# Patient Record
Sex: FEMALE | Race: BLACK OR AFRICAN AMERICAN | NOT HISPANIC OR LATINO | Employment: OTHER | URBAN - METROPOLITAN AREA
[De-identification: names, ages, dates, MRNs, and addresses within clinical notes are randomized per-mention and may not be internally consistent; named-entity substitution may affect disease eponyms.]

---

## 2017-01-20 RX ORDER — DILTIAZEM HYDROCHLORIDE 180 MG/1
180 CAPSULE, COATED, EXTENDED RELEASE ORAL DAILY
Qty: 90 CAPSULE | Refills: 0 | Status: SHIPPED | OUTPATIENT
Start: 2017-01-20 | End: 2017-04-19 | Stop reason: SDUPTHER

## 2017-01-20 RX ORDER — DILTIAZEM HYDROCHLORIDE 120 MG/1
120 CAPSULE, COATED, EXTENDED RELEASE ORAL DAILY
Qty: 90 CAPSULE | Refills: 0 | Status: SHIPPED | OUTPATIENT
Start: 2017-01-20 | End: 2017-04-19 | Stop reason: SDUPTHER

## 2017-02-01 ENCOUNTER — OFFICE VISIT (OUTPATIENT)
Dept: OPHTHALMOLOGY | Facility: CLINIC | Age: 67
End: 2017-02-01
Payer: MEDICARE

## 2017-02-01 DIAGNOSIS — H40.63X0 STEROID-INDUCED GLAUCOMA OF BOTH EYES: Primary | ICD-10-CM

## 2017-02-01 DIAGNOSIS — T38.0X5A STEROID-INDUCED GLAUCOMA OF BOTH EYES: Primary | ICD-10-CM

## 2017-02-01 DIAGNOSIS — D89.89 AUTOIMMUNE DISORDER: ICD-10-CM

## 2017-02-01 DIAGNOSIS — H04.123 BILATERAL DRY EYES: ICD-10-CM

## 2017-02-01 DIAGNOSIS — H25.13 NUCLEAR SCLEROSIS, BILATERAL: ICD-10-CM

## 2017-02-01 PROCEDURE — 99999 PR PBB SHADOW E&M-EST. PATIENT-LVL II: CPT | Mod: PBBFAC,,, | Performed by: OPHTHALMOLOGY

## 2017-02-01 PROCEDURE — 92012 INTRM OPH EXAM EST PATIENT: CPT | Mod: S$GLB,,, | Performed by: OPHTHALMOLOGY

## 2017-02-01 RX ORDER — BRIMONIDINE TARTRATE AND TIMOLOL MALEATE 2; 5 MG/ML; MG/ML
1 SOLUTION OPHTHALMIC 2 TIMES DAILY
Qty: 15 ML | Refills: 11 | Status: SHIPPED | OUTPATIENT
Start: 2017-02-01 | End: 2018-03-23 | Stop reason: SDUPTHER

## 2017-02-01 NOTE — PROGRESS NOTES
HPI     Glaucoma    Additional comments: 2 mon iritis chk           Comments   Patient states that vision seems about the same as last visit in both   eyes.       Last edited by Juan Fraga on 2/1/2017 10:17 AM. (History)            Assessment /Plan     For exam results, see Encounter Report.    Steroid-induced glaucoma of both eyes  -     brimonidine-timolol (COMBIGAN) 0.2-0.5 % Drop; Place 1 drop into both eyes 2 (two) times daily.  Dispense: 15 mL; Refill: 11  -     Posterior Segment OCT Optic Nerve- Both eyes    Nuclear sclerosis, bilateral    Bilateral dry eyes - Both Eyes    Autoimmune disorder- recurrent iritis          Steroid glaucoma  Re-discussed steroid use and elevated IOP response    Patient with Hx scleritis  Long term PF 1% use without steroid response in past  Switched from PF 1% --> Durezol q day in April 2/2 cost and lack of coverage  Presents 10/5/2016 45 OU, clear corneas and quiet --> patient felt blur in OS x 3 weeks / no pain        CCT  489 // 495    Low teens      Both eyes  Combigan TWICE DAILY --> tolerating --> ran out and restart      ANJANA --> Recurrent x 2 inpast  Likely PARISH  No Trauma / Masses etc    HESHAM OS  Inf limbus  Patient notes increased darkening  Flat without vessels  More prominent      Recurrent Scleritis OU  + GEORGIANA Lupus  Azathioprine -->  increased dose per Dr Kan      Off Oral Motrin 400 mg TWICE DAILY -> proteinuria     Dry Eye Syndrome: discussed use of warm compresses, preserved & non-preserved artificial tears, gel and PM ointment options.  Also discussed options utilizing medications.      Plan  RTC 3 months IOP / inflammation check  RTC sooner prn with good understanding

## 2017-02-01 NOTE — MR AVS SNAPSHOT
[facility] - Ophthalmology  [address] [facility] [address]  Phone: [phone]  Fax: [phone]                  Idalmis Morejon   2017 10:00 AM   Office Visit    Description:  Female : 1950   Provider:  Alfredito Valentine MD   Department:  [facility] - Ophthalmology           Reason for Visit     Glaucoma           Diagnoses this Visit        Comments    Steroid-induced glaucoma of both eyes    -  Primary     Nuclear sclerosis, bilateral         Bilateral dry eyes         Autoimmune disorder                To Do List           Future Appointments        Provider Department Dept Phone    2017 10:45 AM LAB, SAME DAY [facility] [phone]    2017 11:30 AM Bryan Love MD [facility] - Gastroenterology [phone]    3/3/2017 8:00 AM HRA, NOM 3 [facility] - Internal Medicine [phone]    5/3/2017 10:00 AM Alfredito Valentine MD [facility] - Ophthalmology [phone]      Goals (5 Years of Data)     None      Follow-Up and Disposition     Return in about 3 months (around 2017), or if symptoms worsen or fail to improve, for Pressure check.       These Medications        Disp Refills Start End    brimonidine-timolol (COMBIGAN) 0.2-0.5 % Drop 15 mL 11 2017     Place 1 drop into both eyes 2 (two) times daily. - Both Eyes    Pharmacy: [facility]/pharmacy #[id number] - [place], [place] - [address] Ph #: [phone]         [facility]     [facility] Nurse Care [facility] -  Assistance  Registered nurses in the [facility] provide clinical advisement, health education, appointment booking, and other advisory services.  Call for this free service at [phone].             Medications           Message regarding Medications     Verify the changes and/or additions to your medication regime listed below are the same as discussed with your clinician today.  If any of these changes or additions are incorrect, please notify your  healthcare provider.        START taking these NEW medications        Refills    brimonidine-timolol (COMBIGAN) 0.2-0.5 % Drop 11    Sig: Place 1 drop into both eyes 2 (two) times daily.    Class: Normal    Route: Both Eyes      STOP taking these medications     prednisoLONE acetate (PRED FORTE) 1 % DrpS Place 1 drop into both eyes every 4 (four) hours.           Verify that the below list of medications is an accurate representation of the medications you are currently taking.  If none reported, the list may be blank. If incorrect, please contact your healthcare provider. Carry this list with you in case of emergency.           Current Medications     azathioprine 75 mg Tab Take 75 mg by mouth once daily.    diltiaZEM (CARDIZEM CD) 120 MG Cp24 Take 1 capsule (120 mg total) by mouth once daily.    diltiaZEM (CARDIZEM CD) 180 MG 24 hr capsule Take 1 capsule (180 mg total) by mouth once daily.    duloxetine (CYMBALTA) 60 MG capsule TAKE 2 CAPSULES (120 MG TOTAL) BY MOUTH ONCE DAILY.    ferrous sulfate 325 mg (65 mg iron) Tab tablet Take 1 tablet (325 mg total) by mouth every 12 (twelve) hours.    losartan (COZAAR) 100 MG tablet Take 1 tablet (100 mg total) by mouth once daily.    pantoprazole (PROTONIX) 40 MG tablet Take 1 tablet (40 mg total) by mouth before breakfast.    POLYETHYLENE GLYCOL 3350 (MIRALAX ORAL)     promethazine (PHENERGAN) 25 MG tablet TAKE 1 TABLET BY MOUTH .DO NOT TAKE MORE THAN 2-3 WEEKLY FOR NAUSEA AND HEADACHE    tolterodine (DETROL LA) 4 MG 24 hr capsule TAKE ONE CAPSULE BY MOUTH EVERY DAY    brimonidine-timolol (COMBIGAN) 0.2-0.5 % Drop Place 1 drop into both eyes 2 (two) times daily.           Clinical Reference Information           Allergies as of 2/1/2017     Alendronate    Kenalog [Triamcinolone Acetonide]      Immunizations Administered on Date of Encounter - 2/1/2017     None      Orders Placed During Today's Visit      Normal Orders This Visit    Posterior Segment OCT Optic Nerve- Both  eyes

## 2017-02-08 ENCOUNTER — TELEPHONE (OUTPATIENT)
Dept: GASTROENTEROLOGY | Facility: CLINIC | Age: 67
End: 2017-02-08

## 2017-02-09 ENCOUNTER — OFFICE VISIT (OUTPATIENT)
Dept: GASTROENTEROLOGY | Facility: CLINIC | Age: 67
End: 2017-02-09
Payer: MEDICARE

## 2017-02-09 ENCOUNTER — LAB VISIT (OUTPATIENT)
Dept: LAB | Facility: HOSPITAL | Age: 67
End: 2017-02-09
Attending: INTERNAL MEDICINE
Payer: MEDICARE

## 2017-02-09 VITALS
WEIGHT: 146.63 LBS | BODY MASS INDEX: 23.57 KG/M2 | HEIGHT: 66 IN | SYSTOLIC BLOOD PRESSURE: 143 MMHG | HEART RATE: 66 BPM | DIASTOLIC BLOOD PRESSURE: 84 MMHG

## 2017-02-09 DIAGNOSIS — Z79.899 ENCOUNTER FOR MONITORING LONG-TERM PROTON PUMP INHIBITOR THERAPY: ICD-10-CM

## 2017-02-09 DIAGNOSIS — K21.9 GASTROESOPHAGEAL REFLUX DISEASE, ESOPHAGITIS PRESENCE NOT SPECIFIED: Primary | ICD-10-CM

## 2017-02-09 DIAGNOSIS — M81.0 OSTEOPOROSIS: ICD-10-CM

## 2017-02-09 DIAGNOSIS — Z80.0 FAMILY HISTORY OF COLON CANCER: ICD-10-CM

## 2017-02-09 DIAGNOSIS — Z51.81 ENCOUNTER FOR MONITORING LONG-TERM PROTON PUMP INHIBITOR THERAPY: ICD-10-CM

## 2017-02-09 LAB
ALBUMIN SERPL BCP-MCNC: 3.6 G/DL
ALP SERPL-CCNC: 63 U/L
ALT SERPL W/O P-5'-P-CCNC: 10 U/L
ANION GAP SERPL CALC-SCNC: 8 MMOL/L
AST SERPL-CCNC: 23 U/L
BASOPHILS # BLD AUTO: 0.01 K/UL
BASOPHILS NFR BLD: 0.3 %
BILIRUB SERPL-MCNC: 0.7 MG/DL
BUN SERPL-MCNC: 13 MG/DL
CALCIUM SERPL-MCNC: 9.2 MG/DL
CHLORIDE SERPL-SCNC: 108 MMOL/L
CO2 SERPL-SCNC: 26 MMOL/L
CREAT SERPL-MCNC: 1 MG/DL
CRP SERPL-MCNC: 1.9 MG/L
DIFFERENTIAL METHOD: ABNORMAL
EOSINOPHIL # BLD AUTO: 0.2 K/UL
EOSINOPHIL NFR BLD: 4.1 %
ERYTHROCYTE [DISTWIDTH] IN BLOOD BY AUTOMATED COUNT: 15.2 %
ERYTHROCYTE [SEDIMENTATION RATE] IN BLOOD BY WESTERGREN METHOD: 20 MM/HR
EST. GFR  (AFRICAN AMERICAN): >60 ML/MIN/1.73 M^2
EST. GFR  (NON AFRICAN AMERICAN): 58 ML/MIN/1.73 M^2
GLUCOSE SERPL-MCNC: 87 MG/DL
HCT VFR BLD AUTO: 32.7 %
HGB BLD-MCNC: 10.9 G/DL
LYMPHOCYTES # BLD AUTO: 1.1 K/UL
LYMPHOCYTES NFR BLD: 29.3 %
MCH RBC QN AUTO: 29.5 PG
MCHC RBC AUTO-ENTMCNC: 33.3 %
MCV RBC AUTO: 89 FL
MONOCYTES # BLD AUTO: 0.3 K/UL
MONOCYTES NFR BLD: 6.5 %
NEUTROPHILS # BLD AUTO: 2.3 K/UL
NEUTROPHILS NFR BLD: 59.8 %
PLATELET # BLD AUTO: 254 K/UL
PMV BLD AUTO: 10.4 FL
POTASSIUM SERPL-SCNC: 4.1 MMOL/L
PROT SERPL-MCNC: 7.2 G/DL
RBC # BLD AUTO: 3.69 M/UL
SODIUM SERPL-SCNC: 142 MMOL/L
WBC # BLD AUTO: 3.86 K/UL

## 2017-02-09 PROCEDURE — 1157F ADVNC CARE PLAN IN RCRD: CPT | Mod: S$GLB,,, | Performed by: INTERNAL MEDICINE

## 2017-02-09 PROCEDURE — 1159F MED LIST DOCD IN RCRD: CPT | Mod: S$GLB,,, | Performed by: INTERNAL MEDICINE

## 2017-02-09 PROCEDURE — 1160F RVW MEDS BY RX/DR IN RCRD: CPT | Mod: S$GLB,,, | Performed by: INTERNAL MEDICINE

## 2017-02-09 PROCEDURE — 99999 PR PBB SHADOW E&M-EST. PATIENT-LVL III: CPT | Mod: PBBFAC,,, | Performed by: INTERNAL MEDICINE

## 2017-02-09 PROCEDURE — 99213 OFFICE O/P EST LOW 20 MIN: CPT | Mod: S$GLB,,, | Performed by: INTERNAL MEDICINE

## 2017-02-09 PROCEDURE — 3079F DIAST BP 80-89 MM HG: CPT | Mod: S$GLB,,, | Performed by: INTERNAL MEDICINE

## 2017-02-09 PROCEDURE — 3077F SYST BP >= 140 MM HG: CPT | Mod: S$GLB,,, | Performed by: INTERNAL MEDICINE

## 2017-02-09 PROCEDURE — 99499 UNLISTED E&M SERVICE: CPT | Mod: S$GLB,,, | Performed by: INTERNAL MEDICINE

## 2017-02-09 PROCEDURE — 1126F AMNT PAIN NOTED NONE PRSNT: CPT | Mod: S$GLB,,, | Performed by: INTERNAL MEDICINE

## 2017-02-09 NOTE — PROGRESS NOTES
CHIEF COMPLAINT: Follow up of Epigastric pain for the last eight months.      HISTORY OF PRESENT ILLNESS: This is a 66-year-old -American female who   has been worked up by Rheumatology for possible lupus, not definitive. She  Was seen in   GI clinic last on 8/16/2016  for 8 months of epigastric pain which would usually occurs right after eating,   lasted 5 to 15 minutes and then would go away. She denied any weight loss as a   result of this. She has had no trauma to her belly. She has never had this   pain before.  She had EGD and EUS for evaluation of these symptoms and it was normal and her  Symptoms have completely resolved on their own and now she feels great and no problems.      REVIEW OF SYSTEMS:  CONSTITUTIONAL: No fever or fatigue. No weight loss.  EYES: No visual disturbances.  ENT: No difficulty swallowing or sore throat. No odynophagia. No dysphagia.  CARDIOVASCULAR: No chest pain or palpitations.  RESPIRATORY: No shortness of breath.  MUSCULOSKELETAL: She does have some chronic arthritis.  SKIN: No itching or rash.  NEUROLOGIC: No headache, syncope or stroke.  PSYCHIATRIC: No uncontrolled depression or anxiety.  ENDOCRINE: No cold or heat intolerance.  LYMPHATICS: No lymphadenopathy.  GI: No nausea or vomiting. No heartburn. No epigastric pain. No early satiety.   No blood in her stool. Averaging one bowel   movement every other day. She does take MiraLax.      ALLERGIES: She is allergic to Kenalog and alendronate.      PAST MEDICAL HISTORY: Positive for hypertension, depression, chronic back pain,  anemia, sickle cell trait and possible lupus, but not definitive.      PAST SURGICAL HISTORY: Bilateral salpingo-oophorectomy, hysterectomy back in   1970, cholecystectomy in 1997, appendectomy, tonsillectomy, lumbar surgery with   fusion.      FAMILY HISTORY: Mom had two brothers with colorectal cancer, one sister with   colorectal cancer in her 70s. No first or other second-degree relatives with  "  colorectal cancer. Nobody with colon cancer under the age of 50. No Holly   syndrome. No FAP, no attenuated FAP, no MAP. No other GI malignancies. She   did have a brother who  of an MI in his 60, he was a diabetic, on dialysis.   Her mom lived past age 86. Her dad  in his 80s.      SOCIAL HISTORY: Nonsmoker, nondrinker. She has been disabled secondary to   depression since . She was  once,  in , has one daughter  who is about 48.5 years of age.      PHYSICAL EXAMINATION:  Visit Vitals    BP (!) 143/84    Pulse 66    Ht 5' 6" (1.676 m)    Wt 66.5 kg (146 lb 9.7 oz)    BMI 23.66 kg/m2     .GENERAL APPEARANCE: Well nourished, well developed, not in any acute distress.   Chaperone in the room, Alexa.  ABDOMEN: Soft, no guarding. Mild tenderness in the epigastric area. No Mallory  sign. No McBurney point tenderness. No CVA tenderness. Nondistended.   Normoactive bowel sounds. No appreciative ascites or hernias.  EYES: Conjunctivae are nonicteric.  CARDIOVASCULAR: S1 and S2 without murmurs.  RESPIRATORY: Clear to auscultation bilaterally without wheezes, rhonchi or   rales.  SKIN: No petechiae or rash on exposed skin areas.  NEUROLOGIC: Alert and oriented x4.  PSYCHIATRIC: Normal speech, mentation and affect.  LYMPHATICS: No cervical or supraclavicular lymphadenopathy.      MEDICAL DECISION MAKING: As above. Labs have been reviewed. The patient's   last colonoscopy on 10/02/2015 reviewed. The patient had 3 mm colon polyp.   Recommend a repeat colonoscopy in five years for surveillance. EGD on   2015, shows mild Schatzki ring.      Lab work shows a last hemoglobin was 12 but has sickle cell trait, it   has been stable. Comprehensive metabolic panel shows no elevated LFTs, lipase   is normal, ferritin is normal.    EGD and EUS   Impression:   - Normal esophagus.                        - Normal stomach.                        - Normal examined duodenum.                        - " There was no sign of significant pathology in the                         common bile duct.                        - There was no sign of significant pathology in the                         entire pancreas.                        - No specimens collected.  Recommendation:       - Discharge patient to home (ambulatory).                        - Resume previous diet; Discharge to home                         (ambulatory); Resume outpatient medications                        - Return to referring physician as previously                         scheduled.  Attending Participation:       I personally performed the entire procedure.  Arthur Partida MD  10/10/2016 11:50:11 AM      IMPRESSION AND PLAN:  1. Epigastric pain resolved on its own and patient feels great.  CT scan of the abdomen and pelvis didn't get good look at pancreas she is   So she had EGD and EUS for further evaluation and was reported as normal by Dr. Partida.   2. Long-term PPI use. She knows to get a vitamin B12, vitamin D and magnesium   once yearly and periodic bone densities. She was offered to drop down to 20mg once daily of   Her PPI but she doesn't think she can do that due to her symptoms.  3. Family history of colon cancer. Not meeting Holly screening criteria. The   patient with a history of adenomatous colon polyp. Recommend her next   colonoscopy in 5 years from her last, should be October 2020.   4. Recommend the patient return to GI Clinic in once yearly on long term PPI meds.

## 2017-03-02 ENCOUNTER — PATIENT MESSAGE (OUTPATIENT)
Dept: OPHTHALMOLOGY | Facility: CLINIC | Age: 67
End: 2017-03-02

## 2017-04-19 ENCOUNTER — TELEPHONE (OUTPATIENT)
Dept: INTERNAL MEDICINE | Facility: CLINIC | Age: 67
End: 2017-04-19

## 2017-04-19 ENCOUNTER — PATIENT MESSAGE (OUTPATIENT)
Dept: INTERNAL MEDICINE | Facility: CLINIC | Age: 67
End: 2017-04-19

## 2017-04-19 DIAGNOSIS — F32.9 MAJOR DEPRESSION, CHRONIC: ICD-10-CM

## 2017-04-19 DIAGNOSIS — Z79.899 ENCOUNTER FOR MONITORING PROTON PUMP INHIBITOR THERAPY: ICD-10-CM

## 2017-04-19 DIAGNOSIS — K21.9 GASTROESOPHAGEAL REFLUX DISEASE, ESOPHAGITIS PRESENCE NOT SPECIFIED: ICD-10-CM

## 2017-04-19 DIAGNOSIS — E78.2 MIXED HYPERLIPIDEMIA: ICD-10-CM

## 2017-04-19 DIAGNOSIS — I10 ESSENTIAL HYPERTENSION: Primary | ICD-10-CM

## 2017-04-19 DIAGNOSIS — Z51.81 ENCOUNTER FOR MONITORING PROTON PUMP INHIBITOR THERAPY: ICD-10-CM

## 2017-04-19 DIAGNOSIS — R10.13 EPIGASTRIC PAIN: ICD-10-CM

## 2017-04-19 DIAGNOSIS — E55.9 MILD VITAMIN D DEFICIENCY: ICD-10-CM

## 2017-04-19 DIAGNOSIS — D53.9 UNSPECIFIED DEFICIENCY ANEMIA: ICD-10-CM

## 2017-04-19 RX ORDER — DILTIAZEM HYDROCHLORIDE 180 MG/1
CAPSULE, COATED, EXTENDED RELEASE ORAL
Qty: 90 CAPSULE | Refills: 0 | Status: SHIPPED | OUTPATIENT
Start: 2017-04-19 | End: 2017-07-16 | Stop reason: SDUPTHER

## 2017-04-19 RX ORDER — DILTIAZEM HYDROCHLORIDE 120 MG/1
CAPSULE, COATED, EXTENDED RELEASE ORAL
Qty: 90 CAPSULE | Refills: 0 | Status: SHIPPED | OUTPATIENT
Start: 2017-04-19 | End: 2017-07-16 | Stop reason: SDUPTHER

## 2017-04-19 RX ORDER — PANTOPRAZOLE SODIUM 40 MG/1
40 TABLET, DELAYED RELEASE ORAL
Qty: 90 TABLET | Refills: 3 | Status: SHIPPED | OUTPATIENT
Start: 2017-04-19 | End: 2018-05-04 | Stop reason: SDUPTHER

## 2017-05-03 ENCOUNTER — OFFICE VISIT (OUTPATIENT)
Dept: OPHTHALMOLOGY | Facility: CLINIC | Age: 67
End: 2017-05-03
Payer: MEDICARE

## 2017-05-03 DIAGNOSIS — H25.13 NUCLEAR SCLEROSIS, BILATERAL: ICD-10-CM

## 2017-05-03 DIAGNOSIS — T38.0X5A STEROID-INDUCED GLAUCOMA OF BOTH EYES: ICD-10-CM

## 2017-05-03 DIAGNOSIS — H40.053 OCULAR HYPERTENSION, BILATERAL: Primary | ICD-10-CM

## 2017-05-03 DIAGNOSIS — H40.63X0 STEROID-INDUCED GLAUCOMA OF BOTH EYES: ICD-10-CM

## 2017-05-03 DIAGNOSIS — H04.123 BILATERAL DRY EYES: ICD-10-CM

## 2017-05-03 PROCEDURE — 99999 PR PBB SHADOW E&M-EST. PATIENT-LVL II: CPT | Mod: PBBFAC,,, | Performed by: OPHTHALMOLOGY

## 2017-05-03 PROCEDURE — 99499 UNLISTED E&M SERVICE: CPT | Mod: S$GLB,,, | Performed by: OPHTHALMOLOGY

## 2017-05-03 PROCEDURE — 92012 INTRM OPH EXAM EST PATIENT: CPT | Mod: S$GLB,,, | Performed by: OPHTHALMOLOGY

## 2017-05-03 NOTE — PROGRESS NOTES
HPI     DLS: 2/1/17    Pt here for 3 month IOP check. Pt without complaints today OU .  Pt denies   eye pain OU.     Combigan BID OU  Systane PRN OU        Last edited by Jessica Gould MA on 5/3/2017  9:39 AM.         Assessment /Plan     For exam results, see Encounter Report.    Ocular hypertension, bilateral  -     Rojas Visual Field - OU - Extended - Both Eyes; Future  -     Posterior Segment OCT Optic Nerve- Both eyes; Future    Steroid-induced glaucoma of both eyes    Bilateral dry eyes - Both Eyes    Nuclear sclerosis, bilateral            Steroid glaucoma  Re-discussed steroid use and elevated IOP response    Patient with Hx scleritis  Long term PF 1% use without steroid response in past  Switched from PF 1% --> Durezol q day in April 2/2 cost and lack of coverage  Presents 10/5/2016 43 OU, clear corneas and quiet --> patient felt blur in OS x 3 weeks / no pain        CCT  489 // 495    Low teens      Both eyes  Combigan TWICE DAILY      ANJANA --> Recurrent x 2 inpast  Likely PARISH  No Trauma / Masses etc    HESHAM OS  Inf limbus  Patient notes increased darkening  Flat without vessels        Recurrent Scleritis OU  + GEORGIANA Lupus  Azathioprine -->  increased dose per Dr Kan      Off Oral Motrin 400 mg TWICE DAILY -> proteinuria     Dry Eye Syndrome: discussed use of warm compresses, preserved & non-preserved artificial tears, gel and PM ointment options.  Also discussed options utilizing medications.      Plan  RTC 4 months IOP & DFE / HVF and OCT RNFL  RTC sooner prn with good understanding

## 2017-05-03 NOTE — MR AVS SNAPSHOT
[facility] - Ophthalmology  [address] [address]  Phone: [phone]  Fax: [phone]                  Idalmis Morejon   5/3/2017 10:00 AM   Office Visit    Description:  Female : 1950   Provider:  Alfredito Valentine MD   Department:  [facility] - Ophthalmology           Reason for Visit     Glaucoma           Diagnoses this Visit        Comments    Ocular hypertension, bilateral    -  Primary     Steroid-induced glaucoma of both eyes         Bilateral dry eyes         Nuclear sclerosis, bilateral                To Do List           Future Appointments        Provider Department Dept Phone    5/3/2017 10:00 AM Alfredito Valentine MD [facility] Ophthalmology [phone]    2017 8:30 AM LAB, APPOINTMENT [place] [facility] [phone]    2017 8:35 AM SPECIMEN, [facility] [phone]    2017 9:30 AM Gian Sena MD [facility] Nephrology [phone]    7/10/2017 7:30 AM LAB, APPOINTMENT [facility] [phone]      Goals (5 Years of Data)     None      Follow-Up and Disposition     Return in about 4 months (around 9/3/2017), or if symptoms worsen or fail to improve, for Pressure, Dilate, HVF & OCT RNFL.      [facility]     [facility] Care [facility] -  Assistance  Unless otherwise directed by your provider, please contact [facility], our nurse care line that is available for  assistance.     Registered nurses in the [facility] provide: appointment scheduling, clinical advisement, health education, and other advisory services.  Call: [phone] (toll free)               Medications           Message regarding Medications     Verify the changes and/or additions to your medication regime listed below are the same as discussed with your clinician today.  If any of these changes or additions are incorrect, please notify your healthcare  provider.             Verify that the below list of medications is an accurate representation of the medications you are currently taking.  If none reported, the list may be blank. If incorrect, please contact your healthcare provider. Carry this list with you in case of emergency.           Current Medications     azathioprine 75 mg Tab Take 75 mg by mouth once daily.    brimonidine-timolol (COMBIGAN) 0.2-0.5 % Drop Place 1 drop into both eyes 2 (two) times daily.    diltiaZEM (CARDIZEM CD) 120 MG Cp24 TAKE 1 CAPSULE (120 MG TOTAL) BY MOUTH ONCE DAILY.    diltiaZEM (CARDIZEM CD) 180 MG 24 hr capsule TAKE 1 CAPSULE (180 MG TOTAL) BY MOUTH ONCE DAILY.    duloxetine (CYMBALTA) 60 MG capsule TAKE 2 CAPSULES (120 MG TOTAL) BY MOUTH ONCE DAILY.    ferrous sulfate 325 mg (65 mg iron) Tab tablet Take 1 tablet (325 mg total) by mouth every 12 (twelve) hours.    losartan (COZAAR) 100 MG tablet Take 1 tablet (100 mg total) by mouth once daily.    pantoprazole (PROTONIX) 40 MG tablet Take 1 tablet (40 mg total) by mouth before breakfast.    POLYETHYLENE GLYCOL 3350 (MIRALAX ORAL)     promethazine (PHENERGAN) 25 MG tablet TAKE 1 TABLET BY MOUTH .DO NOT TAKE MORE THAN 2-3 WEEKLY FOR NAUSEA AND HEADACHE    tolterodine (DETROL LA) 4 MG 24 hr capsule TAKE ONE CAPSULE BY MOUTH EVERY DAY           Clinical Reference Information           Allergies as of 5/3/2017     Alendronate    Kenalog [Triamcinolone Acetonide]      Immunizations Administered on Date of Encounter - 5/3/2017     None      Orders Placed During Today's Visit     Future Labs/Procedures Expected by Expires    Rojas Visual Field - OU - Extended - Both Eyes  As directed 5/3/2018    Posterior Segment OCT Optic Nerve- Both eyes  As directed 5/3/2018      Language Assistance Services     ATTENTION: Language assistance services are available, free of charge. Please call [phone].      ATENCIÓN: Si theresa brar, tiene a garcia disposición servicios gratuitos de  asistencia lingüística. kandice al [phone].     NOHEMI Ý: N?u b?n nói Ti?ng Vi?t, có các d?ch v? h? tr? ngôn ng? mi?n phí dành cho b?n. G?i s? [phone].         [facility] complies with applicable Federal civil rights laws and does not discriminate on the basis of race, color, national origin, age, disability, or sex.

## 2017-05-10 ENCOUNTER — OFFICE VISIT (OUTPATIENT)
Dept: PSYCHIATRY | Facility: CLINIC | Age: 67
End: 2017-05-10
Payer: MEDICARE

## 2017-05-10 VITALS
SYSTOLIC BLOOD PRESSURE: 160 MMHG | WEIGHT: 147 LBS | BODY MASS INDEX: 23.63 KG/M2 | DIASTOLIC BLOOD PRESSURE: 78 MMHG | HEIGHT: 66 IN | HEART RATE: 75 BPM

## 2017-05-10 DIAGNOSIS — F33.41 MAJOR DEPRESSIVE DISORDER, RECURRENT, IN PARTIAL REMISSION: Primary | ICD-10-CM

## 2017-05-10 DIAGNOSIS — F40.298 SPECIFIC PHOBIA: ICD-10-CM

## 2017-05-10 PROCEDURE — 1160F RVW MEDS BY RX/DR IN RCRD: CPT | Mod: S$GLB,,, | Performed by: PSYCHIATRY & NEUROLOGY

## 2017-05-10 PROCEDURE — 3077F SYST BP >= 140 MM HG: CPT | Mod: S$GLB,,, | Performed by: PSYCHIATRY & NEUROLOGY

## 2017-05-10 PROCEDURE — 99499 UNLISTED E&M SERVICE: CPT | Mod: S$GLB,,, | Performed by: PSYCHIATRY & NEUROLOGY

## 2017-05-10 PROCEDURE — 1159F MED LIST DOCD IN RCRD: CPT | Mod: S$GLB,,, | Performed by: PSYCHIATRY & NEUROLOGY

## 2017-05-10 PROCEDURE — 99999 PR PBB SHADOW E&M-EST. PATIENT-LVL II: CPT | Mod: PBBFAC,,, | Performed by: PSYCHIATRY & NEUROLOGY

## 2017-05-10 PROCEDURE — 90833 PSYTX W PT W E/M 30 MIN: CPT | Mod: S$GLB,,, | Performed by: PSYCHIATRY & NEUROLOGY

## 2017-05-10 PROCEDURE — 3078F DIAST BP <80 MM HG: CPT | Mod: S$GLB,,, | Performed by: PSYCHIATRY & NEUROLOGY

## 2017-05-10 PROCEDURE — 99213 OFFICE O/P EST LOW 20 MIN: CPT | Mod: S$GLB,,, | Performed by: PSYCHIATRY & NEUROLOGY

## 2017-05-10 RX ORDER — DULOXETIN HYDROCHLORIDE 60 MG/1
CAPSULE, DELAYED RELEASE ORAL
Qty: 60 CAPSULE | Refills: 6 | Status: SHIPPED | OUTPATIENT
Start: 2017-05-10 | End: 2018-01-04 | Stop reason: SDUPTHER

## 2017-05-10 NOTE — PROGRESS NOTES
"Outpatient Psychiatry Follow-Up Visit (MD/NP)    5/10/2017    Clinical Status of Patient:  Outpatient (Ambulatory)    Chief Complaint:  Idalmis Morejon is a 66 y.o. female who presents today for follow-up of depression and anxiety.      Met with patient.      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  "Waiting for Mother's da to come and go."  She reports she was diagnosed with steroid induced glaucoma in her left eye.  It is improving.      Mood has been up and down starting a few weeks ago.  She will feel depressed for a day or so at times.  Wonders if it is thinkin too much about mothers day.  She still has not disposed of her mothers clothes or medications.      Eating well.  Awakens at 2 am and struggles to return to sleep.  She is trying not to take zzquil.  She takes it twice a week.  She had one crying spell when she states she was reliving her mothers death.      Psychotherapy:  · Target symptoms: depression, anxiety   · Why chosen therapy is appropriate versus another modality: relevant to diagnosis  · Outcome monitoring methods: self-report, observation  · Therapeutic intervention type: supportive psychotherapy  · Topics discussed/themes: nephew's living situation, illness/death of a loved one, stress related to medical comorbidities, symptom recognition  · The patient's response to the intervention is motivated. The patient's progress toward treatment goals is good.   · Duration of intervention: 16 mins    Review of Systems   · PSYCHIATRIC: Pertinant items are noted in the narrative.    Past Medical, Family and Social History: The patient's past medical, family and social history have been reviewed and updated as appropriate within the electronic medical record - see encounter notes.    Compliance: yes    Side effects: possible headache    Risk Parameters:  Patient reports no " "suicidal ideation  Patient reports no homicidal ideation  Patient reports no self-injurious behavior  Patient reports no violent behavior    Exam (detailed: at least 9 elements; comprehensive: all 15 elements)   Constitutional  Vitals:  Most recent vital signs, dated less than 90 days prior to this appointment, were reviewed.    Vitals:    05/10/17 0810   BP: (!) 160/78   Pulse: 75   Weight: 66.7 kg (147 lb)   Height: 5' 6" (1.676 m)        General:  unremarkable, age appropriate, neatly groomed, well nourished     Musculoskeletal  Muscle Strength/Tone:  no dyskinesia, no tremor   Gait & Station:  non-ataxic     Psychiatric  Speech:  normal tone, normal rate, normal pitch, normal volume, prosody intact   Mood:    Affect:  euthymic  appropriate, full   Thought Process:  normal and logical   Associations:  intact   Thought Content:  normal, no suicidality, no homicidality, delusions, or paranoia   Insight  Judgement:  good, patient has awareness of illness  Patient's behavior is adequate to circumstances   Orientation:  grossly intact   Memory: intact for content of interview   Language: grossly intact   Attention Span & Concentration:  able to focus   Fund of Knowledge:  intact and appropriate to age and level of education     Assessment and Diagnosis   Status/Progress: Based on the examination today, the patient's problem(s) is/are improved.  New problems have not been presented today.   Comorbidities are complicating management of the primary condition.  There are no active rule-out diagnoses for this patient at this time.    General Impression: Pt with depression and anxiety as well as multiple other medical comorbidities.  She is still affected by the death of her mother in March 2014.      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      Intervention/Counseling/Treatment Plan   · Continue cymbalta 120 mg  · Consider trial of trazodone instead of benadryl      Return to Clinic: 6 " months

## 2017-06-13 ENCOUNTER — LAB VISIT (OUTPATIENT)
Dept: LAB | Facility: HOSPITAL | Age: 67
End: 2017-06-13
Attending: INTERNAL MEDICINE
Payer: MEDICARE

## 2017-06-13 DIAGNOSIS — Z51.81 ENCOUNTER FOR MONITORING LONG-TERM PROTON PUMP INHIBITOR THERAPY: ICD-10-CM

## 2017-06-13 DIAGNOSIS — Z80.0 FAMILY HISTORY OF COLON CANCER: ICD-10-CM

## 2017-06-13 DIAGNOSIS — K21.9 GASTROESOPHAGEAL REFLUX DISEASE, ESOPHAGITIS PRESENCE NOT SPECIFIED: ICD-10-CM

## 2017-06-13 DIAGNOSIS — Z79.899 ENCOUNTER FOR MONITORING LONG-TERM PROTON PUMP INHIBITOR THERAPY: ICD-10-CM

## 2017-06-13 DIAGNOSIS — I10 ESSENTIAL HYPERTENSION: ICD-10-CM

## 2017-06-13 DIAGNOSIS — R80.9 PROTEINURIA: ICD-10-CM

## 2017-06-13 LAB
25(OH)D3+25(OH)D2 SERPL-MCNC: 37 NG/ML
ALBUMIN SERPL BCP-MCNC: 3.7 G/DL
ANION GAP SERPL CALC-SCNC: 10 MMOL/L
ANION GAP SERPL CALC-SCNC: 10 MMOL/L
BUN SERPL-MCNC: 13 MG/DL
BUN SERPL-MCNC: 13 MG/DL
CALCIUM SERPL-MCNC: 9.9 MG/DL
CALCIUM SERPL-MCNC: 9.9 MG/DL
CHLORIDE SERPL-SCNC: 109 MMOL/L
CHLORIDE SERPL-SCNC: 109 MMOL/L
CO2 SERPL-SCNC: 28 MMOL/L
CO2 SERPL-SCNC: 28 MMOL/L
CREAT SERPL-MCNC: 1 MG/DL
CREAT SERPL-MCNC: 1 MG/DL
EST. GFR  (AFRICAN AMERICAN): >60 ML/MIN/1.73 M^2
EST. GFR  (AFRICAN AMERICAN): >60 ML/MIN/1.73 M^2
EST. GFR  (NON AFRICAN AMERICAN): 58 ML/MIN/1.73 M^2
EST. GFR  (NON AFRICAN AMERICAN): 58 ML/MIN/1.73 M^2
GLUCOSE SERPL-MCNC: 92 MG/DL
GLUCOSE SERPL-MCNC: 92 MG/DL
MAGNESIUM SERPL-MCNC: 2.3 MG/DL
PHOSPHATE SERPL-MCNC: 3.2 MG/DL
POTASSIUM SERPL-SCNC: 4.4 MMOL/L
POTASSIUM SERPL-SCNC: 4.4 MMOL/L
SODIUM SERPL-SCNC: 147 MMOL/L
SODIUM SERPL-SCNC: 147 MMOL/L
VIT B12 SERPL-MCNC: 865 PG/ML

## 2017-06-13 PROCEDURE — 36415 COLL VENOUS BLD VENIPUNCTURE: CPT

## 2017-06-13 PROCEDURE — 82607 VITAMIN B-12: CPT

## 2017-06-13 PROCEDURE — 83735 ASSAY OF MAGNESIUM: CPT

## 2017-06-13 PROCEDURE — 80069 RENAL FUNCTION PANEL: CPT

## 2017-06-13 PROCEDURE — 82306 VITAMIN D 25 HYDROXY: CPT

## 2017-06-19 ENCOUNTER — TELEPHONE (OUTPATIENT)
Dept: GASTROENTEROLOGY | Facility: CLINIC | Age: 67
End: 2017-06-19

## 2017-06-19 NOTE — TELEPHONE ENCOUNTER
----- Message from Bryan Love MD sent at 6/18/2017  5:26 PM CDT -----  Alexa - please tell Idalmis that her serum sodium is just slightly elevated and periodically over the years it appears to be normal and slightly elevated and not sure of any clinic significance but recommend she follow up this with her primary care MD if not done so.

## 2017-06-28 ENCOUNTER — OFFICE VISIT (OUTPATIENT)
Dept: NEPHROLOGY | Facility: CLINIC | Age: 67
End: 2017-06-28
Payer: MEDICARE

## 2017-06-28 VITALS
SYSTOLIC BLOOD PRESSURE: 150 MMHG | HEART RATE: 78 BPM | WEIGHT: 147.69 LBS | BODY MASS INDEX: 23.74 KG/M2 | HEIGHT: 66 IN | OXYGEN SATURATION: 98 % | DIASTOLIC BLOOD PRESSURE: 80 MMHG

## 2017-06-28 DIAGNOSIS — I12.9 HYPERTENSIVE CKD (CHRONIC KIDNEY DISEASE): ICD-10-CM

## 2017-06-28 DIAGNOSIS — E87.0 HYPERNATREMIA: ICD-10-CM

## 2017-06-28 DIAGNOSIS — I10 ESSENTIAL HYPERTENSION: ICD-10-CM

## 2017-06-28 DIAGNOSIS — N18.2 CKD (CHRONIC KIDNEY DISEASE), STAGE II: Primary | ICD-10-CM

## 2017-06-28 PROCEDURE — 99999 PR PBB SHADOW E&M-EST. PATIENT-LVL III: CPT | Mod: PBBFAC,,, | Performed by: INTERNAL MEDICINE

## 2017-06-28 PROCEDURE — 99499 UNLISTED E&M SERVICE: CPT | Mod: S$GLB,,, | Performed by: INTERNAL MEDICINE

## 2017-06-28 PROCEDURE — 1159F MED LIST DOCD IN RCRD: CPT | Mod: S$GLB,,, | Performed by: INTERNAL MEDICINE

## 2017-06-28 PROCEDURE — 99214 OFFICE O/P EST MOD 30 MIN: CPT | Mod: S$GLB,,, | Performed by: INTERNAL MEDICINE

## 2017-06-28 PROCEDURE — 1126F AMNT PAIN NOTED NONE PRSNT: CPT | Mod: S$GLB,,, | Performed by: INTERNAL MEDICINE

## 2017-06-28 NOTE — PROGRESS NOTES
"Subjective:       Patient ID: Idalmis Morejon is a 66 y.o. Black or  female who presents for follow up of Chronic Kidney Disease    HPI     Ms. Morejon is seen for follow up on CKD. She established care with our clinic in 10/16, was seen in NP clinic in 11/16. Pt has longstanding hypertension, Schatzki's ring, DJD, sciatica, osteoporosis, hyperlipidemia, depression and multiple other medical problems. She reports having hypertension for "decades". She reports her mother and brother had longterm hypertension. They both had kidney disease and her brother had to be on dialysis. Pt admits to prior heavy use of various NSAID like Ibuprofen, goody powder and other compounds. She was still using NSAID until later part of 2016.    She denies any dysuria/ decreased urination/ leg swelling/ flank pain/ hematuria/ nausea/ vomiting/ diarrhea. She has been on losartan containing regimen. Her lab trend noted for baseline creatinine of 1 to 1.2. She has minimal amount of protein on prior urine studies, on prior quantification it was 0.1 to 0.2.     She denies any recent acute illness. She admits she does not drink enough water. She admits to drinking may be 3-4 cups of water daily. She denies any diarrhea. She denies any dizziness or postural symptoms. She did not bring her home BP readings.    Most recent renal labs from 6/13/17 noted for Na 147, K 4.4, bicarbonate 28, BUN 13, creatinine 1.0, eGFR > 60, Ca 9.9,prior . She has chronic anemia. Urinalysis shows hazy urine, urine PC ratio 0.11. Remote hep C screen was negative. Prior CT imaging showed hypodensity suggestive of possible cysts on kidneys.    Review of Systems   Constitutional: Negative for activity change, appetite change, chills, fatigue and fever.   HENT: Negative for ear discharge, ear pain, sneezing and sore throat.    Eyes: Negative for redness.   Respiratory: Negative for cough, shortness of breath and wheezing.    Cardiovascular: Negative for " chest pain and leg swelling.   Gastrointestinal: Negative for abdominal distention, abdominal pain, diarrhea, nausea and vomiting.   Endocrine: Negative for polydipsia and polyuria.   Genitourinary: Negative for decreased urine volume, difficulty urinating, dysuria, flank pain, frequency and hematuria.   Musculoskeletal: Positive for arthralgias and back pain.   Skin: Negative for pallor and rash.   Allergic/Immunologic: Negative for food allergies.   Neurological: Negative for dizziness, light-headedness and headaches.   Psychiatric/Behavioral: Negative for behavioral problems. The patient is not nervous/anxious.        Objective:      Physical Exam   Constitutional: She is oriented to person, place, and time. She appears well-developed and well-nourished. No distress.   HENT:   Head: Normocephalic and atraumatic.   Eyes: Right eye exhibits no discharge. Left eye exhibits no discharge.   Neck: Neck supple.   Cardiovascular: Normal rate, regular rhythm and normal heart sounds.    No murmur heard.  Pulmonary/Chest: Effort normal and breath sounds normal. No respiratory distress. She has no wheezes. She has no rales.   Abdominal: Soft. There is no tenderness.   Musculoskeletal: She exhibits no edema.   Neurological: She is alert and oriented to person, place, and time.   Skin: Skin is warm and dry.   Psychiatric: She has a normal mood and affect. Her behavior is normal. Thought content normal.   Nursing note and vitals reviewed.      Assessment:       1. CKD (chronic kidney disease), stage II    2. Essential hypertension    3. Hypernatremia    4. Hypertensive CKD (chronic kidney disease)      Plan:     Ms. Morejon has longstanding hypertension, prior heavy use of NSAID, hyperlipidemia, CTD and multiple other medical problems. Pt appears to have CKD II/III and minimal proteinuria per prior urine PC ratio. It could be related to longstanding hypertension and heavy use of NSAID. I discussed with her in detail about plenty  of oral fluids, low salt diet, monitoring BP at home, avoiding all types of NSAID, avoid high dose of vitamin C as it can promote GI irritation and kidney stone formation, periodically following on renal labs. Her family members likely had hypertensive glomerulosclerosis.     Encourage increased oral intake of water and follow up BMP in 3-4 weeks for electrolytes. She expressed understanding.     Plan  - periodically monitor renal panel for electrolytes, acid base status, eGFR  - CKD staging and potential risk of CKD progression d/w patient  - follow low salt diet  - follow PTH levels, vit D, elevated PTH seems out of proportion to her CKD stage, suspect primary hyperparathyroidism rather than secondary  - follow urine studies for proteinuria, quantify proteinuria by spot urine protein/ creatinine ratio  - obtain US kidney for kidney size, rule out mass/ cyst  - avoid NSAID/ bactrim/ IV contrast/ gadolinium/ aminoglycoside/ Fleet enema where possible  - continue ARBI containing regimen for RAAS blockade. She has been on losartan   - defer to her GI if she can come off protonix, given her complicated GI symptoms and problems not sure if it would be possible  - discussed with patient that Tramadol, Tylenol upto 2 grams per day in divided doses is still a safe option for her chronic pain    Plan, labs, recommendations were discussed with patient, her questions were answered to her satisfaction.   RTC 4 months

## 2017-06-29 ENCOUNTER — HOSPITAL ENCOUNTER (EMERGENCY)
Facility: HOSPITAL | Age: 67
Discharge: HOME | End: 2017-06-29
Attending: EMERGENCY MEDICINE | Admitting: EMERGENCY MEDICINE
Payer: MEDICARE

## 2017-06-29 VITALS
SYSTOLIC BLOOD PRESSURE: 174 MMHG | TEMPERATURE: 98 F | HEIGHT: 66 IN | DIASTOLIC BLOOD PRESSURE: 84 MMHG | OXYGEN SATURATION: 100 % | RESPIRATION RATE: 16 BRPM | HEART RATE: 71 BPM | WEIGHT: 140 LBS | BODY MASS INDEX: 22.5 KG/M2

## 2017-06-29 DIAGNOSIS — S90.31XA CONTUSION OF FOOT, RIGHT: Primary | ICD-10-CM

## 2017-06-29 DIAGNOSIS — M79.671 RIGHT FOOT PAIN: ICD-10-CM

## 2017-06-29 PROCEDURE — 99282 EMERGENCY DEPT VISIT SF MDM: CPT | Mod: ,,, | Performed by: EMERGENCY MEDICINE

## 2017-06-29 PROCEDURE — 99283 EMERGENCY DEPT VISIT LOW MDM: CPT

## 2017-06-29 NOTE — ED PROVIDER NOTES
Encounter Date: 6/29/2017    SCRIBE #1 NOTE: I, Kirk Tanner, am scribing for, and in the presence of,  Dr. Laird. I have scribed the entire note.       History     Chief Complaint   Patient presents with    Foot Injury     Patient dropped container of frozen food on right foot x3 days ago, swelling noted to the medial aspect of foot     Time patient was seen by the provider: 10:53 AM      The patient is a 66 y.o. female with hx of: CKD, HTN, sciatica that presents to the ED with a complaint of R foot pain x 3 days. Pt reports that a container fell on her R foot 3 days ago now endorses worsening swelling, tingling and pain with weight bearing. Pt reports no recent trauma to the foot. Pt has not taken any medications for the pain.           Review of patient's allergies indicates:   Allergen Reactions    Alendronate Nausea Only     And heartburn    Kenalog [triamcinolone acetonide] Hives     Past Medical History:   Diagnosis Date    Abnormal chest CT     Acid reflux     Allergy     Anemia     Anemia     Anxiety     Cataract     Chronic UTI     recently treated with antibiotics -x 3 wks. ago-    Colon adenoma 2015 due 2020 10/21/2015    Colon adenoma 2015 due 2020 10/21/2015    Depression     Disturbed sleep rhythm     DJD (degenerative joint disease)     Dry eyes     Dry mouth     Grief reaction 3/24/2014    Hx of migraine headaches     Hyperlipemia     Hypernatremia 8/8/2014    Hypertension     Iritis     Iritis     Mild vitamin D deficiency 9/9/2013    Multiple lung nodules on CT: [id number] no f/u needed 6/6/2016    Neurogenic bladder     Osteopenia     in hips    Osteoporosis     spine    Osteoporosis: 9/15 see rheumatology notes 6/6/2016    Positive GE[place]IANA (antinuclear antibody)     Schatzki's ring: 3/15 dilated 6/6/2016    Sciatica neuralgia 6/21/2013    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    Visual impairment      Past Surgical History:   Procedure Laterality Date  "   APPENDECTOMY      BACK SURGERY  2007    x4    CHOLECYSTECTOMY      lap orin    COLONOSCOPY N/A 10/2/2015    Procedure: COLONOSCOPY;  Surgeon: Bryan Love MD;  Location: [facility] ([address] [place] [address]);  Service: Endoscopy;  Laterality: N/A;    CYSTOSCOPY      with BOTOX INJECTION    HERNIA REPAIR      umbilical    HYSTERECTOMY      POSTERIOR FUSION LUMBAR SPINE W/ CORPECTOMY      TONSILLECTOMY, ADENOIDECTOMY       Family History   Problem Relation Age of Onset    Kidney disease Mother     Hypertension Mother     Diabetes Father     Diabetes Brother     Kidney disease Brother     Hyperlipidemia Sister     Hypertension Daughter     Colon cancer Maternal Aunt 70     stomach    Blindness Maternal Aunt     Cancer Maternal Aunt      stomach cancer    Cancer Maternal Uncle      colon    Colon cancer Maternal Uncle 70     two uncles    Glaucoma Neg Hx     Amblyopia Neg Hx     Macular degeneration Neg Hx     Retinal detachment Neg Hx     Anesthesia problems Neg Hx     Celiac disease Neg Hx     Cirrhosis Neg Hx     Crohn's disease Neg Hx     Esophageal cancer Neg Hx     Irritable bowel syndrome Neg Hx     Liver cancer Neg Hx     Liver disease Neg Hx     Rectal cancer Neg Hx     Stomach cancer Neg Hx      Social History   Substance Use Topics    Smoking status: Never Smoker    Smokeless tobacco: Never Used    Alcohol use No     Review of Systems   Constitutional: Negative for fever.   HENT: Negative for sore throat.    Eyes: Negative for visual disturbance.   Respiratory: Negative for shortness of breath.    Cardiovascular: Negative for chest pain.   Gastrointestinal: Negative for nausea and vomiting.   Genitourinary: Negative for dysuria.   Musculoskeletal:        R foot pain x 3 days.     Skin: Negative for rash.   Neurological: Positive for numbness ("tingling" of R foot).       Physical Exam     Initial Vitals [06/29/17 0958]   BP Pulse Resp Temp SpO2   (!) 174/84 71 16 98.2 °F (36.8 °C) " 100 %      MAP       114         Physical Exam    Nursing note and vitals reviewed.  Constitutional: She appears well-developed and well-nourished. No distress.   HENT:   Head: Normocephalic and atraumatic.   Mouth/Throat: No oropharyngeal exudate.   Eyes: EOM are normal.   Neck: Neck supple. No JVD present.   Cardiovascular:   Pulses:       Dorsalis pedis pulses are 2+ on the right side, and 2+ on the left side.   Musculoskeletal: Normal range of motion. She exhibits edema and tenderness (There is ttp at the first mtp but no decreased ROM).   Full ROM of toes and ankle on R side. Soft tissue swelling presenting at first mtp. No tenderness over remainder of foot.   Neurological: She is alert and oriented to person, place, and time. She has normal strength. No cranial nerve deficit or sensory deficit.   Sensation to light touch intact distally.   Skin: Skin is dry. No rash noted. No erythema. No pallor.   Psychiatric: She has a normal mood and affect.         ED Course   Procedures  Labs Reviewed - No data to display       X-Rays:   Independently Interpreted Readings:   Other Readings:  Xray R foot- soft tissue swelling at the first mtp but no fx or dislocation.     Medical Decision Making:   History:   Old Medical Records: I decided to obtain old medical records.  Old Records Summarized: records from clinic visits.       <> Summary of Records: Pt seen yesterday by her nephrologist. Hx of CKD, HTN, sciatica.   Initial Assessment:   65 yo female presents after dropping a heavy object on her R foot with continued swelling and pain with bearing weight.  Differential Diagnosis:   My initial differential diagnoses include but are not limited to: contusion vs fx. Will xray.   Independently Interpreted Test(s):   I have ordered and independently interpreted X-rays - see prior notes.  Clinical Tests:   Radiological Study: Ordered and Reviewed            Scribe Attestation:   Scribe #1: I performed the above scribed service  and the documentation accurately describes the services I performed. I attest to the accuracy of the note.    Attending Attestation:           Physician Attestation for Scribe:  Physician Attestation Statement for Scribe #1: I, Dr. Laird, reviewed documentation, as scribed by Kirk Tanner in my presence, and it is both accurate and complete.         Attending ED Notes:   11:37 AM    Will offer pt ace wrap for comfort, recommend elevation and to continue tylenol for pain.    11:43 AM  Pt opted for Ace wrap.          ED Course     Clinical Impression:   The primary encounter diagnosis was Contusion of foot, right. A diagnosis of Right foot pain was also pertinent to this visit.    Disposition:   Disposition: Discharged  Condition: Stable                        Neva Medeiros MD  06/29/17 9991

## 2017-06-29 NOTE — ED TRIAGE NOTES
Pt presents to the ED c/o right foot pain that occurred 2-3 days ago. Pt states she opened her freezer, and something fell onto her foot. Knot noted to right foot. Pt states she is unable to wear a closed toe shoe.

## 2017-07-10 ENCOUNTER — LAB VISIT (OUTPATIENT)
Dept: LAB | Facility: HOSPITAL | Age: 67
End: 2017-07-10
Attending: INTERNAL MEDICINE
Payer: MEDICARE

## 2017-07-10 ENCOUNTER — PATIENT MESSAGE (OUTPATIENT)
Dept: INTERNAL MEDICINE | Facility: CLINIC | Age: 67
End: 2017-07-10

## 2017-07-10 DIAGNOSIS — F32.9 MAJOR DEPRESSION, CHRONIC: ICD-10-CM

## 2017-07-10 DIAGNOSIS — E55.9 MILD VITAMIN D DEFICIENCY: ICD-10-CM

## 2017-07-10 DIAGNOSIS — E78.2 MIXED HYPERLIPIDEMIA: ICD-10-CM

## 2017-07-10 DIAGNOSIS — I10 ESSENTIAL HYPERTENSION: ICD-10-CM

## 2017-07-10 DIAGNOSIS — D53.9 UNSPECIFIED DEFICIENCY ANEMIA: ICD-10-CM

## 2017-07-10 LAB
25(OH)D3+25(OH)D2 SERPL-MCNC: 34 NG/ML
ALBUMIN SERPL BCP-MCNC: 3.9 G/DL
ALP SERPL-CCNC: 98 U/L
ALT SERPL W/O P-5'-P-CCNC: 18 U/L
ANION GAP SERPL CALC-SCNC: 10 MMOL/L
AST SERPL-CCNC: 26 U/L
BASOPHILS # BLD AUTO: 0.03 K/UL
BASOPHILS NFR BLD: 0.6 %
BILIRUB SERPL-MCNC: 0.5 MG/DL
BUN SERPL-MCNC: 16 MG/DL
CALCIUM SERPL-MCNC: 9.5 MG/DL
CHLORIDE SERPL-SCNC: 109 MMOL/L
CHOLEST/HDLC SERPL: 2.7 {RATIO}
CO2 SERPL-SCNC: 28 MMOL/L
CREAT SERPL-MCNC: 1.1 MG/DL
DIFFERENTIAL METHOD: ABNORMAL
EOSINOPHIL # BLD AUTO: 0.1 K/UL
EOSINOPHIL NFR BLD: 2.4 %
ERYTHROCYTE [DISTWIDTH] IN BLOOD BY AUTOMATED COUNT: 14 %
EST. GFR  (AFRICAN AMERICAN): 60 ML/MIN/1.73 M^2
EST. GFR  (NON AFRICAN AMERICAN): 52 ML/MIN/1.73 M^2
FERRITIN SERPL-MCNC: 43 NG/ML
GLUCOSE SERPL-MCNC: 89 MG/DL
HCT VFR BLD AUTO: 34.3 %
HDL/CHOLESTEROL RATIO: 36.7 %
HDLC SERPL-MCNC: 180 MG/DL
HDLC SERPL-MCNC: 66 MG/DL
HGB BLD-MCNC: 11.5 G/DL
IRON SERPL-MCNC: 64 UG/DL
LDLC SERPL CALC-MCNC: 102.8 MG/DL
LYMPHOCYTES # BLD AUTO: 1.7 K/UL
LYMPHOCYTES NFR BLD: 34 %
MCH RBC QN AUTO: 28.5 PG
MCHC RBC AUTO-ENTMCNC: 33.5 %
MCV RBC AUTO: 85 FL
MONOCYTES # BLD AUTO: 0.5 K/UL
MONOCYTES NFR BLD: 10.3 %
NEUTROPHILS # BLD AUTO: 2.7 K/UL
NEUTROPHILS NFR BLD: 52.7 %
NONHDLC SERPL-MCNC: 114 MG/DL
PLATELET # BLD AUTO: 272 K/UL
PMV BLD AUTO: 10.8 FL
POTASSIUM SERPL-SCNC: 4.1 MMOL/L
PROT SERPL-MCNC: 7.3 G/DL
RBC # BLD AUTO: 4.03 M/UL
SATURATED IRON: 20 %
SODIUM SERPL-SCNC: 147 MMOL/L
TOTAL IRON BINDING CAPACITY: 327 UG/DL
TRANSFERRIN SERPL-MCNC: 221 MG/DL
TRIGL SERPL-MCNC: 56 MG/DL
TSH SERPL DL<=0.005 MIU/L-ACNC: 1.54 UIU/ML
VIT B12 SERPL-MCNC: 767 PG/ML
WBC # BLD AUTO: 5.03 K/UL

## 2017-07-10 PROCEDURE — 80061 LIPID PANEL: CPT

## 2017-07-10 PROCEDURE — 36415 COLL VENOUS BLD VENIPUNCTURE: CPT

## 2017-07-10 PROCEDURE — 80053 COMPREHEN METABOLIC PANEL: CPT

## 2017-07-10 PROCEDURE — 84443 ASSAY THYROID STIM HORMONE: CPT

## 2017-07-10 PROCEDURE — 85025 COMPLETE CBC W/AUTO DIFF WBC: CPT

## 2017-07-10 PROCEDURE — 83540 ASSAY OF IRON: CPT

## 2017-07-10 PROCEDURE — 82607 VITAMIN B-12: CPT

## 2017-07-10 PROCEDURE — 82306 VITAMIN D 25 HYDROXY: CPT

## 2017-07-10 PROCEDURE — 82728 ASSAY OF FERRITIN: CPT

## 2017-07-14 ENCOUNTER — OFFICE VISIT (OUTPATIENT)
Dept: INTERNAL MEDICINE | Facility: CLINIC | Age: 67
End: 2017-07-14
Payer: MEDICARE

## 2017-07-14 VITALS
WEIGHT: 147.69 LBS | HEIGHT: 66 IN | SYSTOLIC BLOOD PRESSURE: 135 MMHG | DIASTOLIC BLOOD PRESSURE: 80 MMHG | BODY MASS INDEX: 23.74 KG/M2 | HEIGHT: 66 IN | WEIGHT: 147.69 LBS | SYSTOLIC BLOOD PRESSURE: 135 MMHG | BODY MASS INDEX: 23.74 KG/M2 | HEART RATE: 60 BPM | DIASTOLIC BLOOD PRESSURE: 80 MMHG

## 2017-07-14 DIAGNOSIS — F33.41 RECURRENT MAJOR DEPRESSIVE DISORDER, IN PARTIAL REMISSION: ICD-10-CM

## 2017-07-14 DIAGNOSIS — T38.0X5A STEROID-INDUCED GLAUCOMA OF BOTH EYES: ICD-10-CM

## 2017-07-14 DIAGNOSIS — Z79.899 HIGH RISK MEDICATION USE: ICD-10-CM

## 2017-07-14 DIAGNOSIS — Z79.899 ENCOUNTER FOR MONITORING LONG-TERM PROTON PUMP INHIBITOR THERAPY: ICD-10-CM

## 2017-07-14 DIAGNOSIS — I70.0 ATHEROSCLEROSIS OF ABDOMINAL AORTA: ICD-10-CM

## 2017-07-14 DIAGNOSIS — E55.9 MILD VITAMIN D DEFICIENCY: ICD-10-CM

## 2017-07-14 DIAGNOSIS — R91.8 MULTIPLE LUNG NODULES ON CT: ICD-10-CM

## 2017-07-14 DIAGNOSIS — I10 ESSENTIAL HYPERTENSION: ICD-10-CM

## 2017-07-14 DIAGNOSIS — D64.9 LOW HEMOGLOBIN AND LOW HEMATOCRIT: ICD-10-CM

## 2017-07-14 DIAGNOSIS — N18.2 CKD (CHRONIC KIDNEY DISEASE), STAGE II: ICD-10-CM

## 2017-07-14 DIAGNOSIS — M81.0 AGE-RELATED OSTEOPOROSIS WITHOUT CURRENT PATHOLOGICAL FRACTURE: ICD-10-CM

## 2017-07-14 DIAGNOSIS — Z51.81 ENCOUNTER FOR MONITORING LONG-TERM PROTON PUMP INHIBITOR THERAPY: ICD-10-CM

## 2017-07-14 DIAGNOSIS — K22.2 SCHATZKI'S RING: ICD-10-CM

## 2017-07-14 DIAGNOSIS — H40.63X0 STEROID-INDUCED GLAUCOMA OF BOTH EYES: ICD-10-CM

## 2017-07-14 DIAGNOSIS — D12.6 COLON ADENOMA: ICD-10-CM

## 2017-07-14 DIAGNOSIS — D89.89 AUTOIMMUNE DISORDER: ICD-10-CM

## 2017-07-14 DIAGNOSIS — I12.9 HYPERTENSIVE CKD (CHRONIC KIDNEY DISEASE): ICD-10-CM

## 2017-07-14 DIAGNOSIS — Z79.899 ENCOUNTER FOR MONITORING PROTON PUMP INHIBITOR THERAPY: ICD-10-CM

## 2017-07-14 DIAGNOSIS — D53.9 UNSPECIFIED DEFICIENCY ANEMIA: ICD-10-CM

## 2017-07-14 DIAGNOSIS — K21.00 GASTROESOPHAGEAL REFLUX DISEASE WITH ESOPHAGITIS: ICD-10-CM

## 2017-07-14 DIAGNOSIS — M47.817 SPONDYLOSIS OF LUMBOSACRAL REGION WITHOUT MYELOPATHY OR RADICULOPATHY: ICD-10-CM

## 2017-07-14 DIAGNOSIS — Z00.00 ENCOUNTER FOR PREVENTIVE HEALTH EXAMINATION: Primary | ICD-10-CM

## 2017-07-14 DIAGNOSIS — Z12.31 OTHER SCREENING MAMMOGRAM: ICD-10-CM

## 2017-07-14 DIAGNOSIS — E78.2 MIXED HYPERLIPIDEMIA: ICD-10-CM

## 2017-07-14 DIAGNOSIS — Z00.00 ANNUAL PHYSICAL EXAM: Primary | ICD-10-CM

## 2017-07-14 DIAGNOSIS — E87.0 HYPERNATREMIA: ICD-10-CM

## 2017-07-14 DIAGNOSIS — Z51.81 ENCOUNTER FOR MONITORING PROTON PUMP INHIBITOR THERAPY: ICD-10-CM

## 2017-07-14 DIAGNOSIS — D84.9 IMMUNOSUPPRESSION: ICD-10-CM

## 2017-07-14 DIAGNOSIS — K59.01 SLOW TRANSIT CONSTIPATION: ICD-10-CM

## 2017-07-14 PROCEDURE — 99499 UNLISTED E&M SERVICE: CPT | Mod: S$GLB,,, | Performed by: INTERNAL MEDICINE

## 2017-07-14 PROCEDURE — 99999 PR PBB SHADOW E&M-EST. PATIENT-LVL IV: CPT | Mod: PBBFAC,,, | Performed by: NURSE PRACTITIONER

## 2017-07-14 PROCEDURE — 99999 PR PBB SHADOW E&M-EST. PATIENT-LVL IV: CPT | Mod: PBBFAC,,, | Performed by: INTERNAL MEDICINE

## 2017-07-14 PROCEDURE — 99499 UNLISTED E&M SERVICE: CPT | Mod: S$GLB,,, | Performed by: NURSE PRACTITIONER

## 2017-07-14 PROCEDURE — 99397 PER PM REEVAL EST PAT 65+ YR: CPT | Mod: S$GLB,,, | Performed by: INTERNAL MEDICINE

## 2017-07-14 PROCEDURE — G0439 PPPS, SUBSEQ VISIT: HCPCS | Mod: S$GLB,,, | Performed by: NURSE PRACTITIONER

## 2017-07-14 NOTE — PROGRESS NOTES
"Idalmis Morejon presented for a  Medicare AWV and comprehensive Health Risk Assessment today. The following components were reviewed and updated:    · Medical history  · Family History  · Social history  · Allergies and Current Medications  · Health Risk Assessment  · Health Maintenance  · Care Team     ** See Completed Assessments for Annual Wellness Visit within the encounter summary.**       The following assessments were completed:  · Living Situation  · Depression Screening  · Timed Get Up and Go  · Whisper Test  · Cognitive Function Screening  · Nutrition Screening  · ADL Screening  · PAQ Screening            Vitals:    07/14/17 0857   BP: 135/80   Pulse: 60   Weight: 67 kg (147 lb 11.3 oz)   Height: 5' 6" (1.676 m)     Body mass index is 23.84 kg/m².     Physical Exam   Constitutional: She is oriented to person, place, and time. She appears well-developed and well-nourished. No distress.   HENT:   Head: Normocephalic and atraumatic.   Right Ear: No decreased hearing is noted.   Left Ear: No decreased hearing is noted.   Cardiovascular: Normal rate, regular rhythm and normal heart sounds.    No murmur heard.  Pulmonary/Chest: Effort normal and breath sounds normal. No respiratory distress. She has no wheezes. She has no rales.   Musculoskeletal: She exhibits no edema, tenderness or deformity.   Neurological: She is alert and oriented to person, place, and time.   Skin: Skin is warm and dry. She is not diaphoretic.   Psychiatric: She has a normal mood and affect. Her behavior is normal. She expresses no homicidal and no suicidal ideation. She expresses no suicidal plans and no homicidal plans.   Vitals reviewed.        Diagnoses and health risks identified today and associated recommendations/orders:    1. Encounter for preventive health examination  Mammogram ordered by PCP.  Dxa scan due 2018.  C-scope due 2020.    2. Immunosuppression  Stable.   On Azathioprine.   Followed by Rheumatology.     3. Recurrent " major depressive disorder, in partial remission  Stable.   Continue current medication.  Followed by Psychiatry.     4. Autoimmune disorder- recurrent iritis  Stable.   Continue current medication.  Followed by Rheumatology.     5. Atherosclerosis of abdominal aorta  Stable.   Seen on imaging from 7/18/2016.  Followed by PCP.     6. Essential hypertension  Stable.   Continue current medication.  Followed by PCP.     7. Mixed hyperlipidemia  Stable.   Followed by PCP.     8. Mild vitamin D deficiency  Stable.   Followed by PCP.     9. Hypernatremia  Stable.   Followed by Nephrology.     10. Age-related osteoporosis without current pathological fracture 2016  Stable.   Followed by Rheumatology.     11. Hypertensive CKD (chronic kidney disease)  Stable.   Followed by Nephrology.     12. CKD (chronic kidney disease), stage II  Stable.   Followed by Nephrology.     13. Steroid-induced glaucoma of both eyes  Stable.   Continue current medication.  Followed by Ophthalmology.       Provided Idalmis with a 5-10 year written screening schedule and personal prevention plan. Recommendations were developed using the USPSTF age appropriate recommendations. Education, counseling, and referrals were provided as needed. After Visit Summary printed and given to patient which includes a list of additional screenings\tests needed.    Return for follow up with PCP as advised. Return in 1 year for HRA..    Bisi Chanel NP

## 2017-07-14 NOTE — PATIENT INSTRUCTIONS
"  Exercises to Prevent Falls  Certain types of exercises may help make you less likely to fall. Try the ones below. Or do other exercises that your health care provider suggests. Depending on your health, you may need to start slowly. Don't let that stop you. Even small amounts of exercise can help you. Be sure to talk to your health care provider before starting any exercise program.    Improve balance  Many types of exercise can help improve balance. Jose chi and yoga are good examples. Here's another one to try. You can do it anytime and almost anywhere.  · Stand next to a counter or solid support.  · Push yourself up onto your tiptoes.  · Hold for 5 seconds. If you start to lose your balance, hold on to the counter.  · Rest and repeat 5 times. Work up to holding for 20 to 30 seconds, if you can.    Increase flexibility  Being more flexible makes it easier for you to move around safely. Try exercises like the seated hamstring stretch.  · Sit in a chair and put one foot on a stool.  · Straighten your leg and reach with both hands down either side of your leg. Reach as far down your leg as you can.  · Hold for about 20 seconds.  · Go back to the starting position. Then repeat 5 times. Switch legs.    Build strength  "Resistance" exercises help build strength. You can do them without equipment. Or you can use weights, elastic bands, or special machines. One such exercise is called the biceps curl. You can hold a 1-pound weight or even a can of soup. Do this exercise at least 3 times a week. Strive for every day.  · Sit up straight in a chair.  · Keep your elbow close to your body and your wrist straight.  · Bend your arm, moving your hand up to your shoulder. Then slowly lower your arm.  · Repeat 5 times. Switch to the other arm.    Build your staying power  Aerobic exercises make your heart and lungs stronger so you can keep moving longer. Walking and swimming are two of the best types of exercises you can do. Using " a stationary bike is great, too. Find an aerobic exercise that you enjoy. Start slowly and build up. Even 5 minutes is helpful. Aim for a goal of 30 minutes, at least 3 times a week. You don't have to do 30 minutes in 1 session. Break it up and walk a little throughout the day.     More helpful tips  · Start easy. Slowly work up to doing more.  · Talk with your health care provider about the best exercises for you.  · Call senior centers or health clubs about exercise programs.  · If needed, have a family member watch you walk every so often to check your stability.  · Exercise with a friend. Choose an activity you both enjoy.  · Consider vanessa chi or yoga to strengthen your balance.  · Try exercises that you can do anytime, anywhere. Here are 2 examples. Have someone with you when you first try these:  ¨ Practice walking by placing 1 foot right in front of the other.  ¨ Stand up and sit down 10 times. Repeat this throughout the day.   Date Last Reviewed: 6/13/2015  © 4869-4572 [employer]. [address] [place] [address]. All rights reserved. This information is not intended as a substitute for professional medical care. Always follow your healthcare professional's instructions.          Counseling and Referral of Other Preventative  (Italic type indicates deductible and co-insurance are waived)    Patient Name: Idalmis Morejon  Today's Date: 7/14/2017      SERVICE LIMITATIONS RECOMMENDATION    Vaccines    · Pneumococcal (once after 65)    · Influenza (annually)    · Hepatitis B (if medium/high risk)    · Prevnar 13      Hepatitis B medium/high risk factors:       - End-stage renal disease       - Hemophiliacs who received Factor VII or         IX concentrates       - Clients of institutions for the mentally             retarded       - Persons who live in the same house as          a HepB carrier       - Homosexual men       - Illicit injectable drug abusers     Pneumococcal: Done, no repeat  necessary     Influenza: Due fall 2017     Hepatitis B: N/A     Prevnar 13: Done, no repeat necessary    Mammogram (biennial age 50-74)  Annually (age 40 or over)  Recommended to patient, ordered by PCP.    Pap (up to age 70 and after 70 if unknown history or abnormal study last 10 years)    N/A     The USPSTF recommends against screening for cervical cancer in women who have had a hysterectomy with removal of the cervix and who do not have a history of a high-grade precancerous lesion (cervical intraepithelial neoplasia [MANUEL] grade 2 or 3) or cervical cancer.     Colorectal cancer screening (to age 75)    · Fecal occult blood test (annual)  · Flexible sigmoidoscopy (5y)  · Screening colonoscopy (10y)  · Barium enema   Last done 10/2015, recommend to repeat every 5  years    Diabetes self-management training (no USPSTF recommendations)  Requires referral by treating physician for patient with diabetes or renal disease. 10 hours of initial DSMT sessions of no less than 30 minutes each in a continuous 12-month period. 2 hours of follow-up DSMT in subsequent years.  N/A    Bone mass measurements (age 65 & older, biennial)  Requires diagnosis related to osteoporosis or estrogen deficiency. Biennial benefit unless patient has history of long-term glucocorticoid  Last done 3/2016, recommend to repeat every 2  years    Glaucoma screening (no USPSTF recommendation)  Diabetes mellitus, family history   , age 50 or over    American, age 65 or over  Done this year, repeat every year    Medical nutrition therapy for diabetes or renal disease (no recommended schedule)  Requires referral by treating physician for patient with diabetes or renal disease or kidney transplant within the past 3 years.  Can be provided in same year as diabetes self-management training (DSMT), and CMS recommends medical nutrition therapy take place after DSMT. Up to 3 hours for initial year and 2 hours in subsequent years.  N/A     Cardiovascular screening blood tests (every 5 years)  · Fasting lipid panel  Order as a panel if possible  Done this year, repeat every year    Diabetes screening tests (at least every 3 years, Medicare covers annually or at 6-month intervals for prediabetic patients)  · Fasting blood sugar (FBS) or glucose tolerance test (GTT)  Patient must be diagnosed with one of the following:       - Hypertension       - Dyslipidemia       - Obesity (BMI 30kg/m2)       - Previous elevated impaired FBS or GTT       ... or any two of the following:       - Overweight (BMI 25 but <30)       - Family history of diabetes       - Age 65 or older       - History of gestational diabetes or birth of baby weighing more than 9 pounds  Done this year, repeat every year    Abdominal aortic aneurysm screening (once)  · Sonogram   Limited to patients who meet one of the following criteria:       - Men who are 65-75 years old and have smoked more than 100 cigarette in their lifetime       - Anyone with a family history of abdominal aortic aneurysm       - Anyone recommended for screening by the USPSTF  N/A    HIV screening (annually for increased risk patients)  · HIV-1 and HIV-2 by EIA, or NUBIA, rapid antibody test or oral mucosa transudate  Patients must be at increased risk for HIV infection per USPSTF guidelines or pregnant. Tests covered annually for patient at increased risk or as requested by the patient. Pregnant patients may receive up to 3 tests during pregnancy.  Risks discussed, screening is not recommended    Smoking cessation counseling (up to 8 sessions per year)  Patients must be asymptomatic of tobacco-related conditions to receive as a preventative service.  Non-smoker    Subsequent annual wellness visit  At least 12 months since last AWV  Return in one year     The following information is provided to all patients.  This information is to help you find resources for any of the problems found today that may be affecting  your health:                Living healthy guide: www.[facility].[place].[facility]      Understanding Diabetes: www.diabetes.org      Eating healthy: www.cdc.gov/healthyweight      CDC home safety checklist: www.cdc.gov/steadi/patient.html      Agency on Aging: www.goea.[place].[facility]      Alcoholics anonymous (AA): www.aa.org      Physical Activity: www.dago.nih.gov/da4vzhg      Tobacco use: www.quitwithusla.org

## 2017-07-14 NOTE — PATIENT INSTRUCTIONS
Tips to Control Acid Reflux    To control acid reflux, youll need to make some basic diet and lifestyle changes. The simple steps outlined below may be all youll need to ease discomfort.  Watch what you eat  · Avoid fatty foods and spicy foods.  · Eat fewer acidic foods, such as citrus and tomato-based foods. These can increase symptoms.  · Limit drinking alcohol, caffeine, and fizzy beverages. All increase acid reflux.  · Try limiting chocolate, peppermint, and spearmint. These can worsen acid reflux in some people.  Watch when you eat  · Avoid lying down for 3 hours after eating.  · Do not snack before going to bed.  Raise your head  Raising your head and upper body by 4 to 6 inches helps limit reflux when youre lying down. Put blocks under the head of your bed frame to raise it.  Other changes  · Lose weight, if you need to  · Dont exercise near bedtime  · Avoid tight-fitting clothes  · Limit aspirin and ibuprofen  · Stop smoking   Date Last Reviewed: 7/1/2016 © 2000-2016 [employer]. [address] [place] [address]. All rights reserved. This information is not intended as a substitute for professional medical care. Always follow your healthcare professional's instructions.        Taking Your Blood Pressure  Blood pressure is the force of blood against the artery wall as it moves from the heart through the blood vessels. You can take your own blood pressure reading using a digital monitor. Take readings as often as your healthcare provider instructs. Take each reading at the same time of day.  Step 1. Relax    · Take your blood pressure at the same time every day, such as in the morning or evening, or at the time your healthcare provider recommends.  · Wait at least a half-hour after smoking, eating, drinking caffeinated beverages, or exercising.  · Sit comfortably at a table with both feet on the floor. Do not cross your legs or feet. Place the monitor near you.  · Rest for a few  minutes before you begin.  Step 2. Wrap the cuff    · Place your arm on the table, palm up. Your arm should be at the level of your heart. Wrap the cuff around your upper arm, just above your elbow. Its best done on bare skin, not over clothing. Most cuffs will indicate where the brachial artery (the blood vessel in the middle of the arm at the inner side of the elbow) should line up with the cuff. Look in your monitor's instruction booklet for an illustration. You can also bring your cuff to your healthcare provider and have them show you how to correctly place the cuff.  · Make sure your cuff fits. If it doesnt wrap around your upper arm, order a larger cuff.  Step 3. Inflate the cuff    · Pump the cuff until the scale reads 160. If you have a self-inflating cuff, push the button that starts the pump.  · The cuff will tighten, then loosen.  · The numbers will change. When they stop changing, your blood pressure reading will appear.  · Take 2 or 3 readings one minute apart.  Step 4. Write down the results of each reading    · Write down your blood pressure numbers for each reading. Note the date and time. Keep your results in one place, such as a notebook. Even if your monitor has a built-in memory, keep a hard copy of the readings.  · Remove the cuff from your arm. Turn off the machine.  · Share your blood pressure records with your healthcare providers at each visit.  Date Last Reviewed: 4/27/2016  © 1486-4399 [employer]. [address] [place] [address], [place] [address]. All rights reserved. This information is not intended as a substitute for professional medical care. Always follow your healthcare professional's instructions.

## 2017-07-14 NOTE — PROGRESS NOTES
Subjective:       Patient ID: Idalmis Morejon is a 66 y.o. female.    Chief Complaint:  Annual exam    Follow up multiple issues    Still missing mom    Due for Prolia?  DEXA 2016    Still sees Psych, mood ok, still not sleeping too well.    Adenoma 2015 due 2020.    CKD better.  Labs ok, needs to drink more water.    Patient Active Problem List   Diagnosis    Essential hypertension    Unspecified deficiency anemia    Gastroesophageal reflux disease with esophagitis: EGD 3/15    Spondylosis of lumbosacral region without myelopathy or radiculopathy    Mixed hyperlipidemia    Nuclear sclerosis - Both Eyes    Chronic pain syndrome    Sciatica neuralgia    High risk medication use-Azathioprine 100 mg daily    Ocular hypertension - Both Eyes    Bilateral dry eyes - Both Eyes    Autoimmune disorder- recurrent iritis    Mild vitamin D deficiency    Neurogenic bladder    Hypernatremia    Slow transit constipation    Scleritis    Urge incontinence    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    Colon adenoma: 10/15 repeat in 2020    Age-related osteoporosis without current pathological fracture 2016    Schatzki's ring: 3/15 dilated    Multiple lung nodules on CT: 2074-0002 no f/u needed    Steroid-induced glaucoma of both eyes    Proteinuria    Family history of colon cancer: maternal uncle and aunt    CKD (chronic kidney disease), stage II    Hypertensive CKD (chronic kidney disease)    Recurrent major depressive disorder, in partial remission    Immunosuppression    Atherosclerosis of abdominal aorta       HPI  Review of Systems   Constitutional: Negative for activity change and unexpected weight change.   HENT: Negative for hearing loss, rhinorrhea and trouble swallowing.    Eyes: Positive for visual disturbance. Negative for discharge.        Ongoing Ophtho follow up   Respiratory: Negative for chest tightness and wheezing.    Cardiovascular: Negative for chest pain and  palpitations.   Gastrointestinal: Positive for constipation. Negative for blood in stool, diarrhea and vomiting.        Chronic constipation  No blood   No pencil thin stools  Minimal GERD   Endocrine: Negative for polydipsia and polyuria.   Genitourinary: Negative for difficulty urinating, dysuria, hematuria and menstrual problem.        Some frequency uses Detrol   Musculoskeletal: Negative for arthralgias, joint swelling and neck pain.   Neurological: Negative for weakness and headaches.   Psychiatric/Behavioral: Negative for confusion and dysphoric mood.       Objective:      Physical Exam   Constitutional: She is oriented to person, place, and time. She appears well-developed and well-nourished.   HENT:   Head: Normocephalic and atraumatic.   Right Ear: External ear normal.   Left Ear: External ear normal.   Nose: Nose normal.   Mouth/Throat: Oropharynx is clear and moist. No oropharyngeal exudate.   Eyes: EOM are normal. No scleral icterus.   Neck: Normal range of motion. Neck supple. No JVD present. No thyromegaly present.   Cardiovascular: Normal rate, regular rhythm, normal heart sounds and intact distal pulses.  Exam reveals no gallop.    No murmur heard.  Pulmonary/Chest: Effort normal and breath sounds normal. No respiratory distress. She has no wheezes.   Abdominal: Soft. Bowel sounds are normal. She exhibits no distension and no mass. There is no tenderness. There is no rebound and no guarding.   Musculoskeletal: Normal range of motion. She exhibits no edema or tenderness.   Lymphadenopathy:     She has no cervical adenopathy.   Neurological: She is alert and oriented to person, place, and time. She displays normal reflexes. No cranial nerve deficit. Coordination normal.   Skin: Skin is warm. No rash noted. No erythema.   Psychiatric: She has a normal mood and affect. Her behavior is normal. Judgment and thought content normal.   Nursing note and vitals reviewed.      Assessment:       1. Annual  physical exam    2. Multiple lung nodules on CT: 4365-3216 no f/u needed    3. Essential hypertension    4. CKD (chronic kidney disease), stage II    5. Hypertensive CKD (chronic kidney disease)    6. Colon adenoma: 10/15 repeat in 2020    7. Gastroesophageal reflux disease with esophagitis: EGD 3/15    8. Schatzki's ring: 3/15 dilated    9. Mild vitamin D deficiency    10. Mixed hyperlipidemia    11. Age-related osteoporosis without current pathological fracture 2016    12. Encounter for monitoring proton pump inhibitor therapy    13. Low hemoglobin and low hematocrit    14. Encounter for monitoring long-term proton pump inhibitor therapy    15. High risk medication use-Azathioprine 100 mg daily    16. Recurrent major depressive disorder, in partial remission    17. Other screening mammogram    18. Slow transit constipation    19. Spondylosis of lumbosacral region without myelopathy or radiculopathy    20. Unspecified deficiency anemia        Plan:         Annual physical exam    Multiple lung nodules on CT: 8385-9910 no f/u needed    Essential hypertension: Low salt diet, exercise,; call if > 135/85 on a regular basis.    CKD (chronic kidney disease), stage II: hydration; avoid nephrotoxins    Hypertensive CKD (chronic kidney disease): stable    Colon adenoma: 10/15 repeat in 2020    Gastroesophageal reflux disease with esophagitis: EGD 3/15: no alarm sx currently; continue regimen    Schatzki's ring: 3/15 dilated    Mild vitamin D deficiency    Mixed hyperlipidemia: I have reviewed ACC/AHA guidelines with patient in terms of risk stratification and cholesterol guidelines and treatment; she is ambivalent about tx and declines currently    Age-related osteoporosis without current pathological fracture 2016: keep rheum follow up    Encounter for monitoring proton pump inhibitor therapy    Low hemoglobin and low hematocrit    Encounter for monitoring long-term proton pump inhibitor therapy: stable on regimen; keep GI  follow up    High risk medication use-Azathioprine 100 mg daily: keep rheum follow up    Recurrent major depressive disorder, in partial remission    Other screening mammogram  -     Mammo Digital Screening Bilat with CAD; Future; Expected date: 07/14/2017    Slow transit constipation: chronic; no alarm sx.  Diet and lifestyle reviewed    Spondylosis of lumbosacral region without myelopathy or radiculopathy: stable sx    Unspecified deficiency anemia: normal iron; likely secondary to meds and renal issues; will monitor    Shingles discussed- she declines (she is on immunosuppressive meds)    EP 6 months

## 2017-07-16 RX ORDER — DILTIAZEM HYDROCHLORIDE 120 MG/1
CAPSULE, COATED, EXTENDED RELEASE ORAL
Qty: 90 CAPSULE | Refills: 0 | Status: SHIPPED | OUTPATIENT
Start: 2017-07-16 | End: 2017-10-12 | Stop reason: SDUPTHER

## 2017-07-16 RX ORDER — DILTIAZEM HYDROCHLORIDE 180 MG/1
CAPSULE, COATED, EXTENDED RELEASE ORAL
Qty: 90 CAPSULE | Refills: 0 | Status: SHIPPED | OUTPATIENT
Start: 2017-07-16 | End: 2017-10-12 | Stop reason: SDUPTHER

## 2017-07-19 ENCOUNTER — TELEPHONE (OUTPATIENT)
Dept: NEPHROLOGY | Facility: CLINIC | Age: 67
End: 2017-07-19

## 2017-07-19 ENCOUNTER — LAB VISIT (OUTPATIENT)
Dept: LAB | Facility: HOSPITAL | Age: 67
End: 2017-07-19
Attending: INTERNAL MEDICINE
Payer: MEDICARE

## 2017-07-19 DIAGNOSIS — N18.2 CKD (CHRONIC KIDNEY DISEASE), STAGE II: ICD-10-CM

## 2017-07-19 LAB
ANION GAP SERPL CALC-SCNC: 7 MMOL/L
BUN SERPL-MCNC: 22 MG/DL
CALCIUM SERPL-MCNC: 9.6 MG/DL
CHLORIDE SERPL-SCNC: 110 MMOL/L
CO2 SERPL-SCNC: 27 MMOL/L
CREAT SERPL-MCNC: 1.3 MG/DL
EST. GFR  (AFRICAN AMERICAN): 48.7 ML/MIN/1.73 M^2
EST. GFR  (NON AFRICAN AMERICAN): 42.3 ML/MIN/1.73 M^2
GLUCOSE SERPL-MCNC: 84 MG/DL
POTASSIUM SERPL-SCNC: 4.4 MMOL/L
SODIUM SERPL-SCNC: 144 MMOL/L

## 2017-07-19 PROCEDURE — 80048 BASIC METABOLIC PNL TOTAL CA: CPT

## 2017-07-19 PROCEDURE — 36415 COLL VENOUS BLD VENIPUNCTURE: CPT

## 2017-07-19 NOTE — TELEPHONE ENCOUNTER
Repeat BMP reports reviewed, pt was called and informed, hypernatremia has resolved, electrolytes overall stable, only slight worsening of creatinine but she has had creatinine rise to 1.3 in the past also. She does not have any new symptoms. Will follow labs periodically to monitor her CKD. She expressed understanding.

## 2017-08-11 DIAGNOSIS — R35.1 NOCTURIA: ICD-10-CM

## 2017-08-11 RX ORDER — TOLTERODINE 4 MG/1
CAPSULE, EXTENDED RELEASE ORAL
Qty: 30 CAPSULE | Refills: 0 | Status: SHIPPED | OUTPATIENT
Start: 2017-08-11 | End: 2017-09-05 | Stop reason: SDUPTHER

## 2017-08-25 ENCOUNTER — HOSPITAL ENCOUNTER (OUTPATIENT)
Dept: RADIOLOGY | Facility: HOSPITAL | Age: 67
Discharge: HOME | End: 2017-08-25
Attending: INTERNAL MEDICINE
Payer: MEDICARE

## 2017-08-25 DIAGNOSIS — Z12.31 OTHER SCREENING MAMMOGRAM: ICD-10-CM

## 2017-08-25 PROCEDURE — 77063 BREAST TOMOSYNTHESIS BI: CPT | Mod: 26,,, | Performed by: INTERNAL MEDICINE

## 2017-08-25 PROCEDURE — 77067 SCR MAMMO BI INCL CAD: CPT | Mod: TC

## 2017-08-25 PROCEDURE — 77067 SCR MAMMO BI INCL CAD: CPT | Mod: 26,,, | Performed by: INTERNAL MEDICINE

## 2017-09-05 ENCOUNTER — OFFICE VISIT (OUTPATIENT)
Dept: OPHTHALMOLOGY | Facility: CLINIC | Age: 67
End: 2017-09-05
Payer: MEDICARE

## 2017-09-05 ENCOUNTER — CLINICAL SUPPORT (OUTPATIENT)
Dept: OPHTHALMOLOGY | Facility: CLINIC | Age: 67
End: 2017-09-05
Payer: MEDICARE

## 2017-09-05 DIAGNOSIS — H40.053 OCULAR HYPERTENSION, BILATERAL: ICD-10-CM

## 2017-09-05 DIAGNOSIS — H25.13 NUCLEAR SCLEROSIS, BILATERAL: ICD-10-CM

## 2017-09-05 DIAGNOSIS — T38.0X5A STEROID-INDUCED GLAUCOMA OF BOTH EYES: Primary | ICD-10-CM

## 2017-09-05 DIAGNOSIS — H04.123 BILATERAL DRY EYES: ICD-10-CM

## 2017-09-05 DIAGNOSIS — H15.003 SCLERITIS, BILATERAL: ICD-10-CM

## 2017-09-05 DIAGNOSIS — H11.132 PRIMARY ACQUIRED MELANOSIS OF CONJUNCTIVA OF LEFT EYE: ICD-10-CM

## 2017-09-05 DIAGNOSIS — R35.1 NOCTURIA: ICD-10-CM

## 2017-09-05 DIAGNOSIS — H40.63X0 STEROID-INDUCED GLAUCOMA OF BOTH EYES: Primary | ICD-10-CM

## 2017-09-05 PROCEDURE — 92133 CPTRZD OPH DX IMG PST SGM ON: CPT | Mod: S$GLB,,, | Performed by: OPHTHALMOLOGY

## 2017-09-05 PROCEDURE — 92083 EXTENDED VISUAL FIELD XM: CPT | Mod: S$GLB,,, | Performed by: OPHTHALMOLOGY

## 2017-09-05 PROCEDURE — 92014 COMPRE OPH EXAM EST PT 1/>: CPT | Mod: S$GLB,,, | Performed by: OPHTHALMOLOGY

## 2017-09-05 PROCEDURE — 99999 PR PBB SHADOW E&M-EST. PATIENT-LVL I: CPT | Mod: PBBFAC,,, | Performed by: OPHTHALMOLOGY

## 2017-09-05 RX ORDER — TOLTERODINE 4 MG/1
CAPSULE, EXTENDED RELEASE ORAL
Qty: 30 CAPSULE | Refills: 9 | Status: SHIPPED | OUTPATIENT
Start: 2017-09-05 | End: 2019-01-17 | Stop reason: SDUPTHER

## 2017-09-05 NOTE — PROGRESS NOTES
HPI     DLS: 05/03/2017  Steroid induced glaucoma   PARISH  NSC OU     Combigan BID OU   Systane BID OU       Last edited by Ricarda Martell on 9/5/2017  8:42 AM. (History)            Assessment /Plan     For exam results, see Encounter Report.    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          Steroid glaucoma  Re-discussed steroid use and elevated IOP response    Patient with Hx scleritis  Long term PF 1% use without steroid response in past  Switched from PF 1% --> Durezol q day in April 2/2 cost and lack of coverage  Presents 10/5/2016 43 OU, clear corneas and quiet --> patient felt blur in OS x 3 weeks / no pain        CCT  489 // 495    Low teens      Both eyes  Combigan TWICE DAILY      ANJANA --> Recurrent x 2 inpast  Likely PARISH  No Trauma / Masses etc    HESHAM OS  Inf limbus  Patient notes increased darkening  Flat without vessels        Recurrent Scleritis OU  + [place] Lupus  Azathioprine -->  increased dose per Dr Kan      Off Oral Motrin 400 mg TWICE DAILY -> proteinuria     Dry Eye Syndrome: discussed use of warm compresses, preserved & non-preserved artificial tears, gel and PM ointment options.  Also discussed options utilizing medications.      Plan  RTC 5 months IOP  RTC sooner prn with good understanding

## 2017-10-12 RX ORDER — DILTIAZEM HYDROCHLORIDE 120 MG/1
CAPSULE, COATED, EXTENDED RELEASE ORAL
Qty: 90 CAPSULE | Refills: 0 | Status: SHIPPED | OUTPATIENT
Start: 2017-10-12 | End: 2018-01-08 | Stop reason: SDUPTHER

## 2017-10-12 RX ORDER — DILTIAZEM HYDROCHLORIDE 180 MG/1
CAPSULE, COATED, EXTENDED RELEASE ORAL
Qty: 90 CAPSULE | Refills: 0 | Status: SHIPPED | OUTPATIENT
Start: 2017-10-12 | End: 2018-01-08 | Stop reason: SDUPTHER

## 2017-11-21 NOTE — PROGRESS NOTES
Subjective:       Patient ID: Idalmis Morejon is a 67 y.o. female with recurrent uveitis and +GEORGIANA on azathioprine & osteoporosis  Chief Complaint: No chief complaint on file.    This is a patient with multiple morbidities with recurrent scleritis and a +GEORGIANA. that had been followed by Dr. Gonzalo Vo in the past. She has not been dx with a CTD but does have esophageal dysmotility on fluoroscopy (2012).  She's had episodes of leukopenia since 2006. She has been followed by Dr. Valentine for uveitis, which appears to have improved in duration of episodes on azathioprine, but patient is still not able to come off prednisolone eye drops. Goal is to reduce and possibly come off steroid drops. Also gets bilateral scleritis and has nuclear sclerosis of both eyes and dry eye syndrome.    Returns for follow-up. Has not been seen since 11/14/16. Is on azathioprine 175 mg/d for her scleritis. Also has dry eye syndrome and glaucoma. She is doing very well with her eyes and joints.  She thinks azathioprine is a great drug as she has stopped steroid drops as well. Still has dry, red eyes and also has some dryness in her mouth, but it is not bothersome to her. Also has upper pharyngeal dysphagia (mild) but is scheduled for another dilatation soon. Still has some fatigue--mild. Denies joint pain, joint swelling, AM stiffness, Raynaud's, tight skin, alopecia, oral ulcers, pleurisy, pericarditis, photosensitivity, skin rashes, miscarriages (1 pregnancy FT), thromboses.     Had an initial rx w denosumab last July 2016 but none since then as she states she was not sent an appointment.  Also states she was not sent appts for labs.                She reports no joint swelling. Associated symptoms include fatigue and trouble swallowing. Pertinent negatives include no fever, myalgias or headaches.          Current Outpatient Prescriptions   Medication Sig Dispense Refill    azathioprine 75 mg Tab Take 75 mg by mouth once daily. 90 tablet  3    brimonidine-timolol (COMBIGAN) 0.2-0.5 % Drop Place 1 drop into both eyes 2 (two) times daily. 15 mL 11    diltiaZEM (CARDIZEM CD) 120 MG Cp24 TAKE ONE CAPSULE BY MOUTH EVERY DAY 90 capsule 0    diltiaZEM (CARDIZEM CD) 180 MG 24 hr capsule TAKE ONE CAPSULE BY MOUTH EVERY DAY 90 capsule 0    duloxetine (CYMBALTA) 60 MG capsule TAKE 2 CAPSULES (120 MG TOTAL) BY MOUTH ONCE DAILY. 60 capsule 6    losartan (COZAAR) 100 MG tablet Take 1 tablet (100 mg total) by mouth once daily. 90 tablet 3    pantoprazole (PROTONIX) 40 MG tablet Take 1 tablet (40 mg total) by mouth before breakfast. 90 tablet 3    POLYETHYLENE GLYCOL 3350 (MIRALAX ORAL)       promethazine (PHENERGAN) 25 MG tablet TAKE 1 TABLET BY MOUTH .DO NOT TAKE MORE THAN 2-3 WEEKLY FOR NAUSEA AND HEADACHE 30 tablet 3    tolterodine (DETROL LA) 4 MG 24 hr capsule TAKE ONE CAPSULE BY MOUTH EVERY DAY 30 capsule 9     No current facility-administered medications for this visit.          Allergies   Allergen Reactions    Alendronate Nausea Only     And heartburn    Kenalog [Triamcinolone Acetonide] Hives       Past Medical History:   Diagnosis Date    Abnormal chest CT     Acid reflux     Allergy     Anemia     Anemia     Anxiety     Cataract     Chronic UTI     recently treated with antibiotics -x 3 wks. ago-    Colon adenoma 2015 due 2020 10/21/2015    Colon adenoma 2015 due 2020 10/21/2015    Depression     Disturbed sleep rhythm     DJD (degenerative joint disease)     Dry eyes     Dry mouth     Grief reaction 3/24/2014    Hx of migraine headaches     Hyperlipemia     Hypernatremia 8/8/2014    Hypertension     Iritis     Iritis     Mild vitamin D deficiency 9/9/2013    Multiple lung nodules on CT: 2451-8290 no f/u needed 6/6/2016    Neurogenic bladder     Osteopenia     in hips    Osteoporosis     spine    Osteoporosis: 9/15 see rheumatology notes 6/6/2016    Positive GEORGIANA (antinuclear antibody)     Schatzki's ring: 3/15  dilated 6/6/2016    Sciatica neuralgia 6/21/2013    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    Visual impairment        Past Surgical History:   Procedure Laterality Date    APPENDECTOMY      BACK SURGERY  2007    x4    CHOLECYSTECTOMY      lap orin    COLONOSCOPY N/A 10/2/2015    Procedure: COLONOSCOPY;  Surgeon: Bryan Love MD;  Location: [facility] ([address] [place] [address]);  Service: Endoscopy;  Laterality: N/A;    CYSTOSCOPY      with BOTOX INJECTION    HERNIA REPAIR      umbilical    HYSTERECTOMY      COMPLETE    POSTERIOR FUSION LUMBAR SPINE W/ CORPECTOMY      TONSILLECTOMY, ADENOIDECTOMY           Review of Systems   Constitutional: Positive for fatigue. Negative for diaphoresis and fever.   HENT: Positive for trouble swallowing. Negative for mouth sores, sore throat and tinnitus.         Dry mouth   Eyes: Positive for redness. Negative for visual disturbance.        Dry eyes   Respiratory: Negative.  Negative for cough, choking, chest tightness and shortness of breath.    Cardiovascular: Negative.  Negative for chest pain, palpitations and leg swelling.   Gastrointestinal: Positive for constipation. Negative for abdominal distention, abdominal pain, blood in stool, diarrhea, nausea and vomiting.        Heartburn   Endocrine: Negative.    Genitourinary: Negative.  Negative for frequency, hematuria and menstrual problem.   Musculoskeletal: Negative.  Negative for back pain, joint swelling, myalgias, neck pain and neck stiffness.   Skin: Negative.  Negative for rash.   Allergic/Immunologic: Negative.    Neurological: Negative.  Negative for dizziness, syncope, weakness, light-headedness, numbness and headaches.   Hematological: Negative.  Negative for adenopathy. Does not bruise/bleed easily.   Psychiatric/Behavioral: Positive for dysphoric mood and sleep disturbance. The patient is nervous/anxious.        Family History   Problem Relation Age of Onset    Kidney disease Mother     Hypertension  "Mother     Diabetes Father     Diabetes Brother     Kidney disease Brother     Heart disease Brother     Hyperlipidemia Sister     Hypertension Daughter     Colon cancer Maternal Aunt 70     stomach    Blindness Maternal Aunt     Cancer Maternal Aunt      stomach cancer    Cancer Maternal Uncle      colon    Colon cancer Maternal Uncle 70     two uncles    Glaucoma Neg Hx     Amblyopia Neg Hx     Macular degeneration Neg Hx     Retinal detachment Neg Hx     Anesthesia problems Neg Hx     Celiac disease Neg Hx     Cirrhosis Neg Hx     Crohn's disease Neg Hx     Esophageal cancer Neg Hx     Irritable bowel syndrome Neg Hx     Liver cancer Neg Hx     Liver disease Neg Hx     Rectal cancer Neg Hx     Stomach cancer Neg Hx    Daughter taking BP meds.  Mom  age 89 with dementia/Alzheimers  Brother  late 50's diabetes & drugs.  Lots of colon cancer in the family.  No CTD in family.  Social History   Substance Use Topics    Smoking status: Never Smoker    Smokeless tobacco: Never Used    Alcohol use No           Objective:     BP (!) 174/85 (BP Location: Right arm, Patient Position: Sitting)   Pulse 71   Ht 5' 6" (1.676 m)   Wt 70.2 kg (154 lb 12.8 oz)   BMI 24.99 kg/m²   Did not take BP med this AM; took it after we took BP  Physical Exam   Vitals reviewed.  Constitutional: She is oriented to person, place, and time and well-developed, well-nourished, and in no distress. No distress.   HENT:   Head: Normocephalic and atraumatic.   Mouth/Throat: Oropharynx is clear and moist. No oropharyngeal exudate.   No facial rashes  Parotids not enlarged  No oral ulcers  Adequate pooling.  Teeth ok;    Eyes: EOM are normal. Pupils are equal, round, and reactive to light. Right eye exhibits no discharge. Left eye exhibits no discharge. No scleral icterus.   Both eyes with pigmentation and some erythema R>L   Neck: Neck supple. No JVD present. No tracheal deviation present. No thyromegaly present. "   Cardiovascular: Normal rate, regular rhythm, normal heart sounds and intact distal pulses.  Exam reveals no gallop and no friction rub.    No murmur heard.  Pulmonary/Chest: Effort normal and breath sounds normal. No respiratory distress. She has no wheezes. She has no rales. She exhibits no tenderness.   Abdominal: Soft. Bowel sounds are normal. She exhibits no distension and no mass. There is no splenomegaly or hepatomegaly. There is no tenderness. There is no rebound and no guarding.   Lymphadenopathy:     She has no cervical adenopathy.        Right: No inguinal adenopathy present.        Left: No inguinal adenopathy present.   Neurological: She is alert and oriented to person, place, and time. She has normal reflexes. No cranial nerve deficit. Gait normal.   Proximal and distal muscle strength 5/5.   Skin: Skin is warm and dry. No rash noted. She is not diaphoretic.     Psychiatric: Mood, memory, affect and judgment normal.   Musculoskeletal: Normal range of motion. She exhibits no edema or tenderness.   Cspine FROM no tenderness  Tspine FROM no tenderness  Lspine FROM no tenderness.  TMJ: unremarkable  Shoulders: FROM; no synovitis;  Elbows: FROM; no synovitis; no tophi or nodules  Wrists: FROM; no synovitis;    MCPs: FROM; no synovitis; no metacarpalgia;  ok;  PIPs:FROM; no synovitis; small Josie's  DIPs: FROM; no synovitis; small Heberden's  HIPS: FROM  Knees: Elvis PF crepitus; FROM; no synovitis; no instability;  Ankles: FROM: no synovitis   Toes: ok; no metatarsalgia               LABS:   11/22/17: RFTs ok;   7/10/17: Hg 11.5; Ht 34.3; CMP cnne 1.1;   11/14/16: CRP 3.3; Ht 36.2; CMP ok with cnne 1.0; ;   8/16/16: Hg 11.4; ferritin 35; CMP cnne 1.0  6/30/16: ESR 20; CRP 2.2; Hg 11.1; Ht 34.6;   6/6/16: Hg 11.3; Ht 34.9; cnne 1.3; TSH ok; Vit d 43  3/30/16: ESR 18; CRP 3.2; Hg 11.7; Ht 34.8; cnne 1.2  12/29/15: ESR 23; CRP 2.7; Hg 11.3; Ht 33.9; cnne 1.0  9/30/15: ESR 36; CRP 3.0; Hg 11.3; Ht  32.8; K+3.4; cnne 1.0; Vit D 40;   RF 42; CCP neg; GEORGIANA 1:320H; neg pro; LEXI ok; LAC neg; aCLA IgM 25.57 (12.49); beta 2 gly ok;   5/28/12: Hg 11.6; Ht 34.5; WC 4.26; Cnne 1.2; LFTs ok;  11/19/14: ESR 28;   6/20/12: GEORGIANA 1:320H; neg pro; neg c& p ANCA    3/29/16: DXA: personally reviewed: TLS -3.1; TFN -2.1; TTH -1.5;         Assessment:   UCTD   Recurrent uveitis & scleritis helped by azathioprine   Esophageal dysmotility (but upper dysphagia by hx)    Periodic dilatations   +GEORGIANA 1:320 H; neg profile;    +RF 42; (CCP neg)   + IgM aCLA 25.57 (12.49);    Mildly elevated ESRs in past   Mild anemia   Mild intermittent leukopenia (anna 2.9)   On azathioprine 150 mg/d.      Sicca sxs attributed in part to meds   Dry eye syndrome by Ophthalmology    Osteoporosis with vitamin D insufficiency in past   6/27/13: DXA TLS -2.9; TFN -2.0; TTH -1.6   3/29/16: DXA:TLS -3.1; TFN -2.1; TTH -1.5;    Vit D as low as 15--treated   S/P alendronate intolerance (nausea & heartburn)   Began denosumab 7/7/16;     Elevated BP   Did not take BP meds this AM    Mild renal insufficiency--intermittent--developed since 3/15   R/O PPI induced   Doubt CTD related.    GERD with Schatzki's Ring   On pantoprazole.    Multiple co-morbidities      Plan:   Continue azathioprine 175 mg/d.  Pre-auth put in again for denosumab.  Continue denosumab 60 mg q 6 m.  Labs today (w vitamin D) and in 3 & 6 months.  RTC 6 months with labs.       Cc: Alfredito Valentine MD

## 2017-11-22 ENCOUNTER — LAB VISIT (OUTPATIENT)
Dept: LAB | Facility: HOSPITAL | Age: 67
End: 2017-11-22
Attending: INTERNAL MEDICINE
Payer: MEDICARE

## 2017-11-22 ENCOUNTER — OFFICE VISIT (OUTPATIENT)
Dept: RHEUMATOLOGY | Facility: CLINIC | Age: 67
End: 2017-11-22
Payer: MEDICARE

## 2017-11-22 VITALS
BODY MASS INDEX: 24.88 KG/M2 | HEART RATE: 71 BPM | SYSTOLIC BLOOD PRESSURE: 174 MMHG | WEIGHT: 154.81 LBS | HEIGHT: 66 IN | DIASTOLIC BLOOD PRESSURE: 85 MMHG

## 2017-11-22 DIAGNOSIS — D89.89 AUTOIMMUNE DISORDER: ICD-10-CM

## 2017-11-22 DIAGNOSIS — N18.2 CKD (CHRONIC KIDNEY DISEASE), STAGE II: ICD-10-CM

## 2017-11-22 DIAGNOSIS — E55.9 VITAMIN D INSUFFICIENCY: ICD-10-CM

## 2017-11-22 DIAGNOSIS — Z79.60 LONG-TERM USE OF IMMUNOSUPPRESSANT MEDICATION: ICD-10-CM

## 2017-11-22 DIAGNOSIS — R76.8 POSITIVE ANA (ANTINUCLEAR ANTIBODY): ICD-10-CM

## 2017-11-22 DIAGNOSIS — H20.023 RECURRENT IRITIS, BILATERAL: Primary | ICD-10-CM

## 2017-11-22 DIAGNOSIS — M81.0 OSTEOPOROSIS WITHOUT CURRENT PATHOLOGICAL FRACTURE, UNSPECIFIED OSTEOPOROSIS TYPE: ICD-10-CM

## 2017-11-22 LAB
ALBUMIN SERPL BCP-MCNC: 3.8 G/DL
ANION GAP SERPL CALC-SCNC: 8 MMOL/L
BUN SERPL-MCNC: 13 MG/DL
CALCIUM SERPL-MCNC: 10.1 MG/DL
CHLORIDE SERPL-SCNC: 106 MMOL/L
CO2 SERPL-SCNC: 29 MMOL/L
CREAT SERPL-MCNC: 0.9 MG/DL
EST. GFR  (AFRICAN AMERICAN): >60 ML/MIN/1.73 M^2
EST. GFR  (NON AFRICAN AMERICAN): >60 ML/MIN/1.73 M^2
GLUCOSE SERPL-MCNC: 93 MG/DL
PHOSPHATE SERPL-MCNC: 3.4 MG/DL
POTASSIUM SERPL-SCNC: 4.2 MMOL/L
SODIUM SERPL-SCNC: 143 MMOL/L

## 2017-11-22 PROCEDURE — 36415 COLL VENOUS BLD VENIPUNCTURE: CPT

## 2017-11-22 PROCEDURE — 99999 PR PBB SHADOW E&M-EST. PATIENT-LVL III: CPT | Mod: PBBFAC,,, | Performed by: INTERNAL MEDICINE

## 2017-11-22 PROCEDURE — 99214 OFFICE O/P EST MOD 30 MIN: CPT | Mod: S$GLB,,, | Performed by: INTERNAL MEDICINE

## 2017-11-22 PROCEDURE — 99499 UNLISTED E&M SERVICE: CPT | Mod: S$GLB,,, | Performed by: INTERNAL MEDICINE

## 2017-11-22 PROCEDURE — 80069 RENAL FUNCTION PANEL: CPT

## 2017-11-22 NOTE — PATIENT INSTRUCTIONS
Call to get Prolia injection: [phone] 11/24/17.  Also remember to call in 6 months.    You need labs every 3 months for azathioprine.

## 2017-12-04 ENCOUNTER — TELEPHONE (OUTPATIENT)
Dept: INTERNAL MEDICINE | Facility: CLINIC | Age: 67
End: 2017-12-04

## 2017-12-04 NOTE — TELEPHONE ENCOUNTER
Spoke to pt and she stated that  Since Saturday she has been having  fever and Body  aches , headaches , congestion she has been taking otc medication with out much relive , her fever today is better is at 99.5  Pt wants something to be send to her pharmacy   Please advise

## 2017-12-04 NOTE — TELEPHONE ENCOUNTER
It sounds like this is a viral syndrome and I do not think that she needs any antibiotics at this point.  I would recommend rest, fluids, Tylenol and Mucinex    If she feels that fever is increasing or she is developing shortness of breath, chest pain or severe cough, I would recommend an office visit.  Thank you

## 2017-12-04 NOTE — TELEPHONE ENCOUNTER
----- Message from Madhavi Sky sent at 12/4/2017  9:54 AM CST -----  Contact: [phone]  Pt would like call back from nurse

## 2017-12-12 ENCOUNTER — NURSE TRIAGE (OUTPATIENT)
Dept: ADMINISTRATIVE | Facility: CLINIC | Age: 67
End: 2017-12-12

## 2017-12-12 ENCOUNTER — TELEPHONE (OUTPATIENT)
Dept: GASTROENTEROLOGY | Facility: CLINIC | Age: 67
End: 2017-12-12

## 2017-12-12 NOTE — TELEPHONE ENCOUNTER
----- Message from Daphne Tom sent at 12/12/2017 12:09 PM CST -----  Contact: Self- [phone]   Kate- pt is returning a missed call to Alexa- please call pt back at [phone]

## 2017-12-12 NOTE — TELEPHONE ENCOUNTER
Reason for Disposition   Normal formed BM with a few streaks or drops of blood on surface of BM    Protocols used: ST RECTAL BLEEDING-A-OH    Ms. Morejon states she has notice blood in stools for 2 days.

## 2017-12-13 NOTE — PROGRESS NOTES
CRS Office Visit History and Physical    Referring Md:   Aaareferral Self  No address on file    SUBJECTIVE:     Chief Complaint: rectal bleeding    History of Present Illness:  Patient is a 67 y.o. female presents with rectal bleeding. The patient is a new patient to this practice.   Course is as follows:  12/10/17: Normal formed BM with a few streaks or drops of blood on surface of BM    She comes in today stating that she has continued rectal bleeding.  It is bright red.  It is only with bowel movements.  No pain.  No itching.  No masses.  At baseline she is constipated.  She has 1 bowel movement every other day and spends approximately 10 minutes on the toilet.  She takes MiraLAX to have a bowel movement.    No family history of colorectal cancer.  No family history of inflammatory bowel disease.  She has never had rectal bleeding in the past    Last Colonoscopy: 10/2015  Impression:           - The examined portion of the ileum was normal.                        - Non-bleeding internal hemorrhoids.                        - Mild diverticulosis in the sigmoid colon. There                         was no evidence of diverticular bleeding.                        - One 3 mm polyp in the descending colon. Resected                         and retrieved.                        - The examination was otherwise normal.    Pathology was tubular adenoma    Review of patient's allergies indicates:   Allergen Reactions    Alendronate Nausea Only     And heartburn    Kenalog [triamcinolone acetonide] Hives       Past Medical History:   Diagnosis Date    Abnormal chest CT     Acid reflux     Allergy     Anemia     Anemia     Anxiety     Cataract     Chronic UTI     recently treated with antibiotics -x 3 wks. ago-    Colon adenoma 2015 due 2020 10/21/2015    Colon adenoma 2015 due 2020 10/21/2015    Depression     Disturbed sleep rhythm     DJD (degenerative joint disease)     Dry eyes     Dry mouth     Grief  reaction 3/24/2014    Hx of migraine headaches     Hyperlipemia     Hypernatremia 8/8/2014    Hypertension     Iritis     Iritis     Mild vitamin D deficiency 9/9/2013    Multiple lung nodules on CT: 0753-0343 no f/u needed 6/6/2016    Neurogenic bladder     Osteopenia     in hips    Osteoporosis     spine    Osteoporosis: 9/15 see rheumatology notes 6/6/2016    Positive GEORGIANA (antinuclear antibody)     Schatzki's ring: 3/15 dilated 6/6/2016    Sciatica neuralgia 6/21/2013    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    Visual impairment      Past Surgical History:   Procedure Laterality Date    APPENDECTOMY      BACK SURGERY  2007    x4    CHOLECYSTECTOMY      lap orin    COLONOSCOPY N/A 10/2/2015    Procedure: COLONOSCOPY;  Surgeon: Bryan Love MD;  Location: [facility] ([address] [place] [address]);  Service: Endoscopy;  Laterality: N/A;    CYSTOSCOPY      with BOTOX INJECTION    HERNIA REPAIR      umbilical    HYSTERECTOMY      COMPLETE    POSTERIOR FUSION LUMBAR SPINE W/ CORPECTOMY      TONSILLECTOMY, ADENOIDECTOMY       Family History   Problem Relation Age of Onset    Kidney disease Mother     Hypertension Mother     Diabetes Father     Diabetes Brother     Kidney disease Brother     Heart disease Brother     Hyperlipidemia Sister     Hypertension Daughter     Colon cancer Maternal Aunt 70     stomach    Blindness Maternal Aunt     Cancer Maternal Aunt      stomach cancer    Cancer Maternal Uncle      colon    Colon cancer Maternal Uncle 70     two uncles    Glaucoma Neg Hx     Amblyopia Neg Hx     Macular degeneration Neg Hx     Retinal detachment Neg Hx     Anesthesia problems Neg Hx     Celiac disease Neg Hx     Cirrhosis Neg Hx     Crohn's disease Neg Hx     Esophageal cancer Neg Hx     Irritable bowel syndrome Neg Hx     Liver cancer Neg Hx     Liver disease Neg Hx     Rectal cancer Neg Hx     Stomach cancer Neg Hx      Social History   Substance Use Topics     "Smoking status: Never Smoker    Smokeless tobacco: Never Used    Alcohol use No        Review of Systems:  Review of Systems   Constitutional: Negative for chills, diaphoresis, fever, malaise/fatigue and weight loss.   HENT: Negative for congestion.    Respiratory: Negative for shortness of breath.    Cardiovascular: Negative for chest pain and leg swelling.   Gastrointestinal: Positive for blood in stool and constipation. Negative for abdominal pain, nausea and vomiting.   Genitourinary: Negative for dysuria.   Musculoskeletal: Negative for back pain and myalgias.   Skin: Negative for rash.   Neurological: Negative for dizziness and weakness.   Endo/Heme/Allergies: Does not bruise/bleed easily.   Psychiatric/Behavioral: Negative for depression.       OBJECTIVE:     Vital Signs (Most Recent)  BP (!) 175/85   Pulse 72   Ht 5' 5.98" (1.676 m)   Wt 69.3 kg (152 lb 12.5 oz)   BMI 24.67 kg/m²     Physical Exam:  General: Black or  female in no distress   Neuro: alert and oriented x 4.  Moves all extremities.     HEENT: no icterus.  Trachea midline  Respiratory: respirations are even and unlabored  Cardiac: regular rate  Abdomen: Soft, no masses  Extremities: Warm dry and intact  Skin: no rashes  Anorectal: External exam reveals a few small external skin tags.  Digital exam was performed and was negative for any masses or gross blood  Anoscopy was then performed.  She has no hemorrhoidal disease seen in the right posterior.  Grade 1 hemorrhoids seen right anterior as well as left lateral    Labs: None    Imaging: None      ASSESSMENT/PLAN:     Diagnoses and all orders for this visit:    Grade I hemorrhoids        67-year-old lady with rectal bleeding that is likely secondary to internal hemorrhoids from constipation.  We discussed behavioral modification as well as dietary modification.  We will plan to increase fiber in her diet as well as provide supplemental fiber.  She should continue with her " MiraLAX.  She can follow up as needed    ERNST Rivera MD  Staff Surgeon  Colon & Rectal Surgery

## 2017-12-14 ENCOUNTER — OFFICE VISIT (OUTPATIENT)
Dept: SURGERY | Facility: CLINIC | Age: 67
End: 2017-12-14
Payer: MEDICARE

## 2017-12-14 VITALS
DIASTOLIC BLOOD PRESSURE: 85 MMHG | HEART RATE: 72 BPM | SYSTOLIC BLOOD PRESSURE: 175 MMHG | HEIGHT: 66 IN | WEIGHT: 152.75 LBS | BODY MASS INDEX: 24.55 KG/M2

## 2017-12-14 DIAGNOSIS — K64.0 GRADE I HEMORRHOIDS: Primary | ICD-10-CM

## 2017-12-14 PROCEDURE — 99203 OFFICE O/P NEW LOW 30 MIN: CPT | Mod: 25,S$GLB,, | Performed by: COLON & RECTAL SURGERY

## 2017-12-14 PROCEDURE — 99999 PR PBB SHADOW E&M-EST. PATIENT-LVL III: CPT | Mod: PBBFAC,,, | Performed by: COLON & RECTAL SURGERY

## 2017-12-14 PROCEDURE — 46600 DIAGNOSTIC ANOSCOPY SPX: CPT | Mod: S$GLB,,, | Performed by: COLON & RECTAL SURGERY

## 2018-01-04 ENCOUNTER — OFFICE VISIT (OUTPATIENT)
Dept: PSYCHIATRY | Facility: CLINIC | Age: 68
End: 2018-01-04
Payer: MEDICARE

## 2018-01-04 VITALS
HEIGHT: 65 IN | WEIGHT: 156 LBS | BODY MASS INDEX: 25.99 KG/M2 | HEART RATE: 72 BPM | DIASTOLIC BLOOD PRESSURE: 84 MMHG | SYSTOLIC BLOOD PRESSURE: 182 MMHG

## 2018-01-04 DIAGNOSIS — F40.298 SPECIFIC PHOBIA: ICD-10-CM

## 2018-01-04 DIAGNOSIS — F33.41 MAJOR DEPRESSIVE DISORDER, RECURRENT, IN PARTIAL REMISSION: Primary | ICD-10-CM

## 2018-01-04 PROCEDURE — 99999 PR PBB SHADOW E&M-EST. PATIENT-LVL II: CPT | Mod: PBBFAC,,, | Performed by: PSYCHIATRY & NEUROLOGY

## 2018-01-04 PROCEDURE — 99213 OFFICE O/P EST LOW 20 MIN: CPT | Mod: S$GLB,,, | Performed by: PSYCHIATRY & NEUROLOGY

## 2018-01-04 PROCEDURE — 90833 PSYTX W PT W E/M 30 MIN: CPT | Mod: S$GLB,,, | Performed by: PSYCHIATRY & NEUROLOGY

## 2018-01-04 PROCEDURE — 99499 UNLISTED E&M SERVICE: CPT | Mod: S$GLB,,, | Performed by: PSYCHIATRY & NEUROLOGY

## 2018-01-04 RX ORDER — DULOXETIN HYDROCHLORIDE 60 MG/1
CAPSULE, DELAYED RELEASE ORAL
Qty: 60 CAPSULE | Refills: 6 | Status: SHIPPED | OUTPATIENT
Start: 2018-01-04 | End: 2018-08-02 | Stop reason: SDUPTHER

## 2018-01-04 NOTE — PROGRESS NOTES
"Outpatient Psychiatry Follow-Up Visit (MD/NP)    1/4/2018    Clinical Status of Patient:  Outpatient (Ambulatory)    Chief Complaint:  Idalmis Morejon is a 67 y.o. female who presents today for follow-up of depression and anxiety.      Met with patient.      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  She is happy the holidays are over.  She has not enjoyed it since becoming depressed and worse since her mothere's death.  She has some down days but it lasts perhaps only a day.  She had more as the holidays approached, perhaps 2 a week.  She takes zzzquill still for sleep if she does tno go to volunteer the next day.  She still awakenes at 2 am most o fthe time if she does not take the medication.  Denies crying spells.  She reports eating is better.  She denies desire to die.      She still has no dealt with her mothers belongings.  It been 3 yrs.          Psychotherapy:  · Target symptoms: depression, anxiety   · Why chosen therapy is appropriate versus another modality: relevant to diagnosis  · Outcome monitoring methods: self-report, observation  · Therapeutic intervention type: supportive psychotherapy  · Topics discussed/themes: enduring the holidays, illness/death of a loved one, stress related to medical comorbidities, symptom recognition  · The patient's response to the intervention is motivated. The patient's progress toward treatment goals is good.   · Duration of intervention: 16 mins    Review of Systems   · PSYCHIATRIC: Pertinant items are noted in the narrative.  · CONSTITUTIONAL: No weight gain or loss.   · RESPIRATORY: No shortness of breath.  · CARDIOVASCULAR: No tachycardia or chest pain.    Past Medical, Family and Social History: The patient's past medical, family and social history have been reviewed and updated as appropriate within the electronic " "medical record - see encounter notes.    Compliance: yes    Side effects: possible headache    Risk Parameters:  Patient reports no suicidal ideation  Patient reports no homicidal ideation  Patient reports no self-injurious behavior  Patient reports no violent behavior    Exam (detailed: at least 9 elements; comprehensive: all 15 elements)   Constitutional  Vitals:  Most recent vital signs, dated less than 90 days prior to this appointment, were reviewed.    Vitals:    01/04/18 0805   BP: (!) 182/84   Pulse: 72   Weight: 70.8 kg (156 lb)   Height: 5' 5" (1.651 m)        General:  unremarkable, age appropriate, neatly groomed, well nourished     Musculoskeletal  Muscle Strength/Tone:  no dyskinesia, no tremor   Gait & Station:  non-ataxic     Psychiatric  Speech:  normal tone, normal rate, normal pitch, normal volume, prosody intact   Mood:    Affect:  euthymic  appropriate, full   Thought Process:  normal and logical   Associations:  intact   Thought Content:  normal, no suicidality, no homicidality, delusions, or paranoia   Insight  Judgement:  good, patient has awareness of illness  Patient's behavior is adequate to circumstances   Orientation:  grossly intact   Memory: intact for content of interview   Language: grossly intact   Attention Span & Concentration:  able to focus   Fund of Knowledge:  intact and appropriate to age and level of education     Assessment and Diagnosis   Status/Progress: Based on the examination today, the patient's problem(s) is/are improved.  New problems have not been presented today.   Comorbidities are complicating management of the primary condition.  There are no active rule-out diagnoses for this patient at this time.    General Impression: Pt with depression and anxiety as well as multiple other medical comorbidities.  She is still affected by the death of her mother in March 2014.      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      Intervention/Counseling/Treatment Plan   · Continue cymbalta 120 mg. Consider reducing dose.    · Consider trial of trazodone instead of benadryl      Return to Clinic: 6 months

## 2018-01-07 DIAGNOSIS — I10 HYPERTENSION: ICD-10-CM

## 2018-01-07 RX ORDER — LOSARTAN POTASSIUM 100 MG/1
100 TABLET ORAL DAILY
Qty: 90 TABLET | Refills: 2 | Status: SHIPPED | OUTPATIENT
Start: 2018-01-07 | End: 2018-03-09 | Stop reason: ALTCHOICE

## 2018-01-08 RX ORDER — DILTIAZEM HYDROCHLORIDE 120 MG/1
CAPSULE, COATED, EXTENDED RELEASE ORAL
Qty: 90 CAPSULE | Refills: 0 | Status: SHIPPED | OUTPATIENT
Start: 2018-01-08 | End: 2018-04-17 | Stop reason: SDUPTHER

## 2018-01-08 RX ORDER — DILTIAZEM HYDROCHLORIDE 180 MG/1
CAPSULE, COATED, EXTENDED RELEASE ORAL
Qty: 90 CAPSULE | Refills: 0 | Status: SHIPPED | OUTPATIENT
Start: 2018-01-08 | End: 2018-04-17 | Stop reason: SDUPTHER

## 2018-01-11 ENCOUNTER — LAB VISIT (OUTPATIENT)
Dept: LAB | Facility: HOSPITAL | Age: 68
End: 2018-01-11
Attending: INTERNAL MEDICINE
Payer: MEDICARE

## 2018-01-11 DIAGNOSIS — E55.9 VITAMIN D INSUFFICIENCY: ICD-10-CM

## 2018-01-11 DIAGNOSIS — Z79.60 LONG-TERM USE OF IMMUNOSUPPRESSANT MEDICATION: ICD-10-CM

## 2018-01-11 DIAGNOSIS — H20.023 RECURRENT IRITIS, BILATERAL: ICD-10-CM

## 2018-01-11 LAB
25(OH)D3+25(OH)D2 SERPL-MCNC: 38 NG/ML
ALBUMIN SERPL BCP-MCNC: 3.6 G/DL
ALP SERPL-CCNC: 117 U/L
ALT SERPL W/O P-5'-P-CCNC: 20 U/L
ANION GAP SERPL CALC-SCNC: 8 MMOL/L
AST SERPL-CCNC: 37 U/L
BASOPHILS # BLD AUTO: 0.02 K/UL
BASOPHILS NFR BLD: 0.4 %
BILIRUB SERPL-MCNC: 0.4 MG/DL
BUN SERPL-MCNC: 18 MG/DL
CALCIUM SERPL-MCNC: 9.6 MG/DL
CHLORIDE SERPL-SCNC: 106 MMOL/L
CO2 SERPL-SCNC: 28 MMOL/L
CREAT SERPL-MCNC: 1 MG/DL
CRP SERPL-MCNC: 3.9 MG/L
DIFFERENTIAL METHOD: ABNORMAL
EOSINOPHIL # BLD AUTO: 0.1 K/UL
EOSINOPHIL NFR BLD: 2.4 %
ERYTHROCYTE [DISTWIDTH] IN BLOOD BY AUTOMATED COUNT: 14.7 %
ERYTHROCYTE [SEDIMENTATION RATE] IN BLOOD BY WESTERGREN METHOD: 21 MM/HR
EST. GFR  (AFRICAN AMERICAN): >60 ML/MIN/1.73 M^2
EST. GFR  (NON AFRICAN AMERICAN): 57.6 ML/MIN/1.73 M^2
GLUCOSE SERPL-MCNC: 81 MG/DL
HCT VFR BLD AUTO: 33.5 %
HGB BLD-MCNC: 11 G/DL
IMM GRANULOCYTES # BLD AUTO: 0.02 K/UL
IMM GRANULOCYTES NFR BLD AUTO: 0.4 %
LYMPHOCYTES # BLD AUTO: 1.9 K/UL
LYMPHOCYTES NFR BLD: 36.1 %
MCH RBC QN AUTO: 27.9 PG
MCHC RBC AUTO-ENTMCNC: 32.8 G/DL
MCV RBC AUTO: 85 FL
MONOCYTES # BLD AUTO: 0.5 K/UL
MONOCYTES NFR BLD: 9.4 %
NEUTROPHILS # BLD AUTO: 2.7 K/UL
NEUTROPHILS NFR BLD: 51.3 %
NRBC BLD-RTO: 0 /100 WBC
PLATELET # BLD AUTO: 233 K/UL
PMV BLD AUTO: 11.9 FL
POTASSIUM SERPL-SCNC: 4.1 MMOL/L
PROT SERPL-MCNC: 7.5 G/DL
RBC # BLD AUTO: 3.94 M/UL
SODIUM SERPL-SCNC: 142 MMOL/L
WBC # BLD AUTO: 5.34 K/UL

## 2018-01-11 PROCEDURE — 82306 VITAMIN D 25 HYDROXY: CPT

## 2018-01-11 PROCEDURE — 85025 COMPLETE CBC W/AUTO DIFF WBC: CPT

## 2018-01-11 PROCEDURE — 86140 C-REACTIVE PROTEIN: CPT

## 2018-01-11 PROCEDURE — 36415 COLL VENOUS BLD VENIPUNCTURE: CPT

## 2018-01-11 PROCEDURE — 85651 RBC SED RATE NONAUTOMATED: CPT

## 2018-01-11 PROCEDURE — 80053 COMPREHEN METABOLIC PANEL: CPT

## 2018-01-25 ENCOUNTER — PATIENT MESSAGE (OUTPATIENT)
Dept: INTERNAL MEDICINE | Facility: CLINIC | Age: 68
End: 2018-01-25

## 2018-01-25 ENCOUNTER — OFFICE VISIT (OUTPATIENT)
Dept: NEPHROLOGY | Facility: CLINIC | Age: 68
End: 2018-01-25
Payer: MEDICARE

## 2018-01-25 VITALS
HEIGHT: 66 IN | DIASTOLIC BLOOD PRESSURE: 78 MMHG | OXYGEN SATURATION: 90 % | HEART RATE: 69 BPM | BODY MASS INDEX: 25.15 KG/M2 | WEIGHT: 156.5 LBS | SYSTOLIC BLOOD PRESSURE: 158 MMHG

## 2018-01-25 DIAGNOSIS — N18.2 CKD (CHRONIC KIDNEY DISEASE), STAGE II: Primary | ICD-10-CM

## 2018-01-25 DIAGNOSIS — I12.9 HYPERTENSIVE CKD (CHRONIC KIDNEY DISEASE): ICD-10-CM

## 2018-01-25 PROCEDURE — 99214 OFFICE O/P EST MOD 30 MIN: CPT | Mod: S$GLB,,, | Performed by: INTERNAL MEDICINE

## 2018-01-25 PROCEDURE — 99999 PR PBB SHADOW E&M-EST. PATIENT-LVL III: CPT | Mod: PBBFAC,,, | Performed by: INTERNAL MEDICINE

## 2018-01-25 PROCEDURE — 99499 UNLISTED E&M SERVICE: CPT | Mod: S$GLB,,, | Performed by: INTERNAL MEDICINE

## 2018-01-26 ENCOUNTER — TELEPHONE (OUTPATIENT)
Dept: INTERNAL MEDICINE | Facility: CLINIC | Age: 68
End: 2018-01-26

## 2018-01-26 NOTE — PROGRESS NOTES
"Subjective:       Patient ID: Idalmis Morejon is a 67 y.o. Black or  female who presents for follow up of Chronic Kidney Disease    HPI     Ms. Morejon is seen for follow up on CKD. She was last followed on 6/28/17. Pt has longstanding hypertension, Schatzki's ring, DJD, sciatica, osteoporosis, hyperlipidemia, depression and multiple other medical problems. She reports having hypertension for "decades". She reports her mother and brother had longterm hypertension. They both had kidney disease and her brother had to be on dialysis. Pt admits to prior heavy use of various NSAID like Ibuprofen, goody powder and other compounds. She was still using NSAID until later part of 2016.    She denies any dysuria/ decreased urination/ leg swelling/ flank pain/ hematuria/ nausea/ vomiting/ diarrhea. She has been on losartan containing regimen. Her lab trend noted for baseline creatinine of 1 to 1.2. She has minimal amount of protein on prior urine studies, on prior quantification it was 0.1 to 0.2.     She denies any recent acute illness. She denies any dizziness or postural symptoms. She did not bring her home BP readings as she admits she actually has not been checking her BP at home. She is trying to improve her fluid intake. But overall she has been doing fine.     Remote hep C screen was negative. Prior CT imaging showed hypodensity suggestive of possible cysts on kidneys.    Renal Function:  Lab Results   Component Value Date    GLU 81 01/11/2018    GLU 93 11/22/2017     01/11/2018     11/22/2017    K 4.1 01/11/2018    K 4.2 11/22/2017     01/11/2018     11/22/2017    CO2 28 01/11/2018    CO2 29 11/22/2017    BUN 18 01/11/2018    BUN 13 11/22/2017    CALCIUM 9.6 01/11/2018    CALCIUM 10.1 11/22/2017    CREATININE 1.0 01/11/2018    CREATININE 0.9 11/22/2017    ALBUMIN 3.6 01/11/2018    ALBUMIN 3.8 11/22/2017    PHOS 3.4 11/22/2017    PHOS 3.2 06/13/2017    ESTGFRAFRICA >60.0 01/11/2018 "    ESTGFRAFRICA >60.0 11/22/2017    EGFRNONAA 57.6 (A) 01/11/2018    EGFRNONAA >60.0 11/22/2017       Urinalysis:  Lab Results   Component Value Date    APPEARANCEUA Clear 11/22/2017    PHUR 7.0 11/22/2017    SPECGRAV 1.015 11/22/2017    PROTEINUA Negative 11/22/2017    GLUCUA Negative 11/22/2017    OCCULTUA Negative 11/22/2017    NITRITE Negative 11/22/2017    LEUKOCYTESUR Trace (A) 11/22/2017       Protein/Creatinine Ratio:  Lab Results   Component Value Date    PROTEINURINE 19 (H) 11/22/2017    CREATRANDUR 160.0 11/22/2017    UTPCR 0.12 11/22/2017       CBC:  Lab Results   Component Value Date    WBC 5.34 01/11/2018    HGB 11.0 (L) 01/11/2018    HCT 33.5 (L) 01/11/2018       Review of Systems   Constitutional: Negative for activity change, appetite change, chills, fatigue and fever.   HENT: Negative for ear discharge, ear pain, sneezing and sore throat.    Eyes: Negative for redness.   Respiratory: Negative for cough, shortness of breath and wheezing.    Cardiovascular: Negative for chest pain and leg swelling.   Gastrointestinal: Negative for abdominal distention, abdominal pain, diarrhea, nausea and vomiting.   Endocrine: Negative for polydipsia and polyuria.   Genitourinary: Negative for decreased urine volume, difficulty urinating, dysuria, flank pain, frequency and hematuria.   Musculoskeletal: Positive for arthralgias and back pain.   Skin: Negative for pallor and rash.   Allergic/Immunologic: Negative for food allergies.   Neurological: Negative for dizziness, light-headedness and headaches.   Psychiatric/Behavioral: Negative for behavioral problems. The patient is not nervous/anxious.        Objective:      Physical Exam   Constitutional: She is oriented to person, place, and time. She appears well-developed and well-nourished. No distress.   HENT:   Head: Normocephalic and atraumatic.   Eyes: Right eye exhibits no discharge. Left eye exhibits no discharge.   Neck: Neck supple.   Cardiovascular: Normal  rate, regular rhythm and normal heart sounds.    No murmur heard.  Pulmonary/Chest: Effort normal and breath sounds normal. No respiratory distress. She has no wheezes. She has no rales.   Abdominal: Soft. There is no tenderness.   Musculoskeletal: She exhibits no edema.   Neurological: She is alert and oriented to person, place, and time.   Skin: Skin is warm and dry.   Psychiatric: She has a normal mood and affect. Her behavior is normal. Thought content normal.   Nursing note and vitals reviewed.      Assessment:       1. CKD (chronic kidney disease), stage II    2. Hypertensive CKD (chronic kidney disease)      Plan:     Ms. Morejon has longstanding hypertension, prior heavy use of NSAID, hyperlipidemia, CTD and multiple other medical problems. Pt appears to have CKD II/III and minimal proteinuria per prior urine PC ratio. It could be related to longstanding hypertension and heavy use of NSAID. I discussed with her in detail about plenty of oral fluids, low salt diet, monitoring BP at home, avoiding all types of NSAID, avoid high dose of vitamin C as it can promote GI irritation and kidney stone formation, periodically following on renal labs. Her family members likely had hypertensive glomerulosclerosis.     Encourage increased oral intake of water. Stressed importance of following low salt diet, keeping goal BP less than 130/80 and following BP closely at home on a regular basis. She expressed understanding. Pt would benefit by digital BP monitoring. Defer further input on this to her PCP. I have been unable to refer her to Digital BP monitoring program in the epic.     Overall her CKD is stable appearing for now. She has stable PTH. Urine PC ratio is 0.12. Chronic anemia not related to her CKD.     Plan  - periodically monitor renal panel for electrolytes, acid base status, eGFR  - CKD staging and potential risk of CKD progression d/w patient  - follow low salt diet  - follow PTH levels, vit D  - follow urine  studies for proteinuria, quantify proteinuria by spot urine protein/ creatinine ratio  - obtain US kidney for kidney size, rule out mass/ cyst, ordered many months ago, not scheduled yet?  - avoid NSAID/ bactrim/ IV contrast/ gadolinium/ aminoglycoside/ Fleet enema where possible  - continue ARBI containing regimen for RAAS blockade. She has been on losartan   - defer to her GI if she can come off protonix, given her complicated GI symptoms and problems not sure if it would be possible  - discussed with patient that Tramadol, Tylenol upto 2 grams per day in divided doses is still a safe option for her chronic pain    Plan, labs, recommendations were discussed with patient, her questions were answered to her satisfaction.   RTC 5 to 6 months

## 2018-01-30 ENCOUNTER — TELEPHONE (OUTPATIENT)
Dept: GASTROENTEROLOGY | Facility: CLINIC | Age: 68
End: 2018-01-30

## 2018-01-30 NOTE — TELEPHONE ENCOUNTER
----- Message from Rylee Vo sent at 1/30/2018 12:54 PM CST -----  Contact: pt [phone]  Kate    Pt states she is having a lot of terrible heartburn and would like to speak with the nurse regarding scheduling a procedure. Pt states medication is not working and she is only able to eat soft foods and drink liquids. Please call pt

## 2018-02-01 ENCOUNTER — PATIENT MESSAGE (OUTPATIENT)
Dept: INTERNAL MEDICINE | Facility: CLINIC | Age: 68
End: 2018-02-01

## 2018-02-05 ENCOUNTER — PATIENT MESSAGE (OUTPATIENT)
Dept: ADMINISTRATIVE | Facility: OTHER | Age: 68
End: 2018-02-05

## 2018-02-16 ENCOUNTER — OFFICE VISIT (OUTPATIENT)
Dept: INTERNAL MEDICINE | Facility: CLINIC | Age: 68
End: 2018-02-16
Payer: MEDICARE

## 2018-02-16 VITALS
WEIGHT: 156.06 LBS | TEMPERATURE: 99 F | BODY MASS INDEX: 25.08 KG/M2 | HEART RATE: 96 BPM | OXYGEN SATURATION: 98 % | HEIGHT: 66 IN | DIASTOLIC BLOOD PRESSURE: 68 MMHG | SYSTOLIC BLOOD PRESSURE: 140 MMHG

## 2018-02-16 DIAGNOSIS — L50.9 URTICARIA: Primary | ICD-10-CM

## 2018-02-16 PROCEDURE — 3008F BODY MASS INDEX DOCD: CPT | Mod: S$GLB,,, | Performed by: INTERNAL MEDICINE

## 2018-02-16 PROCEDURE — 1126F AMNT PAIN NOTED NONE PRSNT: CPT | Mod: S$GLB,,, | Performed by: INTERNAL MEDICINE

## 2018-02-16 PROCEDURE — 99999 PR PBB SHADOW E&M-EST. PATIENT-LVL IV: CPT | Mod: PBBFAC,,, | Performed by: INTERNAL MEDICINE

## 2018-02-16 PROCEDURE — 1159F MED LIST DOCD IN RCRD: CPT | Mod: S$GLB,,, | Performed by: INTERNAL MEDICINE

## 2018-02-16 PROCEDURE — 99213 OFFICE O/P EST LOW 20 MIN: CPT | Mod: S$GLB,,, | Performed by: INTERNAL MEDICINE

## 2018-02-16 RX ORDER — METHYLPREDNISOLONE 4 MG/1
TABLET ORAL
Qty: 1 PACKAGE | Refills: 0 | Status: SHIPPED | OUTPATIENT
Start: 2018-02-16 | End: 2018-02-21

## 2018-02-16 NOTE — PROGRESS NOTES
Subjective:       Patient ID: Idalmis Morejon is a 67 y.o. female.    Chief Complaint: Rash    Rash   This is a new problem. The current episode started 1 to 4 weeks ago. The problem has been gradually worsening since onset. The rash is diffuse. The rash is characterized by redness and itchiness. She was exposed to nothing. Pertinent negatives include no congestion, cough, diarrhea, fatigue, fever, rhinorrhea, shortness of breath, sore throat or vomiting. (None) Past treatments include anti-itch cream, antihistamine, moisturizer, cold compress and topical steroids. The treatment provided no relief. There is no history of allergies, asthma, eczema or varicella.     Review of Systems   Constitutional: Negative for activity change, chills, fatigue, fever and unexpected weight change.   HENT: Negative for congestion, ear pain, hearing loss, nosebleeds, postnasal drip, rhinorrhea, sinus pressure, sore throat and trouble swallowing.    Eyes: Negative.  Negative for discharge and visual disturbance.   Respiratory: Negative for cough, chest tightness, shortness of breath and wheezing.    Cardiovascular: Negative for chest pain and palpitations.   Gastrointestinal: Positive for constipation. Negative for abdominal pain, blood in stool, diarrhea, nausea and vomiting.   Endocrine: Positive for polyuria. Negative for polydipsia.   Genitourinary: Negative for difficulty urinating, dysuria, frequency, hematuria, menstrual problem and urgency.   Musculoskeletal: Negative for arthralgias, joint swelling, neck pain and neck stiffness.   Skin: Positive for rash.   Neurological: Negative for dizziness, weakness and headaches.   Psychiatric/Behavioral: Negative for confusion, dysphoric mood and sleep disturbance. The patient is not nervous/anxious.        Objective:      Physical Exam   Constitutional: She is oriented to person, place, and time. She appears well-developed and well-nourished.  Non-toxic appearance. No distress.   HENT:    Head: Normocephalic and atraumatic.   Right Ear: Tympanic membrane, external ear and ear canal normal.   Left Ear: Tympanic membrane, external ear and ear canal normal.   Eyes: EOM are normal. Pupils are equal, round, and reactive to light. No scleral icterus.   Neck: Normal range of motion. Neck supple. No thyromegaly present.   Cardiovascular: Normal rate, regular rhythm and normal heart sounds.    Pulmonary/Chest: Effort normal and breath sounds normal.   Abdominal: Soft. Bowel sounds are normal. She exhibits no mass. There is no tenderness. There is no rebound.   Musculoskeletal: Normal range of motion.   Lymphadenopathy:     She has no cervical adenopathy.   Neurological: She is alert and oriented to person, place, and time. She has normal reflexes. She displays normal reflexes. No cranial nerve deficit. She exhibits normal muscle tone. Coordination normal.   Skin: Skin is warm and dry.        Psychiatric: She has a normal mood and affect. Her behavior is normal.       Assessment:       1. Urticaria        Plan:   Idalmis was seen today for rash.    Diagnoses and all orders for this visit:    Urticaria  -     Ambulatory consult to Allergy    Other orders  -     methylPREDNISolone (MEDROL DOSEPACK) 4 mg tablet; use as directed

## 2018-02-21 ENCOUNTER — HOSPITAL ENCOUNTER (EMERGENCY)
Facility: HOSPITAL | Age: 68
Discharge: HOME | End: 2018-02-21
Attending: EMERGENCY MEDICINE
Payer: MEDICARE

## 2018-02-21 ENCOUNTER — OFFICE VISIT (OUTPATIENT)
Dept: ALLERGY | Facility: CLINIC | Age: 68
End: 2018-02-21
Payer: MEDICARE

## 2018-02-21 ENCOUNTER — TELEPHONE (OUTPATIENT)
Dept: INTERNAL MEDICINE | Facility: CLINIC | Age: 68
End: 2018-02-21

## 2018-02-21 VITALS — WEIGHT: 154.56 LBS | HEIGHT: 66 IN | BODY MASS INDEX: 24.84 KG/M2

## 2018-02-21 VITALS
HEART RATE: 80 BPM | TEMPERATURE: 98 F | DIASTOLIC BLOOD PRESSURE: 80 MMHG | RESPIRATION RATE: 16 BRPM | OXYGEN SATURATION: 100 % | SYSTOLIC BLOOD PRESSURE: 147 MMHG

## 2018-02-21 DIAGNOSIS — R55 SYNCOPE: ICD-10-CM

## 2018-02-21 DIAGNOSIS — E86.0 DEHYDRATION: Primary | ICD-10-CM

## 2018-02-21 DIAGNOSIS — I10 ESSENTIAL HYPERTENSION: ICD-10-CM

## 2018-02-21 DIAGNOSIS — R21 RASH: ICD-10-CM

## 2018-02-21 DIAGNOSIS — R07.9 CHEST PAIN: ICD-10-CM

## 2018-02-21 DIAGNOSIS — L29.9 PRURITUS: ICD-10-CM

## 2018-02-21 DIAGNOSIS — R55 SYNCOPE AND COLLAPSE: Primary | ICD-10-CM

## 2018-02-21 LAB
ALBUMIN SERPL BCP-MCNC: 3.3 G/DL
ALP SERPL-CCNC: 118 U/L
ALT SERPL W/O P-5'-P-CCNC: 21 U/L
ANION GAP SERPL CALC-SCNC: 12 MMOL/L
AST SERPL-CCNC: 40 U/L
BASOPHILS # BLD AUTO: 0.01 K/UL
BASOPHILS NFR BLD: 0.1 %
BILIRUB SERPL-MCNC: 0.4 MG/DL
BNP SERPL-MCNC: 37 PG/ML
BUN SERPL-MCNC: 22 MG/DL
CALCIUM SERPL-MCNC: 9.6 MG/DL
CHLORIDE SERPL-SCNC: 104 MMOL/L
CO2 SERPL-SCNC: 22 MMOL/L
CREAT SERPL-MCNC: 1.4 MG/DL
DIFFERENTIAL METHOD: ABNORMAL
EOSINOPHIL # BLD AUTO: 0 K/UL
EOSINOPHIL NFR BLD: 0.1 %
ERYTHROCYTE [DISTWIDTH] IN BLOOD BY AUTOMATED COUNT: 14 %
EST. GFR  (AFRICAN AMERICAN): 44.2 ML/MIN/1.73 M^2
EST. GFR  (NON AFRICAN AMERICAN): 38.4 ML/MIN/1.73 M^2
GLUCOSE SERPL-MCNC: 151 MG/DL
HCT VFR BLD AUTO: 34.7 %
HGB BLD-MCNC: 11.9 G/DL
IMM GRANULOCYTES # BLD AUTO: 0.09 K/UL
IMM GRANULOCYTES NFR BLD AUTO: 0.7 %
LYMPHOCYTES # BLD AUTO: 1.5 K/UL
LYMPHOCYTES NFR BLD: 12.2 %
MCH RBC QN AUTO: 28.3 PG
MCHC RBC AUTO-ENTMCNC: 34.3 G/DL
MCV RBC AUTO: 82 FL
MONOCYTES # BLD AUTO: 0.3 K/UL
MONOCYTES NFR BLD: 2 %
NEUTROPHILS # BLD AUTO: 10.5 K/UL
NEUTROPHILS NFR BLD: 84.9 %
NRBC BLD-RTO: 0 /100 WBC
PLATELET # BLD AUTO: 371 K/UL
PMV BLD AUTO: 11 FL
POTASSIUM SERPL-SCNC: 4 MMOL/L
PROT SERPL-MCNC: 7.4 G/DL
RBC # BLD AUTO: 4.21 M/UL
SODIUM SERPL-SCNC: 138 MMOL/L
TROPONIN I SERPL DL<=0.01 NG/ML-MCNC: <0.006 NG/ML
WBC # BLD AUTO: 12.37 K/UL

## 2018-02-21 PROCEDURE — 96360 HYDRATION IV INFUSION INIT: CPT

## 2018-02-21 PROCEDURE — 99203 OFFICE O/P NEW LOW 30 MIN: CPT | Mod: GC,S$GLB,, | Performed by: ALLERGY & IMMUNOLOGY

## 2018-02-21 PROCEDURE — 3008F BODY MASS INDEX DOCD: CPT | Mod: GC,S$GLB,, | Performed by: ALLERGY & IMMUNOLOGY

## 2018-02-21 PROCEDURE — 84484 ASSAY OF TROPONIN QUANT: CPT

## 2018-02-21 PROCEDURE — 99999 PR PBB SHADOW E&M-EST. PATIENT-LVL III: CPT | Mod: PBBFAC,GC,, | Performed by: ALLERGY & IMMUNOLOGY

## 2018-02-21 PROCEDURE — 25000003 PHARM REV CODE 250: Performed by: NURSE PRACTITIONER

## 2018-02-21 PROCEDURE — 96361 HYDRATE IV INFUSION ADD-ON: CPT

## 2018-02-21 PROCEDURE — 99285 EMERGENCY DEPT VISIT HI MDM: CPT | Mod: ,,, | Performed by: EMERGENCY MEDICINE

## 2018-02-21 PROCEDURE — 99285 EMERGENCY DEPT VISIT HI MDM: CPT | Mod: 25

## 2018-02-21 PROCEDURE — 80053 COMPREHEN METABOLIC PANEL: CPT

## 2018-02-21 PROCEDURE — 85025 COMPLETE CBC W/AUTO DIFF WBC: CPT

## 2018-02-21 PROCEDURE — 93010 ELECTROCARDIOGRAM REPORT: CPT | Mod: ,,, | Performed by: INTERNAL MEDICINE

## 2018-02-21 PROCEDURE — 93005 ELECTROCARDIOGRAM TRACING: CPT | Mod: 59

## 2018-02-21 PROCEDURE — 1159F MED LIST DOCD IN RCRD: CPT | Mod: GC,S$GLB,, | Performed by: ALLERGY & IMMUNOLOGY

## 2018-02-21 PROCEDURE — 25000003 PHARM REV CODE 250: Performed by: EMERGENCY MEDICINE

## 2018-02-21 PROCEDURE — 83880 ASSAY OF NATRIURETIC PEPTIDE: CPT

## 2018-02-21 RX ORDER — ACETAMINOPHEN 500 MG
1000 TABLET ORAL
Status: COMPLETED | OUTPATIENT
Start: 2018-02-21 | End: 2018-02-21

## 2018-02-21 RX ADMIN — ACETAMINOPHEN 1000 MG: 500 TABLET ORAL at 11:02

## 2018-02-21 RX ADMIN — SODIUM CHLORIDE 1000 ML: 0.9 INJECTION, SOLUTION INTRAVENOUS at 11:02

## 2018-02-21 NOTE — PROGRESS NOTES
The patient presented for evaluation if itch/rash and other systemic signs that have been progressive. However during the check-in process, I was called to patient's room by Nurse Patricia, who was about to repeat the patient's BP (which was 100/60 on triage). I witnessed the patient have a syncopal episode while sitting in a chair in the exam room. She had a pulse during the syncopal episode, which lasted about 45 seconds. No seizure-like activity, no incontinence. When she came to, she was alert and oriented x 4, diaphoretic, nauseous, and complained of a headache and heartburn. Together, we laid her down on the exam table, her pulse was 80-90s, BP improved to 125/78 while supine.     She had taken two rolaids in the waiting room for her heartburn. She was coming to clinic to establish care for generalized pruritus. She recently completed a steroid pack, and after completion, symptoms got worse. She has some red patches on her skin. She denies new medication use, herbal medications, OTC medications other than benadryl and rolaids.     She had decreased sleep last night due to itching. She had diarrhea yesterday. She ate a smoothie this morning. She drove herself to clinic today. She has a history of HTN and took only one of her BP meds this am. She has a history of GERD and is on a PPI.    EMS was notified, and promptly transferred her to the ED. EKG done by EMS shows T-wave inversion in V5 and V5 and evidence of LVH, but rate and rhythm are normal.     I spoke with her daughter Thu, who will meet her in the ED.     The patient will need evaluation and treatment emergently, and she will need to RTC for additional evaluation at another time.     Cinthia Salinas MD  Allergy/Immunology Fellow

## 2018-02-21 NOTE — ED NOTES
C/o hives x 2 weeks, states she is finishing up a prescription of steroids today, states she returned to her primary physician today for worsening of symptoms and had a syncopal episode in the lobby, denies injury and fall, initial blood pressure on ems arrival was 100/50, currently complains of sore throat and nausea, denies dizziness and pain, denies shortness of breath

## 2018-02-21 NOTE — ED NOTES
Notified MD of patient' c/o headache pain, which she states has been intermittent since last night.

## 2018-02-21 NOTE — ED PROVIDER NOTES
"Encounter Date: 2/21/2018    SCRIBE #1 NOTE: I, Tsering Alan, am scribing for, and in the presence of,  Dr. Bates. I have scribed the following portions of the note - the APC attestation and the EKG reading.       History     Chief Complaint   Patient presents with    Loss of Consciousness     Pt is a 68 yo female with PMH of anemia, anxiety, depression, DJD, HTN, osteoporosis, and sciatica presents to the ED after a syncopal episode at the allergist office.  Pt states that she remembers going into the exam room, but does not remember anything after that.  Office staff states that pt sat in the exam chair and had an approximately 45 second syncopal episode.  Staff stat pt did not hit head. Office staff states that prior to exam, pt had taken 2 Rolaids due to heartburn in the waiting room.  Pt A&Ox4 on exam.  Only complaint is "heavy feeling" headache and bilateral arm itching.  Pt states she had been taking Prednisone x 1 week for the rash, but finished yesterday.  Pt denies any chest pain, abdominal pain, N/V/D.            Review of patient's allergies indicates:   Allergen Reactions    Alendronate Nausea Only     And heartburn    Kenalog [triamcinolone acetonide] Hives     Past Medical History:   Diagnosis Date    Abnormal chest CT     Acid reflux     Allergy     Anemia     Anemia     Anxiety     Cataract     Chronic UTI     recently treated with antibiotics -x 3 wks. ago-    Colon adenoma 2015 due 2020 10/21/2015    Colon adenoma 2015 due 2020 10/21/2015    Depression     Disturbed sleep rhythm     DJD (degenerative joint disease)     Dry eyes     Dry mouth     Grief reaction 3/24/2014    Hx of migraine headaches     Hyperlipemia     Hypernatremia 8/8/2014    Hypertension     Iritis     Iritis     Mild vitamin D deficiency 9/9/2013    Multiple lung nodules on CT: 6752-0234 no f/u needed 6/6/2016    Neurogenic bladder     Osteopenia     in hips    Osteoporosis     spine    " Osteoporosis: 9/15 see rheumatology notes 6/6/2016    Positive GE[place] (antinuclear antibody)     Schatzki's ring: 3/15 dilated 6/6/2016    Sciatica neuralgia 6/21/2013    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    Visual impairment      Past Surgical History:   Procedure Laterality Date    APPENDECTOMY      BACK SURGERY  2007    x4    CHOLECYSTECTOMY      lap orin    COLONOSCOPY N/A 10/2/2015    Procedure: COLONOSCOPY;  Surgeon: Bryan Love MD;  Location: [address]);  Service: Endoscopy;  Laterality: N/A;    CYSTOSCOPY      with BOTOX INJECTION    HERNIA REPAIR      umbilical    HYSTERECTOMY      COMPLETE    POSTERIOR FUSION LUMBAR SPINE W/ CORPECTOMY      TONSILLECTOMY, ADENOIDECTOMY       Family History   Problem Relation Age of Onset    Kidney disease Mother     Hypertension Mother     Diabetes Father     Diabetes Brother     Kidney disease Brother     Heart disease Brother     Hyperlipidemia Sister     Hypertension Daughter     Colon cancer Maternal Aunt 70     stomach    Blindness Maternal Aunt     Cancer Maternal Aunt      stomach cancer    Cancer Maternal Uncle      colon    Colon cancer Maternal Uncle 70     two uncles    Glaucoma Neg Hx     Amblyopia Neg Hx     Macular degeneration Neg Hx     Retinal detachment Neg Hx     Anesthesia problems Neg Hx     Celiac disease Neg Hx     Cirrhosis Neg Hx     Crohn's disease Neg Hx     Esophageal cancer Neg Hx     Irritable bowel syndrome Neg Hx     Liver cancer Neg Hx     Liver disease Neg Hx     Rectal cancer Neg Hx     Stomach cancer Neg Hx      Social History   Substance Use Topics    Smoking status: Never Smoker    Smokeless tobacco: Never Used    Alcohol use No     Review of Systems   Constitutional: Negative for activity change, appetite change, chills, fatigue and fever.   HENT: Negative for congestion, sinus pain, sinus pressure, sore throat and trouble swallowing.    Eyes: Negative for  photophobia, pain and discharge.   Respiratory: Negative for apnea, cough, choking, chest tightness, shortness of breath, wheezing and stridor.    Cardiovascular: Negative for chest pain, palpitations and leg swelling.   Gastrointestinal: Negative for abdominal distention, abdominal pain, constipation, diarrhea, nausea and vomiting.   Endocrine: Negative.    Genitourinary: Negative for difficulty urinating, dysuria, frequency and urgency.   Musculoskeletal: Negative for arthralgias, back pain, gait problem, joint swelling, myalgias, neck pain and neck stiffness.   Skin: Positive for rash. Negative for pallor and wound.   Allergic/Immunologic: Negative.    Neurological: Positive for headaches. Negative for dizziness, tremors, syncope, weakness and numbness.   Hematological: Negative for adenopathy.   Psychiatric/Behavioral: Negative.        Physical Exam     Initial Vitals [02/21/18 1037]   BP Pulse Resp Temp SpO2   (!) 149/76 86 16 98.2 °F (36.8 °C) 100 %      MAP       100.33         Physical Exam    Nursing note and vitals reviewed.  Constitutional: Vital signs are normal. She appears well-developed and well-nourished. She is cooperative. She does not have a sickly appearance. She does not appear ill. No distress.   HENT:   Head: Normocephalic and atraumatic.   Mouth/Throat: Uvula is midline, oropharynx is clear and moist and mucous membranes are normal.   Eyes: Conjunctivae, EOM and lids are normal. Pupils are equal, round, and reactive to light.   Neck: Trachea normal, normal range of motion, full passive range of motion without pain and phonation normal. Neck supple. No JVD present.   Cardiovascular: Normal rate, regular rhythm, normal heart sounds and intact distal pulses.   Pulses:       Radial pulses are 2+ on the right side, and 2+ on the left side.        Dorsalis pedis pulses are 2+ on the right side, and 2+ on the left side.   Pulmonary/Chest: Effort normal and breath sounds normal.   Abdominal: Soft.  Normal appearance and bowel sounds are normal. There is no tenderness. There is no rigidity, no rebound and no guarding.   Musculoskeletal: Normal range of motion.   Neurological: She is alert and oriented to person, place, and time. She has normal strength. GCS eye subscore is 4. GCS verbal subscore is 5. GCS motor subscore is 6.   Skin: Skin is warm, dry and intact. Capillary refill takes less than 2 seconds. Rash noted. Rash is urticarial. No cyanosis. Nails show no clubbing.   Uticarial rash noted to bilateral upper arms.  Pt states rash present x 2 weeks with no relief with Prednisone.    Psychiatric: She has a normal mood and affect. Her speech is normal and behavior is normal. Judgment and thought content normal. Cognition and memory are normal.         ED Course   Procedures  Labs Reviewed   CBC W/ AUTO DIFFERENTIAL - Abnormal; Notable for the following:        Result Value    Hemoglobin 11.9 (*)     Hematocrit 34.7 (*)     Platelets 371 (*)     Immature Granulocytes 0.7 (*)     Gran # (ANC) 10.5 (*)     Immature Grans (Abs) 0.09 (*)     Gran% 84.9 (*)     Lymph% 12.2 (*)     Mono% 2.0 (*)     All other components within normal limits   COMPREHENSIVE METABOLIC PANEL - Abnormal; Notable for the following:     CO2 22 (*)     Glucose 151 (*)     Albumin 3.3 (*)     eGFR if  44.2 (*)     eGFR if non  38.4 (*)     All other components within normal limits   TROPONIN I   B-TYPE NATRIURETIC PEPTIDE     EKG Readings: (Independently Interpreted)   Rhythm: Normal Sinus Rhythm. Heart Rate: 88.   Left atrial enlargement. LVH. ST flattening in inferior lateral leads. Unchanged when compared to March 21, 2015.        Imaging Results          CT Head Without Contrast (Final result)  Result time 02/21/18 12:22:28    Final result by Nathan Reynolds MD (02/21/18 12:22:28)                 Impression:        Sequela of chronic microvascular ischemic change, and senescent change, noting a few  more focal regions of low attenuation, new since the previous exam, within the bilateral corona radiata and right internal capsule, could reflect sequela of focal chronic microvascular ischemic change or age indeterminate infarct, correlation is recommended.            Electronically signed by: SHAILESH REYNOLDS MD  Date:     02/21/18  Time:    12:22              Narrative:    Comparison: 4/6/2015    Clinical history: Syncope    Technique:    Axial images of the brain were obtained at 5-mm intervals from the skull base to the vertex without the administration of contrast.    Findings:    There is generalized cerebral volume loss.  There is hypoattenuation in a periventricular fashion, likely sequela of chronic microvascular ischemic change.There are more focal regions of hypoattenuation, involving the anterior limb of the right internal capsule, and bilateral corona radiata, nonspecific.  There is no evidence of acute major vascular territory infarct, hemorrhage, or mass.  There is no hydrocephalus.  There are no abnormal extra-axial fluid collections.  The paranasal sinuses and mastoid air cells are clear, and there is no evidence of calvarial fracture.  The visualized soft tissues are unremarkable.                             X-Ray Chest 1 View (Final result)  Result time 02/21/18 12:23:36    Final result by Shailesh Reynolds MD (02/21/18 12:23:36)                 Impression:      No acute cardiopulmonary process.      Electronically signed by: SHAILESH REYNOLDS MD  Date:     02/21/18  Time:    12:23              Narrative:    Chest one view    Indication:Syncope    Comparison:3/21/2015    Findings:  The cardiomediastinal silhouette is not enlarged, stable as compared to the previous exam.  There is no pleural effusion.  The trachea is midline.  The lungs are symmetrically expanded bilaterally with minimally coarse interstitial attenuation. No large focal consolidation seen.  There is no pneumothorax.  The osseous  "structures are remarkable for degenerative changes.                                 Medical Decision Making:   History:   Old Medical Records: I decided to obtain old medical records.  Old Records Summarized: records from clinic visits.  Initial Assessment:   Emergent evaluation of 68 yo female presents to the ED after a syncopal episode at the allergists office. Urticarial rash noted on BUE.  Pt states head feels "heavy" from the headache.  No neurologic findings.    I will get labs, imaging, orthostatic blood pressures, medicate, hydrate and reassess.     Differential Diagnosis:   Dysrhythmia, Myocardial Infarction, Vasovagal, orthostatic hypotension  Independently Interpreted Test(s):   I have ordered and independently interpreted EKG Reading(s) - see prior notes  Clinical Tests:   Lab Tests: Ordered and Reviewed  The following lab test(s) were unremarkable: CBC, CMP, Troponin and BNP  Radiological Study: Ordered and Reviewed  Medical Tests: Ordered and Reviewed  ED Management:  Tylenol 1000 mg PO   NSS 1L bolus        APC / Resident Notes:   1310- Pt reassessed.  Pt states she is feeling much better.  Lab and imaging results discussed with patient and family.  Verbalized understanding.   Discharge instructions given. Return to ED precautions discussed. Follow up as directed.   Pt is stable for discharge.     I discussed the care of this patient with my supervising MD.         Scribe Attestation:   Scribe #1: I performed the above scribed service and the documentation accurately describes the services I performed. I attest to the accuracy of the note.    Attending Attestation:     Physician Attestation Statement for NP/PA:   I have conducted a face to face encounter with this patient in addition to the NP/PA, due to Medical Complexity    Other NP/PA Attestation Additions:    History of Present Illness: 67 y.o. sent from clinic for evaluation of syncopal episode. Patient was found to be hypotensive and diaphoretic " after event. Pressure improved with lying supine. She reports feeling having a burning sensation in the chest prior to the event. No bladder or bowel incontinence, no seizure-like activity. Patient returned to baseline mental status approximately 45 seconds after event. She now complains of a headache which is not typical for her. Cardiac workup and head CT ordered.     Head CT shows no evidence of ischemic changes or bleed. Cardiac workup is negative. Labs are significant for hypovolemia and mild BRANDT. Patient was treated with IV fluids. She reports significant improvement of symptoms. Cardiac monitor showed no evidence of arhythmia. Patient is stable for discharge.          Physician Attestation for Scribe:      Comments: I, Dr. Clarisse Bates, personally performed the services described in this documentation. All medical record entries made by the scribe were at my direction and in my presence.  I have reviewed the chart and agree that the record reflects my personal performance and is accurate and complete. Clarisse Bates MD.                 Clinical Impression:   The primary encounter diagnosis was Dehydration. Diagnoses of Chest pain and Syncope were also pertinent to this visit.    Disposition:   Disposition: Discharged  Condition: Stable                        Belen Saunders NP  02/21/18 8441       Clarisse Bates MD  02/21/18 7750

## 2018-02-21 NOTE — ED NOTES
"Hourly rounding complete.  Patient updated on plan of care and current status. Patient reporting headache after tylenol was given is "much better". Items within reach. Toileting offered.     "

## 2018-02-21 NOTE — TELEPHONE ENCOUNTER
Patient came in to be seen in  Allergy, roomed, BP was low she told me she said was about to faint, she became unconscious,  100/60, she is not diabetic, but was diaphoretic. 125/78 lying down, 88 hr, 98 pulse ox, c/o headache, has red discoloration to both upper arms and itching to trunk and under her feet, also headache and neck discomfort, Nunu arrived at 10:12, daughter Thu notified and is on the way to ed ,HL started to left hand after 2 attempts. EkG done.

## 2018-02-21 NOTE — ED NOTES
Patient identifiers verified and correct for Idalmis Morejon.    LOC: The patient is awake, alert and aware of environment with an appropriate affect, the patient is oriented x 3 and speaking appropriately.  APPEARANCE: Patient resting comfortably and in no acute distress, patient is clean and well groomed, patient's clothing is properly fastened.  SKIN: The skin is warm and dry, color consistent with ethnicity, patient has normal skin turgor and moist mucus membranes, diffuse generalized patchy rash noted to BLE- non raised, reddened, no drainage, +itching  MUSCULOSKELETAL: Patient moving all extremities spontaneously, no obvious swelling or deformities noted.  RESPIRATORY: Airway is open and patent, respirations are spontaneous, patient has a normal effort and rate, no accessory muscle use noted, bilateral breath sounds clear  CARDIAC: Patient has a normal rate and regular rhythm, no periphreal edema noted, capillary refill < 3 seconds.  ABDOMEN: Soft and non tender to palpation, no distention noted, normoactive bowel sounds present in all four quadrants.  NEUROLOGIC: PERRL, 3mm bilaterally, eyes open spontaneously, behavior appropriate to situation, follows commands, facial expression symmetrical, bilateral hand grasp equal and even, purposeful motor response noted, normal sensation in all extremities when touched with a finger.

## 2018-02-22 NOTE — TELEPHONE ENCOUNTER
Please call her    I reviewed ER report; it sounds like she was dehydrated    Please schedule her for an appt to see me in the next week thanks

## 2018-02-23 ENCOUNTER — OFFICE VISIT (OUTPATIENT)
Dept: ALLERGY | Facility: CLINIC | Age: 68
End: 2018-02-23
Payer: MEDICARE

## 2018-02-23 VITALS
DIASTOLIC BLOOD PRESSURE: 80 MMHG | WEIGHT: 156.06 LBS | SYSTOLIC BLOOD PRESSURE: 150 MMHG | HEIGHT: 66 IN | BODY MASS INDEX: 25.08 KG/M2

## 2018-02-23 DIAGNOSIS — L53.9 ERYTHRODERMA: ICD-10-CM

## 2018-02-23 DIAGNOSIS — L29.9 ITCHING: Primary | ICD-10-CM

## 2018-02-23 PROCEDURE — 1159F MED LIST DOCD IN RCRD: CPT | Mod: S$GLB,,, | Performed by: STUDENT IN AN ORGANIZED HEALTH CARE EDUCATION/TRAINING PROGRAM

## 2018-02-23 PROCEDURE — 99999 PR PBB SHADOW E&M-EST. PATIENT-LVL III: CPT | Mod: PBBFAC,,, | Performed by: STUDENT IN AN ORGANIZED HEALTH CARE EDUCATION/TRAINING PROGRAM

## 2018-02-23 PROCEDURE — 99203 OFFICE O/P NEW LOW 30 MIN: CPT | Mod: S$GLB,,, | Performed by: STUDENT IN AN ORGANIZED HEALTH CARE EDUCATION/TRAINING PROGRAM

## 2018-02-23 PROCEDURE — 1126F AMNT PAIN NOTED NONE PRSNT: CPT | Mod: S$GLB,,, | Performed by: STUDENT IN AN ORGANIZED HEALTH CARE EDUCATION/TRAINING PROGRAM

## 2018-02-23 PROCEDURE — 3008F BODY MASS INDEX DOCD: CPT | Mod: S$GLB,,, | Performed by: STUDENT IN AN ORGANIZED HEALTH CARE EDUCATION/TRAINING PROGRAM

## 2018-02-23 RX ORDER — CETIRIZINE HYDROCHLORIDE 10 MG/1
20 TABLET ORAL 2 TIMES DAILY
Qty: 60 TABLET | Refills: 3 | Status: SHIPPED | OUTPATIENT
Start: 2018-02-23 | End: 2018-02-28 | Stop reason: SDUPTHER

## 2018-02-23 NOTE — PATIENT INSTRUCTIONS
See dermatology to figure out what it is.      In the meantime, I will work to control itching    Plan A:  Cetirizine (10mg):  2 tablets every morning and every evening  Contact me Monday if itching is not controlled.

## 2018-02-23 NOTE — Clinical Note
Pt presented with erythroderma.  Am referring to dermatology for w/u and controlling itching with Zyrtec 2 BID.

## 2018-02-23 NOTE — PROGRESS NOTES
Allergy Clinic Note  [facility]    Subjective:      Patient ID: Idalmis Morejon is a 67 y.o. female.    Chief Complaint: Itching      Referring Provider: Self, Aaareferral    History of Present Illness: 67-year-old female with no prior history of cutaneous syndromes presents complaining of diffuse itching and redness for 3 weeks.    She says the itching started on both hands including between the fingers and spread up the arms.  It later occurred on the feet also in the web spaces and is now diffuse.  The itching and the redness appeared at the same time. There is been no other rash.  The itching is severe and uncontrolled, and keeps her awake at night.  She has been unhelped by Benadryl (by mouth and topical), oatmeal baths, Medrol Dosepak, cortisone cream, Gold Bond powder.  She denies recent use of NSAIDs or antibiotics.  Denies new medicines, OTC products or supplements.  No other clear precipitants.    No previous history of similar symptoms or other skin diseases. She also has no history of ALLERGIES.    Additional History:  Past medical history is significant for  multiple medical problems including chronic kidney disease and hypertension.  No Hx of ENT surgery.  Denies exposure to pets. She is single, disabled, and lives in [place].      Patient Active Problem List   Diagnosis    Essential hypertension    Unspecified deficiency anemia    Gastroesophageal reflux disease with esophagitis: EGD 3/15    Spondylosis of lumbosacral region without myelopathy or radiculopathy    Mixed hyperlipidemia    Nuclear sclerosis - Both Eyes    Chronic pain syndrome    Sciatica neuralgia    High risk medication use-Azathioprine 100 mg daily    Ocular hypertension - Both Eyes    Bilateral dry eyes - Both Eyes    Autoimmune disorder- recurrent iritis    Neurogenic bladder    Slow transit constipation    Scleritis    Urge incontinence    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     Colon adenoma: 10/15 repeat in 2020    Age-related osteoporosis without current pathological fracture 2016    Schatzki's ring: 3/15 dilated    Multiple lung nodules on CT: 8150-8591 no f/u needed    Steroid-induced glaucoma of both eyes    Proteinuria    Family history of colon cancer: maternal uncle and aunt    CKD (chronic kidney disease), stage II    Hypertensive CKD (chronic kidney disease)    Recurrent major depressive disorder, in partial remission    Immunosuppression    Atherosclerosis of abdominal aorta     Current Outpatient Prescriptions on File Prior to Visit   Medication Sig Dispense Refill    azathioprine 75 mg Tab Take 75 mg by mouth once daily. 90 tablet 3    brimonidine-timolol (COMBIGAN) 0.2-0.5 % Drop Place 1 drop into both eyes 2 (two) times daily. 15 mL 11    diltiaZEM (CARDIZEM CD) 120 MG Cp24 TAKE ONE CAPSULE BY MOUTH EVERY DAY 90 capsule 0    diltiaZEM (CARDIZEM CD) 180 MG 24 hr capsule TAKE ONE CAPSULE BY MOUTH EVERY DAY 90 capsule 0    DULoxetine (CYMBALTA) 60 MG capsule TAKE 2 CAPSULES (120 MG TOTAL) BY MOUTH ONCE DAILY. 60 capsule 6    losartan (COZAAR) 100 MG tablet TAKE 1 TABLET (100 MG TOTAL) BY MOUTH ONCE DAILY. 90 tablet 2    pantoprazole (PROTONIX) 40 MG tablet Take 1 tablet (40 mg total) by mouth before breakfast. 90 tablet 3    POLYETHYLENE GLYCOL 3350 (MIRALAX ORAL)       promethazine (PHENERGAN) 25 MG tablet TAKE 1 TABLET BY MOUTH .DO NOT TAKE MORE THAN 2-3 WEEKLY FOR NAUSEA AND HEADACHE 30 tablet 3    tolterodine (DETROL LA) 4 MG 24 hr capsule TAKE ONE CAPSULE BY MOUTH EVERY DAY 30 capsule 9     No current facility-administered medications on file prior to visit.          Review of Systems   Constitutional: Negative for chills, fever and malaise/fatigue.   HENT: Negative for ear discharge.    Eyes: Negative for pain and discharge.   Respiratory: Negative for hemoptysis, shortness of breath, wheezing and stridor.    Cardiovascular: Negative for chest pain  "and palpitations.   Gastrointestinal: Negative for abdominal pain, blood in stool, nausea and vomiting.   Genitourinary: Negative for dysuria, flank pain and hematuria.   Skin: Positive for itching and rash.   Neurological: Negative for seizures and loss of consciousness.       Objective:   BP (!) 150/80 (BP Location: Left arm, Patient Position: Sitting, BP Method: Medium (Manual))   Ht 5' 6" (1.676 m)   Wt 70.8 kg (156 lb 1.4 oz)   BMI 25.19 kg/m²     Physical Exam   Constitutional: She is oriented to person, place, and time and well-developed, well-nourished, and in no distress.   HENT:   Head: Normocephalic and atraumatic.   Eyes: Conjunctivae are normal. No scleral icterus.   Neck: Neck supple.   Cardiovascular: Intact distal pulses.    Pulmonary/Chest: Effort normal. No stridor. No respiratory distress.   Neurological: She is alert and oriented to person, place, and time.   Skin:   Palms red. Forearms.. No welts, papules or other raised lesions no areas of clearing.  Other areas not evaluated   Psychiatric: Affect and judgment normal.       Data: none      Assessment:     1. Itching    2. Erythroderma        Plan:     Idalmis was seen today for itching.    Diagnoses and all orders for this visit:    Itching - uncontrolled  -     cetirizine (ZYRTEC) 10 MG tablet; Take 2 tablets (20 mg total) by mouth 2 (two) times daily.  - Contact me Monday if itching remains uncontrolled.  Anticipate HS Atarax.    Erythroderma  To dermatology [place] [facility]    Patient Instructions   See dermatology to figure out what it is.      In the meantime, I will work to control itching    Plan A:  Cetirizine (10mg):  2 tablets every morning and every evening  Contact me Monday if itching is not controlled.      Follow-up in about 3 days (around 2/26/2018), or if symptoms worsen or fail to improve.    Selena Mclain MD    "

## 2018-02-27 ENCOUNTER — TELEPHONE (OUTPATIENT)
Dept: DERMATOLOGY | Facility: CLINIC | Age: 68
End: 2018-02-27

## 2018-02-27 ENCOUNTER — OFFICE VISIT (OUTPATIENT)
Dept: DERMATOLOGY | Facility: CLINIC | Age: 68
End: 2018-02-27
Payer: MEDICARE

## 2018-02-27 DIAGNOSIS — L50.9 URTICARIA OF UNKNOWN ORIGIN: Primary | ICD-10-CM

## 2018-02-27 PROCEDURE — 99999 PR PBB SHADOW E&M-EST. PATIENT-LVL II: CPT | Mod: PBBFAC,,, | Performed by: DERMATOLOGY

## 2018-02-27 PROCEDURE — 1126F AMNT PAIN NOTED NONE PRSNT: CPT | Mod: S$GLB,,, | Performed by: DERMATOLOGY

## 2018-02-27 PROCEDURE — 99202 OFFICE O/P NEW SF 15 MIN: CPT | Mod: S$GLB,,, | Performed by: DERMATOLOGY

## 2018-02-27 PROCEDURE — 3008F BODY MASS INDEX DOCD: CPT | Mod: S$GLB,,, | Performed by: DERMATOLOGY

## 2018-02-27 PROCEDURE — 1159F MED LIST DOCD IN RCRD: CPT | Mod: S$GLB,,, | Performed by: DERMATOLOGY

## 2018-02-27 NOTE — LETTER
February 27, 2018      Selena Mclain MD  [address]  [place] [address]           [facility]  [address]  [place] [address]  Phone: [phone]  Fax: [phone]          Patient: Idalmis Morejon   MR Number: [id number]   YOB: 1950   Date of Visit: 2/27/2018       Dear Dr. Selena Mclain:    Thank you for referring Idalmis Morejon to me for evaluation. Attached you will find relevant portions of my assessment and plan of care.    If you have questions, please do not hesitate to call me. I look forward to following Idalmis Morejon along with you.    Sincerely,    Paty Rosa MD    Enclosure  CC:  No Recipients    If you would like to receive this communication electronically, please contact [email or web address] or [phone] to request more information on [facility] [employer] access.    For providers and/or their staff who would like to refer a patient to [facility], please contact us through our one-stop-shop provider referral line, [facility], at [phone].    If you feel you have received this communication in error or would no longer like to receive these types of communications, please e-mail [email or web address]

## 2018-02-27 NOTE — TELEPHONE ENCOUNTER
I was able to speak to the patient and schedule her an appointment for today with Dr. Rosa. She was pleased with this and thanked me for the call.

## 2018-02-27 NOTE — PROGRESS NOTES
Subjective:       Patient ID:  Idalmis Morejon is a 67 y.o. female who presents for   Chief Complaint   Patient presents with    Rash     palms of hands/bottoms of feet/both arms, 3 weeks, OCT meds didnt help, RX predinsone did not help, RX cetirizine slightly helps     Rash  - Initial  Affected locations: left arm, right arm, left hand, right hand, left foot and right foot  Duration: 3 weeks  Signs / symptoms: itching  Severity: mild and mild to moderate  Timing: constant  Aggravated by: nothing  Relieving factors/Treatments tried: nothing  Improvement on treatment: no relief      Started on palms and soles; red and itchy - Medrol dose aretha ineffective; was seen by allergy and started on Zyrtec 20 mg po bid since last week for urticaria.  Rash at that time had spread up her arms, came and went.  She has had no further itching or rash since Sunday.  No recent viruses, travel history.  Of note patient had syncopal episode last week requiring ER - she is feeling much better and is seeing her PCP tomorrow for f/u.    Review of Systems   Constitutional: Negative for fatigue and malaise.   Eyes: Negative for itching, eye watering and eye irritation.   Musculoskeletal: Negative for myalgias and joint swelling.   Skin: Positive for itching and rash.        Objective:    Physical Exam   Constitutional: She appears well-developed and well-nourished. No distress.   Neurological: She is alert and oriented to person, place, and time. She is not disoriented.   Psychiatric: She has a normal mood and affect.   Skin:   Areas Examined (abnormalities noted in diagram):   Head / Face Inspection Performed  Neck Inspection Performed  RUE Inspected  LUE Inspection Performed  Nails and Digits Inspection Performed              Diagram Legend     Erythematous scaling macule/papule c/w actinic keratosis       Vascular papule c/w angioma      Pigmented verrucoid papule/plaque c/w seborrheic keratosis      Yellow umbilicated papule c/w  sebaceous hyperplasia      Irregularly shaped tan macule c/w lentigo     1-2 mm smooth white papules consistent with Milia      Movable subcutaneous cyst with punctum c/w epidermal inclusion cyst      Subcutaneous movable cyst c/w pilar cyst      Firm pink to brown papule c/w dermatofibroma      Pedunculated fleshy papule(s) c/w skin tag(s)      Evenly pigmented macule c/w junctional nevus     Mildly variegated pigmented, slightly irregular-bordered macule c/w mildly atypical nevus      Flesh colored to evenly pigmented papule c/w intradermal nevus       Pink pearly papule/plaque c/w basal cell carcinoma      Erythematous hyperkeratotic cursted plaque c/w SCC      Surgical scar with no sign of skin cancer recurrence      Open and closed comedones      Inflammatory papules and pustules      Verrucoid papule consistent consistent with wart     Erythematous eczematous patches and plaques     Dystrophic onycholytic nail with subungual debris c/w onychomycosis     Umbilicated papule    Erythematous-base heme-crusted tan verrucoid plaque consistent with inflamed seborrheic keratosis     Erythematous Silvery Scaling Plaque c/w Psoriasis     See annotation      Assessment / Plan:        Urticaria of unknown origin - no rash at present, but based upon history and description as well as response to cetirizine, favor acute urticaria. Given that timecourse is <3 months no further workup indicated at this point.  Of note patient is on Imuran for uveitis as managed by rheumatology.  -discontinue any fragranced products including bath and body works  -taper Zyrtec by one 10 mg tab q wk until off (take 3 tabs daily this week, 2 tabs daily next week, 1 tab daily week thereafter)  -pt to notify me if rash or itching recurs for further management  -zyrtec has completely resolved her symptoms  -no need for benadryl/atarax at this point  -reassurance         Follow-up if symptoms worsen or fail to improve.

## 2018-02-27 NOTE — TELEPHONE ENCOUNTER
----- Message from Vandana Boles NP sent at 2/26/2018  4:44 PM CST -----  Hey girl,  This patient absolutely needs to be rescheduled w/ a physician.  ThanksNatalie

## 2018-02-28 ENCOUNTER — PATIENT MESSAGE (OUTPATIENT)
Dept: ADMINISTRATIVE | Facility: OTHER | Age: 68
End: 2018-02-28

## 2018-02-28 ENCOUNTER — OFFICE VISIT (OUTPATIENT)
Dept: INTERNAL MEDICINE | Facility: CLINIC | Age: 68
End: 2018-02-28
Payer: MEDICARE

## 2018-02-28 VITALS
HEIGHT: 66 IN | DIASTOLIC BLOOD PRESSURE: 70 MMHG | WEIGHT: 152.13 LBS | BODY MASS INDEX: 24.45 KG/M2 | SYSTOLIC BLOOD PRESSURE: 150 MMHG

## 2018-02-28 DIAGNOSIS — E21.1 SECONDARY HYPERPARATHYROIDISM, NOT ELSEWHERE CLASSIFIED: ICD-10-CM

## 2018-02-28 DIAGNOSIS — R55 SYNCOPE, UNSPECIFIED SYNCOPE TYPE: ICD-10-CM

## 2018-02-28 DIAGNOSIS — D53.9 DEFICIENCY ANEMIA: ICD-10-CM

## 2018-02-28 DIAGNOSIS — D89.89 AUTOIMMUNE DISORDER: ICD-10-CM

## 2018-02-28 DIAGNOSIS — E86.0 DEHYDRATION: Primary | ICD-10-CM

## 2018-02-28 DIAGNOSIS — I70.0 ATHEROSCLEROSIS OF ABDOMINAL AORTA: ICD-10-CM

## 2018-02-28 DIAGNOSIS — N18.2 CKD (CHRONIC KIDNEY DISEASE), STAGE II: ICD-10-CM

## 2018-02-28 DIAGNOSIS — N17.9 ACUTE RENAL FAILURE, UNSPECIFIED ACUTE RENAL FAILURE TYPE: ICD-10-CM

## 2018-02-28 DIAGNOSIS — F33.41 RECURRENT MAJOR DEPRESSIVE DISORDER, IN PARTIAL REMISSION: ICD-10-CM

## 2018-02-28 DIAGNOSIS — E78.2 MIXED HYPERLIPIDEMIA: ICD-10-CM

## 2018-02-28 DIAGNOSIS — D84.9 IMMUNOSUPPRESSION: ICD-10-CM

## 2018-02-28 DIAGNOSIS — I10 ESSENTIAL HYPERTENSION: ICD-10-CM

## 2018-02-28 PROCEDURE — 1126F AMNT PAIN NOTED NONE PRSNT: CPT | Mod: S$GLB,,, | Performed by: INTERNAL MEDICINE

## 2018-02-28 PROCEDURE — 3008F BODY MASS INDEX DOCD: CPT | Mod: S$GLB,,, | Performed by: INTERNAL MEDICINE

## 2018-02-28 PROCEDURE — 99499 UNLISTED E&M SERVICE: CPT | Mod: S$GLB,,, | Performed by: INTERNAL MEDICINE

## 2018-02-28 PROCEDURE — 1159F MED LIST DOCD IN RCRD: CPT | Mod: S$GLB,,, | Performed by: INTERNAL MEDICINE

## 2018-02-28 PROCEDURE — 99999 PR PBB SHADOW E&M-EST. PATIENT-LVL III: CPT | Mod: PBBFAC,,, | Performed by: INTERNAL MEDICINE

## 2018-02-28 PROCEDURE — 99215 OFFICE O/P EST HI 40 MIN: CPT | Mod: S$GLB,,, | Performed by: INTERNAL MEDICINE

## 2018-02-28 RX ORDER — CETIRIZINE HYDROCHLORIDE 10 MG/1
10 TABLET ORAL 2 TIMES DAILY
Qty: 60 TABLET | Refills: 0
Start: 2018-02-28 | End: 2020-06-30

## 2018-02-28 NOTE — PATIENT INSTRUCTIONS
Anemia  Anemia is a condition that occurs when your body does not have enough healthy red blood cells (RBCs). RBCs are the parts of your blood that carry oxygen throughout your body. A protein called hemoglobin allows your RBCs to absorb and release oxygen. Without enough RBCs or hemoglobin, your body doesn't get enough oxygen. Symptoms of anemia may then occur.    What are the symptoms of anemia?  Some people with anemia have no symptoms. But most people have symptoms that range from mild to severe. These can include:  · Tiredness (fatigue)  · Weakness  · Pale skin  · Shortness of breath  · Dizziness or fainting  · Rapid heartbeat  · Trouble doing normal amounts of activity  · Jaundice (yellowing of your eyes, skin, or mouth; dark urine)  What causes anemia?  Anemia can occur when your body:  · Loses too much blood  · Does not make enough RBCs  · Destroys your RBCs at a faster rate than it can replace them  · Does not make a normal amount of hemoglobin in your RBCs  These problems can occur for many reasons, including:  · A condition that you are born with (congenital or inherited), such as sickle cell disease or thalassemia  · Heavy bleeding for any reason, including injury, surgery, childbirth, or even heavy menstrual periods  · Being low in certain nutrients, such as iron, folate, or vitamin B12, possibly from a poor diet or a condition like celiac disease or Crohn's disease  · Certain chronic conditions like diabetes, arthritis, or kidney disease  · Certain chronic infections like tuberculosis or HIV  · Exposure to certain medicines, such as those used for chemotherapy  There are different types of anemia. Your healthcare provider can tell you more about the type of anemia you have and what may have caused it.  How is anemia diagnosed?  To diagnose anemia, your healthcare provider orders blood tests. These can include:  · Complete blood cell count (CBC). This test measures the amounts of the different types  of blood cells.  · Blood smear. This test checks the size and shape of your blood cells. To do the test, a drop of your blood is viewed under a microscope. A stain is used to make the blood cells easier to see.  · Iron studies. These tests measure the amount of iron in your blood. Your body needs iron to make hemoglobin in your RBCs.  · Vitamin B12 and folate studies. These tests check for some of the components that help give RBCs a normal size and shape.  · Reticulocyte count. This test measures the amount of new RBCs that your bone marrow makes.  · Hemoglobin electrophoresis. This test checks for problems with your hemoglobin in RBCs.  How is anemia treated?  Treatment for anemia is based on the type of anemia, its cause, and the severity of your symptoms. Treatments may include:  · Diet changes. This involves increasing the amount of certain nutrients in your diet, such as iron, vitamin B12, or folate. Your healthcare provider may also prescribe nutrient supplements.  · Medicines. Certain medicines treat the cause of your anemia. Others help build new RBCs or relieve symptoms. If a medicine is the cause of your anemia, you may need to stop or change it.  · Blood transfusions. Replacing some of your blood can increase the number of healthy RBCs in your body.  · Surgery. In some cases, your doctor may do surgery to treat the underlying cause of anemia. If you need surgery, your healthcare provider will explain the procedure and outline the risks and benefits for you.  What are the long-term concerns?  If you have a certain type of anemia, you can expect a full recovery after treatment. If you have other types of anemia (especially a type you're born with), you will need to manage it for life. Your doctor can tell you more.  Date Last Reviewed: 12/1/2016  © 4395-4058 [employer]. [address] [place] [address], [place] [address]. All rights reserved. This information is not intended as a substitute for  professional medical care. Always follow your healthcare professional's instructions.        Taking Aspirin for Atherosclerosis       Aspirin is a medicine often prescribed to treat atherosclerosis. This condition affects your arteries. These are the blood vessels that carry blood away from your heart. Having atherosclerosis means youre at greater risk of a heart attack or stroke. Aspirin can help prevent these from happening.  How atherosclerosis affects your arteries   A fatty material (plaque) can build up in your arteries. This makes it harder for blood to flow through them. A blood clot can then form on the plaque. This may block the artery, cutting off blood flow. This can cause conditions such as coronary artery disease (CAD) and peripheral arterial disease (PAD):  · CAD occurs when plaque builds up in the coronary artery. This artery supplies the heart with oxygen-rich blood.  · PAD occurs when plaque forms in leg arteries.  The same things that cause CAD and PAD can also cause plaque to form in other arteries in your body, such as those in the brain. When plaque occurs in any of these arteries, it raises your risk of heart attack or stroke.  What aspirin does  Aspirin is a blood-thinner (antiplatelet medicine). It helps keep blood clots from forming. This reduces the risk of blockage. Aspirin can be taken daily if you are at high risk of or have already had a heart attack or stroke. It is also used after a procedure called a stent placement. This is when a tiny wire mesh tube, or stent, is placed in an artery to keep it open. Aspirin helps prevent blood clots from forming on the stent.  Taking aspirin safely  Tell your healthcare provider about any medicines you are taking. This includes:  · All prescription medicines  · Over-the-counter medicines  · Herbs, vitamins, and other supplements  Also mention if you have a history of ulcers or bleeding problems. Ask whether you will need to stop taking aspirin  before having surgery or dental work. Always take medicines as directed.  Tips for taking aspirin  · Have a routine. For example, take aspirin with the same meal each day.  · Dont take more than prescribed. A low dose gives the same benefit as a higher one, with a lower risk of side effects.  · Dont skip doses. Aspirin needs to be taken each day to work well.  · Keep track of what you take. A pillbox with days of the week can help, especially if you take several medicines. Or use a list or chart to keep track.  When to call your healthcare provider  Side effects of aspirin are not usually serious. If you do have problems, changing your dose may help. Call your healthcare provider if you have any of the following:  · Excessive bruising (some bruising is normal)  · Nosebleeds, bleeding gums, or other excessive bleeding  · An upset stomach or stomach pain   Date Last Reviewed: 6/1/2016  © 3293-1204 [employer]. [address] [place] [address]. All rights reserved. This information is not intended as a substitute for professional medical care. Always follow your healthcare professional's instructions.        Rosuvastatin Tablets  What is this medicine?  ROSUVASTATIN (mitchel TYLER va sta tin) is known as a HMG-CoA reductase inhibitor or 'statin'. It lowers cholesterol and triglycerides in the blood. This drug may also reduce the risk of heart attack, stroke, or other health problems in patients with risk factors for heart disease. Diet and lifestyle changes are often used with this drug.  How should I use this medicine?  Take this medicine by mouth with a glass of water. Follow the directions on the prescription label. Do not cut, crush or chew this medicine. You can take this medicine with or without food. Take your doses at regular intervals. Do not take your medicine more often than directed.  Talk to your pediatrician regarding the use of this medicine in children. While this drug may be  prescribed for children as young as 7 years old for selected conditions, precautions do apply.  What side effects may I notice from receiving this medicine?  Side effects that you should report to your doctor or health care professional as soon as possible:  · allergic reactions like skin rash, itching or hives, swelling of the face, lips, or tongue  · dark urine  · fever  · joint pain  · muscle cramps, pain  · redness, blistering, peeling or loosening of the skin, including inside the mouth  · trouble passing urine or change in the amount of urine  · unusually weak or tired  · yellowing of the eyes or skin  Side effects that usually do not require medical attention (report to your doctor or health care professional if they continue or are bothersome):  · constipation  · heartburn  · nausea  · stomach gas, pain, upset  What may interact with this medicine?  Do not take this medicine with any of the following medications:  · herbal medicines like red yeast rice  This medicine may also interact with the following medications:  · alcohol  · antacids containing aluminum hydroxide or magnesium hydroxide  · cyclosporine  · other medicines for high cholesterol  · some medicines for HIV infection  · warfarin  What if I miss a dose?  If you miss a dose, take it as soon as you can. Do not take 2 doses within 12 hours of each other. If there are less than 12 hours until your next dose, take only that dose. Do not take double or extra doses.  Where should I keep my medicine?  Keep out of the reach of children.  Store at room temperature between 20 and 25 degrees C (68 and 77 degrees F). Keep container tightly closed (protect from moisture). Throw away any unused medicine after the expiration date.  What should I tell my health care provider before I take this medicine?  They need to know if you have any of these conditions:  · frequently drink alcoholic beverages  · kidney disease  · liver disease  · muscle aches or  weakness  · other medical condition  · an unusual or allergic reaction to rosuvastatin, other medicines, foods, dyes, or preservatives  · pregnant or trying to get pregnant  · breast-feeding  What should I watch for while using this medicine?  Visit your doctor or health care professional for regular check-ups. You may need regular tests to make sure your liver is working properly.  Tell your doctor or health care professional right away if you get any unexplained muscle pain, tenderness, or weakness, especially if you also have a fever and tiredness. Your doctor or health care professional may tell you to stop taking this medicine if you develop muscle problems. If your muscle problems do not go away after stopping this medicine, contact your health care professional.  This medicine may affect blood sugar levels. If you have diabetes, check with your doctor or health care professional before you change your diet or the dose of your diabetic medicine.  Avoid taking antacids containing aluminum, calcium or magnesium within 2 hours of taking this medicine.  This drug is only part of a total heart-health program. Your doctor or a dietician can suggest a low-cholesterol and low-fat diet to help. Avoid alcohol and smoking, and keep a proper exercise schedule.  Do not use this drug if you are pregnant or breast-feeding. Serious side effects to an unborn child or to an infant are possible. Talk to your doctor or pharmacist for more information.  NOTE:This sheet is a summary. It may not cover all possible information. If you have questions about this medicine, talk to your doctor, pharmacist, or health care provider. Copyright© 2017 [employer]        Atorvastatin tablets  What is this medicine?  ATORVASTATIN (a TORE va sta tin) is known as a HMG-CoA reductase inhibitor or 'statin'. It lowers the level of cholesterol and triglycerides in the blood. This drug may also reduce the risk of heart attack, stroke, or other  health problems in patients with risk factors for heart disease. Diet and lifestyle changes are often used with this drug.  How should I use this medicine?  Take this medicine by mouth with a glass of water. Follow the directions on the prescription label. You can take this medicine with or without food. Take your doses at regular intervals. Do not take your medicine more often than directed.  Talk to your pediatrician regarding the use of this medicine in children. While this drug may be prescribed for children as young as 10 years old for selected conditions, precautions do apply.  What side effects may I notice from receiving this medicine?  Side effects that you should report to your doctor or health care professional as soon as possible:  · allergic reactions like skin rash, itching or hives, swelling of the face, lips, or tongue  · dark urine  · fever  · joint pain  · muscle cramps, pain  · redness, blistering, peeling or loosening of the skin, including inside the mouth  · trouble passing urine or change in the amount of urine  · unusually weak or tired  · yellowing of eyes or skin  Side effects that usually do not require medical attention (report to your doctor or health care professional if they continue or are bothersome):  · constipation  · heartburn  · stomach gas, pain, upset  What may interact with this medicine?  Do not take this medicine with any of the following medications:  · red yeast rice  · telaprevir  · telithromycin  · voriconazole  This medicine may also interact with the following medications:  · alcohol  · antiviral medicines for HIV or AIDS  · boceprevir  · certain antibiotics like clarithromycin, erythromycin, troleandomycin  · certain medicines for cholesterol like fenofibrate or gemfibrozil  · cimetidine  · clarithromycin  · colchicine  · cyclosporine  · digoxin  · female hormones, like estrogens or progestins and birth control pills  · grapefruit juice  · medicines for fungal  infections like fluconazole, itraconazole, ketoconazole  · niacin  · rifampin  · spironolactone  What if I miss a dose?  If you miss a dose, take it as soon as you can. If it is almost time for your next dose, take only that dose. Do not take double or extra doses.  Where should I keep my medicine?  Keep out of the reach of children.  Store at room temperature between 20 to 25 degrees C (68 to 77 degrees F). Throw away any unused medicine after the expiration date.  What should I tell my health care provider before I take this medicine?  They need to know if you have any of these conditions:  · frequently drink alcoholic beverages  · history of stroke, TIA  · kidney disease  · liver disease  · muscle aches or weakness  · other medical condition  · an unusual or allergic reaction to atorvastatin, other medicines, foods, dyes, or preservatives  · pregnant or trying to get pregnant  · breast-feeding  What should I watch for while using this medicine?  Visit your doctor or health care professional for regular check-ups. You may need regular tests to make sure your liver is working properly.  Tell your doctor or health care professional right away if you get any unexplained muscle pain, tenderness, or weakness, especially if you also have a fever and tiredness. Your doctor or health care professional may tell you to stop taking this medicine if you develop muscle problems. If your muscle problems do not go away after stopping this medicine, contact your health care professional.  This drug is only part of a total heart-health program. Your doctor or a dietician can suggest a low-cholesterol and low-fat diet to help. Avoid alcohol and smoking, and keep a proper exercise schedule.  Do not use this drug if you are pregnant or breast-feeding. Serious side effects to an unborn child or to an infant are possible. Talk to your doctor or pharmacist for more information.  This medicine may affect blood sugar levels. If you have  diabetes, check with your doctor or health care professional before you change your diet or the dose of your diabetic medicine.  If you are going to have surgery tell your health care professional that you are taking this drug.  NOTE:This sheet is a summary. It may not cover all possible information. If you have questions about this medicine, talk to your doctor, pharmacist, or health care provider. Copyright© 2017 [employer]

## 2018-02-28 NOTE — PROGRESS NOTES
"Transitional Care Note  Subjective:       Patient ID: Idalmis Morejon is a 67 y.o. female.  Chief Complaint: Hospital Follow Up    Family and/or Caretaker present at visit?  No.  Diagnostic tests reviewed/disposition: I have reviewed all completed as well as pending diagnostic tests at the time of discharge.  Disease/illness education: yes  Home health/community services discussion/referrals: Patient does not have home health established from hospital visit.  They do not need home health.  If needed, we will set up home health for the patient.   Establishment or re-establishment of referral orders for community resources: No other necessary community resources.   Discussion with other health care providers: No discussion with other health care providers necessary.     Passed out in the Allergy clinic and was taken to the ER.  Found to be dehydrated.  Unclear etiology- no vomiting or diarrhea or fever.  No URI sx.  She admits to drinking almost no liquids.    "Syncopal episode at the allergist office.  Pt states that she remembers going into the exam room, but does not remember anything after that.  Office staff states that pt sat in the exam chair and had an approximately 45 second syncopal episode.  Staff stat pt did not hit head. Office staff states that prior to exam, pt had taken 2 Rolaids due to heartburn in the waiting room.  Pt A&Ox4 on exam.  Only complaint is "heavy feeling" headache and bilateral arm itching.  Pt states she had been taking Prednisone x 1 week for the rash, but finished yesterday.  Pt denies any chest pain, abdominal pain, N/V/D."    Got EKG, CT, CXR and labs.    EKG showed some nonspecific changes.  Head CT showed some microvascular change consistent with microvascular ischemia.  Chest x-ray was acceptable.  Lab work showed worsening renal failure and slight anemia.    Since that time, she has felt significantly better.  She has been trying to hydrate.  She's had no further syncope.  Blood " pressure today is elevated but she says she did not take her medication today.  She doesn't really check her blood pressure at home but is plan to get involved in the digital hypertension program.    Aortic atherosclerosis was reviewed.  She's not a good candidate for aspirin therapy given her GI bleeding issues.  Statin therapy was discussed, she is probably a candidate for that but I would like to defer on new medication until she is more stable clinically.    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    Colon adenoma: 10/15 repeat in 2020    Age-related osteoporosis without current pathological fracture 2016    Schatzki's ring: 3/15 dilated    Multiple lung nodules on CT: 3696-8590 no f/u needed    Steroid-induced glaucoma of both eyes    Proteinuria    Family history of colon cancer: maternal uncle and aunt    CKD (chronic kidney disease), stage II    Hypertensive CKD (chronic kidney disease)    Recurrent major depressive disorder, in partial remission    Immunosuppression    Atherosclerosis of abdominal aorta: minimal see CT 7/16    Acute renal failure       HPI  Review of Systems   Constitutional: Positive for fatigue. Negative for activity change, appetite change, chills and fever.   HENT: Negative for congestion, hearing loss, sinus pressure and sore throat.    Eyes: Negative for visual disturbance.    Respiratory: Negative for cough, shortness of breath and wheezing.    Cardiovascular: Negative for chest pain, palpitations and leg swelling.   Gastrointestinal: Negative for abdominal distention, abdominal pain, constipation, diarrhea, nausea and vomiting.   Genitourinary: Negative for difficulty urinating, dysuria, frequency, hematuria and vaginal bleeding.   Musculoskeletal: Positive for back pain. Negative for gait problem, joint swelling and myalgias.   Skin: Negative for rash.   Neurological: Negative for dizziness, weakness, light-headedness, numbness and headaches.   Hematological: Negative for adenopathy. Does not bruise/bleed easily.   Psychiatric/Behavioral: Negative for confusion, hallucinations, sleep disturbance and suicidal ideas.        Stable mood       Objective:      Physical Exam   Constitutional: She is oriented to person, place, and time. She appears well-developed and well-nourished.   HENT:   Head: Normocephalic and atraumatic.   Right Ear: External ear normal.   Left Ear: External ear normal.   Nose: Nose normal.   Mouth/Throat: Oropharynx is clear and moist. No oropharyngeal exudate.   Eyes: Conjunctivae and EOM are normal. No scleral icterus.   Neck: Normal range of motion. Neck supple. No JVD present. No thyromegaly present.   Cardiovascular: Normal rate, regular rhythm, normal heart sounds and intact distal pulses.  Exam reveals no gallop.    No murmur heard.  Pulmonary/Chest: Effort normal and breath sounds normal. No respiratory distress. She has no wheezes.   Abdominal: Soft. Bowel sounds are normal. She exhibits no distension and no mass. There is no tenderness. There is no rebound and no guarding.   Musculoskeletal: Normal range of motion. She exhibits no edema or tenderness.   Lymphadenopathy:     She has no cervical adenopathy.   Neurological: She is alert and oriented to person, place, and time. She displays normal reflexes. No cranial nerve deficit. Coordination normal.    Skin: Skin is warm. No rash noted. No erythema.   Psychiatric: She has a normal mood and affect. Her behavior is normal. Judgment and thought content normal.   Nursing note and vitals reviewed.      Assessment:       1. Dehydration    2. Acute renal failure, unspecified acute renal failure type    3. Recurrent major depressive disorder, in partial remission    4. Atherosclerosis of abdominal aorta    5. Essential hypertension    6. Mixed hyperlipidemia    7. CKD (chronic kidney disease), stage II    8. Immunosuppression    9. Autoimmune disorder- recurrent iritis    10. Deficiency anemia    11. Syncope, unspecified syncope type    12. Secondary hyperparathyroidism, not elsewhere classified         Plan:       Dehydration: Hydration reviewed    Acute renal failure, unspecified acute renal failure type: Hydration reviewed, avoid nephrotoxins    Recurrent major depressive disorder, in partial remission: Stable on medication, keep psych follow up    Atherosclerosis of abdominal aorta  -     Lipid panel; Future; Expected date: 02/28/2018    Essential hypertension: She did not take her medication today.  Compliance with medication urged.  Low salt diet.  Agree with digital hypertension    Mixed hyperlipidemia: Should likely be on statin therapy, she is ambivalent but will consider.  Cautions and side effects reviewed, handouts given.  We'll discuss again at her upcoming follow-up appointment    CKD (chronic kidney disease), stage II: Stable, keep nephrology follow-up    Immunosuppression; stable on regimen, keep rheumatology follow-up    Autoimmune disorder- recurrent iritis  -     TSH; Future; Expected date: 02/28/2018    Deficiency anemia: Stable, no evidence of acute GI bleeding although she does have some hemorrhoidal bleeding from time to time  -     Vitamin B12; Future; Expected date: 02/28/2018  -     Iron and TIBC; Future; Expected date: 02/28/2018  -     Ferritin; Future; Expected date: 02/28/2018    Syncope,  unspecified syncope type: Unclear etiology.  Hydration, postural measures reviewed, keep scrupulous track of symptoms  -     CAR Ultrasound doppler carotid bliateral; Future  -     Exercise stress echo; Future    Secondary hyperparathyroidism, not elsewhere classified: No alarm symptoms.  Keep nephrology follow-up    Other orders  -     cetirizine (ZYRTEC) 10 MG tablet; Take 1 tablet (10 mg total) by mouth 2 (two) times daily.  Dispense: 60 tablet; Refill: 0    I will review all studies and determine further tx depending on findings    Patient evaluated for over 40 minutes with this appoinment, including diagnostic testing and treatment.  All questions answered,  chart reviewed and care coordinated.

## 2018-03-02 ENCOUNTER — PATIENT OUTREACH (OUTPATIENT)
Dept: OTHER | Facility: OTHER | Age: 68
End: 2018-03-02

## 2018-03-02 NOTE — LETTER
Kelsi Cordova, PharmD  [address] [place] [address]     Dear Idalmis Morejon,    Welcome to the [facility]!         My name is Kelsi Cordova PharmD and I am your dedicated Digital Medicine clinician.  As an expert in medication management, I will help ensure that the medications you are taking continue to provide you with the intended benefits.      I am Gibran Painting and I will be your health  for the duration of the program.  My  job is to help you identify lifestyle changes to improve your blood pressure control.  We will talk about nutrition, exercise, and other ways that you may be able to adjust your current habits to better your health. Together, we will work to improve your overall health and encourage you to meet your goals for a healthier lifestyle.    What we expect from YOU:    You will need to take blood pressure readings multiple times a week and no less than one reading per week.   It is important that you take your measurements at different times during the day, when possible.     What you should expect from your Digital Medicine Care Team:   We will provide you with education about high blood pressure, including lifestyle changes that could help you to control your blood pressure.   We will review your weekly readings and provide you with monthly blood pressure progress reports after you have been in the program for more than 30 days.   We will send monthly progress reports on your blood pressure control to your physician so they can follow along with your progress as well.    You will be able to reach me by phone at [phone] or through your [facility] account by clicking my name under Care Team on the right side of the home screen.    I look forward to working with you to achieve your blood pressure goals!    Sincerely,    Kelsi Cordova PharmD  Your personal clinician    Please visit  [email or web address] to learn more about high blood pressure and what you can do lower your blood pressure.                                                                                           Idalmis Morejon  [address] [facility] [place] [address]

## 2018-03-02 NOTE — PROGRESS NOTES
Ms. Idalmis Morejon is a 67 y.o. female who is newly enrolled in the [facility].     The following information was reviewed/updated:  Preferred pharmacy   [facility]/pharmacy #[id number] - [place] - [address]  [address]  [place] [address]  Phone: [phone] Fax: [phone]    Patient prefers a 90 days supply    Review of patient's allergies indicates:   Allergen Reactions    Alendronate Nausea Only     And heartburn    Kenalog [triamcinolone acetonide] Hives     Current Outpatient Prescriptions on File Prior to Visit   Medication Sig Dispense Refill    azathioprine 75 mg Tab Take 75 mg by mouth once daily. 90 tablet 3    brimonidine-timolol (COMBIGAN) 0.2-0.5 % Drop Place 1 drop into both eyes 2 (two) times daily. 15 mL 11    cetirizine (ZYRTEC) 10 MG tablet Take 1 tablet (10 mg total) by mouth 2 (two) times daily. 60 tablet 0    diltiaZEM (CARDIZEM CD) 120 MG Cp24 TAKE ONE CAPSULE BY MOUTH EVERY DAY 90 capsule 0    diltiaZEM (CARDIZEM CD) 180 MG 24 hr capsule TAKE ONE CAPSULE BY MOUTH EVERY DAY 90 capsule 0    DULoxetine (CYMBALTA) 60 MG capsule TAKE 2 CAPSULES (120 MG TOTAL) BY MOUTH ONCE DAILY. 60 capsule 6    losartan (COZAAR) 100 MG tablet TAKE 1 TABLET (100 MG TOTAL) BY MOUTH ONCE DAILY. 90 tablet 2    pantoprazole (PROTONIX) 40 MG tablet Take 1 tablet (40 mg total) by mouth before breakfast. 90 tablet 3    POLYETHYLENE GLYCOL 3350 (MIRALAX ORAL)       tolterodine (DETROL LA) 4 MG 24 hr capsule TAKE ONE CAPSULE BY MOUTH EVERY DAY 30 capsule 9     No current facility-administered medications on file prior to visit.        Reviewed non-pharmacologic therapies and impact on BP:    1. Low-sodium diet- 11 mmHg reduction 2-4 weeks. I have reviewed [facility].A[facility] diet sodium restrictions (<2000mg/day) Does not track salt intake currently. Prepares own food and eats out (eats out ~ 2-3 times/week). Does not add salt.  2. Exercise- 7 mmHg reduction 4-12 weeks.  I have recommended patient engage in exercise as tolerated at least 30 minutes 5x per week to improve cardiovascular health.  Walking at the mall on weekends. Back problems/pain and osteoporosis.   3. Alcohol intake- 3 mmHg reduction 4-12 weeks. I have discussed with patient the maximum recommended number of 1 drink(s) per day for women. Infrequent intake.    Explained that we expect patient to obtain several blood pressures per week at random times of day.   Our goal is to get  BP to consistently below 130/80mmHg and make the process convenient so patient can avoid extra trips to the office. Getting your blood pressure below 130/80mmHg (definition of control) will reduce your risk for heart attack, kidney failure, stroke and death (as well as kidney failure, eye disease, & dementia).     Patient is not meeting the goal already.   When asked what the patient thinks is causing BP to be elevated, she states: stress, age    Instructed patient not to allow anyone else to use phone and BP cuff.   I'm not available for emergencies. Patient will call [facility] on-call ([phone] or [phone]) or 911 if needed.     Discussed appropriate BP measuring technique:  Before taking your blood pressure  Find a quiet place. You will need to listen for your heartbeat.  Roll up the sleeve on your left arm or remove any tight-sleeved clothing, if needed. (It's best to take your blood pressure from your left arm if you are right-handed.You can use the other arm if you have been told by your health care provider to do so.)  Rest in a chair next to a table for 5 to 10 minutes. (Your left arm should rest comfortably at heart level.)  Sit up straight with your back against the chair, legs uncrossed and on the ground.  Rest your forearm on the table with the palm of your hand facing up.  You should not talk, read the newspaper, or watch television during this process.      Patient and I agreed that she will continue to monitor blood  pressure and sodium intake, and continue to remain adherent to medications.     I will plan to follow-up with the patient in 1-2 weeks.   Emailed patient link to [facility] webpage and my contact information in case she has any questions.       Last 5 Patient Entered Readings                                      Current 30 Day Average: 175/90     Recent Readings 3/1/2018 2/28/2018    SBP (mmHg) 165 184    DBP (mmHg) 81 99    Pulse 70 81

## 2018-03-08 ENCOUNTER — OFFICE VISIT (OUTPATIENT)
Dept: GASTROENTEROLOGY | Facility: CLINIC | Age: 68
End: 2018-03-08
Payer: MEDICARE

## 2018-03-08 ENCOUNTER — TELEPHONE (OUTPATIENT)
Dept: ENDOSCOPY | Facility: HOSPITAL | Age: 68
End: 2018-03-08

## 2018-03-08 ENCOUNTER — LAB VISIT (OUTPATIENT)
Dept: LAB | Facility: HOSPITAL | Age: 68
End: 2018-03-08
Attending: INTERNAL MEDICINE
Payer: MEDICARE

## 2018-03-08 VITALS
DIASTOLIC BLOOD PRESSURE: 88 MMHG | SYSTOLIC BLOOD PRESSURE: 164 MMHG | WEIGHT: 155 LBS | HEART RATE: 74 BPM | HEIGHT: 66 IN | BODY MASS INDEX: 24.91 KG/M2

## 2018-03-08 DIAGNOSIS — K21.9 GASTROESOPHAGEAL REFLUX DISEASE WITHOUT ESOPHAGITIS: Primary | ICD-10-CM

## 2018-03-08 DIAGNOSIS — Z79.60 LONG-TERM USE OF IMMUNOSUPPRESSANT MEDICATION: ICD-10-CM

## 2018-03-08 DIAGNOSIS — H20.023 RECURRENT IRITIS, BILATERAL: ICD-10-CM

## 2018-03-08 DIAGNOSIS — R10.816 EPIGASTRIC ABDOMINAL TENDERNESS WITHOUT REBOUND TENDERNESS: ICD-10-CM

## 2018-03-08 DIAGNOSIS — K59.09 OTHER CONSTIPATION: ICD-10-CM

## 2018-03-08 DIAGNOSIS — Z86.010 HISTORY OF COLON POLYPS: ICD-10-CM

## 2018-03-08 DIAGNOSIS — R13.19 ESOPHAGEAL DYSPHAGIA: ICD-10-CM

## 2018-03-08 LAB
ALBUMIN SERPL BCP-MCNC: 3.6 G/DL
ALP SERPL-CCNC: 113 U/L
ALT SERPL W/O P-5'-P-CCNC: 13 U/L
ANION GAP SERPL CALC-SCNC: 8 MMOL/L
AST SERPL-CCNC: 25 U/L
BASOPHILS # BLD AUTO: 0.01 K/UL
BASOPHILS NFR BLD: 0.2 %
BILIRUB SERPL-MCNC: 0.7 MG/DL
BUN SERPL-MCNC: 9 MG/DL
CALCIUM SERPL-MCNC: 9.9 MG/DL
CHLORIDE SERPL-SCNC: 109 MMOL/L
CO2 SERPL-SCNC: 28 MMOL/L
CREAT SERPL-MCNC: 1 MG/DL
CRP SERPL-MCNC: 6.6 MG/L
DIFFERENTIAL METHOD: ABNORMAL
EOSINOPHIL # BLD AUTO: 0.1 K/UL
EOSINOPHIL NFR BLD: 1.3 %
ERYTHROCYTE [DISTWIDTH] IN BLOOD BY AUTOMATED COUNT: 14.4 %
ERYTHROCYTE [SEDIMENTATION RATE] IN BLOOD BY WESTERGREN METHOD: 40 MM/HR
EST. GFR  (AFRICAN AMERICAN): >60 ML/MIN/1.73 M^2
EST. GFR  (NON AFRICAN AMERICAN): 57.6 ML/MIN/1.73 M^2
GLUCOSE SERPL-MCNC: 97 MG/DL
HCT VFR BLD AUTO: 34.7 %
HGB BLD-MCNC: 11.4 G/DL
IMM GRANULOCYTES # BLD AUTO: 0.02 K/UL
IMM GRANULOCYTES NFR BLD AUTO: 0.3 %
LYMPHOCYTES # BLD AUTO: 1.3 K/UL
LYMPHOCYTES NFR BLD: 20.8 %
MCH RBC QN AUTO: 27.8 PG
MCHC RBC AUTO-ENTMCNC: 32.9 G/DL
MCV RBC AUTO: 85 FL
MONOCYTES # BLD AUTO: 0.4 K/UL
MONOCYTES NFR BLD: 6.4 %
NEUTROPHILS # BLD AUTO: 4.4 K/UL
NEUTROPHILS NFR BLD: 71 %
NRBC BLD-RTO: 0 /100 WBC
PLATELET # BLD AUTO: 321 K/UL
PMV BLD AUTO: 9.8 FL
POTASSIUM SERPL-SCNC: 4.6 MMOL/L
PROT SERPL-MCNC: 7.3 G/DL
RBC # BLD AUTO: 4.1 M/UL
SODIUM SERPL-SCNC: 145 MMOL/L
WBC # BLD AUTO: 6.14 K/UL

## 2018-03-08 PROCEDURE — 36415 COLL VENOUS BLD VENIPUNCTURE: CPT

## 2018-03-08 PROCEDURE — 85025 COMPLETE CBC W/AUTO DIFF WBC: CPT

## 2018-03-08 PROCEDURE — 86140 C-REACTIVE PROTEIN: CPT

## 2018-03-08 PROCEDURE — 99214 OFFICE O/P EST MOD 30 MIN: CPT | Mod: S$GLB,,, | Performed by: NURSE PRACTITIONER

## 2018-03-08 PROCEDURE — 99999 PR PBB SHADOW E&M-EST. PATIENT-LVL III: CPT | Mod: PBBFAC,,, | Performed by: NURSE PRACTITIONER

## 2018-03-08 PROCEDURE — 3079F DIAST BP 80-89 MM HG: CPT | Mod: S$GLB,,, | Performed by: NURSE PRACTITIONER

## 2018-03-08 PROCEDURE — 80053 COMPREHEN METABOLIC PANEL: CPT

## 2018-03-08 PROCEDURE — 3077F SYST BP >= 140 MM HG: CPT | Mod: S$GLB,,, | Performed by: NURSE PRACTITIONER

## 2018-03-08 PROCEDURE — 99499 UNLISTED E&M SERVICE: CPT | Mod: S$GLB,,, | Performed by: NURSE PRACTITIONER

## 2018-03-08 PROCEDURE — 85651 RBC SED RATE NONAUTOMATED: CPT

## 2018-03-08 NOTE — PROGRESS NOTES
[facility] Note    Reason for Visit:  The primary encounter diagnosis was Gastroesophageal reflux disease without esophagitis. Diagnoses of Esophageal dysphagia, Epigastric abdominal tenderness without rebound tenderness, History of colon polyps, and Other constipation were also pertinent to this visit.    PCP:   Noemy Pugh       Referring MD:  No referring provider defined for this encounter.    HPI:  This is a 67 y.o. female here for evaluation of Gerd. Ms. Morejon was last seen in clinic 2/2017. She is new to me.     Hx of epigastric pain and dyspepsia. Last EGD 3/2015 was having dyspepsia and dysphagia- Erythematous mucosa in the gastric body, antrum and prepyloric region of the stomach bx negative for H. Pylori. Hiatal hernia 1 cm. Mild Schatzki ring. Dilated. Pt reports relief of dysphagia.     Currently, taking Protonix 40 mg daily in AM. The past 4 months reflux symptoms have gotten worse and occurring daily and has been taking Tums in addition to Protonix. Has been choking on solid and liquids. Feels get stuck in cervical esophagus and eventually subside. Occurring 1x/month. Drinking liquid with foods appears to prevent episodes. Recently, painful swallowing feels swallowing glass. Denies SOB at rest, CP, inducing vomiting. NSAID usage- none.     Hx of constipation. Normal formed stool daily. Taking Miralax every other day. 2 months ago saw CRS in for bright red blood for hemorrhoids. Recommended to continue Miralax and fiber follow up as needed. Bright red blood resolved.     ROS:  Constitutional: No fevers, no chills, No unintentional weight loss, no fatigue   ENT: No allergies  CV: No chest pain, no palpitations, no perif. edema  Pulm: No cough, No shortness of breath, no wheezes, no sputum  Ophtho: No vision changes  GI: see HPI  Derm: No rash  Heme: No lymphadenopathy, No bruising  MSK: No arthritis, no muscle pain, no muscle weakness  : No dysuria, No hematuria  Endo:  No hot or cold intolerance  Neuro: No syncope, No seizure     Medical History:  has a past medical history of Abnormal chest CT; Acid reflux; Allergy; Anemia; Anemia; Anxiety; Cataract; Chronic UTI; Colon adenoma 2015 due 2020 (10/21/2015); Colon adenoma 2015 due 2020 (10/21/2015); Depression; Disturbed sleep rhythm; DJD (degenerative joint disease); Dry eyes; Dry mouth; Grief reaction (3/24/2014); migraine headaches; Hyperlipemia; Hypernatremia (8/8/2014); Hypertension; Iritis; Iritis; Mild vitamin D deficiency (9/9/2013); Multiple lung nodules on CT: 7296-5856 no f/u needed (6/6/2016); Neurogenic bladder; Osteopenia; Osteoporosis; Osteoporosis: 9/15 see rheumatology notes (6/6/2016); Positive [place] (antinuclear antibody); Schatzki's ring: 3/15 dilated (6/6/2016); Sciatica neuralgia (6/21/2013); Steroid-induced glaucoma of both eyes (10/5/2016); and Visual impairment.    Surgical History:  has a past surgical history that includes Cholecystectomy; Appendectomy; Posterior fusion lumbar spine w/ corpectomy; Back surgery (2007); TONSILLECTOMY, ADENOIDECTOMY; Cystoscopy; Hernia repair; Colonoscopy (N/A, 10/2/2015); and Hysterectomy.    Family History: family history includes Blindness in her maternal aunt; Cancer in her maternal aunt and maternal uncle; Colon cancer (age of onset: 70) in her maternal aunt and maternal uncle; Diabetes in her brother and father; Heart disease in her brother; Hyperlipidemia in her sister; Hypertension in her daughter and mother; Kidney disease in her brother and mother..     Social History:  reports that she has never smoked. She has never used smokeless tobacco. She reports that she does not drink alcohol or use drugs.    Review of patient's allergies indicates:   Allergen Reactions    Alendronate Nausea Only     And heartburn    Kenalog [triamcinolone acetonide] Hives     Current Outpatient Prescriptions   Medication Sig    azathioprine 75 mg Tab Take 75 mg by mouth once daily.     "brimonidine-timolol (COMBIGAN) 0.2-0.5 % Drop Place 1 drop into both eyes 2 (two) times daily.    cetirizine (ZYRTEC) 10 MG tablet Take 1 tablet (10 mg total) by mouth 2 (two) times daily.    diltiaZEM (CARDIZEM CD) 120 MG Cp24 TAKE ONE CAPSULE BY MOUTH EVERY DAY    diltiaZEM (CARDIZEM CD) 180 MG 24 hr capsule TAKE ONE CAPSULE BY MOUTH EVERY DAY    DULoxetine (CYMBALTA) 60 MG capsule TAKE 2 CAPSULES (120 MG TOTAL) BY MOUTH ONCE DAILY.    losartan (COZAAR) 100 MG tablet TAKE 1 TABLET (100 MG TOTAL) BY MOUTH ONCE DAILY.    pantoprazole (PROTONIX) 40 MG tablet Take 1 tablet (40 mg total) by mouth before breakfast.    POLYETHYLENE GLYCOL 3350 (MIRALAX ORAL)     tolterodine (DETROL [place]) 4 MG 24 hr capsule TAKE ONE CAPSULE BY MOUTH EVERY DAY     No current facility-administered medications for this visit.      Objective Findings:    Vital Signs:  BP (!) 164/88   Pulse 74   Ht 5' 6" (1.676 m)   Wt 70.3 kg (154 lb 15.7 oz)   BMI 25.01 kg/m²   Body mass index is 25.01 kg/m².    Physical Exam:  General Appearance: Well appearing in no acute distress  Head: Normocephalic, without obvious abnormality  Eyes: No scleral icterus, EOMI  ENT: Neck supple, Lips, mucosa, and tongue normal; teeth and gums normal  Lungs: CTA bilaterally in anterior and posterior fields, no wheezes, no crackles.  Heart: Regular rate and rhythm, no murmurs heard  Abdomen: Soft, epigastric tenderness present upon palpation, non distended with positive bowel sounds in all four quadrants.  Extremities: No clubbing, cyanosis or edema  Skin: No rash to exposed areas  Neurologic: AAOx4    Labs:  Lab Results   Component Value Date    WBC 6.14 03/08/2018    HGB 11.4 (L) 03/08/2018    HCT 34.7 (L) 03/08/2018     03/08/2018    CHOL 180 07/10/2017    TRIG 56 07/10/2017    HDL 66 07/10/2017    ALT 13 03/08/2018    AST 25 03/08/2018     03/08/2018    K 4.6 03/08/2018     03/08/2018    CREATININE 1.0 03/08/2018    BUN 9 03/08/2018    CO2 " 28 03/08/2018    TSH 1.545 07/10/2017    INR 1.0 03/21/2015    HGBA1C 5.0 07/18/2006     Endoscopy:    EGD- 3/2015- Erythematous mucosa in the gastric body, antrum and prepyloric region of the stomach bx negative for H. Pylori. Hiatal hernia 1 cm. Mild Schatzki ring. Dilated.    EUS- 10/2016- Normal     Colonoscopy- 10/2015- Non-bleeding internal hemorrhoids. Mild diverticulosis in the sigmoid colon. There was no evidence of diverticular bleeding. Descending colon polyp removed. Repeat in 5 years.     Assessment:  1. Gastroesophageal reflux disease without esophagitis    2. Esophageal dysphagia    3. Epigastric abdominal tenderness without rebound tenderness    4. History of colon polyps    5. Other constipation      Recommendations:  1., 2., & 3. Schedule EGD to evaluate for dilation and esophagitis. Ordered H. Pylori antibody today. Continue taking Protonix 40 mg in AM.   4. Repeat colonoscopy 2020.   5. Continue taking Miralax to have soft formed BM 3 times per week.     Follow up after EGD with HARRIS Barnes.     Order summary:  Orders Placed This Encounter    H. PYLORI ANTIBODY, IGG    Case request GI: ESOPHAGOGASTRODUODENOSCOPY (EGD)     Thank you so much for allowing me to participate in the care of ROJELIO Bruno, LOGANP-C

## 2018-03-09 ENCOUNTER — PATIENT OUTREACH (OUTPATIENT)
Dept: OTHER | Facility: OTHER | Age: 68
End: 2018-03-09

## 2018-03-09 DIAGNOSIS — I10 ESSENTIAL HYPERTENSION: Primary | ICD-10-CM

## 2018-03-09 RX ORDER — VALSARTAN 160 MG/1
160 TABLET ORAL DAILY
Qty: 30 TABLET | Refills: 2 | Status: SHIPPED | OUTPATIENT
Start: 2018-03-09 | End: 2018-03-15 | Stop reason: SDUPTHER

## 2018-03-09 NOTE — PROGRESS NOTES
Last 5 Patient Entered Readings                                      Current 30 Day Average: 175/89     Recent Readings 3/8/2018 3/8/2018 3/4/2018 3/1/2018 2/28/2018    SBP (mmHg) 178 185 173 165 184    DBP (mmHg) 90 94 86 81 99    Pulse 77 78 73 70 81          Patient's BP average is above goal of <130/80.     Patient denies s/s of hypotension (lightheadedness, dizziness, nausea, fatigue) associated with low readings. Instructed patient to inform me if this occurs, patient confirms understanding.      Patient denies s/s of hypertension (SOB, CP, severe headaches, changes in vision) associated with high readings. Instructed patient to go to the ED if BP > 180/110 and accompanied by hypertensive s/s, patient confirms understanding.    Will continue to monitor regularly. Will follow up in 1-2 weeks, sooner if BP begins to trend upward or downward.    Patient has my contact information and knows to call with any concerns or clinical changes.     Current HTN regimen:  Hypertension Medications             diltiaZEM (CARDIZEM CD) 120 MG Cp24 TAKE ONE CAPSULE BY MOUTH EVERY DAY    diltiaZEM (CARDIZEM CD) 180 MG 24 hr capsule TAKE ONE CAPSULE BY MOUTH EVERY DAY    losartan (COZAAR) 100 MG tablet TAKE 1 TABLET (100 MG TOTAL) BY MOUTH ONCE DAILY.        Change losartan to valsartan 160 mg once daily. Follow up 1 week to assess if dose increase needed. Patient has been measuring BP soon before second dose of diltiazem, she will adjust timing to earlier in the day or at least 1 hour after diltiazem.

## 2018-03-09 NOTE — PROGRESS NOTES
Last 5 Patient Entered Readings                                      Current 30 Day Average: 175/89     Recent Readings 3/8/2018 3/8/2018 3/4/2018 3/1/2018 2/28/2018    SBP (mmHg) 178 185 173 165 184    DBP (mmHg) 90 94 86 81 99    Pulse 77 78 73 70 81        3/9-LVM.Intro attempt. Will call again on 3/13.    Hypertension Digital Medicine Program (HDMP): Health  Introduction    Introduced Mrs. Idalmis Morejon to HDMP. Discussed health  role and recommended lifestyle modifications.    Diet (i.e. Low sodium, food labels): Patient reports not being a breakfast person. She states she may consume a cold breakfast or blueberry muffin. She states maybe consuming oatmeal. She states consuming an apple or orange. Patient reports eating fig newtons or chex mix for snacks. Patient reports making red or butter beans. She states consuming fried or baked chicken with greens for dinner. She state seldom making mac N cheese or rice and gravy. She states eating seafood on Friday.   Exercise: Patient reports the only physical activity she gets is attending the mall on Saturday. She states she may enjoy walking for future increased activity. Patient reports she would like to walk 2x week for 1 mile per session.    Alcohol/Tobacco: She states being a non-smoker. Patient reports drinking 3-4 glasses of white wine per month.   Medication Adherence: has been compliant with the medicaiton regimen Patient reports no abnormal signs or symptoms with BP medication.   Other goals:    Reviewed AHA recommendations:  Limit sodium intake to <2000mg/day  Perform 150 minutes of physical activity per week    Patient is aware of the importance of taking daily BP readings at various times of the day  Patient aware that the health  can be used as a resource for lifestyle modifications to help reduce or maintain a healthy BP  Patient is aware of the importance of medication adherence.  Patient is aware that HDMP team is not available for  emergencies. Patient should call 911 or [facility] on [facility] if one arises.

## 2018-03-15 ENCOUNTER — PATIENT OUTREACH (OUTPATIENT)
Dept: OTHER | Facility: OTHER | Age: 68
End: 2018-03-15

## 2018-03-15 DIAGNOSIS — I10 ESSENTIAL HYPERTENSION: ICD-10-CM

## 2018-03-15 RX ORDER — VALSARTAN 160 MG/1
160 TABLET ORAL 2 TIMES DAILY
Start: 2018-03-15 | End: 2018-04-23 | Stop reason: SDUPTHER

## 2018-03-15 NOTE — PROGRESS NOTES
Last 5 Patient Entered Readings                                      Current 30 Day Average: 174/91     Recent Readings 3/14/2018 3/13/2018 3/13/2018 3/13/2018 3/12/2018    SBP (mmHg) 166 185 184 177 167    DBP (mmHg) 89 89 96 101 97    Pulse 78 69 69 70 77          Patient's BP average is above goal of <130/80.     Patient denies s/s of hypotension (lightheadedness, dizziness, nausea, fatigue) associated with low readings. Instructed patient to inform me if this occurs, patient confirms understanding.      Patient denies s/s of hypertension (SOB, CP, severe headaches, changes in vision) associated with high readings. Instructed patient to go to the ED if BP > 180/110 and accompanied by hypertensive s/s, patient confirms understanding.    Will continue to monitor regularly. Will follow up in 1-2 weeks, sooner if BP begins to trend upward or downward.    Patient has my contact information and knows to call with any concerns or clinical changes.     Current HTN regimen:  Hypertension Medications             diltiaZEM (CARDIZEM CD) 120 MG Cp24 TAKE ONE CAPSULE BY MOUTH EVERY DAY    diltiaZEM (CARDIZEM CD) 180 MG 24 hr capsule TAKE ONE CAPSULE BY MOUTH EVERY DAY    valsartan (DIOVAN) 160 MG tablet Take 1 tablet (160 mg total) by mouth once daily.        Patient reports she is closely monitoring diet. Increase valsartan to 160 mg BID.

## 2018-03-23 DIAGNOSIS — T38.0X5A STEROID-INDUCED GLAUCOMA OF BOTH EYES: ICD-10-CM

## 2018-03-23 DIAGNOSIS — H40.63X0 STEROID-INDUCED GLAUCOMA OF BOTH EYES: ICD-10-CM

## 2018-03-26 RX ORDER — BRIMONIDINE TARTRATE, TIMOLOL MALEATE 2; 5 MG/ML; MG/ML
1 SOLUTION/ DROPS OPHTHALMIC 2 TIMES DAILY
Qty: 15 ML | Refills: 3 | Status: SHIPPED | OUTPATIENT
Start: 2018-03-26 | End: 2019-04-11 | Stop reason: SDUPTHER

## 2018-03-29 ENCOUNTER — PATIENT OUTREACH (OUTPATIENT)
Dept: OTHER | Facility: OTHER | Age: 68
End: 2018-03-29

## 2018-03-29 DIAGNOSIS — I10 ESSENTIAL HYPERTENSION: Primary | ICD-10-CM

## 2018-03-29 RX ORDER — CHLORTHALIDONE 25 MG/1
TABLET ORAL
Qty: 15 TABLET | Refills: 2 | Status: SHIPPED | OUTPATIENT
Start: 2018-03-29 | End: 2018-05-08 | Stop reason: SDUPTHER

## 2018-03-29 NOTE — PROGRESS NOTES
Last 5 Patient Entered Readings                                      Current 30 Day Average: 170/90     Recent Readings 3/24/2018 3/20/2018 3/17/2018 3/14/2018 3/13/2018    SBP (mmHg) 155 152 161 166 185    DBP (mmHg) 81 90 89 89 89    Pulse 70 74 76 78 69          Patient's BP average is above goal of <130/80.     Patient denies s/s of hypotension (lightheadedness, dizziness, nausea, fatigue) associated with low readings. Instructed patient to inform me if this occurs, patient confirms understanding.      Patient denies s/s of hypertension (SOB, CP, severe headaches, changes in vision) associated with high readings. Instructed patient to go to the ED if BP > 180/110 and accompanied by hypertensive s/s, patient confirms understanding.    Will continue to monitor regularly. Will follow up in 2-3 weeks, sooner if BP begins to trend upward or downward.    Patient has my contact information and knows to call with any concerns or clinical changes.     Current HTN regimen:  Hypertension Medications             diltiaZEM (CARDIZEM CD) 120 MG Cp24 TAKE ONE CAPSULE BY MOUTH EVERY DAY    diltiaZEM (CARDIZEM CD) 180 MG 24 hr capsule TAKE ONE CAPSULE BY MOUTH EVERY DAY    valsartan (DIOVAN) 160 MG tablet Take 1 tablet (160 mg total) by mouth 2 (two) times daily.        BP improving but remains above goal. Start chlorthalidone 12.5 mg once daily in the morning. Patient does not recall ADRs with hctz in past. Move valsartan to evening. Repeat BMP 1-2 weeks; monitor renal function closely.

## 2018-04-02 ENCOUNTER — PATIENT OUTREACH (OUTPATIENT)
Dept: OTHER | Facility: OTHER | Age: 68
End: 2018-04-02

## 2018-04-02 NOTE — PROGRESS NOTES
Last 5 Patient Entered Readings                                      Current 30 Day Average: 167/90     Recent Readings 3/29/2018 3/24/2018 3/20/2018 3/17/2018 3/14/2018    SBP (mmHg) 148 155 152 161 166    DBP (mmHg) 87 81 90 89 89    Pulse 72 70 74 76 78        Assessed elevated readings starting on 3/13. She states feeling fine.      Patient expresses her gratitude for the [facility] care team checking in on her.    Hypertension Digital Medicine Program (HDMP): Health  Follow Up    Lifestyle Modifications:    1.Low sodium diet: yes Patient reports limiting her fried food to 1x week. She states eating baked chicken and mustard greens or broccoli. Patient reports staying away from crawfish and boiled crabs. Patient reports enjoying chanel toast. Patient reports consuming grapes, apples, and tangerines. Patient reports enjoying consuming pickled beets. Patient reports eating avocados. She states using Mrs. GRACE. Patient reports reading food labels. She states reading the labels daily.     2.Physical activity: no Patient reports walking 1x week for 2 miles. Encouraged patient to increase her walking regimen. She states she will try to increase it starting this week.       3.Hypotension/Hypertension symptoms: no Patient reports no abnormal signs or symptoms with BP medication.   Frequency/Alleviating factors/Precipitating factors, etc.     4.Patient has been compliant with the medication regimen.     Follow up with Mrs. Idalmis Morejon completed. No further questions or concerns. I will follow up in a few weeks to assess progress.

## 2018-04-04 ENCOUNTER — PES CALL (OUTPATIENT)
Dept: ADMINISTRATIVE | Facility: CLINIC | Age: 68
End: 2018-04-04

## 2018-04-10 ENCOUNTER — PATIENT MESSAGE (OUTPATIENT)
Dept: INTERNAL MEDICINE | Facility: CLINIC | Age: 68
End: 2018-04-10

## 2018-04-11 ENCOUNTER — LAB VISIT (OUTPATIENT)
Dept: LAB | Facility: HOSPITAL | Age: 68
End: 2018-04-11
Payer: MEDICARE

## 2018-04-11 DIAGNOSIS — I10 ESSENTIAL HYPERTENSION: ICD-10-CM

## 2018-04-11 LAB
ANION GAP SERPL CALC-SCNC: 8 MMOL/L
BUN SERPL-MCNC: 11 MG/DL
CALCIUM SERPL-MCNC: 9.6 MG/DL
CHLORIDE SERPL-SCNC: 107 MMOL/L
CO2 SERPL-SCNC: 27 MMOL/L
CREAT SERPL-MCNC: 1 MG/DL
EST. GFR  (AFRICAN AMERICAN): >60 ML/MIN/1.73 M^2
EST. GFR  (NON AFRICAN AMERICAN): 58 ML/MIN/1.73 M^2
GLUCOSE SERPL-MCNC: 92 MG/DL
POTASSIUM SERPL-SCNC: 4.1 MMOL/L
SODIUM SERPL-SCNC: 142 MMOL/L

## 2018-04-11 PROCEDURE — 80048 BASIC METABOLIC PNL TOTAL CA: CPT

## 2018-04-11 PROCEDURE — 36415 COLL VENOUS BLD VENIPUNCTURE: CPT

## 2018-04-12 ENCOUNTER — PATIENT OUTREACH (OUTPATIENT)
Dept: OTHER | Facility: OTHER | Age: 68
End: 2018-04-12

## 2018-04-12 NOTE — PROGRESS NOTES
Last 5 Patient Entered Readings                                      Current 30 Day Average: 165/88     Recent Readings 4/10/2018 4/8/2018 4/5/2018 3/29/2018 3/24/2018    SBP (mmHg) 183 164 168 148 155    DBP (mmHg) 92 85 89 87 81    Pulse 61 72 64 72 70          Patient's BP average is above goal of <130/80.     Patient denies s/s of hypotension (lightheadedness, dizziness, nausea, fatigue) associated with low readings. Instructed patient to inform me if this occurs, patient confirms understanding.      Patient denies s/s of hypertension (SOB, CP, severe headaches, changes in vision) associated with high readings. Instructed patient to go to the ED if BP > 180/110 and accompanied by hypertensive s/s, patient confirms understanding.    Will continue to monitor regularly. Will follow up in 2-3 weeks, sooner if BP begins to trend upward or downward.    Patient has my contact information and knows to call with any concerns or clinical changes.     Current HTN regimen:  Hypertension Medications             chlorthalidone (HYGROTEN) 25 MG Tab Take 1/2 tablet (12.5 mg) by mouth once daily in the morning    diltiaZEM (CARDIZEM CD) 120 MG Cp24 TAKE ONE CAPSULE BY MOUTH EVERY DAY    diltiaZEM (CARDIZEM CD) 180 MG 24 hr capsule TAKE ONE CAPSULE BY MOUTH EVERY DAY    valsartan (DIOVAN) 160 MG tablet Take 1 tablet (160 mg total) by mouth 2 (two) times daily.        Reports eating crawfish 4/7. Attributes higher BP 4/10 to increased salt intake. Reviewed BMP results. Discussed increasing chlorthalidone to 25 mg daily, however, she would like to wait until follow up. I requested at least 3 BP readings per week. Reviewed placement of BP cuff and encouraged charging cuff at least once per week. She is unwrapping cuff between uses.

## 2018-04-13 ENCOUNTER — PATIENT MESSAGE (OUTPATIENT)
Dept: INTERNAL MEDICINE | Facility: CLINIC | Age: 68
End: 2018-04-13

## 2018-04-17 RX ORDER — DILTIAZEM HYDROCHLORIDE 180 MG/1
CAPSULE, COATED, EXTENDED RELEASE ORAL
Qty: 90 CAPSULE | Refills: 0 | Status: SHIPPED | OUTPATIENT
Start: 2018-04-17 | End: 2018-04-27 | Stop reason: ALTCHOICE

## 2018-04-17 RX ORDER — DILTIAZEM HYDROCHLORIDE 120 MG/1
CAPSULE, COATED, EXTENDED RELEASE ORAL
Qty: 90 CAPSULE | Refills: 0 | Status: SHIPPED | OUTPATIENT
Start: 2018-04-17 | End: 2018-04-27 | Stop reason: ALTCHOICE

## 2018-04-21 RX ORDER — DILTIAZEM HYDROCHLORIDE 120 MG/1
CAPSULE, COATED, EXTENDED RELEASE ORAL
Qty: 90 CAPSULE | Refills: 0 | Status: SHIPPED | OUTPATIENT
Start: 2018-04-21 | End: 2018-04-27 | Stop reason: ALTCHOICE

## 2018-04-21 RX ORDER — DILTIAZEM HYDROCHLORIDE 180 MG/1
CAPSULE, COATED, EXTENDED RELEASE ORAL
Qty: 90 CAPSULE | Refills: 0 | Status: SHIPPED | OUTPATIENT
Start: 2018-04-21 | End: 2018-04-27 | Stop reason: ALTCHOICE

## 2018-04-23 ENCOUNTER — PATIENT OUTREACH (OUTPATIENT)
Dept: OTHER | Facility: OTHER | Age: 68
End: 2018-04-23

## 2018-04-23 DIAGNOSIS — I10 ESSENTIAL HYPERTENSION: ICD-10-CM

## 2018-04-23 RX ORDER — VALSARTAN 160 MG/1
160 TABLET ORAL 2 TIMES DAILY
Qty: 180 TABLET | Refills: 3 | Status: SHIPPED | OUTPATIENT
Start: 2018-04-23 | End: 2019-05-18 | Stop reason: SDUPTHER

## 2018-04-23 NOTE — PROGRESS NOTES
Last 5 Patient Entered Readings                                      Current 30 Day Average: 159/86     Recent Readings 4/21/2018 4/16/2018 4/15/2018 4/10/2018 4/8/2018    SBP (mmHg) 135 155 166 183 164    DBP (mmHg) 74 91 90 92 85    Pulse 76 74 69 61 72        Assessed previous elevated readings. She states she has been consuming crawfish.     Hypertension Digital Medicine Program (HDMP): Health  Follow Up    Lifestyle Modifications:    1.Low sodium diet: no Patient reports consuming fried shrimp, oysters, and crawfish. Patient reports eating fried food and eating out recently. Patient states she has been reading food labels but forgot what is considered low sodium (i.e. 140 mg or less ) and amount/day (i.e. <2kmg/day).     2.Physical activity: no Patient reports walking 2x/week 30 minutes in a local park. Asked about increasing to 3x/week but patient declined as she states having osteoporosis. Patient will continue walking 2x week and plans on increasing to 3x when she feels most comfortable. Encouraged patient to continue her walking regimen.      3.Hypotension/Hypertension symptoms: no Patient reports no abnormal signs or symptoms with BP medication.   Frequency/Alleviating factors/Precipitating factors, etc.     4.Patient has been compliant with the medication regimen.     Follow up with Mrs. Idalmis BAINS Jean-Pierre completed. No further questions or concerns. I will follow up in a few weeks to assess progress.

## 2018-04-26 ENCOUNTER — PATIENT OUTREACH (OUTPATIENT)
Dept: OTHER | Facility: OTHER | Age: 68
End: 2018-04-26

## 2018-04-26 DIAGNOSIS — I10 ESSENTIAL HYPERTENSION: Primary | ICD-10-CM

## 2018-04-26 NOTE — PROGRESS NOTES
Last 5 Patient Entered Readings                                      Current 30 Day Average: 159/87     Recent Readings 4/25/2018 4/21/2018 4/16/2018 4/15/2018 4/10/2018    SBP (mmHg) 154 135 155 166 183    DBP (mmHg) 84 74 91 90 92    Pulse 69 76 74 69 61          Patient's BP average is above goal of <130/80.     Patient denies s/s of hypotension (lightheadedness, dizziness, nausea, fatigue) associated with low readings. Instructed patient to inform me if this occurs, patient confirms understanding.      Patient denies s/s of hypertension (SOB, CP, severe headaches, changes in vision) associated with high readings. Instructed patient to go to the ED if BP > 180/110 and accompanied by hypertensive s/s, patient confirms understanding.    Will continue to monitor regularly. Will follow up in 2-3 weeks, sooner if BP begins to trend upward or downward.    Patient has my contact information and knows to call with any concerns or clinical changes.     Current HTN regimen:  Hypertension Medications             chlorthalidone (HYGROTEN) 25 MG Tab Take 1/2 tablet (12.5 mg) by mouth once daily in the morning    diltiaZEM (CARDIZEM CD) 120 MG Cp24 TAKE ONE CAPSULE BY MOUTH EVERY DAY    diltiaZEM (CARDIZEM CD) 120 MG Cp24 TAKE ONE CAPSULE BY MOUTH EVERY DAY    diltiaZEM (CARDIZEM CD) 180 MG 24 hr capsule TAKE ONE CAPSULE BY MOUTH EVERY DAY    diltiaZEM (CARDIZEM CD) 180 MG 24 hr capsule TAKE ONE CAPSULE BY MOUTH EVERY DAY    valsartan (DIOVAN) 160 MG tablet Take 1 tablet (160 mg total) by mouth 2 (two) times daily.          Stop diltiazem. Start amlodipine 10 mg once daily (per her preference, she will take dose in early afternoon).    5/10 - health  spoke with patient this week. May not yet be seeing full effect of amlodipine. Patient continues to work on lifestyle modifications.

## 2018-04-27 RX ORDER — AMLODIPINE BESYLATE 10 MG/1
10 TABLET ORAL DAILY
Qty: 30 TABLET | Refills: 2 | Status: SHIPPED | OUTPATIENT
Start: 2018-04-27 | End: 2018-07-23 | Stop reason: SDUPTHER

## 2018-05-04 DIAGNOSIS — R10.13 EPIGASTRIC PAIN: ICD-10-CM

## 2018-05-04 DIAGNOSIS — Z51.81 ENCOUNTER FOR MONITORING PROTON PUMP INHIBITOR THERAPY: ICD-10-CM

## 2018-05-04 DIAGNOSIS — K21.9 GASTROESOPHAGEAL REFLUX DISEASE, ESOPHAGITIS PRESENCE NOT SPECIFIED: ICD-10-CM

## 2018-05-04 DIAGNOSIS — Z79.899 ENCOUNTER FOR MONITORING PROTON PUMP INHIBITOR THERAPY: ICD-10-CM

## 2018-05-04 RX ORDER — PANTOPRAZOLE SODIUM 40 MG/1
TABLET, DELAYED RELEASE ORAL
Qty: 90 TABLET | Refills: 3 | Status: SHIPPED | OUTPATIENT
Start: 2018-05-04 | End: 2019-05-13 | Stop reason: SDUPTHER

## 2018-05-07 ENCOUNTER — PATIENT OUTREACH (OUTPATIENT)
Dept: OTHER | Facility: OTHER | Age: 68
End: 2018-05-07

## 2018-05-07 NOTE — PROGRESS NOTES
Last 5 Patient Entered Readings                                      Current 30 Day Average: 157/86     Recent Readings 5/7/2018 4/30/2018 4/25/2018 4/21/2018 4/16/2018    SBP (mmHg) 152 143 154 135 155    DBP (mmHg) 85 83 84 74 91    Pulse 70 74 69 76 74         Encounter also consisted of patient discussing her smoothie choices at [employer]. Went over nutrition facts , per [employer] website, with patient focusing on salt and sugar. Patient states she only consumes 1 smoothie every other week.     Digital Medicine: Health  Follow Up    Lifestyle Modifications:    1.Dietary Modifications (Sodium intake <2,000mg/day, food labels, dining out): Patient reports staying away from fried food by eating it on Fridays. She states consuming grilled or baked chicken or fish. She states making meals at home or from [employer] or [employer]. Patient reports sending food back when food is over seasoned or salted. Patient reports consuming 1 smoothie from [employer] 1x/2 weeks. Encouraged patient to ask  for cook to use no salt when seasoning foods. Encouraged patient to make more meals at home and decrease amount of food eating out.        2.Physical Activity: Patient reports walking a lot over the weekend. She states walking in the mall a couple of hours. Patient states she cannot put a lot of walking but she is trying to walk at [facility] 2x/week for 1 mile. She states she will this week.     3.Medication Therapy: Patient has been compliant with the medication regimen.    4.Patient has the following medication side effects/concerns:   (Frequency/Alleviating factors/Precipitating factors, etc.)     Follow up with Mrs. Idalmis BAINS Jean-Pierre completed. No further questions or concerns. Will continue follow up to achieve health goals.

## 2018-05-08 DIAGNOSIS — I10 ESSENTIAL HYPERTENSION: ICD-10-CM

## 2018-05-08 RX ORDER — CHLORTHALIDONE 25 MG/1
TABLET ORAL
Qty: 45 TABLET | Refills: 1 | Status: SHIPPED | OUTPATIENT
Start: 2018-05-08 | End: 2018-11-26 | Stop reason: SDUPTHER

## 2018-05-10 ENCOUNTER — ANESTHESIA EVENT (OUTPATIENT)
Dept: ENDOSCOPY | Facility: HOSPITAL | Age: 68
End: 2018-05-10
Payer: MEDICARE

## 2018-05-11 ENCOUNTER — ANESTHESIA (OUTPATIENT)
Dept: ENDOSCOPY | Facility: HOSPITAL | Age: 68
End: 2018-05-11
Payer: MEDICARE

## 2018-05-11 ENCOUNTER — SURGERY (OUTPATIENT)
Age: 68
End: 2018-05-11

## 2018-05-11 ENCOUNTER — HOSPITAL ENCOUNTER (OUTPATIENT)
Facility: HOSPITAL | Age: 68
Discharge: HOME | End: 2018-05-11
Attending: INTERNAL MEDICINE | Admitting: INTERNAL MEDICINE
Payer: MEDICARE

## 2018-05-11 VITALS
OXYGEN SATURATION: 100 % | BODY MASS INDEX: 22.5 KG/M2 | TEMPERATURE: 98 F | WEIGHT: 140 LBS | RESPIRATION RATE: 19 BRPM | HEART RATE: 66 BPM | HEIGHT: 66 IN | SYSTOLIC BLOOD PRESSURE: 173 MMHG | DIASTOLIC BLOOD PRESSURE: 97 MMHG

## 2018-05-11 DIAGNOSIS — K21.9 GASTROESOPHAGEAL REFLUX DISEASE WITHOUT ESOPHAGITIS: ICD-10-CM

## 2018-05-11 PROCEDURE — C1769 GUIDE WIRE: HCPCS | Performed by: INTERNAL MEDICINE

## 2018-05-11 PROCEDURE — 37000008 HC ANESTHESIA 1ST 15 MINUTES: Performed by: INTERNAL MEDICINE

## 2018-05-11 PROCEDURE — 43239 EGD BIOPSY SINGLE/MULTIPLE: CPT | Mod: 59,,, | Performed by: INTERNAL MEDICINE

## 2018-05-11 PROCEDURE — 43239 EGD BIOPSY SINGLE/MULTIPLE: CPT | Performed by: INTERNAL MEDICINE

## 2018-05-11 PROCEDURE — 88305 TISSUE EXAM BY PATHOLOGIST: CPT | Mod: 26,,, | Performed by: PATHOLOGY

## 2018-05-11 PROCEDURE — 27201012 HC FORCEPS, HOT/COLD, DISP: Performed by: INTERNAL MEDICINE

## 2018-05-11 PROCEDURE — 63600175 PHARM REV CODE 636 W HCPCS: Performed by: NURSE ANESTHETIST, CERTIFIED REGISTERED

## 2018-05-11 PROCEDURE — 88342 IMHCHEM/IMCYTCHM 1ST ANTB: CPT | Mod: 26,,, | Performed by: PATHOLOGY

## 2018-05-11 PROCEDURE — E9220 PRA ENDO ANESTHESIA: HCPCS | Mod: ,,, | Performed by: NURSE ANESTHETIST, CERTIFIED REGISTERED

## 2018-05-11 PROCEDURE — 37000009 HC ANESTHESIA EA ADD 15 MINS: Performed by: INTERNAL MEDICINE

## 2018-05-11 PROCEDURE — 25000003 PHARM REV CODE 250: Performed by: NURSE ANESTHETIST, CERTIFIED REGISTERED

## 2018-05-11 PROCEDURE — 43248 EGD GUIDE WIRE INSERTION: CPT | Performed by: INTERNAL MEDICINE

## 2018-05-11 PROCEDURE — 88305 TISSUE EXAM BY PATHOLOGIST: CPT | Performed by: PATHOLOGY

## 2018-05-11 PROCEDURE — 25000003 PHARM REV CODE 250: Performed by: INTERNAL MEDICINE

## 2018-05-11 PROCEDURE — 43248 EGD GUIDE WIRE INSERTION: CPT | Mod: ,,, | Performed by: INTERNAL MEDICINE

## 2018-05-11 PROCEDURE — 88341 IMHCHEM/IMCYTCHM EA ADD ANTB: CPT | Mod: 26,,, | Performed by: PATHOLOGY

## 2018-05-11 RX ORDER — SODIUM CHLORIDE 9 MG/ML
INJECTION, SOLUTION INTRAVENOUS CONTINUOUS
Status: DISCONTINUED | OUTPATIENT
Start: 2018-05-11 | End: 2018-05-11 | Stop reason: HOSPADM

## 2018-05-11 RX ORDER — PROPOFOL 10 MG/ML
VIAL (ML) INTRAVENOUS
Status: DISCONTINUED | OUTPATIENT
Start: 2018-05-11 | End: 2018-05-11

## 2018-05-11 RX ORDER — METOPROLOL TARTRATE 1 MG/ML
INJECTION, SOLUTION INTRAVENOUS
Status: DISCONTINUED | OUTPATIENT
Start: 2018-05-11 | End: 2018-05-11

## 2018-05-11 RX ORDER — HYDRALAZINE HYDROCHLORIDE 20 MG/ML
5 INJECTION INTRAMUSCULAR; INTRAVENOUS EVERY 10 MIN PRN
Status: CANCELLED | OUTPATIENT
Start: 2018-05-11

## 2018-05-11 RX ORDER — GLYCOPYRROLATE 0.2 MG/ML
INJECTION INTRAMUSCULAR; INTRAVENOUS
Status: DISCONTINUED | OUTPATIENT
Start: 2018-05-11 | End: 2018-05-11

## 2018-05-11 RX ORDER — LIDOCAINE HCL/PF 100 MG/5ML
SYRINGE (ML) INTRAVENOUS
Status: DISCONTINUED | OUTPATIENT
Start: 2018-05-11 | End: 2018-05-11

## 2018-05-11 RX ORDER — PROPOFOL 10 MG/ML
VIAL (ML) INTRAVENOUS CONTINUOUS PRN
Status: DISCONTINUED | OUTPATIENT
Start: 2018-05-11 | End: 2018-05-11

## 2018-05-11 RX ADMIN — GLYCOPYRROLATE 0.1 MG: 0.2 INJECTION, SOLUTION INTRAMUSCULAR; INTRAVENOUS at 11:05

## 2018-05-11 RX ADMIN — PROPOFOL 150 MCG/KG/MIN: 10 INJECTION, EMULSION INTRAVENOUS at 11:05

## 2018-05-11 RX ADMIN — PROPOFOL 20 MG: 10 INJECTION, EMULSION INTRAVENOUS at 11:05

## 2018-05-11 RX ADMIN — SODIUM CHLORIDE: 9 INJECTION, SOLUTION INTRAVENOUS at 10:05

## 2018-05-11 RX ADMIN — METOPROLOL TARTRATE 2.5 MG: 5 INJECTION, SOLUTION INTRAVENOUS at 11:05

## 2018-05-11 RX ADMIN — LIDOCAINE HYDROCHLORIDE 75 MG: 20 INJECTION, SOLUTION INTRAVENOUS at 11:05

## 2018-05-11 RX ADMIN — PROPOFOL 75 MG: 10 INJECTION, EMULSION INTRAVENOUS at 11:05

## 2018-05-11 NOTE — PROVATION PATIENT INSTRUCTIONS
Discharge Summary/Instructions after an Endoscopic Procedure  Patient Name: Idalmis Morejon  Patient MRN: [id number]  Patient YOB: 1950  Friday, May 11, 2018  Bryan Love MD  RESTRICTIONS:  During your procedure today, you received medications for sedation.  These   medications may affect your judgment, balance and coordination.  Therefore,   for 24 hours, you have the following restrictions:   - DO NOT drive a car, operate machinery, make legal/financial decisions,   sign important papers or drink alcohol.    ACTIVITY:  The following day: return to full activity including work, except no heavy   lifting, straining or running for 3 days if polyps were removed.  DIET:  Eat and drink normally unless instructed otherwise.     TREATMENT FOR COMMON SIDE EFFECTS:  - Mild abdominal pain, nausea, belching, bloating or excessive gas:  rest,   eat lightly and use a heating pad.  - Sore Throat: treat with throat lozenges and/or gargle with warm salt   water.  - Because air was used during the procedure, expelling large amounts of air   from your rectum or belching is normal.  - If a bowel prep was taken, you may not have a bowel movement for 1-3 days.    This is normal.  SYMPTOMS TO WATCH FOR AND REPORT TO YOUR PHYSICIAN:  1. Abdominal pain or bloating, other than gas cramps.  2. Chest pain.  3. Back pain.  4. Signs of infection such as: chills or fever occurring within 24 hours   after the procedure.  5. Rectal bleeding, which would show as bright red, maroon, or black stools.   (A tablespoon of blood from the rectum is not serious, especially if   hemorrhoids are present.)  6. Vomiting.  7. Weakness or dizziness.  GO DIRECTLY TO THE NEAREST EMERGENCY ROOM IF YOU HAVE ANY OF THE FOLLOWING:      Difficulty breathing              Chills and/or fever over 101 F   Persistent vomiting and/or vomiting blood   Severe abdominal pain   Severe chest pain   Black, tarry stools   Bleeding- more than one tablespoon   Any other  symptom or condition that you feel may need urgent attention  Your doctor recommends these additional instructions:  If any biopsies were taken, your doctors clinic will contact you in 1 to 2   weeks with any results.  - Discharge patient to home.   - Follow an antireflux regimen indefinitely.   - Use a proton pump inhibitor PO daily.   - Await pathology results.   - Telephone endoscopist for pathology results in 2 weeks.   - Return to GI clinic at the next available appointment.   - Repeat upper endoscopy PRN per protocol.   - The findings and recommendations were discussed with the patient.   - Return to nurse practitioner.  For questions, problems or results please call your physician - Bryan Love MD at Work:  [phone].  [facility], EMERGENCY ROOM PHONE NUMBER: [phone]  IF A COMPLICATION OR EMERGENCY SITUATION ARISES AND YOU ARE UNABLE TO REACH   YOUR PHYSICIAN - GO DIRECTLY TO THE EMERGENCY ROOM.  Bryan Love MD  5/11/2018 11:48:59 AM  This report has been verified and signed electronically.

## 2018-05-11 NOTE — ANESTHESIA POSTPROCEDURE EVALUATION
"Anesthesia Post Evaluation    Patient: Idalmis Morejon    Procedure(s) Performed: Procedure(s) (LRB):  ESOPHAGOGASTRODUODENOSCOPY (EGD) (N/A)    Final Anesthesia Type: general  Patient location during evaluation: GI PACU  Patient participation: Yes- Able to Participate  Level of consciousness: awake and alert  Post-procedure vital signs: reviewed and stable  Pain management: adequate  Airway patency: patent  PONV status at discharge: No PONV  Anesthetic complications: no      Cardiovascular status: blood pressure returned to baseline and hypertensive  Respiratory status: unassisted, spontaneous ventilation and room air  Hydration status: euvolemic          Visit Vitals  BP (!) 173/97   Pulse 66   Temp 36.6 °C (97.9 °F) (Temporal)   Resp 19   Ht 5' 6" (1.676 m)   Wt 63.5 kg (140 lb)   SpO2 100%   Breastfeeding? No   BMI 22.60 kg/m²       Pain/Brina Score: Pain Assessment Performed: Yes (5/11/2018 11:46 AM)  Presence of Pain: denies (5/11/2018 11:46 AM)  Brina Score: 10 (5/11/2018 12:23 PM)      "

## 2018-05-11 NOTE — H&P
[facility]  History & Physical    Subjective:      Chief Complaint/Reason for Admission:     EGD    Idalmis Morejon is a 67 y.o. female.    Past Medical History:   Diagnosis Date    Abnormal chest CT     Acid reflux     Allergy     Anemia     Anemia     Anxiety     Cataract     Chronic UTI     recently treated with antibiotics -x 3 wks. ago-    Colon adenoma 2015 due 2020 10/21/2015    Colon adenoma 2015 due 2020 10/21/2015    Depression     Disturbed sleep rhythm     DJD (degenerative joint disease)     Dry eyes     Dry mouth     Grief reaction 3/24/2014    Hx of migraine headaches     Hyperlipemia     Hypernatremia 8/8/2014    Hypertension     Iritis     Iritis     Mild vitamin D deficiency 9/9/2013    Multiple lung nodules on CT: 2022-1806 no f/u needed 6/6/2016    Neurogenic bladder     Osteopenia     in hips    Osteoporosis     spine    Osteoporosis: 9/15 see rheumatology notes 6/6/2016    Positive GE[place]IANA (antinuclear antibody)     Schatzki's ring: 3/15 dilated 6/6/2016    Sciatica neuralgia 6/21/2013    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    Visual impairment      Past Surgical History:   Procedure Laterality Date    APPENDECTOMY      BACK SURGERY  2007    x4    CHOLECYSTECTOMY      lap orin    COLONOSCOPY N/A 10/2/2015    Procedure: COLONOSCOPY;  Surgeon: Bryan Love MD;  Location: [facility] ([address] [place] [address]);  Service: Endoscopy;  Laterality: N/A;    CYSTOSCOPY      with BOTOX INJECTION    HERNIA REPAIR      umbilical    HYSTERECTOMY      COMPLETE    POSTERIOR FUSION LUMBAR SPINE W/ CORPECTOMY      TONSILLECTOMY, ADENOIDECTOMY       Family History   Problem Relation Age of Onset    Kidney disease Mother     Hypertension Mother     Diabetes Father     Diabetes Brother     Kidney disease Brother     Heart disease Brother     Hyperlipidemia Sister     Hypertension Daughter     Colon cancer Maternal Aunt 70        stomach    Blindness  Maternal Aunt     Cancer Maternal Aunt         stomach cancer    Cancer Maternal Uncle         colon    Colon cancer Maternal Uncle 70        two uncles    Glaucoma Neg Hx     Amblyopia Neg Hx     Macular degeneration Neg Hx     Retinal detachment Neg Hx     Anesthesia problems Neg Hx     Celiac disease Neg Hx     Cirrhosis Neg Hx     Crohn's disease Neg Hx     Esophageal cancer Neg Hx     Irritable bowel syndrome Neg Hx     Liver cancer Neg Hx     Liver disease Neg Hx     Rectal cancer Neg Hx     Stomach cancer Neg Hx     Melanoma Neg Hx      Social History   Substance Use Topics    Smoking status: Never Smoker    Smokeless tobacco: Never Used    Alcohol use No       PTA Medications   Medication Sig    amLODIPine (NORVASC) 10 MG tablet Take 1 tablet (10 mg total) by mouth once daily.    azathioprine 75 mg Tab Take 75 mg by mouth once daily.    cetirizine (ZYRTEC) 10 MG tablet Take 1 tablet (10 mg total) by mouth 2 (two) times daily.    chlorthalidone (HYGROTEN) 25 MG Tab Take 1/2 tablet (12.5 mg) by mouth once daily in the morning    DULoxetine (CYMBALTA) 60 MG capsule TAKE 2 CAPSULES (120 MG TOTAL) BY MOUTH ONCE DAILY.    pantoprazole (PROTONIX) 40 MG tablet TAKE 1 TABLET BY MOUTH BEFORE BREAKFAST.    tolterodine (DETROL LA) 4 MG 24 hr capsule TAKE ONE CAPSULE BY MOUTH EVERY DAY    valsartan (DIOVAN) 160 MG tablet Take 1 tablet (160 mg total) by mouth 2 (two) times daily.    COMBIGAN 0.2-0.5 % Drop PLACE 1 DROP INTO BOTH EYES 2 (TWO) TIMES DAILY.    POLYETHYLENE GLYCOL 3350 (MIRALAX ORAL)      Review of patient's allergies indicates:   Allergen Reactions    Alendronate Nausea Only     And heartburn    Kenalog [triamcinolone acetonide] Hives        Review of Systems   Constitutional: Negative for chills and fever.   Respiratory: Negative for shortness of breath and wheezing.    Cardiovascular: Negative for chest pain.   Gastrointestinal: Positive for heartburn.       Objective:       Vital Signs (Most Recent)  Temp: 98.2 °F (36.8 °C) (05/11/18 1057)  Pulse: 74 (05/11/18 1057)  Resp: 16 (05/11/18 1057)  BP: (!) 192/90 (05/11/18 1057)  SpO2: 100 % (05/11/18 1057)    Vital Signs Range (Last 24H):  Temp:  [98.2 °F (36.8 °C)]   Pulse:  [74]   Resp:  [16]   BP: (192)/(90)   SpO2:  [100 %]     Physical Exam   Constitutional: She appears well-developed and well-nourished.   Cardiovascular: Normal rate.    Pulmonary/Chest: Effort normal.   Abdominal: Soft.   Skin: Skin is warm and dry.   Psychiatric: She has a normal mood and affect. Her behavior is normal. Judgment and thought content normal.           Assessment:      Active Hospital Problems    Diagnosis  POA    Gastroesophageal reflux disease without esophagitis [K21.9]  Yes      Resolved Hospital Problems    Diagnosis Date Resolved POA   No resolved problems to display.       Plan:    EGD for dysphagia

## 2018-05-11 NOTE — ANESTHESIA PREPROCEDURE EVALUATION
05/11/2018  Idalmis Morejon is a 67 y.o., female.  Pre-operative evaluation for Procedure(s) (LRB):  ESOPHAGOGASTRODUODENOSCOPY (EGD) (N/A)    Idalmis Morejon is a 67 y.o. female     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    Colon adenoma: 10/15 repeat in 2020    Age-related osteoporosis without current pathological fracture 2016    Schatzki's ring: 3/15 dilated    Multiple lung nodules on CT: 5734-9164 no f/u needed    Steroid-induced glaucoma of both eyes    Proteinuria    Family history of colon cancer: maternal uncle and aunt    CKD (chronic kidney disease), stage II    Hypertensive CKD (chronic kidney disease)    Recurrent major depressive disorder, in partial remission    Immunosuppression    Atherosclerosis of abdominal aorta: minimal see CT 7/16    Acute renal failure       Review of patient's allergies indicates:   Allergen Reactions    Alendronate Nausea Only     And heartburn    Kenalog [triamcinolone acetonide] Hives       No current facility-administered medications on file prior to encounter.      Current Outpatient Prescriptions on File Prior to Encounter   Medication Sig Dispense Refill    azathioprine 75 mg Tab Take 75 mg by mouth once daily. 90 tablet 3    cetirizine (ZYRTEC) 10  MG tablet Take 1 tablet (10 mg total) by mouth 2 (two) times daily. 60 tablet 0    DULoxetine (CYMBALTA) 60 MG capsule TAKE 2 CAPSULES (120 MG TOTAL) BY MOUTH ONCE DAILY. 60 capsule 6    POLYETHYLENE GLYCOL 3350 (MIRALAX ORAL)       tolterodine (DETROL LA) 4 MG 24 hr capsule TAKE ONE CAPSULE BY MOUTH EVERY DAY 30 capsule 9       Past Surgical History:   Procedure Laterality Date    APPENDECTOMY      BACK SURGERY  2007    x4    CHOLECYSTECTOMY      lap orin    COLONOSCOPY N/A 10/2/2015    Procedure: COLONOSCOPY;  Surgeon: Bryan Love MD;  Location: [address];  Service: Endoscopy;  Laterality: N/A;    CYSTOSCOPY      with BOTOX INJECTION    HERNIA REPAIR      umbilical    HYSTERECTOMY      COMPLETE    POSTERIOR FUSION LUMBAR SPINE W/ CORPECTOMY      TONSILLECTOMY, ADENOIDECTOMY         Social History     Social History    Marital status: Single     Spouse name: N/A    Number of children: N/A    Years of education: N/A     Occupational History    Not on file.     Social History Main Topics    Smoking status: Never Smoker    Smokeless tobacco: Never Used    Alcohol use No    Drug use: No    Sexual activity: Not Currently     Other Topics Concern    Are You Pregnant Or Think You May Be? No    Breast-Feeding No     Social History Narrative    No narrative on file         CBC: No results for input(s): WBC, RBC, HGB, HCT, PLT, MCV, MCH, MCHC in the last 72 hours.    CMP: No results for input(s): NA, K, CL, CO2, BUN, CREATININE, GLU, MG, PHOS, CALCIUM, ALBUMIN, PROT, ALKPHOS, ALT, AST, BILITOT in the last 72 hours.    INR  No results for input(s): PT, INR, PROTIME, APTT in the last 72 hours.        Diagnostic Studies:      EKD Echo:  No results found for this or any previous visit.  Last 3 sets of Vitals    Vitals - 1 value per visit 2018 2018 3/8/2018   SYSTOLIC - 150 164   DIASTOLIC - 70 88   PULSE - - 74   TEMPERATURE - - -   RESPIRATIONS - - -   SPO2 - - -  "  Weight (lb) - 152.12 154.98   Weight (kg) - 69 70.3   HEIGHT - 5' 6" 5' 6"   BODY MASS INDEX - 24.55 25.01   VISIT REPORT - - -   Pain Score  0 0 0   Some encounter information is confidential and restricted. Go to Review Flowsheets activity to see all data.   Some recent data might be hidden         Anesthesia Evaluation    I have reviewed the Patient Summary Reports.    I have reviewed the Nursing Notes.   I have reviewed the Medications.     Review of Systems  Anesthesia Hx:  No problems with previous Anesthesia  History of prior surgery of interest to airway management or planning: Denies Family Hx of Anesthesia complications.   Denies Personal Hx of Anesthesia complications.   Cardiovascular:   Hypertension hyperlipidemia ECG has been reviewed.    Pulmonary:  Pulmonary Normal Lung nodules   Renal/:  Renal/ Normal     Hepatic/GI:   GERD    Musculoskeletal:  Spine Disorders: Degenerative disease    Neurological:   Chronic Pain Syndrome   Psych:   depression               Anesthesia Plan  Type of Anesthesia, risks & benefits discussed:  Anesthesia Type:  MAC, general  Patient's Preference:   Intra-op Monitoring Plan:   Intra-op Monitoring Plan Comments:   Post Op Pain Control Plan:   Post Op Pain Control Plan Comments:   Induction:   IV  Beta Blocker:  Patient is not currently on a Beta-Blocker (No further documentation required).       Informed Consent: Patient understands risks and agrees with Anesthesia plan.  Questions answered. Anesthesia consent signed with patient.  ASA Score: 2     Day of Surgery Review of History & Physical:    H&P update referred to the provider.         Ready For Surgery From Anesthesia Perspective.       "

## 2018-05-11 NOTE — DISCHARGE INSTRUCTIONS

## 2018-05-11 NOTE — TRANSFER OF CARE
"Anesthesia Transfer of Care Note    Patient: Idalmis Morejon    Procedure(s) Performed: Procedure(s) (LRB):  ESOPHAGOGASTRODUODENOSCOPY (EGD) (N/A)    Patient location: PACU    Anesthesia Type: general    Transport from OR: Transported from OR on room air with adequate spontaneous ventilation    Post pain: adequate analgesia    Post assessment: no apparent anesthetic complications and tolerated procedure well    Post vital signs: stable    Level of consciousness: awake, alert and oriented    Nausea/Vomiting: no nausea/vomiting    Complications: none    Transfer of care protocol was followed      Last vitals:   Visit Vitals  BP (!) 171/85 (BP Location: Left arm, Patient Position: Lying)   Pulse 75   Temp 36.6 °C (97.9 °F) (Temporal)   Resp 16   Ht 5' 6" (1.676 m)   Wt 63.5 kg (140 lb)   SpO2 100%   Breastfeeding? No   BMI 22.60 kg/m²     "

## 2018-05-15 ENCOUNTER — PATIENT OUTREACH (OUTPATIENT)
Dept: OTHER | Facility: OTHER | Age: 68
End: 2018-05-15

## 2018-05-15 NOTE — PROGRESS NOTES
Last 5 Patient Entered Readings                                      Current 30 Day Average: 149/84     Recent Readings 5/14/2018 5/7/2018 4/30/2018 4/25/2018 4/21/2018    SBP (mmHg) 141 152 143 154 135    DBP (mmHg) 79 85 83 84 74    Pulse 75 70 74 69 76          Patient's BP average is above goal of <130/80.     Patient denies s/s of hypotension (lightheadedness, dizziness, nausea, fatigue) associated with low readings. Instructed patient to inform me if this occurs, patient confirms understanding.      Patient denies s/s of hypertension (SOB, CP, severe headaches, changes in vision) associated with high readings. Instructed patient to go to the ED if BP > 180/110 and accompanied by hypertensive s/s, patient confirms understanding.    Will continue to monitor regularly. Will follow up in 2-3 weeks, sooner if BP begins to trend upward or downward.    Patient has my contact information and knows to call with any concerns or clinical changes.     Current HTN regimen:  Hypertension Medications             amLODIPine (NORVASC) 10 MG tablet Take 1 tablet (10 mg total) by mouth once daily.    chlorthalidone (HYGROTEN) 25 MG Tab Take 1/2 tablet (12.5 mg) by mouth once daily in the morning    valsartan (DIOVAN) 160 MG tablet Take 1 tablet (160 mg total) by mouth 2 (two) times daily.        Patient reports she is tolerating change from diltiazem to amlodipine. BP improving and may not yet be seeing full effect of amlodipine.

## 2018-05-18 ENCOUNTER — TELEPHONE (OUTPATIENT)
Dept: ENDOSCOPY | Facility: HOSPITAL | Age: 68
End: 2018-05-18

## 2018-05-29 ENCOUNTER — PATIENT OUTREACH (OUTPATIENT)
Dept: OTHER | Facility: OTHER | Age: 68
End: 2018-05-29

## 2018-05-29 NOTE — PROGRESS NOTES
Last 5 Patient Entered Readings                                      Current 30 Day Average: 146/82     Recent Readings 5/28/2018 5/21/2018 5/14/2018 5/7/2018 4/30/2018    SBP (mmHg) 141 152 141 152 143    DBP (mmHg) 81 82 79 85 83    Pulse 74 68 75 70 74          Patient's BP average is above goal of <130/80.     Patient denies s/s of hypotension (lightheadedness, dizziness, nausea, fatigue) associated with low readings. Instructed patient to inform me if this occurs, patient confirms understanding.      Patient denies s/s of hypertension (SOB, CP, severe headaches, changes in vision) associated with high readings. Instructed patient to go to the ED if BP > 180/110 and accompanied by hypertensive s/s, patient confirms understanding.    Will continue to monitor regularly. Will follow up in 2-3 weeks, sooner if BP begins to trend upward or downward.    Patient has my contact information and knows to call with any concerns or clinical changes.     Current HTN regimen:  Hypertension Medications             amLODIPine (NORVASC) 10 MG tablet Take 1 tablet (10 mg total) by mouth once daily.    chlorthalidone (HYGROTEN) 25 MG Tab Take 1/2 tablet (12.5 mg) by mouth once daily in the morning    valsartan (DIOVAN) 160 MG tablet Take 1 tablet (160 mg total) by mouth 2 (two) times daily.        Patient has been taking chlorthalidone at 9am, amlodipine at 5pm and valsartan 9am/2pm. She reports less fatigue with taking amlodipine later in day. She will try new schedule of chlorthalidone 9am, amlodipine 9pm, and valsartan 9am/9pm. She agreed to taking 2-3 BP readings per week and intends to take on MWF. She has labs 6/6 and PCP appointment 6/28. Consider increasing chlorthalidone based on lab results and if BP remains elevated on follow up.

## 2018-05-30 ENCOUNTER — PATIENT MESSAGE (OUTPATIENT)
Dept: GASTROENTEROLOGY | Facility: CLINIC | Age: 68
End: 2018-05-30

## 2018-05-31 ENCOUNTER — PATIENT OUTREACH (OUTPATIENT)
Dept: OTHER | Facility: OTHER | Age: 68
End: 2018-05-31

## 2018-05-31 NOTE — PROGRESS NOTES
Last 5 Patient Entered Readings                                      Current 30 Day Average: 146/82     Recent Readings 5/30/2018 5/30/2018 5/28/2018 5/21/2018 5/14/2018    SBP (mmHg) 146 156 141 152 141    DBP (mmHg) 82 80 81 82 79    Pulse 77 74 74 68 75        Digital Medicine: Health  Follow Up    Left voicemail to follow up with Erasmo Idalmis BAINS Jean-Pierre.  Current BP average 146/82 mmHg is not at goal, [130/80].

## 2018-06-06 ENCOUNTER — LAB VISIT (OUTPATIENT)
Dept: LAB | Facility: HOSPITAL | Age: 68
End: 2018-06-06
Attending: INTERNAL MEDICINE
Payer: MEDICARE

## 2018-06-06 DIAGNOSIS — I70.0 ATHEROSCLEROSIS OF ABDOMINAL AORTA: ICD-10-CM

## 2018-06-06 DIAGNOSIS — I12.9 HYPERTENSIVE CKD (CHRONIC KIDNEY DISEASE): ICD-10-CM

## 2018-06-06 DIAGNOSIS — Z79.60 LONG-TERM USE OF IMMUNOSUPPRESSANT MEDICATION: ICD-10-CM

## 2018-06-06 DIAGNOSIS — D89.89 AUTOIMMUNE DISORDER: ICD-10-CM

## 2018-06-06 DIAGNOSIS — D53.9 DEFICIENCY ANEMIA: ICD-10-CM

## 2018-06-06 DIAGNOSIS — H20.023 RECURRENT IRITIS, BILATERAL: ICD-10-CM

## 2018-06-06 LAB
ALBUMIN SERPL BCP-MCNC: 4 G/DL
ALBUMIN SERPL BCP-MCNC: 4 G/DL
ALP SERPL-CCNC: 106 U/L
ALT SERPL W/O P-5'-P-CCNC: 17 U/L
ANION GAP SERPL CALC-SCNC: 11 MMOL/L
ANION GAP SERPL CALC-SCNC: 11 MMOL/L
AST SERPL-CCNC: 27 U/L
BASOPHILS # BLD AUTO: 0.01 K/UL
BASOPHILS NFR BLD: 0.2 %
BILIRUB SERPL-MCNC: 0.8 MG/DL
BUN SERPL-MCNC: 13 MG/DL
BUN SERPL-MCNC: 13 MG/DL
CALCIUM SERPL-MCNC: 10.1 MG/DL
CALCIUM SERPL-MCNC: 10.1 MG/DL
CHLORIDE SERPL-SCNC: 106 MMOL/L
CHLORIDE SERPL-SCNC: 106 MMOL/L
CHOLEST SERPL-MCNC: 171 MG/DL
CHOLEST/HDLC SERPL: 2.8 {RATIO}
CO2 SERPL-SCNC: 27 MMOL/L
CO2 SERPL-SCNC: 27 MMOL/L
CREAT SERPL-MCNC: 1 MG/DL
CREAT SERPL-MCNC: 1 MG/DL
CRP SERPL-MCNC: 6.7 MG/L
DIFFERENTIAL METHOD: ABNORMAL
EOSINOPHIL # BLD AUTO: 0.2 K/UL
EOSINOPHIL NFR BLD: 4.4 %
ERYTHROCYTE [DISTWIDTH] IN BLOOD BY AUTOMATED COUNT: 14.9 %
ERYTHROCYTE [SEDIMENTATION RATE] IN BLOOD BY WESTERGREN METHOD: 38 MM/HR
EST. GFR  (AFRICAN AMERICAN): >60 ML/MIN/1.73 M^2
EST. GFR  (AFRICAN AMERICAN): >60 ML/MIN/1.73 M^2
EST. GFR  (NON AFRICAN AMERICAN): 58 ML/MIN/1.73 M^2
EST. GFR  (NON AFRICAN AMERICAN): 58 ML/MIN/1.73 M^2
FERRITIN SERPL-MCNC: 67 NG/ML
GLUCOSE SERPL-MCNC: 113 MG/DL
GLUCOSE SERPL-MCNC: 113 MG/DL
HCT VFR BLD AUTO: 34.8 %
HDLC SERPL-MCNC: 62 MG/DL
HDLC SERPL: 36.3 %
HGB BLD-MCNC: 11.8 G/DL
IRON SERPL-MCNC: 84 UG/DL
LDLC SERPL CALC-MCNC: 92.6 MG/DL
LYMPHOCYTES # BLD AUTO: 1.6 K/UL
LYMPHOCYTES NFR BLD: 34.2 %
MCH RBC QN AUTO: 27.8 PG
MCHC RBC AUTO-ENTMCNC: 33.9 G/DL
MCV RBC AUTO: 82 FL
MONOCYTES # BLD AUTO: 0.3 K/UL
MONOCYTES NFR BLD: 6.6 %
NEUTROPHILS # BLD AUTO: 2.5 K/UL
NEUTROPHILS NFR BLD: 54.6 %
NONHDLC SERPL-MCNC: 109 MG/DL
PHOSPHATE SERPL-MCNC: 3.1 MG/DL
PLATELET # BLD AUTO: 296 K/UL
PMV BLD AUTO: 11 FL
POTASSIUM SERPL-SCNC: 3.7 MMOL/L
POTASSIUM SERPL-SCNC: 3.7 MMOL/L
PROT SERPL-MCNC: 7.7 G/DL
PTH-INTACT SERPL-MCNC: 82 PG/ML
RBC # BLD AUTO: 4.24 M/UL
SATURATED IRON: 24 %
SODIUM SERPL-SCNC: 144 MMOL/L
SODIUM SERPL-SCNC: 144 MMOL/L
TOTAL IRON BINDING CAPACITY: 354 UG/DL
TRANSFERRIN SERPL-MCNC: 239 MG/DL
TRIGL SERPL-MCNC: 82 MG/DL
TSH SERPL DL<=0.005 MIU/L-ACNC: 2.4 UIU/ML
VIT B12 SERPL-MCNC: 869 PG/ML
WBC # BLD AUTO: 4.53 K/UL

## 2018-06-06 PROCEDURE — 82728 ASSAY OF FERRITIN: CPT

## 2018-06-06 PROCEDURE — 80069 RENAL FUNCTION PANEL: CPT

## 2018-06-06 PROCEDURE — 80053 COMPREHEN METABOLIC PANEL: CPT

## 2018-06-06 PROCEDURE — 85025 COMPLETE CBC W/AUTO DIFF WBC: CPT

## 2018-06-06 PROCEDURE — 80061 LIPID PANEL: CPT

## 2018-06-06 PROCEDURE — 84443 ASSAY THYROID STIM HORMONE: CPT

## 2018-06-06 PROCEDURE — 85651 RBC SED RATE NONAUTOMATED: CPT

## 2018-06-06 PROCEDURE — 83540 ASSAY OF IRON: CPT

## 2018-06-06 PROCEDURE — 83970 ASSAY OF PARATHORMONE: CPT

## 2018-06-06 PROCEDURE — 86140 C-REACTIVE PROTEIN: CPT

## 2018-06-06 PROCEDURE — 36415 COLL VENOUS BLD VENIPUNCTURE: CPT

## 2018-06-06 PROCEDURE — 82607 VITAMIN B-12: CPT

## 2018-06-08 NOTE — PROGRESS NOTES
"Last 5 Patient Entered Readings                                      Current 30 Day Average: 144/80     Recent Readings 6/6/2018 6/4/2018 5/30/2018 5/30/2018 5/28/2018    SBP (mmHg) 135 147 146 156 141    DBP (mmHg) 76 82 82 80 81    Pulse 72 70 77 74 74          Digital Medicine: Health  Follow Up    Lifestyle Modifications:    1.Dietary Modifications (Sodium intake <2,000mg/day, food labels, dining out): patient reports "cheating" 1x/week. She states consuming fried seafood for her cheat meal.. She states reading food labels often. Patient states eating grilled chicken salads with avocado. She states consuming smoothies. She states eating grilled fish in the evening. Patient  She states she is feeling better since pursuing a low-sodium diet. She states trying her best to stay below the 2k mg/sodium/day.  Encouraged patient to continue her current dietary regimen.     2.Physical Activity: Patient reports walking in the evening. She states walking in the mall and 1x in the local park. Encouraged patient to continue exercise regimen. Will further discuss exercise changes upon next encounter.     3.Medication Therapy: Patient has been compliant with the medication regimen.    4.Patient has the following medication side effects/concerns:   (Frequency/Alleviating factors/Precipitating factors, etc.)     Follow up with  Idalmis NARA Morejon completed. No further questions or concerns. Will continue follow up to achieve health goals.  "

## 2018-06-08 NOTE — PROGRESS NOTES
Last 5 Patient Entered Readings                                      Current 30 Day Average: 144/80     Recent Readings 6/6/2018 6/4/2018 5/30/2018 5/30/2018 5/28/2018    SBP (mmHg) 135 147 146 156 141    DBP (mmHg) 76 82 82 80 81    Pulse 72 70 77 74 74        6/8-Patient answered stating she will return call when available as she is at a [employer] party.

## 2018-06-12 ENCOUNTER — PATIENT OUTREACH (OUTPATIENT)
Dept: OTHER | Facility: OTHER | Age: 68
End: 2018-06-12

## 2018-06-12 NOTE — PROGRESS NOTES
Last 5 Patient Entered Readings                                      Current 30 Day Average: 145/79     Recent Readings 6/11/2018 6/8/2018 6/6/2018 6/4/2018 5/30/2018    SBP (mmHg) 150 148 135 147 146    DBP (mmHg) 77 74 76 82 82    Pulse 64 77 72 70 77          Patient's BP average is above goal of <130/80.     Patient denies s/s of hypotension (lightheadedness, dizziness, nausea, fatigue) associated with low readings. Instructed patient to inform me if this occurs, patient confirms understanding.      Patient denies s/s of hypertension (SOB, CP, severe headaches, changes in vision) associated with high readings. Instructed patient to go to the ED if BP > 180/110 and accompanied by hypertensive s/s, patient confirms understanding.    Will continue to monitor regularly. Will follow up in 1-2 weeks, sooner if BP begins to trend upward or downward.    Patient has my contact information and knows to call with any concerns or clinical changes.     Current HTN regimen:  Hypertension Medications             amLODIPine (NORVASC) 10 MG tablet Take 1 tablet (10 mg total) by mouth once daily.    chlorthalidone (HYGROTEN) 25 MG Tab Take 1/2 tablet (12.5 mg) by mouth once daily in the morning    valsartan (DIOVAN) 160 MG tablet Take 1 tablet (160 mg total) by mouth 2 (two) times daily.        DBP improving and at goal. Patient unable to explain lower BP reading 6/6. She did not adjust the timing of her medications. She will move amlodipine and evening valsartan doses to ~ 9 pm. Discussed taking BP measurement during the middle of the day. She agreed to follow up next week. BMP reviewed.

## 2018-06-19 ENCOUNTER — PATIENT OUTREACH (OUTPATIENT)
Dept: OTHER | Facility: OTHER | Age: 68
End: 2018-06-19

## 2018-06-19 NOTE — PROGRESS NOTES
Last 5 Patient Entered Readings                                      Current 30 Day Average: 144/80     Recent Readings 6/18/2018 6/13/2018 6/11/2018 6/8/2018 6/6/2018    SBP (mmHg) 143 137 150 148 135    DBP (mmHg) 83 83 77 74 76    Pulse 65 78 64 77 72        Patient's BP average is above goal of <130/80.     Patient denies s/s of hypotension (lightheadedness, dizziness, nausea, fatigue) associated with low readings. Instructed patient to inform me if this occurs, patient confirms understanding.      Patient denies s/s of hypertension (SOB, CP, severe headaches, changes in vision) associated with high readings. Instructed patient to go to the ED if BP > 180/110 and accompanied by hypertensive s/s, patient confirms understanding.    Will continue to monitor regularly. Will follow up in 2-3 weeks, sooner if BP begins to trend upward or downward.    Patient has my contact information and knows to call with any concerns or clinical changes.     Current HTN regimen:  Hypertension Medications             amLODIPine (NORVASC) 10 MG tablet Take 1 tablet (10 mg total) by mouth once daily.    chlorthalidone (HYGROTEN) 25 MG Tab Take 1/2 tablet (12.5 mg) by mouth once daily in the morning    valsartan (DIOVAN) 160 MG tablet Take 1 tablet (160 mg total) by mouth 2 (two) times daily.        Patient has changed timing of amlodipine and evening valsartan as discussed, now taking doses at 8-9 pm. Only 2 BP measurements since medication adjustment, but they have improved. Consider increasing chlorthalidone if BP not improved on follow up.

## 2018-06-28 ENCOUNTER — OFFICE VISIT (OUTPATIENT)
Dept: INTERNAL MEDICINE | Facility: CLINIC | Age: 68
End: 2018-06-28
Payer: MEDICARE

## 2018-06-28 VITALS
DIASTOLIC BLOOD PRESSURE: 70 MMHG | BODY MASS INDEX: 24.8 KG/M2 | HEIGHT: 66 IN | WEIGHT: 154.31 LBS | SYSTOLIC BLOOD PRESSURE: 125 MMHG

## 2018-06-28 DIAGNOSIS — I10 ESSENTIAL HYPERTENSION: ICD-10-CM

## 2018-06-28 DIAGNOSIS — F33.41 RECURRENT MAJOR DEPRESSIVE DISORDER, IN PARTIAL REMISSION: ICD-10-CM

## 2018-06-28 DIAGNOSIS — I12.9 HYPERTENSIVE KIDNEY DISEASE WITH STAGE 2 CHRONIC KIDNEY DISEASE: ICD-10-CM

## 2018-06-28 DIAGNOSIS — I70.0 ATHEROSCLEROSIS OF ABDOMINAL AORTA: ICD-10-CM

## 2018-06-28 DIAGNOSIS — E78.2 MIXED HYPERLIPIDEMIA: ICD-10-CM

## 2018-06-28 DIAGNOSIS — Z91.89 AT RISK FOR CORONARY ARTERY DISEASE: ICD-10-CM

## 2018-06-28 DIAGNOSIS — Z00.00 ANNUAL PHYSICAL EXAM: Primary | ICD-10-CM

## 2018-06-28 DIAGNOSIS — D84.9 IMMUNOSUPPRESSION: ICD-10-CM

## 2018-06-28 DIAGNOSIS — N18.2 HYPERTENSIVE KIDNEY DISEASE WITH STAGE 2 CHRONIC KIDNEY DISEASE: ICD-10-CM

## 2018-06-28 DIAGNOSIS — R91.8 MULTIPLE LUNG NODULES ON CT: ICD-10-CM

## 2018-06-28 DIAGNOSIS — D53.9 DEFICIENCY ANEMIA: ICD-10-CM

## 2018-06-28 DIAGNOSIS — Z12.31 SCREENING MAMMOGRAM, ENCOUNTER FOR: ICD-10-CM

## 2018-06-28 DIAGNOSIS — R11.0 NAUSEA: ICD-10-CM

## 2018-06-28 DIAGNOSIS — M81.0 AGE-RELATED OSTEOPOROSIS WITHOUT CURRENT PATHOLOGICAL FRACTURE: ICD-10-CM

## 2018-06-28 PROBLEM — N17.9 ACUTE RENAL FAILURE: Status: RESOLVED | Noted: 2018-02-28 | Resolved: 2018-06-28

## 2018-06-28 PROCEDURE — 99999 PR PBB SHADOW E&M-EST. PATIENT-LVL IV: CPT | Mod: PBBFAC,,, | Performed by: INTERNAL MEDICINE

## 2018-06-28 PROCEDURE — 3078F DIAST BP <80 MM HG: CPT | Mod: CPTII,S$GLB,, | Performed by: INTERNAL MEDICINE

## 2018-06-28 PROCEDURE — 3074F SYST BP LT 130 MM HG: CPT | Mod: CPTII,S$GLB,, | Performed by: INTERNAL MEDICINE

## 2018-06-28 PROCEDURE — 99397 PER PM REEVAL EST PAT 65+ YR: CPT | Mod: S$GLB,,, | Performed by: INTERNAL MEDICINE

## 2018-06-28 PROCEDURE — 99499 UNLISTED E&M SERVICE: CPT | Mod: S$GLB,,, | Performed by: INTERNAL MEDICINE

## 2018-06-28 RX ORDER — PROMETHAZINE HYDROCHLORIDE 12.5 MG/1
12.5 TABLET ORAL EVERY 6 HOURS PRN
Qty: 20 TABLET | Refills: 0 | Status: SHIPPED | OUTPATIENT
Start: 2018-06-28 | End: 2020-06-30

## 2018-06-28 NOTE — PROGRESS NOTES
Subjective:       Patient ID: Idalmis Morejon is a 67 y.o. female.    Chief Complaint: Annual Exam    Here for follow-up of multiple medical issues.    Blood pressure doing very well.  No further episodes of syncope.  She is on the digital hypertension program.    She recently saw GI and had an EGD, gastritis identify but no other findings.  She feels well.    Labs improved including anemia.    Lipids discussed.  Lipids are significantly better however given her connective tissue disease and hypertension, she is still a candidate for lipid lowering therapy.  She is still ambivalent about this.  ACC guidelines were reviewed.  I recommended a coronary CT scan for risk stratification.    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     Colon adenoma: 10/15 repeat in 2020     Age-related osteoporosis without current pathological fracture 2016     Schatzki's ring: 3/15 dilated     Multiple lung nodules on CT: 1490-2347 no f/u needed     Steroid-induced glaucoma of both eyes     Proteinuria     Family history of colon cancer: maternal uncle and aunt     CKD (chronic kidney disease), stage II     Hypertensive kidney disease with stage 2 chronic kidney disease     Recurrent major depressive disorder, in partial remission     Immunosuppression     Atherosclerosis of abdominal aorta: minimal see CT 7/16     Gastroesophageal reflux disease  without esophagitis        Review of Systems   Constitutional: Negative for activity change and unexpected weight change.   HENT: Negative for hearing loss, rhinorrhea and trouble swallowing.    Eyes: Negative for discharge and visual disturbance.   Respiratory: Negative for chest tightness and wheezing.    Cardiovascular: Negative for chest pain and palpitations.   Gastrointestinal: Positive for constipation. Negative for blood in stool, diarrhea and vomiting.   Endocrine: Negative for polydipsia and polyuria.   Genitourinary: Negative for difficulty urinating, dysuria, hematuria and menstrual problem.   Musculoskeletal: Positive for joint swelling. Negative for arthralgias and neck pain.   Neurological: Negative for weakness and headaches.   Psychiatric/Behavioral: Negative for confusion and dysphoric mood.       Objective:      Physical Exam   Constitutional: She is oriented to person, place, and time. She appears well-developed and well-nourished.   HENT:   Head: Normocephalic and atraumatic.   Right Ear: External ear normal.   Left Ear: External ear normal.   Nose: Nose normal.   Mouth/Throat: Oropharynx is clear and moist. No oropharyngeal exudate.   Eyes: Conjunctivae and EOM are normal. No scleral icterus.   Neck: Normal range of motion. Neck supple. No JVD present. No thyromegaly present.   Cardiovascular: Normal rate, regular rhythm, normal heart sounds and intact distal pulses.  Exam reveals no gallop.    No murmur heard.  Pulmonary/Chest: Effort normal and breath sounds normal. No respiratory distress. She has no wheezes.   Abdominal: Soft. Bowel sounds are normal. She exhibits no distension and no mass. There is no tenderness. There is no rebound and no guarding.   Musculoskeletal: Normal range of motion. She exhibits no edema or tenderness.   Lymphadenopathy:     She has no cervical adenopathy.   Neurological: She is alert and oriented to person, place, and time. She displays normal reflexes. No  cranial nerve deficit. Coordination normal.   Skin: Skin is warm. No rash noted. No erythema.   Psychiatric: She has a normal mood and affect. Her behavior is normal. Judgment and thought content normal.   Nursing note and vitals reviewed.      Assessment:       1. Annual physical exam    2. Recurrent major depressive disorder, in partial remission    3. Multiple lung nodules on CT: 6973-0687 no f/u needed    4. Atherosclerosis of abdominal aorta: minimal see CT 7/16    5. Essential hypertension    6. Mixed hyperlipidemia    7. Hypertensive kidney disease with stage 2 chronic kidney disease    8. Immunosuppression    9. Age-related osteoporosis without current pathological fracture 2016    10. Screening mammogram, encounter for    11. Deficiency anemia    12. At risk for coronary artery disease    13. Nausea        Plan:         Annual physical exam    Recurrent major depressive disorder, in partial remission:  Stable on regimen, keep psych follow-up    Multiple lung nodules on CT: 2979-7988 no f/u needed    Atherosclerosis of abdominal aorta: minimal see CT 7/16  -     CT Cardiac Scoring; Future; Expected date: 06/28/2018    Essential hypertension:  Continue regimen    Mixed hyperlipidemia  -     CT Cardiac Scoring; Future; Expected date: 06/28/2018    Hypertensive kidney disease with stage 2 chronic kidney disease:  Stable    Immunosuppression:  Stable, cautions reviewed.  New shingles vaccine when available recommended    Age-related osteoporosis without current pathological fracture 2016    Screening mammogram, encounter for  -     Mammo Digital Screening Bilat with Tomosynthesis CAD; Future; Expected date: 06/28/2018    Deficiency anemia:  Improved    At risk for coronary artery disease  -     CT Cardiac Scoring; Future; Expected date: 06/28/2018    Nausea:  She gets this very sparingly, perhaps once or twice a year and uses the medication very infrequently.  -     promethazine (PHENERGAN) 12.5 MG Tab; Take 1  tablet (12.5 mg total) by mouth every 6 (six) hours as needed (nausea).  Dispense: 20 tablet; Refill: 0    I will review all studies and determine further tx depending on findings

## 2018-06-28 NOTE — PATIENT INSTRUCTIONS
Prevention Guidelines, Women Ages 65 and Older  Screening tests and vaccines are an important part of managing your health. Health counseling is essential, too. Below are guidelines for these, for women ages 65 and older. Talk with your healthcare provider to make sure youre up to date on what you need.  Screening Who needs it How often   Type 2 diabetes or prediabetes All adults beginning at age 45 and adults without symptoms at any age who are overweight or obese and have 1 or more additional risk factors for diabetes At least every 3 years   Alcohol misuse All women in this age group At routine exams   Blood pressure All women in this age group Every 2 years if your blood pressure is less than 120/80 mm Hg; yearly if your systolic blood pressure is 120 to 139 mm Hg, or your diastolic blood pressure reading is 80 to 89 mm Hg   Breast cancer All women in this age group Yearly mammogram and clinical breast exam1   Cervical cancer Only women who had abnormal screening results before age 65 Talk with your healthcare provider   Chlamydia Women at increased risk for infection At routine exams   Colorectal cancer All women in this age group1 Flexible sigmoidoscopy every 5 years, or colonoscopy every 10 years, or double-contrast barium enema every 5 years; yearly fecal occult blood test or fecal immunochemical test; or a stool DNA test as often as your healthcare provider advises; talk with your healthcare provider about which tests are best for you   Depression All women in this age group At routine exams   Gonorrhea Sexually active women at increased risk for infection At routine exams   Hepatitis C Anyone at increased risk; 1 time for those born between 1945 and 1965 At routine exams   High cholesterol or triglycerides All women in this age group who are at risk for coronary artery disease At least every 5 years   HIV Women at increased risk for infection - talk with your healthcare provider At routine exams   Lung  cancer Adults age 55 to 80 who have smoked Yearly screening in smokers with 30 pack-year history of smoking or who quit within 15 years   Obesity All women in this age group At routine exams   Osteoporosis All women in this age group Bone density test at age 65, then follow-up as advised by your healthcare provider   Syphilis Women at increased risk for infection - talk with your healthcare provider At routine exams   Thyroid-Stimulating Hormone (TSH) All women in this age group Every 5 years   Tuberculosis Women at increased risk for infection - talk with your healthcare provider Ask your healthcare provider   Vision All women in this age group Every 1 to 2 years; if you have a chronic health condition, ask your healthcare provider if you need exams more often   Vaccine Who needs it How often   Chickenpox (varicella) All women in this age group who have no record of this infection or vaccine 2 doses; second dose should be given at least 4 weeks after the first dose   Hepatitis A Women at increased risk for infection - talk with your healthcare provider 2 doses given 6 months apart   Hepatitis B Women at increased risk for infection - talk with your healthcare provider 3 doses over 6 months; second dose should be given 1 month after the first dose; the third dose should be given at least 2 months after the second dose and at least 4 months after the first dose   Haemophilus influenza Type B (HIB) Women at increased risk for infection - talk with your healthcare provider 1 to 3 doses   Influenza (flu) All women in this age group Once a year   Pneumococcal conjugate vaccine (PCV13) and pneumococcal polysaccharide vaccine (PPSV23) All women in this age group 1 dose of each vaccine   Tetanus/diphtheria/pertussis (Td/Tdap) booster All women in this age group Td every 10 years, or a one-time dose of Tdap instead of a Td booster after age 18, then Td every 10 years   Zoster All women in this age group 1 dose   Counseling  Who needs it How often   Diet and exercise Women who are overweight or obese When diagnosed, and then at routine exams   Fall prevention (exercise and vitamin D supplements) All women in this age group At routine exams   Sexually transmitted infection prevention Women at increased risk for infection - talk with your healthcare provider At routine exams   Use of daily aspirin Women ages 55 and up in this age group who are at risk for cardiovascular health problems such as stroke When your risk is known   Use of tobacco and the health effects it can cause All women in this age group Every exam   1American [employer]  Date Last Reviewed: 8/9/2015  © 7374-0907 [employer]. [address] [place] [address], [place] [address]. All rights reserved. This information is not intended as a substitute for professional medical care. Always follow your healthcare professional's instructions.    [employer] when available

## 2018-06-29 ENCOUNTER — PATIENT OUTREACH (OUTPATIENT)
Dept: OTHER | Facility: OTHER | Age: 68
End: 2018-06-29

## 2018-06-29 NOTE — PROGRESS NOTES
Last 5 Patient Entered Readings                                      Current 30 Day Average: 144/79     Recent Readings 6/25/2018 6/22/2018 6/18/2018 6/13/2018 6/11/2018    SBP (mmHg) 144 146 143 137 150    DBP (mmHg) 82 73 83 83 77    Pulse 72 77 65 78 64          Digital Medicine: Health  Follow Up    Left voicemail to follow up with Erasmo Idalmis BAINS Jean-Pierre.  Current BP average 144/79 mmHg is not at goal, [130/80].

## 2018-07-03 ENCOUNTER — PATIENT OUTREACH (OUTPATIENT)
Dept: OTHER | Facility: OTHER | Age: 68
End: 2018-07-03

## 2018-07-03 NOTE — PROGRESS NOTES
Last 5 Patient Entered Readings                                      Current 30 Day Average: 140/78     Recent Readings 7/14/2018 7/9/2018 7/6/2018 7/2/2018 6/30/2018    SBP (mmHg) 140 133 146 131 140    DBP (mmHg) 83 67 83 74 82    Pulse 65 69 82 76 69        Patient's BP average is above goal of <130/80.     Patient denies s/s of hypotension (lightheadedness, dizziness, nausea, fatigue) associated with low readings. Instructed patient to inform me if this occurs, patient confirms understanding.      Patient denies s/s of hypertension (SOB, CP, severe headaches, changes in vision) associated with high readings. Instructed patient to go to the ED if BP > 180/110 and accompanied by hypertensive s/s, patient confirms understanding.    Will continue to monitor regularly. Will follow up in 2-3 weeks, sooner if BP begins to trend upward or downward.    Patient has my contact information and knows to call with any concerns or clinical changes.     Current HTN regimen:  Hypertension Medications             amLODIPine (NORVASC) 10 MG tablet Take 1 tablet (10 mg total) by mouth once daily.    chlorthalidone (HYGROTEN) 25 MG Tab Take 1/2 tablet (12.5 mg) by mouth once daily in the morning    valsartan (DIOVAN) 160 MG tablet Take 1 tablet (160 mg total) by mouth 2 (two) times daily.        7/3 - Chart reviewed. BP improving, including reading at PCP appointment last week. No medication recommendations at this time. Continue to monitor.  7/17 - Spoke with patient and advised of valsartan recall. She will check with her pharmacy. BP improving. Addendum: patient reports her valsartan is not affected by recall. Afternoon BP readings more elevated than other readings. Recommended patient to split amlodipine dose 5mg BID.

## 2018-07-10 NOTE — PROGRESS NOTES
"Last 5 Patient Entered Readings                                      Current 30 Day Average: 141/78     Recent Readings 7/9/2018 7/6/2018 7/2/2018 6/30/2018 6/25/2018    SBP (mmHg) 133 146 131 140 144    DBP (mmHg) 67 83 74 82 82    Pulse 69 82 76 69 72        Patient LVM on HC on lifestyle factors.     Patient states everything "all is well." Patient reports "pressure is coming down to where it needs to be."     Patient states she will be in an area where she cannot answer her phone. Requested HC not return call.     Digital Medicine: Health  Follow Up    Lifestyle Modifications:    1.Dietary Modifications (Sodium intake <2,000mg/day, food labels, dining out): Patient reports adjusting to the foods she needs to eat.     2.Physical Activity: Patient reports adjusting to times of day she needs to exercise.     3.Medication Therapy: Patient has been compliant with the medication regimen.    4.Patient has the following medication side effects/concerns:   (Frequency/Alleviating factors/Precipitating factors, etc.)     Follow up with Ms. Idalmis Morejon completed. No further questions or concerns. Will continue follow up to achieve health goals.     "

## 2018-07-10 NOTE — PROGRESS NOTES
Last 5 Patient Entered Readings                                      Current 30 Day Average: 141/78     Recent Readings 7/9/2018 7/6/2018 7/2/2018 6/30/2018 6/25/2018    SBP (mmHg) 133 146 131 140 144    DBP (mmHg) 67 83 74 82 82    Pulse 69 82 76 69 72        Digital Medicine: Health  Follow Up    Left voicemail to follow up with Ms. Idalmis Morejon.   Current BP average 141/78 mmHg is not at goal, [130/80].

## 2018-07-11 RX ORDER — DULOXETIN HYDROCHLORIDE 60 MG/1
CAPSULE, DELAYED RELEASE ORAL
Qty: 60 CAPSULE | Refills: 3 | Status: SHIPPED | OUTPATIENT
Start: 2018-07-11 | End: 2018-10-03

## 2018-07-19 RX ORDER — DILTIAZEM HYDROCHLORIDE 180 MG/1
CAPSULE, COATED, EXTENDED RELEASE ORAL
Qty: 90 CAPSULE | Refills: 0 | Status: SHIPPED | OUTPATIENT
Start: 2018-07-19 | End: 2018-08-02 | Stop reason: ALTCHOICE

## 2018-07-19 RX ORDER — DILTIAZEM HYDROCHLORIDE 120 MG/1
CAPSULE, COATED, EXTENDED RELEASE ORAL
Qty: 90 CAPSULE | Refills: 0 | Status: SHIPPED | OUTPATIENT
Start: 2018-07-19 | End: 2018-08-02 | Stop reason: ALTCHOICE

## 2018-07-23 DIAGNOSIS — I10 ESSENTIAL HYPERTENSION: ICD-10-CM

## 2018-07-23 RX ORDER — AMLODIPINE BESYLATE 10 MG/1
TABLET ORAL
Qty: 30 TABLET | Refills: 2 | Status: SHIPPED | OUTPATIENT
Start: 2018-07-23 | End: 2018-08-08

## 2018-07-27 ENCOUNTER — PATIENT OUTREACH (OUTPATIENT)
Dept: OTHER | Facility: OTHER | Age: 68
End: 2018-07-27

## 2018-07-27 DIAGNOSIS — I10 ESSENTIAL HYPERTENSION: ICD-10-CM

## 2018-07-27 NOTE — PROGRESS NOTES
Last 5 Patient Entered Readings                                      Current 30 Day Average: 136/78     Recent Readings 7/27/2018 7/22/2018 7/14/2018 7/9/2018 7/6/2018    SBP (mmHg) 120 126 140 133 146    DBP (mmHg) 79 77 83 67 83    Pulse 91 89 65 69 82          7/27 - Patient reports ankle swelling. States swelling is not painful but does make shoes tight. She reports edema resolves overnight. Patient understands CCB edema is not harmful and is willing to continue with amlodipine 5mg BID at this time due to improvement in BP. She agreed to elevated feet when able and to contact me sooner than scheduled outreach if edema intolerable.

## 2018-07-30 ENCOUNTER — PATIENT MESSAGE (OUTPATIENT)
Dept: ADMINISTRATIVE | Facility: OTHER | Age: 68
End: 2018-07-30

## 2018-07-31 ENCOUNTER — PATIENT OUTREACH (OUTPATIENT)
Dept: OTHER | Facility: OTHER | Age: 68
End: 2018-07-31

## 2018-07-31 NOTE — PROGRESS NOTES
Last 5 Patient Entered Readings                                      Current 30 Day Average: 134/78     Recent Readings 7/31/2018 7/27/2018 7/22/2018 7/14/2018 7/9/2018    SBP (mmHg) 139 120 126 140 133    DBP (mmHg) 83 79 77 83 67    Pulse 67 91 89 65 69        Digital Medicine: Health  Follow Up    Left voicemail to follow up with Ms. Idalmis Morejon.  Current BP average 134/78 mmHg is not at goal, [130/80].

## 2018-07-31 NOTE — PROGRESS NOTES
"Last 5 Patient Entered Readings                                      Current 30 Day Average: 134/78     Recent Readings 7/31/2018 7/27/2018 7/22/2018 7/14/2018 7/9/2018    SBP (mmHg) 139 120 126 140 133    DBP (mmHg) 83 79 77 83 67    Pulse 67 91 89 65 69        Patient attributes recent low readings to change in BP readings. When asked "what are you doing to keep BP readings low" she states "the BP medication is really helping" Patient states thinking the evening time, slightly elevated readings are due to not taking her 2nd BP meds prior to taking evening BP readings.     Digital Medicine: Health  Follow Up    Lifestyle Modifications:    1.Dietary Modifications (Sodium intake <2,000mg/day, food labels, dining out): Patient reports trying to limit fried foods. Patient reports trying to eat salads often. She states not having a problem with salt. Patient reports eating grilled chicken at [employer]. Patient reports eating prepared meals on Sundays. Encouraged patient to continue limiting processed foods and increased salads.        2.Physical Activity: Patient reports walking in mall 2x week for 45 minutes with daughter. Encouraged patient to continue increasing physical activity.     3.Medication Therapy: Patient has been compliant with the medication regimen.     4.Patient has the following medication side effects/concerns:   (Frequency/Alleviating factors/Precipitating factors, etc.)     Follow up with  Idalmis NARA Morejon completed. No further questions or concerns. Will continue follow up to achieve health goals.    "

## 2018-08-02 ENCOUNTER — OFFICE VISIT (OUTPATIENT)
Dept: RHEUMATOLOGY | Facility: CLINIC | Age: 68
End: 2018-08-02
Payer: MEDICARE

## 2018-08-02 VITALS
BODY MASS INDEX: 25.23 KG/M2 | HEART RATE: 76 BPM | SYSTOLIC BLOOD PRESSURE: 121 MMHG | WEIGHT: 157 LBS | HEIGHT: 66 IN | DIASTOLIC BLOOD PRESSURE: 73 MMHG

## 2018-08-02 DIAGNOSIS — D89.89 AUTOIMMUNE DISORDER: ICD-10-CM

## 2018-08-02 DIAGNOSIS — M81.0 OSTEOPOROSIS WITHOUT CURRENT PATHOLOGICAL FRACTURE, UNSPECIFIED OSTEOPOROSIS TYPE: ICD-10-CM

## 2018-08-02 DIAGNOSIS — Z79.60 LONG-TERM USE OF IMMUNOSUPPRESSANT MEDICATION: ICD-10-CM

## 2018-08-02 DIAGNOSIS — R76.8 POSITIVE ANA (ANTINUCLEAR ANTIBODY): ICD-10-CM

## 2018-08-02 DIAGNOSIS — M65.312 TRIGGER THUMB, LEFT THUMB: ICD-10-CM

## 2018-08-02 DIAGNOSIS — H20.023 RECURRENT IRITIS, BILATERAL: Primary | ICD-10-CM

## 2018-08-02 PROCEDURE — 99214 OFFICE O/P EST MOD 30 MIN: CPT | Mod: 25,S$GLB,, | Performed by: INTERNAL MEDICINE

## 2018-08-02 PROCEDURE — 3074F SYST BP LT 130 MM HG: CPT | Mod: CPTII,S$GLB,, | Performed by: INTERNAL MEDICINE

## 2018-08-02 PROCEDURE — 99499 UNLISTED E&M SERVICE: CPT | Mod: S$GLB,,, | Performed by: INTERNAL MEDICINE

## 2018-08-02 PROCEDURE — 20600 DRAIN/INJ JOINT/BURSA W/O US: CPT | Mod: LT,S$GLB,, | Performed by: INTERNAL MEDICINE

## 2018-08-02 PROCEDURE — 3078F DIAST BP <80 MM HG: CPT | Mod: CPTII,S$GLB,, | Performed by: INTERNAL MEDICINE

## 2018-08-02 PROCEDURE — 99999 PR PBB SHADOW E&M-EST. PATIENT-LVL III: CPT | Mod: PBBFAC,,, | Performed by: INTERNAL MEDICINE

## 2018-08-02 RX ORDER — TRIAMCINOLONE ACETONIDE 40 MG/ML
12 INJECTION, SUSPENSION INTRA-ARTICULAR; INTRAMUSCULAR
Status: COMPLETED | OUTPATIENT
Start: 2018-08-02 | End: 2018-08-02

## 2018-08-02 RX ADMIN — TRIAMCINOLONE ACETONIDE 12 MG: 40 INJECTION, SUSPENSION INTRA-ARTICULAR; INTRAMUSCULAR at 04:08

## 2018-08-02 NOTE — PROGRESS NOTES
Subjective:       Patient ID: Idalmis Morejon is a 67 y.o. female with recurrent uveitis and +GEORGIANA on azathioprine & osteoporosis  Chief Complaint: No chief complaint on file.    This is a patient with multiple morbidities with recurrent scleritis and a +GEORGIANA. that had been followed by Dr. Gonzalo Vo in the past. She has not been dx with a CTD but does have esophageal dysmotility on fluoroscopy (2012).  She's had episodes of leukopenia since 2006. She has been followed by Dr. Valentine for uveitis.  Azathioprine was begun in an effort to take her off prednisolone eye drops. 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    Returns for follow-up. Has not been seen since 11//22/17.  She is doing very well with her eyes and joints except for triggering of her L thumb which is worse in AM and interferes with her using her hands. She has difficulty dressing.    She has been off steroid eye drops since 3/26/18 and has been taking azathioprine 175 mg/d for her uveitis without adverse effects. She is pleased that she was able to come off prednisolone eye drops as they where causing other problems.  For her dry eye syndrome she uses systane. Her glaucoma is better. She thinks azathioprine is a great drug as she has stopped steroid drops as well. Still has dry, red eyes and also has some dryness in her mouth, but it is not bothersome to her. Also has upper pharyngeal dysphagia (mild). Denies AM stiffness, Raynaud's, tight skin, alopecia, oral ulcers, pleurisy, pericarditis, photosensitivity, skin rashes, miscarriages (1 pregnancy FT), thromboses.     Had an initial rx w denosumab last July 2016 which she states she never got and has not received it since although its been ordered.              She reports no joint swelling. Associated symptoms include fatigue and trouble swallowing. Pertinent negatives include no dysuria, fever, myalgias or headaches.           Current Outpatient Prescriptions   Medication Sig Dispense Refill    amLODIPine (NORVASC) 10 MG tablet TAKE 1 TABLET BY MOUTH EVERY DAY 30 tablet 2    azathioprine 75 mg Tab Take 75 mg by mouth once daily. 90 tablet 3    cetirizine (ZYRTEC) 10 MG tablet Take 1 tablet (10 mg total) by mouth 2 (two) times daily. 60 tablet 0    chlorthalidone (HYGROTEN) 25 MG Tab Take 1/2 tablet (12.5 mg) by mouth once daily in the morning 45 tablet 1    COMBIGAN 0.2-0.5 % Drop PLACE 1 DROP INTO BOTH EYES 2 (TWO) TIMES DAILY. 15 mL 3    diltiaZEM (CARDIZEM CD) 120 MG Cp24 TAKE ONE CAPSULE BY MOUTH EVERY DAY 90 capsule 0    diltiaZEM (CARDIZEM CD) 180 MG 24 hr capsule TAKE ONE CAPSULE BY MOUTH EVERY DAY 90 capsule 0    DULoxetine (CYMBALTA) 60 MG capsule TAKE 2 CAPSULES (120 MG TOTAL) BY MOUTH ONCE DAILY. 60 capsule 6    DULoxetine (CYMBALTA) 60 MG capsule TAKE 2 CAPSULES (120 MG TOTAL) BY MOUTH ONCE DAILY. 60 capsule 3    pantoprazole (PROTONIX) 40 MG tablet TAKE 1 TABLET BY MOUTH BEFORE BREAKFAST. 90 tablet 3    POLYETHYLENE GLYCOL 3350 (MIRALAX ORAL)       promethazine (PHENERGAN) 12.5 MG Tab Take 1 tablet (12.5 mg total) by mouth every 6 (six) hours as needed (nausea). 20 tablet 0    tolterodine (DETROL LA) 4 MG 24 hr capsule TAKE ONE CAPSULE BY MOUTH EVERY DAY 30 capsule 9    valsartan (DIOVAN) 160 MG tablet Take 1 tablet (160 mg total) by mouth 2 (two) times daily. 180 tablet 3     No current facility-administered medications for this visit.          Allergies   Allergen Reactions    Alendronate Nausea Only     And heartburn    Kenalog [Triamcinolone Acetonide] Hives       Past Medical History:   Diagnosis Date    Abnormal chest CT     Acid reflux     Allergy     Anemia     Anemia     Anxiety     Cataract     Chronic UTI     recently treated with antibiotics -x 3 wks. ago-    Colon adenoma 2015 due 2020 10/21/2015    Colon adenoma 2015 due 2020 10/21/2015    Depression     Disturbed sleep  rhythm     DJD (degenerative joint disease)     Dry eyes     Dry mouth     Grief reaction 3/24/2014    Hx of migraine headaches     Hyperlipemia     Hypernatremia 8/8/2014    Hypertension     Iritis     Iritis     Mild vitamin D deficiency 9/9/2013    Multiple lung nodules on CT: 6455-1604 no f/u needed 6/6/2016    Neurogenic bladder     Osteopenia     in hips    Osteoporosis     spine    Osteoporosis: 9/15 see rheumatology notes 6/6/2016    Positive GEORGIANA (antinuclear antibody)     Schatzki's ring: 3/15 dilated 6/6/2016    Sciatica neuralgia 6/21/2013    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    Visual impairment        Past Surgical History:   Procedure Laterality Date    APPENDECTOMY      BACK SURGERY  2007    x4    CHOLECYSTECTOMY      lap orin    COLONOSCOPY N/A 10/2/2015    Procedure: COLONOSCOPY;  Surgeon: Bryan Love MD;  Location: [facility] ([address] [place] [address]);  Service: Endoscopy;  Laterality: N/A;    CYSTOSCOPY      with BOTOX INJECTION    HERNIA REPAIR      umbilical    HYSTERECTOMY      COMPLETE    POSTERIOR FUSION LUMBAR SPINE W/ CORPECTOMY      TONSILLECTOMY, ADENOIDECTOMY           Review of Systems   Constitutional: Positive for fatigue. Negative for diaphoresis, fever and unexpected weight change.   HENT: Positive for trouble swallowing. Negative for mouth sores, sore throat and tinnitus.         Dry mouth   Eyes: Positive for redness. Negative for visual disturbance.        Dry eyes   Respiratory: Negative.  Negative for cough, choking, chest tightness and shortness of breath.    Cardiovascular: Negative.  Negative for chest pain, palpitations and leg swelling.   Gastrointestinal: Positive for constipation. Negative for abdominal distention, abdominal pain, blood in stool, diarrhea, nausea and vomiting.        Heartburn   Endocrine: Negative.    Genitourinary: Negative.  Negative for dysuria, frequency, genital sores, hematuria and menstrual problem.  "  Musculoskeletal: Negative.  Negative for back pain, joint swelling, myalgias, neck pain and neck stiffness.   Skin: Negative.  Negative for rash.   Allergic/Immunologic: Negative.    Neurological: Negative.  Negative for dizziness, syncope, weakness, light-headedness, numbness and headaches.   Hematological: Negative.  Negative for adenopathy. Does not bruise/bleed easily.   Psychiatric/Behavioral: Positive for dysphoric mood and sleep disturbance. The patient is nervous/anxious.        Family History   Problem Relation Age of Onset    Kidney disease Mother     Hypertension Mother     Diabetes Father     Diabetes Brother     Kidney disease Brother     Heart disease Brother     Hyperlipidemia Sister     Hypertension Sister     Hypertension Daughter     Colon cancer Maternal Aunt 70        stomach    Blindness Maternal Aunt     Cancer Maternal Aunt         stomach cancer    Cancer Maternal Uncle         colon    Colon cancer Maternal Uncle 70        two uncles    Glaucoma Neg Hx     Amblyopia Neg Hx     Macular degeneration Neg Hx     Retinal detachment Neg Hx     Anesthesia problems Neg Hx     Celiac disease Neg Hx     Cirrhosis Neg Hx     Crohn's disease Neg Hx     Esophageal cancer Neg Hx     Irritable bowel syndrome Neg Hx     Liver cancer Neg Hx     Liver disease Neg Hx     Rectal cancer Neg Hx     Stomach cancer Neg Hx     Melanoma Neg Hx    Daughter taking BP meds.  Mom  age 89 with dementia/Alzheimers  Brother  late 50's diabetes & drugs.  Lots of colon cancer in the family.  No CTD in family.  Social History   Substance Use Topics    Smoking status: Never Smoker    Smokeless tobacco: Never Used    Alcohol use No           Objective:   /73   Pulse 76   Ht 5' 6" (1.676 m)   Wt 71.2 kg (157 lb)   BMI 25.34 kg/m²       Physical Exam   Vitals reviewed.  Constitutional: She is oriented to person, place, and time and well-developed, well-nourished, and in no " distress. No distress.   HENT:   Head: Normocephalic and atraumatic.   Mouth/Throat: Oropharynx is clear and moist. No oropharyngeal exudate.   No facial rashes  Parotids not enlarged  No oral ulcers  Adequate pooling of saliva.  Teeth ok;    Eyes: EOM are normal. Pupils are equal, round, and reactive to light. Right eye exhibits no discharge. Left eye exhibits no discharge. No scleral icterus.   Both eyes with increase pigmentation and some erythema   Neck: Neck supple. No JVD present. No tracheal deviation present. No thyromegaly present.   Cardiovascular: Normal rate, regular rhythm, normal heart sounds and intact distal pulses.  Exam reveals no gallop and no friction rub.    No murmur heard.  Pulmonary/Chest: Effort normal and breath sounds normal. No respiratory distress. She has no wheezes. She has no rales. She exhibits no tenderness.   Abdominal: Soft. Bowel sounds are normal. She exhibits no distension and no mass. There is no splenomegaly or hepatomegaly. There is no tenderness. There is no rebound and no guarding.   Lymphadenopathy:     She has no cervical adenopathy.        Right: No inguinal adenopathy present.        Left: No inguinal adenopathy present.   Neurological: She is alert and oriented to person, place, and time. She has normal reflexes. No cranial nerve deficit. Gait normal.   Proximal and distal muscle strength 5/5.   Skin: Skin is warm and dry. No rash noted. She is not diaphoretic.     Psychiatric: Mood, memory, affect and judgment normal.   Musculoskeletal: Normal range of motion. She exhibits no edema or tenderness.   Cspine FROM no tenderness  Tspine FROM no tenderness  Lspine FROM no tenderness.  TMJ: unremarkable  Shoulders: FROM; no synovitis;  Elbows: FROM; no synovitis; no tophi or nodules  Wrists: FROM; no synovitis;    MCPs: FROM; no synovitis; no metacarpalgia;  ok;  R thumb w nodule and triggering; painful to  Palpation.  PIPs:FROM; no synovitis; small Josie's  DIPs:  FROM; no synovitis; small Heberden's  HIPS: FROM  Knees: Elvis PF crepitus; FROM; no synovitis; no instability;  Ankles: FROM: no synovitis   Toes: ok; no metatarsalgia               LABS:   6/6/18: ESR 38; CRP 6.7; CBC Hg 11.8; Ht 34.8; CMP cnne 1.0; PTH 82.0;   11/22/17: RFTs ok;   7/10/17: Hg 11.5; Ht 34.3; CMP cnne 1.1;   11/14/16: CRP 3.3; Ht 36.2; CMP ok with cnne 1.0; ;   8/16/16: Hg 11.4; ferritin 35; CMP cnne 1.0  6/30/16: ESR 20; CRP 2.2; Hg 11.1; Ht 34.6;   6/6/16: Hg 11.3; Ht 34.9; cnne 1.3; TSH ok; Vit d 43  3/30/16: ESR 18; CRP 3.2; Hg 11.7; Ht 34.8; cnne 1.2  12/29/15: ESR 23; CRP 2.7; Hg 11.3; Ht 33.9; cnne 1.0  9/30/15: ESR 36; CRP 3.0; Hg 11.3; Ht 32.8; K+3.4; cnne 1.0; Vit D 40;   RF 42; CCP neg; GEORGIANA 1:320H; neg pro; LEXI ok; LAC neg; aCLA IgM 25.57 (12.49); beta 2 gly ok;   5/28/12: Hg 11.6; Ht 34.5; WC 4.26; Cnne 1.2; LFTs ok;  11/19/14: ESR 28;   6/20/12: GEORGIANA 1:320H; neg pro; neg c& p ANCA    3/29/16: DXA: personally reviewed: TLS -3.1; TFN -2.1; TTH -1.5;         Assessment:   UCTD/Sjogren's (by SICCA criteria)   Recurrent uveitis & scleritis helped by azathioprine   Esophageal dysmotility (but upper dysphagia by hx)    Periodic dilatations   +GEORGIANA 1:320 H; neg profile;    +RF 42; (CCP neg)   + IgM aCLA 25.57 (12.49);    Mildly elevated ESRs in past   Mild anemia   Mild intermittent leukopenia (anna 2.9)   On azathioprine 150 mg/d; off prednisolone eye drops since 3/18.      Sicca sxs attributed in part to meds   Dry eye syndrome by Ophthalmology    Osteoporosis with vitamin D insufficiency in past   6/27/13: DXA TLS -2.9; TFN -2.0; TTH -1.6   3/29/16: DXA:TLS -3.1; TFN -2.1; TTH -1.5;    Vit D as low as 15--treated   S/P alendronate intolerance (nausea & heartburn)   Began denosumab 7/7/16?     L trigger thumb      Mild renal insufficiency--intermittent--developed since 3/15   R/O PPI induced   Doubt CTD related.    GERD with Schatzki's Ring & esophagitis and gastritis   On  pantoprazole.    Hypertension    Multiple co-morbidities   Recurrent Major depressive disorder; Dyslipidemia; atherosclerosis      Plan:   PROCEDURE NOTE:  Local and systemic side effects and toxicities of steroids were discussed prior to procedure.  With patient's request and permission the left thumb area was sterilely prepped. Topical anesthetic was sprayed and 12 mg kenalog was injected along the tendon sheath. Patient tolerated the procedure well. Icing and contacting us was recommended it if it begins to hurt.   Continue azathioprine 175 mg/d.  Pre-auth put in again for denosumab.  Continue denosumab 60 mg q 6 m.  Labs q 3 months.  She will ask Dr. Valentine if we should begin tapering azathioprine.  RTC 6 months with labs.       Cc: Alfredito Valentine MD

## 2018-08-08 RX ORDER — AMLODIPINE BESYLATE 10 MG/1
5 TABLET ORAL 2 TIMES DAILY
Qty: 30 TABLET | Refills: 2
Start: 2018-08-08 | End: 2018-11-19

## 2018-08-08 NOTE — PROGRESS NOTES
Last 5 Patient Entered Readings                                      Current 30 Day Average: 129/78     Recent Readings 8/6/2018 7/31/2018 7/27/2018 7/22/2018 7/14/2018    SBP (mmHg) 117 139 120 126 140    DBP (mmHg) 79 83 79 77 83    Pulse 85 67 91 89 65        Patient's BP average is controlled based on 2017 ACC/AHA HTN guidelines of goal BP <130/80.      Patient denies s/s of hypotension (lightheadedness, dizziness, nausea, fatigue) associated with low readings. Instructed patient to inform me if this occurs, patient confirms understanding.      Patient denies s/s of hypertension (SOB, CP, severe headaches, changes in vision) associated with high readings. Instructed patient to go to the ED if BP > 180/110 and accompanied by hypertensive s/s, patient confirms understanding.     Patient's health , Gibran Painting, will be following up every 3-4 weeks. I will continue to monitor regularly and will follow up in 2-3 months, sooner if BP begins to trend upward or downward.    Patient has my contact information and knows to call with any concerns or clinical changes.     Current HTN regimen:  Hypertension Medications             amLODIPine (NORVASC) 10 MG tablet TAKE 1/2 TABLET BY MOUTH twice daily    chlorthalidone (HYGROTEN) 25 MG Tab Take 1/2 tablet (12.5 mg) by mouth once daily in the morning    valsartan (DIOVAN) 160 MG tablet Take 1 tablet (160 mg total) by mouth 2 (two) times daily.        Patient reports IRIS improved and she is tolerating amlodipine.

## 2018-08-14 ENCOUNTER — PATIENT OUTREACH (OUTPATIENT)
Dept: OTHER | Facility: OTHER | Age: 68
End: 2018-08-14

## 2018-08-14 NOTE — PROGRESS NOTES
Last 5 Patient Entered Readings                                      Current 30 Day Average: 128/80     Recent Readings 8/14/2018 8/14/2018 8/6/2018 7/31/2018 7/27/2018    SBP (mmHg) 136 150 117 139 120    DBP (mmHg) 83 93 79 83 79    Pulse 73 79 85 67 91        Digital Medicine: Health  Follow Up    Left voicemail to follow up with Erasmo Idalmis BAINS Jean-Pierre.  Current BP average 128/80 mmHg is at goal, [130/80].

## 2018-08-21 NOTE — PROGRESS NOTES
Last 5 Patient Entered Readings                                      Current 30 Day Average: 130/81     Recent Readings 8/20/2018 8/14/2018 8/14/2018 8/6/2018 7/31/2018    SBP (mmHg) 142 136 150 117 139    DBP (mmHg) 84 83 93 79 83    Pulse 81 73 79 85 67            Digital Medicine: Health  Follow Up    Left voicemail to follow up with Erasmo Idalmis BAINS Jean-Pierre.  Current BP average 130/81 mmHg is not at goal, [130/80].

## 2018-08-28 ENCOUNTER — TELEPHONE (OUTPATIENT)
Dept: INFECTIOUS DISEASES | Facility: HOSPITAL | Age: 68
End: 2018-08-28

## 2018-08-28 NOTE — TELEPHONE ENCOUNTER
Patient notified regarding auth process and informed that I will be contacting her as soon as I obtain approval.  I also gave her my direct extension.

## 2018-08-31 ENCOUNTER — TELEPHONE (OUTPATIENT)
Dept: INFECTIOUS DISEASES | Facility: HOSPITAL | Age: 68
End: 2018-08-31

## 2018-09-04 NOTE — PROGRESS NOTES
Last 5 Patient Entered Readings                                      Current 30 Day Average: 139/81     Recent Readings 9/4/2018 8/27/2018 8/20/2018 8/14/2018 8/14/2018    SBP (mmHg) 146 154 142 136 150    DBP (mmHg) 80 81 84 83 93    Pulse 74 77 81 73 79          Digital Medicine: Health  Follow Up    Left voicemail to follow up with Ms. Idalmis Morejon. No Contact [employer] message sent.   Current BP average 139/81 mmHg is not at goal, [130/80].

## 2018-09-05 NOTE — PROGRESS NOTES
Last 5 Patient Entered Readings                                      Current 30 Day Average: 139/81     Recent Readings 9/4/2018 8/27/2018 8/20/2018 8/14/2018 8/14/2018    SBP (mmHg) 146 154 142 136 150    DBP (mmHg) 80 81 84 83 93    Pulse 74 77 81 73 79        Digital Medicine: Health  Follow Up    Lifestyle Modifications:    1.Dietary Modifications (Sodium intake <2,000mg/day, food labels, dining out): Patient reports cheating maybe 1 every 2 weeks. She states she may consume fried seafood for her cheat meal. She reports making most meals at home and is reading food labels for sodium. Patient states wanting to continue focusing on her current dietary approach. Encouraged patient to continue limiting prepared meals and reading labels.       2.Physical Activity: Patient states increasing her walking. She states increasing her walking and expresses an interest to increase walking to 1 mile. Will further assess exercise changes in 2 weeks.     3.Medication Therapy: Patient has been compliant with the medication regimen.    4.Patient has the following medication side effects/concerns:   (Frequency/Alleviating factors/Precipitating factors, etc.)     Follow up with Ms. Idalmis Morejon completed. No further questions or concerns. Will continue follow up to achieve health goals.

## 2018-09-06 ENCOUNTER — INFUSION (OUTPATIENT)
Dept: INFECTIOUS DISEASES | Facility: HOSPITAL | Age: 68
End: 2018-09-06
Attending: INTERNAL MEDICINE
Payer: MEDICARE

## 2018-09-06 VITALS — BODY MASS INDEX: 25.23 KG/M2 | HEIGHT: 66 IN | WEIGHT: 157 LBS

## 2018-09-06 DIAGNOSIS — M81.0 AGE-RELATED OSTEOPOROSIS WITHOUT CURRENT PATHOLOGICAL FRACTURE: Primary | ICD-10-CM

## 2018-09-06 PROCEDURE — 96372 THER/PROPH/DIAG INJ SC/IM: CPT

## 2018-09-06 PROCEDURE — 63600175 PHARM REV CODE 636 W HCPCS: Mod: JG | Performed by: INTERNAL MEDICINE

## 2018-09-06 RX ADMIN — DENOSUMAB 60 MG: 60 INJECTION SUBCUTANEOUS at 08:09

## 2018-09-19 ENCOUNTER — PATIENT OUTREACH (OUTPATIENT)
Dept: OTHER | Facility: OTHER | Age: 68
End: 2018-09-19

## 2018-09-19 NOTE — PROGRESS NOTES
Last 5 Patient Entered Readings                                      Current 30 Day Average: 140/80     Recent Readings 9/16/2018 9/5/2018 9/4/2018 8/27/2018 8/20/2018    SBP (mmHg) 130 127 146 154 142    DBP (mmHg) 80 75 80 81 84    Pulse 91 81 74 77 81          Digital Medicine: Health  Follow Up    Left voicemail to follow up with Ms. Idalmis Morejon.  Current BP average 140/80 mmHg is not at goal, [130/80].

## 2018-09-21 ENCOUNTER — PATIENT MESSAGE (OUTPATIENT)
Dept: OTHER | Facility: OTHER | Age: 68
End: 2018-09-21

## 2018-10-02 NOTE — PROGRESS NOTES
Last 5 Patient Entered Readings                                      Current 30 Day Average: 134/79     Recent Readings 9/24/2018 9/16/2018 9/5/2018 9/4/2018 8/27/2018    SBP (mmHg) 134 130 127 146 154    DBP (mmHg) 80 80 75 80 81    Pulse 81 91 81 74 77            Digital Medicine: Health  Follow Up    Left voicemail to follow up with Ms. Idalmis Morejon.  Current BP average 134/79 mmHg is not at goal, [130/80].

## 2018-10-03 ENCOUNTER — OFFICE VISIT (OUTPATIENT)
Dept: PSYCHIATRY | Facility: CLINIC | Age: 68
End: 2018-10-03
Payer: MEDICARE

## 2018-10-03 VITALS
HEIGHT: 66 IN | HEART RATE: 87 BPM | BODY MASS INDEX: 24.8 KG/M2 | WEIGHT: 154.31 LBS | SYSTOLIC BLOOD PRESSURE: 146 MMHG | DIASTOLIC BLOOD PRESSURE: 76 MMHG

## 2018-10-03 DIAGNOSIS — F33.41 MAJOR DEPRESSIVE DISORDER, RECURRENT, IN PARTIAL REMISSION: Primary | ICD-10-CM

## 2018-10-03 PROCEDURE — 99214 OFFICE O/P EST MOD 30 MIN: CPT | Mod: S$PBB,,, | Performed by: PSYCHIATRY & NEUROLOGY

## 2018-10-03 PROCEDURE — 99212 OFFICE O/P EST SF 10 MIN: CPT | Mod: PBBFAC | Performed by: PSYCHIATRY & NEUROLOGY

## 2018-10-03 PROCEDURE — 99999 PR PBB SHADOW E&M-EST. PATIENT-LVL II: CPT | Mod: PBBFAC,,, | Performed by: PSYCHIATRY & NEUROLOGY

## 2018-10-03 PROCEDURE — 3077F SYST BP >= 140 MM HG: CPT | Mod: CPTII,,, | Performed by: PSYCHIATRY & NEUROLOGY

## 2018-10-03 PROCEDURE — 1101F PT FALLS ASSESS-DOCD LE1/YR: CPT | Mod: CPTII,,, | Performed by: PSYCHIATRY & NEUROLOGY

## 2018-10-03 PROCEDURE — 3078F DIAST BP <80 MM HG: CPT | Mod: CPTII,,, | Performed by: PSYCHIATRY & NEUROLOGY

## 2018-10-03 RX ORDER — DULOXETIN HYDROCHLORIDE 30 MG/1
90 CAPSULE, DELAYED RELEASE ORAL DAILY
Qty: 90 CAPSULE | Refills: 3 | Status: SHIPPED | OUTPATIENT
Start: 2018-10-03 | End: 2019-08-21 | Stop reason: DRUGHIGH

## 2018-10-03 NOTE — PROGRESS NOTES
Outpatient Psychiatry Follow-Up Visit (MD/NP)    10/3/2018    Clinical Status of Patient:  Outpatient (Ambulatory)    Chief Complaint:  Idalmis Morejon is a 68 y.o. female who presents today for follow-up of depression and anxiety.      Met with patient.      Interval History and Content of Current Session:  Interim Events/Subjective Report/Content of Current Session:   Pt reports she is doing well.  Still works at the center a few days a week.  Daughter is working on her house.  She still takes zzquil most nights if she has trouble sleeping.  On days she de jesus snot take it she has make herself tired.  She is eating okay.  She is not skipping meals but sometimes has phases of wanting something cold, sometimes smoothies.  Still enjoys doing puzzles, and watching  shows and football, and going out to eat with her daughter.            Psychotherapy:  · Target symptoms: depression, anxiety   · Why chosen therapy is appropriate versus another modality: relevant to diagnosis  · Outcome monitoring methods: self-report, observation  · Therapeutic intervention type: supportive psychotherapy  · Topics discussed/themes: building skills sets for symptom management, symptom recognition  · The patient's response to the intervention is motivated. The patient's progress toward treatment goals is good.   · Duration of intervention: 6 mins    Review of Systems   · PSYCHIATRIC: Pertinant items are noted in the narrative.  · CONSTITUTIONAL: No weight gain or loss.   · RESPIRATORY: No shortness of breath.  · CARDIOVASCULAR: No tachycardia or chest pain.  · GASTROINTESTINAL: Positive for constipation.    Past Medical, Family and Social History: The patient's past medical, family and social history have been reviewed and updated as appropriate within the electronic medical record - see encounter notes.    Compliance: yes    Side effects: none    Risk Parameters:  Patient reports no suicidal ideation  Patient reports no homicidal  "ideation  Patient reports no self-injurious behavior  Patient reports no violent behavior    Exam (detailed: at least 9 elements; comprehensive: all 15 elements)   Constitutional  Vitals:  Most recent vital signs, dated less than 90 days prior to this appointment, were reviewed.    Vitals:    10/03/18 0758   BP: (!) 146/76   Pulse: 87   Weight: 70 kg (154 lb 5.2 oz)   Height: 5' 6" (1.676 m)        General:  unremarkable, age appropriate, neatly groomed, well nourished     Musculoskeletal  Muscle Strength/Tone:  no dyskinesia, no tremor   Gait & Station:  non-ataxic     Psychiatric  Speech:  normal tone, normal rate, normal pitch, normal volume, prosody intact   Mood:    Affect:  euthymic  appropriate, full   Thought Process:  normal and logical   Associations:  intact   Thought Content:  normal, no suicidality, no homicidality, delusions, or paranoia   Insight  Judgement:  good, patient has awareness of illness  Patient's behavior is adequate to circumstances   Orientation:  grossly intact   Memory: intact for content of interview   Language: grossly intact   Attention Span & Concentration:  able to focus   Fund of Knowledge:  intact and appropriate to age and level of education     Assessment and Diagnosis   Status/Progress: Based on the examination today, the patient's problem(s) is/are improved.  New problems have not been presented today.   Comorbidities are complicating management of the primary condition.  There are no active rule-out diagnoses for this patient at this time.    General Impression: Pt with depression and anxiety as well as multiple other medical comorbidities.  She is still affected by the death of her mother in March 2014.      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      Intervention/Counseling/Treatment Plan   · Reduce cymbalta to 90 mg daily.  Pt may further reduce to 60 mg daily after one month if she desires.     · Discussed a trial of melatonin.  "       Return to Clinic: 3 months

## 2018-10-06 ENCOUNTER — HOSPITAL ENCOUNTER (OUTPATIENT)
Dept: RADIOLOGY | Facility: HOSPITAL | Age: 68
Discharge: HOME | End: 2018-10-06
Attending: INTERNAL MEDICINE
Payer: MEDICARE

## 2018-10-06 DIAGNOSIS — Z12.31 SCREENING MAMMOGRAM, ENCOUNTER FOR: ICD-10-CM

## 2018-10-06 PROCEDURE — 77063 BREAST TOMOSYNTHESIS BI: CPT | Mod: 26,,, | Performed by: RADIOLOGY

## 2018-10-06 PROCEDURE — 77067 SCR MAMMO BI INCL CAD: CPT | Mod: 26,,, | Performed by: RADIOLOGY

## 2018-10-06 PROCEDURE — 77067 SCR MAMMO BI INCL CAD: CPT | Mod: TC

## 2018-10-06 PROCEDURE — 77063 BREAST TOMOSYNTHESIS BI: CPT | Mod: TC

## 2018-10-16 ENCOUNTER — PATIENT OUTREACH (OUTPATIENT)
Dept: OTHER | Facility: OTHER | Age: 68
End: 2018-10-16

## 2018-10-17 ENCOUNTER — PATIENT MESSAGE (OUTPATIENT)
Dept: INTERNAL MEDICINE | Facility: CLINIC | Age: 68
End: 2018-10-17

## 2018-10-17 DIAGNOSIS — M25.569 KNEE PAIN, UNSPECIFIED CHRONICITY, UNSPECIFIED LATERALITY: Primary | ICD-10-CM

## 2018-10-17 NOTE — TELEPHONE ENCOUNTER
Pt is requesting an open MRI for R knee pain, x1 month after staying in one position over 10 minutes. Will assist in scheduling if order is approved

## 2018-10-17 NOTE — TELEPHONE ENCOUNTER
I would recommend a plain x-ray and an orthopedic assessment as a 1st step.  I have ordered both.  Please assist with scheduling.  Thank you

## 2018-10-18 NOTE — PROGRESS NOTES
"Last 5 Patient Entered Readings                                      Current 30 Day Average: 141/82     Recent Readings 10/15/2018 10/15/2018 10/7/2018 10/7/2018 9/24/2018    SBP (mmHg) 143 149 145 158 134    DBP (mmHg) 82 88 83 88 80    Pulse 75 76 77 77 81        Patient also discussed hypertension being genetic.     Digital Medicine: Health  Follow Up    Lifestyle Modifications:    1.Dietary Modifications (Sodium intake <2,000mg/day, food labels, dining out): Patient reports making most meals at home. Patient reports buying "no sodium" groceries. Patient reports consuming baked or broiled chicken. She states not eating the same thing every day-it varies. She states making red beans at home. She states consuming spaghetti and meat sauce last week. Patient reports not knowing amount of sodium in her food per serving size. She reports increasing her smoothie consumption. Patient reports consuming She states asking for extra turbinado (sugar). Encouraged patient to keep sugar in moderation. Encouraged patient to continue purchasing no sodium options and making most meals at home.     2.Physical Activity: Patient reports pain in knee. She states having more pain than usual. She states walking but not as much as she would like. She expresses an interest to increase walking after tomorrow mornings appointment with MD. Will further assess patient's progress and ability to increase exercise upon next encounter.      3.Medication Therapy: Patient has been compliant with the medication regimen.    4.Patient has the following medication side effects/concerns:   (Frequency/Alleviating factors/Precipitating factors, etc.)     Follow up with Ms. Idalmis Morejon completed. No further questions or concerns. Will continue to follow up to achieve health goals.    "

## 2018-10-19 ENCOUNTER — HOSPITAL ENCOUNTER (OUTPATIENT)
Dept: RADIOLOGY | Facility: HOSPITAL | Age: 68
Discharge: HOME | End: 2018-10-19
Attending: INTERNAL MEDICINE
Payer: MEDICARE

## 2018-10-19 DIAGNOSIS — M25.569 KNEE PAIN, UNSPECIFIED CHRONICITY, UNSPECIFIED LATERALITY: ICD-10-CM

## 2018-10-19 PROCEDURE — 73562 X-RAY EXAM OF KNEE 3: CPT | Mod: TC,50

## 2018-10-19 PROCEDURE — 73562 X-RAY EXAM OF KNEE 3: CPT | Mod: 26,50,, | Performed by: RADIOLOGY

## 2018-10-22 ENCOUNTER — PATIENT MESSAGE (OUTPATIENT)
Dept: INTERNAL MEDICINE | Facility: CLINIC | Age: 68
End: 2018-10-22

## 2018-10-23 NOTE — PROGRESS NOTES
Last 5 Patient Entered Readings                                      Current 30 Day Average: 141/82     Recent Readings 10/15/2018 10/15/2018 10/7/2018 10/7/2018 9/24/2018    SBP (mmHg) 143 149 145 158 134    DBP (mmHg) 82 88 83 88 80    Pulse 75 76 77 77 81        Patient's BP average is above goal of <130/80.     Patient denies s/s of hypotension (lightheadedness, dizziness, nausea, fatigue) associated with low readings. Instructed patient to inform me if this occurs, patient confirms understanding.      Patient denies s/s of hypertension (SOB, CP, severe headaches, changes in vision) associated with high readings. Instructed patient to go to the ED if BP > 180/110 and accompanied by hypertensive s/s, patient confirms understanding.    Patient has xray for knee last week. She has an orthopedic appointment 10/26 to discuss treatment options. Patient hopes for improved pain and increased exercise with treatment.    Will continue to monitor regularly. Will follow up in 1-2 weeks, sooner if BP begins to trend upward or downward.    Patient has my contact information and knows to call with any concerns or clinical changes.     Current HTN regimen:  Hypertension Medications             amLODIPine (NORVASC) 10 MG tablet Take 0.5 tablets (5 mg total) by mouth 2 (two) times daily.    chlorthalidone (HYGROTEN) 25 MG Tab Take 1/2 tablet (12.5 mg) by mouth once daily in the morning    valsartan (DIOVAN) 160 MG tablet Take 1 tablet (160 mg total) by mouth 2 (two) times daily.

## 2018-10-23 NOTE — PROGRESS NOTES
Last 5 Patient Entered Readings                                      Current 30 Day Average: 141/82     Recent Readings 10/15/2018 10/15/2018 10/7/2018 10/7/2018 9/24/2018    SBP (mmHg) 143 149 145 158 134    DBP (mmHg) 82 88 83 88 80    Pulse 75 76 77 77 81          Left voicemail and sent [facility] message. Suspect knee pain and decreased walking are contributing to BP elevations.

## 2018-10-26 ENCOUNTER — OFFICE VISIT (OUTPATIENT)
Dept: ORTHOPEDICS | Facility: CLINIC | Age: 68
End: 2018-10-26
Payer: MEDICARE

## 2018-10-26 VITALS — DIASTOLIC BLOOD PRESSURE: 79 MMHG | HEART RATE: 69 BPM | SYSTOLIC BLOOD PRESSURE: 142 MMHG

## 2018-10-26 DIAGNOSIS — M25.561 RIGHT KNEE PAIN, UNSPECIFIED CHRONICITY: ICD-10-CM

## 2018-10-26 DIAGNOSIS — M22.41 CHONDROMALACIA OF PATELLOFEMORAL JOINT, RIGHT: ICD-10-CM

## 2018-10-26 DIAGNOSIS — M22.42 CHONDROMALACIA OF PATELLOFEMORAL JOINT, LEFT: ICD-10-CM

## 2018-10-26 DIAGNOSIS — M17.0 PRIMARY OSTEOARTHRITIS OF BOTH KNEES: Primary | ICD-10-CM

## 2018-10-26 PROCEDURE — 3077F SYST BP >= 140 MM HG: CPT | Mod: CPTII,,, | Performed by: PHYSICIAN ASSISTANT

## 2018-10-26 PROCEDURE — 99203 OFFICE O/P NEW LOW 30 MIN: CPT | Mod: S$PBB,,, | Performed by: PHYSICIAN ASSISTANT

## 2018-10-26 PROCEDURE — 3078F DIAST BP <80 MM HG: CPT | Mod: CPTII,,, | Performed by: PHYSICIAN ASSISTANT

## 2018-10-26 PROCEDURE — 99212 OFFICE O/P EST SF 10 MIN: CPT | Mod: PBBFAC | Performed by: PHYSICIAN ASSISTANT

## 2018-10-26 PROCEDURE — 99999 PR PBB SHADOW E&M-EST. PATIENT-LVL II: CPT | Mod: PBBFAC,,, | Performed by: PHYSICIAN ASSISTANT

## 2018-10-26 PROCEDURE — 1101F PT FALLS ASSESS-DOCD LE1/YR: CPT | Mod: CPTII,,, | Performed by: PHYSICIAN ASSISTANT

## 2018-10-26 NOTE — PROGRESS NOTES
SUBJECTIVE:     Chief Complaint & History of Present Illness:  Idalmis Morejon is a 68 y.o. year old female, new patient consulted to Orthopedics by Dr. Pugh, here with a history of intermittent right knee pain which started 2-3 months ago.  There is not a history of trauma.  The pain is located in the medial, anterior, patellar aspect of the knee.  The pain is described as achy, 6/10 on bad days, 0/10 on good days.  She currently does not have any pain in her knee in clinic.  There is not radiation.  There is not catching or locking.  Aggravating factors include going up and down stairs and rising after sitting.  Associated symptoms include crepitus sensation.  There is not numbness or tingling of the lower extremity.  There is not back pain. Previous treatments include acetaminophen which have provided minimal relief.  There is not a history of previous injury or surgery to the knee.  The patient does not use an assistive device.    Review of patient's allergies indicates:   Allergen Reactions    Alendronate Nausea Only     And heartburn    Kenalog [triamcinolone acetonide] Hives         Current Outpatient Medications   Medication Sig Dispense Refill    amLODIPine (NORVASC) 10 MG tablet Take 0.5 tablets (5 mg total) by mouth 2 (two) times daily. 30 tablet 2    cetirizine (ZYRTEC) 10 MG tablet Take 1 tablet (10 mg total) by mouth 2 (two) times daily. 60 tablet 0    chlorthalidone (HYGROTEN) 25 MG Tab Take 1/2 tablet (12.5 mg) by mouth once daily in the morning 45 tablet 1    COMBIGAN 0.2-0.5 % Drop PLACE 1 DROP INTO BOTH EYES 2 (TWO) TIMES DAILY. 15 mL 3    DULoxetine (CYMBALTA) 30 MG capsule Take 3 capsules (90 mg total) by mouth once daily. 90 capsule 3    pantoprazole (PROTONIX) 40 MG tablet TAKE 1 TABLET BY MOUTH BEFORE BREAKFAST. 90 tablet 3    POLYETHYLENE GLYCOL 3350 (MIRALAX ORAL)       promethazine (PHENERGAN) 12.5 MG Tab Take 1 tablet (12.5 mg total) by mouth every 6 (six) hours as needed  (nausea). 20 tablet 0    tolterodine (DETROL LA) 4 MG 24 hr capsule TAKE ONE CAPSULE BY MOUTH EVERY DAY 30 capsule 9    valsartan (DIOVAN) 160 MG tablet Take 1 tablet (160 mg total) by mouth 2 (two) times daily. 180 tablet 3     No current facility-administered medications for this visit.        Past Medical History:   Diagnosis Date    Abnormal chest CT     Acid reflux     Allergy     Anemia     Anemia     Anxiety     Cataract     Chronic UTI     recently treated with antibiotics -x 3 wks. ago-    Colon adenoma 2015 due 2020 10/21/2015    Colon adenoma 2015 due 2020 10/21/2015    Depression     Disturbed sleep rhythm     DJD (degenerative joint disease)     Dry eyes     Dry mouth     Grief reaction 3/24/2014    Hx of migraine headaches     Hyperlipemia     Hypernatremia 8/8/2014    Hypertension     Iritis     Iritis     Mild vitamin D deficiency 9/9/2013    Multiple lung nodules on CT: 7270-2177 no f/u needed 6/6/2016    Neurogenic bladder     Osteopenia     in hips    Osteoporosis     spine    Osteoporosis: 9/15 see rheumatology notes 6/6/2016    Positive GEORGIANA (antinuclear antibody)     Schatzki's ring: 3/15 dilated 6/6/2016    Sciatica neuralgia 6/21/2013    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    Visual impairment        Past Surgical History:   Procedure Laterality Date    APPENDECTOMY      BACK SURGERY  2007    x4    CHOLECYSTECTOMY      lap orin    COLONOSCOPY N/A 10/2/2015    Procedure: COLONOSCOPY;  Surgeon: Bryan Love MD;  Location: [facility] (4TH [facility]);  Service: Endoscopy;  Laterality: N/A;    COLONOSCOPY N/A 10/2/2015    Performed by Bryan Lvoe MD at [facility] (4TH [facility])    CYSTOSCOPY      with BOTOX INJECTION    CYSTOSCOPY N/A 3/5/2014    Performed by Cristine Church MD at [facility]    CYSTOSCOPY N/A 2/19/2013    Performed by Cristine Church MD at [facility]    ESOPHAGOGASTRODUODENOSCOPY (EGD) N/A 5/11/2018    Performed  by Bryan [facility] MD at [facility] (4TH FLR)    ESOPHAGOGASTRODUODENOSCOPY (EGD) N/A 10/10/2016    Performed by Arthur Partida MD at [facility] END[facility] (2ND FLR)    ESOPHAGOGASTRODUODENOSCOPY (EGD) N/A 9/2/2016    Performed by Charlene Villafana MD at [facility] (2ND FLR)    ESOPHAGOGASTRODUODENOSCOPY (EGD) N/A 3/4/2015    Performed by Bryan Love MD at [facility] (4TH FLR)    HERNIA REPAIR      umbilical    HYSTERECTOMY      COMPLETE    INJECTION, BOTULINUM TOXIN, TYPE A N/A 3/5/2014    Performed by Cristine Church MD at [facility] OR [facility]ST [facility]LR    INJECTION, BOTULINUM TOXIN, TYPE A N/A 2/19/2013    Performed by Cristine Church MD at [facility] OR 1ST [facility]LR    POSTERIOR FUSION LUMBAR SPINE W/ CORPECTOMY      REPAIR-HERNIA-INCISIONAL-INITIAL N/A 11/6/2012    Performed by Darryl Valles Jr., MD at [facility] OR 2ND FLR    TONSILLECTOMY, ADENOIDECTOMY      ULTRASOUND-ENDOSCOPIC-UPPER N/A 10/10/2016    Performed by Arthur Partida MD at [facility] (2ND FLR)    ULTRASOUND-ENDOSCOPIC-UPPER N/A 9/2/2016    Performed by Charlene Villafana MD at [facility] (2ND FLR)       Vital Signs (Most Recent)  Vitals:    10/26/18 0959   BP: (!) 142/79   Pulse: 69           Review of Systems:  ROS:  Constitutional: no fever or chills  Eyes: positive for steroid induced glaucoma  ENT: no nasal congestion or sore throat  Respiratory: positive for multiple lung nodules found on CT ([id number])  Cardiovascular: positive for atherosclerosis of abdominal aorta, essential hypertension  Gastrointestinal: positive for GERD with esophagitis  Genitourinary: no hematuria or dysuria  Integument/Breast: no rash or pruritis  Hematologic/Lymphatic: no easy bruising or lymphadenopathy  Musculoskeletal: positive for right knee pain  Neurological: no seizures or tremors  Behavioral/Psych: positive for depression  Endocrine: no heat or cold intolerance                OBJECTIVE:     PHYSICAL EXAM:     , General Appearance: Well nourished, well developed, in no  acute distress.  Neurological: Mood & affect are normal.  right  Knee Exam:  Knee Range of Motion:0-120 degrees flexion   Effusion:none  Condition of skin:intact  Location of tenderness:Medial joint line, Patella and Patellar tendon   Strength:5 of 5  Stability:  Lachman: stable, LCL: stable, MCL: stable, PCL: stable and posteromedial (dial): stable  Varus /Valgus stress:  normal  Maico:   negative/negative    left  Knee Exam:  Knee Range of Motion:0-120 degrees flexion   Effusion:none  Condition of skin:intact  Location of tenderness:None   Strength:5 of 5  Stability:  Lachman: stable, LCL: stable, MCL: stable, PCL: stable and posteromedial (dial): stable  Varus /Valgus stress:  normal  Maico:   negative/negative      Hip Examination:  normal    RADIOGRAPHS:  xrays of bilateral knees taken on 10/19/18 reviewed by me in clinic today, demonstrate mild to moderate degenerative changes of bilateral knee joints, also noted degenerative changes to patellofemoral joints bilaterally.  No evidence of fracture/dislocation.  ASSESSMENT/PLAN:     Plan: We discussed with the patient at length all the different treatment options available for the knee including anti-inflammatories, acetaminophen, rest, ice, knee strengthening exercise, occasional cortisone injections for temporary relief, Viscosupplimentation injections, arthroscopic debridement osteotomy, and finally knee arthroplasty.   I have advised against NSAID treatment due to gastric issues and decreased kidney function.  I have also advised against cortisone injections due to patient's history of steroid induced glaucoma.  I will prescribe compound cream for patient to use as needed for knee pain.  She is interested in Euflexxa injections, however would like to hold off at this time.  She was not interested in attending physical therapy sessions.  She is to follow up PRN.      I have discussed this patient her conditions and treatment plan with Sumit Shetty PA-C.  I  have reviewed notes and physical examination as well as the treatment plan and agree with all aspects of this plan.

## 2018-10-26 NOTE — LETTER
October 26, 2018      Noemy Pugh MD  [address]  [facility] [address]           [facility] - Orthopedics  [address], [address]  [facility] [address]  Phone: [phone]          Patient: Idalmis Morejon   MR Number: [id number]   YOB: 1950   Date of Visit: 10/26/2018       Dear Dr. Noemy Pugh:    Thank you for referring Idalmis Morejon to me for evaluation. Attached you will find relevant portions of my assessment and plan of care.    If you have questions, please do not hesitate to call me. I look forward to following Idalmis Morejon along with you.    Sincerely,    MARIA A Lewisosure  CC:  No Recipients    If you would like to receive this communication electronically, please contact [email or web address] or [phone] to request more information on [employer] access.    For providers and/or their staff who would like to refer a patient to [facility], please contact us through our one-stop-shop provider referral line, [facility], at [phone].    If you feel you have received this communication in error or would no longer like to receive these types of communications, please e-mail [email or web address]

## 2018-10-30 ENCOUNTER — PATIENT OUTREACH (OUTPATIENT)
Dept: OTHER | Facility: OTHER | Age: 68
End: 2018-10-30

## 2018-10-30 NOTE — PROGRESS NOTES
Last 5 Patient Entered Readings                                      Current 30 Day Average: 138/82     Recent Readings 10/30/2018 10/27/2018 10/15/2018 10/15/2018 10/7/2018    SBP (mmHg) 123 142 143 149 145    DBP (mmHg) 72 90 82 88 83    Pulse 71 86 75 76 77          Patient's BP average is above goal of <130/80.     Patient denies s/s of hypotension (lightheadedness, dizziness, nausea, fatigue) associated with low readings. Instructed patient to inform me if this occurs, patient confirms understanding.      Patient denies s/s of hypertension (SOB, CP, severe headaches, changes in vision) associated with high readings. Instructed patient to go to the ED if BP > 180/110 and accompanied by hypertensive s/s, patient confirms understanding.    Patient saw orthopedics last week and prescribed compound cream (she has not yet received). She is considering Euflexxa injections. States pain is not constant now, notices pain occurs when she has sat and then gets up. BP readings improving. Encouraged consistent proper measurement technique and charging cuff regularly.    Will continue to monitor regularly. Will follow up in 2-3 weeks, sooner if BP begins to trend upward or downward.    Patient has my contact information and knows to call with any concerns or clinical changes.     Current HTN regimen:  Hypertension Medications             amLODIPine (NORVASC) 10 MG tablet Take 0.5 tablets (5 mg total) by mouth 2 (two) times daily.    chlorthalidone (HYGROTEN) 25 MG Tab Take 1/2 tablet (12.5 mg) by mouth once daily in the morning    valsartan (DIOVAN) 160 MG tablet Take 1 tablet (160 mg total) by mouth 2 (two) times daily.

## 2018-11-01 ENCOUNTER — PATIENT OUTREACH (OUTPATIENT)
Dept: OTHER | Facility: OTHER | Age: 68
End: 2018-11-01

## 2018-11-01 NOTE — PROGRESS NOTES
Last 5 Patient Entered Readings                                      Current 30 Day Average: 138/84     Recent Readings 10/30/2018 10/27/2018 10/15/2018 10/15/2018 10/7/2018    SBP (mmHg) 123 142 143 149 145    DBP (mmHg) 72 90 82 88 83    Pulse 71 86 75 76 77            Digital Medicine: Health  Follow Up    Left voicemail to follow up with Ms. Idalmis Morejon.  Current BP average 138/84 mmHg is not at goal, [130/80].

## 2018-11-08 ENCOUNTER — PATIENT OUTREACH (OUTPATIENT)
Dept: OTHER | Facility: OTHER | Age: 68
End: 2018-11-08

## 2018-11-08 NOTE — PROGRESS NOTES
Last 5 Patient Entered Readings                                      Current 30 Day Average: 136/83     Recent Readings 10/30/2018 10/27/2018 10/15/2018 10/15/2018 10/7/2018    SBP (mmHg) 123 142 143 149 145    DBP (mmHg) 72 90 82 88 83    Pulse 71 86 75 76 77            Digital Medicine: Health  Follow Up    Left voicemail to follow up with Ms. Idalmis Morejon.  Current BP average 136/83 mmHg is not at goal, 130/80 mmHg. FU on pain and health and wellness goals.     11/8 Patient returned call. However there was a bad phone connection and the call was disconnected twice. I will send [facility] message.

## 2018-11-13 ENCOUNTER — PATIENT OUTREACH (OUTPATIENT)
Dept: OTHER | Facility: OTHER | Age: 68
End: 2018-11-13

## 2018-11-13 DIAGNOSIS — I10 ESSENTIAL HYPERTENSION: ICD-10-CM

## 2018-11-13 NOTE — PROGRESS NOTES
Last 5 Patient Entered Readings                                      Current 30 Day Average: 138/84     Recent Readings 11/10/2018 11/10/2018 11/10/2018 10/30/2018 10/27/2018    SBP (mmHg) 142 161 154 123 142    DBP (mmHg) 79 88 87 72 90    Pulse 71 72 75 71 86          Patient answered but unable to speak, agreed to call back when available. Discuss initiating carvedilol 6.25 mg BID.

## 2018-11-19 DIAGNOSIS — I10 ESSENTIAL HYPERTENSION: ICD-10-CM

## 2018-11-19 RX ORDER — AMLODIPINE BESYLATE 10 MG/1
TABLET ORAL
Qty: 30 TABLET | Refills: 2 | Status: SHIPPED | OUTPATIENT
Start: 2018-11-19 | End: 2018-12-18 | Stop reason: SDUPTHER

## 2018-11-26 RX ORDER — CHLORTHALIDONE 25 MG/1
TABLET ORAL
Qty: 45 TABLET | Refills: 1 | Status: SHIPPED | OUTPATIENT
Start: 2018-11-26 | End: 2020-03-12 | Stop reason: SDUPTHER

## 2018-11-26 NOTE — PROGRESS NOTES
Last 5 Patient Entered Readings                                      Current 30 Day Average: 143/85     Recent Readings 11/26/2018 11/23/2018 11/10/2018 11/10/2018 11/10/2018    SBP (mmHg) 143 163 142 161 154    DBP (mmHg) 82 94 79 88 87    Pulse 79 76 71 72 75        Patient's BP average is above goal of <130/80.     Patient denies s/s of hypotension (lightheadedness, dizziness, nausea, fatigue) associated with low readings. Instructed patient to inform me if this occurs, patient confirms understanding.      Patient denies s/s of hypertension (SOB, CP, severe headaches, changes in vision) associated with high readings. Instructed patient to go to the ED if BP > 180/110 and accompanied by hypertensive s/s, patient confirms understanding.    Patient admits to missing ~ 1 week's worth of chlorthalidone, due to being busy and not requesting refill. Sent prescription today to [facility] and she agreed to resume medication tomorrow. She also reports stress due to home renovations and the holidays being a difficult time (since mother passed). She is open to receiving stress-management resources from health . Discuss initiating carvedilol 6.25 mg BID if BP not improving with resumption of chlorthalidone; may defer change until after holidays depending on degree of BP elevation.    Will continue to monitor regularly. Will follow up in 2-3 weeks, sooner if BP begins to trend upward or downward.    Patient has my contact information and knows to call with any concerns or clinical changes.     Current HTN regimen:  Hypertension Medications             amLODIPine (NORVASC) 10 MG tablet TAKE 1 TABLET BY MOUTH EVERY DAY    chlorthalidone (HYGROTEN) 25 MG Tab Take 1/2 tablet (12.5 mg) by mouth once daily in the morning    valsartan (DIOVAN) 160 MG tablet Take 1 tablet (160 mg total) by mouth 2 (two) times daily.

## 2018-12-07 ENCOUNTER — LAB VISIT (OUTPATIENT)
Dept: LAB | Facility: HOSPITAL | Age: 68
End: 2018-12-07
Attending: INTERNAL MEDICINE
Payer: MEDICARE

## 2018-12-07 DIAGNOSIS — Z79.60 LONG-TERM USE OF IMMUNOSUPPRESSANT MEDICATION: ICD-10-CM

## 2018-12-07 DIAGNOSIS — D89.89 AUTOIMMUNE DISORDER: ICD-10-CM

## 2018-12-07 LAB
ALBUMIN SERPL BCP-MCNC: 3.9 G/DL
ALP SERPL-CCNC: 70 U/L
ALT SERPL W/O P-5'-P-CCNC: 14 U/L
ANION GAP SERPL CALC-SCNC: 11 MMOL/L
AST SERPL-CCNC: 24 U/L
BASOPHILS # BLD AUTO: 0.01 K/UL
BASOPHILS NFR BLD: 0.2 %
BILIRUB SERPL-MCNC: 0.6 MG/DL
BUN SERPL-MCNC: 16 MG/DL
CALCIUM SERPL-MCNC: 9.9 MG/DL
CHLORIDE SERPL-SCNC: 106 MMOL/L
CO2 SERPL-SCNC: 29 MMOL/L
CREAT SERPL-MCNC: 1.2 MG/DL
CRP SERPL-MCNC: 5.9 MG/L
DIFFERENTIAL METHOD: ABNORMAL
EOSINOPHIL # BLD AUTO: 0.1 K/UL
EOSINOPHIL NFR BLD: 2.9 %
ERYTHROCYTE [DISTWIDTH] IN BLOOD BY AUTOMATED COUNT: 14.3 %
ERYTHROCYTE [SEDIMENTATION RATE] IN BLOOD BY WESTERGREN METHOD: 38 MM/HR
EST. GFR  (AFRICAN AMERICAN): 53 ML/MIN/1.73 M^2
EST. GFR  (NON AFRICAN AMERICAN): 46 ML/MIN/1.73 M^2
GLUCOSE SERPL-MCNC: 91 MG/DL
HCT VFR BLD AUTO: 35.3 %
HGB BLD-MCNC: 11.4 G/DL
LYMPHOCYTES # BLD AUTO: 1.7 K/UL
LYMPHOCYTES NFR BLD: 35.4 %
MCH RBC QN AUTO: 26.7 PG
MCHC RBC AUTO-ENTMCNC: 32.3 G/DL
MCV RBC AUTO: 83 FL
MONOCYTES # BLD AUTO: 0.4 K/UL
MONOCYTES NFR BLD: 8.8 %
NEUTROPHILS # BLD AUTO: 2.5 K/UL
NEUTROPHILS NFR BLD: 52.7 %
PLATELET # BLD AUTO: 312 K/UL
PMV BLD AUTO: 10.8 FL
POTASSIUM SERPL-SCNC: 4.1 MMOL/L
PROT SERPL-MCNC: 7.4 G/DL
RBC # BLD AUTO: 4.27 M/UL
SODIUM SERPL-SCNC: 146 MMOL/L
WBC # BLD AUTO: 4.78 K/UL

## 2018-12-07 PROCEDURE — 80053 COMPREHEN METABOLIC PANEL: CPT

## 2018-12-07 PROCEDURE — 85652 RBC SED RATE AUTOMATED: CPT

## 2018-12-07 PROCEDURE — 85025 COMPLETE CBC W/AUTO DIFF WBC: CPT

## 2018-12-07 PROCEDURE — 36415 COLL VENOUS BLD VENIPUNCTURE: CPT

## 2018-12-07 PROCEDURE — 86140 C-REACTIVE PROTEIN: CPT

## 2018-12-11 ENCOUNTER — PATIENT OUTREACH (OUTPATIENT)
Dept: OTHER | Facility: OTHER | Age: 68
End: 2018-12-11

## 2018-12-11 NOTE — PROGRESS NOTES
Last 5 Patient Entered Readings                                      Current 30 Day Average: 143/83     Recent Readings 12/11/2018 12/3/2018 11/26/2018 11/23/2018 11/10/2018    SBP (mmHg) 116 150 143 163 142    DBP (mmHg) 72 83 82 94 79    Pulse 69 67 79 76 71          BP improving with resumption of chlorthalidone. Continue to monitor. Consider initiating carvedilol 6.25mg BID if BP not at goal on follow up.

## 2018-12-18 ENCOUNTER — TELEPHONE (OUTPATIENT)
Dept: INTERNAL MEDICINE | Facility: CLINIC | Age: 68
End: 2018-12-18

## 2018-12-18 DIAGNOSIS — I10 ESSENTIAL HYPERTENSION: ICD-10-CM

## 2018-12-19 RX ORDER — AMLODIPINE BESYLATE 10 MG/1
10 TABLET ORAL DAILY
Qty: 90 TABLET | Refills: 0 | Status: SHIPPED | OUTPATIENT
Start: 2018-12-19 | End: 2019-03-20 | Stop reason: SDUPTHER

## 2018-12-19 NOTE — TELEPHONE ENCOUNTER
Please schedule for appointment with one of the APPs for a blood pressure check sometime in the next 2 months

## 2019-01-08 NOTE — PROGRESS NOTES
Last 5 Patient Entered Readings                                      Current 30 Day Average: 131/85     Recent Readings 12/31/2018 12/24/2018 12/24/2018 12/11/2018 12/3/2018    SBP (mmHg) 136 141 145 116 150    DBP (mmHg) 85 91 90 72 83    Pulse 92 81 79 69 67        Left voicemail. BP continues to improve. Continue to consider initiating carvedilol 6.25mg BID.

## 2019-01-17 ENCOUNTER — PATIENT MESSAGE (OUTPATIENT)
Dept: UROLOGY | Facility: CLINIC | Age: 69
End: 2019-01-17

## 2019-01-17 DIAGNOSIS — R35.1 NOCTURIA: ICD-10-CM

## 2019-01-18 ENCOUNTER — PATIENT MESSAGE (OUTPATIENT)
Dept: UROLOGY | Facility: CLINIC | Age: 69
End: 2019-01-18

## 2019-01-18 RX ORDER — TOLTERODINE 4 MG/1
4 CAPSULE, EXTENDED RELEASE ORAL DAILY
Qty: 30 CAPSULE | Refills: 0 | OUTPATIENT
Start: 2019-01-18

## 2019-01-18 RX ORDER — TOLTERODINE 4 MG/1
CAPSULE, EXTENDED RELEASE ORAL
Qty: 30 CAPSULE | Refills: 0 | Status: SHIPPED | OUTPATIENT
Start: 2019-01-18 | End: 2019-03-20

## 2019-01-18 NOTE — TELEPHONE ENCOUNTER
----- Message from Cristela Mcclellan MA sent at 1/18/2019  3:12 PM CST -----  Contact: [phone]                                Prescription Refill Request  Name of medication and dose  [facility]     Pharm[facility]  [id number] [address] [phone] (Phone)  [phone] (Fax)        Are you completely out of your medication Yes    Additional Information: pt has an appt next week and wanted to know if you could give her one refill to last til she comes in

## 2019-01-22 NOTE — PROGRESS NOTES
Last 5 Patient Entered Readings                                      Current 30 Day Average: 139/89     Recent Readings 12/31/2018 12/24/2018 12/24/2018 12/11/2018 12/3/2018    SBP (mmHg) 136 141 145 116 150    DBP (mmHg) 85 91 90 72 83    Pulse 92 81 79 69 67        Left voicemail and sent [facility] message. Continue to consider initiating carvedilol 6.25mg BID. Last BP reading 12/31.

## 2019-01-22 NOTE — PROGRESS NOTES
Last 5 Patient Entered Readings                                      Current 30 Day Average: 139/89     Recent Readings 12/31/2018 12/24/2018 12/24/2018 12/11/2018 12/3/2018    SBP (mmHg) 136 141 145 116 150    DBP (mmHg) 85 91 90 72 83    Pulse 92 81 79 69 67        HPI:  Called patient to follow up. Patient endorses adherence to medication regimen. States her house was being renovated. She recently got her BP cuff out of storage and intends to take a reading today or tomorrow after it charges.    Patient denies hypotensive s/sx (lightheadedness, dizziness, nausea, fatigue); patient denies hypertensive s/sx (SOB, CP, severe headaches, changes in vision, dizziness, fatigue, confusion, anxiety, nosebleeds).     Assessment:  Reviewed recent readings. Per 2017 ACC/ AHA HTN guidelines (goal of BP < 130/80), current 30-day average needs to be addressed more thoroughly today.     Plan:  Continue current medication regimen. Requested at least 2 BP readings per week. I will continue to monitor regularly and will follow-up in 2 to 3 weeks, sooner if blood pressure begins to trend upward or downward.     Current medication regimen:  Hypertension Medications             amLODIPine (NORVASC) 10 MG tablet Take 1 tablet (10 mg total) by mouth once daily.    chlorthalidone (HYGROTEN) 25 MG Tab Take 1/2 tablet (12.5 mg) by mouth once daily in the morning (For blood pressure)    valsartan (DIOVAN) 160 MG tablet Take 1 tablet (160 mg total) by mouth 2 (two) times daily.          Patient denies having questions or concerns. Patient has my contact information and knows to call with any concerns or clinical changes.

## 2019-02-05 ENCOUNTER — PATIENT OUTREACH (OUTPATIENT)
Dept: OTHER | Facility: OTHER | Age: 69
End: 2019-02-05

## 2019-02-05 NOTE — PROGRESS NOTES
Last 5 Patient Entered Readings                                      Current 30 Day Average: 157/84     Recent Readings 2/2/2019 1/28/2019 12/31/2018 12/24/2018 12/24/2018    SBP (mmHg) 149 165 136 141 145    DBP (mmHg) 81 87 85 91 90    Pulse 76 75 92 81 79          Left voicemail.  Ensure patient charged BP cuff. Discuss lifestyle changes during house renovations.

## 2019-02-06 ENCOUNTER — PES CALL (OUTPATIENT)
Dept: ADMINISTRATIVE | Facility: CLINIC | Age: 69
End: 2019-02-06

## 2019-02-19 NOTE — PROGRESS NOTES
Last 5 Patient Entered Readings                                      Current 30 Day Average: 151/82     Recent Readings 2/18/2019 2/11/2019 2/11/2019 2/2/2019 1/28/2019    SBP (mmHg) 141 149 150 149 165    DBP (mmHg) 74 79 89 81 87    Pulse 70 68 78 76 75          Left voicemail and sent [facility] message.  Ensure patient charged BP cuff. Discuss lifestyle changes during house renovations. BP trending down.

## 2019-02-19 NOTE — PROGRESS NOTES
Last 5 Patient Entered Readings                                      Current 30 Day Average: 151/82     Recent Readings 2/18/2019 2/11/2019 2/11/2019 2/2/2019 1/28/2019    SBP (mmHg) 141 149 150 149 165    DBP (mmHg) 74 79 89 81 87    Pulse 70 68 78 76 75          HPI:  Called patient to follow up. Patient endorses adherence to medication regimen. States she charged her BP cuff once she found it. Is resuming healthy lifestyle routines. Patient denies hypotensive s/sx (lightheadedness, dizziness, nausea, fatigue); patient denies hypertensive s/sx (SOB, CP, severe headaches, changes in vision).     Assessment:  Reviewed recent readings. Per 2017 ACC/ AHA HTN guidelines (goal of BP < 130/80), current 30-day average needs to be addressed more thoroughly today. BP readings trending down.     Plan:  Continue current medication regimen. Consider increasing chlorthalidone if BP elevated on follow up. I will continue to monitor regularly and will follow-up in 4 to 6 weeks, sooner if blood pressure begins to trend upward or downward.     Current medication regimen:  Hypertension Medications             amLODIPine (NORVASC) 10 MG tablet Take 1 tablet (10 mg total) by mouth once daily.    chlorthalidone (HYGROTEN) 25 MG Tab Take 1/2 tablet (12.5 mg) by mouth once daily in the morning (For blood pressure)    valsartan (DIOVAN) 160 MG tablet Take 1 tablet (160 mg total) by mouth 2 (two) times daily.          Patient denies having questions or concerns. Patient has my contact information and knows to call with any concerns or clinical changes.

## 2019-02-21 DIAGNOSIS — R35.1 NOCTURIA: ICD-10-CM

## 2019-02-21 RX ORDER — TOLTERODINE 4 MG/1
CAPSULE, EXTENDED RELEASE ORAL
Qty: 30 CAPSULE | Refills: 0 | OUTPATIENT
Start: 2019-02-21

## 2019-03-11 ENCOUNTER — PATIENT OUTREACH (OUTPATIENT)
Dept: OTHER | Facility: OTHER | Age: 69
End: 2019-03-11

## 2019-03-11 NOTE — PROGRESS NOTES
Last 5 Patient Entered Readings                                      Current 30 Day Average: 148/81     Recent Readings 3/9/2019 3/6/2019 2/18/2019 2/11/2019 2/11/2019    SBP (mmHg) 151 151 141 149 150    DBP (mmHg) 83 78 74 79 89    Pulse 75 76 70 68 78          Patient reports this was not a good time to talk. Patient reports she would call me back. Gave patient my direct line.

## 2019-03-14 NOTE — PROGRESS NOTES
Last 5 Patient Entered Readings                                      Current 30 Day Average: 148/78     Recent Readings 3/9/2019 3/6/2019 2/18/2019 2/11/2019 2/11/2019    SBP (mmHg) 151 151 141 149 150    DBP (mmHg) 83 78 74 79 89    Pulse 75 76 70 68 78          Digital Medicine: Health  Follow Up    Patient reports she is using a compound cream her orthopedic prescribed to help with knee pain. Patient reports the cream has helped the pain a lot.      Lifestyle Modifications:    1.Dietary Modifications (Sodium intake <2,000mg/day, food labels, dining out): Patient reports during Mardi Gras time she may have cheated on her low sodium intake. Patient reports she is back on track.  Patient reports she is still doing grilled and baked protein and occasionally fried. Patient reports she will have 1 smoothie from [employer] once a week. Patient reports she is not adding turbinado (sugar). Patient reports she drinks a vanilla boost every morning.  Encouraged patient to keep up the great work!     2.Physical Activity: Patient reports she is walking in the mall 2x/wk for about 45 minutes. Patient reports this spring she would like to start sliver sneakers.     3.Medication Therapy: Patient has been compliant with the medication regimen. Patient reports she is about to call [facility] due to receiving letter for the recall on her Valsartan. She reports her numbers matched up with the medication that was recalled.     4.Patient has the following medication side effects/concerns: None.     Follow up with Ms. Idalmis NARA Morejon completed. No further questions or concerns. Will continue to follow up to achieve health goals.

## 2019-03-15 ENCOUNTER — INFUSION (OUTPATIENT)
Dept: INFECTIOUS DISEASES | Facility: HOSPITAL | Age: 69
End: 2019-03-15
Attending: INTERNAL MEDICINE
Payer: MEDICARE

## 2019-03-15 VITALS — HEIGHT: 66 IN | BODY MASS INDEX: 24.8 KG/M2 | WEIGHT: 154.31 LBS

## 2019-03-15 DIAGNOSIS — M81.0 AGE-RELATED OSTEOPOROSIS WITHOUT CURRENT PATHOLOGICAL FRACTURE: Primary | ICD-10-CM

## 2019-03-15 PROCEDURE — 63600175 PHARM REV CODE 636 W HCPCS: Mod: HCNC,JG | Performed by: INTERNAL MEDICINE

## 2019-03-15 PROCEDURE — 96372 THER/PROPH/DIAG INJ SC/IM: CPT | Mod: HCNC

## 2019-03-15 RX ADMIN — DENOSUMAB 60 MG: 60 INJECTION SUBCUTANEOUS at 08:03

## 2019-03-17 DIAGNOSIS — R35.1 NOCTURIA: ICD-10-CM

## 2019-03-18 RX ORDER — TOLTERODINE 4 MG/1
CAPSULE, EXTENDED RELEASE ORAL
Qty: 30 CAPSULE | Refills: 0 | OUTPATIENT
Start: 2019-03-18

## 2019-03-20 ENCOUNTER — OFFICE VISIT (OUTPATIENT)
Dept: UROLOGY | Facility: CLINIC | Age: 69
End: 2019-03-20
Payer: MEDICARE

## 2019-03-20 ENCOUNTER — TELEPHONE (OUTPATIENT)
Dept: INTERNAL MEDICINE | Facility: CLINIC | Age: 69
End: 2019-03-20

## 2019-03-20 VITALS
WEIGHT: 154.31 LBS | BODY MASS INDEX: 24.8 KG/M2 | HEIGHT: 66 IN | HEART RATE: 70 BPM | SYSTOLIC BLOOD PRESSURE: 154 MMHG | DIASTOLIC BLOOD PRESSURE: 80 MMHG

## 2019-03-20 DIAGNOSIS — N95.2 VAGINAL ATROPHY: ICD-10-CM

## 2019-03-20 DIAGNOSIS — N28.1 RENAL CYSTS, ACQUIRED, BILATERAL: ICD-10-CM

## 2019-03-20 DIAGNOSIS — R35.1 NOCTURIA: ICD-10-CM

## 2019-03-20 DIAGNOSIS — R39.15 URINARY URGENCY: Primary | ICD-10-CM

## 2019-03-20 DIAGNOSIS — N31.9 NEUROGENIC BLADDER: ICD-10-CM

## 2019-03-20 DIAGNOSIS — I10 ESSENTIAL HYPERTENSION: ICD-10-CM

## 2019-03-20 PROCEDURE — 3079F PR MOST RECENT DIASTOLIC BLOOD PRESSURE 80-89 MM HG: ICD-10-PCS | Mod: HCNC,CPTII,S$GLB, | Performed by: UROLOGY

## 2019-03-20 PROCEDURE — 99999 PR PBB SHADOW E&M-EST. PATIENT-LVL IV: ICD-10-PCS | Mod: PBBFAC,HCNC,, | Performed by: UROLOGY

## 2019-03-20 PROCEDURE — 99999 PR PBB SHADOW E&M-EST. PATIENT-LVL IV: CPT | Mod: PBBFAC,HCNC,, | Performed by: UROLOGY

## 2019-03-20 PROCEDURE — 99213 PR OFFICE/OUTPT VISIT, EST, LEVL III, 20-29 MIN: ICD-10-PCS | Mod: 25,HCNC,S$GLB, | Performed by: UROLOGY

## 2019-03-20 PROCEDURE — 81002 URINALYSIS NONAUTO W/O SCOPE: CPT | Mod: HCNC,S$GLB,, | Performed by: UROLOGY

## 2019-03-20 PROCEDURE — 1101F PT FALLS ASSESS-DOCD LE1/YR: CPT | Mod: HCNC,CPTII,S$GLB, | Performed by: UROLOGY

## 2019-03-20 PROCEDURE — 81002 PR URINALYSIS NONAUTO W/O SCOPE: ICD-10-PCS | Mod: HCNC,S$GLB,, | Performed by: UROLOGY

## 2019-03-20 PROCEDURE — 99213 OFFICE O/P EST LOW 20 MIN: CPT | Mod: 25,HCNC,S$GLB, | Performed by: UROLOGY

## 2019-03-20 PROCEDURE — 3079F DIAST BP 80-89 MM HG: CPT | Mod: HCNC,CPTII,S$GLB, | Performed by: UROLOGY

## 2019-03-20 PROCEDURE — 3077F PR MOST RECENT SYSTOLIC BLOOD PRESSURE >= 140 MM HG: ICD-10-PCS | Mod: HCNC,CPTII,S$GLB, | Performed by: UROLOGY

## 2019-03-20 PROCEDURE — 1101F PR PT FALLS ASSESS DOC 0-1 FALLS W/OUT INJ PAST YR: ICD-10-PCS | Mod: HCNC,CPTII,S$GLB, | Performed by: UROLOGY

## 2019-03-20 PROCEDURE — 3077F SYST BP >= 140 MM HG: CPT | Mod: HCNC,CPTII,S$GLB, | Performed by: UROLOGY

## 2019-03-20 RX ORDER — TOLTERODINE 4 MG/1
4 CAPSULE, EXTENDED RELEASE ORAL 2 TIMES DAILY PRN
Qty: 45 CAPSULE | Refills: 11 | Status: SHIPPED | OUTPATIENT
Start: 2019-03-20 | End: 2019-04-09

## 2019-03-20 RX ORDER — AMLODIPINE BESYLATE 10 MG/1
TABLET ORAL
Qty: 90 TABLET | Refills: 0 | Status: SHIPPED | OUTPATIENT
Start: 2019-03-20 | End: 2019-06-10 | Stop reason: SDUPTHER

## 2019-03-20 NOTE — PROGRESS NOTES
CHIEF COMPLAINT:    Mrs. Morejon is a 68 y.o. female presenting for a follow up on urgency, history of back surgery    PRESENTING ILLNESS:    Idalmis Morejon is a 68 y.o. female who has a history of a small bladder capacity, urinary urgency, nocturia.  She was injected with Botox in 2014 unfortunately, had an elevated PVR.  She performed intermittent catheterization for several months, then was able to stop.  At this point, she has urgency and nocturia.  She generally takes Detrol LA 4 mg in the morning but if she is going to be out she will take a second dose.  Normally she limits her fluids after 6 pm.  She has nocturia x 1-2.  No UTI's has been doing well.  The last time I saw her in the office was 2015.  (she came in 2016 but had to leave before being seen due to a family emergency.)    REVIEW OF SYSTEMS:    Review of Systems   Constitutional: Negative.    HENT: Negative.    Eyes: Negative.    Respiratory: Negative.    Cardiovascular: Negative.    Gastrointestinal: Positive for heartburn.   Genitourinary: Positive for urgency.        Nocturia   Musculoskeletal: Negative.    Skin: Negative.    Neurological: Negative.    Endo/Heme/Allergies: Negative.    Psychiatric/Behavioral: Negative.        PATIENT HISTORY:    Past Medical History:   Diagnosis Date    Abnormal chest CT     Acid reflux     Allergy     Anemia     Anemia     Anxiety     Cataract     Chronic UTI     recently treated with antibiotics -x 3 wks. ago-    Colon adenoma 2015 due 2020 10/21/2015    Colon adenoma 2015 due 2020 10/21/2015    Depression     Disturbed sleep rhythm     DJD (degenerative joint disease)     Dry eyes     Dry mouth     Grief reaction 3/24/2014    Hx of migraine headaches     Hyperlipemia     Hypernatremia 8/8/2014    Hypertension     Iritis     Iritis     Mild vitamin D deficiency 9/9/2013    Multiple lung nodules on CT: 5194-2307 no f/u needed 6/6/2016    Neurogenic bladder     Osteopenia     in hips     Osteoporosis     spine    Osteoporosis: 9/15 see rheumatology notes 6/6/2016    Positive [place] (antinuclear antibody)     Schatzki's ring: 3/15 dilated 6/6/2016    Sciatica neuralgia 6/21/2013    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    Visual impairment        Past Surgical History:   Procedure Laterality Date    APPENDECTOMY      BACK SURGERY  2007    x4    CHOLECYSTECTOMY      lap orin    COLONOSCOPY N/A 10/2/2015    Performed by [facility] at [facility] (4[facility] FLR)    CYSTOSCOPY      with BOTOX INJECTION    CYSTOSCOPY N/A 3/5/2014    Performed by [facility]ine [facility] at [facility] OR 1ST FLR    CYSTOSCOPY N/A 2/19/2013    Performed by [facility] at [facility] OR 1ST FLR    ESOPHAGOGASTRODUODENOSCOPY (EGD) N/A 5/11/2018    Performed by Bryan Love [facility] at [facility] (4TH FLR)    ESOPHAGOGASTRODUODENOSCOPY (EGD) N/A 10/10/2016    Performed by Arthur Partida MD at [facility] ENDO (2ND FLR)    ESOPHAGOGASTRODUODENOSCOPY (EGD) N/A 9/2/2016    Performed by Charlene Villafana MD at [facility] (2ND FLR)    ESOPHAGOGASTRODUODENOSCOPY (EGD) N/A 3/4/2015    Performed by Bryan Love MD at [facility] (4TH FLR)    HERNIA REPAIR      umbilical    HYSTERECTOMY      COMPLETE    INJECTION, BOTULINUM TOXIN, TYPE A N/A 3/5/2014    Performed by [facility] at [facility] OR 1ST FLR    INJECTION, BOTULINUM TOXIN, TYPE A N/A 2/19/2013    Performed by [facility]ine Church [facility] at [facility] OR 1ST FLR    POSTERIOR FUSION LUMBAR SPINE W/ CORPECTOMY      REPAIR-HERNIA-INCISIONAL-INITIAL N/A 11/6/2012    Performed by Darryl Valles Jr., MD at [facility] OR 2ND FLR    TONSILLECTOMY, ADENOIDECTOMY      ULTRASOUND-ENDOSCOPIC-UPPER N/A 10/10/2016    Performed by Arthur Partida MD at [facility] (2ND FLR)    ULTRASOUND-ENDOSCOPIC-UPPER N/A 9/2/2016    Performed by Charlene Villafana MD at [facility] (2ND FLR)       Family History   Problem Relation Age of Onset    Kidney disease Mother     Hypertension Mother      Diabetes Father     Diabetes Brother     Kidney disease Brother     Heart disease Brother     Hyperlipidemia Sister     Hypertension Sister     Hypertension Daughter     Colon cancer Maternal Aunt 70        stomach    Blindness Maternal Aunt     Cancer Maternal Aunt         stomach cancer    Cancer Maternal Uncle         colon    Colon cancer Maternal Uncle 70        two uncles     Socioeconomic History    Marital status: Single   Tobacco Use    Smoking status: Never Smoker    Smokeless tobacco: Never Used   Substance and Sexual Activity    Alcohol use: No    Drug use: No    Sexual activity: Not Currently       Allergies:  Alendronate and Kenalog [triamcinolone acetonide]    Medications:  Outpatient Encounter Medications as of 3/20/2019   Medication Sig Dispense Refill    amLODIPine (NORVASC) 10 MG tablet Take 1 tablet (10 mg total) by mouth once daily. 90 tablet 0    cetirizine (ZYRTEC) 10 MG tablet Take 1 tablet (10 mg total) by mouth 2 (two) times daily. 60 tablet 0    chlorthalidone (HYGROTEN) 25 MG Tab Take 1/2 tablet (12.5 mg) by mouth once daily in the morning (For blood pressure) 45 tablet 1    COMBIGAN 0.2-0.5 % Drop PLACE 1 DROP INTO BOTH EYES 2 (TWO) TIMES DAILY. 15 mL 3    DULoxetine (CYMBALTA) 30 MG capsule Take 3 capsules (90 mg total) by mouth once daily. 90 capsule 3    pantoprazole (PROTONIX) 40 MG tablet TAKE 1 TABLET BY MOUTH BEFORE BREAKFAST. 90 tablet 3    POLYETHYLENE GLYCOL 3350 (MIRALAX ORAL)       promethazine (PHENERGAN) 12.5 MG Tab Take 1 tablet (12.5 mg total) by mouth every 6 (six) hours as needed (nausea). 20 tablet 0    tolterodine (DETROL LA) 4 MG 24 hr capsule Take 1 capsule (4 mg total) by mouth 2 (two) times daily as needed (neurogenic bladder). 45 capsule 11    valsartan (DIOVAN) 160 MG tablet Take 1 tablet (160 mg total) by mouth 2 (two) times daily. 180 tablet 3    [DISCONTINUED] tolterodine (DETROL LA) 4 MG 24 hr capsule TAKE ONE CAPSULE BY MOUTH  EVERY DAY 30 capsule 0    conjugated estrogens (PREMARIN) vaginal cream Place 0.5 g vaginally 3 (three) times a week. 30 g 3     No facility-administered encounter medications on file as of 3/20/2019.          PHYSICAL EXAMINATION:    The patient generally appears in good health, is appropriately interactive, and is in no apparent distress.    Skin: No lesions.    Mental: Cooperative with normal affect.    Neuro: Grossly intact.    HEENT: Normal. No evidence of lymphadenopathy.    Chest:  normal inspiratory effort.    Abdomen:  Soft, non-tender. No masses or organomegaly. Bladder is not palpable. No evidence of flank discomfort. No evidence of inguinal hernia.    Extremities: No clubbing, cyanosis, or edema    Normal external female genitalia  Grade II Vaginal atrophy   Urethral meatus is normal, small urethral caruncle  Urethra and bladder are nontender to bimanual exam  Well supported anteriorly and posteriorly   Uterus and cervix are surgically absent  No adnexal masses      LABS:    Lab Results   Component Value Date    BUN 16 12/07/2018    CREATININE 1.2 12/07/2018     UA 1.010, PH 7, tr leuk, tr protein, otherwise, negative    CT scan abd/pelvis from 7/2016 showed bilateral hypodensities, likely cysts    IMPRESSION:    Encounter Diagnoses   Name Primary?    Urinary urgency Yes    Renal cysts, acquired, bilateral     Nocturia     Vaginal atrophy     Neurogenic bladder        PLAN:    1.  Detrol LA 4 mg #45 because she sometimes takes it twice a day  2.  Premarin cream for vaginal atrophy  3.  Follow up in 1 year,  OK to see midlevel.   4.  Renal ultrasound ordered to follow up on cysts.

## 2019-03-20 NOTE — TELEPHONE ENCOUNTER
Please let her know that I did refill her medication.  However, her blood pressure is running high.  She needs to come in for an office visit, please schedule her with Edie for a blood pressure check sometime in the next 1-2 weeks.  Please let me know.  Thank you

## 2019-03-26 NOTE — PROGRESS NOTES
"Chief complaint:   Chief Complaint   Patient presents with   â¢ Implantable Defibrillator     Medtronic, single chamber   Cardiomyopathy  Atrial fibrillation  Ventricular tachycardia  High risk medication management  Anticoagulation    Vitals:  Visit Vitals  /82   Pulse 68   Ht 5' 10"" (1.778 m)   Wt 93 kg   BMI 29.41 kg/mÂ²       HISTORY OF PRESENT ILLNESS     HPI     Patient had ECHO 11/26/2018. Defibrillator: Denies pocket discomfort. Cardiomyopathy, non-ischemic: No shortness of breath, lightheadedness or fatigue reported. Overall feels well. Atrial fibrillation/ ventricular tachycardia: Denies tachy-palpitations. Tolerating sotalol. Warfarin anticoagulation: Denies bleeding issues. History of AF, CVA,  mechanical AVR. Other significant problems:  Patient Active Problem List    Diagnosis Date Noted   â¢ Impaired mobility 03/26/2019     Priority: Medium     Ambulates with cane. â¢ Sustained ventricular tachycardia 08/30/2012     Priority: Medium     On sotalol. S/p ICD. Myocardial perfusion scan: 11/3/2016 - LVEF 48%, WNL. Myocardial perf. scan: 8/15/2012 - LVEF 36%, WNL. â¢ Sotalol therapy  08/20/2012     Priority: Medium     Started 8/16/2012. For VT suppression. On 80 mg BID.     â¢ ICD, Medtronic, single chamber 08/20/2012     Priority: Medium     HAS REMOTE FOLLOW UP  Implanted 3/28/2005, generator change 8/30/2013. Received appropriate shock for VF (5/21/2017). Secondary prevention. â¢ Cardiomyopathy, non-ischemic 08/20/2012     Priority: Medium     s/p ICD. ECHO: 11/26/2018 - LVEF 35%. ECHO: 8/1/2018 - LVEF 26%, MERON 52.9 ml, moderate MR. Cardiac cath: 8/3/2018 - normal coronaries. ECHO: 6/16/2017 - LVEF 59%, MERON 43.7 ml. ECHO: 1/3/2015 - LVEF 57%, LA volume 40.8ml, mild MR. ECHO: 8/14/2012 - LVEF 56%. â¢ Warfarin anticoagulation 01/22/2012     Priority: Medium     Followed by [facility] (2nd floor). History of mechanical AVR, AF, CVA.       â¢ Atrial " Last 5 Patient Entered Readings                                      Current 30 Day Average: 136/83     Recent Readings 11/10/2018 11/10/2018 11/10/2018 10/30/2018 10/27/2018    SBP (mmHg) 142 161 154 123 142    DBP (mmHg) 79 88 87 72 90    Pulse 71 72 75 71 86          Left voicemail. Discuss initiating carvedilol 6.25 mg BID. Health  also attempting contact, will request more frequent BP readings (ie. At least 3 per week)   fibrillation (CMS/[facility]) 01/22/2012     Priority: Medium     On coreg, warfarin. Carotid doppler: 10/20/2014 - WNL. CHADS VASc Score    Congestive Heart Failure: 1 (YES: 1, NO: 0)  Hypertension: 1 (YES: 1, NO: 0)  Age: 0 (<65: 0, 65-74: 1, >75: 2)  Diabetes Mellitus: 1 (YES: 1, NO: 0)  Stroke/TIA/Thromboembolism: 2 (YES: 2, NO: 0)  Vascular Disease: 1 (YES: 1, NO: 0)  Sex: 0 (Male: 0, Female: 1)    Total Score: 6    CHADSVASC clinical risk estimation. Adapted from Angela et al.   [id number]   SCORE ADJUSTED STROKE RATE (% year)   0 0%   1 1,3%   2 2,2%   3 3,2%   4 4,0%   5 6,7%   6 9,8%   7 9,6%   8 6,7%   9 15,2%          â¢ Hypertension 12/28/2011     Priority: Medium     On Norvasc, lisinopril. â¢ Congestive heart failure 12/28/2011     Priority: Medium   â¢ Aortic valve replacement 11/08/2011     Priority: Medium     1/11/2011- mechanical.  On warfarin.       â¢ PAD (peripheral artery disease) (CMS/[facility]) 08/27/2018     Priority: Low   â¢ CAP (community acquired pneumonia) 08/01/2018     Priority: Low   â¢ Atrial fibrillation, unspecified type (CMS/[facility]) 06/08/2017     Priority: Low   â¢ Diabetic polyneuropathy associated with type 2 diabetes mellitus (CMS/[facility]) 04/24/2017     Priority: Low   â¢ Current use of long term anticoagulation 08/11/2016     Priority: Low   â¢ Vitamin D deficiency 03/14/2016     Priority: Low   â¢ Atrial fibrillation, unspecified 02/02/2016     Priority: Low   â¢ Bilateral foot pain 12/29/2015     Priority: Low   â¢ OM (onychomycosis) 12/29/2015     Priority: Low   â¢ Memory changes 01/08/2015     Priority: Low   â¢ Goiter, unspecified 10/22/2012     Priority: Low   â¢ Left heart failure with left ejection fraction greater than or equal to 50 percent (CMS/[facility]) 01/18/2012     Priority: Low   â¢ Migraine 12/28/2011     Priority: Low   â¢ Hyperlipidemia 12/28/2011     Priority: Low   â¢ Diabetes mellitus (CMS/[facility]) 12/28/2011     Priority: Low   â¢ Degeneration of intervertebral disc 12/28/2011 "Priority: Low   â¢ CVA (cerebrovascular accident) (CMS/[facility]) 12/28/2011     Priority: Low   â¢ Chronic renal failure 12/28/2011     Priority: Low       PAST MEDICAL, FAMILY AND SOCIAL HISTORY     Medications:  Current Outpatient Medications   Medication   â¢ nortriptyline (PAMELOR) 25 MG capsule   â¢ omeprazole (PRILOSEC) 40 MG capsule   â¢ furosemide (LASIX) 40 MG tablet   â¢ ferrous sulfate 325 (65 FE) MG tablet   â¢ atorvastatin (LIPITOR) 40 MG tablet   â¢ sotalol (BETAPACE) 80 MG tablet   â¢ lisinopril (PRINIVIL,ZESTRIL) 40 MG tablet   â¢ potassium chloride (KLOR-CON M) 20 MEQ jung ER tablet   â¢ carvedilol (COREG) 12.5 MG tablet   â¢ gabapentin (NEURONTIN) 100 MG capsule   â¢ amLODIPine (NORVASC) 5 MG tablet   â¢ warfarin (COUMADIN) 5 MG tablet   â¢ insulin NPH (HUMULIN N, NOVOLIN N) 100 UNIT/ML injectable suspension   â¢ insulin regular, human, (HUMULIN R, NOVOLIN R) 100 UNIT/ML injectable solution   â¢ warfarin (COUMADIN) 5 MG tablet   â¢ Cholecalciferol (VITAMIN D3) 2000 units Tab   â¢ aspirin 81 MG chewable tablet   â¢ ammonium lactate (LAC-HYDRIN) 12 % lotion   â¢ fluticasone (VERAMYST) 27.5 MCG/SPRAY nasal spray   â¢ Insulin Syringe 30G X 5/16"" 0.3 ML Misc   â¢ blood glucose test strip   â¢ ONETOUCH DELICA LANCETS 34Y [facility] [place]     No current facility-administered medications for this visit.         Allergies:  ALLERGIES:   Allergen Reactions   â¢ Seasonal Other (See Comments)     Stuffy nose       Past Medical  History/Surgeries:  Past Medical History:   Diagnosis Date   â¢ Aortic regurgitation     Moderate to severe    â¢ Arthritis    â¢ Atrial fibrillation (CMS/[facility])    â¢ Cardiac failure congestive    â¢ Coronary artery disease    â¢ CVA (cerebrovascular accident) (CMS/[facility])    â¢ Esophageal reflux    â¢ Fracture    â¢ HTN (hypertension)    â¢ Murmur, heart    â¢ Myocardial infarction (CMS/[facility])    â¢ Nonischemic cardiomyopathy (CMS/[facility])    â¢ NORBERT (obstructive sleep apnea)     c-pap   â¢ Other and unspecified hyperlipidemia    â¢ Renal insufficiency  " â¢ Type 2 diabetes mellitus (CMS/HCC)     FBS checks three times daily   â¢ Use of cane as ambulatory aid    â¢ VT (ventricular tachycardia) (CMS/HCC)    â¢ Wears glasses        Past Surgical History:   Procedure Laterality Date   â¢ Cardiac surgery  2011    aortic valve replaced   â¢ Colonoscopy diagnostic  2016    tubular polyp 5yr recall    â¢ Combined rt and lt heart cath w ventriculography w md sup & interp  2010    severe LV dysfunction, mod MR, mod Aortic regurg   â¢ Echo lacy  2011    (Dobutamine) LVEF 30-40%   â¢ Ep icd gen change  2013    Medtronic/single-chamber   â¢ Ep icd implant  2005    Medtronic-single chamber   â¢ [facility] exam B[facility] 2017    [facility]   â¢ Knee surgery      Right knee cartilage reconstruction       Family History:  Family History   Problem Relation Age of Onset   â¢ Stroke Mother    â¢ Diabetes Mother    â¢ Diabetes Father    â¢ Heart disease Father    â¢ Hypertension Father    â¢ High cholesterol Father    â¢ Cancer Father    â¢ Stroke Sister    â¢ Hypertension Sister    â¢ Cancer Brother         throat   â¢ Heart disease Sister    â¢ Heart disease Brother        Social History:  Social History     Tobacco Use   â¢ Smoking status: Current Some Day Smoker     Packs/day: 0.00     Years: 40.00     Pack years: 0.00     Types: Cigarettes     Last attempt to quit: 2016     Years since quittin.7   â¢ Smokeless tobacco: Never Used   â¢ Tobacco comment: quit information previously given   Substance Use Topics   â¢ Alcohol use: No     Alcohol/week: 0.0 oz       REVIEW OF SYSTEMS     Review of Systems    A problem-pertinent review of systems was performed and aside from what is mentioned in the HPI, all other review of systems are within normal limits. Patient denies LE swelling. PHYSICAL EXAM     Physical Exam   Constitutional: He is oriented to person, place, and time. He appears well-developed and well-nourished. No distress.    HENT:   Head: Normocephalic and atraumatic. Eyes: Conjunctivae are normal. Right eye exhibits no discharge. Left eye exhibits no discharge. No scleral icterus. Neck: Normal range of motion. Neck supple. Cardiovascular: Normal rate and normal heart sounds. Device incision well healed   Pulmonary/Chest: Effort normal and breath sounds normal. No stridor. No respiratory distress. Abdominal: He exhibits no distension. Musculoskeletal:   Ambulates with cane. Neurological: He is alert and oriented to person, place, and time. Coordination normal.   Skin: Skin is warm and dry. No rash noted. He is not diaphoretic. No erythema. No pallor. Psychiatric: He has a normal mood and affect. His behavior is normal. Judgment and thought content normal.     Telemetry: (Reviewed personally by Dr. Tommy Ahuja): Normal sinus rhythm occasional PVC's, QTc normal.     TSH (mcUnits/mL)   Date Value   11/08/2018 1.563       WBC (K/mcL)   Date Value   11/08/2018 9.8     RBC (mil/mcL)   Date Value   11/08/2018 4.45 (L)     HCT (%)   Date Value   11/08/2018 40.3     HGB (g/dL)   Date Value   11/08/2018 13.2     PLT (K/mcL)   Date Value   11/08/2018 222   08/30/2013 215       Sodium (mmol/L)   Date Value   11/08/2018 141     Potassium (mmol/L)   Date Value   11/08/2018 3.9     Chloride (mmol/L)   Date Value   11/08/2018 105     Glucose (mg/dL)   Date Value   11/08/2018 81     CALCIUM (mg/dL)   Date Value   11/08/2018 9.2     Carbon Dioxide (mmol/L)   Date Value   11/08/2018 28     BUN (mg/dL)   Date Value   11/08/2018 18     Creatinine (mg/dL)   Date Value   11/08/2018 1.06       INR (no units)   Date Value   11/08/2018 2.4     INR-POC (no units)   Date Value   03/21/2019 2.3     ECHO: 11/26/2018 -       ASSESSMENT/PLAN     ICD, Medtronic, single chamber  Secondary prevention. I personally reviewed the device interrogation/programming as recorded with this encounter and agree with the documentation. Normal device programming.     4 monitored VT episodes noted on today's device interrogation - longest lasted 1 second. Asymptomatic. Uses remote monitoring, will send transmission in 3 months with next office visit in 6 months. Congestive heart failure  Clinically, heart failure is well compensated without evidence of significant volume overload. On device interrogation, optivol is subthreshold, reflective of optimal fluid status. Continue present management. Cardiomyopathy, non-ischemic  ECHO:11/26/2018 - LVEF 35%. Sustained ventricular tachycardia  Under excellent suppression on sotalol. Continue current regimen. Sotalol therapy   QTc normal on today's ECG. Continue current therapy. Atrial fibrillation (CMS/HCC)  Continue coreg, warfarin. Warfarin anticoagulation  No bleeding reported. History of mechanical AVR, AF, CVA. Aortic valve replacement  1/11/2011- mechanical.     CVA (cerebrovascular accident)       Return in about 6 months (around 9/26/2019), or if symptoms worsen or fail to improve, for Remote check 3 months. On 3/26/2019, Kareem IQBAL RN scribed the services personally performed by Dr Regine Wallace MD.     The documentation recorded by the scribe accurately and completely reflects the service(s) I personally performed and the decisions made by me.

## 2019-04-09 ENCOUNTER — TELEPHONE (OUTPATIENT)
Dept: INTERNAL MEDICINE | Facility: CLINIC | Age: 69
End: 2019-04-09

## 2019-04-09 ENCOUNTER — TELEPHONE (OUTPATIENT)
Dept: UROLOGY | Facility: CLINIC | Age: 69
End: 2019-04-09

## 2019-04-09 ENCOUNTER — HOSPITAL ENCOUNTER (OUTPATIENT)
Dept: RADIOLOGY | Facility: HOSPITAL | Age: 69
Discharge: HOME | End: 2019-04-09
Attending: UROLOGY
Payer: MEDICARE

## 2019-04-09 DIAGNOSIS — N39.41 URGE INCONTINENCE: Primary | ICD-10-CM

## 2019-04-09 DIAGNOSIS — M81.0 AGE-RELATED OSTEOPOROSIS WITHOUT CURRENT PATHOLOGICAL FRACTURE: ICD-10-CM

## 2019-04-09 DIAGNOSIS — N28.1 RENAL CYSTS, ACQUIRED, BILATERAL: ICD-10-CM

## 2019-04-09 DIAGNOSIS — E78.2 MIXED HYPERLIPIDEMIA: ICD-10-CM

## 2019-04-09 DIAGNOSIS — R53.83 FATIGUE, UNSPECIFIED TYPE: ICD-10-CM

## 2019-04-09 DIAGNOSIS — D53.9 DEFICIENCY ANEMIA: Primary | ICD-10-CM

## 2019-04-09 PROCEDURE — 76770 US EXAM ABDO BACK WALL COMP: CPT | Mod: TC,HCNC

## 2019-04-09 PROCEDURE — 76770 US KIDNEY: ICD-10-PCS | Mod: 26,HCNC,, | Performed by: RADIOLOGY

## 2019-04-09 PROCEDURE — 76770 US EXAM ABDO BACK WALL COMP: CPT | Mod: 26,HCNC,, | Performed by: RADIOLOGY

## 2019-04-09 RX ORDER — OXYBUTYNIN CHLORIDE 10 MG/1
10 TABLET, EXTENDED RELEASE ORAL 2 TIMES DAILY PRN
Qty: 60 TABLET | Refills: 11 | Status: SHIPPED | OUTPATIENT
Start: 2019-04-09 | End: 2020-05-13

## 2019-04-09 NOTE — TELEPHONE ENCOUNTER
----- Message from Dee Dee Ulloa sent at 4/9/2019  7:47 AM CDT -----  Contact: self/[phone]  Patient called in regards needing to talk with Dr Pugh medical assistant about patient is feeling tired, and she believe that she need some blood work. Please call and advise. Thank you.

## 2019-04-09 NOTE — TELEPHONE ENCOUNTER
Oxybutynin 10 mg XL was sent to the patient.  She is to be informed of the bid prn dosing schedulellll

## 2019-04-09 NOTE — TELEPHONE ENCOUNTER
----- Message from Kurtis Brizuela sent at 4/9/2019 10:41 AM CDT -----  Contact: Doris ([facility]): [phone]  Needs Advice    Reason for call: pt stated the  Rx for tolterodine (DETROL LA) 4 MG was to expensive and would like to switch to oxybutin         Communication Preference: Doris ([facility]): [phone]

## 2019-04-09 NOTE — TELEPHONE ENCOUNTER
Pt stated that she has been feeling tired and weak , low energy , pt stated that she have had a couple of Bowel Movements she have notice a lot of Blood Loss in the last 2 weeks on and off,  this has happened in the past too , pt is requesting  Lab work to be done today   Please advise

## 2019-04-10 ENCOUNTER — PATIENT MESSAGE (OUTPATIENT)
Dept: INTERNAL MEDICINE | Facility: CLINIC | Age: 69
End: 2019-04-10

## 2019-04-10 RX ORDER — FERROUS SULFATE 324(65)MG
325 TABLET, DELAYED RELEASE (ENTERIC COATED) ORAL DAILY
Qty: 30 TABLET | Refills: 6 | Status: SHIPPED | OUTPATIENT
Start: 2019-04-10 | End: 2020-06-30

## 2019-04-10 NOTE — TELEPHONE ENCOUNTER
Labs show worsening of her kidney function, she needs to be sure that she is drinking at least 8 glasses of water a day.    Anemia is stable but her iron is slightly low.  I know she struggles with constipation but I would recommend iron at least once daily, ferrous sulfate, and to work exercise and consider evaluation for her hemorrhoids in Colorectal surgery.    I do want to see her for an appointment to review in person

## 2019-04-11 DIAGNOSIS — H40.63X0 STEROID-INDUCED GLAUCOMA OF BOTH EYES: ICD-10-CM

## 2019-04-11 DIAGNOSIS — T38.0X5A STEROID-INDUCED GLAUCOMA OF BOTH EYES: ICD-10-CM

## 2019-04-13 RX ORDER — BRIMONIDINE TARTRATE, TIMOLOL MALEATE 2; 5 MG/ML; MG/ML
1 SOLUTION/ DROPS OPHTHALMIC 2 TIMES DAILY
Qty: 15 ML | Refills: 0 | Status: SHIPPED | OUTPATIENT
Start: 2019-04-13 | End: 2019-06-24

## 2019-04-22 ENCOUNTER — PATIENT MESSAGE (OUTPATIENT)
Dept: GASTROENTEROLOGY | Facility: CLINIC | Age: 69
End: 2019-04-22

## 2019-04-22 DIAGNOSIS — R13.19 ESOPHAGEAL DYSPHAGIA: Primary | ICD-10-CM

## 2019-04-24 ENCOUNTER — PATIENT MESSAGE (OUTPATIENT)
Dept: GASTROENTEROLOGY | Facility: CLINIC | Age: 69
End: 2019-04-24

## 2019-04-25 ENCOUNTER — HOSPITAL ENCOUNTER (OUTPATIENT)
Dept: RADIOLOGY | Facility: CLINIC | Age: 69
Discharge: HOME | End: 2019-04-25
Attending: INTERNAL MEDICINE
Payer: MEDICARE

## 2019-04-25 DIAGNOSIS — M81.0 AGE-RELATED OSTEOPOROSIS WITHOUT CURRENT PATHOLOGICAL FRACTURE: ICD-10-CM

## 2019-04-25 PROCEDURE — 77080 DEXA BONE DENSITY SPINE HIP: ICD-10-PCS | Mod: 26,HCNC,, | Performed by: INTERNAL MEDICINE

## 2019-04-25 PROCEDURE — 77080 DXA BONE DENSITY AXIAL: CPT | Mod: 26,HCNC,, | Performed by: INTERNAL MEDICINE

## 2019-04-25 PROCEDURE — 77080 DXA BONE DENSITY AXIAL: CPT | Mod: TC,HCNC

## 2019-05-01 ENCOUNTER — OFFICE VISIT (OUTPATIENT)
Dept: INTERNAL MEDICINE | Facility: CLINIC | Age: 69
End: 2019-05-01
Payer: MEDICARE

## 2019-05-01 VITALS
WEIGHT: 154.31 LBS | BODY MASS INDEX: 25.71 KG/M2 | DIASTOLIC BLOOD PRESSURE: 70 MMHG | SYSTOLIC BLOOD PRESSURE: 135 MMHG | HEIGHT: 65 IN

## 2019-05-01 DIAGNOSIS — I70.0 ATHEROSCLEROSIS OF ABDOMINAL AORTA: ICD-10-CM

## 2019-05-01 DIAGNOSIS — N25.81 SECONDARY HYPERPARATHYROIDISM: ICD-10-CM

## 2019-05-01 DIAGNOSIS — N18.30 CKD (CHRONIC KIDNEY DISEASE) STAGE 3, GFR 30-59 ML/MIN: ICD-10-CM

## 2019-05-01 DIAGNOSIS — Z79.899 HIGH RISK MEDICATION USE: ICD-10-CM

## 2019-05-01 DIAGNOSIS — D53.9 DEFICIENCY ANEMIA: ICD-10-CM

## 2019-05-01 DIAGNOSIS — D84.9 IMMUNOSUPPRESSION: ICD-10-CM

## 2019-05-01 DIAGNOSIS — F33.41 RECURRENT MAJOR DEPRESSIVE DISORDER, IN PARTIAL REMISSION: ICD-10-CM

## 2019-05-01 DIAGNOSIS — I10 ESSENTIAL HYPERTENSION: ICD-10-CM

## 2019-05-01 DIAGNOSIS — E78.2 MIXED HYPERLIPIDEMIA: ICD-10-CM

## 2019-05-01 DIAGNOSIS — R91.8 MULTIPLE LUNG NODULES ON CT: ICD-10-CM

## 2019-05-01 DIAGNOSIS — K64.9 HEMORRHOIDS, UNSPECIFIED HEMORRHOID TYPE: Primary | ICD-10-CM

## 2019-05-01 DIAGNOSIS — D89.89 AUTOIMMUNE DISORDER: ICD-10-CM

## 2019-05-01 PROBLEM — N18.2 CKD (CHRONIC KIDNEY DISEASE), STAGE II: Status: RESOLVED | Noted: 2017-06-28 | Resolved: 2019-05-01

## 2019-05-01 PROCEDURE — 1101F PT FALLS ASSESS-DOCD LE1/YR: CPT | Mod: HCNC,CPTII,S$GLB, | Performed by: INTERNAL MEDICINE

## 2019-05-01 PROCEDURE — 99499 UNLISTED E&M SERVICE: CPT | Mod: HCNC,S$GLB,, | Performed by: INTERNAL MEDICINE

## 2019-05-01 PROCEDURE — 3075F PR MOST RECENT SYSTOLIC BLOOD PRESS GE 130-139MM HG: ICD-10-PCS | Mod: HCNC,CPTII,S$GLB, | Performed by: INTERNAL MEDICINE

## 2019-05-01 PROCEDURE — 3075F SYST BP GE 130 - 139MM HG: CPT | Mod: HCNC,CPTII,S$GLB, | Performed by: INTERNAL MEDICINE

## 2019-05-01 PROCEDURE — 99499 RISK ADDL DX/OHS AUDIT: ICD-10-PCS | Mod: HCNC,S$GLB,, | Performed by: INTERNAL MEDICINE

## 2019-05-01 PROCEDURE — 99999 PR PBB SHADOW E&M-EST. PATIENT-LVL V: CPT | Mod: PBBFAC,HCNC,, | Performed by: INTERNAL MEDICINE

## 2019-05-01 PROCEDURE — 99999 PR PBB SHADOW E&M-EST. PATIENT-LVL V: ICD-10-PCS | Mod: PBBFAC,HCNC,, | Performed by: INTERNAL MEDICINE

## 2019-05-01 PROCEDURE — 99214 PR OFFICE/OUTPT VISIT, EST, LEVL IV, 30-39 MIN: ICD-10-PCS | Mod: HCNC,S$GLB,, | Performed by: INTERNAL MEDICINE

## 2019-05-01 PROCEDURE — 1101F PR PT FALLS ASSESS DOC 0-1 FALLS W/OUT INJ PAST YR: ICD-10-PCS | Mod: HCNC,CPTII,S$GLB, | Performed by: INTERNAL MEDICINE

## 2019-05-01 PROCEDURE — 3078F DIAST BP <80 MM HG: CPT | Mod: HCNC,CPTII,S$GLB, | Performed by: INTERNAL MEDICINE

## 2019-05-01 PROCEDURE — 3078F PR MOST RECENT DIASTOLIC BLOOD PRESSURE < 80 MM HG: ICD-10-PCS | Mod: HCNC,CPTII,S$GLB, | Performed by: INTERNAL MEDICINE

## 2019-05-01 PROCEDURE — 99214 OFFICE O/P EST MOD 30 MIN: CPT | Mod: HCNC,S$GLB,, | Performed by: INTERNAL MEDICINE

## 2019-05-01 NOTE — PATIENT INSTRUCTIONS
Hemorrhoids    Hemorrhoids are swollen and inflamed veins inside the rectum and near the anus. The rectum is the last several inches of the colon. The anus is the passage between the rectum and the outside of the body.  Causes  The veins can become swollen due to increased pressure in them. This is most often caused by:  · Chronic constipation or diarrhea  · Straining when having a bowel movement  · Sitting too long on the toilet  · A low-fiber diet  · Pregnancy  Symptoms  · Bleeding from the rectum (this may be noticeable after bowel movements)  · Lump near the anus  · Itching around the anus  · Pain around the anus  There are different types of hemorrhoids. Depending on the type you have and the severity, you may be able to treat yourself at home. In some cases, a procedure may be the best treatment option. Your healthcare provider can tell you more about this, if needed.  Home care  General care  · To get relief from pain or itching, try:  ¨ Topical products. Your healthcare provider may prescribe or recommend creams, ointments, or pads that can be applied to the hemorrhoid. Use these exactly as directed.  ¨ Medicines. Your healthcare provider may recommend stool softeners, suppositories, or laxatives to help manage constipation. Use these exactly as directed.  ¨ Sitz baths. A sitz bath involves sitting in a few inches of warm bath water. Be careful not to make the water so hot that you burn yourself--test it before sitting in it. Soak for about 10 to 15 minutes a few times a day. This may help relieve pain.  Tips to help prevent hemorrhoids  · Eat more fiber. Fiber adds bulk to stool and absorbs water as it moves through your colon. This makes stool softer and easier to pass.  ¨ Increase the fiber in your diet with more fiber-rich foods. These include fresh fruit, vegetables, and whole grains.  ¨ Take a fiber supplement or bulking agent, if advised to by your provider. These include products such as psyllium  or methylcellulose.  · Drink plenty of water, if directed to by your provider. This can help keep stool soft.  · Be more active. Frequent exercise aids digestion and helps prevent constipation. It may also help make bowel movements more regular.  · Dont strain during bowel movements. This can make hemorrhoids more likely. Also, dont sit on the toilet for long periods of time.  Follow-up care  Follow up with your healthcare provider, or as advised. If a culture or imaging tests were done, you will be notified of the results when they are ready. This may take a few days or longer.  When to seek medical advice  Call your healthcare provider right away if any of these occur:  · Increased bleeding from the rectum  · Increased pain around the rectum or anus  · Weakness or dizziness  Call 911  Call 911 or return to the emergency department right away if any of these occur:  · Trouble breathing or swallowing  · Fainting or loss of consciousness  · Unusually fast heart rate  · Vomiting blood  · Large amounts of blood in stool  Date Last Reviewed: 6/22/2015 © 2000-2017 [employer]. [address] [place] [address]. All rights reserved. This information is not intended as a substitute for professional medical care. Always follow your healthcare professional's instructions.        Treating Hemorrhoids: Self-Care    Follow your healthcare providers advice about caring for your hemorrhoids at home. Some treatments help relieve symptoms right away. Others involve making changes in your diet and exercise habits. These can help ease constipation and prevent hemorrhoid symptoms from coming back.  Relieving symptoms  Your healthcare provider may prescribe anti-inflammatory medicine to help ease your symptoms. The following tips will also help relieve pain and swelling.  · Take sitz baths. Taking a sitz bath means sitting in a few inches of warm bath water. Soaking for 10 minutes twice a day can provide  welcome relief from painful hemorrhoids. It can also help the area stay clean.  · Develop good bowel habits. Use the bathroom when you need to. Dont ignore the urge to move your bowels. This can lead to constipation, hard stools, and straining. Also, dont read while on the toilet. Sit only as long as needed. Wipe gently with soft, unscented toilet tissue or baby wipes.  · Use ice packs. Placing an ice pack on a thrombosed external hemorrhoid can help relieve pain right away. It will also help reduce the blood clot. Use the ice for 15 to 20 minutes at a time. Keep a cloth between the ice and your skin to prevent skin damage.  · Use other measures. Laxatives and enemas can help ease constipation. But use them only on your healthcare providers advice. For symptom relief, try using cotton pads soaked in witch hazel. These are available at most drugstores. Over-the-counter hemorrhoid ointments and petroleum jelly can also provide relief.  Add fiber to your diet  Adding fiber to your diet can help relieve constipation by making stools softer and easier to pass. To increase your fiber intake, your healthcare provider may recommend a bulking agent, such as psyllium. This is a high-fiber supplement available at most grocery stores and drugstores. Eating more fiber-rich foods will also help. There are two types of fiber:  · Insoluble fiber is the main ingredient in bulking agents. Its also found in foods such as wheat bran, whole-grain breads, fresh fruits, and vegetables.  · Soluble fiber is found in foods such as oat bran. Although soluble fiber is good for you, it may not ease constipation as much as foods high in insoluble fiber.  Drink more water  Along with a high-fiber diet, drinking more water can help ease constipation. This is because insoluble fiber absorbs water, making stools soft and bulky. Be sure to drink plenty of water throughout the day. Drinking fruit juices, such as prune juice or apple juice, can  also help prevent constipation.  Get more exercise  Regular exercise aids digestion and helps prevent constipation. Its also great for your health. So talk with your healthcare provider about starting an exercise program. Low-impact activities, such as swimming or walking, are good places to start. Take it easy at first. And remember to drink plenty of water when you exercise.  High-fiber foods  High-fiber foods offer many benefits. By making your stools softer, they help heal and prevent swollen hemorrhoids. They may also help reduce the risk of colon and rectal cancer. Best of all, theyre usually low in calories and taste great. Here are some examples of fiber-rich foods.  · Whole grains, such as wheat bran, corn bran, and brown rice.  · Vegetables, especially carrots, broccoli, cabbage, and peas.  · Fruits, such as apples, bananas, raisins, peaches, and pears.  · Nuts and legumes, especially peanuts, lentils, and kidney beans.  Easy ways to add fiber  The tips below offer some simple ways to add more high-fiber foods to your meals.  · Start your day with a high-fiber breakfast. Eat a wheat bran cereal along with a sliced banana. Or, try peanut butter on whole-wheat toast.  · Eat carrot sticks for snacks. Theyre easy to prepare, taste great, and are low in calories.  · Use whole-grain breads instead of white bread for sandwiches.  · Eat fruits for treats. Try an apple and some raisins instead of a candy bar.   Date Last Reviewed: 7/1/2016  © 3656-1199 [employer]. [address] [place] [address], [place] [address]. All rights reserved. This information is not intended as a substitute for professional medical care. Always follow your healthcare professional's instructions.        Understanding Hemorrhoids    Hemorrhoid tissues are cushions of blood vessels that swell slightly during bowel movements. Too much pressure on the anal canal can make these tissues remain enlarged, become inflamed, and cause  symptoms. This can happen both inside and outside the anal canal.  Parts of the anal canal  The parts of the anal canal are:  · Internal hemorrhoid tissue is in the upper area of the anal canal.  · The rectum is the last several inches of the colon. This is where stool is stored prior to bowel movements.  · Anal sphincters are ring-shaped muscles that expand and contract to control the anal opening.  · External hemorrhoid tissue lies under the anal skin.  · The anus is the passage between the rectum and the outside of the body.  Normal hemorrhoid tissue  Hemorrhoid tissues play an important role in helping your body eliminate waste. Food passes from the stomach through the intestines. The waste (stool) then travels through the colon to the rectum. It is stored in the rectum until its ready to be passed from the anus. During bowel movements, hemorrhoids swell with blood and become slightly larger. This swelling helps protect and cushion the anal canal as stool passes from the body. Once the stool has passed, the tissues stop swelling and return to normal.  Problem hemorrhoids  Pressure due to straining or other factors can cause hemorrhoid tissues to remain swollen. When this happens to the hemorrhoid tissues in the anal canal theyre called internal hemorrhoids. Swollen tissues around the anal opening are called external hemorrhoids. Depending on the location, your symptoms can differ.  · Internal hemorrhoids often happen in clusters around the wall of the anal canal. They are usually painless. But they may prolapse (protrude out of the anus) due to straining or pressure from hard stool. After the bowel movement is over, they may then reduce (return inside the body). Internal hemorrhoids often bleed. They can also discharge mucus.  ·   External hemorrhoids are located at the anal opening, just beneath the skin. These tissues rarely cause problems unless they thrombose (form a blood clot). When this happens, a hard,  bluish lump may appear. A thrombosed hemorrhoid also causes sudden, severe pain. In time, the clot may go away on its own. This sometimes leaves a skin tag of tissue stretched by the clot.  Hemorrhoid symptoms  Hemorrhoid symptoms may include:  · Pain or a burning sensation  · Bleeding during bowel movements  · Protrusion of tissue from the anus  · Itching around the anus  Causes of hemorrhoids  Theres no single cause of hemorrhoids. Most often, though, they are caused by too much pressure on the anal canal. This can be due to:  · Chronic (ongoing) constipation  · Straining during bowel movements  · Sitting too long on the toilet  · Strenuous exercise or heavy lifting  · Pregnancy and childbirth  · Aging  · Diarrhea  Date Last Reviewed: 7/1/2016  © 2644-7509 [employer]. [address] [place] [address], [place] [address]. All rights reserved. This information is not intended as a substitute for professional medical care. Always follow your healthcare professional's instructions.        Evaluating and Treating Rectal Bleeding     As part of your evaluation, a flexible sigmoidoscopy or colonoscopy may be done. You may also have an upper endoscopy if your stools are darker.     To find the site and cause of your bleeding, you will have a physical exam. You will be asked about your health history. Tests may be done to help confirm the diagnosis and plan your treatment.  Tests you may have  Any of these procedures may be done:  · Stool sample. A small amount of your stool will be checked for blood.  · Anoscopy. This test uses a small tube (anoscope) to examine the anus. It checks for problems such as hemorrhoids.  · Sigmoidoscopy. This test uses a lighted tube to check your rectum and the part of the large intestine that is closest to the rectum (the sigmoid colon).  · Colonoscopy. This test looks at your rectum and entire colon. You may be given medicine through an IV to help you relax.  · Lower GI  (gastrointestinal) series, or barium enema. This is an X-ray test to view your colon. A milky liquid containing barium is passed through your rectum and into the colon. This liquid makes it easy to see your colon on the X-ray.  · Upper endoscopy. This test checks your esophagus, stomach, and upper small intestine. It is done in cases of rectal bleeding along with other symptoms like low blood pressure and rapid heartbeat. This test may also be done if your stools are dark black and tarry.  · Capsule endoscopy. For this test, you swallow a pill that has a tiny camera inside. The camera takes pictures of your small intestine. It can get to areas that are hard to reach with colonoscopy and upper endoscopy.  · Balloon enteroscopy. This test uses a special tube (scope) to get deep into the small intestine.  · Tagged red blood cell scan. This test marks (tags) red blood cells with very small amounts of radioactive material. The cells can then be seen and tracked on a scan.  · Angiography. This test threads a tube (catheter) through a vein, often in the leg. The tube injects dye into your blood vessels to see where the bleeding is taking place.  Your treatment plan  Your treatment will depend on the cause of your rectal bleeding. Your healthcare provider will create a treatment plan thats right for you. Sometimes rectal bleeding stops on its own. If it does, be sure to see your provider to check that the problem wasnt serious.  What you can do  Follow all your doctors instructions. Keep working with your doctor after your treatment. Make and keep your follow-up visits. If you have more rectal bleeding, call your doctor. It may be a sign of the same or another health problem.   Date Last Reviewed: 7/1/2016  © 2994-4389 [employer]. [address] [place] [address], [place] [address]. All rights reserved. This information is not intended as a substitute for professional medical care. Always follow your healthcare  professional's instructions.

## 2019-05-01 NOTE — PROGRESS NOTES
Subjective:       Patient ID: Idalmis Morejon is a 68 y.o. female.    Chief Complaint: Constipation and Rectal Problems    Follow-up visit    Constipation, some rectal bleeding.  Hemorrhoids in past 2 months.      Has had colon polyps in the past, last colonoscopy was in 2015 and revealed a colon adenoma.  Colonoscopy is planned.  She does have hemorrhoids.  An appointment in [facility] was recommended for the hemorrhoids but she did not want to schedule this.  (She had been seen in December of 2017 for this in [facility]).      She is planning to have an EGD for esophageal stricture.    Last labs showed some slightly low iron, anemia and worsening of CKD.    Lipids again reviewed.  Statin tx again recommended.  She declines.  She also declines coronary CT.    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     Colon adenoma: 10/15 repeat in 2020     Age-related osteoporosis without current pathological fracture 2016     Schatzki's ring: 3/15 dilated     Multiple lung nodules on CT: 5023-1756 no f/u needed     Steroid-induced glaucoma of both eyes     Proteinuria     Family history of colon cancer: maternal uncle and aunt     Hypertensive kidney disease with stage 2 chronic kidney disease     Recurrent major depressive disorder, in partial remission     Immunosuppression     Atherosclerosis of abdominal aorta: minimal see CT 7/16     Gastroesophageal reflux disease without  esophagitis     CKD (chronic kidney disease) stage 3, GFR 30-59 ml/min     Secondary hyperparathyroidism      Review of Systems   Constitutional: Negative for activity change and unexpected weight change.   HENT: Negative for hearing loss, rhinorrhea and trouble swallowing.    Eyes: Negative for discharge and visual disturbance.   Respiratory: Negative for chest tightness and wheezing.    Cardiovascular: Negative for chest pain and palpitations.   Gastrointestinal: Positive for blood in stool and constipation. Negative for diarrhea and vomiting.   Endocrine: Positive for polyuria. Negative for polydipsia.   Genitourinary: Negative for difficulty urinating, dysuria, hematuria and menstrual problem.   Musculoskeletal: Negative for arthralgias, joint swelling and neck pain.   Neurological: Negative for weakness and headaches.   Psychiatric/Behavioral: Negative for confusion and dysphoric mood.       Objective:      Physical Exam   Constitutional: She is oriented to person, place, and time. She appears well-developed and well-nourished.   HENT:   Head: Normocephalic and atraumatic.   Right Ear: External ear normal.   Left Ear: External ear normal.   Mouth/Throat: Oropharynx is clear and moist.   Eyes: Conjunctivae are normal.   Neck: Normal range of motion. Neck supple. No thyromegaly present.   Cardiovascular: Normal rate, regular rhythm and normal heart sounds.   Pulmonary/Chest: No respiratory distress. She has no wheezes. She has no rales.   Abdominal: Soft. Bowel sounds are normal.   Genitourinary:   Genitourinary Comments: Non thrombosed external hemorrhoid  Guaiac-negative   Musculoskeletal: She exhibits no edema.   Lymphadenopathy:     She has no cervical adenopathy.   Neurological: She is alert and oriented to person, place, and time.   Skin: Skin is warm and dry. No rash noted. No erythema.       Assessment:       1. Hemorrhoids, unspecified hemorrhoid type    2. Recurrent major depressive disorder, in partial  remission    3. Multiple lung nodules on CT: 4882-8031 no f/u needed    4. Essential hypertension    5. Mixed hyperlipidemia    6. CKD (chronic kidney disease) stage 3, GFR 30-59 ml/min    7. High risk medication use-Azathioprine 100 mg daily    8. Atherosclerosis of abdominal aorta: minimal see CT 7/16    9. Autoimmune disorder- recurrent iritis    10. Immunosuppression    11. Deficiency anemia    12. Secondary hyperparathyroidism        Plan:         Idalmis was seen today for constipation and rectal problems.    Diagnoses and all orders for this visit:    Hemorrhoids, unspecified hemorrhoid type:  Hydration, avoid constipation.  Hygienic measures reviewed.  She cannot tolerate Anusol  -     Ambulatory referral to Colorectal Surgery    Recurrent major depressive disorder, in partial remission; stable, continue current regimen.  Keep psych follow-up    Multiple lung nodules on CT: 4951-2201 no f/u needed    Essential hypertension:  Continue regimen    Mixed hyperlipidemia:  Levels actually significantly improved with LDL around 70, she declines statin therapy citing concerned about side effects and multiple medications that she currently takes    CKD (chronic kidney disease) stage 3, GFR 30-59 ml/min:  Hydration, avoid nephrotoxins.  She admits that she drinks very little water, she will work on this.  Will follow closely  -     Ambulatory consult to Nephrology  -     Renal function panel; Future    High risk medication use-Azathioprine 100 mg daily    Atherosclerosis of abdominal aorta: minimal see CT 7/16:  Continue regimen    Autoimmune disorder- recurrent iritis; keep follow-up    Immunosuppression:  Keep follow-up    Deficiency anemia:  Continue with low-dose iron; cautions and side effects reviewed.  She will be having an EGD soon, due for colonoscopy in the next year, she wants to defer on this  -     Iron and TIBC; Future  -     CBC auto differential; Future  -     Ferritin; Future    Secondary  hyperparathyroidism  -     Ambulatory consult to Nephrology    Shingles vaccine recommended, she declines    I will review all studies and determine further tx depending on findings

## 2019-05-02 ENCOUNTER — PATIENT OUTREACH (OUTPATIENT)
Dept: OTHER | Facility: OTHER | Age: 69
End: 2019-05-02

## 2019-05-02 NOTE — PROGRESS NOTES
Last 5 Patient Entered Readings                                      Current 30 Day Average: 137/77     Recent Readings 4/30/2019 4/29/2019 4/15/2019 4/8/2019 3/25/2019    SBP (mmHg) 136 141 135 135 145    DBP (mmHg) 74 83 76 74 79    Pulse 68 77 74 76 74          Digital Medicine: Health  Follow Up    Patient is not experiencing any stress or pain.     Lifestyle Modifications:    1.Dietary Modifications (Sodium intake <2,000mg/day, food labels, dining out): Patient reports she is monitoring sodium intake. Patient had no questions or concerns about dietary habits. Patient declined resources for improving dietary habits.     2.Physical Activity: Patient reports no change in exercise. Patient is continuing to walk in the mall. Patient looked into [employer] and will be starting next month. Will f/u with patient next outreach on classes.      3.Medication Therapy: Patient has been compliant with the medication regimen. Patient is taking half pill in the morning and half at night (Amlodipine). Patient reports amlodipine makes her ankles swell. Patient reports the change has helped but does still experiences swelling when she stands for a long time.     4.Patient has the following medication side effects/concerns: None.     Follow up with Ms. Idalmis Morejon completed. No further questions or concerns. Will continue to follow up to achieve health goals.

## 2019-05-08 ENCOUNTER — PATIENT MESSAGE (OUTPATIENT)
Dept: INTERNAL MEDICINE | Facility: CLINIC | Age: 69
End: 2019-05-08

## 2019-05-13 ENCOUNTER — ANESTHESIA (OUTPATIENT)
Dept: ENDOSCOPY | Facility: HOSPITAL | Age: 69
End: 2019-05-13
Payer: MEDICARE

## 2019-05-13 ENCOUNTER — ANESTHESIA EVENT (OUTPATIENT)
Dept: ENDOSCOPY | Facility: HOSPITAL | Age: 69
End: 2019-05-13
Payer: MEDICARE

## 2019-05-13 ENCOUNTER — TELEPHONE (OUTPATIENT)
Dept: ENDOSCOPY | Facility: HOSPITAL | Age: 69
End: 2019-05-13

## 2019-05-13 ENCOUNTER — HOSPITAL ENCOUNTER (OUTPATIENT)
Facility: HOSPITAL | Age: 69
Discharge: HOME | End: 2019-05-13
Attending: INTERNAL MEDICINE | Admitting: INTERNAL MEDICINE
Payer: MEDICARE

## 2019-05-13 VITALS
WEIGHT: 149 LBS | OXYGEN SATURATION: 100 % | HEART RATE: 78 BPM | TEMPERATURE: 98 F | SYSTOLIC BLOOD PRESSURE: 158 MMHG | DIASTOLIC BLOOD PRESSURE: 76 MMHG | RESPIRATION RATE: 18 BRPM | BODY MASS INDEX: 23.95 KG/M2 | HEIGHT: 66 IN

## 2019-05-13 DIAGNOSIS — R10.13 EPIGASTRIC PAIN: ICD-10-CM

## 2019-05-13 DIAGNOSIS — R13.19 ESOPHAGEAL DYSPHAGIA: ICD-10-CM

## 2019-05-13 DIAGNOSIS — R13.19 ESOPHAGEAL DYSPHAGIA: Primary | ICD-10-CM

## 2019-05-13 DIAGNOSIS — Z79.899 ENCOUNTER FOR MONITORING PROTON PUMP INHIBITOR THERAPY: ICD-10-CM

## 2019-05-13 DIAGNOSIS — Z51.81 ENCOUNTER FOR MONITORING PROTON PUMP INHIBITOR THERAPY: ICD-10-CM

## 2019-05-13 DIAGNOSIS — K21.9 GASTROESOPHAGEAL REFLUX DISEASE, ESOPHAGITIS PRESENCE NOT SPECIFIED: ICD-10-CM

## 2019-05-13 PROCEDURE — 37000009 HC ANESTHESIA EA ADD 15 MINS: Mod: HCNC | Performed by: INTERNAL MEDICINE

## 2019-05-13 PROCEDURE — 37000008 HC ANESTHESIA 1ST 15 MINUTES: Mod: HCNC | Performed by: INTERNAL MEDICINE

## 2019-05-13 PROCEDURE — 63600175 PHARM REV CODE 636 W HCPCS: Mod: HCNC | Performed by: NURSE ANESTHETIST, CERTIFIED REGISTERED

## 2019-05-13 PROCEDURE — 43248 EGD GUIDE WIRE INSERTION: CPT | Mod: HCNC | Performed by: INTERNAL MEDICINE

## 2019-05-13 PROCEDURE — 25000003 PHARM REV CODE 250: Mod: HCNC | Performed by: INTERNAL MEDICINE

## 2019-05-13 PROCEDURE — 43248 PR EGD, FLEX, W/DILATION OVER GUIDEWIRE: ICD-10-PCS | Mod: HCNC,,, | Performed by: INTERNAL MEDICINE

## 2019-05-13 PROCEDURE — 43248 EGD GUIDE WIRE INSERTION: CPT | Mod: HCNC,,, | Performed by: INTERNAL MEDICINE

## 2019-05-13 PROCEDURE — 25000003 PHARM REV CODE 250: Mod: HCNC | Performed by: NURSE ANESTHETIST, CERTIFIED REGISTERED

## 2019-05-13 PROCEDURE — E9220 PRA ENDO ANESTHESIA: ICD-10-PCS | Mod: HCNC,,, | Performed by: NURSE ANESTHETIST, CERTIFIED REGISTERED

## 2019-05-13 PROCEDURE — E9220 PRA ENDO ANESTHESIA: HCPCS | Mod: HCNC,,, | Performed by: NURSE ANESTHETIST, CERTIFIED REGISTERED

## 2019-05-13 RX ORDER — PROPOFOL 10 MG/ML
VIAL (ML) INTRAVENOUS
Status: DISCONTINUED | OUTPATIENT
Start: 2019-05-13 | End: 2019-05-13

## 2019-05-13 RX ORDER — GLYCOPYRROLATE 0.2 MG/ML
INJECTION INTRAMUSCULAR; INTRAVENOUS
Status: DISCONTINUED | OUTPATIENT
Start: 2019-05-13 | End: 2019-05-13

## 2019-05-13 RX ORDER — SODIUM CHLORIDE 9 MG/ML
INJECTION, SOLUTION INTRAVENOUS CONTINUOUS
Status: DISCONTINUED | OUTPATIENT
Start: 2019-05-13 | End: 2019-05-13 | Stop reason: HOSPADM

## 2019-05-13 RX ORDER — PANTOPRAZOLE SODIUM 40 MG/1
TABLET, DELAYED RELEASE ORAL
Qty: 90 TABLET | Refills: 3 | Status: SHIPPED | OUTPATIENT
Start: 2019-05-13 | End: 2020-05-26

## 2019-05-13 RX ORDER — LIDOCAINE HCL/PF 100 MG/5ML
SYRINGE (ML) INTRAVENOUS
Status: DISCONTINUED | OUTPATIENT
Start: 2019-05-13 | End: 2019-05-13

## 2019-05-13 RX ORDER — SODIUM CHLORIDE 0.9 % (FLUSH) 0.9 %
10 SYRINGE (ML) INJECTION
Status: DISCONTINUED | OUTPATIENT
Start: 2019-05-13 | End: 2019-05-13 | Stop reason: HOSPADM

## 2019-05-13 RX ORDER — PROPOFOL 10 MG/ML
VIAL (ML) INTRAVENOUS CONTINUOUS PRN
Status: DISCONTINUED | OUTPATIENT
Start: 2019-05-13 | End: 2019-05-13

## 2019-05-13 RX ADMIN — LIDOCAINE HYDROCHLORIDE 80 MG: 20 INJECTION, SOLUTION INTRAVENOUS at 11:05

## 2019-05-13 RX ADMIN — SODIUM CHLORIDE: 0.9 INJECTION, SOLUTION INTRAVENOUS at 10:05

## 2019-05-13 RX ADMIN — PROPOFOL 50 MG: 10 INJECTION, EMULSION INTRAVENOUS at 11:05

## 2019-05-13 RX ADMIN — GLYCOPYRROLATE 0.2 MG: 0.2 INJECTION, SOLUTION INTRAMUSCULAR; INTRAVENOUS at 11:05

## 2019-05-13 RX ADMIN — PROPOFOL 150 MCG/KG/MIN: 10 INJECTION, EMULSION INTRAVENOUS at 11:05

## 2019-05-13 NOTE — ANESTHESIA POSTPROCEDURE EVALUATION
Anesthesia Post Evaluation    Patient: Idalmis Morejon    Procedure(s) Performed: Procedure(s) (LRB):  EGD (ESOPHAGOGASTRODUODENOSCOPY) (N/A)    Final Anesthesia Type: general  Patient location during evaluation: PACU  Patient participation: Yes- Able to Participate  Level of consciousness: awake and alert and oriented  Post-procedure vital signs: reviewed and stable  Pain management: adequate  Airway patency: patent  PONV status at discharge: No PONV  Anesthetic complications: no      Cardiovascular status: blood pressure returned to baseline  Respiratory status: unassisted  Hydration status: euvolemic  Follow-up not needed.          Vitals Value Taken Time   /76 5/13/2019 12:19 PM   Temp 36.6 °C (97.9 °F) 5/13/2019 11:54 AM   Pulse 78 5/13/2019 12:19 PM   Resp 18 5/13/2019 12:19 PM   SpO2 100 % 5/13/2019 12:19 PM         Event Time     Out of Recovery 12:19:57          Pain/Brina Score: Brina Score: 10 (5/13/2019 12:09 PM)

## 2019-05-13 NOTE — DISCHARGE INSTRUCTIONS

## 2019-05-13 NOTE — PROVATION PATIENT INSTRUCTIONS
Discharge Summary/Instructions after an Endoscopic Procedure  Patient Name: Idalmis Morejon  Patient MRN: [id number]  Patient YOB: 1950  Monday, May 13, 2019  Bryan Love MD  RESTRICTIONS:  During your procedure today, you received medications for sedation.  These   medications may affect your judgment, balance and coordination.  Therefore,   for 24 hours, you have the following restrictions:   - DO NOT drive a car, operate machinery, make legal/financial decisions,   sign important papers or drink alcohol.    ACTIVITY:  Today: no heavy lifting, straining or running due to procedural   sedation/anesthesia.  The following day: return to full activity including work.  DIET:  Eat and drink normally unless instructed otherwise.     TREATMENT FOR COMMON SIDE EFFECTS:  - Mild abdominal pain, nausea, belching, bloating or excessive gas:  rest,   eat lightly and use a heating pad.  - Sore Throat: treat with throat lozenges and/or gargle with warm salt   water.  - Because air was used during the procedure, expelling large amounts of air   from your rectum or belching is normal.  - If a bowel prep was taken, you may not have a bowel movement for 1-3 days.    This is normal.  SYMPTOMS TO WATCH FOR AND REPORT TO YOUR PHYSICIAN:  1. Abdominal pain or bloating, other than gas cramps.  2. Chest pain.  3. Back pain.  4. Signs of infection such as: chills or fever occurring within 24 hours   after the procedure.  5. Rectal bleeding, which would show as bright red, maroon, or black stools.   (A tablespoon of blood from the rectum is not serious, especially if   hemorrhoids are present.)  6. Vomiting.  7. Weakness or dizziness.  GO DIRECTLY TO THE NEAREST EMERGENCY ROOM IF YOU HAVE ANY OF THE FOLLOWING:      Difficulty breathing              Chills and/or fever over 101 F   Persistent vomiting and/or vomiting blood   Severe abdominal pain   Severe chest pain   Black, tarry stools   Bleeding- more than one tablespoon   Any  other symptom or condition that you feel may need urgent attention  Your doctor recommends these additional instructions:  If any biopsies were taken, your doctors clinic will contact you in 1 to 2   weeks with any results.  - Discharge patient to home.   - Follow an antireflux regimen.   - Return to GI clinic PRN of still with dysphagia.   - The findings and recommendations were discussed with the patient.   - Perform a barium swallow using barium in liquid and tablet form at the   next available appointment.  For questions, problems or results please call your physician - Bryan Love MD at Work:  [phone].  [facility], EMERGENCY ROOM PHONE NUMBER: [phone]  IF A COMPLICATION OR EMERGENCY SITUATION ARISES AND YOU ARE UNABLE TO REACH   YOUR PHYSICIAN - GO DIRECTLY TO THE EMERGENCY ROOM.  Bryan Love MD  5/13/2019 11:54:24 AM  This report has been verified and signed electronically.  [employer]

## 2019-05-13 NOTE — H&P
Short Stay Endoscopy History and Physical    PCP - Noemy Pugh MD     Procedure - EGD  ASA - per anesthesia  Mallampati - per anesthesia  History of Anesthesia problems - no  Family history Anesthesia problems -  no   Plan of anesthesia - General    HPI:  68 year old female with a history of HTN, HLD, GERD who presents for EGD due to ongoing dysphagia.    Patient with a long standing issue of dysphagia and prior EGD with dilations.    ROS:  Constitutional: No fevers, chills, No weight loss  CV: No chest pain  Pulm: No cough, No shortness of breath  Ophtho: No vision changes  GI: see HPI  Derm: No rash    Medical History:  has a past medical history of Abnormal chest CT, Acid reflux, Allergy, Anemia, Anemia, Anxiety, Cataract, Chronic UTI, Colon adenoma 2015 due 2020 (10/21/2015), Colon adenoma 2015 due 2020 (10/21/2015), Depression, Disturbed sleep rhythm, DJD (degenerative joint disease), Dry eyes, Dry mouth, Grief reaction (3/24/2014), migraine headaches, Hyperlipemia, Hypernatremia (8/8/2014), Hypertension, Iritis, Iritis, Mild vitamin D deficiency (9/9/2013), Multiple lung nodules on CT: 1638-9656 no f/u needed (6/6/2016), Neurogenic bladder, Osteopenia, Osteoporosis, Osteoporosis: 9/15 see rheumatology notes (6/6/2016), Positive GEORGIANA (antinuclear antibody), Schatzki's ring: 3/15 dilated (6/6/2016), Sciatica neuralgia (6/21/2013), 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    Surgical History:  has a past surgical history that includes Cholecystectomy; Appendectomy; Posterior fusion lumbar spine w/ corpectomy; Back surgery (2007); TONSILLECTOMY, ADENOIDECTOMY; Cystoscopy; Hernia repair; Colonoscopy (N/A, 10/2/2015); and Hysterectomy.    Family History: family history includes Blindness in her maternal aunt; Cancer in her maternal aunt and maternal uncle; Colon cancer (age of onset: 70) in her maternal aunt and maternal uncle; Diabetes in her brother and father; Heart disease in  her brother; Hyperlipidemia in her sister; Hypertension in her daughter, mother, and sister; Kidney disease in her brother and mother.. Otherwise no colon cancer, inflammatory bowel disease, or GI malignancies.    Social History:  reports that she has never smoked. She has never used smokeless tobacco. She reports that she does not drink alcohol or use drugs.    Review of patient's allergies indicates:   Allergen Reactions    Alendronate Nausea Only     And heartburn    Kenalog [triamcinolone acetonide] Hives       Medications:   Medications Prior to Admission   Medication Sig Dispense Refill Last Dose    amLODIPine (NORVASC) 10 MG tablet TAKE 1 TABLET BY MOUTH EVERY DAY 90 tablet 0 5/12/2019 at Unknown time    cetirizine (ZYRTEC) 10 MG tablet Take 1 tablet (10 mg total) by mouth 2 (two) times daily. 60 tablet 0 Past Week at Unknown time    chlorthalidone (HYGROTEN) 25 MG Tab Take 1/2 tablet (12.5 mg) by mouth once daily in the morning (For blood pressure) 45 tablet 1 Past Week at Unknown time    COMBIGAN 0.2-0.5 % Drop PLACE 1 DROP INTO BOTH EYES 2 (TWO) TIMES DAILY. 15 mL 0 Past Week at Unknown time    DULoxetine (CYMBALTA) 30 MG capsule Take 3 capsules (90 mg total) by mouth once daily. 90 capsule 3 5/12/2019 at Unknown time    ferrous sulfate 324 mg (65 mg iron) TbEC Take 1 tablet (324 mg total) by mouth once daily. 30 tablet 6 Past Week at Unknown time    oxybutynin (DITROPAN-XL) 10 MG 24 hr tablet Take 1 tablet (10 mg total) by mouth 2 (two) times daily as needed. 60 tablet 11 5/12/2019 at Unknown time    pantoprazole (PROTONIX) 40 MG tablet TAKE 1 TABLET BY MOUTH BEFORE BREAKFAST. 90 tablet 3 5/12/2019 at Unknown time    POLYETHYLENE GLYCOL 3350 (MIRALAX ORAL)    Past Week at Unknown time    valsartan (DIOVAN) 160 MG tablet Take 1 tablet (160 mg total) by mouth 2 (two) times daily. 180 tablet 3 5/13/2019 at Unknown time    conjugated estrogens (PREMARIN) vaginal cream Place 0.5 g vaginally 3  (three) times a week. 30 g 3     promethazine (PHENERGAN) 12.5 MG Tab Take 1 tablet (12.5 mg total) by mouth every 6 (six) hours as needed (nausea). 20 tablet 0 More than a month at Unknown time       Physical Exam:    Vital Signs:   Vitals:    05/13/19 1058   BP: (!) 154/73   Pulse: 77   Resp: 15   Temp: 98.6 °F (37 °C)       General Appearance: Well appearing in no acute distress  Eyes:    No scleral icterus  ENT: Neck supple, Lips, mucosa, and tongue normal; teeth and gums normal  Lungs: CTA anteriorly  Heart:  Regular rate, S1, S2 normal, no murmurs heard.  Abdomen: Soft, non tender, non distended with normal bowel sounds. No hepatosplenomegaly, ascites, or mass.  Extremities: No edema  Skin: No rash    Labs:  Lab Results   Component Value Date    WBC 6.63 04/09/2019    HGB 11.0 (L) 04/09/2019    HCT 33.9 (L) 04/09/2019     04/09/2019    CHOL 159 04/09/2019    TRIG 105 04/09/2019    HDL 60 04/09/2019    ALT 10 04/09/2019    AST 24 04/09/2019     04/09/2019    K 4.0 04/09/2019     04/09/2019    CREATININE 1.2 04/09/2019    BUN 14 04/09/2019    CO2 27 04/09/2019    TSH 1.355 04/09/2019    INR 1.0 03/21/2015    HGBA1C 5.0 07/18/2006       I have explained the risks and benefits of endoscopy procedures to the patient including but not limited to bleeding, perforation, infection, and death.      Antoinette Wheeler MD

## 2019-05-13 NOTE — H&P
[facility]  History & Physical    Subjective:      Chief Complaint/Reason for Admission:     EGD for dysphagia    Idalmis Morejon is a 68 y.o. female.    Past Medical History:   Diagnosis Date    Abnormal chest CT     Acid reflux     Allergy     Anemia     Anemia     Anxiety     Cataract     Chronic UTI     recently treated with antibiotics -x 3 wks. ago-    Colon adenoma 2015 due 2020 10/21/2015    Colon adenoma 2015 due 2020 10/21/2015    Depression     Disturbed sleep rhythm     DJD (degenerative joint disease)     Dry eyes     Dry mouth     Grief reaction 3/24/2014    Hx of migraine headaches     Hyperlipemia     Hypernatremia 8/8/2014    Hypertension     Iritis     Iritis     Mild vitamin D deficiency 9/9/2013    Multiple lung nodules on CT: 9904-0814 no f/u needed 6/6/2016    Neurogenic bladder     Osteopenia     in hips    Osteoporosis     spine    Osteoporosis: 9/15 see rheumatology notes 6/6/2016    Positive GEORGIANA (antinuclear antibody)     Schatzki's ring: 3/15 dilated 6/6/2016    Sciatica neuralgia 6/21/2013    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    Visual impairment      Past Surgical History:   Procedure Laterality Date    APPENDECTOMY      BACK SURGERY  2007    x4    CHOLECYSTECTOMY      lap orin    COLONOSCOPY N/A 10/2/2015    Performed by Bryan Love [facility] at [facility] (4TH FLR)    CYSTOSCOPY      with BOTOX INJECTION    CYSTOSCOPY N/A 3/5/2014    Performed by Cristine Church MD at [facility] OR 1ST FLR    CYSTOSCOPY N/A 2/19/2013    Performed by [facility]ine Church MD at [facility] OR 1ST FLR    ESOPHAGOGASTRODUODENOSCOPY (EGD) N/A 5/11/2018    Performed by Bryan Love MD at [facility] ENDO (4TH FLR)    ESOPHAGOGASTRODUODENOSCOPY (EGD) N/A 10/10/2016    Performed by Arthur Partida MD at [facility] ENDO (2ND FLR)    ESOPHAGOGASTRODUODENOSCOPY (EGD) N/A 9/2/2016    Performed by Charlene Villafana MD at [facility] ENDO (2ND FLR)     ESOPHAGOGASTRODUODENOSCOPY (EGD) N/A 3/4/2015    Performed [facility] at [facility] (4TH FLR)    HERNIA REPAIR      umbilical    HYSTERECTOMY      COMPLETE    INJECTION, BOTULINUM TOXIN, TYPE A N/A 3/5/2014    [facility]formed [facility] OR 1ST FLR    INJECTION, BOTULINUM TOXIN, TYPE A N/A 2/19/2013    Performed by Cristine Church MD at [facility] OR 1ST FLR    POSTERIOR FUSION LUMBAR SPINE W/ CORPECTOMY      REPAIR-HERNIA-INCISIONAL-INITIAL N/A 11/6/2012    Performed by Darryl Valles Jr., MD at [facility] OR 2ND FLR    TONSILLECTOMY, ADENOIDECTOMY      ULTRASOUND-ENDOSCOPIC-UPPER N/A 10/10/2016    Performed by Arthur Partida MD at [facility] ENDO (2ND FLR)    ULTRASOUND-ENDOSCOPIC-UPPER N/A 9/2/2016    Performed by Charlene Villafana MD at [facility] ENDO (2ND FLR)     Family History   Problem Relation Age of Onset    Kidney disease Mother     Hypertension Mother     Diabetes Father     Diabetes Brother     Kidney disease Brother     Heart disease Brother     Hyperlipidemia Sister     Hypertension Sister     Hypertension Daughter     Colon cancer Maternal Aunt 70        stomach    Blindness Maternal Aunt     Cancer Maternal Aunt         stomach cancer    Cancer Maternal Uncle         colon    Colon cancer Maternal Uncle 70        two uncles    Glaucoma Neg Hx     Amblyopia Neg Hx     Macular degeneration Neg Hx     Retinal detachment Neg Hx     Anesthesia problems Neg Hx     Celiac disease Neg Hx     Cirrhosis Neg Hx     Crohn's disease Neg Hx     Esophageal cancer Neg Hx     Irritable bowel syndrome Neg Hx     Liver cancer Neg Hx     Liver disease Neg Hx     Rectal cancer Neg Hx     Stomach cancer Neg Hx     Melanoma Neg Hx      Social History     Tobacco Use    Smoking status: Never Smoker    Smokeless tobacco: Never Used   Substance Use Topics    Alcohol use: No     Frequency: Never    Drug use: No       PTA Medications   Medication Sig    amLODIPine  (NORVASC) 10 MG tablet TAKE 1 TABLET BY MOUTH EVERY DAY    cetirizine (ZYRTEC) 10 MG tablet Take 1 tablet (10 mg total) by mouth 2 (two) times daily.    chlorthalidone (HYGROTEN) 25 MG Tab Take 1/2 tablet (12.5 mg) by mouth once daily in the morning (For blood pressure)    COMBIGAN 0.2-0.5 % Drop PLACE 1 DROP INTO BOTH EYES 2 (TWO) TIMES DAILY.    DULoxetine (CYMBALTA) 30 MG capsule Take 3 capsules (90 mg total) by mouth once daily.    ferrous sulfate 324 mg (65 mg iron) TbEC Take 1 tablet (324 mg total) by mouth once daily.    oxybutynin (DITROPAN-XL) 10 MG 24 hr tablet Take 1 tablet (10 mg total) by mouth 2 (two) times daily as needed.    pantoprazole (PROTONIX) 40 MG tablet TAKE 1 TABLET BY MOUTH BEFORE BREAKFAST.    POLYETHYLENE GLYCOL 3350 (MIRALAX ORAL)     valsartan (DIOVAN) 160 MG tablet Take 1 tablet (160 mg total) by mouth 2 (two) times daily.    conjugated estrogens (PREMARIN) vaginal cream Place 0.5 g vaginally 3 (three) times a week.    promethazine (PHENERGAN) 12.5 MG Tab Take 1 tablet (12.5 mg total) by mouth every 6 (six) hours as needed (nausea).     Review of patient's allergies indicates:   Allergen Reactions    Alendronate Nausea Only     And heartburn    Kenalog [triamcinolone acetonide] Hives        Review of Systems   Constitutional: Negative for chills, fever and weight loss.   Respiratory: Negative for shortness of breath and wheezing.    Cardiovascular: Negative for chest pain.   Gastrointestinal: Positive for heartburn.       Objective:      Vital Signs (Most Recent)  Temp: 98.6 °F (37 °C) (05/13/19 1058)  Pulse: 77 (05/13/19 1058)  Resp: 15 (05/13/19 1058)  BP: (!) 154/73 (05/13/19 1058)  SpO2: 100 % (05/13/19 1058)    Vital Signs Range (Last 24H):  Temp:  [98.6 °F (37 °C)]   Pulse:  [77]   Resp:  [15]   BP: (154)/(73)   SpO2:  [100 %]     Physical Exam   Constitutional: She appears well-developed and well-nourished.   Cardiovascular: Normal rate.   Pulmonary/Chest: Effort  normal.   Abdominal: Soft.   Skin: Skin is warm and dry.   Psychiatric: She has a normal mood and affect. Her behavior is normal. Judgment and thought content normal.            Assessment:      Active Hospital Problems    Diagnosis  POA    Esophageal dysphagia [R13.10]  Yes      Resolved Hospital Problems   No resolved problems to display.       Plan:    EGd for dysphagia has had good success with esophageal dilation last time.

## 2019-05-13 NOTE — ANESTHESIA PREPROCEDURE EVALUATION
05/13/2019  Idalmis Morejon is a 68 y.o., female.    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    Colon adenoma: 10/15 repeat in 2020    Age-related osteoporosis without current pathological fracture 2016    Schatzki's ring: 3/15 dilated    Multiple lung nodules on CT: 7907-1472 no f/u needed    Steroid-induced glaucoma of both eyes    Proteinuria    Family history of colon cancer: maternal uncle and aunt    Hypertensive kidney disease with stage 2 chronic kidney disease    Recurrent major depressive disorder, in partial remission    Immunosuppression    Atherosclerosis of abdominal aorta: minimal see CT 7/16    Gastroesophageal reflux disease without esophagitis    CKD (chronic kidney disease) stage 3, GFR 30-59 ml/min    Secondary hyperparathyroidism         Anesthesia Evaluation    I have reviewed the Patient Summary Reports.     I have reviewed the Medications.     Review of Systems  Anesthesia Hx:   Denies Personal Hx of Anesthesia complications.   Hematology/Oncology:  Hematology Normal   Oncology Normal     EENT/Dental:EENT/Dental Normal   Cardiovascular:   Hypertension    Pulmonary:  Pulmonary Normal    Renal/:   Chronic Renal Disease    Hepatic/GI:   GERD    Musculoskeletal:   Arthritis     Neurological:    Neuromuscular Disease,    Endocrine:  Endocrine Normal    Dermatological:  Skin Normal    Psych:  Psychiatric Normal           Physical Exam  General:  Well nourished    Airway/Jaw/Neck:  AIRWAY FINDINGS: Normal      Eyes/Ears/Nose:  EYES/EARS/NOSE FINDINGS: Normal   Dental:  DENTAL FINDINGS: Normal   Chest/Lungs:  Chest/Lungs Clear    Heart/Vascular:  Heart Findings: Normal Heart murmur: negative Vascular Findings: Normal    Abdomen:  Abdomen Findings: Normal    Musculoskeletal:  Musculoskeletal Findings: Normal   Skin:  Skin Findings: Normal    Mental Status:  Mental Status Findings: Normal        Anesthesia Plan  Type of Anesthesia, risks & benefits discussed:  Anesthesia Type:  general  Patient's Preference: General  Intra-op Monitoring Plan: standard ASA monitors  Intra-op Monitoring Plan Comments:   Post Op Pain Control Plan:   Post Op Pain Control Plan Comments:   Induction:   IV  Beta Blocker:  Patient is not currently on a Beta-Blocker (No further documentation required).       Informed Consent: Patient understands risks and agrees with Anesthesia plan.  Questions answered. Anesthesia consent signed with patient.  ASA Score: 2     Day of Surgery Review of History & Physical: I have interviewed and examined the patient. I have reviewed the patient's H&P dated:    H&P update referred to the surgeon.         Ready For Surgery From Anesthesia Perspective.

## 2019-05-13 NOTE — TRANSFER OF CARE
"Anesthesia Transfer of Care Note    Patient: Idalmis Morejon    Procedure(s) Performed: Procedure(s) (LRB):  EGD (ESOPHAGOGASTRODUODENOSCOPY) (N/A)    Patient location: PACU    Anesthesia Type: general    Post pain: adequate analgesia    Post assessment: tolerated procedure well and no apparent anesthetic complications    Post vital signs: stable    Level of consciousness: awake, alert and oriented    Nausea/Vomiting: no nausea/vomiting    Complications: none    Transfer of care protocol was followed      Last vitals:   Visit Vitals  BP (!) 154/73   Pulse 77   Temp 37 °C (98.6 °F)   Resp 15   Ht 5' 6" (1.676 m)   Wt 67.6 kg (149 lb)   SpO2 100%   BMI 24.05 kg/m²     "

## 2019-05-15 ENCOUNTER — PATIENT OUTREACH (OUTPATIENT)
Dept: OTHER | Facility: OTHER | Age: 69
End: 2019-05-15

## 2019-05-15 ENCOUNTER — PATIENT MESSAGE (OUTPATIENT)
Dept: OPHTHALMOLOGY | Facility: CLINIC | Age: 69
End: 2019-05-15

## 2019-05-15 RX ORDER — DULOXETIN HYDROCHLORIDE 60 MG/1
CAPSULE, DELAYED RELEASE ORAL
Qty: 60 CAPSULE | Refills: 3 | OUTPATIENT
Start: 2019-05-15

## 2019-05-15 NOTE — PROGRESS NOTES
Left voicemail for patient to call back as follow up to her hypertension.     Assessment:  Reviewed recent readings. Per 2017 ACC/ AHA HTN guidelines (goal of BP < 130/80), current 30-day average needs to be addressed more thoroughly today.     Plan:  Consider increasing chlorthalidone to 25mg    I will continue to monitor regularly and will follow-up in 2 to 3 weeks, sooner if blood pressure begins to trend upward or downward.     Current medication regimen:  Hypertension Medications             amLODIPine (NORVASC) 10 MG tablet TAKE 1 TABLET BY MOUTH EVERY DAY    chlorthalidone (HYGROTEN) 25 MG Tab Take 1/2 tablet (12.5 mg) by mouth once daily in the morning (For blood pressure)    valsartan (DIOVAN) 160 MG tablet Take 1 tablet (160 mg total) by mouth 2 (two) times daily.        Patient has my contact information and knows to call with any concerns or clinical changes.

## 2019-05-18 DIAGNOSIS — I10 ESSENTIAL HYPERTENSION: ICD-10-CM

## 2019-05-18 RX ORDER — VALSARTAN 160 MG/1
160 TABLET ORAL 2 TIMES DAILY
Qty: 180 TABLET | Refills: 3 | Status: SHIPPED | OUTPATIENT
Start: 2019-05-18 | End: 2020-03-12

## 2019-05-20 ENCOUNTER — TELEPHONE (OUTPATIENT)
Dept: ENDOSCOPY | Facility: HOSPITAL | Age: 69
End: 2019-05-20

## 2019-05-23 ENCOUNTER — TELEPHONE (OUTPATIENT)
Dept: GASTROENTEROLOGY | Facility: CLINIC | Age: 69
End: 2019-05-23

## 2019-05-23 NOTE — TELEPHONE ENCOUNTER
Ma spoke to pt scheduled esophagram for dysphagia ordered by Dr. Love     Pt verbalize understanding, confirmed appt on 6/7 at 3 pm and thank Ma

## 2019-06-04 ENCOUNTER — LAB VISIT (OUTPATIENT)
Dept: LAB | Facility: HOSPITAL | Age: 69
End: 2019-06-04
Attending: INTERNAL MEDICINE
Payer: MEDICARE

## 2019-06-04 DIAGNOSIS — D53.9 DEFICIENCY ANEMIA: ICD-10-CM

## 2019-06-04 DIAGNOSIS — N18.30 CKD (CHRONIC KIDNEY DISEASE) STAGE 3, GFR 30-59 ML/MIN: ICD-10-CM

## 2019-06-04 LAB
ALBUMIN SERPL BCP-MCNC: 3.5 G/DL (ref 3.5–5.2)
ANION GAP SERPL CALC-SCNC: 8 MMOL/L (ref 8–16)
BASOPHILS # BLD AUTO: 0.02 K/UL (ref 0–0.2)
BASOPHILS NFR BLD: 0.3 % (ref 0–1.9)
BUN SERPL-MCNC: 14 MG/DL (ref 8–23)
CALCIUM SERPL-MCNC: 9.7 MG/DL (ref 8.7–10.5)
CHLORIDE SERPL-SCNC: 106 MMOL/L (ref 95–110)
CO2 SERPL-SCNC: 26 MMOL/L (ref 23–29)
CREAT SERPL-MCNC: 1.2 MG/DL (ref 0.5–1.4)
DIFFERENTIAL METHOD: ABNORMAL
EOSINOPHIL # BLD AUTO: 0.2 K/UL (ref 0–0.5)
EOSINOPHIL NFR BLD: 2.9 % (ref 0–8)
ERYTHROCYTE [DISTWIDTH] IN BLOOD BY AUTOMATED COUNT: 14.8 % (ref 11.5–14.5)
EST. GFR  (AFRICAN AMERICAN): 52.9 ML/MIN/1.73 M^2
EST. GFR  (NON AFRICAN AMERICAN): 45.9 ML/MIN/1.73 M^2
FERRITIN SERPL-MCNC: 31 NG/ML (ref 20–300)
GLUCOSE SERPL-MCNC: 83 MG/DL (ref 70–110)
HCT VFR BLD AUTO: 33.6 % (ref 37–48.5)
HGB BLD-MCNC: 10.9 G/DL (ref 12–16)
IRON SERPL-MCNC: 66 UG/DL (ref 30–160)
LYMPHOCYTES # BLD AUTO: 2.6 K/UL (ref 1–4.8)
LYMPHOCYTES NFR BLD: 38.8 % (ref 18–48)
MCH RBC QN AUTO: 27 PG (ref 27–31)
MCHC RBC AUTO-ENTMCNC: 32.4 G/DL (ref 32–36)
MCV RBC AUTO: 83 FL (ref 82–98)
MONOCYTES # BLD AUTO: 0.6 K/UL (ref 0.3–1)
MONOCYTES NFR BLD: 9.7 % (ref 4–15)
NEUTROPHILS # BLD AUTO: 3.2 K/UL (ref 1.8–7.7)
NEUTROPHILS NFR BLD: 48 % (ref 38–73)
PHOSPHATE SERPL-MCNC: 3.2 MG/DL (ref 2.7–4.5)
PLATELET # BLD AUTO: 294 K/UL (ref 150–350)
PMV BLD AUTO: 10.8 FL (ref 9.2–12.9)
POTASSIUM SERPL-SCNC: 4.6 MMOL/L (ref 3.5–5.1)
RBC # BLD AUTO: 4.03 M/UL (ref 4–5.4)
SATURATED IRON: 19 % (ref 20–50)
SODIUM SERPL-SCNC: 140 MMOL/L (ref 136–145)
TOTAL IRON BINDING CAPACITY: 346 UG/DL (ref 250–450)
TRANSFERRIN SERPL-MCNC: 234 MG/DL (ref 200–375)
WBC # BLD AUTO: 6.59 K/UL (ref 3.9–12.7)

## 2019-06-04 PROCEDURE — 85025 COMPLETE CBC W/AUTO DIFF WBC: CPT | Mod: HCNC

## 2019-06-04 PROCEDURE — 83540 ASSAY OF IRON: CPT | Mod: HCNC

## 2019-06-04 PROCEDURE — 82728 ASSAY OF FERRITIN: CPT | Mod: HCNC

## 2019-06-04 PROCEDURE — 80069 RENAL FUNCTION PANEL: CPT | Mod: HCNC

## 2019-06-04 PROCEDURE — 36415 COLL VENOUS BLD VENIPUNCTURE: CPT | Mod: HCNC

## 2019-06-05 ENCOUNTER — PATIENT MESSAGE (OUTPATIENT)
Dept: INTERNAL MEDICINE | Facility: CLINIC | Age: 69
End: 2019-06-05

## 2019-06-05 ENCOUNTER — PES CALL (OUTPATIENT)
Dept: ADMINISTRATIVE | Facility: CLINIC | Age: 69
End: 2019-06-05

## 2019-06-06 NOTE — TELEPHONE ENCOUNTER
Please contact her to assist her with an appointment both in Colorectal surgery and in Nephrology.  Orders are in for both.  Thank you    Please let me know when scheduled.

## 2019-06-06 NOTE — TELEPHONE ENCOUNTER
Spoke to pt advised pt on appt with colon & rectal pt verbalized understanding , Nephrology is completely booked we tried and patient tired getting something please advise on what can be done pt patient thank you

## 2019-06-10 DIAGNOSIS — I10 ESSENTIAL HYPERTENSION: ICD-10-CM

## 2019-06-10 RX ORDER — AMLODIPINE BESYLATE 10 MG/1
TABLET ORAL
Qty: 90 TABLET | Refills: 0 | Status: SHIPPED | OUTPATIENT
Start: 2019-06-10 | End: 2019-08-29

## 2019-06-10 NOTE — TELEPHONE ENCOUNTER
This is another patient who needs a Nephrology appointment for worsening renal function.  Thank you for your prompt attention.

## 2019-06-11 NOTE — TELEPHONE ENCOUNTER
Zeferino Grove MD  You; Alexa Mensah LPN 3 minutes ago (3:16 PM)       Please, place this patient with our [facility] for July or August.    Routing comment

## 2019-06-17 ENCOUNTER — HOSPITAL ENCOUNTER (OUTPATIENT)
Dept: RADIOLOGY | Facility: HOSPITAL | Age: 69
Discharge: HOME | End: 2019-06-17
Attending: INTERNAL MEDICINE
Payer: MEDICARE

## 2019-06-17 ENCOUNTER — PATIENT MESSAGE (OUTPATIENT)
Dept: GASTROENTEROLOGY | Facility: CLINIC | Age: 69
End: 2019-06-17

## 2019-06-17 DIAGNOSIS — R13.19 ESOPHAGEAL DYSPHAGIA: ICD-10-CM

## 2019-06-17 PROCEDURE — 74220 X-RAY XM ESOPHAGUS 1CNTRST: CPT | Mod: 26,HCNC,, | Performed by: RADIOLOGY

## 2019-06-17 PROCEDURE — 74220 X-RAY XM ESOPHAGUS 1CNTRST: CPT | Mod: TC,HCNC

## 2019-06-17 PROCEDURE — 74220 FL ESOPHAGRAM WITH BARIUM TABLET: ICD-10-PCS | Mod: 26,HCNC,, | Performed by: RADIOLOGY

## 2019-06-19 ENCOUNTER — PATIENT MESSAGE (OUTPATIENT)
Dept: GASTROENTEROLOGY | Facility: CLINIC | Age: 69
End: 2019-06-19

## 2019-06-19 DIAGNOSIS — R13.19 ESOPHAGEAL DYSPHAGIA: Primary | ICD-10-CM

## 2019-06-20 ENCOUNTER — OFFICE VISIT (OUTPATIENT)
Dept: NEPHROLOGY | Facility: CLINIC | Age: 69
End: 2019-06-20
Payer: MEDICARE

## 2019-06-20 VITALS
OXYGEN SATURATION: 99 % | HEIGHT: 66 IN | SYSTOLIC BLOOD PRESSURE: 140 MMHG | HEART RATE: 69 BPM | BODY MASS INDEX: 25.15 KG/M2 | DIASTOLIC BLOOD PRESSURE: 70 MMHG | WEIGHT: 156.5 LBS

## 2019-06-20 DIAGNOSIS — R80.9 PROTEINURIA, UNSPECIFIED TYPE: ICD-10-CM

## 2019-06-20 DIAGNOSIS — N18.30 CKD (CHRONIC KIDNEY DISEASE) STAGE 3, GFR 30-59 ML/MIN: Primary | ICD-10-CM

## 2019-06-20 PROBLEM — I12.9 HYPERTENSIVE KIDNEY DISEASE WITH STAGE 2 CHRONIC KIDNEY DISEASE: Status: RESOLVED | Noted: 2017-06-28 | Resolved: 2019-06-20

## 2019-06-20 PROBLEM — N18.2 HYPERTENSIVE KIDNEY DISEASE WITH STAGE 2 CHRONIC KIDNEY DISEASE: Status: RESOLVED | Noted: 2017-06-28 | Resolved: 2019-06-20

## 2019-06-20 PROCEDURE — 3078F PR MOST RECENT DIASTOLIC BLOOD PRESSURE < 80 MM HG: ICD-10-PCS | Mod: HCNC,CPTII,S$GLB, | Performed by: INTERNAL MEDICINE

## 2019-06-20 PROCEDURE — 99214 OFFICE O/P EST MOD 30 MIN: CPT | Mod: HCNC,S$GLB,, | Performed by: INTERNAL MEDICINE

## 2019-06-20 PROCEDURE — 99999 PR PBB SHADOW E&M-EST. PATIENT-LVL III: CPT | Mod: PBBFAC,HCNC,, | Performed by: INTERNAL MEDICINE

## 2019-06-20 PROCEDURE — 99499 RISK ADDL DX/OHS AUDIT: ICD-10-PCS | Mod: HCNC,S$GLB,, | Performed by: INTERNAL MEDICINE

## 2019-06-20 PROCEDURE — 1101F PR PT FALLS ASSESS DOC 0-1 FALLS W/OUT INJ PAST YR: ICD-10-PCS | Mod: HCNC,CPTII,S$GLB, | Performed by: INTERNAL MEDICINE

## 2019-06-20 PROCEDURE — 99999 PR PBB SHADOW E&M-EST. PATIENT-LVL III: ICD-10-PCS | Mod: PBBFAC,HCNC,, | Performed by: INTERNAL MEDICINE

## 2019-06-20 PROCEDURE — 1101F PT FALLS ASSESS-DOCD LE1/YR: CPT | Mod: HCNC,CPTII,S$GLB, | Performed by: INTERNAL MEDICINE

## 2019-06-20 PROCEDURE — 3078F DIAST BP <80 MM HG: CPT | Mod: HCNC,CPTII,S$GLB, | Performed by: INTERNAL MEDICINE

## 2019-06-20 PROCEDURE — 99499 UNLISTED E&M SERVICE: CPT | Mod: HCNC,S$GLB,, | Performed by: INTERNAL MEDICINE

## 2019-06-20 PROCEDURE — 3077F PR MOST RECENT SYSTOLIC BLOOD PRESSURE >= 140 MM HG: ICD-10-PCS | Mod: HCNC,CPTII,S$GLB, | Performed by: INTERNAL MEDICINE

## 2019-06-20 PROCEDURE — 3077F SYST BP >= 140 MM HG: CPT | Mod: HCNC,CPTII,S$GLB, | Performed by: INTERNAL MEDICINE

## 2019-06-20 PROCEDURE — 99214 PR OFFICE/OUTPT VISIT, EST, LEVL IV, 30-39 MIN: ICD-10-PCS | Mod: HCNC,S$GLB,, | Performed by: INTERNAL MEDICINE

## 2019-06-20 NOTE — PROGRESS NOTES
"Subjective:       Patient ID: Idalmis Morejon is a 68 y.o. Black or  female who presents for follow up of Chronic Kidney Disease    HPI     Ms. Morejon is seen for follow up on CKD. She was last followed on 1/25/18. She returns to re-establish follow up on CKD.    She reports she has been doing ok. She denies any recent problems with BP/ dizziness/ nausea/ vomiting/ decreased urine/ dysuria. She has occasional ankle swelling, she states she has been told it is related to amlodipine use. She does not have any flank pain/ SOB.     Pt has longstanding hypertension, Schatzki's ring, DJD, sciatica, osteoporosis, hyperlipidemia, depression and multiple other medical problems. She reports having hypertension for "decades". She reports her mother and brother had longterm hypertension. They both had kidney disease and her brother had to be on dialysis. Pt admits to prior heavy use of various NSAID like Ibuprofen, goody powder and other compounds. She was still using NSAID until later part of 2016.    She has been on ARB containing regimen. Her lab trend noted for baseline creatinine of 1 to 1.2. She has minimal amount of protein on prior urine studies, on prior quantification it was 0.1 to 0.2.      Remote hep C screen was negative. Prior CT imaging showed hypodensity suggestive of possible cysts on kidneys.    Renal Function:  Lab Results   Component Value Date    GLU 83 06/04/2019     04/09/2019     06/04/2019     04/09/2019    K 4.6 06/04/2019    K 4.0 04/09/2019     06/04/2019     04/09/2019    CO2 26 06/04/2019    CO2 27 04/09/2019    BUN 14 06/04/2019    BUN 14 04/09/2019    CALCIUM 9.7 06/04/2019    CALCIUM 10.3 04/09/2019    CREATININE 1.2 06/04/2019    CREATININE 1.2 04/09/2019    ALBUMIN 3.5 06/04/2019    ALBUMIN 3.9 04/09/2019    PHOS 3.2 06/04/2019    PHOS 3.1 06/06/2018    ESTGFRAFRICA 52.9 (A) 06/04/2019    ESTGFRAFRICA 53 (A) 04/09/2019    EGFRNONAA 45.9 (A) " 06/04/2019    EGFRNONAA 46 (A) 04/09/2019       Urinalysis:  Lab Results   Component Value Date    APPEARANCEUA Clear 11/22/2017    PHUR 7.0 11/22/2017    SPECGRAV 1.015 11/22/2017    PROTEINUA Negative 11/22/2017    GLUCUA Negative 11/22/2017    OCCULTUA Negative 11/22/2017    NITRITE Negative 11/22/2017    LEUKOCYTESUR Trace (A) 11/22/2017       Protein/Creatinine Ratio:  Lab Results   Component Value Date    PROTEINURINE 19 (H) 11/22/2017    CREATRANDUR 160.0 11/22/2017    UTPCR 0.12 11/22/2017       CBC:  Lab Results   Component Value Date    WBC 6.59 06/04/2019    HGB 10.9 (L) 06/04/2019    HCT 33.6 (L) 06/04/2019     US kidneys  4/2019  8.2 and 8.3 cm kidney size  No hydronephrosis  No cysts noted    Review of Systems   Constitutional: Negative for activity change, appetite change, chills, fatigue and fever.   HENT: Negative for facial swelling and sore throat.    Respiratory: Negative for cough, shortness of breath and wheezing.    Cardiovascular: Negative for chest pain and leg swelling.   Gastrointestinal: Negative for abdominal pain, diarrhea, nausea and vomiting.   Endocrine: Negative for polyuria.   Genitourinary: Negative for decreased urine volume, difficulty urinating, dysuria, flank pain, frequency and hematuria.   Musculoskeletal: Negative for gait problem.   Skin: Negative for pallor and rash.   Neurological: Negative for dizziness, light-headedness and headaches.   Psychiatric/Behavioral: Negative for behavioral problems. The patient is not nervous/anxious.        Objective:      Physical Exam   Constitutional: She is oriented to person, place, and time. She appears well-developed and well-nourished. No distress.   Eyes: Right eye exhibits no discharge. Left eye exhibits no discharge.   Neck: Neck supple.   Cardiovascular: Normal rate, regular rhythm and normal heart sounds.   Pulmonary/Chest: Effort normal and breath sounds normal. No respiratory distress. She has no wheezes. She has no rales.    Abdominal: Soft. There is no tenderness.   Musculoskeletal: She exhibits no edema.   Neurological: She is alert and oriented to person, place, and time.   Skin: Skin is warm and dry.   Psychiatric: She has a normal mood and affect. Her behavior is normal. Thought content normal.   Nursing note and vitals reviewed.      Assessment:       1. CKD (chronic kidney disease) stage 3, GFR 30-59 ml/min    2. Proteinuria, unspecified type      Plan:     Ms. Morejon has longstanding hypertension, prior heavy use of NSAID, hyperlipidemia, CTD and multiple other medical problems. Pt appears to have CKD III and minimal proteinuria per prior urine PC ratio. It could be related to longstanding hypertension and heavy use of NSAID. I discussed with her in detail about plenty of oral fluids, low salt diet, monitoring BP at home, avoiding all types of NSAID, avoid high dose of vitamin C as it can promote GI irritation and kidney stone formation, periodically following on renal labs. Her family members likely had hypertensive glomerulosclerosis.     Encourage increased oral intake of water. Stressed importance of following low salt diet, keeping goal BP less than 130/80 and following BP closely at home on a regular basis. She expressed understanding. Her BP is being monitored through the Digital BP monitoring program.    Overall her CKD is stable appearing for now. Chronic anemia not related to her CKD. No pre renal component seen on the most recent labs.     Plan  - periodically monitor renal panel for electrolytes, acid base status, eGFR  - CKD staging and potential risk of CKD progression d/w patient  - follow low salt diet  - follow PTH levels, vit D  - follow urine studies for proteinuria, quantify proteinuria by spot urine protein/ creatinine ratio  - continue to follow with urology for urinary incontinence problem  - avoid NSAID/ bactrim/ IV contrast/ gadolinium/ aminoglycoside/ Fleet enema where possible  - continue ARB  containing regimen for RAAS blockade. She has been on losartan   - defer to her GI if she can come off protonix, given her complicated GI symptoms and problems not sure if it would be possible  - discussed with patient that Tylenol upto 2 grams per day in divided doses is still a safe option for her chronic pain    Plan, labs, recommendations were discussed with patient, her questions were answered to her satisfaction.   RTC  6 months

## 2019-06-20 NOTE — LETTER
June 20, 2019      Noemy Pugh MD  [address]  [place] [address]           [facility] - Nephrology  [address]  [place] [address]  Phone: [phone]  Fax: [phone]          Patient: Idalmis Morejon   MR Number: [id number]   YOB: 1950   Date of Visit: 6/20/2019       Dear Dr. Noemy Pugh:    Thank you for referring Idalmis Morejon to me for evaluation. Attached you will find relevant portions of my assessment and plan of care.    If you have questions, please do not hesitate to call me. I look forward to following Idalmis Morejon along with you.    Sincerely,    Gian Sena MD    Enclosure  CC:  No Recipients    If you would like to receive this communication electronically, please contact [email or web address] or [phone] to request more information on [employer] Link access.    For providers and/or their staff who would like to refer a patient to [facility], please contact us through our one-stop-shop provider referral line, [facility], at [phone].    If you feel you have received this communication in error or would no longer like to receive these types of communications, please e-mail [email or web address]

## 2019-06-23 NOTE — PROGRESS NOTES
Assessment /Plan     For exam results, see Encounter Report.    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    Bilateral ocular hypertension          Steroid glaucoma  Re-discussed steroid use and elevated IOP response    Patient with Hx scleritis  Long term PF 1% use without steroid response in past  Switched from PF 1% --> Durezol q day in April 2/2 cost and lack of coverage  Presents 10/5/2016 43 OU, clear corneas and quiet --> patient felt blur in OS x 3 weeks / no pain        CCT  489 // 495    Low teens      Both eyes  Combigan TWICE DAILY --> Alphagan Allergy --> Hold --> start Timolol BID     No Trauma / Masses etc    HESHAM OS  Inf limbus  Patient notes increased darkening  Flat without vessels        Recurrent Scleritis OU  + GEORGIANA Lupus  Azathioprine -->  Not using    Off Oral Motrin 400 mg TWICE DAILY -> proteinuria     Dry Eye Syndrome: discussed use of warm compresses, preserved & non-preserved artificial tears, gel and PM ointment options.  Also discussed options utilizing medications.      Plan  RTC 4-6 weeks IOP, HVF and OCT RNFL --> off Alphagan  RTC sooner prn with good understanding

## 2019-06-24 ENCOUNTER — OFFICE VISIT (OUTPATIENT)
Dept: OPHTHALMOLOGY | Facility: CLINIC | Age: 69
End: 2019-06-24
Payer: MEDICARE

## 2019-06-24 DIAGNOSIS — H11.132 PRIMARY ACQUIRED MELANOSIS OF CONJUNCTIVA OF LEFT EYE: ICD-10-CM

## 2019-06-24 DIAGNOSIS — T38.0X5A STEROID-INDUCED GLAUCOMA OF BOTH EYES: ICD-10-CM

## 2019-06-24 DIAGNOSIS — H40.1194 PRIMARY OPEN-ANGLE GLAUCOMA, INDETERMINATE STAGE, UNSPECIFIED LATERALITY: Primary | ICD-10-CM

## 2019-06-24 DIAGNOSIS — D89.89 AUTOIMMUNE DISORDER: ICD-10-CM

## 2019-06-24 DIAGNOSIS — H10.433 CHRONIC FOLLICULAR CONJUNCTIVITIS OF BOTH EYES: Primary | ICD-10-CM

## 2019-06-24 DIAGNOSIS — H40.053 BILATERAL OCULAR HYPERTENSION: ICD-10-CM

## 2019-06-24 DIAGNOSIS — H40.63X0 STEROID-INDUCED GLAUCOMA OF BOTH EYES: ICD-10-CM

## 2019-06-24 PROCEDURE — 92014 PR EYE EXAM, EST PATIENT,COMPREHESV: ICD-10-PCS | Mod: HCNC,S$GLB,, | Performed by: OPHTHALMOLOGY

## 2019-06-24 PROCEDURE — 99499 RISK ADDL DX/OHS AUDIT: ICD-10-PCS | Mod: HCNC,S$GLB,, | Performed by: OPHTHALMOLOGY

## 2019-06-24 PROCEDURE — 99999 PR PBB SHADOW E&M-EST. PATIENT-LVL II: CPT | Mod: PBBFAC,HCNC,, | Performed by: OPHTHALMOLOGY

## 2019-06-24 PROCEDURE — 99999 PR PBB SHADOW E&M-EST. PATIENT-LVL II: ICD-10-PCS | Mod: PBBFAC,HCNC,, | Performed by: OPHTHALMOLOGY

## 2019-06-24 PROCEDURE — 92014 COMPRE OPH EXAM EST PT 1/>: CPT | Mod: HCNC,S$GLB,, | Performed by: OPHTHALMOLOGY

## 2019-06-24 PROCEDURE — 99499 UNLISTED E&M SERVICE: CPT | Mod: HCNC,S$GLB,, | Performed by: OPHTHALMOLOGY

## 2019-06-24 RX ORDER — TIMOLOL MALEATE 5 MG/ML
1 SOLUTION/ DROPS OPHTHALMIC 2 TIMES DAILY
Qty: 10 ML | Refills: 6 | Status: SHIPPED | OUTPATIENT
Start: 2019-06-24 | End: 2020-11-22 | Stop reason: SDUPTHER

## 2019-06-24 RX ORDER — TIMOLOL MALEATE 5 MG/ML
1 SOLUTION/ DROPS OPHTHALMIC 2 TIMES DAILY
COMMUNITY
End: 2019-06-24 | Stop reason: SDUPTHER

## 2019-06-28 ENCOUNTER — TELEPHONE (OUTPATIENT)
Dept: ENDOSCOPY | Facility: HOSPITAL | Age: 69
End: 2019-06-28

## 2019-06-28 ENCOUNTER — OFFICE VISIT (OUTPATIENT)
Dept: GASTROENTEROLOGY | Facility: CLINIC | Age: 69
End: 2019-06-28
Payer: MEDICARE

## 2019-06-28 ENCOUNTER — PATIENT OUTREACH (OUTPATIENT)
Dept: OTHER | Facility: OTHER | Age: 69
End: 2019-06-28

## 2019-06-28 VITALS
WEIGHT: 154.56 LBS | BODY MASS INDEX: 26.39 KG/M2 | DIASTOLIC BLOOD PRESSURE: 90 MMHG | SYSTOLIC BLOOD PRESSURE: 142 MMHG | HEIGHT: 64 IN | HEART RATE: 74 BPM

## 2019-06-28 DIAGNOSIS — D50.9 IRON DEFICIENCY ANEMIA, UNSPECIFIED IRON DEFICIENCY ANEMIA TYPE: ICD-10-CM

## 2019-06-28 DIAGNOSIS — R13.19 ESOPHAGEAL DYSPHAGIA: Primary | ICD-10-CM

## 2019-06-28 PROCEDURE — 99999 PR PBB SHADOW E&M-EST. PATIENT-LVL IV: CPT | Mod: PBBFAC,HCNC,, | Performed by: INTERNAL MEDICINE

## 2019-06-28 PROCEDURE — 1101F PT FALLS ASSESS-DOCD LE1/YR: CPT | Mod: HCNC,CPTII,S$GLB, | Performed by: INTERNAL MEDICINE

## 2019-06-28 PROCEDURE — 3080F DIAST BP >= 90 MM HG: CPT | Mod: HCNC,CPTII,S$GLB, | Performed by: INTERNAL MEDICINE

## 2019-06-28 PROCEDURE — 99999 PR PBB SHADOW E&M-EST. PATIENT-LVL IV: ICD-10-PCS | Mod: PBBFAC,HCNC,, | Performed by: INTERNAL MEDICINE

## 2019-06-28 PROCEDURE — 3080F PR MOST RECENT DIASTOLIC BLOOD PRESSURE >= 90 MM HG: ICD-10-PCS | Mod: HCNC,CPTII,S$GLB, | Performed by: INTERNAL MEDICINE

## 2019-06-28 PROCEDURE — 99214 OFFICE O/P EST MOD 30 MIN: CPT | Mod: HCNC,S$GLB,, | Performed by: INTERNAL MEDICINE

## 2019-06-28 PROCEDURE — 1101F PR PT FALLS ASSESS DOC 0-1 FALLS W/OUT INJ PAST YR: ICD-10-PCS | Mod: HCNC,CPTII,S$GLB, | Performed by: INTERNAL MEDICINE

## 2019-06-28 PROCEDURE — 3077F PR MOST RECENT SYSTOLIC BLOOD PRESSURE >= 140 MM HG: ICD-10-PCS | Mod: HCNC,CPTII,S$GLB, | Performed by: INTERNAL MEDICINE

## 2019-06-28 PROCEDURE — 3077F SYST BP >= 140 MM HG: CPT | Mod: HCNC,CPTII,S$GLB, | Performed by: INTERNAL MEDICINE

## 2019-06-28 PROCEDURE — 99214 PR OFFICE/OUTPT VISIT, EST, LEVL IV, 30-39 MIN: ICD-10-PCS | Mod: HCNC,S$GLB,, | Performed by: INTERNAL MEDICINE

## 2019-06-28 NOTE — PROGRESS NOTES
Last 5 Patient Entered Readings                                      Current 30 Day Average: 133/79     Recent Readings 6/25/2019 6/18/2019 6/11/2019 6/11/2019 6/3/2019    SBP (mmHg) 140 128 125 142 140    DBP (mmHg) 80 76 77 88 73    Pulse 77 69 75 73 67            Digital [facility]: Health  Follow Up    Left voicemail to follow up with Ms. Idalmis Morejon.  Current BP average 133/79 mmHg is not at goal, [130/80 mmHg].

## 2019-06-28 NOTE — LETTER
June 28, 2019      Noemy Pugh MD  [address]  [place] [address]           [facility] - Gastroenterology  [address]  [place] [address]  Phone: [phone]  Fax: [phone]          Patient: Idalmis Morejon   MR Number: [id number]   YOB: 1950   Date of Visit: 6/28/2019       Dear Dr. Noemy Pugh:    Thank you for referring Idalmis Morejon to me for evaluation. Attached you will find relevant portions of my assessment and plan of care.    If you have questions, please do not hesitate to call me. I look forward to following Idalmis Morejon along with you.    Sincerely,    Bryan Love MD    Enclosure  CC:  No Recipients    If you would like to receive this communication electronically, please contact [email or web address] or [phone] to request more information on [employer] Link access.    For providers and/or their staff who would like to refer a patient to [facility], please contact us through our one-stop-shop provider referral line, [facility], at [phone].    If you feel you have received this communication in error or would no longer like to receive these types of communications, please e-mail [email or web address]

## 2019-06-28 NOTE — TELEPHONE ENCOUNTER
Patient in office to scheduled EGD and colonoscopy.  She wants to schedule in September. Informed her that we are not scheduling for that time yet.  Main line phone number provided to call in 2 weeks to attempt scheduling.  Stated understanding.

## 2019-06-28 NOTE — PROGRESS NOTES
[facility] Consultation Note    Reason for Consult:  The primary encounter diagnosis was Esophageal dysphagia. A diagnosis of Iron deficiency anemia, unspecified iron deficiency anemia type was also pertinent to this visit.    PCP:   Noemy Pugh   [address] / [place] [address]    Referring MD:  Noemy Pugh Md  [address] [place] [place] [address]    Initial History of Present Illness (HPI):  This is a 68 y.o. female here for evaluation of intermittent solid food dysphagia and iron deficiency anemia.  Patient denies taking any NSAIDs.  No history of food impactions.  She did have esophageal dilation had some improvement with feels she needs to be further dilated.  She has not seen any blood in her stool.  No change in bowel habits.  No fever chills or weight loss.  No chest pain or shortness of breath no abdominal pain.    Abdominal pain - no  Reflux - no  Dysphagia - yes  Bowel habits - normal  GI bleeding - none  NSAID usage - none    Interval HPI 06/28/2019:  The patient's last visit with me was on 2/9/2017.      ROS:  Constitutional: No fevers, chills, No weight loss  ENT:  No heartburn no dysphagia no odynophagia no hoarseness  CV: No chest pain, no palpitation  Pulm: No cough, No shortness of breath, no wheezing  Ophtho: No vision changes  GI: see HPI  Derm: No rash, no itching  Heme: No lymphadenopathy, No easy bruising  MSK: No significant arthritis  : No dysuria, No hematuria  Endo: No hot or cold intolerance  Neuro: No syncope, No seizure, no strokes  Psych: No uncontrolled anxiety, No uncontrolled depression    Medical History:  has a past medical history of Abnormal chest CT, Acid reflux, Allergy, Anemia, Anemia, Anxiety, Cataract, Chronic UTI, Colon adenoma 2015 due 2020 (10/21/2015), Colon adenoma 2015 due 2020 (10/21/2015), Depression, Disturbed sleep rhythm, DJD (degenerative joint disease), Dry eyes, Dry mouth, Grief reaction (3/24/2014), migraine  headaches, Hyperlipemia, Hypernatremia (8/8/2014), Hypertension, Iritis, Iritis, Mild vitamin D deficiency (9/9/2013), Multiple lung nodules on CT: 3293-8232 no f/u needed (6/6/2016), Neurogenic bladder, Osteopenia, Osteoporosis, Osteoporosis: 9/15 see rheumatology notes (6/6/2016), Positive GEORGIANA (antinuclear antibody), Schatzki's ring: 3/15 dilated (6/6/2016), Sciatica neuralgia (6/21/2013), 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    Surgical History:  has a past surgical history that includes Cholecystectomy; Appendectomy; Posterior fusion lumbar spine w/ corpectomy; Back surgery (2007); TONSILLECTOMY, ADENOIDECTOMY; Cystoscopy; Hernia repair; Colonoscopy (N/A, 10/2/2015); Hysterectomy; and Esophagogastroduodenoscopy (N/A, 5/13/2019).    Family History: family history includes Blindness in her maternal aunt; Cancer in her maternal aunt and maternal uncle; Colon cancer (age of onset: 70) in her maternal aunt and maternal uncle; Diabetes in her brother and father; Heart disease in her brother; Hyperlipidemia in her sister; Hypertension in her daughter, mother, and sister; Kidney disease in her brother and mother..     Social History:  reports that she has never smoked. She has never used smokeless tobacco. She reports that she does not drink alcohol or use drugs.     PAST MEDICAL HISTORY: Positive for hypertension, depression, chronic back pain,  anemia, sickle cell trait and possible lupus, but not definitive.      PAST SURGICAL HISTORY: Bilateral salpingo-oophorectomy, hysterectomy back in   1970, cholecystectomy in 1997, appendectomy, tonsillectomy, lumbar surgery with   fusion.      FAMILY HISTORY: Mom had two brothers with colorectal cancer, one sister with   colorectal cancer in her 70s. No first or other second-degree relatives with   colorectal cancer. Nobody with colon cancer under the age of 50. No Holly   syndrome. No FAP, no attenuated FAP, no MAP. No other GI  "malignancies. She   did have a brother who  of an MI in his 60, he was a diabetic, on dialysis.   Her mom lived past age 86. Her dad  in his 80s.    Review of patient's allergies indicates:   Allergen Reactions    Alendronate Nausea Only     And heartburn    Kenalog [triamcinolone acetonide] Hives       Medication List with Changes/Refills   Current Medications    AMLODIPINE (NORVASC) 10 MG TABLET    TAKE 1 TABLET BY MOUTH EVERY DAY    CETIRIZINE (ZYRTEC) 10 MG TABLET    Take 1 tablet (10 mg total) by mouth 2 (two) times daily.    CHLORTHALIDONE (HYGROTEN) 25 MG TAB    Take 1/2 tablet (12.5 mg) by mouth once daily in the morning (For blood pressure)    DULOXETINE (CYMBALTA) 30 MG CAPSULE    Take 3 capsules (90 mg total) by mouth once daily.    FERROUS SULFATE 324 MG (65 MG IRON) TBEC    Take 1 tablet (324 mg total) by mouth once daily.    OXYBUTYNIN (DITROPAN-XL) 10 MG 24 HR TABLET    Take 1 tablet (10 mg total) by mouth 2 (two) times daily as needed.    PANTOPRAZOLE (PROTONIX) 40 MG TABLET    TAKE 1 TABLET BY MOUTH BEFORE BREAKFAST.    POLYETHYLENE GLYCOL 3350 (MIRALAX ORAL)        PROMETHAZINE (PHENERGAN) 12.5 MG TAB    Take 1 tablet (12.5 mg total) by mouth every 6 (six) hours as needed (nausea).    TIMOLOL MALEATE 0.5% (TIMOPTIC) 0.5 % DROP    Place 1 drop into both eyes 2 (two) times daily.    VALSARTAN (DIOVAN) 160 MG TABLET    TAKE 1 TABLET (160 MG TOTAL) BY MOUTH 2 (TWO) TIMES DAILY.         Objective Findings:    Vital Signs:  BP (!) 142/90   Pulse 74   Ht 5' 4" (1.626 m)   Wt 70.1 kg (154 lb 8.7 oz)   BMI 26.53 kg/m²   Body mass index is 26.53 kg/m².    Physical Exam:  General Appearance: Well appearing in no acute distress  Eyes:    No scleral icterus  ENT:  No lesions or masses   Lungs: CTA bilaterally, no wheezes, no rhonchi, no rales  Heart:  S1, S2 normal, no murmurs heard  Abdomen:  Non distended, soft, no guarding, no rebound, no tenderness, no appreciated ascites, no bruits, no " hepatosplenomegaly,  No CVA tenderness, no appreciated hernias  Musculoskeletal:  No major joint deformities  Skin: No petechiae or rash on exposed skin areas  Neurologic:  Alert and oriented x4  Psychiatric:  Normal speech mentation and affect    Labs:  Lab Results   Component Value Date    WBC 6.59 06/04/2019    HGB 10.9 (L) 06/04/2019    HCT 33.6 (L) 06/04/2019     06/04/2019    CHOL 159 04/09/2019    TRIG 105 04/09/2019    HDL 60 04/09/2019    ALT 10 04/09/2019    AST 24 04/09/2019     06/04/2019    K 4.6 06/04/2019     06/04/2019    CREATININE 1.2 06/04/2019    BUN 14 06/04/2019    CO2 26 06/04/2019    TSH 1.355 04/09/2019    INR 1.0 03/21/2015    HGBA1C 5.0 07/18/2006             Medical Decision Making:    Prior EGD and colonoscopy images and path reviewed by myself.  EGD talk given colonoscopy talk given.  Lab work talk given.  Split bowel prep talk given.    Assessment:  1. Esophageal dysphagia    2. Iron deficiency anemia, unspecified iron deficiency anemia type         Recommendations:   1.  Iron deficiency anemia Family history of colon cancer and prior colon polyps recommend colonoscopy.  2.  History of dysphagia repeat EGD with esophageal dilation and also evaluate for iron deficiency anemia.  3.  Return GI clinic as needed.  No follow-ups on file.      Order summary:  Orders Placed This Encounter    Case request GI: EGD (ESOPHAGOGASTRODUODENOSCOPY), COLONOSCOPY         Thank you so much for allowing me to participate in the care of Idalmis Love MD

## 2019-07-09 DIAGNOSIS — D50.9 IRON DEFICIENCY ANEMIA, UNSPECIFIED IRON DEFICIENCY ANEMIA TYPE: Primary | ICD-10-CM

## 2019-07-09 RX ORDER — POLYETHYLENE GLYCOL 3350, SODIUM SULFATE ANHYDROUS, SODIUM BICARBONATE, SODIUM CHLORIDE, POTASSIUM CHLORIDE 236; 22.74; 6.74; 5.86; 2.97 G/4L; G/4L; G/4L; G/4L; G/4L
4 POWDER, FOR SOLUTION ORAL ONCE
Qty: 4000 ML | Refills: 0 | Status: SHIPPED | OUTPATIENT
Start: 2019-07-09 | End: 2019-07-09

## 2019-07-10 ENCOUNTER — TELEPHONE (OUTPATIENT)
Dept: INTERNAL MEDICINE | Facility: CLINIC | Age: 69
End: 2019-07-10

## 2019-07-11 RX ORDER — CETIRIZINE HYDROCHLORIDE 10 MG/1
10 TABLET ORAL 2 TIMES DAILY
Qty: 60 TABLET | Refills: 0 | OUTPATIENT
Start: 2019-07-11

## 2019-07-11 NOTE — TELEPHONE ENCOUNTER
----- Message from Vicky Garner sent at 7/11/2019 11:46 AM CDT -----  Contact: pt at [phone]  Rx Refill/Request     Is this a Refill or New Rx:  refill  Rx Name and Strength:  Cetirrizine 10mg qty60  Preferred Pharmacy with phone number: [facility] at [phone]  Communication Preference: call asap.  Pt has been waiting since Monday.  Call pt with update please  Additional Information:
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
## 2019-07-11 NOTE — TELEPHONE ENCOUNTER
LVM stating medication refill was denied due to not being seen.  Left message that this medication was OTC.

## 2019-07-22 RX ORDER — DULOXETIN HYDROCHLORIDE 60 MG/1
CAPSULE, DELAYED RELEASE ORAL
Qty: 180 CAPSULE | Refills: 0 | Status: SHIPPED | OUTPATIENT
Start: 2019-07-22 | End: 2019-09-12 | Stop reason: DRUGHIGH

## 2019-07-24 ENCOUNTER — PATIENT MESSAGE (OUTPATIENT)
Dept: INTERNAL MEDICINE | Facility: CLINIC | Age: 69
End: 2019-07-24

## 2019-08-01 NOTE — PROGRESS NOTES
Last 5 Patient Entered Readings                                      Current 30 Day Average: 129/79     Recent Readings 7/22/2019 7/15/2019 7/11/2019 7/8/2019 7/4/2019    SBP (mmHg) 131 129 128 128 128    DBP (mmHg) 77 77 78 82 80    Pulse 82 69 72 77 69            Digital Medicine: Health  Follow Up    Left voicemail to follow up with Ms. Idalmis Morejon.  Current BP average 129/79 mmHg is at goal, [130/80 mmHg].  Sending [employer] message next outreach if patient does not return call.

## 2019-08-06 NOTE — PROGRESS NOTES
Assessment /Plan     For exam results, see Encounter Report.    Bilateral ocular hypertension    Chronic follicular conjunctivitis of both eyes    Bilateral scleritis    Steroid-induced glaucoma of both eyes    Bilateral dry eyes - Both Eyes    Nuclear sclerosis of both eyes          Steroid glaucoma  Re-discussed steroid use and elevated IOP response    Patient with Hx scleritis  Long term PF 1% use without steroid response in past  Switched from PF 1% --> Durezol q day in April 2/2 cost and lack of coverage  Presents 10/5/2016 43 OU, clear corneas and quiet --> patient felt blur in OS x 3 weeks / no pain        CCT  489 // 495    Low teens      Both eyes  Timolol BID  Combigan BID --> Alphagan Allergy --> resolved // Holding    No Trauma / Masses etc    HESHAM OS  Inf limbus  Patient notes increased darkening  Flat without vessels        Recurrent Scleritis OU  + GEORGIANA Lupus  Azathioprine -->  Not using    Off Oral Motrin 400 mg TWICE DAILY -> proteinuria     Dry Eye Syndrome: discussed use of warm compresses, preserved & non-preserved artificial tears, gel and PM ointment options.  Also discussed options utilizing medications.      Plan  RTC 6 weeks IOP, DFE and OCT RNFL  RTC sooner prn with good understanding

## 2019-08-14 ENCOUNTER — CLINICAL SUPPORT (OUTPATIENT)
Dept: OPHTHALMOLOGY | Facility: CLINIC | Age: 69
End: 2019-08-14
Payer: MEDICARE

## 2019-08-14 ENCOUNTER — OFFICE VISIT (OUTPATIENT)
Dept: OPHTHALMOLOGY | Facility: CLINIC | Age: 69
End: 2019-08-14
Payer: MEDICARE

## 2019-08-14 DIAGNOSIS — H40.053 BILATERAL OCULAR HYPERTENSION: ICD-10-CM

## 2019-08-14 DIAGNOSIS — H15.003 BILATERAL SCLERITIS: ICD-10-CM

## 2019-08-14 DIAGNOSIS — H40.63X0 STEROID-INDUCED GLAUCOMA OF BOTH EYES: ICD-10-CM

## 2019-08-14 DIAGNOSIS — H10.433 CHRONIC FOLLICULAR CONJUNCTIVITIS OF BOTH EYES: ICD-10-CM

## 2019-08-14 DIAGNOSIS — T38.0X5A STEROID-INDUCED GLAUCOMA OF BOTH EYES: ICD-10-CM

## 2019-08-14 DIAGNOSIS — H25.13 NUCLEAR SCLEROSIS OF BOTH EYES: ICD-10-CM

## 2019-08-14 DIAGNOSIS — H04.123 BILATERAL DRY EYES: ICD-10-CM

## 2019-08-14 DIAGNOSIS — H40.053 BILATERAL OCULAR HYPERTENSION: Primary | ICD-10-CM

## 2019-08-14 PROCEDURE — 92083 HUMPHREY VISUAL FIELD - OU - BOTH EYES: ICD-10-PCS | Mod: HCNC,S$GLB,, | Performed by: OPHTHALMOLOGY

## 2019-08-14 PROCEDURE — 99999 PR PBB SHADOW E&M-EST. PATIENT-LVL III: ICD-10-PCS | Mod: PBBFAC,HCNC,, | Performed by: OPHTHALMOLOGY

## 2019-08-14 PROCEDURE — 99999 PR PBB SHADOW E&M-EST. PATIENT-LVL I: ICD-10-PCS | Mod: PBBFAC,HCNC,,

## 2019-08-14 PROCEDURE — 99999 PR PBB SHADOW E&M-EST. PATIENT-LVL III: CPT | Mod: PBBFAC,HCNC,, | Performed by: OPHTHALMOLOGY

## 2019-08-14 PROCEDURE — 99999 PR PBB SHADOW E&M-EST. PATIENT-LVL I: CPT | Mod: PBBFAC,HCNC,,

## 2019-08-14 PROCEDURE — 92012 PR EYE EXAM, EST PATIENT,INTERMED: ICD-10-PCS | Mod: HCNC,S$GLB,, | Performed by: OPHTHALMOLOGY

## 2019-08-14 PROCEDURE — 92012 INTRM OPH EXAM EST PATIENT: CPT | Mod: HCNC,S$GLB,, | Performed by: OPHTHALMOLOGY

## 2019-08-14 PROCEDURE — 92083 EXTENDED VISUAL FIELD XM: CPT | Mod: HCNC,S$GLB,, | Performed by: OPHTHALMOLOGY

## 2019-08-21 ENCOUNTER — OFFICE VISIT (OUTPATIENT)
Dept: INTERNAL MEDICINE | Facility: CLINIC | Age: 69
End: 2019-08-21
Payer: MEDICARE

## 2019-08-21 VITALS
HEIGHT: 66 IN | DIASTOLIC BLOOD PRESSURE: 80 MMHG | OXYGEN SATURATION: 99 % | BODY MASS INDEX: 24.7 KG/M2 | HEART RATE: 68 BPM | SYSTOLIC BLOOD PRESSURE: 120 MMHG | WEIGHT: 153.69 LBS

## 2019-08-21 DIAGNOSIS — E78.2 MIXED HYPERLIPIDEMIA: ICD-10-CM

## 2019-08-21 DIAGNOSIS — H40.63X0 STEROID-INDUCED GLAUCOMA OF BOTH EYES: ICD-10-CM

## 2019-08-21 DIAGNOSIS — N31.9 NEUROGENIC BLADDER: ICD-10-CM

## 2019-08-21 DIAGNOSIS — I10 ESSENTIAL HYPERTENSION: ICD-10-CM

## 2019-08-21 DIAGNOSIS — I70.0 ATHEROSCLEROSIS OF ABDOMINAL AORTA: ICD-10-CM

## 2019-08-21 DIAGNOSIS — N18.30 CKD (CHRONIC KIDNEY DISEASE) STAGE 3, GFR 30-59 ML/MIN: ICD-10-CM

## 2019-08-21 DIAGNOSIS — M81.0 AGE-RELATED OSTEOPOROSIS WITHOUT CURRENT PATHOLOGICAL FRACTURE: ICD-10-CM

## 2019-08-21 DIAGNOSIS — D89.89 AUTOIMMUNE DISORDER: ICD-10-CM

## 2019-08-21 DIAGNOSIS — Z00.00 ENCOUNTER FOR PREVENTIVE HEALTH EXAMINATION: Primary | ICD-10-CM

## 2019-08-21 DIAGNOSIS — R91.8 MULTIPLE LUNG NODULES ON CT: ICD-10-CM

## 2019-08-21 DIAGNOSIS — Z12.39 BREAST CANCER SCREENING: ICD-10-CM

## 2019-08-21 DIAGNOSIS — F33.41 RECURRENT MAJOR DEPRESSIVE DISORDER, IN PARTIAL REMISSION: ICD-10-CM

## 2019-08-21 DIAGNOSIS — N25.81 SECONDARY HYPERPARATHYROIDISM: ICD-10-CM

## 2019-08-21 DIAGNOSIS — T38.0X5A STEROID-INDUCED GLAUCOMA OF BOTH EYES: ICD-10-CM

## 2019-08-21 DIAGNOSIS — K21.00 GASTROESOPHAGEAL REFLUX DISEASE WITH ESOPHAGITIS: ICD-10-CM

## 2019-08-21 DIAGNOSIS — R09.89 PROMINENT AORTA: ICD-10-CM

## 2019-08-21 DIAGNOSIS — D84.9 IMMUNOSUPPRESSION: ICD-10-CM

## 2019-08-21 PROCEDURE — G0439 PPPS, SUBSEQ VISIT: HCPCS | Mod: HCNC,S$GLB,, | Performed by: NURSE PRACTITIONER

## 2019-08-21 PROCEDURE — 99499 RISK ADDL DX/OHS AUDIT: ICD-10-PCS | Mod: HCNC,S$GLB,, | Performed by: NURSE PRACTITIONER

## 2019-08-21 PROCEDURE — G0439 PR MEDICARE ANNUAL WELLNESS SUBSEQUENT VISIT: ICD-10-PCS | Mod: HCNC,S$GLB,, | Performed by: NURSE PRACTITIONER

## 2019-08-21 PROCEDURE — 99499 UNLISTED E&M SERVICE: CPT | Mod: HCNC,S$GLB,, | Performed by: NURSE PRACTITIONER

## 2019-08-21 PROCEDURE — 99999 PR PBB SHADOW E&M-EST. PATIENT-LVL V: CPT | Mod: PBBFAC,HCNC,, | Performed by: NURSE PRACTITIONER

## 2019-08-21 PROCEDURE — 3079F PR MOST RECENT DIASTOLIC BLOOD PRESSURE 80-89 MM HG: ICD-10-PCS | Mod: HCNC,CPTII,S$GLB, | Performed by: NURSE PRACTITIONER

## 2019-08-21 PROCEDURE — 3079F DIAST BP 80-89 MM HG: CPT | Mod: HCNC,CPTII,S$GLB, | Performed by: NURSE PRACTITIONER

## 2019-08-21 PROCEDURE — 3074F PR MOST RECENT SYSTOLIC BLOOD PRESSURE < 130 MM HG: ICD-10-PCS | Mod: HCNC,CPTII,S$GLB, | Performed by: NURSE PRACTITIONER

## 2019-08-21 PROCEDURE — 99999 PR PBB SHADOW E&M-EST. PATIENT-LVL V: ICD-10-PCS | Mod: PBBFAC,HCNC,, | Performed by: NURSE PRACTITIONER

## 2019-08-21 PROCEDURE — 3074F SYST BP LT 130 MM HG: CPT | Mod: HCNC,CPTII,S$GLB, | Performed by: NURSE PRACTITIONER

## 2019-08-21 RX ORDER — CHOLECALCIFEROL (VITAMIN D3) 25 MCG
1000 TABLET ORAL DAILY
COMMUNITY

## 2019-08-21 RX ORDER — MELATONIN 3 MG
LOZENGE ORAL NIGHTLY
COMMUNITY
End: 2020-06-30

## 2019-08-21 NOTE — PROGRESS NOTES
"Idalmis Morejon presented for a  Medicare AWV and comprehensive Health Risk Assessment today. The following components were reviewed and updated:    · Medical history  · Family History  · Social history  · Allergies and Current Medications  · Health Risk Assessment  · Health Maintenance  · Care Team     ** See Completed Assessments for Annual Wellness Visit within the encounter summary.**       The following assessments were completed:  · Living Situation  · CAGE  · Depression Screening  · Timed Get Up and Go  · Whisper Test  · Cognitive Function Screening  ·   ·   · Nutrition Screening  · ADL Screening  · PAQ Screening    Vitals:    08/21/19 0813   BP: 120/80   Pulse: 68   SpO2: 99%   Weight: 69.7 kg (153 lb 10.6 oz)   Height: 5' 6" (1.676 m)     Body mass index is 24.8 kg/m².  Physical Exam   Constitutional: She is oriented to person, place, and time. She appears well-developed and well-nourished.   HENT:   Head: Normocephalic and atraumatic.   Nose: Nose normal.   Eyes: Conjunctivae and EOM are normal.   Cardiovascular: Normal rate, regular rhythm, normal heart sounds and intact distal pulses.   No murmur heard.  Pulmonary/Chest: Effort normal and breath sounds normal.   Musculoskeletal: Normal range of motion.   Neurological: She is alert and oriented to person, place, and time.   Skin: Skin is warm and dry.   Psychiatric: She has a normal mood and affect. Her behavior is normal. Judgment and thought content normal.   Nursing note and vitals reviewed.        Diagnoses and health risks identified today and associated recommendations/orders:    1. Encounter for preventive health examination  Assessment performed. Health maintenance updated. Chart review completed.    2. Recurrent major depressive disorder, in partial remission  Chronic. Stable with current regimen. PHQ2 negative. Followed by Psychiatry.    3. Prominent aorta-Noted on imaging 12/6/2010  Noted on imaging. Chronic. Stable with blood pressure control. " Followed by PCP.    4. Atherosclerosis of abdominal aorta: minimal see CT 7/16  Noted on imaging. Chronic. Stable with blood pressure control. Followed by PCP.    5. CKD (chronic kidney disease) stage 3, GFR 30-59 ml/min  Chronic Continue as directed. Followed by Nephrology.    6. Immunosuppression  Chronic. Continue current regimen as instructed. Followed by Rheumatology.    7. Autoimmune disorder- recurrent iritis  Chronic. Continue current regimen as instructed. Followed by Rheumatology.    8. Secondary hyperparathyroidism  Chronic. Continue as directed. Followed by PCP.  Component      Latest Ref Rng & Units 6/6/2018   PTH      9.0 - 77.0 pg/mL 82.0 (H)     9. Gastroesophageal reflux disease with esophagitis: EGD 3/15  Chronic. Continue regimen as instructed. Followed by Gastroenterology.  EGD scheduled.    10. Age-related osteoporosis without current pathological fracture 2016  Chronic. Stable. Receives Prolia with ID department    11. Essential hypertension  Chronic. Continue as instructed. Followed by PCP.    12. Mixed hyperlipidemia  Chronic. Continue as instructed. Followed by PCP.    13. Multiple lung nodules on CT: 4817-0893 no f/u needed  Chronic. Stable. Followed by PCP.    14. Steroid-induced glaucoma of both eyes  Chronic. Continue with current regimen. Followed by Ophthalmology.    15. Neurogenic bladder  Chronic. Stable. Followed by PCP.    16. Breast cancer screening  - Mammo Digital Screening Bilat w/ [facility]; Future      Provided Idalmis with a 5-10 year written screening schedule and personal prevention plan. Recommendations were developed using the USPSTF age appropriate recommendations. Education, counseling, and referrals were provided as needed. After Visit Summary printed and given to patient which includes a list of additional screenings\tests needed.    Follow up for Annual Wellness Visit in 1 year, follow up with PCP as instructed, ;sooner if problems.    DANO Patel         I  offered to discuss end of life issues, including information on how to make advance directives that the patient could use to name someone who would make medical decisions on their behalf if they became too ill to make themselves.    _X_Patient declined  ___Patient is interested, I provided paper work and offered to discuss.

## 2019-08-21 NOTE — PATIENT INSTRUCTIONS
Counseling and Referral of Other Preventative  (Italic type indicates deductible and co-insurance are waived)    Patient Name: Idalmis Morejon  Today's Date: 8/21/2019    Health Maintenance       Date Due Completion Date    High Dose Statin 08/13/1969 ---    Mammogram 10/06/2019 10/6/2018    Override on 3/26/2012: Done    Shingles Vaccine (1 of 2) 08/21/2020 (Originally 8/13/1998) ---    Influenza Vaccine (1) 09/01/2019 ---    Colonoscopy 10/02/2020 10/2/2015    DEXA SCAN 04/25/2022 4/25/2019    Lipid Panel 04/09/2024 4/9/2019    TETANUS VACCINE 02/03/2026 2/3/2016        No orders of the defined types were placed in this encounter.    The following information is provided to all patients.  This information is to help you find resources for any of the problems found today that may be affecting your health:                Living healthy guide: www.[facility].[place].gov      Understanding Diabetes: www.diabetes.org      Eating healthy: www.cdc.gov/healthyweight      CDC home safety checklist: www.cdc.gov/steadi/patient.html      Agency on Aging: www.goea.[place].[facility]      Alcoholics anonymous (AA): www.aa.org      Physical Activity: www.dago.nih.gov/hd9asyi      Tobacco use: www.quitwithusla.org

## 2019-08-29 DIAGNOSIS — I10 ESSENTIAL HYPERTENSION: ICD-10-CM

## 2019-08-29 RX ORDER — AMLODIPINE BESYLATE 10 MG/1
5 TABLET ORAL 2 TIMES DAILY
Qty: 90 TABLET | Refills: 0
Start: 2019-08-29 | End: 2019-10-18 | Stop reason: SDUPTHER

## 2019-08-29 NOTE — PROGRESS NOTES
Last 5 Patient Entered Readings                                      Current 30 Day Average: 130/79     Recent Readings 8/26/2019 8/26/2019 8/24/2019 8/20/2019 8/12/2019    SBP (mmHg) 133 139 138 128 130    DBP (mmHg) 84 86 76 78 79    Pulse 74 74 70 76 78          PharmD spoke with patient today. I will follow-up on lifestyle next week.

## 2019-08-29 NOTE — PROGRESS NOTES
Last 5 Patient Entered Readings                                      Current 30 Day Average: 130/79     Recent Readings 8/26/2019 8/26/2019 8/24/2019 8/20/2019 8/12/2019    SBP (mmHg) 133 139 138 128 130    DBP (mmHg) 84 86 76 78 79    Pulse 74 74 70 76 78          HPI:  Called patient to follow up. Patient endorses adherence to medication regimen. Patient denies hypotensive s/sx (lightheadedness, dizziness, nausea, fatigue); patient denies hypertensive s/sx (SOB, CP, severe headaches, changes in vision).    Patient relates upward trend of BP to temporary increase in stress.    She reports IRIS is minimal and not restricting ADLs.     She is following with nephrology every 6 months.    Assessment:  Reviewed recent readings. Per 2017 ACC/ AHA HTN guidelines (goal of BP < 130/80), current 30-day average is well controlled.     Plan:  Continue current medication regimen. I will continue to monitor regularly and will follow-up in 2 to 3 weeks, sooner if blood pressure begins to trend upward or downward.     Current medication regimen:  Hypertension Medications             amLODIPine (NORVASC) 10 MG tablet TAKE 1/2 TABLET BY MOUTH twice a day    chlorthalidone (HYGROTEN) 25 MG Tab Take 1/2 tablet (12.5 mg) by mouth once daily in the morning (For blood pressure)    valsartan (DIOVAN) 160 MG tablet TAKE 1 TABLET (160 MG TOTAL) BY MOUTH 2 (TWO) TIMES DAILY.          Patient denies having questions or concerns. Patient has my contact information and knows to call with any concerns or clinical changes.

## 2019-08-29 NOTE — PROGRESS NOTES
Last 5 Patient Entered Readings                                      Current 30 Day Average: 130/79     Recent Readings 8/26/2019 8/26/2019 8/24/2019 8/20/2019 8/12/2019    SBP (mmHg) 133 139 138 128 130    DBP (mmHg) 84 86 76 78 79    Pulse 74 74 70 76 78          Patient answered and states she will call me back  Assess slight upward trend of BP and prior reported IRIS with amlodipine

## 2019-09-05 NOTE — PROGRESS NOTES
Last 5 Patient Entered Readings                                      Current 30 Day Average: 130/79     Recent Readings 9/2/2019 8/26/2019 8/26/2019 8/24/2019 8/20/2019    SBP (mmHg) 122 133 139 138 128    DBP (mmHg) 71 84 86 76 78    Pulse 65 74 74 70 76        Digital Medicine: Health  Follow Up    Left voicemail to follow up with Ms. Idalmis Morejon.  Current BP average 130/79 mmHg is at goal, [130/80 mmHg].

## 2019-09-09 ENCOUNTER — ANESTHESIA EVENT (OUTPATIENT)
Dept: ENDOSCOPY | Facility: HOSPITAL | Age: 69
End: 2019-09-09
Payer: MEDICARE

## 2019-09-09 ENCOUNTER — HOSPITAL ENCOUNTER (OUTPATIENT)
Facility: HOSPITAL | Age: 69
Discharge: HOME | End: 2019-09-09
Attending: INTERNAL MEDICINE | Admitting: INTERNAL MEDICINE
Payer: MEDICARE

## 2019-09-09 ENCOUNTER — TELEPHONE (OUTPATIENT)
Dept: ENDOSCOPY | Facility: HOSPITAL | Age: 69
End: 2019-09-09

## 2019-09-09 ENCOUNTER — ANESTHESIA (OUTPATIENT)
Dept: ENDOSCOPY | Facility: HOSPITAL | Age: 69
End: 2019-09-09
Payer: MEDICARE

## 2019-09-09 VITALS
OXYGEN SATURATION: 100 % | BODY MASS INDEX: 23.95 KG/M2 | HEIGHT: 66 IN | RESPIRATION RATE: 14 BRPM | TEMPERATURE: 97 F | HEART RATE: 69 BPM | DIASTOLIC BLOOD PRESSURE: 86 MMHG | SYSTOLIC BLOOD PRESSURE: 156 MMHG | WEIGHT: 149 LBS

## 2019-09-09 DIAGNOSIS — K92.2 GASTROINTESTINAL HEMORRHAGE, UNSPECIFIED GASTROINTESTINAL HEMORRHAGE TYPE: Primary | ICD-10-CM

## 2019-09-09 DIAGNOSIS — Z86.010 HISTORY OF ADENOMATOUS POLYP OF COLON: ICD-10-CM

## 2019-09-09 PROBLEM — Z86.0101 HISTORY OF ADENOMATOUS POLYP OF COLON: Status: ACTIVE | Noted: 2019-09-09

## 2019-09-09 PROCEDURE — G0105 COLORECTAL SCRN; HI RISK IND: ICD-10-PCS | Mod: HCNC,,, | Performed by: INTERNAL MEDICINE

## 2019-09-09 PROCEDURE — 25000003 PHARM REV CODE 250: Mod: HCNC | Performed by: NURSE ANESTHETIST, CERTIFIED REGISTERED

## 2019-09-09 PROCEDURE — E9220 PRA ENDO ANESTHESIA: ICD-10-PCS | Mod: HCNC,,, | Performed by: NURSE ANESTHETIST, CERTIFIED REGISTERED

## 2019-09-09 PROCEDURE — 88305 TISSUE SPECIMEN TO PATHOLOGY - SURGERY: ICD-10-PCS | Mod: 26,HCNC,, | Performed by: PATHOLOGY

## 2019-09-09 PROCEDURE — 88305 TISSUE EXAM BY PATHOLOGIST: CPT | Mod: HCNC | Performed by: PATHOLOGY

## 2019-09-09 PROCEDURE — C1769 GUIDE WIRE: HCPCS | Mod: HCNC | Performed by: INTERNAL MEDICINE

## 2019-09-09 PROCEDURE — 63600175 PHARM REV CODE 636 W HCPCS: Mod: HCNC | Performed by: INTERNAL MEDICINE

## 2019-09-09 PROCEDURE — 43249 ESOPH EGD DILATION <30 MM: CPT | Mod: HCNC,,, | Performed by: INTERNAL MEDICINE

## 2019-09-09 PROCEDURE — 37000008 HC ANESTHESIA 1ST 15 MINUTES: Mod: HCNC | Performed by: INTERNAL MEDICINE

## 2019-09-09 PROCEDURE — 43255 PR EGD, FLEX, W/CTRL BLEED, ANY METHOD: ICD-10-PCS | Mod: 59,HCNC,, | Performed by: INTERNAL MEDICINE

## 2019-09-09 PROCEDURE — 37000009 HC ANESTHESIA EA ADD 15 MINS: Mod: HCNC | Performed by: INTERNAL MEDICINE

## 2019-09-09 PROCEDURE — 43239 EGD BIOPSY SINGLE/MULTIPLE: CPT | Mod: HCNC

## 2019-09-09 PROCEDURE — 43248 PR EGD, FLEX, W/DILATION OVER GUIDEWIRE: ICD-10-PCS | Mod: HCNC,,, | Performed by: INTERNAL MEDICINE

## 2019-09-09 PROCEDURE — 63600175 PHARM REV CODE 636 W HCPCS: Mod: HCNC | Performed by: NURSE ANESTHETIST, CERTIFIED REGISTERED

## 2019-09-09 PROCEDURE — 43255 EGD CONTROL BLEEDING ANY: CPT | Mod: 59,HCNC,, | Performed by: INTERNAL MEDICINE

## 2019-09-09 PROCEDURE — 43255 EGD CONTROL BLEEDING ANY: CPT | Mod: HCNC | Performed by: INTERNAL MEDICINE

## 2019-09-09 PROCEDURE — 88305 TISSUE EXAM BY PATHOLOGIST: CPT | Mod: 26,HCNC,, | Performed by: PATHOLOGY

## 2019-09-09 PROCEDURE — 43239 PR EGD, FLEX, W/BIOPSY, SGL/MULTI: ICD-10-PCS | Mod: 59,HCNC,, | Performed by: INTERNAL MEDICINE

## 2019-09-09 PROCEDURE — 43249 PR EGD, FLEX, W/BALL DILATION, < 30MM: ICD-10-PCS | Mod: HCNC,,, | Performed by: INTERNAL MEDICINE

## 2019-09-09 PROCEDURE — G0105 COLORECTAL SCRN; HI RISK IND: HCPCS | Mod: HCNC,,, | Performed by: INTERNAL MEDICINE

## 2019-09-09 PROCEDURE — 43248 EGD GUIDE WIRE INSERTION: CPT | Mod: HCNC,,, | Performed by: INTERNAL MEDICINE

## 2019-09-09 PROCEDURE — 43255 EGD CONTROL BLEEDING ANY: CPT | Mod: HCNC,59

## 2019-09-09 PROCEDURE — 43239 EGD BIOPSY SINGLE/MULTIPLE: CPT | Mod: HCNC | Performed by: INTERNAL MEDICINE

## 2019-09-09 PROCEDURE — G0105 COLORECTAL SCRN; HI RISK IND: HCPCS | Mod: HCNC | Performed by: INTERNAL MEDICINE

## 2019-09-09 PROCEDURE — E9220 PRA ENDO ANESTHESIA: HCPCS | Mod: HCNC,,, | Performed by: NURSE ANESTHETIST, CERTIFIED REGISTERED

## 2019-09-09 PROCEDURE — 43239 EGD BIOPSY SINGLE/MULTIPLE: CPT | Mod: 59,HCNC,, | Performed by: INTERNAL MEDICINE

## 2019-09-09 PROCEDURE — 43249 ESOPH EGD DILATION <30 MM: CPT | Mod: HCNC | Performed by: INTERNAL MEDICINE

## 2019-09-09 RX ORDER — SODIUM CHLORIDE 9 MG/ML
INJECTION, SOLUTION INTRAVENOUS CONTINUOUS
Status: DISCONTINUED | OUTPATIENT
Start: 2019-09-09 | End: 2019-09-09 | Stop reason: HOSPADM

## 2019-09-09 RX ORDER — SODIUM CHLORIDE 0.9 % (FLUSH) 0.9 %
10 SYRINGE (ML) INJECTION
Status: DISCONTINUED | OUTPATIENT
Start: 2019-09-09 | End: 2019-09-09 | Stop reason: HOSPADM

## 2019-09-09 RX ORDER — PROPOFOL 10 MG/ML
VIAL (ML) INTRAVENOUS
Status: DISCONTINUED | OUTPATIENT
Start: 2019-09-09 | End: 2019-09-09

## 2019-09-09 RX ORDER — LIDOCAINE HCL/PF 100 MG/5ML
SYRINGE (ML) INTRAVENOUS
Status: DISCONTINUED | OUTPATIENT
Start: 2019-09-09 | End: 2019-09-09

## 2019-09-09 RX ORDER — GLYCOPYRROLATE 0.2 MG/ML
INJECTION INTRAMUSCULAR; INTRAVENOUS
Status: DISCONTINUED | OUTPATIENT
Start: 2019-09-09 | End: 2019-09-09

## 2019-09-09 RX ORDER — PROPOFOL 10 MG/ML
VIAL (ML) INTRAVENOUS CONTINUOUS PRN
Status: DISCONTINUED | OUTPATIENT
Start: 2019-09-09 | End: 2019-09-09

## 2019-09-09 RX ADMIN — SODIUM CHLORIDE: 0.9 INJECTION, SOLUTION INTRAVENOUS at 09:09

## 2019-09-09 RX ADMIN — PROPOFOL 140 MG: 10 INJECTION, EMULSION INTRAVENOUS at 08:09

## 2019-09-09 RX ADMIN — SODIUM CHLORIDE: 0.9 INJECTION, SOLUTION INTRAVENOUS at 08:09

## 2019-09-09 RX ADMIN — LIDOCAINE HYDROCHLORIDE 50 MG: 20 INJECTION, SOLUTION INTRAVENOUS at 09:09

## 2019-09-09 RX ADMIN — LIDOCAINE HYDROCHLORIDE 70 MG: 20 INJECTION, SOLUTION INTRAVENOUS at 08:09

## 2019-09-09 RX ADMIN — PROPOFOL 40 MG: 10 INJECTION, EMULSION INTRAVENOUS at 08:09

## 2019-09-09 RX ADMIN — GLYCOPYRROLATE 0.2 MG: 0.2 INJECTION, SOLUTION INTRAMUSCULAR; INTRAVENOUS at 08:09

## 2019-09-09 RX ADMIN — PROPOFOL 200 MCG/KG/MIN: 10 INJECTION, EMULSION INTRAVENOUS at 08:09

## 2019-09-09 NOTE — TRANSFER OF CARE
"Anesthesia Transfer of Care Note    Patient: Idalmis Morejon    Procedure(s) Performed: Procedure(s) (LRB):  EGD (ESOPHAGOGASTRODUODENOSCOPY) (N/A)  COLONOSCOPY (N/A)    Patient location: PACU    Anesthesia Type: general    Transport from OR: Transported from OR on 2-3 L/min O2 by NC with adequate spontaneous ventilation    Post pain: adequate analgesia    Post assessment: no apparent anesthetic complications and tolerated procedure well    Post vital signs: stable    Level of consciousness: sedated    Nausea/Vomiting: no nausea/vomiting    Complications: none    Transfer of care protocol was followed      Last vitals:   Visit Vitals  BP (!) 167/80 (BP Location: Left arm, Patient Position: Lying)   Pulse 70   Temp 36.6 °C (97.9 °F) (Tympanic)   Resp 16   Ht 5' 6" (1.676 m)   Wt 67.6 kg (149 lb)   SpO2 100%   Breastfeeding? No   BMI 24.05 kg/m²     "

## 2019-09-09 NOTE — PLAN OF CARE
Discharge instructions reviewed with pt and she verbalized understanding. Instructed pt to go to 2nd floor for lab work ordered per dr mahmood. Pt verbalized understanding

## 2019-09-09 NOTE — ANESTHESIA POSTPROCEDURE EVALUATION
Anesthesia Post Evaluation    Patient: Idalmis Morejon    Procedure(s) Performed: Procedure(s) (LRB):  EGD (ESOPHAGOGASTRODUODENOSCOPY) (N/A)  COLONOSCOPY (N/A)    Final Anesthesia Type: general  Patient location during evaluation: PACU  Patient participation: Yes- Able to Participate  Level of consciousness: awake and alert  Post-procedure vital signs: reviewed and stable  Pain management: adequate  Airway patency: patent  PONV status at discharge: No PONV  Anesthetic complications: no      Cardiovascular status: hemodynamically stable  Respiratory status: room air, spontaneous ventilation and unassisted  Hydration status: euvolemic  Follow-up not needed.          Vitals Value Taken Time   /86 9/9/2019 10:20 AM   Temp 36.3 °C (97.3 °F) 9/9/2019  9:50 AM   Pulse 69 9/9/2019 10:20 AM   Resp 14 9/9/2019 10:20 AM   SpO2 100 % 9/9/2019 10:20 AM         Event Time     Out of Recovery 10:29:34          Pain/Brina Score: Brina Score: 10 (9/9/2019 10:21 AM)

## 2019-09-09 NOTE — ANESTHESIA PREPROCEDURE EVALUATION
09/09/2019  Idalmis Morejon is a 69 y.o., female.    Anesthesia Evaluation    I have reviewed the Patient Summary Reports.     I have reviewed the Medications.     Review of Systems  Anesthesia Hx:  No problems with previous Anesthesia  Denies Family Hx of Anesthesia complications.   Denies Personal Hx of Anesthesia complications.   Hematology/Oncology:  Hematology Normal   Oncology Normal     EENT/Dental:EENT/Dental Normal   Cardiovascular:  Cardiovascular Normal     Pulmonary:  Pulmonary Normal    Renal/:  Renal/ Normal     Hepatic/GI:  Hepatic/GI Normal    Musculoskeletal:  Musculoskeletal Normal    Neurological:  Neurology Normal    Endocrine:  Endocrine Normal    Dermatological:  Skin Normal    Psych:  Psychiatric Normal           Physical Exam  General:  Well nourished    Airway/Jaw/Neck:  Airway Findings: Mouth Opening: Normal Tongue: Normal  General Airway Assessment: Adult  Mallampati: II  TM Distance: Normal, at least 6 cm      Dental:  Dental Findings:   Chest/Lungs:  Chest/Lungs Findings: Clear to auscultation     Heart/Vascular:  Heart Findings: Rate: Normal  Rhythm: Regular Rhythm  Sounds: Normal     Abdomen:  Abdomen Findings:  Normal     Musculoskeletal:  Musculoskeletal Findings:    Skin:  Skin Findings:     Mental Status:  Mental Status Findings:  Cooperative, Alert and Oriented         Anesthesia Plan  Type of Anesthesia, risks & benefits discussed:  Anesthesia Type:  general  Patient's Preference: General  Intra-op Monitoring Plan: standard ASA monitors  Intra-op Monitoring Plan Comments:   Post Op Pain Control Plan:   Post Op Pain Control Plan Comments:   Induction:    Beta Blocker:  Patient is not currently on a Beta-Blocker (No further documentation required).       Informed Consent: Patient understands risks and agrees with Anesthesia plan.  Questions answered. Anesthesia consent  signed with patient.  ASA Score: 2     Day of Surgery Review of History & Physical: I have interviewed and examined the patient. I have reviewed the patient's H&P dated:  There are no significant changes.      Anesthesia Plan Notes: B/P under control,  No C/O CP/SOB with physical activity.  NPO status verified.  No HX anesthetic complications.        Ready For Surgery From Anesthesia Perspective.     Past Medical History:   Diagnosis Date    Abnormal chest CT     Acid reflux     Allergy     Anemia     Anemia     Anxiety     Cataract     Chronic UTI     recently treated with antibiotics -x 3 wks. ago-    Colon adenoma 2015 due 2020 10/21/2015    Colon adenoma 2015 due 2020 10/21/2015    Depression     Disturbed sleep rhythm     DJD (degenerative joint disease)     Dry eyes     Dry mouth     Grief reaction 3/24/2014    Hx of migraine headaches     Hyperlipemia     Hypernatremia 8/8/2014    Hypertension     Iritis     Iritis     Mild vitamin D deficiency 9/9/2013    Multiple lung nodules on CT: 9427-6083 no f/u needed 6/6/2016    Neurogenic bladder     Osteopenia     in hips    Osteoporosis     spine    Osteoporosis: 9/15 see rheumatology notes 6/6/2016    Positive [place] (antinuclear antibody)     Schatzki's ring: 3/15 dilated 6/6/2016    Sciatica neuralgia 6/21/2013    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    Visual impairment      Past Surgical History:   Procedure Laterality Date    APPENDECTOMY      BACK SURGERY  2007    x4    CHOLECYSTECTOMY      lap orin    COLONOSCOPY N/A 10/2/2015    [facility] ([facility])    CYSTOSCOPY      with BOTOX INJECTION    CYSTOSCOPY N/A 3/5/2014    [facility] OR 1ST FLR    CYSTOSCOPY N/A 2/19/2013    Per[facility] by [facility] at [facility] OR 1ST FLR    EGD (ESOPHAGOGASTRODUODENOSCOPY) N/A 5/13/2019    Performed by [facility] at [facility] ENDO (4TH FLR)     ESOPHAGOGASTRODUODENOSCOPY (EGD) N/A 5/11/2018    Per[facility] (4TH FLR)    ESOPHAGOGASTRODUODENOSCOPY (EGD) N/A 10/10/2016    Performed by Arthur Partida [facility] at [facility] (2ND FLR)    ESOPHAGOGASTRODUODENOSCOPY (EGD) N/A 9/2/2016    Performed by Charlene Villafana [facility] at [facility] (2ND FLR)    ESOPHAGOGASTRODUODENOSCOPY (EGD) N/A 3/4/2015    Performed by [facility] at [facility] (4TH FLR)    HERNIA REPAIR      umbilical    HYSTERECTOMY      COMPLETE    INJECTION, BOTULINUM TOXIN, TYPE A N/A 3/5/2014    [facility] 1ST FLR    INJECTION, BOTULINUM TOXIN, TYPE A N/A 2/19/2013    [facility] at [facility] 1ST FLR    POSTERIOR FUSION LUMBAR SPINE W/ CORPECTOMY      REPAIR-HERNIA-INCISIONAL-INITIAL N/A 11/6/2012    Performed by Darryl Valles Jr., [facility] at [facility] OR 2ND FLR    TONSILLECTOMY, ADENOIDECTOMY      ULTRASOUND-ENDOSCOPIC-UPPER N/A 10/10/2016    Performed by [facility] at [facility] (2ND FLR)    ULTRASOUND-ENDOSCOPIC-UPPER N/A 9/2/2016    Performed by Charlene [facility]afana [facility] at [facility] (2ND FLR)     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    Colon adenoma: 10/15 repeat in 2020    Age-related osteoporosis without current pathological fracture 2016    Schatzki's ring: 3/15 dilated    Multiple lung nodules on CT: 5707-0403  no f/u needed    Steroid-induced glaucoma of both eyes    Proteinuria    Family history of colon cancer: maternal uncle and aunt    Recurrent major depressive disorder, in partial remission    Immunosuppression    Atherosclerosis of abdominal aorta: minimal see CT 7/16    Gastroesophageal reflux disease without esophagitis    CKD (chronic kidney disease) stage 3, GFR 30-59 ml/min    Secondary hyperparathyroidism    Esophageal dysphagia    Chronic follicular conjunctivitis of both eyes    Prominent aorta-Noted on imaging 12/6/2010    History of adenomatous polyp of colon     There is no height or weight on file to calculate BMI.  2D Echo:  No results found for this or any previous visit.    Please See ROS/PMH and Active Problem List above

## 2019-09-09 NOTE — DISCHARGE INSTRUCTIONS

## 2019-09-09 NOTE — H&P
[facility]  History & Physical    Subjective:      Chief Complaint/Reason for Admission:     EGD and colonoscopy    Idalmis Morejon is a 69 y.o. female.    Past Medical History:   Diagnosis Date    Abnormal chest CT     Acid reflux     Allergy     Anemia     Anemia     Anxiety     Cataract     Chronic UTI     recently treated with antibiotics -x 3 wks. ago-    Colon adenoma 2015 due 2020 10/21/2015    Colon adenoma 2015 due 2020 10/21/2015    Depression     Disturbed sleep rhythm     DJD (degenerative joint disease)     Dry eyes     Dry mouth     Grief reaction 3/24/2014    Hx of migraine headaches     Hyperlipemia     Hypernatremia 8/8/2014    Hypertension     Iritis     Iritis     Mild vitamin D deficiency 9/9/2013    Multiple lung nodules on CT: 2357-2209 no f/u needed 6/6/2016    Neurogenic bladder     Osteopenia     in hips    Osteoporosis     spine    Osteoporosis: 9/15 see rheumatology notes 6/6/2016    Positive [place] (antinuclear antibody)     Schatzki's ring: 3/15 dilated 6/6/2016    Sciatica neuralgia 6/21/2013    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    Visual impairment      Past Surgical History:   Procedure Laterality Date    APPENDECTOMY      BACK SURGERY  2007    x4    CHOLECYSTECTOMY      lap orin    COLONOSCOPY N/A 10/2/2015    Performed by Bryan Love MD at [facility] (4TH FLR)    CYSTOSCOPY      with BOTOX INJECTION    CYSTOSCOPY N/A 3/5/2014    Performed by Cristine Church MD at [facility] OR 1ST FLR    CYSTOSCOPY N/A 2/19/2013    Performed by Cristine Church MD at [facility] OR 1ST FLR    EGD (ESOPHAGOGASTRODUODENOSCOPY) N/A 5/13/2019    Performed by Bryan Love MD at [facility] ENDO (4TH FLR)    ESOPHAGOGASTRODUODENOSCOPY (EGD) N/A 5/11/2018    Performed by Bryan Love MD at [facility] ENDO (4TH FLR)    ESOPHAGOGASTRODUODENOSCOPY (EGD) N/A 10/10/2016    Performed by Arthur Partida MD at [facility] (2ND FLR)     ESOPHAGOGASTRODUODENOSCOPY (EGD) N/A 9/2/2016    Performed by Charlene Villafana MD at [facility] ENDO (2ND FLR)    ESOPHAGOGASTRODUODENOSCOPY (EGD) N/A 3/4/2015    Performed by [facility] at [facility] (4TH FLR)    HERNIA REPAIR      umbilical    HYSTERECTOMY      COMPLETE    INJECTION, BOTULINUM TOXIN, TYPE A N/A 3/5/2014    Performed by Cristine Church MD at [facility] OR 1ST FLR    INJECTION, BOTULINUM TOXIN, TYPE A N/A 2/19/2013    Performed by Cristine Church MD at [facility] OR 1ST FLR    POSTERIOR FUSION LUMBAR SPINE W/ CORPECTOMY      REPAIR-HERNIA-INCISIONAL-INITIAL N/A 11/6/2012    Performed by Darryl Valles Jr., MD at [facility] OR 2ND FLR    TONSILLECTOMY, ADENOIDECTOMY      ULTRASOUND-ENDOSCOPIC-UPPER N/A 10/10/2016    Performed by Arthur Partida MD at [facility] ENDO (2ND FLR)    ULTRASOUND-ENDOSCOPIC-UPPER N/A 9/2/2016    Performed by Charlene Villafana MD at [facility] (2ND FLR)     Family History   Problem Relation Age of Onset    Kidney disease Mother     Hypertension Mother     Diabetes Father     Diabetes Brother     Kidney disease Brother     Heart disease Brother     Hyperlipidemia Sister     Hypertension Sister     Hypertension Daughter     Colon cancer Maternal Aunt 70        stomach    Blindness Maternal Aunt     Cancer Maternal Aunt         stomach cancer    Cancer Maternal Uncle         colon    Colon cancer Maternal Uncle 70        two uncles    Glaucoma Neg Hx     Amblyopia Neg Hx     Macular degeneration Neg Hx     Retinal detachment Neg Hx     Anesthesia problems Neg Hx     Celiac disease Neg Hx     Cirrhosis Neg Hx     Crohn's disease Neg Hx     Esophageal cancer Neg Hx     Irritable bowel syndrome Neg Hx     Liver cancer Neg Hx     Liver disease Neg Hx     Rectal cancer Neg Hx     Stomach cancer Neg Hx     Melanoma Neg Hx      Social History     Tobacco Use    Smoking status: Never Smoker    Smokeless tobacco: Never Used   Substance Use Topics     Alcohol use: No     Frequency: Never    Drug use: No       PTA Medications   Medication Sig    amLODIPine (NORVASC) 10 MG tablet Take 0.5 tablets (5 mg total) by mouth 2 (two) times daily.    cetirizine (ZYRTEC) 10 MG tablet Take 1 tablet (10 mg total) by mouth 2 (two) times daily.    chlorthalidone (HYGROTEN) 25 MG Tab Take 1/2 tablet (12.5 mg) by mouth once daily in the morning (For blood pressure)    DULoxetine (CYMBALTA) 60 MG capsule TAKE 2 CAPSULES (120 MG TOTAL) BY MOUTH ONCE DAILY.    ferrous sulfate 324 mg (65 mg iron) TbEC Take 1 tablet (324 mg total) by mouth once daily.    melatonin 1 mg/4 mL Drop Take by mouth every evening.    oxybutynin (DITROPAN-XL) 10 MG 24 hr tablet Take 1 tablet (10 mg total) by mouth 2 (two) times daily as needed.    pantoprazole (PROTONIX) 40 MG tablet TAKE 1 TABLET BY MOUTH BEFORE BREAKFAST.    POLYETHYLENE GLYCOL 3350 (MIRALAX ORAL) daily as needed.     promethazine (PHENERGAN) 12.5 MG Tab Take 1 tablet (12.5 mg total) by mouth every 6 (six) hours as needed (nausea).    timolol maleate 0.5% (TIMOPTIC) 0.5 % Drop Place 1 drop into both eyes 2 (two) times daily.    valsartan (DIOVAN) 160 MG tablet TAKE 1 TABLET (160 MG TOTAL) BY MOUTH 2 (TWO) TIMES DAILY.    vitamin D (VITAMIN D3) 1000 units Tab Take 1,000 Units by mouth once daily.     Review of patient's allergies indicates:   Allergen Reactions    Alendronate Nausea Only     And heartburn    Brimonidine Dermatitis     Follicular Conjunctivitis    Kenalog [triamcinolone acetonide] Hives        Review of Systems   Constitutional: Negative for chills, fever and weight loss.   Respiratory: Negative for shortness of breath and wheezing.    Cardiovascular: Negative for chest pain.   Gastrointestinal: Negative for abdominal pain.       Objective:      Vital Signs (Most Recent)  Temp: 97.9 °F (36.6 °C) (09/09/19 0817)  Pulse: 70 (09/09/19 0817)  Resp: 16 (09/09/19 0817)  BP: (!) 167/80 (09/09/19 0817)  SpO2: 100 %  (09/09/19 0817)    Vital Signs Range (Last 24H):  Temp:  [97.9 °F (36.6 °C)]   Pulse:  [70]   Resp:  [16]   BP: (167)/(80)   SpO2:  [100 %]     Physical Exam   Constitutional: She appears well-developed and well-nourished.   Cardiovascular: Normal rate.   Pulmonary/Chest: Effort normal.   Abdominal: Soft.   Skin: Skin is warm and dry.   Psychiatric: She has a normal mood and affect. Her behavior is normal. Judgment and thought content normal.            Assessment:      Active Hospital Problems    Diagnosis  POA    History of adenomatous polyp of colon [Z86.010]  Not Applicable      Resolved Hospital Problems   No resolved problems to display.       Plan:    EGD for dysphagia and GERD and colonoscopy for history of colon polyps

## 2019-09-09 NOTE — [employer] PATIENT INSTRUCTIONS
Discharge Summary/Instructions after an Endoscopic Procedure  Patient Name: Idalmis Morejon  Patient MRN: [id number]  Patient YOB: 1950 Monday, September 09, 2019  Bryan Love MD  RESTRICTIONS:  During your procedure today, you received medications for sedation.  These   medications may affect your judgment, balance and coordination.  Therefore,   for 24 hours, you have the following restrictions:   - DO NOT drive a car, operate machinery, make legal/financial decisions,   sign important papers or drink alcohol.    ACTIVITY:  Today: no heavy lifting, straining or running due to procedural   sedation/anesthesia.  The following day: return to full activity including work.  DIET:  Eat and drink normally unless instructed otherwise.     TREATMENT FOR COMMON SIDE EFFECTS:  - Mild abdominal pain, nausea, belching, bloating or excessive gas:  rest,   eat lightly and use a heating pad.  - Sore Throat: treat with throat lozenges and/or gargle with warm salt   water.  - Because air was used during the procedure, expelling large amounts of air   from your rectum or belching is normal.  - If a bowel prep was taken, you may not have a bowel movement for 1-3 days.    This is normal.  SYMPTOMS TO WATCH FOR AND REPORT TO YOUR PHYSICIAN:  1. Abdominal pain or bloating, other than gas cramps.  2. Chest pain.  3. Back pain.  4. Signs of infection such as: chills or fever occurring within 24 hours   after the procedure.  5. Rectal bleeding, which would show as bright red, maroon, or black stools.   (A tablespoon of blood from the rectum is not serious, especially if   hemorrhoids are present.)  6. Vomiting.  7. Weakness or dizziness.  GO DIRECTLY TO THE NEAREST EMERGENCY ROOM IF YOU HAVE ANY OF THE FOLLOWING:      Difficulty breathing              Chills and/or fever over 101 F   Persistent vomiting and/or vomiting blood   Severe abdominal pain   Severe chest pain   Black, tarry stools   Bleeding- more than one  tablespoon   Any other symptom or condition that you feel may need urgent attention  Your doctor recommends these additional instructions:  If any biopsies were taken, your doctors clinic will contact you in 1 to 2   weeks with any results.  - Discharge patient to home.   - Repeat colonoscopy in 5 years for surveillance.   - The findings and recommendations were discussed with the patient.   - Return to primary care physician.  For questions, problems or results please call your physician - Bryan Love MD at Work:  [phone].  [facility], EMERGENCY ROOM PHONE NUMBER: [phone]  IF A COMPLICATION OR EMERGENCY SITUATION ARISES AND YOU ARE UNABLE TO REACH   YOUR PHYSICIAN - GO DIRECTLY TO THE EMERGENCY ROOM.  Bryan Love MD  9/9/2019 9:53:26 AM  This report has been verified and signed electronically.  [employer]

## 2019-09-09 NOTE — PROVATION PATIENT INSTRUCTIONS
Discharge Summary/Instructions after an Endoscopic Procedure  Patient Name: Idalmis Morejon  Patient MRN: [id number]  Patient YOB: 1950 Monday, September 09, 2019  Bryan oLve MD  RESTRICTIONS:  During your procedure today, you received medications for sedation.  These   medications may affect your judgment, balance and coordination.  Therefore,   for 24 hours, you have the following restrictions:   - DO NOT drive a car, operate machinery, make legal/financial decisions,   sign important papers or drink alcohol.    ACTIVITY:  Today: no heavy lifting, straining or running due to procedural   sedation/anesthesia.  The following day: return to full activity including work.  DIET:  Eat and drink normally unless instructed otherwise.     TREATMENT FOR COMMON SIDE EFFECTS:  - Mild abdominal pain, nausea, belching, bloating or excessive gas:  rest,   eat lightly and use a heating pad.  - Sore Throat: treat with throat lozenges and/or gargle with warm salt   water.  - Because air was used during the procedure, expelling large amounts of air   from your rectum or belching is normal.  - If a bowel prep was taken, you may not have a bowel movement for 1-3 days.    This is normal.  SYMPTOMS TO WATCH FOR AND REPORT TO YOUR PHYSICIAN:  1. Abdominal pain or bloating, other than gas cramps.  2. Chest pain.  3. Back pain.  4. Signs of infection such as: chills or fever occurring within 24 hours   after the procedure.  5. Rectal bleeding, which would show as bright red, maroon, or black stools.   (A tablespoon of blood from the rectum is not serious, especially if   hemorrhoids are present.)  6. Vomiting.  7. Weakness or dizziness.  GO DIRECTLY TO THE NEAREST EMERGENCY ROOM IF YOU HAVE ANY OF THE FOLLOWING:      Difficulty breathing              Chills and/or fever over 101 F   Persistent vomiting and/or vomiting blood   Severe abdominal pain   Severe chest pain   Black, tarry stools   Bleeding- more than one  tablespoon   Any other symptom or condition that you feel may need urgent attention  Your doctor recommends these additional instructions:  If any biopsies were taken, your doctors clinic will contact you in 1 to 2   weeks with any results.  - Discharge patient to home.   - Await pathology results. Please get labs today before your go home to   check for anemia - orders placed.  - Telephone endoscopist for pathology results in 2 weeks.   - Consider avoiding all non-steroidal anti-inflammatory drugs (aspirin,   ibuprofen, naproxen, etc.), unless needed for cardiovascular protection.    Recommend you discuss with your prescribing doctor (of your aspirin) to see   if cardiovascular benefits of your aspirin outweigh the risks of GI   bleeding.  - Use a proton pump inhibitor PO daily.   - The findings and recommendations were discussed with the patient.   - Return to GI clinic at the next available appointment.   - Return to primary care physician.  For questions, problems or results please call your physician - Bryan Love MD at Work:  [phone].  [facility], EMERGENCY ROOM PHONE NUMBER: [phone]  IF A COMPLICATION OR EMERGENCY SITUATION ARISES AND YOU ARE UNABLE TO REACH   YOUR PHYSICIAN - GO DIRECTLY TO THE EMERGENCY ROOM.  Bryan Love MD  9/9/2019 9:29:44 AM  This report has been verified and signed electronically.  [employer]

## 2019-09-12 ENCOUNTER — INITIAL CONSULT (OUTPATIENT)
Dept: PSYCHIATRY | Facility: CLINIC | Age: 69
End: 2019-09-12
Payer: MEDICARE

## 2019-09-12 VITALS
HEART RATE: 70 BPM | BODY MASS INDEX: 24.98 KG/M2 | SYSTOLIC BLOOD PRESSURE: 144 MMHG | WEIGHT: 155.44 LBS | DIASTOLIC BLOOD PRESSURE: 71 MMHG | HEIGHT: 66 IN

## 2019-09-12 DIAGNOSIS — F33.41 RECURRENT MAJOR DEPRESSIVE DISORDER, IN PARTIAL REMISSION: Primary | ICD-10-CM

## 2019-09-12 PROCEDURE — 99999 PR PBB SHADOW E&M-EST. PATIENT-LVL II: CPT | Mod: PBBFAC,HCNC,GC, | Performed by: STUDENT IN AN ORGANIZED HEALTH CARE EDUCATION/TRAINING PROGRAM

## 2019-09-12 PROCEDURE — 3078F DIAST BP <80 MM HG: CPT | Mod: HCNC,CPTII,GC,S$GLB | Performed by: STUDENT IN AN ORGANIZED HEALTH CARE EDUCATION/TRAINING PROGRAM

## 2019-09-12 PROCEDURE — 99213 OFFICE O/P EST LOW 20 MIN: CPT | Mod: HCNC,GC,S$GLB, | Performed by: STUDENT IN AN ORGANIZED HEALTH CARE EDUCATION/TRAINING PROGRAM

## 2019-09-12 PROCEDURE — 99999 PR PBB SHADOW E&M-EST. PATIENT-LVL II: ICD-10-PCS | Mod: PBBFAC,HCNC,GC, | Performed by: STUDENT IN AN ORGANIZED HEALTH CARE EDUCATION/TRAINING PROGRAM

## 2019-09-12 PROCEDURE — 1101F PR PT FALLS ASSESS DOC 0-1 FALLS W/OUT INJ PAST YR: ICD-10-PCS | Mod: HCNC,CPTII,GC,S$GLB | Performed by: STUDENT IN AN ORGANIZED HEALTH CARE EDUCATION/TRAINING PROGRAM

## 2019-09-12 PROCEDURE — 3077F SYST BP >= 140 MM HG: CPT | Mod: HCNC,CPTII,GC,S$GLB | Performed by: STUDENT IN AN ORGANIZED HEALTH CARE EDUCATION/TRAINING PROGRAM

## 2019-09-12 PROCEDURE — 3078F PR MOST RECENT DIASTOLIC BLOOD PRESSURE < 80 MM HG: ICD-10-PCS | Mod: HCNC,CPTII,GC,S$GLB | Performed by: STUDENT IN AN ORGANIZED HEALTH CARE EDUCATION/TRAINING PROGRAM

## 2019-09-12 PROCEDURE — 99213 PR OFFICE/OUTPT VISIT, EST, LEVL III, 20-29 MIN: ICD-10-PCS | Mod: HCNC,GC,S$GLB, | Performed by: STUDENT IN AN ORGANIZED HEALTH CARE EDUCATION/TRAINING PROGRAM

## 2019-09-12 PROCEDURE — 3077F PR MOST RECENT SYSTOLIC BLOOD PRESSURE >= 140 MM HG: ICD-10-PCS | Mod: HCNC,CPTII,GC,S$GLB | Performed by: STUDENT IN AN ORGANIZED HEALTH CARE EDUCATION/TRAINING PROGRAM

## 2019-09-12 PROCEDURE — 1101F PT FALLS ASSESS-DOCD LE1/YR: CPT | Mod: HCNC,CPTII,GC,S$GLB | Performed by: STUDENT IN AN ORGANIZED HEALTH CARE EDUCATION/TRAINING PROGRAM

## 2019-09-12 RX ORDER — DULOXETIN HYDROCHLORIDE 30 MG/1
30 CAPSULE, DELAYED RELEASE ORAL DAILY
Qty: 30 CAPSULE | Refills: 2 | Status: SHIPPED | OUTPATIENT
Start: 2019-09-12 | End: 2019-12-11

## 2019-09-12 NOTE — PROGRESS NOTES
Outpatient Psychiatry Follow-Up Visit (MD/NP)    9/12/2019    Clinical Status of Patient:  Outpatient (Ambulatory)    Chief Complaint:  Idalmis Morejon is a 69 y.o. female who presents today for follow-up of depression and anxiety.        Interval History and Content of Current Session:  Interim Events/Subjective Report/Content of Current Session:         Chart reviewed patient previously seen by Dr. Campbell reports that her mood is stable and she would like to decrease her medication to the lowest possible effective dose. Patient does not want to discontinue her medication as she has had multiple episodes of depression since her first episode in 2002. Patient reports that during her first episode she also had SI and AH.     Today she reports that her appetite and sleep remain unimproved.   Reports poor appetite that has not improved since the first time being diagnosed with clinical depression   Reports continued poor sleep overnight with early morning awakenings where she is unable to return to sleep     Continues to spend time with her daughter whom lives with her.     Previous hx:  Pt reports she is doing well.  Still works at the center a few days a week.  Daughter is working on her house.  She still takes zzquil most nights if she has trouble sleeping.  On days she de jesus snot take it she has make herself tired.  She is eating okay.  She is not skipping meals but sometimes has phases of wanting something cold, sometimes smoothies.  Still enjoys doing puzzles, and watching  shows and football, and going out to eat with her daughter.      Psychotherapy:  · Target symptoms: depression, anxiety   · Why chosen therapy is appropriate versus another modality: relevant to diagnosis  · Outcome monitoring methods: self-report, observation  · Therapeutic intervention type: supportive psychotherapy  · Topics discussed/themes: building skills sets for symptom management, symptom recognition  · The patient's response to  "the intervention is motivated. The patient's progress toward treatment goals is good.   · Duration of intervention: 6 mins    Review of Systems   · PSYCHIATRIC: Pertinant items are noted in the narrative.  · CONSTITUTIONAL: No weight gain or loss.   · RESPIRATORY: No shortness of breath.  · CARDIOVASCULAR: No tachycardia or chest pain.  · GASTROINTESTINAL: Positive for constipation.    Past Medical, Family and Social History: The patient's past medical, family and social history have been reviewed and updated as appropriate within the electronic medical record - see encounter notes.    Compliance: yes    Side effects: none    Risk Parameters:  Patient reports no suicidal ideation  Patient reports no homicidal ideation  Patient reports no self-injurious behavior  Patient reports no violent behavior    Exam (detailed: at least 9 elements; comprehensive: all 15 elements)   Constitutional  Vitals:  Most recent vital signs, dated less than 90 days prior to this appointment, were reviewed.    Vitals:    09/12/19 0755   BP: (!) 144/71   Pulse: 70   Weight: 70.5 kg (155 lb 6.8 oz)   Height: 5' 6" (1.676 m)        General:  unremarkable, age appropriate, neatly groomed, well nourished     Musculoskeletal  Muscle Strength/Tone:  no dyskinesia, no tremor   Gait & Station:  non-ataxic     Psychiatric  Speech:  normal tone, normal rate, normal pitch, normal volume, prosody intact   Mood:    Affect:  euthymic  appropriate, full   Thought Process:  normal and logical   Associations:  intact   Thought Content:  normal, no suicidality, no homicidality, delusions, or paranoia   Insight  Judgement:  good, patient has awareness of illness  Patient's behavior is adequate to circumstances   Orientation:  grossly intact   Memory: intact for content of interview   Language: grossly intact   Attention Span & Concentration:  able to focus   Fund of Knowledge:  intact and appropriate to age and level of education     Assessment and Diagnosis "   Status/Progress: Based on the examination today, the patient's problem(s) is/are well controlled.  New problems have not been presented today.   Comorbidities are complicating management of the primary condition.  There are no active rule-out diagnoses for this patient at this time.    General Impression: Pt with depression and anxiety as well as multiple other medical comorbidities.  She is still affected by the death of her mother in March 2014.      Diagnosis::   MDD, recurrent in remission   specific phobia  R/O social phobia.      Intervention/Counseling/Treatment Plan   · Reduced Cymbalta to 30mg qD discussed with patient that she may self titrate back to 60mg qD if she find that her mood is negatively impacted by the change   · Trialed melatonin without significant effect  · Discussed Doxepin with patient and she would like to revisit the conversation in 8 weeks when she returns to clinic  · RTC in 8 weeks       Return to Clinic: 3 months

## 2019-09-16 ENCOUNTER — TELEPHONE (OUTPATIENT)
Dept: ENDOSCOPY | Facility: HOSPITAL | Age: 69
End: 2019-09-16

## 2019-09-20 NOTE — PROGRESS NOTES
I discussed this patient's diagnosis and treatment with Dr Pino, and agree with the diagnosis and planned treatment at this time.

## 2019-09-20 NOTE — PROGRESS NOTES
Last 5 Patient Entered Readings                                      Current 30 Day Average: 138/81     Recent Readings 10/15/2018 10/15/2018 10/7/2018 10/7/2018 9/24/2018    SBP (mmHg) 143 149 145 158 134    DBP (mmHg) 82 88 83 88 80    Pulse 75 76 77 77 81          Left voicemail. Address upward trend of BP and increasing monitoring frequency.   08-Sep-2019

## 2019-10-03 ENCOUNTER — PATIENT OUTREACH (OUTPATIENT)
Dept: ADMINISTRATIVE | Facility: OTHER | Age: 69
End: 2019-10-03

## 2019-10-04 ENCOUNTER — OFFICE VISIT (OUTPATIENT)
Dept: ORTHOPEDICS | Facility: CLINIC | Age: 69
End: 2019-10-04
Payer: MEDICARE

## 2019-10-04 ENCOUNTER — HOSPITAL ENCOUNTER (OUTPATIENT)
Dept: RADIOLOGY | Facility: HOSPITAL | Age: 69
Discharge: HOME | End: 2019-10-04
Attending: PHYSICIAN ASSISTANT
Payer: MEDICARE

## 2019-10-04 VITALS
HEART RATE: 73 BPM | BODY MASS INDEX: 25.01 KG/M2 | HEIGHT: 66 IN | DIASTOLIC BLOOD PRESSURE: 75 MMHG | SYSTOLIC BLOOD PRESSURE: 131 MMHG | WEIGHT: 155.63 LBS

## 2019-10-04 DIAGNOSIS — M79.672 LEFT FOOT PAIN: Primary | ICD-10-CM

## 2019-10-04 DIAGNOSIS — S90.122A CONTUSION OF LESSER TOE OF LEFT FOOT WITHOUT DAMAGE TO NAIL, INITIAL ENCOUNTER: ICD-10-CM

## 2019-10-04 DIAGNOSIS — M79.672 LEFT FOOT PAIN: ICD-10-CM

## 2019-10-04 PROCEDURE — 99214 PR OFFICE/OUTPT VISIT, EST, LEVL IV, 30-39 MIN: ICD-10-PCS | Mod: HCNC,S$GLB,, | Performed by: PHYSICIAN ASSISTANT

## 2019-10-04 PROCEDURE — 3078F DIAST BP <80 MM HG: CPT | Mod: HCNC,CPTII,S$GLB, | Performed by: PHYSICIAN ASSISTANT

## 2019-10-04 PROCEDURE — 73630 X-RAY EXAM OF FOOT: CPT | Mod: TC,HCNC,LT

## 2019-10-04 PROCEDURE — 3078F PR MOST RECENT DIASTOLIC BLOOD PRESSURE < 80 MM HG: ICD-10-PCS | Mod: HCNC,CPTII,S$GLB, | Performed by: PHYSICIAN ASSISTANT

## 2019-10-04 PROCEDURE — 99999 PR PBB SHADOW E&M-EST. PATIENT-LVL IV: ICD-10-PCS | Mod: PBBFAC,HCNC,, | Performed by: PHYSICIAN ASSISTANT

## 2019-10-04 PROCEDURE — 3075F PR MOST RECENT SYSTOLIC BLOOD PRESS GE 130-139MM HG: ICD-10-PCS | Mod: HCNC,CPTII,S$GLB, | Performed by: PHYSICIAN ASSISTANT

## 2019-10-04 PROCEDURE — 99214 OFFICE O/P EST MOD 30 MIN: CPT | Mod: HCNC,S$GLB,, | Performed by: PHYSICIAN ASSISTANT

## 2019-10-04 PROCEDURE — 1101F PT FALLS ASSESS-DOCD LE1/YR: CPT | Mod: HCNC,CPTII,S$GLB, | Performed by: PHYSICIAN ASSISTANT

## 2019-10-04 PROCEDURE — 99999 PR PBB SHADOW E&M-EST. PATIENT-LVL IV: CPT | Mod: PBBFAC,HCNC,, | Performed by: PHYSICIAN ASSISTANT

## 2019-10-04 PROCEDURE — 1101F PR PT FALLS ASSESS DOC 0-1 FALLS W/OUT INJ PAST YR: ICD-10-PCS | Mod: HCNC,CPTII,S$GLB, | Performed by: PHYSICIAN ASSISTANT

## 2019-10-04 PROCEDURE — 73630 XR FOOT COMPLETE 3 VIEW LEFT: ICD-10-PCS | Mod: 26,HCNC,LT, | Performed by: RADIOLOGY

## 2019-10-04 PROCEDURE — 73630 X-RAY EXAM OF FOOT: CPT | Mod: 26,HCNC,LT, | Performed by: RADIOLOGY

## 2019-10-04 PROCEDURE — 3075F SYST BP GE 130 - 139MM HG: CPT | Mod: HCNC,CPTII,S$GLB, | Performed by: PHYSICIAN ASSISTANT

## 2019-10-04 NOTE — PROGRESS NOTES
SUBJECTIVE:     Chief Complaint & History of Present Illness:  Idalmis Morejon is a  New  patient 69 y.o. female who is seen here today with a complaint of  pain in the 5th digit of the left foot .  Patient is here today for evaluation treatment of sudden onset pain of the 5th digit of her left foot following dropping a cell phone on her toe.  She has had soreness and tenderness to palpation and  inability to wear closed shoes for the past 4 days following the injury.  He has had no formal treatment for the foot but can stand and ambulate without difficulty.  She comes to the clinic today and sandals as having little to no problems there is minor swelling in about the and ecchymosis  On a scale of 1-10, with 10 being worst pain imaginable, he rates this pain as 1 on good days and 4 on bad days.  she describes the pain as sore.    Review of patient's allergies indicates:   Allergen Reactions    Alendronate Nausea Only     And heartburn    Brimonidine Dermatitis     Follicular Conjunctivitis    Kenalog [triamcinolone acetonide] Hives         Current Outpatient Medications   Medication Sig Dispense Refill    amLODIPine (NORVASC) 10 MG tablet Take 0.5 tablets (5 mg total) by mouth 2 (two) times daily. 90 tablet 0    cetirizine (ZYRTEC) 10 MG tablet Take 1 tablet (10 mg total) by mouth 2 (two) times daily. 60 tablet 0    chlorthalidone (HYGROTEN) 25 MG Tab Take 1/2 tablet (12.5 mg) by mouth once daily in the morning (For blood pressure) 45 tablet 1    DULoxetine (CYMBALTA) 30 MG capsule Take 1 capsule (30 mg total) by mouth once daily. 30 capsule 2    ferrous sulfate 324 mg (65 mg iron) TbEC Take 1 tablet (324 mg total) by mouth once daily. 30 tablet 6    melatonin 1 mg/4 mL Drop Take by mouth every evening.      oxybutynin (DITROPAN-XL) 10 MG 24 hr tablet Take 1 tablet (10 mg total) by mouth 2 (two) times daily as needed. 60 tablet 11    pantoprazole (PROTONIX) 40 MG tablet TAKE 1 TABLET BY MOUTH BEFORE  BREAKFAST. 90 tablet 3    POLYETHYLENE GLYCOL 3350 (MIRALAX ORAL) daily as needed.       promethazine (PHENERGAN) 12.5 MG Tab Take 1 tablet (12.5 mg total) by mouth every 6 (six) hours as needed (nausea). 20 tablet 0    timolol maleate 0.5% (TIMOPTIC) 0.5 % Drop Place 1 drop into both eyes 2 (two) times daily. 10 mL 6    valsartan (DIOVAN) 160 MG tablet TAKE 1 TABLET (160 MG TOTAL) BY MOUTH 2 (TWO) TIMES DAILY. 180 tablet 3    vitamin D (VITAMIN D3) 1000 units Tab Take 1,000 Units by mouth once daily.       No current facility-administered medications for this visit.        Past Medical History:   Diagnosis Date    Abnormal chest CT     Acid reflux     Allergy     Anemia     Anemia     Anxiety     Cataract     Chronic UTI     recently treated with antibiotics -x 3 wks. ago-    Colon adenoma 2015 due 2020 10/21/2015    Colon adenoma 2015 due 2020 10/21/2015    Depression     Disturbed sleep rhythm     DJD (degenerative joint disease)     Dry eyes     Dry mouth     Grief reaction 3/24/2014    Hx of migraine headaches     Hyperlipemia     Hypernatremia 8/8/2014    Hypertension     Iritis     Iritis     Mild vitamin D deficiency 9/9/2013    Multiple lung nodules on CT: 3715-1027 no f/u needed 6/6/2016    Neurogenic bladder     Osteopenia     in hips    Osteoporosis     spine    Osteoporosis: 9/15 see rheumatology notes 6/6/2016    Positive GEORGIANA (antinuclear antibody)     Schatzki's ring: 3/15 dilated 6/6/2016    Sciatica neuralgia 6/21/2013    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    Visual impairment        Past Surgical History:   Procedure Laterality Date    APPENDECTOMY      BACK SURGERY  2007    x4    CHOLECYSTECTOMY      lap orin    COLONOSCOPY N/A 10/2/2015    Procedure: COLONOSCOPY;  Surgeon: Bryan Love MD;  [facility] ([address] [place] [address]);  Service: Endoscopy;  Laterality: N/A;    COLONOSCOPY N/A 9/9/2019    Procedure: COLONOSCOPY;  Surgeon: Bryan ARGUELLO  "MD Kate;  [facility]  Service: Endoscopy;  Laterality: N/A;    CYSTOSCOPY      with BOTOX INJECTION    ESOPHAGOGASTRODUODENOSCOPY N/A 5/13/2019    Procedure: EGD (ESOPHAGOGASTRODUODENOSCOPY);  Surgeon: Bryan Love MD;  [facility]  Service: Endoscopy;  Laterality: N/A;    ESOPHAGOGASTRODUODENOSCOPY N/A 9/9/2019    Procedure: EGD (ESOPHAGOGASTRODUODENOSCOPY);  Surgeon: Bryan Love MD;  [facility]  Service: Endoscopy;  Laterality: N/A;    HERNIA REPAIR      umbilical    HYSTERECTOMY      COMPLETE    POSTERIOR FUSION LUMBAR SPINE W/ CORPECTOMY      TONSILLECTOMY, ADENOIDECTOMY         Vital Signs (Most Recent)  Vitals:    10/04/19 0903   BP: 131/75   Pulse: 73       Review of Systems:  ROS:  Constitutional: no fever or chills  Eyes: no visual changes, Steroid induced glaucoma, history of iritis  ENT: no nasal congestion or sore throat  Respiratory: no cough or shortness of breath  Cardiovascular: no chest pain or palpitations, Positive aortic atherosclerosis  Gastrointestinal: Positive GERD, shot skis ring, dysphagia, colon adenoma  Genitourinary: no hematuria or dysuria, Positive neurogenic bladder, CKD stage 3,  Integument/Breast: no rash or pruritis  Hematologic/Lymphatic: no easy bruising or lymphadenopathy, Positive GEORGIANA, immunosuppression, history of anemia  Musculoskeletal: no arthralgias or myalgias, Positive sciatic neuralgia, osteoporosis,  Neurological: no seizures or tremors, Positive history of migraines, chronic pain syndrome, spondylosis lumbar spine without radiculopathy  Behavioral/Psych: no auditory or visual hallucinations, Recurrent major depressive disorder, depression, anxiety  Endocrine: no heat or cold intolerance, Secondary hyperparathyroidism, mild vitamin-D deficiency      OBJECTIVE:     PHYSICAL EXAM:  Height: 5' 6" (167.6 cm) Weight: 70.6 kg (155 lb 10.3 oz), General Appearance: Well nourished, well developed, " in no acute distress.  Neurological: Mood & affect are normal.  left  Foot/Ankle    Skin: bruising  Swelling: mild  Warmth: no warmth  Tenderness: mild and moderate  ROM: 90 degrees dorsiflexion, 180 degrees plantarflexion, 40 degrees inversion and 45 degrees eversion  Strength: normal, 5 on 5 tibialis anterior strength, 5 of 5 EHL strength, 5 of 5 EDL strength, 5 of 5 FHL and 5 of 5 FDL  Gait: normal  Stability: anterior drawer: negative, exterior rotation test: negative, Lachman: negative and Cotton test: negative    ecchymoses and swelling of the 5th toe and normal microcirculation and capillary reflow          RADIOGRAPHS:  X-rays taken today films reviewed by me demonstrate no evidence of fracture dislocation or bony abnormality in or about the foot    ASSESSMENT/PLAN:       ICD-10-CM ICD-9-CM   1. Left foot pain M79.672 729.5   2. Contusion of lesser toe of left foot without damage to nail, initial encounter S90.122A 924.3       Plan:  Contusion of the foot  Salty tape  Wear lace-up shoes with minimal pressure on the toe  Ice elevation  Follow-up p.r.n.

## 2019-10-10 ENCOUNTER — PATIENT OUTREACH (OUTPATIENT)
Dept: OTHER | Facility: OTHER | Age: 69
End: 2019-10-10

## 2019-10-14 ENCOUNTER — OFFICE VISIT (OUTPATIENT)
Dept: ORTHOPEDICS | Facility: CLINIC | Age: 69
End: 2019-10-14
Payer: MEDICARE

## 2019-10-14 ENCOUNTER — TELEPHONE (OUTPATIENT)
Dept: ORTHOPEDICS | Facility: CLINIC | Age: 69
End: 2019-10-14

## 2019-10-14 ENCOUNTER — HOSPITAL ENCOUNTER (OUTPATIENT)
Dept: RADIOLOGY | Facility: HOSPITAL | Age: 69
Discharge: HOME | End: 2019-10-14
Attending: PHYSICIAN ASSISTANT
Payer: MEDICARE

## 2019-10-14 ENCOUNTER — PATIENT OUTREACH (OUTPATIENT)
Dept: ADMINISTRATIVE | Facility: OTHER | Age: 69
End: 2019-10-14

## 2019-10-14 VITALS
SYSTOLIC BLOOD PRESSURE: 130 MMHG | HEART RATE: 67 BPM | WEIGHT: 157.06 LBS | DIASTOLIC BLOOD PRESSURE: 71 MMHG | BODY MASS INDEX: 25.24 KG/M2 | HEIGHT: 66 IN

## 2019-10-14 DIAGNOSIS — M79.672 LEFT FOOT PAIN: ICD-10-CM

## 2019-10-14 DIAGNOSIS — S90.122A CONTUSION OF LESSER TOE OF LEFT FOOT WITHOUT DAMAGE TO NAIL, INITIAL ENCOUNTER: ICD-10-CM

## 2019-10-14 DIAGNOSIS — M79.672 LEFT FOOT PAIN: Primary | ICD-10-CM

## 2019-10-14 PROCEDURE — 99999 PR PBB SHADOW E&M-EST. PATIENT-LVL IV: ICD-10-PCS | Mod: PBBFAC,HCNC,, | Performed by: PHYSICIAN ASSISTANT

## 2019-10-14 PROCEDURE — 99999 PR PBB SHADOW E&M-EST. PATIENT-LVL IV: CPT | Mod: PBBFAC,HCNC,, | Performed by: PHYSICIAN ASSISTANT

## 2019-10-14 PROCEDURE — 3075F SYST BP GE 130 - 139MM HG: CPT | Mod: HCNC,CPTII,S$GLB, | Performed by: PHYSICIAN ASSISTANT

## 2019-10-14 PROCEDURE — 73630 X-RAY EXAM OF FOOT: CPT | Mod: 26,HCNC,LT, | Performed by: RADIOLOGY

## 2019-10-14 PROCEDURE — 99213 PR OFFICE/OUTPT VISIT, EST, LEVL III, 20-29 MIN: ICD-10-PCS | Mod: HCNC,S$GLB,, | Performed by: PHYSICIAN ASSISTANT

## 2019-10-14 PROCEDURE — 99213 OFFICE O/P EST LOW 20 MIN: CPT | Mod: HCNC,S$GLB,, | Performed by: PHYSICIAN ASSISTANT

## 2019-10-14 PROCEDURE — 3075F PR MOST RECENT SYSTOLIC BLOOD PRESS GE 130-139MM HG: ICD-10-PCS | Mod: HCNC,CPTII,S$GLB, | Performed by: PHYSICIAN ASSISTANT

## 2019-10-14 PROCEDURE — 73630 X-RAY EXAM OF FOOT: CPT | Mod: TC,HCNC,LT

## 2019-10-14 PROCEDURE — 3078F PR MOST RECENT DIASTOLIC BLOOD PRESSURE < 80 MM HG: ICD-10-PCS | Mod: HCNC,CPTII,S$GLB, | Performed by: PHYSICIAN ASSISTANT

## 2019-10-14 PROCEDURE — 3078F DIAST BP <80 MM HG: CPT | Mod: HCNC,CPTII,S$GLB, | Performed by: PHYSICIAN ASSISTANT

## 2019-10-14 PROCEDURE — 1101F PT FALLS ASSESS-DOCD LE1/YR: CPT | Mod: HCNC,CPTII,S$GLB, | Performed by: PHYSICIAN ASSISTANT

## 2019-10-14 PROCEDURE — 1101F PR PT FALLS ASSESS DOC 0-1 FALLS W/OUT INJ PAST YR: ICD-10-PCS | Mod: HCNC,CPTII,S$GLB, | Performed by: PHYSICIAN ASSISTANT

## 2019-10-14 PROCEDURE — 73630 XR FOOT COMPLETE 3 VIEW LEFT: ICD-10-PCS | Mod: 26,HCNC,LT, | Performed by: RADIOLOGY

## 2019-10-14 NOTE — PROGRESS NOTES
SUBJECTIVE:     Chief Complaint & History of Present Illness:  Idalmis Morejon is a  Established  patient 69 y.o. female who is seen here today with a complaint of  left foot pain .  He has patient well-known to me was last seen treated the clinic for this condition 10/04/2019 for contusion of the 5th digit of her left foot.  Returns today stating she is still having soreness and tenderness in the area difficulty with tolerating footwear.  She has had some improvement but is concerned that she is having delayed healing  On a scale of 1-10, with 10 being worst pain imaginable, he rates this pain as 3 on good days and 7 on bad days.  she describes the pain as tender.    Review of patient's allergies indicates:   Allergen Reactions    Alendronate Nausea Only     And heartburn    Brimonidine Dermatitis     Follicular Conjunctivitis    Kenalog [triamcinolone acetonide] Hives         Current Outpatient Medications   Medication Sig Dispense Refill    amLODIPine (NORVASC) 10 MG tablet Take 0.5 tablets (5 mg total) by mouth 2 (two) times daily. 90 tablet 0    cetirizine (ZYRTEC) 10 MG tablet Take 1 tablet (10 mg total) by mouth 2 (two) times daily. 60 tablet 0    chlorthalidone (HYGROTEN) 25 MG Tab Take 1/2 tablet (12.5 mg) by mouth once daily in the morning (For blood pressure) 45 tablet 1    DULoxetine (CYMBALTA) 30 MG capsule Take 1 capsule (30 mg total) by mouth once daily. 30 capsule 2    ferrous sulfate 324 mg (65 mg iron) TbEC Take 1 tablet (324 mg total) by mouth once daily. 30 tablet 6    melatonin 1 mg/4 mL Drop Take by mouth every evening.      oxybutynin (DITROPAN-XL) 10 MG 24 hr tablet Take 1 tablet (10 mg total) by mouth 2 (two) times daily as needed. 60 tablet 11    pantoprazole (PROTONIX) 40 MG tablet TAKE 1 TABLET BY MOUTH BEFORE BREAKFAST. 90 tablet 3    POLYETHYLENE GLYCOL 3350 (MIRALAX ORAL) daily as needed.       promethazine (PHENERGAN) 12.5 MG Tab Take 1 tablet (12.5 mg total) by mouth  every 6 (six) hours as needed (nausea). 20 tablet 0    timolol maleate 0.5% (TIMOPTIC) 0.5 % Drop Place 1 drop into both eyes 2 (two) times daily. 10 mL 6    valsartan (DIOVAN) 160 MG tablet TAKE 1 TABLET (160 MG TOTAL) BY MOUTH 2 (TWO) TIMES DAILY. 180 tablet 3    vitamin D (VITAMIN D3) 1000 units Tab Take 1,000 Units by mouth once daily.       No current facility-administered medications for this visit.        Past Medical History:   Diagnosis Date    Abnormal chest CT     Acid reflux     Allergy     Anemia     Anemia     Anxiety     Cataract     Chronic UTI     recently treated with antibiotics -x 3 wks. ago-    Colon adenoma 2015 due 2020 10/21/2015    Colon adenoma 2015 due 2020 10/21/2015    Depression     Disturbed sleep rhythm     DJD (degenerative joint disease)     Dry eyes     Dry mouth     Grief reaction 3/24/2014    Hx of migraine headaches     Hyperlipemia     Hypernatremia 8/8/2014    Hypertension     Iritis     Iritis     Mild vitamin D deficiency 9/9/2013    Multiple lung nodules on CT: 7143-9684 no f/u needed 6/6/2016    Neurogenic bladder     Osteopenia     in hips    Osteoporosis     spine    Osteoporosis: 9/15 see rheumatology notes 6/6/2016    Positive GEORGIANA (antinuclear antibody)     Schatzki's ring: 3/15 dilated 6/6/2016    Sciatica neuralgia 6/21/2013    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    Visual impairment        Past Surgical History:   Procedure Laterality Date    APPENDECTOMY      BACK SURGERY  2007    x4    CHOLECYSTECTOMY      lap orin    COLONOSCOPY N/A 10/2/2015    Procedure: COLONOSCOPY;  Surgeon: Bryan Love MD;  Location: [address]);  Service: Endoscopy;  Laterality: N/A;    COLONOSCOPY N/A 9/9/2019    Procedure: COLONOSCOPY;  Surgeon: Bryan Love MD;  Location: [facility] ([address] [place] [address]);  Service: Endoscopy;  Laterality: N/A;    CYSTOSCOPY      with BOTOX INJECTION    ESOPHAGOGASTRODUODENOSCOPY N/A 5/13/2019  "   Procedure: EGD (ESOPHAGOGASTRODUODENOSCOPY);  Surgeon: Bryan Love MD;  [facility]  Service: Endoscopy;  Laterality: N/A;    ESOPHAGOGASTRODUODENOSCOPY N/A 9/9/2019    Procedure: EGD (ESOPHAGOGASTRODUODENOSCOPY);  Surgeon: Bryan Love MD;  [facility]  Service: Endoscopy;  Laterality: N/A;    HERNIA REPAIR      umbilical    HYSTERECTOMY      COMPLETE    POSTERIOR FUSION LUMBAR SPINE W/ CORPECTOMY      TONSILLECTOMY, ADENOIDECTOMY         Vital Signs (Most Recent)  Vitals:    10/14/19 1550   BP: 130/71   Pulse: 67       Review of Systems:  ROS:  Constitutional: no fever or chills  Eyes: no visual changes, Steroid induced glaucoma, history of iritis  ENT: no nasal congestion or sore throat  Respiratory: no cough or shortness of breath  Cardiovascular: no chest pain or palpitations, Positive aortic atherosclerosis  Gastrointestinal: Positive GERD, shot skis ring, dysphagia, colon adenoma  Genitourinary: no hematuria or dysuria, Positive neurogenic bladder, CKD stage 3,  Integument/Breast: no rash or pruritis  Hematologic/Lymphatic: no easy bruising or lymphadenopathy, Positive GEORGIANA, immunosuppression, history of anemia  Musculoskeletal: no arthralgias or myalgias, Positive sciatic neuralgia, osteoporosis,  Neurological: no seizures or tremors, Positive history of migraines, chronic pain syndrome, spondylosis lumbar spine without radiculopathy  Behavioral/Psych: no auditory or visual hallucinations, Recurrent major depressive disorder, depression, anxiety  Endocrine: no heat or cold intolerance, Secondary hyperparathyroidism, mild vitamin-D deficiency        OBJECTIVE:     PHYSICAL EXAM:  Height: 5' 6" (167.6 cm) Weight: 71.2 kg (157 lb 1.2 oz), General Appearance: Well nourished, well developed, in no acute distress.  Neurological: Mood & affect are normal.  left  Foot/Ankle     Skin: bruising  Swelling: mild  Warmth: no warmth  Tenderness: mildROM: 90 degrees " dorsiflexion, 180 degrees plantarflexion, 40 degrees inversion and 45 degrees eversion  Strength: normal, 5 on 5 tibialis anterior strength, 5 of 5 EHL strength, 5 of 5 EDL strength, 5 of 5 FHL and 5 of 5 FDL  Gait: normal  Stability: anterior drawer: negative, exterior rotation test: negative, Lachman: negative and Cotton test: negative     mild swelling of the 5th toe and normal microcirculation and capillary reflow              RADIOGRAPHS:  Repeat x-rays taken today films reviewed by me demonstrate no evidence of fracture dislocation or about the toe soft tissue swelling can be noted on the x-ray    ASSESSMENT/PLAN:       ICD-10-CM ICD-9-CM   1. Left foot pain M79.672 729.5       Plan:  Continue with conservative care going to add compound pain cream to affected area up to q.i.d. p.r.n. follow-up in 2-3 weeks if symptoms not resolve

## 2019-10-14 NOTE — TELEPHONE ENCOUNTER
----- Message from Martin Mccann PA-C sent at 10/14/2019  8:36 AM CDT -----  Contact: patient  If she is still having problems have her come in.    ----- Message -----  From: Ju Alford MA  Sent: 10/11/2019   1:56 PM CDT  To: Martin Mccann PA-C    Patient states that she came in for her big toe two weeks ago. Patient states that her big toe still hurts. She wanted to know what was going on. Can you give her a call back please??  ----- Message -----  From: Farzaneh Fonseca  Sent: 10/11/2019  11:50 AM CDT  To: Siobhan Flores Staff    Please call above patient at [phone] need to speak with the nurse,waiting on a call back thanks.

## 2019-10-18 DIAGNOSIS — I10 ESSENTIAL HYPERTENSION: ICD-10-CM

## 2019-10-18 RX ORDER — DULOXETIN HYDROCHLORIDE 60 MG/1
CAPSULE, DELAYED RELEASE ORAL
Qty: 180 CAPSULE | Refills: 0 | Status: SHIPPED | OUTPATIENT
Start: 2019-10-18 | End: 2020-04-03

## 2019-10-18 RX ORDER — AMLODIPINE BESYLATE 10 MG/1
TABLET ORAL
Qty: 90 TABLET | Refills: 0 | Status: SHIPPED | OUTPATIENT
Start: 2019-10-18 | End: 2020-01-22

## 2019-10-28 ENCOUNTER — TELEPHONE (OUTPATIENT)
Dept: ORTHOPEDICS | Facility: CLINIC | Age: 69
End: 2019-10-28

## 2019-10-28 ENCOUNTER — PATIENT OUTREACH (OUTPATIENT)
Dept: ADMINISTRATIVE | Facility: OTHER | Age: 69
End: 2019-10-28

## 2019-10-28 NOTE — TELEPHONE ENCOUNTER
----- Message from Martin Mccann PA-C sent at 10/28/2019 10:41 AM CDT -----  Contact: self/[phone]  Set her up with podiatry please     ----- Message -----  From: Heather Alvarenga MA  Sent: 10/28/2019  10:30 AM CDT  To: Martin Mccann PA-C        ----- Message -----  From: Keyanna Weldon MA  Sent: 10/28/2019  10:07 AM CDT  To: Siobhan Flores Staff    Pt states she is still having problems with her Little L toe. Pt states she cant even put on her shoes on .She would like to know what can she do

## 2019-10-31 ENCOUNTER — OFFICE VISIT (OUTPATIENT)
Dept: PODIATRY | Facility: CLINIC | Age: 69
End: 2019-10-31
Payer: MEDICARE

## 2019-10-31 VITALS
HEIGHT: 66 IN | BODY MASS INDEX: 25.23 KG/M2 | WEIGHT: 157 LBS | HEART RATE: 62 BPM | SYSTOLIC BLOOD PRESSURE: 150 MMHG | DIASTOLIC BLOOD PRESSURE: 71 MMHG

## 2019-10-31 DIAGNOSIS — S90.122A: Primary | ICD-10-CM

## 2019-10-31 DIAGNOSIS — M79.675 TOE PAIN, LEFT: ICD-10-CM

## 2019-10-31 PROCEDURE — 99203 OFFICE O/P NEW LOW 30 MIN: CPT | Mod: HCNC,S$GLB,, | Performed by: PODIATRIST

## 2019-10-31 PROCEDURE — 3077F SYST BP >= 140 MM HG: CPT | Mod: HCNC,CPTII,S$GLB, | Performed by: PODIATRIST

## 2019-10-31 PROCEDURE — 3077F PR MOST RECENT SYSTOLIC BLOOD PRESSURE >= 140 MM HG: ICD-10-PCS | Mod: HCNC,CPTII,S$GLB, | Performed by: PODIATRIST

## 2019-10-31 PROCEDURE — 99999 PR PBB SHADOW E&M-EST. PATIENT-LVL III: CPT | Mod: PBBFAC,HCNC,, | Performed by: PODIATRIST

## 2019-10-31 PROCEDURE — 99999 PR PBB SHADOW E&M-EST. PATIENT-LVL III: ICD-10-PCS | Mod: PBBFAC,HCNC,, | Performed by: PODIATRIST

## 2019-10-31 PROCEDURE — 1101F PR PT FALLS ASSESS DOC 0-1 FALLS W/OUT INJ PAST YR: ICD-10-PCS | Mod: HCNC,CPTII,S$GLB, | Performed by: PODIATRIST

## 2019-10-31 PROCEDURE — 1101F PT FALLS ASSESS-DOCD LE1/YR: CPT | Mod: HCNC,CPTII,S$GLB, | Performed by: PODIATRIST

## 2019-10-31 PROCEDURE — 3078F DIAST BP <80 MM HG: CPT | Mod: HCNC,CPTII,S$GLB, | Performed by: PODIATRIST

## 2019-10-31 PROCEDURE — 3078F PR MOST RECENT DIASTOLIC BLOOD PRESSURE < 80 MM HG: ICD-10-PCS | Mod: HCNC,CPTII,S$GLB, | Performed by: PODIATRIST

## 2019-10-31 PROCEDURE — 99203 PR OFFICE/OUTPT VISIT, NEW, LEVL III, 30-44 MIN: ICD-10-PCS | Mod: HCNC,S$GLB,, | Performed by: PODIATRIST

## 2019-10-31 RX ORDER — LIDOCAINE HYDROCHLORIDE 20 MG/ML
JELLY TOPICAL
Qty: 30 ML | Refills: 2 | Status: SHIPPED | OUTPATIENT
Start: 2019-10-31 | End: 2020-06-30

## 2019-10-31 NOTE — PROGRESS NOTES
Subjective:      Patient ID: Idalmis Morejon is a 69 y.o. female.    Chief Complaint: New patient (left foot ( little toe) // dropped cell phone on little toe and been swollen// bruised tissue )    Sharp throbbing pain left 5th toe.  Rapid onset dropping phone 1 month ago.  Minimal improvement since.  Gradual onset, worsening over past several weeks, aggravated by increased weight bearing, shoe gear, pressure.  No previous medical treatment.  OTC pain med not helping. Denies repeat trauma, signs infection, surgery.  xrays x 2 negative osseous injury toe bones/joints.    Review of Systems   Constitution: Negative for chills, diaphoresis, fever, malaise/fatigue and night sweats.   Cardiovascular: Negative for claudication, cyanosis, leg swelling and syncope.   Skin: Negative for color change, dry skin, nail changes, rash, suspicious lesions and unusual hair distribution.   Musculoskeletal: Negative for falls, joint pain, joint swelling, muscle cramps, muscle weakness and stiffness.   Gastrointestinal: Negative for constipation, diarrhea, nausea and vomiting.   Neurological: Negative for brief paralysis, disturbances in coordination, focal weakness, numbness, paresthesias, sensory change and tremors.           Objective:      Physical Exam   Constitutional: She is oriented to person, place, and time. She appears well-developed and well-nourished. She is cooperative. No distress.   Cardiovascular:   Pulses:       Popliteal pulses are 2+ on the right side, and 2+ on the left side.        Dorsalis pedis pulses are 2+ on the right side, and 2+ on the left side.        Posterior tibial pulses are 2+ on the right side, and 2+ on the left side.   Capillary refill 3 seconds all toes/distal feet, all toes/both feet warm to touch.      Negative lymphadenopathy bilateral popliteal fossa and tarsal tunnel.      Negavie lower extremity edema bilateral.     Musculoskeletal:        Right ankle: She exhibits normal range of motion, no  swelling, no ecchymosis, no deformity, no laceration and normal pulse. Achilles tendon normal. Achilles tendon exhibits no pain, no defect and normal Lincoln's test results.   Sharp deep pain to palpation dorsal lateral and tip of 5th toe left foot without deformity, signs of trauma, or loss of function.    Otherwise, Normal angle, base, station of gait. All ten toes without clubbing, cyanosis, or signs of ischemia.  No pain to palpation bilateral lower extremities.  Range of motion, stability, muscle strength, and muscle tone normal bilateral feet and legs.    Lymphadenopathy: No inguinal adenopathy noted on the right or left side.   Negative lymphadenopathy bilateral popliteal fossa and tarsal tunnel.    Negative lymphangitic streaking bilateral feet/ankles/legs.   Neurological: She is alert and oriented to person, place, and time. She has normal strength. She displays no atrophy and no tremor. No sensory deficit. She exhibits normal muscle tone. Gait normal.   Reflex Scores:       Patellar reflexes are 2+ on the right side and 2+ on the left side.       Achilles reflexes are 2+ on the right side and 2+ on the left side.  Negative tinel sign to percussion sural, superficial peroneal, deep peroneal, saphenous, and posterior tibial nerves right and left ankles and feet.    Negative allodynia and negative hyperesthesia both feet and all toes.   Skin: Skin is warm, dry and intact. Capillary refill takes 2 to 3 seconds. No abrasion, no bruising, no burn, no ecchymosis, no laceration, no lesion and no rash noted. She is not diaphoretic. No cyanosis or erythema. No pallor. Nails show no clubbing.     Skin is normal age and health appropriate color, turgor, texture, and temperature bilateral lower extremities without ulceration, hyperpigmentation, discoloration, masses nodules or cords palpated.  No ecchymosis, erythema, edema, or cardinal signs of infection bilateral lower extremities.     Psychiatric: She has a normal  mood and affect.             Assessment:       Encounter Diagnoses   Name Primary?    Contusion of small toe of left foot, initial encounter Yes    Toe pain, left          Plan:       Idalmis was seen today for new patient.    Diagnoses and all orders for this visit:    Contusion of small toe of left foot, initial encounter    Toe pain, left    Other orders  -     lidocaine HCL 2% (XYLOCAINE) 2 % jelly; Apply topically as needed.      I counseled the patient on her conditions, their implications and medical management.        Recommend toe sleeves to tolerance.    Rx lidocaine gel.    Discussed conservative treatment with shoes of adequate dimensions, material, and style to alleviate symptoms and delay or prevent surgical intervention.    Declines scheduled follow up pending improvement.          Follow up if symptoms worsen or fail to improve.

## 2019-11-08 NOTE — PROGRESS NOTES
Health  following non-compliance protocol  BP avg near goal at 131/78 mmHg  Renal function panel 6/2019 WNL except eGFR ~ 52    Hypertension Medications             amLODIPine (NORVASC) 10 MG tablet TAKE 1 TABLET BY MOUTH EVERY DAY    chlorthalidone (HYGROTEN) 25 MG Tab Take 1/2 tablet (12.5 mg) by mouth once daily in the morning (For blood pressure)    valsartan (DIOVAN) 160 MG tablet TAKE 1 TABLET (160 MG TOTAL) BY MOUTH 2 (TWO) TIMES DAILY.

## 2019-11-14 NOTE — PROGRESS NOTES
"Patient states, 'I think I have everything under control. I no longer want to be in the program".     Patient reports she is going to call Kelsi Cordova PharmD to speak with her this afternoon. Notified PharmD.      Signing encounter due to expected removal of program.   "

## 2019-11-30 ENCOUNTER — HOSPITAL ENCOUNTER (OUTPATIENT)
Dept: RADIOLOGY | Facility: HOSPITAL | Age: 69
Discharge: HOME | End: 2019-11-30
Attending: NURSE PRACTITIONER
Payer: MEDICARE

## 2019-11-30 DIAGNOSIS — Z12.39 BREAST CANCER SCREENING: ICD-10-CM

## 2019-11-30 PROCEDURE — 77063 MAMMO DIGITAL SCREENING BILAT WITH TOMOSYNTHESIS_CAD: ICD-10-PCS | Mod: 26,HCNC,, | Performed by: RADIOLOGY

## 2019-11-30 PROCEDURE — 77067 MAMMO DIGITAL SCREENING BILAT WITH TOMOSYNTHESIS_CAD: ICD-10-PCS | Mod: 26,HCNC,, | Performed by: RADIOLOGY

## 2019-11-30 PROCEDURE — 77063 BREAST TOMOSYNTHESIS BI: CPT | Mod: TC,HCNC

## 2019-11-30 PROCEDURE — 77067 SCR MAMMO BI INCL CAD: CPT | Mod: 26,HCNC,, | Performed by: RADIOLOGY

## 2019-11-30 PROCEDURE — 77063 BREAST TOMOSYNTHESIS BI: CPT | Mod: 26,HCNC,, | Performed by: RADIOLOGY

## 2019-12-02 NOTE — PROGRESS NOTES
Patient never contacted PharmDKelsi. Calling patient to confirm if she would like be to removed from the program.

## 2019-12-05 ENCOUNTER — EPISODE CHANGES (OUTPATIENT)
Dept: OTHER | Facility: OTHER | Age: 69
End: 2019-12-05

## 2019-12-05 ENCOUNTER — TELEPHONE (OUTPATIENT)
Dept: NEPHROLOGY | Facility: CLINIC | Age: 69
End: 2019-12-05

## 2019-12-05 NOTE — PROGRESS NOTES
"Patient requested to be removed.     Patient states, "I want to be removed because I am stable and I keep forgetting to take readings". Patient reports she only wants to check readings every couple of weeks. Patient reports she is going to continue to check in with doctors. Patient states, "if it gets out of hand she will call us and rejoin the program". Patient states, "thank you for all you do".     Removing patient from digital medicine program. Notified PharmD and enrolling physician.     "

## 2019-12-16 ENCOUNTER — TELEPHONE (OUTPATIENT)
Dept: NEPHROLOGY | Facility: CLINIC | Age: 69
End: 2019-12-16

## 2020-01-22 DIAGNOSIS — I10 ESSENTIAL HYPERTENSION: ICD-10-CM

## 2020-01-22 RX ORDER — AMLODIPINE BESYLATE 10 MG/1
TABLET ORAL
Qty: 90 TABLET | Refills: 0 | Status: SHIPPED | OUTPATIENT
Start: 2020-01-22 | End: 2020-03-12

## 2020-02-26 ENCOUNTER — TELEPHONE (OUTPATIENT)
Dept: GASTROENTEROLOGY | Facility: CLINIC | Age: 70
End: 2020-02-26

## 2020-02-26 NOTE — TELEPHONE ENCOUNTER
----- Message from Divina Brown sent at 2/26/2020 10:16 AM CST -----  Contact: Pt  Type: Needs Medical Advice  Who Called: Pt  Symptoms (please be specific): Not able to digest anything   How long has patient had these symptoms: ongoing for the past couple of weeks  Pharmacy name and phone #: [facility]   Would the patient rather a call back or a response via [facility]? Callback  Best Call Back Number: [phone]  Additional Information: Requesting a callback since she is having a hard time digesting food and water

## 2020-02-26 NOTE — TELEPHONE ENCOUNTER
Dr. Love,  Patient is requesting orders to get an EGD   Patient states she's having a hard time digesting food and water along with a sour taste  Pt states the pantoprazole is not working and wants to know if she should double the meds  Please advise

## 2020-03-02 ENCOUNTER — PES CALL (OUTPATIENT)
Dept: ADMINISTRATIVE | Facility: CLINIC | Age: 70
End: 2020-03-02

## 2020-03-03 ENCOUNTER — LAB VISIT (OUTPATIENT)
Dept: LAB | Facility: HOSPITAL | Age: 70
End: 2020-03-03
Attending: INTERNAL MEDICINE
Payer: MEDICARE

## 2020-03-03 DIAGNOSIS — N18.30 CKD (CHRONIC KIDNEY DISEASE) STAGE 3, GFR 30-59 ML/MIN: ICD-10-CM

## 2020-03-03 DIAGNOSIS — R80.9 PROTEINURIA, UNSPECIFIED TYPE: ICD-10-CM

## 2020-03-03 PROCEDURE — 80069 RENAL FUNCTION PANEL: CPT | Mod: HCNC

## 2020-03-03 PROCEDURE — 83970 ASSAY OF PARATHORMONE: CPT | Mod: HCNC

## 2020-03-03 PROCEDURE — 82306 VITAMIN D 25 HYDROXY: CPT | Mod: HCNC

## 2020-03-03 PROCEDURE — 36415 COLL VENOUS BLD VENIPUNCTURE: CPT | Mod: HCNC

## 2020-03-04 LAB
25(OH)D3+25(OH)D2 SERPL-MCNC: 46 NG/ML (ref 30–96)
ALBUMIN SERPL BCP-MCNC: 3.7 G/DL (ref 3.5–5.2)
ANION GAP SERPL CALC-SCNC: 12 MMOL/L (ref 8–16)
BUN SERPL-MCNC: 13 MG/DL (ref 8–23)
CALCIUM SERPL-MCNC: 10.2 MG/DL (ref 8.7–10.5)
CHLORIDE SERPL-SCNC: 106 MMOL/L (ref 95–110)
CO2 SERPL-SCNC: 26 MMOL/L (ref 23–29)
CREAT SERPL-MCNC: 1.1 MG/DL (ref 0.5–1.4)
EST. GFR  (AFRICAN AMERICAN): 58.4 ML/MIN/1.73 M^2
EST. GFR  (NON AFRICAN AMERICAN): 50.6 ML/MIN/1.73 M^2
GLUCOSE SERPL-MCNC: 90 MG/DL (ref 70–110)
PHOSPHATE SERPL-MCNC: 3.9 MG/DL (ref 2.7–4.5)
POTASSIUM SERPL-SCNC: 4.4 MMOL/L (ref 3.5–5.1)
PTH-INTACT SERPL-MCNC: 65 PG/ML (ref 9–77)
SODIUM SERPL-SCNC: 144 MMOL/L (ref 136–145)

## 2020-03-06 ENCOUNTER — PATIENT MESSAGE (OUTPATIENT)
Dept: GASTROENTEROLOGY | Facility: CLINIC | Age: 70
End: 2020-03-06

## 2020-03-06 ENCOUNTER — TELEPHONE (OUTPATIENT)
Dept: NEPHROLOGY | Facility: CLINIC | Age: 70
End: 2020-03-06

## 2020-03-06 DIAGNOSIS — R13.19 ESOPHAGEAL DYSPHAGIA: Primary | ICD-10-CM

## 2020-03-06 NOTE — PROGRESS NOTES
"Subjective:       Patient ID: Idalmis Morejon is a 69 y.o. AA female who presents for follow-up evaluation of CKD   No chief complaint on file.    HPI   Patient is new to me. Last seen by Dr. Sena in June 2019.  Prior pertinent chart reviewed since this is patient's first appointment with me.    Patient presents for f/u of CKD.  Baseline creatinine of 1.0-1.2 mg/dL. Pt admits to prior heavy use of various NSAID like Ibuprofen, goody powder and other compounds. She was still using NSAID until later part of 2016.    Home BPs: 140s/80s (when not taking amlodipine); 120s/70s (when taking amlodipine).     Significant hx includes longstanding hypertension "for decades", Schatzki's ring, DJD, sciatica, osteoporosis, hyperlipidemia.      The patient denies taking NSAIDs, herbal supplements, or new antibiotics, recreational drugs, recent episode of dehydration, diarrhea, nausea or vomiting, acute illness, hospitalization or exposure to IV radiocontrast.     Significant family hx includes: HTN; brother, father, nephew c ESRD on dialysis    Last renal US: April 2019, reviewed.    Review of Systems   Constitutional: Negative for appetite change and fatigue.   Respiratory: Negative for cough, shortness of breath, wheezing and stridor.    Cardiovascular: Positive for leg swelling (to ankles only). Negative for chest pain and palpitations.   Gastrointestinal: Positive for constipation. Negative for diarrhea, nausea and vomiting.   Genitourinary: Negative for difficulty urinating, dysuria, flank pain, frequency, hematuria and urgency.   Neurological: Negative for dizziness, weakness and light-headedness.       Objective:       Blood pressure (!) 140/80, pulse 64, height 5' 6" (1.676 m), weight 72 kg (158 lb 11.7 oz), SpO2 98 %.  Physical Exam   Constitutional: She is oriented to person, place, and time. She appears well-developed and well-nourished. No distress.   Cardiovascular: Normal rate, regular rhythm and normal heart " sounds. Exam reveals no gallop and no friction rub.   No murmur heard.  Pulmonary/Chest: Effort normal and breath sounds normal. No stridor. No respiratory distress. She has no wheezes.   Abdominal: Soft. Bowel sounds are normal. She exhibits no distension. There is no tenderness. There is no CVA tenderness.   Musculoskeletal: She exhibits no edema.   Neurological: She is alert and oriented to person, place, and time.   Skin: Skin is warm and dry. She is not diaphoretic. No pallor.   Psychiatric: She has a normal mood and affect.         Crt: 1.1 mg/dL  Hgb: 11.3 g/dL  CO2: 26 mmol/L  UPCR: 0.05 g/g    Assessment:       1. CKD (chronic kidney disease) stage 3, GFR 30-59 ml/min    2. Anemia, unspecified type    3. Essential hypertension    4. Hypertension, unspecified type        Plan:   CKD stage 3A c eGFR 53-58 mL/min - likely r/t longstanding HTN and heavy NSAID use, possible APOL-1 component. Stable. Non-proteinuric. Educated patient to control BP, remain well-hydrated, and avoid NSAIDs to prevent progression of CKD.    UPCR Non-proteinuric. On valsartan 160 mg BID.   Acid-base WNL.   Renal osteodystrophy Ca, phos okay. PTH improved.   Anemia Previously at goal for CKD; need repeat.   DM Non-diabetic.   Lipid Management Defer to PCP.   ESRD planning Anticipatory guidance provided about timing of dialysis. Start discussions and planning when eGFR is about 20 mL/min; most patients start dialysis between 5-10 mL/min.     HTN - BPs high at home on days when not taking chlorthalidone. On amlodipine 5 mg, chlorthalidone 25 mg qod (supposed to be chlorthalidone 12.5 daily), valsartan 160 mg BID.  Will simplify patient's dosing schedule: amlodipine 5 mg daily, chlorthalidone 12.5 mg daily, valsartan 320 mg nightly. Instructed pt to continue to take home BPs and call for SBP <110 or DBP < 60 consistently. Also advised her to report symptoms of hypotension, including dizziness. She verbalized understanding and agreed to  plan. Will message pt in 2 weeks to check on BPs on new regimen.     All questions patient had were answered.  Asked if further questions. None. F/u in clinic 12 mos  with labs and urine prior to next visit or sooner if needed.  ER for emergency concerns.    Summary of Plan:  1. Simplify patient's BP dosing schedule: amlodipine 5 mg daily, chlorthalidone 12.5 mg daily, valsartan 320 mg nightly.  2. CBC and BMP next week   3. Avoid NSAIDs, IV contrast, aminoglycosides, Bactrim  4. RTC in 12 mos

## 2020-03-09 NOTE — TELEPHONE ENCOUNTER
Called and spoke to pt re recommendations.  Pt says she had esophagram few months ago (06/17/2019) and does not want to have another.  Pt transferred to endo schedulers to schedule EGD and motility study & pt provided endo schedulers number.  Pt appreciated the call.

## 2020-03-11 ENCOUNTER — PATIENT OUTREACH (OUTPATIENT)
Dept: ADMINISTRATIVE | Facility: OTHER | Age: 70
End: 2020-03-11

## 2020-03-12 ENCOUNTER — OFFICE VISIT (OUTPATIENT)
Dept: NEPHROLOGY | Facility: CLINIC | Age: 70
End: 2020-03-12
Payer: MEDICARE

## 2020-03-12 VITALS
WEIGHT: 158.75 LBS | OXYGEN SATURATION: 98 % | DIASTOLIC BLOOD PRESSURE: 80 MMHG | HEART RATE: 64 BPM | HEIGHT: 66 IN | BODY MASS INDEX: 25.51 KG/M2 | SYSTOLIC BLOOD PRESSURE: 140 MMHG

## 2020-03-12 DIAGNOSIS — N18.30 CKD (CHRONIC KIDNEY DISEASE) STAGE 3, GFR 30-59 ML/MIN: Primary | ICD-10-CM

## 2020-03-12 DIAGNOSIS — I10 HYPERTENSION, UNSPECIFIED TYPE: ICD-10-CM

## 2020-03-12 DIAGNOSIS — D64.9 ANEMIA, UNSPECIFIED TYPE: ICD-10-CM

## 2020-03-12 DIAGNOSIS — I10 ESSENTIAL HYPERTENSION: ICD-10-CM

## 2020-03-12 PROCEDURE — 3077F SYST BP >= 140 MM HG: CPT | Mod: HCNC,CPTII,S$GLB, | Performed by: NURSE PRACTITIONER

## 2020-03-12 PROCEDURE — 1101F PT FALLS ASSESS-DOCD LE1/YR: CPT | Mod: HCNC,CPTII,S$GLB, | Performed by: NURSE PRACTITIONER

## 2020-03-12 PROCEDURE — 1159F PR MEDICATION LIST DOCUMENTED IN MEDICAL RECORD: ICD-10-PCS | Mod: HCNC,S$GLB,, | Performed by: NURSE PRACTITIONER

## 2020-03-12 PROCEDURE — 99499 UNLISTED E&M SERVICE: CPT | Mod: HCNC,S$GLB,, | Performed by: NURSE PRACTITIONER

## 2020-03-12 PROCEDURE — 1126F AMNT PAIN NOTED NONE PRSNT: CPT | Mod: HCNC,S$GLB,, | Performed by: NURSE PRACTITIONER

## 2020-03-12 PROCEDURE — 3079F DIAST BP 80-89 MM HG: CPT | Mod: HCNC,CPTII,S$GLB, | Performed by: NURSE PRACTITIONER

## 2020-03-12 PROCEDURE — 99999 PR PBB SHADOW E&M-EST. PATIENT-LVL III: ICD-10-PCS | Mod: PBBFAC,HCNC,, | Performed by: NURSE PRACTITIONER

## 2020-03-12 PROCEDURE — 3077F PR MOST RECENT SYSTOLIC BLOOD PRESSURE >= 140 MM HG: ICD-10-PCS | Mod: HCNC,CPTII,S$GLB, | Performed by: NURSE PRACTITIONER

## 2020-03-12 PROCEDURE — 99214 PR OFFICE/OUTPT VISIT, EST, LEVL IV, 30-39 MIN: ICD-10-PCS | Mod: HCNC,S$GLB,, | Performed by: NURSE PRACTITIONER

## 2020-03-12 PROCEDURE — 1101F PR PT FALLS ASSESS DOC 0-1 FALLS W/OUT INJ PAST YR: ICD-10-PCS | Mod: HCNC,CPTII,S$GLB, | Performed by: NURSE PRACTITIONER

## 2020-03-12 PROCEDURE — 99499 RISK ADDL DX/OHS AUDIT: ICD-10-PCS | Mod: HCNC,S$GLB,, | Performed by: NURSE PRACTITIONER

## 2020-03-12 PROCEDURE — 99999 PR PBB SHADOW E&M-EST. PATIENT-LVL III: CPT | Mod: PBBFAC,HCNC,, | Performed by: NURSE PRACTITIONER

## 2020-03-12 PROCEDURE — 99214 OFFICE O/P EST MOD 30 MIN: CPT | Mod: HCNC,S$GLB,, | Performed by: NURSE PRACTITIONER

## 2020-03-12 PROCEDURE — 1126F PR PAIN SEVERITY QUANTIFIED, NO PAIN PRESENT: ICD-10-PCS | Mod: HCNC,S$GLB,, | Performed by: NURSE PRACTITIONER

## 2020-03-12 PROCEDURE — 3079F PR MOST RECENT DIASTOLIC BLOOD PRESSURE 80-89 MM HG: ICD-10-PCS | Mod: HCNC,CPTII,S$GLB, | Performed by: NURSE PRACTITIONER

## 2020-03-12 PROCEDURE — 1159F MED LIST DOCD IN RCRD: CPT | Mod: HCNC,S$GLB,, | Performed by: NURSE PRACTITIONER

## 2020-03-12 RX ORDER — B-COMPLEX WITH VITAMIN C
1 TABLET ORAL DAILY
COMMUNITY

## 2020-03-12 RX ORDER — CHLORTHALIDONE 25 MG/1
TABLET ORAL
Qty: 45 TABLET | Refills: 1 | Status: SHIPPED | OUTPATIENT
Start: 2020-03-12 | End: 2020-04-06

## 2020-03-12 RX ORDER — VALSARTAN 160 MG/1
320 TABLET ORAL DAILY
Qty: 180 TABLET | Refills: 3 | Status: SHIPPED | OUTPATIENT
Start: 2020-03-12 | End: 2021-05-12

## 2020-03-12 RX ORDER — AMLODIPINE BESYLATE 5 MG/1
5 TABLET ORAL DAILY
Qty: 90 TABLET | Refills: 3 | Status: SHIPPED | OUTPATIENT
Start: 2020-03-12 | End: 2020-06-30

## 2020-03-12 NOTE — PATIENT INSTRUCTIONS
Blood pressure regimen:   - amlodipine 5  Mg in morning  - chlorthalidone 12.5 mg in morning (daily)   - valsartan 320 mg at night.  Notify us if top number is consistently less than 110 or bottom number is consistently less than 60.    Continue low sodium diet.  Avoid NSAID (ibuprofen, advil, motrin, aleve, naprosyn, naproxen, [place], Goody's Powder). Tylenol is okay.  Avoid bactrim/ IV contrast/ gadolinium/ aminoglycoside where possible.  Avoid herbal supplements.  Stay hydrated.  Return to clinic in 12 months with labs (bloodwork and urine) or sooner if needed.  Go to the ER with any emergency concerns.

## 2020-03-17 ENCOUNTER — PES CALL (OUTPATIENT)
Dept: ADMINISTRATIVE | Facility: CLINIC | Age: 70
End: 2020-03-17

## 2020-03-20 ENCOUNTER — TELEPHONE (OUTPATIENT)
Dept: INTERNAL MEDICINE | Facility: CLINIC | Age: 70
End: 2020-03-20

## 2020-03-20 ENCOUNTER — LAB VISIT (OUTPATIENT)
Dept: LAB | Facility: HOSPITAL | Age: 70
End: 2020-03-20
Attending: INTERNAL MEDICINE
Payer: MEDICARE

## 2020-03-20 DIAGNOSIS — N18.30 CKD (CHRONIC KIDNEY DISEASE) STAGE 3, GFR 30-59 ML/MIN: ICD-10-CM

## 2020-03-20 DIAGNOSIS — I10 ESSENTIAL HYPERTENSION: ICD-10-CM

## 2020-03-20 DIAGNOSIS — I10 HYPERTENSION, UNSPECIFIED TYPE: ICD-10-CM

## 2020-03-20 LAB
ANION GAP SERPL CALC-SCNC: 8 MMOL/L (ref 8–16)
BASOPHILS # BLD AUTO: 0.03 K/UL (ref 0–0.2)
BASOPHILS NFR BLD: 0.4 % (ref 0–1.9)
BUN SERPL-MCNC: 21 MG/DL (ref 8–23)
CALCIUM SERPL-MCNC: 9.7 MG/DL (ref 8.7–10.5)
CHLORIDE SERPL-SCNC: 104 MMOL/L (ref 95–110)
CO2 SERPL-SCNC: 30 MMOL/L (ref 23–29)
CREAT SERPL-MCNC: 1 MG/DL (ref 0.5–1.4)
DIFFERENTIAL METHOD: ABNORMAL
EOSINOPHIL # BLD AUTO: 0.1 K/UL (ref 0–0.5)
EOSINOPHIL NFR BLD: 1.5 % (ref 0–8)
ERYTHROCYTE [DISTWIDTH] IN BLOOD BY AUTOMATED COUNT: 14.8 % (ref 11.5–14.5)
EST. GFR  (AFRICAN AMERICAN): >60 ML/MIN/1.73 M^2
EST. GFR  (NON AFRICAN AMERICAN): 56.8 ML/MIN/1.73 M^2
GLUCOSE SERPL-MCNC: 93 MG/DL (ref 70–110)
HCT VFR BLD AUTO: 35.8 % (ref 37–48.5)
HGB BLD-MCNC: 11 G/DL (ref 12–16)
IMM GRANULOCYTES # BLD AUTO: 0.01 K/UL (ref 0–0.04)
IMM GRANULOCYTES NFR BLD AUTO: 0.1 % (ref 0–0.5)
LYMPHOCYTES # BLD AUTO: 2.8 K/UL (ref 1–4.8)
LYMPHOCYTES NFR BLD: 37.1 % (ref 18–48)
MCH RBC QN AUTO: 26.6 PG (ref 27–31)
MCHC RBC AUTO-ENTMCNC: 30.7 G/DL (ref 32–36)
MCV RBC AUTO: 87 FL (ref 82–98)
MONOCYTES # BLD AUTO: 0.6 K/UL (ref 0.3–1)
MONOCYTES NFR BLD: 8 % (ref 4–15)
NEUTROPHILS # BLD AUTO: 4 K/UL (ref 1.8–7.7)
NEUTROPHILS NFR BLD: 52.9 % (ref 38–73)
NRBC BLD-RTO: 0 /100 WBC
PLATELET # BLD AUTO: 248 K/UL (ref 150–350)
PMV BLD AUTO: 12.5 FL (ref 9.2–12.9)
POTASSIUM SERPL-SCNC: 3.9 MMOL/L (ref 3.5–5.1)
RBC # BLD AUTO: 4.13 M/UL (ref 4–5.4)
SODIUM SERPL-SCNC: 142 MMOL/L (ref 136–145)
WBC # BLD AUTO: 7.53 K/UL (ref 3.9–12.7)

## 2020-03-20 PROCEDURE — 85025 COMPLETE CBC W/AUTO DIFF WBC: CPT | Mod: HCNC

## 2020-03-20 PROCEDURE — 36415 COLL VENOUS BLD VENIPUNCTURE: CPT | Mod: HCNC

## 2020-03-20 PROCEDURE — 80048 BASIC METABOLIC PNL TOTAL CA: CPT | Mod: HCNC

## 2020-03-20 NOTE — TELEPHONE ENCOUNTER
Please call to check on her.  Unless she is having issues of concern I would NOT recommend she come in for an appointment today.  I can call her if she likes or we can make this a video visit thanks

## 2020-03-31 ENCOUNTER — TELEPHONE (OUTPATIENT)
Dept: NEPHROLOGY | Facility: CLINIC | Age: 70
End: 2020-03-31

## 2020-03-31 NOTE — TELEPHONE ENCOUNTER
----- Message from Shadia Cm LPN sent at 3/31/2020  4:09 PM CDT -----      ----- Message -----  From: Mercy Maldonado NP  Sent: 3/26/2020   4:29 PM CDT  To: Shadia Cm LPN    Pls call for home BPs

## 2020-04-01 ENCOUNTER — TELEPHONE (OUTPATIENT)
Dept: NEPHROLOGY | Facility: CLINIC | Age: 70
End: 2020-04-01

## 2020-04-03 ENCOUNTER — PATIENT MESSAGE (OUTPATIENT)
Dept: PSYCHIATRY | Facility: CLINIC | Age: 70
End: 2020-04-03

## 2020-04-03 ENCOUNTER — OFFICE VISIT (OUTPATIENT)
Dept: PSYCHIATRY | Facility: CLINIC | Age: 70
End: 2020-04-03
Payer: MEDICARE

## 2020-04-03 DIAGNOSIS — F33.41 RECURRENT MAJOR DEPRESSIVE DISORDER, IN PARTIAL REMISSION: Primary | ICD-10-CM

## 2020-04-03 PROCEDURE — 1159F MED LIST DOCD IN RCRD: CPT | Mod: HCNC,95,, | Performed by: PSYCHIATRY & NEUROLOGY

## 2020-04-03 PROCEDURE — 1101F PT FALLS ASSESS-DOCD LE1/YR: CPT | Mod: HCNC,CPTII,95, | Performed by: PSYCHIATRY & NEUROLOGY

## 2020-04-03 PROCEDURE — 99214 OFFICE O/P EST MOD 30 MIN: CPT | Mod: HCNC,95,, | Performed by: PSYCHIATRY & NEUROLOGY

## 2020-04-03 PROCEDURE — 99499 RISK ADDL DX/OHS AUDIT: ICD-10-PCS | Mod: HCNC,95,, | Performed by: PSYCHIATRY & NEUROLOGY

## 2020-04-03 PROCEDURE — 99499 UNLISTED E&M SERVICE: CPT | Mod: HCNC,95,, | Performed by: PSYCHIATRY & NEUROLOGY

## 2020-04-03 PROCEDURE — 99214 PR OFFICE/OUTPT VISIT, EST, LEVL IV, 30-39 MIN: ICD-10-PCS | Mod: HCNC,95,, | Performed by: PSYCHIATRY & NEUROLOGY

## 2020-04-03 PROCEDURE — 1101F PR PT FALLS ASSESS DOC 0-1 FALLS W/OUT INJ PAST YR: ICD-10-PCS | Mod: HCNC,CPTII,95, | Performed by: PSYCHIATRY & NEUROLOGY

## 2020-04-03 PROCEDURE — 1159F PR MEDICATION LIST DOCUMENTED IN MEDICAL RECORD: ICD-10-PCS | Mod: HCNC,95,, | Performed by: PSYCHIATRY & NEUROLOGY

## 2020-04-03 RX ORDER — DULOXETIN HYDROCHLORIDE 60 MG/1
60 CAPSULE, DELAYED RELEASE ORAL DAILY
Qty: 90 CAPSULE | Refills: 0 | Status: SHIPPED | OUTPATIENT
Start: 2020-04-03 | End: 2020-07-02

## 2020-04-03 NOTE — PROGRESS NOTES
The patient location is: her home  The chief complaint leading to consultation is: depression and anxiety  Visit type: Virtual visit with synchronous audio and video  Total time spent with patient: 30 mins  Each patient to whom he or she provides medical services by telemedicine is:  (1) informed of the relationship between the physician and patient and the respective role of any other health care provider with respect to management of the patient; and (2) notified that he or she may decline to receive medical services by telemedicine and may withdraw from such care at any time.    Notes:     Outpatient Psychiatry Follow-Up Visit (MD/NP)    4/3/2020    Clinical Status of Patient:  Outpatient (Ambulatory)    Chief Complaint:  Idalmis Morejon is a 69 y.o. female who presents today for follow-up of depression and anxiety.      Met with patient.      Interval History and Content of Current Session:  Interim Events/Subjective Report/Content of Current Session:   Overwhelmed with corona.  People she knows have .  Prior to Coronavirus she was doing well.  Was working with [employer] working 3 days a week.  Unfortunately she has been laid off recently.  She is worried about being home so much.  She is having trouble sleeping.  Melatonin has stopped working for her.  Watches TV in bed before turning it off about 10:30.  Sleeps 2 hrs and tosses and turns the rest of the night.  Started before coronavirus.  She is worried about kwame the virus.  She admits to minimal crying.  She was walking with her daughter at [facility] but there wee too many people there.  She is eating one meal a day and her weight is stable.  No other worries.  She still enjoys doing puzzles but unable to go to [employer] or the mall.  Denies wanting to die.      At her last appt in Sept with Dr. Pino it was documented that the dose of cymbalta was decreased to 30 mg daily.  She reports she is on 60 mg daily and never attempted to reduce to 30 mg  daily. She verified by checking her medicine bottle.        Psychotherapy:  · Target symptoms: depression, anxiety   · Why chosen therapy is appropriate versus another modality: relevant to diagnosis  · Outcome monitoring methods: self-report, observation  · Therapeutic intervention type: supportive psychotherapy  · Topics discussed/themes: adjusting to coronavirus effects on daily living, building skills sets for symptom management, symptom recognition  · The patient's response to the intervention is motivated. The patient's progress toward treatment goals is good.   · Duration of intervention: 10 mins    Review of Systems   Constitutional: Negative for chills, fever and weight loss.   Respiratory: Negative for cough, shortness of breath and wheezing.    Cardiovascular: Negative for chest pain and palpitations.   Gastrointestinal: Negative for abdominal pain, diarrhea and vomiting.   Genitourinary: Negative for dysuria.   Musculoskeletal: Negative for falls.   Neurological: Negative for dizziness and tremors.         Past Medical, Family and Social History: The patient's past medical, family and social history have been reviewed and updated as appropriate within the electronic medical record - see encounter notes.    Compliance: yes    Side effects: none    Risk Parameters:  Patient reports no suicidal ideation  Patient reports no homicidal ideation  Patient reports no self-injurious behavior  Patient reports no violent behavior    Exam (detailed: at least 9 elements; comprehensive: all 15 elements)   Constitutional  Vitals:  Most recent vital signs, dated less than 90 days prior to this appointment, were reviewed.    There were no vitals filed for this visit.     General:  unremarkable, age appropriate, neatly groomed, well nourished     Musculoskeletal  Muscle Strength/Tone:  no dyskinesia, no tremor   Gait & Station:  non-ataxic noted for walking in her home     Psychiatric  Speech:  normal tone, normal rate,  normal pitch, normal volume, prosody intact   Mood:    Affect:  euthymic  appropriate, full   Thought Process:  normal and logical   Associations:  intact   Thought Content:  normal, no suicidality, no homicidality, delusions, or paranoia   Insight  Judgement:  good, patient has awareness of illness  Patient's behavior is adequate to circumstances   Orientation:  grossly intact   Memory: intact for content of interview   Language: grossly intact   Attention Span & Concentration:  able to focus   Fund of Knowledge:  intact and appropriate to age and level of education     Assessment and Diagnosis   Status/Progress: Based on the examination today, the patient's problem(s) is/are improved.  New problems have not been presented today.   Comorbidities are complicating management of the primary condition.  There are no active rule-out diagnoses for this patient at this time.    General Impression: Pt with depression and anxiety as well as multiple other medical comorbidities.  She is still affected by the death of her mother in March 2014.      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      Intervention/Counseling/Treatment Plan   · Continue cymbalta 60 mg daily  · Discussed sleep hygiene and risks of sleep meds      Return to Clinic: 3 months

## 2020-04-05 DIAGNOSIS — I10 ESSENTIAL HYPERTENSION: ICD-10-CM

## 2020-04-06 RX ORDER — CHLORTHALIDONE 25 MG/1
TABLET ORAL
Qty: 45 TABLET | Refills: 1 | Status: SHIPPED | OUTPATIENT
Start: 2020-04-06 | End: 2020-05-04

## 2020-04-19 DIAGNOSIS — I10 ESSENTIAL HYPERTENSION: ICD-10-CM

## 2020-04-19 RX ORDER — AMLODIPINE BESYLATE 10 MG/1
TABLET ORAL
Qty: 90 TABLET | Refills: 0 | Status: SHIPPED | OUTPATIENT
Start: 2020-04-19 | End: 2020-06-30

## 2020-04-28 RX ORDER — DULOXETIN HYDROCHLORIDE 60 MG/1
CAPSULE, DELAYED RELEASE ORAL
Qty: 180 CAPSULE | Refills: 0 | OUTPATIENT
Start: 2020-04-28

## 2020-05-02 DIAGNOSIS — I10 ESSENTIAL HYPERTENSION: ICD-10-CM

## 2020-05-04 RX ORDER — CHLORTHALIDONE 25 MG/1
TABLET ORAL
Qty: 45 TABLET | Refills: 1 | Status: SHIPPED | OUTPATIENT
Start: 2020-05-04 | End: 2021-01-26

## 2020-05-05 ENCOUNTER — PATIENT MESSAGE (OUTPATIENT)
Dept: NEPHROLOGY | Facility: CLINIC | Age: 70
End: 2020-05-05

## 2020-05-08 ENCOUNTER — PATIENT MESSAGE (OUTPATIENT)
Dept: NEPHROLOGY | Facility: CLINIC | Age: 70
End: 2020-05-08

## 2020-05-13 ENCOUNTER — TELEPHONE (OUTPATIENT)
Dept: UROLOGY | Facility: CLINIC | Age: 70
End: 2020-05-13

## 2020-05-13 DIAGNOSIS — N39.41 URGE INCONTINENCE: ICD-10-CM

## 2020-05-13 RX ORDER — OXYBUTYNIN CHLORIDE 10 MG/1
10 TABLET, EXTENDED RELEASE ORAL 2 TIMES DAILY PRN
Qty: 180 TABLET | Refills: 0 | Status: SHIPPED | OUTPATIENT
Start: 2020-05-13 | End: 2020-05-13

## 2020-05-13 RX ORDER — OXYBUTYNIN CHLORIDE 10 MG/1
10 TABLET, EXTENDED RELEASE ORAL 2 TIMES DAILY PRN
Qty: 180 TABLET | Refills: 0 | Status: SHIPPED | OUTPATIENT
Start: 2020-05-13 | End: 2020-08-10

## 2020-05-17 ENCOUNTER — PATIENT MESSAGE (OUTPATIENT)
Dept: NEPHROLOGY | Facility: CLINIC | Age: 70
End: 2020-05-17

## 2020-05-26 DIAGNOSIS — Z79.899 ENCOUNTER FOR MONITORING PROTON PUMP INHIBITOR THERAPY: ICD-10-CM

## 2020-05-26 DIAGNOSIS — Z51.81 ENCOUNTER FOR MONITORING PROTON PUMP INHIBITOR THERAPY: ICD-10-CM

## 2020-05-26 DIAGNOSIS — K21.9 GASTROESOPHAGEAL REFLUX DISEASE, ESOPHAGITIS PRESENCE NOT SPECIFIED: ICD-10-CM

## 2020-05-26 DIAGNOSIS — R10.13 EPIGASTRIC PAIN: ICD-10-CM

## 2020-05-26 RX ORDER — PANTOPRAZOLE SODIUM 40 MG/1
TABLET, DELAYED RELEASE ORAL
Qty: 90 TABLET | Refills: 3 | Status: SHIPPED | OUTPATIENT
Start: 2020-05-26 | End: 2021-05-19

## 2020-05-26 RX ORDER — PANTOPRAZOLE SODIUM 40 MG/1
40 TABLET, DELAYED RELEASE ORAL
Qty: 90 TABLET | Refills: 3 | Status: CANCELLED | OUTPATIENT
Start: 2020-05-26

## 2020-06-15 DIAGNOSIS — R13.10 DYSPHAGIA, UNSPECIFIED TYPE: Primary | ICD-10-CM

## 2020-06-16 ENCOUNTER — PATIENT OUTREACH (OUTPATIENT)
Dept: ADMINISTRATIVE | Facility: HOSPITAL | Age: 70
End: 2020-06-16

## 2020-06-16 NOTE — PROGRESS NOTES
Health Maintenance Due   Topic Date Due    High Dose Statin  08/13/1971     Chart review completed.

## 2020-06-30 ENCOUNTER — TELEPHONE (OUTPATIENT)
Dept: INTERNAL MEDICINE | Facility: CLINIC | Age: 70
End: 2020-06-30

## 2020-06-30 ENCOUNTER — OFFICE VISIT (OUTPATIENT)
Dept: INTERNAL MEDICINE | Facility: CLINIC | Age: 70
End: 2020-06-30
Payer: MEDICARE

## 2020-06-30 DIAGNOSIS — I10 ESSENTIAL HYPERTENSION: Primary | ICD-10-CM

## 2020-06-30 DIAGNOSIS — R91.8 MULTIPLE LUNG NODULES ON CT: ICD-10-CM

## 2020-06-30 DIAGNOSIS — H15.003 BILATERAL SCLERITIS: ICD-10-CM

## 2020-06-30 DIAGNOSIS — F33.41 RECURRENT MAJOR DEPRESSIVE DISORDER, IN PARTIAL REMISSION: ICD-10-CM

## 2020-06-30 DIAGNOSIS — D12.6 COLON ADENOMA: ICD-10-CM

## 2020-06-30 DIAGNOSIS — M35.9 UNDIFFERENTIATED CONNECTIVE TISSUE DISEASE: ICD-10-CM

## 2020-06-30 DIAGNOSIS — N18.30 CKD (CHRONIC KIDNEY DISEASE) STAGE 3, GFR 30-59 ML/MIN: ICD-10-CM

## 2020-06-30 DIAGNOSIS — R06.00 DYSPNEA, UNSPECIFIED TYPE: ICD-10-CM

## 2020-06-30 DIAGNOSIS — Z79.899 HIGH RISK MEDICATION USE: ICD-10-CM

## 2020-06-30 DIAGNOSIS — R06.00 DYSPNEA, UNSPECIFIED TYPE: Primary | ICD-10-CM

## 2020-06-30 DIAGNOSIS — N25.81 SECONDARY HYPERPARATHYROIDISM: ICD-10-CM

## 2020-06-30 DIAGNOSIS — D89.89 AUTOIMMUNE DISORDER: ICD-10-CM

## 2020-06-30 PROCEDURE — 99499 RISK ADDL DX/OHS AUDIT: ICD-10-PCS | Mod: HCNC,95,, | Performed by: INTERNAL MEDICINE

## 2020-06-30 PROCEDURE — 1101F PR PT FALLS ASSESS DOC 0-1 FALLS W/OUT INJ PAST YR: ICD-10-PCS | Mod: HCNC,CPTII,95, | Performed by: INTERNAL MEDICINE

## 2020-06-30 PROCEDURE — 99214 OFFICE O/P EST MOD 30 MIN: CPT | Mod: HCNC,95,, | Performed by: INTERNAL MEDICINE

## 2020-06-30 PROCEDURE — 99214 PR OFFICE/OUTPT VISIT, EST, LEVL IV, 30-39 MIN: ICD-10-PCS | Mod: HCNC,95,, | Performed by: INTERNAL MEDICINE

## 2020-06-30 PROCEDURE — 1159F MED LIST DOCD IN RCRD: CPT | Mod: HCNC,95,, | Performed by: INTERNAL MEDICINE

## 2020-06-30 PROCEDURE — 1159F PR MEDICATION LIST DOCUMENTED IN MEDICAL RECORD: ICD-10-PCS | Mod: HCNC,95,, | Performed by: INTERNAL MEDICINE

## 2020-06-30 PROCEDURE — 1101F PT FALLS ASSESS-DOCD LE1/YR: CPT | Mod: HCNC,CPTII,95, | Performed by: INTERNAL MEDICINE

## 2020-06-30 PROCEDURE — 99499 UNLISTED E&M SERVICE: CPT | Mod: HCNC,95,, | Performed by: INTERNAL MEDICINE

## 2020-06-30 RX ORDER — AMLODIPINE BESYLATE 10 MG/1
5 TABLET ORAL 2 TIMES DAILY
Qty: 90 TABLET | Refills: 3 | Status: SHIPPED | OUTPATIENT
Start: 2020-06-30 | End: 2021-08-13

## 2020-06-30 NOTE — PROGRESS NOTES
Telemedicine Video Visit    The patient location is:  Patient Home   The chief complaint leading to consultation is: home  Visit type: Virtual visit with synchronous audio and video  Total time spent with patient: 25 minutes  Each patient to whom he or she provides medical services by telemedicine is:  (1) informed of the relationship between the physician and patient and the respective role of any other health care provider with respect to management of the patient; and (2) notified that he or she may decline to receive medical services by telemedicine and may withdraw from such care at any time.     BP doing well; takes amlodipine 5 mg 2 x daily.    Renal function stable; seen in Nephrology and was told to come back in 1 year.  In fact, last creatinine was normal at 1.0!  Drinking more water.    Slight anemia, stable to improved.      Insomnia- some trouble sleeping, trouble falling asleep and staying asleep.  Zoey worked but concerned about dementia.  Melatonin may have helped, she will try 10 mg.    Has been doing some exercise.  Walks at the park 3 miles in an hour.  Sometimes SOB with walking but feels good no chest pain or tightness.  If she sits sx resolve.  No PND or orthopnea.    Due for colonoscopy later this year.    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    Colon adenoma: 10/15 repeat in 2020    Age-related osteoporosis  without current pathological fracture 2016    Schatzki's ring: 3/15 dilated    Multiple lung nodules on CT: 2230-0066 no f/u needed    Steroid-induced glaucoma of both eyes    Proteinuria    Family history of colon cancer: maternal uncle and aunt    Recurrent major depressive disorder, in partial remission    Immunosuppression    Atherosclerosis of abdominal aorta: minimal see CT 7/16    Gastroesophageal reflux disease without esophagitis    CKD (chronic kidney disease) stage 3, GFR 30-59 ml/min    Secondary hyperparathyroidism    Esophageal dysphagia    Chronic follicular conjunctivitis of both eyes    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    Respiratory: Negative for cough.         See above   Cardiovascular: Negative for leg swelling and PND.   Gastrointestinal: Negative for abdominal pain.   Genitourinary: Negative for dysuria and frequency.   Musculoskeletal:        Arthralgias, mild stable   Neurological: Negative for headaches.   Psychiatric/Behavioral:        Stable mood               Answers for HPI/ROS submitted by the patient on 6/28/2020   Shortness of breath  Chronicity: new  Onset: more than 1 month ago  Frequency: daily  Progression since onset: unchanged  Episode duration: 15 seconds  abdominal pain: No  coryza: No  headaches: Yes  leg swelling: No  PND: No  sore throat: No  syncope: No  Aggravating factors: any activity  Risk factors for DVT/PE: no known risk factors  Treatments tried: nothing  asthma: No  allergies: No  COPD: No  pneumonia: No  aspirin allergies: No  CAD: No  DVT: No  heart failure: No  PE: No  recent surgery: No  bronchiolitis: No  chronic lung disease: No    Physical Exam  Constitutional:       Appearance: She is well-developed.   HENT:      Head: Normocephalic and atraumatic.   Eyes:      Extraocular Movements:  Extraocular movements intact.      Conjunctiva/sclera: Conjunctivae normal.   Neck:      Musculoskeletal: Normal range of motion and neck supple.      Thyroid: No thyromegaly.   Cardiovascular:      Rate and Rhythm: Normal rate and regular rhythm.   Pulmonary:      Effort: Pulmonary effort is normal. No respiratory distress.   Lymphadenopathy:      Cervical: No cervical adenopathy.   Skin:     Findings: No erythema or rash.   Neurological:      General: No focal deficit present.      Mental Status: She is alert and oriented to person, place, and time. Mental status is at baseline.   Psychiatric:         Mood and Affect: Mood normal.         Behavior: Behavior normal.         Thought Content: Thought content normal.         Judgment: Judgment normal.     Idalmis was seen today for follow-up.    Diagnoses and all orders for this visit:    Essential hypertension  -     amLODIPine (NORVASC) 10 MG tablet; Take 0.5 tablets (5 mg total) by mouth 2 (two) times daily.    CKD (chronic kidney disease) stage 3, GFR 30-59 ml/min: improved; continue hydration    Secondary hyperparathyroidism: improved    Colon adenoma: 10/15 repeated in 2019    High risk medication use-Azathioprine 100 mg daily    Dyspnea, unspecified type: no alarm sx; but on high risk meds.  Will start with CXR and echo; may need to consider PFTs  -     Echo Color Flow Doppler? Yes  -     X-Ray Chest PA And Lateral; Future  -     PFTs    UCTD: schedule Rheum follow up    Depression: stable on regimen, keep Psych follow up    Shingrix reviewed    I will review all studies and determine further tx depending on findings

## 2020-07-01 ENCOUNTER — PATIENT MESSAGE (OUTPATIENT)
Dept: INTERNAL MEDICINE | Facility: CLINIC | Age: 70
End: 2020-07-01

## 2020-07-01 NOTE — TELEPHONE ENCOUNTER
Please call to let her know she is due to see Rheumatology as well, it has been almost 2 years    She needs a chest xray, echo and PFTs all ordered

## 2020-07-02 ENCOUNTER — TELEPHONE (OUTPATIENT)
Dept: INTERNAL MEDICINE | Facility: CLINIC | Age: 70
End: 2020-07-02

## 2020-07-02 NOTE — TELEPHONE ENCOUNTER
----- Message from Edel Cristobal sent at 7/2/2020 10:46 AM CDT -----  Contact: self [phone]  Pt states she is calling back to schedule her EKG. Pt states she would like to schedule on the same day as the xray(7/6). Please call and advise.

## 2020-07-22 ENCOUNTER — OFFICE VISIT (OUTPATIENT)
Dept: PSYCHIATRY | Facility: CLINIC | Age: 70
End: 2020-07-22
Payer: MEDICARE

## 2020-07-22 DIAGNOSIS — F33.41 RECURRENT MAJOR DEPRESSIVE DISORDER, IN PARTIAL REMISSION: Primary | ICD-10-CM

## 2020-07-22 PROCEDURE — 90833 PR PSYCHOTHERAPY W/PATIENT W/E&M, 30 MIN (ADD ON): ICD-10-PCS | Mod: HCNC,95,, | Performed by: PSYCHIATRY & NEUROLOGY

## 2020-07-22 PROCEDURE — 1159F MED LIST DOCD IN RCRD: CPT | Mod: HCNC,95,, | Performed by: PSYCHIATRY & NEUROLOGY

## 2020-07-22 PROCEDURE — 90833 PSYTX W PT W E/M 30 MIN: CPT | Mod: HCNC,95,, | Performed by: PSYCHIATRY & NEUROLOGY

## 2020-07-22 PROCEDURE — 1101F PT FALLS ASSESS-DOCD LE1/YR: CPT | Mod: HCNC,CPTII,95, | Performed by: PSYCHIATRY & NEUROLOGY

## 2020-07-22 PROCEDURE — 1159F PR MEDICATION LIST DOCUMENTED IN MEDICAL RECORD: ICD-10-PCS | Mod: HCNC,95,, | Performed by: PSYCHIATRY & NEUROLOGY

## 2020-07-22 PROCEDURE — 1101F PR PT FALLS ASSESS DOC 0-1 FALLS W/OUT INJ PAST YR: ICD-10-PCS | Mod: HCNC,CPTII,95, | Performed by: PSYCHIATRY & NEUROLOGY

## 2020-07-22 PROCEDURE — 99213 PR OFFICE/OUTPT VISIT, EST, LEVL III, 20-29 MIN: ICD-10-PCS | Mod: HCNC,95,, | Performed by: PSYCHIATRY & NEUROLOGY

## 2020-07-22 PROCEDURE — 99213 OFFICE O/P EST LOW 20 MIN: CPT | Mod: HCNC,95,, | Performed by: PSYCHIATRY & NEUROLOGY

## 2020-07-22 RX ORDER — DULOXETIN HYDROCHLORIDE 60 MG/1
60 CAPSULE, DELAYED RELEASE ORAL DAILY
Qty: 90 CAPSULE | Refills: 0 | Status: SHIPPED | OUTPATIENT
Start: 2020-07-22 | End: 2020-10-26

## 2020-07-22 NOTE — PROGRESS NOTES
The patient location is: her home  The chief complaint leading to consultation is: depression and anxiety  Visit type: Virtual visit with synchronous audio and video  Total time spent with patient: 30 mins  Each patient to whom he or she provides medical services by telemedicine is:  (1) informed of the relationship between the physician and patient and the respective role of any other health care provider with respect to management of the patient; and (2) notified that he or she may decline to receive medical services by telemedicine and may withdraw from such care at any time.    Notes:     Outpatient Psychiatry Follow-Up Visit (MD/NP)    7/22/2020    Clinical Status of Patient:  Outpatient (Ambulatory)    Chief Complaint:  Idalmis Morejon is a 69 y.o. female who presents today for follow-up of depression and anxiety.      Met with patient.      Interval History and Content of Current Session:  Interim Events/Subjective Report/Content of Current Session:   Pt reports a bad couple of weeks.  Her mother's last sibling passed away.   Relived her mothers death.  She was unable to vist with her aunt prior to her death.  She is doing better now.  She is less preoccupied with it now.  Prior to her death she was doing very well.  She had been exercising until it got too hot to walk at [employer].  Now going to the mall. Still does not sleep very well.  No problem falling asleep but is unable to return to sleep after going to the bathroom 2 hrs later.   Mind is just all over at that point.  Not necessarily worry or negative focus.  Gets another 4 hrs in the morning.  Eating okay.  Was supposed to get an endoscopy done but did not because of COViD.   Maintaining weight.     Asks about benefits of OTC med for memory.  Feels she is forgetting things she should not and is worried about dementia.        Psychotherapy:  · Target symptoms: depression, anxiety   · Why chosen therapy is appropriate versus another modality: relevant to  diagnosis  · Outcome monitoring methods: self-report, observation  · Therapeutic intervention type: supportive psychotherapy  · Topics discussed/themes: adjusting to coronavirus effects on daily living, illness/death of a loved one, building skills sets for symptom management, symptom recognition  · The patient's response to the intervention is motivated. The patient's progress toward treatment goals is good.   · Duration of intervention: 16 mins    Review of Systems   Constitutional: Negative for fever and weight loss.   Respiratory: Positive for shortness of breath. Negative for cough and wheezing.    Cardiovascular: Negative for chest pain and palpitations.   Gastrointestinal: Negative for abdominal pain, diarrhea and vomiting.   Musculoskeletal: Negative for falls.         Past Medical, Family and Social History: The patient's past medical, family and social history have been reviewed and updated as appropriate within the electronic medical record - see encounter notes.    Compliance: yes    Side effects: none    Risk Parameters:  Patient reports no suicidal ideation  Patient reports no homicidal ideation  Patient reports no self-injurious behavior  Patient reports no violent behavior    Exam (detailed: at least 9 elements; comprehensive: all 15 elements)   Constitutional  Vitals:  Most recent vital signs, dated less than 90 days prior to this appointment, were reviewed.    There were no vitals filed for this visit.     General:  unremarkable, age appropriate, neatly groomed, well nourished     Musculoskeletal  Muscle Strength/Tone:  no dyskinesia, no tremor   Gait & Station:  non-ataxic      Psychiatric  Speech:  normal tone, normal rate, normal pitch, normal volume, prosody intact   Mood:    Affect:  as above  appropriate, full   Thought Process:  goal-directed, logical   Associations:  intact   Thought Content:  normal, no suicidality, no homicidality, delusions, or paranoia   Insight  Judgement:  good,  patient has awareness of illness  Patient's behavior is adequate to circumstances   Orientation:  grossly intact   Memory: intact for content of interview   Language: grossly intact   Attention Span & Concentration:  able to focus   Fund of Knowledge:  intact and appropriate to age and level of education     Assessment and Diagnosis   Status/Progress: Based on the examination today, the patient's problem(s) is/are improved.  New problems have not been presented today.   Comorbidities are complicating management of the primary condition.  There are no active rule-out diagnoses for this patient at this time.    General Impression: Pt with depression and anxiety as well as multiple other medical comorbidities.  She is still affected by the death of her mother in March 2014.      Diagnosis::   MDD single in  rem  specific phobia  R/O social phobia.      Intervention/Counseling/Treatment Plan   · Continue cymbalta 60 mg daily  · Recommended continuing/restarting melatonin.  She may temporarily restart zzzquil if not successful  · Possible referral to CBT-i      Return to Clinic: 4 months

## 2020-07-27 ENCOUNTER — PES CALL (OUTPATIENT)
Dept: ADMINISTRATIVE | Facility: CLINIC | Age: 70
End: 2020-07-27

## 2020-07-29 ENCOUNTER — HOSPITAL ENCOUNTER (OUTPATIENT)
Dept: RADIOLOGY | Facility: HOSPITAL | Age: 70
Discharge: HOME | End: 2020-07-29
Attending: INTERNAL MEDICINE
Payer: MEDICARE

## 2020-07-29 DIAGNOSIS — R06.00 DYSPNEA, UNSPECIFIED TYPE: ICD-10-CM

## 2020-07-29 PROCEDURE — 71046 X-RAY EXAM CHEST 2 VIEWS: CPT | Mod: TC,HCNC

## 2020-07-29 PROCEDURE — 71046 XR CHEST PA AND LATERAL: ICD-10-PCS | Mod: 26,HCNC,, | Performed by: RADIOLOGY

## 2020-07-29 PROCEDURE — 71046 X-RAY EXAM CHEST 2 VIEWS: CPT | Mod: 26,HCNC,, | Performed by: RADIOLOGY

## 2020-08-07 ENCOUNTER — TELEPHONE (OUTPATIENT)
Dept: INTERNAL MEDICINE | Facility: CLINIC | Age: 70
End: 2020-08-07

## 2020-08-07 NOTE — TELEPHONE ENCOUNTER
----- Message from Marie Faith sent at 8/7/2020  3:55 PM CDT -----  Contact: Pt [phone]  Patient is still waiting on a call concerning her [facility] message.    Please call and advise.    Thank You

## 2020-08-10 ENCOUNTER — TELEPHONE (OUTPATIENT)
Dept: UROLOGY | Facility: CLINIC | Age: 70
End: 2020-08-10

## 2020-08-11 ENCOUNTER — PATIENT MESSAGE (OUTPATIENT)
Dept: GASTROENTEROLOGY | Facility: CLINIC | Age: 70
End: 2020-08-11

## 2020-08-12 ENCOUNTER — PATIENT MESSAGE (OUTPATIENT)
Dept: GASTROENTEROLOGY | Facility: CLINIC | Age: 70
End: 2020-08-12

## 2020-08-13 ENCOUNTER — OFFICE VISIT (OUTPATIENT)
Dept: GASTROENTEROLOGY | Facility: CLINIC | Age: 70
End: 2020-08-13
Payer: MEDICARE

## 2020-08-13 VITALS — WEIGHT: 147 LBS | HEIGHT: 66 IN | BODY MASS INDEX: 23.63 KG/M2

## 2020-08-13 DIAGNOSIS — Z79.899 ENCOUNTER FOR MONITORING LONG-TERM PROTON PUMP INHIBITOR THERAPY: ICD-10-CM

## 2020-08-13 DIAGNOSIS — E61.1 IRON DEFICIENCY: ICD-10-CM

## 2020-08-13 DIAGNOSIS — M54.50 LUMBAR BACK PAIN: ICD-10-CM

## 2020-08-13 DIAGNOSIS — R13.19 ESOPHAGEAL DYSPHAGIA: ICD-10-CM

## 2020-08-13 DIAGNOSIS — K59.04 CHRONIC IDIOPATHIC CONSTIPATION: Primary | ICD-10-CM

## 2020-08-13 DIAGNOSIS — Z51.81 ENCOUNTER FOR MONITORING LONG-TERM PROTON PUMP INHIBITOR THERAPY: ICD-10-CM

## 2020-08-13 PROCEDURE — 3008F PR BODY MASS INDEX (BMI) DOCUMENTED: ICD-10-PCS | Mod: HCNC,CPTII,95, | Performed by: INTERNAL MEDICINE

## 2020-08-13 PROCEDURE — 1159F MED LIST DOCD IN RCRD: CPT | Mod: HCNC,95,, | Performed by: INTERNAL MEDICINE

## 2020-08-13 PROCEDURE — 1159F PR MEDICATION LIST DOCUMENTED IN MEDICAL RECORD: ICD-10-PCS | Mod: HCNC,95,, | Performed by: INTERNAL MEDICINE

## 2020-08-13 PROCEDURE — 3008F BODY MASS INDEX DOCD: CPT | Mod: HCNC,CPTII,95, | Performed by: INTERNAL MEDICINE

## 2020-08-13 PROCEDURE — 99214 PR OFFICE/OUTPT VISIT, EST, LEVL IV, 30-39 MIN: ICD-10-PCS | Mod: HCNC,95,, | Performed by: INTERNAL MEDICINE

## 2020-08-13 PROCEDURE — 99214 OFFICE O/P EST MOD 30 MIN: CPT | Mod: HCNC,95,, | Performed by: INTERNAL MEDICINE

## 2020-08-13 PROCEDURE — 1101F PT FALLS ASSESS-DOCD LE1/YR: CPT | Mod: HCNC,CPTII,95, | Performed by: INTERNAL MEDICINE

## 2020-08-13 PROCEDURE — 1101F PR PT FALLS ASSESS DOC 0-1 FALLS W/OUT INJ PAST YR: ICD-10-PCS | Mod: HCNC,CPTII,95, | Performed by: INTERNAL MEDICINE

## 2020-08-13 NOTE — Clinical Note
VERY IMPORTANT:    Staci please schedule patient for fasting blood work across the street [facility] Monday morning August 17, 2020 at 9:00 a.m. patient should have a 12 hr fast nothing to eat after 9:00 p.m. the night before.    Please refer patient 2 spine clinic at [facility] for lumbar back pain with right posterior leg pain.  Orders placed    Please schedule patient for telemedicine follow-up video visit with me in 3 months.

## 2020-08-13 NOTE — PROGRESS NOTES
The patient location is:  at home  The chief complaint leading to consultation is:  Intermittent solid food dysphagia, constipation    Visit type:  Telemedicine video visit    Face to Face time with patient: 20 minutes of total time spent on the encounter, which includes face to face time and non-face to face time preparing to see the patient (eg, review of tests), Obtaining and/or reviewing separately obtained history, Documenting clinical information in the electronic or other health record, Independently interpreting results (not separately reported) and communicating results to the patient/family/caregiver, or Care coordination (not separately reported).         Each patient to whom he or she provides medical services by telemedicine is:  (1) informed of the relationship between the physician and patient and the respective role of any other health care provider with respect to management of the patient; and (2) notified that he or she may decline to receive medical services by telemedicine and may withdraw from such care at any time.    Notes:         [facility] Consultation Note    Reason for Consult:  The primary encounter diagnosis was Chronic idiopathic constipation. Diagnoses of Esophageal dysphagia, Iron deficiency, Encounter for monitoring long-term proton pump inhibitor therapy, and Lumbar back pain were also pertinent to this visit.    PCP:   Noemy Pugh       Referring MD:  No referring provider defined for this encounter.    Initial History of Present Illness (HPI):  This is a 70 y.o. female here for evaluation of chronic constipation.  MiraLax is bloating her too much she would like to try Linzess at the lowest dose and stop her MiraLax.  Colonoscopy is up-to-date.  She is also having intermittent solid food dysphagia she guard great benefit from her esophageal dilation last year EGD is been ordered she is going to call the schedulers to schedule it.  Currently no blood in  the stool no blood in her urine no nausea vomiting no nose bleeds.  Not taking a lot a NSAIDs she does have a slight anemia.  No overt GI bleeding no chest pain no shortness of breath no nausea or vomiting.  Patient with chronic back pain similar to when she had her surgery it is lumbar radiating down her right leg no fecal or urinary incontinence.  She is going to follow-up with the spine clinic at [facility].  She is able walk without problems no fever chills no cough    Abdominal pain - no  Reflux -well controlled on her PPI  Dysphagia - intermittent solid food dysphagia  Bowel habits - constipated responding well to MiraLax but causing a lot a gas and bloating  GI bleeding - none  NSAID usage - none    Interval HPI 08/13/2020:  The patient's last visit with me was on 6/28/2019.      ROS:  Constitutional: No fevers, chills, No weight loss she says her weight is stable she looks like she has 9 lb down since last documentation  We got her weight today from patient's report she says she is not eating quite as much since COVID  ENT:  Well control heartburn intermittent solid food dysphagia no odynophagia no hoarseness  CV: No chest pain, no palpitation  Pulm: No cough, No shortness of breath, no wheezing  Ophtho: No vision changes  GI: see HPI  Derm: No rash, no itching  Heme: No lymphadenopathy, No easy bruising  MSK:  Positive for chronic back pain  : No dysuria, No hematuria  Endo: No hot or cold intolerance  Neuro: No syncope, No seizure, no strokes  Psych: No uncontrolled anxiety, No uncontrolled depression    Medical History:  has a past medical history of Abnormal chest CT, Acid reflux, Allergy, Anemia, Anemia, Anxiety, Cataract, Chronic UTI, Colon adenoma 2015 due 2020 (10/21/2015), Colon adenoma 2015 due 2020 (10/21/2015), Depression, Disturbed sleep rhythm, DJD (degenerative joint disease), Dry eyes, Dry mouth, Grief reaction (3/24/2014), migraine headaches, Hyperlipemia, Hypernatremia (8/8/2014),  Hypertension, Iritis, Iritis, Mild vitamin D deficiency (9/9/2013), Multiple lung nodules on CT: [id number] no f/u needed (6/6/2016), Neurogenic bladder, Osteopenia, Osteoporosis, Osteoporosis: 9/15 see rheumatology notes (6/6/2016), Positive GEORGIANA (antinuclear antibody), Schatzki's ring: 3/15 dilated (6/6/2016), Sciatica neuralgia (6/21/2013), 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    Surgical History:  has a past surgical history that includes Cholecystectomy; Appendectomy; Posterior fusion lumbar spine w/ corpectomy; Back surgery (2007); TONSILLECTOMY, ADENOIDECTOMY; Cystoscopy; Hernia repair; Colonoscopy (N/A, 10/2/2015); Hysterectomy; Esophagogastroduodenoscopy (N/A, 5/13/2019); Esophagogastroduodenoscopy (N/A, 9/9/2019); and Colonoscopy (N/A, 9/9/2019).    Family History: family history includes Blindness in her maternal aunt; Cancer in her maternal aunt and maternal uncle; Colon cancer (age of onset: 70) in her maternal aunt and maternal uncle; Diabetes in her brother and father; Heart disease in her brother; Hyperlipidemia in her sister; Hypertension in her daughter, mother, and sister; Kidney disease in her brother and mother..     Social History:  reports that she has never smoked. She has never used smokeless tobacco. She reports that she does not drink alcohol or use drugs.    Review of patient's allergies indicates:   Allergen Reactions    Alendronate Nausea Only     And heartburn    Brimonidine Dermatitis     Follicular Conjunctivitis    Kenalog [triamcinolone acetonide] Hives       Medication List with Changes/Refills   New Medications    LINACLOTIDE (LINZESS) 72 MCG CAP CAPSULE    Take 1 capsule (72 mcg total) by mouth before breakfast.   Current Medications    AMLODIPINE (NORVASC) 10 MG TABLET    Take 0.5 tablets (5 mg total) by mouth 2 (two) times daily.    ASCORBIC ACID (VITAMIN C ORAL)    Take by mouth.     "B-COMPLEX WITH VITAMIN C (Z-BEC OR EQUIV) TABLET    Take 1 tablet by mouth once daily.    CHLORTHALIDONE (HYGROTEN) 25 MG TAB    TAKE 1/2 TABLET (12.5 MG) BY MOUTH ONCE DAILY IN THE MORNING (FOR BLOOD PRESSURE)    DULOXETINE (CYMBALTA) 60 MG CAPSULE    Take 1 capsule (60 mg total) by mouth once daily.    OXYBUTYNIN (DITROPAN-XL) 10 MG 24 HR TABLET    TAKE 1 TABLET (10 MG TOTAL) BY MOUTH 2 (TWO) TIMES DAILY AS NEEDED.    PANTOPRAZOLE (PROTONIX) 40 MG TABLET    TAKE 1 TABLET BY MOUTH BEFORE BREAKFAST.    PROMETHAZINE (PHENERGAN) 25 MG TABLET    Take 1 tablet (25 mg total) by mouth every 6 (six) hours as needed for Nausea.    TIMOLOL MALEATE 0.5% (TIMOPTIC) 0.5 % DROP    Place 1 drop into both eyes 2 (two) times daily.    VALSARTAN (DIOVAN) 160 MG TABLET    Take 2 tablets (320 mg total) by mouth once daily.    VITAMIN D (VITAMIN D3) 1000 UNITS TAB    Take 1,000 Units by mouth once daily.   Discontinued Medications    POLYETHYLENE GLYCOL 3350 (MIRALAX ORAL)    daily as needed.          Objective Findings:    Vital Signs:  Ht 5' 6" (1.676 m)   Wt 66.7 kg (147 lb)   BMI 23.73 kg/m²   Body mass index is 23.73 kg/m².    Physical Exam:  General Appearance: Well appearing in no acute distress  Neurologic:  Alert and oriented x4  Psychiatric:  Normal speech mentation and affect    Labs:  Lab Results   Component Value Date    WBC 7.53 03/20/2020    HGB 11.0 (L) 03/20/2020    HCT 35.8 (L) 03/20/2020     03/20/2020    CHOL 159 04/09/2019    TRIG 105 04/09/2019    HDL 60 04/09/2019    ALT 10 04/09/2019    AST 24 04/09/2019     03/20/2020    K 3.9 03/20/2020     03/20/2020    CREATININE 1.0 03/20/2020    BUN 21 03/20/2020    CO2 30 (H) 03/20/2020    TSH 1.355 04/09/2019    INR 1.0 03/21/2015    HGBA1C 5.0 07/18/2006               Medical Decision Making:  Lab work reviewed anemic  Intermittent solid food dysphagia EGD talk give  Patient request referral to spine clinic for her back pain and radicular pain down " her right leg  Patient would like to try Linzess at the lowest dose in stop her MiraLax for chronic constipation  She knows not to be on both MiraLax and Linzess she has to do 1 or the other.    Assessment:  1. Chronic idiopathic constipation    2. Esophageal dysphagia    3. Iron deficiency    4. Encounter for monitoring long-term proton pump inhibitor therapy    5. Lumbar back pain         Recommendations:   1.  Chronic constipation with a lot of bloating from her MiraLax she would like to stop her MiraLax and start Linzess at the lowest dose prescription given she will message us in a week after starting Linzess and stopping her MiraLax to let us know if she feels orders and improvement.  She knows not to taken both.  2.  Anemia will set up patient for lab work for further evaluation.  She will do this Monday August 17, 2020 fasting at 9:00 a.m. across [facility].  3.  Lumbar back pain with radicular pain down the right leg with no urinary a or fecal incontinence refer patient to the spine clinic.  4.  Intermittent solid food dysphagia with improvement esophageal dilation last year EGD orders have been placed patient will call to schedule.    Follow up in about 3 months (around 11/13/2020).      Order summary:  Orders Placed This Encounter    CBC auto differential    Ferritin    Iron and TIBC    TISSUE TRANSGLUTAMINASE (TTG), IGA    IgA    Lipase    TSH    Comprehensive metabolic panel    Vitamin B12    Vitamin D    Magnesium    Ambulatory referral/consult to [facility] Sp[facility] Clinic    linaCLOtide (LINZESS) 72 mcg Cap capsule         Thank you so much for allowing me to participate in the care of Idalmis Love MD

## 2020-08-24 ENCOUNTER — LAB VISIT (OUTPATIENT)
Dept: LAB | Facility: HOSPITAL | Age: 70
End: 2020-08-24
Attending: INTERNAL MEDICINE
Payer: MEDICARE

## 2020-08-24 DIAGNOSIS — Z79.899 ENCOUNTER FOR MONITORING LONG-TERM PROTON PUMP INHIBITOR THERAPY: ICD-10-CM

## 2020-08-24 DIAGNOSIS — R13.19 ESOPHAGEAL DYSPHAGIA: ICD-10-CM

## 2020-08-24 DIAGNOSIS — E61.1 IRON DEFICIENCY: ICD-10-CM

## 2020-08-24 DIAGNOSIS — Z51.81 ENCOUNTER FOR MONITORING LONG-TERM PROTON PUMP INHIBITOR THERAPY: ICD-10-CM

## 2020-08-24 DIAGNOSIS — K59.04 CHRONIC IDIOPATHIC CONSTIPATION: ICD-10-CM

## 2020-08-24 LAB
25(OH)D3+25(OH)D2 SERPL-MCNC: 47 NG/ML (ref 30–96)
ALBUMIN SERPL BCP-MCNC: 3.7 G/DL (ref 3.5–5.2)
ALP SERPL-CCNC: 90 U/L (ref 55–135)
ALT SERPL W/O P-5'-P-CCNC: 14 U/L (ref 10–44)
ANION GAP SERPL CALC-SCNC: 9 MMOL/L (ref 8–16)
AST SERPL-CCNC: 23 U/L (ref 10–40)
BASOPHILS # BLD AUTO: 0.02 K/UL (ref 0–0.2)
BASOPHILS NFR BLD: 0.4 % (ref 0–1.9)
BILIRUB SERPL-MCNC: 0.5 MG/DL (ref 0.1–1)
BUN SERPL-MCNC: 20 MG/DL (ref 8–23)
CALCIUM SERPL-MCNC: 9.6 MG/DL (ref 8.7–10.5)
CHLORIDE SERPL-SCNC: 107 MMOL/L (ref 95–110)
CO2 SERPL-SCNC: 27 MMOL/L (ref 23–29)
CREAT SERPL-MCNC: 1.1 MG/DL (ref 0.5–1.4)
DIFFERENTIAL METHOD: ABNORMAL
EOSINOPHIL # BLD AUTO: 0.1 K/UL (ref 0–0.5)
EOSINOPHIL NFR BLD: 1.8 % (ref 0–8)
ERYTHROCYTE [DISTWIDTH] IN BLOOD BY AUTOMATED COUNT: 14.6 % (ref 11.5–14.5)
EST. GFR  (AFRICAN AMERICAN): 58 ML/MIN/1.73 M^2
EST. GFR  (NON AFRICAN AMERICAN): 50.3 ML/MIN/1.73 M^2
FERRITIN SERPL-MCNC: 56 NG/ML (ref 20–300)
GLUCOSE SERPL-MCNC: 94 MG/DL (ref 70–110)
HCT VFR BLD AUTO: 36 % (ref 37–48.5)
HGB BLD-MCNC: 11.1 G/DL (ref 12–16)
IGA SERPL-MCNC: 163 MG/DL (ref 40–350)
IMM GRANULOCYTES # BLD AUTO: 0.02 K/UL (ref 0–0.04)
IMM GRANULOCYTES NFR BLD AUTO: 0.4 % (ref 0–0.5)
IRON SERPL-MCNC: 74 UG/DL (ref 30–160)
LIPASE SERPL-CCNC: 17 U/L (ref 4–60)
LYMPHOCYTES # BLD AUTO: 1.9 K/UL (ref 1–4.8)
LYMPHOCYTES NFR BLD: 35.2 % (ref 18–48)
MAGNESIUM SERPL-MCNC: 2.2 MG/DL (ref 1.6–2.6)
MCH RBC QN AUTO: 26.5 PG (ref 27–31)
MCHC RBC AUTO-ENTMCNC: 30.8 G/DL (ref 32–36)
MCV RBC AUTO: 86 FL (ref 82–98)
MONOCYTES # BLD AUTO: 0.5 K/UL (ref 0.3–1)
MONOCYTES NFR BLD: 8.3 % (ref 4–15)
NEUTROPHILS # BLD AUTO: 2.9 K/UL (ref 1.8–7.7)
NEUTROPHILS NFR BLD: 53.9 % (ref 38–73)
NRBC BLD-RTO: 0 /100 WBC
PLATELET # BLD AUTO: 321 K/UL (ref 150–350)
PMV BLD AUTO: 11.8 FL (ref 9.2–12.9)
POTASSIUM SERPL-SCNC: 4.2 MMOL/L (ref 3.5–5.1)
PROT SERPL-MCNC: 7.3 G/DL (ref 6–8.4)
RBC # BLD AUTO: 4.19 M/UL (ref 4–5.4)
SATURATED IRON: 22 % (ref 20–50)
SODIUM SERPL-SCNC: 143 MMOL/L (ref 136–145)
TOTAL IRON BINDING CAPACITY: 329 UG/DL (ref 250–450)
TRANSFERRIN SERPL-MCNC: 222 MG/DL (ref 200–375)
TSH SERPL DL<=0.005 MIU/L-ACNC: 1.38 UIU/ML (ref 0.4–4)
VIT B12 SERPL-MCNC: 1074 PG/ML (ref 210–950)
WBC # BLD AUTO: 5.45 K/UL (ref 3.9–12.7)

## 2020-08-24 PROCEDURE — 83690 ASSAY OF LIPASE: CPT | Mod: HCNC

## 2020-08-24 PROCEDURE — 83516 IMMUNOASSAY NONANTIBODY: CPT | Mod: HCNC

## 2020-08-24 PROCEDURE — 82784 ASSAY IGA/IGD/IGG/IGM EACH: CPT | Mod: HCNC

## 2020-08-24 PROCEDURE — 83735 ASSAY OF MAGNESIUM: CPT | Mod: HCNC

## 2020-08-24 PROCEDURE — 84443 ASSAY THYROID STIM HORMONE: CPT | Mod: HCNC

## 2020-08-24 PROCEDURE — 80053 COMPREHEN METABOLIC PANEL: CPT | Mod: HCNC

## 2020-08-24 PROCEDURE — 83540 ASSAY OF IRON: CPT | Mod: HCNC

## 2020-08-24 PROCEDURE — 82306 VITAMIN D 25 HYDROXY: CPT | Mod: HCNC

## 2020-08-24 PROCEDURE — 85025 COMPLETE CBC W/AUTO DIFF WBC: CPT | Mod: HCNC

## 2020-08-24 PROCEDURE — 82728 ASSAY OF FERRITIN: CPT | Mod: HCNC

## 2020-08-24 PROCEDURE — 82607 VITAMIN B-12: CPT | Mod: HCNC

## 2020-08-25 ENCOUNTER — TELEPHONE (OUTPATIENT)
Dept: UROLOGY | Facility: CLINIC | Age: 70
End: 2020-08-25

## 2020-08-27 LAB — TTG IGA SER-ACNC: 5 UNITS

## 2020-09-03 ENCOUNTER — PATIENT OUTREACH (OUTPATIENT)
Dept: ADMINISTRATIVE | Facility: OTHER | Age: 70
End: 2020-09-03

## 2020-09-04 ENCOUNTER — DOCUMENTATION ONLY (OUTPATIENT)
Dept: ENDOSCOPY | Facility: HOSPITAL | Age: 70
End: 2020-09-04

## 2020-09-04 NOTE — PROGRESS NOTES
"Great to hear that  Thank you  ===View-only below this line===      ----- Message -----       From:Idalmis Morejon       Sent:9/4/2020 12:51 PM CDT         To:Bryan Love MD    Subject:Prescription    Hi Dr. Love,  you prescribed Linzess for me a few weeks ago and I just want to say how wonderful it's working for me. Since I've been off the Miralax and on the Linzess the bloating and gas pains are so much better. You are "God Sent" and I'm so grateful that you are my doctor. Thanks a bunch and stay safe. Idalmis Morejon    "

## 2020-09-04 NOTE — PROGRESS NOTES
Subjective:       Patient ID: Idalmis Morejon is a 70 y.o. female with recurrent uveitis and +GEORGIANA  (on azathioprine in the past) & with osteoporosis      The patient location is: home  The chief complaint leading to consultation is: UCTD + hx of azathioprine use & OP  Visit type: synchronous video & audio    Face to Face time with patient: 15 mins  35 minutes of total time spent on the encounter, which includes face to face time and non-face to face time preparing to see the patient (eg, review of tests), Obtaining and/or reviewing separately obtained history, Documenting clinical information in the electronic or other health record, Independently interpreting results (not separately reported) and communicating results to the patient/family/caregiver, or Care coordination (not separately reported).     Each patient to whom he or she provides medical services by telemedicine is:  (1) informed of the relationship between the physician and patient and the respective role of any other health care provider with respect to management of the patient; and (2) notified that he or she may decline to receive medical services by telemedicine and may withdraw from such care at any time.        Chief Complaint: No chief complaint on file.    This is a patient with multiple morbidities with recurrent scleritis and a +GEORGIANA. that had been followed by Dr. Gonzalo Vo in the past. She has not been dx with a CTD but does have esophageal dysmotility on fluoroscopy (2012).  She's had episodes of leukopenia since 2006. She has been followed by Dr. Valentine for uveitis (HLA B27 neg). Azathioprine was begun in an effort to take her off prednisolone eye drops. 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    Returns for follow-up. She was last seen over 2 years ago on 8/2/18. She returns for f/u and has no complaints.  She is no longer taking azathioprine for  scleritis/uveitis and is only on timolol eye drops. She denies sicca sxs as she is using OTC eye drops. The L thumb triggering has resolved with the injection given 2 yrs ago.  Denies other joint pain or swelling; denies dysphagia. Denies AM stiffness, Raynaud's, tight skin, alopecia, oral ulcers, pleurisy, pericarditis, photosensitivity, skin rashes, miscarriages (1 pregnancy FT), thromboses.     Was on denosumab for OP but it she only received a few injections and she never returned for f/u. Last injection 3/15/19.              She reports no joint swelling. Associated symptoms include fatigue. Pertinent negatives include no dysuria, fever, trouble swallowing, myalgias or headaches.          Current Outpatient Medications   Medication Sig Dispense Refill    amLODIPine (NORVASC) 10 MG tablet Take 0.5 tablets (5 mg total) by mouth 2 (two) times daily. 90 tablet 3    ascorbic acid (VITAMIN C ORAL) Take by mouth.      B-complex with vitamin C (Z-BEC OR EQUIV) tablet Take 1 tablet by mouth once daily.      chlorthalidone (HYGROTEN) 25 MG Tab TAKE 1/2 TABLET (12.5 MG) BY MOUTH ONCE DAILY IN THE MORNING (FOR BLOOD PRESSURE) 45 tablet 1    DULoxetine (CYMBALTA) 60 MG capsule Take 1 capsule (60 mg total) by mouth once daily. 90 capsule 0    linaCLOtide (LINZESS) 72 mcg Cap capsule Take 1 capsule (72 mcg total) by mouth before breakfast. 30 capsule 2    oxybutynin (DITROPAN-XL) 10 MG 24 hr tablet TAKE 1 TABLET (10 MG TOTAL) BY MOUTH 2 (TWO) TIMES DAILY AS NEEDED. 180 tablet 0    pantoprazole (PROTONIX) 40 MG tablet TAKE 1 TABLET BY MOUTH BEFORE BREAKFAST. 90 tablet 3    promethazine (PHENERGAN) 25 MG tablet Take 1 tablet (25 mg total) by mouth every 6 (six) hours as needed for Nausea. 10 tablet 0    timolol maleate 0.5% (TIMOPTIC) 0.5 % Drop Place 1 drop into both eyes 2 (two) times daily. 10 mL 6    valsartan (DIOVAN) 160 MG tablet Take 2 tablets (320 mg total) by mouth once daily. 180 tablet 3    vitamin D  (VITAMIN D3) 1000 units Tab Take 1,000 Units by mouth once daily.       No current facility-administered medications for this visit.          Allergies   Allergen Reactions    Alendronate Nausea Only     And heartburn    Kenalog [Triamcinolone Acetonide] Hives       Past Medical History:   Diagnosis Date    Abnormal chest CT     Acid reflux     Allergy     Anemia     Anemia     Anxiety     Cataract     Chronic UTI     recently treated with antibiotics -x 3 wks. ago-    Colon adenoma 2015 due 2020 10/21/2015    Colon adenoma 2015 due 2020 10/21/2015    Depression     Disturbed sleep rhythm     DJD (degenerative joint disease)     Dry eyes     Dry mouth     Grief reaction 3/24/2014    Hx of migraine headaches     Hyperlipemia     Hypernatremia 8/8/2014    Hypertension     Iritis     Iritis     Mild vitamin D deficiency 9/9/2013    Multiple lung nodules on CT: 2769-7019 no f/u needed 6/6/2016    Neurogenic bladder     Osteopenia     in hips    Osteoporosis     spine    Osteoporosis: 9/15 see rheumatology notes 6/6/2016    Positive GEORGIANA (antinuclear antibody)     Schatzki's ring: 3/15 dilated 6/6/2016    Sciatica neuralgia 6/21/2013    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    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    Visual impairment        Past Surgical History:   Procedure Laterality Date    APPENDECTOMY      BACK SURGERY  2007    x4    CHOLECYSTECTOMY      lap orin    COLONOSCOPY N/A 10/2/2015    Procedure: COLONOSCOPY;  Surgeon: Bryan Love MD;  Location: [address]);  Service: Endoscopy;  Laterality: N/A;    COLONOSCOPY N/A 9/9/2019    Procedure: COLONOSCOPY;  Surgeon: Byran Love MD;  Location: [address]);  Service: Endoscopy;  Laterality: N/A;    CYSTOSCOPY      with BOTOX INJECTION    ESOPHAGOGASTRODUODENOSCOPY N/A 5/13/2019    Procedure: EGD (ESOPHAGOGASTRODUODENOSCOPY);  Surgeon: Bryan Love MD;  [facility]  Service: Endoscopy;  Laterality: N/A;    ESOPHAGOGASTRODUODENOSCOPY N/A 9/9/2019    Procedure: EGD (ESOPHAGOGASTRODUODENOSCOPY);  Surgeon: Bryan Love MD;  [facility]  Service: Endoscopy;  Laterality: N/A;    HERNIA REPAIR      umbilical    HYSTERECTOMY      COMPLETE    POSTERIOR FUSION LUMBAR SPINE W/ CORPECTOMY      TONSILLECTOMY, ADENOIDECTOMY           Review of Systems   Constitutional: Positive for fatigue. Negative for diaphoresis, fever and unexpected weight change.   HENT: Negative.  Negative for mouth sores, sore throat, tinnitus and trouble swallowing.         Dry mouth   Eyes: Negative for redness and visual disturbance.        Dry eyes   Respiratory: Negative.  Negative for cough, choking, chest tightness and shortness of breath.    Cardiovascular: Negative.  Negative for chest pain, palpitations and leg swelling.   Gastrointestinal: Positive for constipation (helped by Linzess) and nausea (occasional nausea w GERD). Negative for abdominal distention, abdominal pain, blood in stool, diarrhea and vomiting.        Heartburn  Constipation helped by Linzess   Endocrine: Negative.    Genitourinary: Negative.  Negative for dysuria, frequency, genital sores, hematuria and menstrual problem.   Musculoskeletal: Positive for back pain (chronic; about the same). Negative for joint swelling, myalgias, neck pain and neck stiffness.   Skin: Negative.  Negative for rash.   Allergic/Immunologic: Negative.    Neurological: Negative.  Negative for dizziness, syncope, weakness, light-headedness, numbness and headaches.   Hematological: Negative.  Negative for adenopathy. Does not bruise/bleed easily.   Psychiatric/Behavioral: Positive for dysphoric mood and sleep disturbance. The patient is nervous/anxious.        Family History   Problem Relation Age of Onset    Kidney disease Mother     Hypertension Mother     Diabetes Father     Diabetes Brother     Kidney  disease Brother     Heart disease Brother     Hyperlipidemia Sister     Hypertension Sister     Hypertension Daughter     Colon cancer Maternal Aunt 70        stomach    Blindness Maternal Aunt     Cancer Maternal Aunt         stomach cancer    Cancer Maternal Uncle         colon    Colon cancer Maternal Uncle 70        two uncles    Glaucoma Neg Hx     Amblyopia Neg Hx     Macular degeneration Neg Hx     Retinal detachment Neg Hx     Anesthesia problems Neg Hx     Celiac disease Neg Hx     Cirrhosis Neg Hx     Crohn's disease Neg Hx     Esophageal cancer Neg Hx     Irritable bowel syndrome Neg Hx     Liver cancer Neg Hx     Liver disease Neg Hx     Rectal cancer Neg Hx     Stomach cancer Neg Hx     Melanoma Neg Hx    Daughter taking BP meds.  Mom  age 89 with dementia/Alzheimers  Maternal aunt recently .   Brother  late 50's diabetes & drugs.  Lots of colon cancer in the family.  No CTD in family.  Social History     Tobacco Use    Smoking status: Never Smoker    Smokeless tobacco: Never Used   Substance Use Topics    Alcohol use: No     Frequency: Never    Drug use: No           Objective:   Oriented x 3  No ocular erythema  FROM all upper joints.   Exam below is from 18    Physical Exam   Vitals reviewed.  Constitutional: She is oriented to person, place, and time and well-developed, well-nourished, and in no distress. No distress.   HENT:   Head: Normocephalic and atraumatic.   Mouth/Throat: Oropharynx is clear and moist. No oropharyngeal exudate.   No facial rashes  Parotids not enlarged  No oral ulcers  Adequate pooling of saliva.  Teeth ok;    Eyes: EOM are normal. Pupils are equal, round, and reactive to light. Right eye exhibits no discharge. Left eye exhibits no discharge. No scleral icterus.   Both eyes with increase pigmentation and some erythema   Neck: Neck supple. No JVD present. No tracheal deviation present. No thyromegaly present.   Cardiovascular: Normal  rate, regular rhythm, normal heart sounds and intact distal pulses.  Exam reveals no gallop and no friction rub.    No murmur heard.  Pulmonary/Chest: Effort normal and breath sounds normal. No respiratory distress. She has no wheezes. She has no rales. She exhibits no tenderness.   Abdominal: Soft. Bowel sounds are normal. She exhibits no distension and no mass. There is no splenomegaly or hepatomegaly. There is no abdominal tenderness. There is no rebound and no guarding.   Lymphadenopathy:     She has no cervical adenopathy.   Neurological: She is alert and oriented to person, place, and time. She has normal reflexes. No cranial nerve deficit. Gait normal.   Proximal and distal muscle strength 5/5.   Skin: Skin is warm and dry. No rash noted. She is not diaphoretic.     Psychiatric: Mood, memory, affect and judgment normal.   Musculoskeletal: Normal range of motion. No tenderness or edema.      Comments: Cspine FROM no tenderness  Tspine FROM no tenderness  Lspine FROM no tenderness.  TMJ: unremarkable  Shoulders: FROM; no synovitis;  Elbows: FROM; no synovitis; no tophi or nodules  Wrists: FROM; no synovitis;    MCPs: FROM; no synovitis; no metacarpalgia;  ok;  R thumb w nodule and triggering; painful to  Palpation.  PIPs:FROM; no synovitis; small Josie's  DIPs: FROM; no synovitis; small Heberden's  HIPS: FROM  Knees: Elvis PF crepitus; FROM; no synovitis; no instability;  Ankles: FROM: no synovitis   Toes: ok; no metatarsalgia               LABS:   8/24/20: CBC Hg 11.1; Ht 36; CMP cnne 1.1; GFR 58; Vit D 47; TSH ok;   6/6/18: ESR 38; CRP 6.7; CBC Hg 11.8; Ht 34.8; CMP cnne 1.0; PTH 82.0;   11/22/17: RFTs ok;   7/10/17: Hg 11.5; Ht 34.3; CMP cnne 1.1;   11/14/16: CRP 3.3; Ht 36.2; CMP ok with cnne 1.0; ;   8/16/16: Hg 11.4; ferritin 35; CMP cnne 1.0  6/30/16: ESR 20; CRP 2.2; Hg 11.1; Ht 34.6;   6/6/16: Hg 11.3; Ht 34.9; cnne 1.3; TSH ok; Vit d 43  3/30/16: ESR 18; CRP 3.2; Hg 11.7; Ht 34.8; cnne  1.2  12/29/15: ESR 23; CRP 2.7; Hg 11.3; Ht 33.9; cnne 1.0  9/30/15: ESR 36; CRP 3.0; Hg 11.3; Ht 32.8; K+3.4; cnne 1.0; Vit D 40;   RF 42; CCP neg; GEORGIANA 1:320H; neg pro; LEXI ok; LAC neg; aCLA IgM 25.57 (12.49); beta 2 gly ok;   5/28/12: Hg 11.6; Ht 34.5; WC 4.26; Cnne 1.2; LFTs ok;  11/19/14: ESR 28;   6/20/12: GEORGIANA 1:320H; neg pro; neg c& p ANCA    4/25/19: DXA: TLS -1.7; TFN -1.5; TTH -1.4; Frax 6.4%/1%  3/29/16: DXA: personally reviewed: TLS -3.1; TFN -2.1; TTH -1.5;         Assessment:   UCTD/Sjogren's (by SICCA criteria)   Recurrent uveitis & scleritis helped by azathioprine    Off for a good while   Esophageal dysmotility (but upper dysphagia by hx)    Periodic dilatations (has Schatzki's ring)   +GEORGIANA 1:320 H; neg profile;    +RF 42; (CCP neg)   + IgM aCLA 25.57 (12.49);    HLA B-27 neg   Mildly elevated ESRs in past   Mild anemia   Mild intermittent leukopenia (anna 2.9)   Was on azathioprine 150 mg/d for uveitis--off 1-2 years; off prednisolone eye drops since   3/18; only on  timolol drops currently   Off azathioprine      Sicca sxs attributed in part to meds--doing well on OTC eye drops   Dry eye syndrome by Ophthalmology    Osteoporosis with vitamin D insufficiency in past   3/29/16: DXA:TLS -3.1; TFN -2.1; TTH -1.5   6/27/13: DXA TLS -2.9; TFN -2.0; TTH -1.6   Vit D as low as 15--treated; last 47   S/P alendronate intolerance (nausea & heartburn)   Began denosumab but did not take as prescribed    Received 7/7/16; 9/6/18 & 3/15/19 but off it since 3/15/19     L trigger thumb--resolved    Mild renal insufficiency--intermittent--developed since 3/15   R/O PPI induced   Doubt CTD related.    GERD with Schatzki's Ring & esophagitis and gastritis   On pantoprazole.   S/P esophageal dilatation    Hypertension    Multiple co-morbidities   Recurrent Major depressive disorder; Dyslipidemia; atherosclerosis; LBP      Plan:   Discussed stability of her UCTD.  Discussed risk of OP (ping since she has not had  denosumab)  We will order DXA.  RTC prn--if she needs rx for OP will call back for VV)

## 2020-09-08 ENCOUNTER — OFFICE VISIT (OUTPATIENT)
Dept: RHEUMATOLOGY | Facility: CLINIC | Age: 70
End: 2020-09-08
Payer: MEDICARE

## 2020-09-08 DIAGNOSIS — Z92.25 HISTORY OF IMMUNOSUPPRESSION: ICD-10-CM

## 2020-09-08 DIAGNOSIS — M81.0 AGE-RELATED OSTEOPOROSIS WITHOUT CURRENT PATHOLOGICAL FRACTURE: ICD-10-CM

## 2020-09-08 DIAGNOSIS — M35.9 UNDIFFERENTIATED CONNECTIVE TISSUE DISEASE: Primary | ICD-10-CM

## 2020-09-08 PROCEDURE — 1159F PR MEDICATION LIST DOCUMENTED IN MEDICAL RECORD: ICD-10-PCS | Mod: HCNC,95,, | Performed by: INTERNAL MEDICINE

## 2020-09-08 PROCEDURE — 99214 PR OFFICE/OUTPT VISIT, EST, LEVL IV, 30-39 MIN: ICD-10-PCS | Mod: HCNC,95,, | Performed by: INTERNAL MEDICINE

## 2020-09-08 PROCEDURE — 1159F MED LIST DOCD IN RCRD: CPT | Mod: HCNC,95,, | Performed by: INTERNAL MEDICINE

## 2020-09-08 PROCEDURE — 1101F PT FALLS ASSESS-DOCD LE1/YR: CPT | Mod: HCNC,CPTII,95, | Performed by: INTERNAL MEDICINE

## 2020-09-08 PROCEDURE — 99214 OFFICE O/P EST MOD 30 MIN: CPT | Mod: HCNC,95,, | Performed by: INTERNAL MEDICINE

## 2020-09-08 PROCEDURE — 1101F PR PT FALLS ASSESS DOC 0-1 FALLS W/OUT INJ PAST YR: ICD-10-PCS | Mod: HCNC,CPTII,95, | Performed by: INTERNAL MEDICINE

## 2020-09-08 NOTE — PROGRESS NOTES
Rapid3 Question Responses and Scores 9/4/2020   MDHAQ Score 0.3   Psychologic Score 3.3   Pain Score 7   When you awakened in the morning OVER THE LAST WEEK, did you feel stiff? No   Fatigue Score 5   Global Health Score 0.5   RAPID3 Score 2.83       Answers for HPI/ROS submitted by the patient on 9/4/2020   fever: No  eye redness: No  mouth sores: No  headaches: No  shortness of breath: No  chest pain: No  trouble swallowing: No  diarrhea: No  constipation: No  unexpected weight change: No  genital sore: No  dysuria: No  During the last 3 days, have you had a skin rash?: No  Bruises or bleeds easily: No  cough: No

## 2020-09-08 NOTE — LETTER
September 8, 2020      Noemy Pugh MD  [address]  [facility] [address]           [id number]  [address]  [facility] [address]  Phone: [phone]  Fax: [phone]          Patient: Idalmis Morejon   MR Number: [id number]   YOB: 1950   Date of Visit: 9/8/2020       Dear Dr. Noemy Pugh:    Thank you for referring Idalmis Morejon to me for evaluation. Attached you will find relevant portions of my assessment and plan of care.    If you have questions, please do not hesitate to call me. I look forward to following Idalmis Morejon along with you.    Sincerely,    Bonita Kan MD    Enclosure  CC:  No Recipients    If you would like to receive this communication electronically, please contact [email or web address] or [phone] to request more information on [employer] Link access.    For providers and/or their staff who would like to refer a patient to [facility], please contact us through our one-stop-shop provider referral line, [facility], at [phone].    If you feel you have received this communication in error or would no longer like to receive these types of communications, please e-mail [email or web address]

## 2020-09-14 ENCOUNTER — TELEPHONE (OUTPATIENT)
Dept: SPINE | Facility: CLINIC | Age: 70
End: 2020-09-14

## 2020-09-14 ENCOUNTER — PES CALL (OUTPATIENT)
Dept: ADMINISTRATIVE | Facility: CLINIC | Age: 70
End: 2020-09-14

## 2020-09-14 NOTE — TELEPHONE ENCOUNTER
No answer, LVM informing patient that at this time we have no sooner appts but will add her name to the Wait List. No further questions at this time.

## 2020-09-14 NOTE — TELEPHONE ENCOUNTER
----- Message from Mary Wise, Patient Care Assistant sent at 9/14/2020  9:53 AM CDT -----  Name of Who is Calling: REA ARNOLD [[id number]]    What is the request in detail: Requesting a sooner appointment than 11/3. Please contact to further discuss and advise      Can the clinic reply by [facility]: No    [facility]   [phone]

## 2020-09-14 NOTE — TELEPHONE ENCOUNTER
----- Message from Erin Doyle sent at 9/14/2020  3:26 PM CDT -----      Name of Who is Calling: REA ARNOLD [[id number]]      What is the request in detail: pt called for a pain she's having in her back and would like to speakw ith someone.Please contact to further discuss and advise.          Can the clinic reply by [facility]: N      What Number to Call Back if not in [facility]: [phone]

## 2020-09-16 ENCOUNTER — PATIENT MESSAGE (OUTPATIENT)
Dept: SPINE | Facility: CLINIC | Age: 70
End: 2020-09-16

## 2020-09-16 ENCOUNTER — PATIENT MESSAGE (OUTPATIENT)
Dept: INTERNAL MEDICINE | Facility: CLINIC | Age: 70
End: 2020-09-16

## 2020-09-16 ENCOUNTER — PATIENT MESSAGE (OUTPATIENT)
Dept: RHEUMATOLOGY | Facility: CLINIC | Age: 70
End: 2020-09-16

## 2020-09-16 NOTE — TELEPHONE ENCOUNTER
I spoke to the patient and informed her that myself, and my staff have left several messages for her regarding her request for a sooner appt. At this time I still do not have a sooner appt. Patient is requesting same day due to pain. Patient was advised to go to the nearest UC or contact her PCP if her pain is that unbearable, as Dr. Winkler can not diagnose or prescribe anything without 1st evaluating her. Patient verbalized understanding of all of the above. Pt added to the waitlist for sooner appt No further questions or concerns at this time.

## 2020-09-16 NOTE — TELEPHONE ENCOUNTER
"Patient contacted and informed of the details of the referral which is for NEUROSURGERY. Patient was very understanding. Patient states that NEUROSURGERY would make more sense because that is who did her last surgery which helped a lot. Pt states she is have some of the same symptoms that she had prior to her surgery in 2007 and would like to see if something has gone wrong with the "hardware".     Message sent to referring provider Dr. Love for review of referral.    "

## 2020-09-16 NOTE — TELEPHONE ENCOUNTER
It does not appear that she has recent imaging.  So neurosurgery might not see her.  We just need to clarify referral and see if they want neurosurgery

## 2020-09-17 ENCOUNTER — TELEPHONE (OUTPATIENT)
Dept: RHEUMATOLOGY | Facility: CLINIC | Age: 70
End: 2020-09-17

## 2020-09-17 DIAGNOSIS — M54.50 LOW BACK PAIN, UNSPECIFIED BACK PAIN LATERALITY, UNSPECIFIED CHRONICITY, UNSPECIFIED WHETHER SCIATICA PRESENT: Primary | ICD-10-CM

## 2020-09-17 NOTE — TELEPHONE ENCOUNTER
Please let her know that I would recommend that she be seen in the [facility] as soon as possible.  I do not know who her neurosurgeon was and if they would be willing to see her but they usually ask that they get an MRI first.    We can see her for urgent care here in [facility], either me or any provider, to get things started.

## 2020-09-17 NOTE — TELEPHONE ENCOUNTER
Spoke to pt-----she has been in contact with Dr. Winkler in [facility] and have been advised to see Neurosurgery. Pt would like to know if MRI can be ordered. She would prefer to see only Dr. Pugh if appt is needed. Please advise.

## 2020-09-21 ENCOUNTER — PATIENT MESSAGE (OUTPATIENT)
Dept: INTERNAL MEDICINE | Facility: CLINIC | Age: 70
End: 2020-09-21

## 2020-09-21 ENCOUNTER — OFFICE VISIT (OUTPATIENT)
Dept: INTERNAL MEDICINE | Facility: CLINIC | Age: 70
End: 2020-09-21
Payer: MEDICARE

## 2020-09-21 DIAGNOSIS — R80.9 PROTEINURIA, UNSPECIFIED TYPE: ICD-10-CM

## 2020-09-21 DIAGNOSIS — M54.31 SCIATICA OF RIGHT SIDE: ICD-10-CM

## 2020-09-21 DIAGNOSIS — Z86.010 HISTORY OF ADENOMATOUS POLYP OF COLON: ICD-10-CM

## 2020-09-21 DIAGNOSIS — Z79.899 HIGH RISK MEDICATION USE: ICD-10-CM

## 2020-09-21 DIAGNOSIS — T38.0X5A STEROID-INDUCED GLAUCOMA OF BOTH EYES: ICD-10-CM

## 2020-09-21 DIAGNOSIS — I77.1 TORTUOUS AORTA: ICD-10-CM

## 2020-09-21 DIAGNOSIS — K21.9 GASTROESOPHAGEAL REFLUX DISEASE WITHOUT ESOPHAGITIS: ICD-10-CM

## 2020-09-21 DIAGNOSIS — K59.01 SLOW TRANSIT CONSTIPATION: ICD-10-CM

## 2020-09-21 DIAGNOSIS — Z00.00 ENCOUNTER FOR PREVENTIVE HEALTH EXAMINATION: Primary | ICD-10-CM

## 2020-09-21 DIAGNOSIS — R13.19 ESOPHAGEAL DYSPHAGIA: ICD-10-CM

## 2020-09-21 DIAGNOSIS — D89.89 AUTOIMMUNE DISORDER: ICD-10-CM

## 2020-09-21 DIAGNOSIS — H04.123 BILATERAL DRY EYES: ICD-10-CM

## 2020-09-21 DIAGNOSIS — M81.0 AGE-RELATED OSTEOPOROSIS WITHOUT CURRENT PATHOLOGICAL FRACTURE: ICD-10-CM

## 2020-09-21 DIAGNOSIS — I70.0 ATHEROSCLEROSIS OF ABDOMINAL AORTA: ICD-10-CM

## 2020-09-21 DIAGNOSIS — H10.433 CHRONIC FOLLICULAR CONJUNCTIVITIS OF BOTH EYES: ICD-10-CM

## 2020-09-21 DIAGNOSIS — H40.63X0 STEROID-INDUCED GLAUCOMA OF BOTH EYES: ICD-10-CM

## 2020-09-21 DIAGNOSIS — F33.41 RECURRENT MAJOR DEPRESSIVE DISORDER, IN PARTIAL REMISSION: ICD-10-CM

## 2020-09-21 DIAGNOSIS — K22.2 SCHATZKI'S RING: ICD-10-CM

## 2020-09-21 DIAGNOSIS — N31.9 NEUROGENIC BLADDER: ICD-10-CM

## 2020-09-21 DIAGNOSIS — D53.9 DEFICIENCY ANEMIA: ICD-10-CM

## 2020-09-21 DIAGNOSIS — M47.817 SPONDYLOSIS OF LUMBOSACRAL REGION WITHOUT MYELOPATHY OR RADICULOPATHY: ICD-10-CM

## 2020-09-21 DIAGNOSIS — H25.13 NUCLEAR SCLEROSIS OF BOTH EYES: ICD-10-CM

## 2020-09-21 DIAGNOSIS — R09.89 PROMINENT AORTA: ICD-10-CM

## 2020-09-21 DIAGNOSIS — G89.4 CHRONIC PAIN SYNDROME: ICD-10-CM

## 2020-09-21 DIAGNOSIS — D84.9 IMMUNOSUPPRESSION: ICD-10-CM

## 2020-09-21 DIAGNOSIS — N18.30 CKD (CHRONIC KIDNEY DISEASE) STAGE 3, GFR 30-59 ML/MIN: ICD-10-CM

## 2020-09-21 DIAGNOSIS — H40.053 BILATERAL OCULAR HYPERTENSION: ICD-10-CM

## 2020-09-21 DIAGNOSIS — H11.132 PRIMARY ACQUIRED MELANOSIS OF CONJUNCTIVA OF LEFT EYE: ICD-10-CM

## 2020-09-21 DIAGNOSIS — H15.003 BILATERAL SCLERITIS: ICD-10-CM

## 2020-09-21 DIAGNOSIS — K21.00 GASTROESOPHAGEAL REFLUX DISEASE WITH ESOPHAGITIS: ICD-10-CM

## 2020-09-21 DIAGNOSIS — R91.8 MULTIPLE LUNG NODULES ON CT: ICD-10-CM

## 2020-09-21 DIAGNOSIS — M35.9 UNDIFFERENTIATED CONNECTIVE TISSUE DISEASE: ICD-10-CM

## 2020-09-21 DIAGNOSIS — E78.2 MIXED HYPERLIPIDEMIA: ICD-10-CM

## 2020-09-21 DIAGNOSIS — D12.6 COLON ADENOMA: ICD-10-CM

## 2020-09-21 DIAGNOSIS — I10 ESSENTIAL HYPERTENSION: ICD-10-CM

## 2020-09-21 DIAGNOSIS — Z12.31 SCREENING MAMMOGRAM, ENCOUNTER FOR: ICD-10-CM

## 2020-09-21 DIAGNOSIS — Z80.0 FAMILY HISTORY OF COLON CANCER: ICD-10-CM

## 2020-09-21 DIAGNOSIS — N25.81 SECONDARY HYPERPARATHYROIDISM: ICD-10-CM

## 2020-09-21 PROCEDURE — 99999 PR PBB SHADOW E&M-EST. PATIENT-LVL II: ICD-10-PCS | Mod: PBBFAC,HCNC,, | Performed by: NURSE PRACTITIONER

## 2020-09-21 PROCEDURE — 99499 UNLISTED E&M SERVICE: CPT | Mod: HCNC,S$GLB,, | Performed by: NURSE PRACTITIONER

## 2020-09-21 PROCEDURE — 99999 PR PBB SHADOW E&M-EST. PATIENT-LVL II: CPT | Mod: PBBFAC,HCNC,, | Performed by: NURSE PRACTITIONER

## 2020-09-21 PROCEDURE — 99499 RISK ADDL DX/OHS AUDIT: ICD-10-PCS | Mod: HCNC,S$GLB,, | Performed by: NURSE PRACTITIONER

## 2020-09-21 PROCEDURE — G0439 PR MEDICARE ANNUAL WELLNESS SUBSEQUENT VISIT: ICD-10-PCS | Mod: 95,HCNC,, | Performed by: NURSE PRACTITIONER

## 2020-09-21 PROCEDURE — G0439 PPPS, SUBSEQ VISIT: HCPCS | Mod: 95,HCNC,, | Performed by: NURSE PRACTITIONER

## 2020-09-21 NOTE — PROGRESS NOTES
The patient location is: Home  The chief complaint leading to consultation is: AWV    Visit type: audiovisual    Face to Face time with patient: Yes  40 minutes of total time spent on the encounter, which includes face to face time and non-face to face time preparing to see the patient (eg, review of tests), Obtaining and/or reviewing separately obtained history, Documenting clinical information in the electronic or other health record, Independently interpreting results (not separately reported) and communicating results to the patient/family/caregiver, or Care coordination (not separately reported).       Each patient to whom he or she provides medical services by telemedicine is:  (1) informed of the relationship between the physician and patient and the respective role of any other health care provider with respect to management of the patient; and (2) notified that he or she may decline to receive medical services by telemedicine and may withdraw from such care at any time.    Notes: At home MJ=[id number].    Idalmis Morejon presented for a  Medicare AWV and comprehensive Health Risk Assessment today. The following components were reviewed and updated:    · Medical history  · Family History  · Social history  · Allergies and Current Medications  · Health Risk Assessment  · Health Maintenance  · Care Team     ** See Completed Assessments for Annual Wellness Visit within the encounter summary.**         The following assessments were completed:  · Living Situation  · CAGE  · Depression Screening  · Fall Risk Assessment (MACH 10)  · Hearing Assessment(HHI)  · Cognitive Function Screening  · Nutrition Screening  · ADL Screening  · PAQ Screening      Physical Exam  Constitutional: She is oriented to person, place, and time and well-developed, well-nourished, and in no distress. No distress.   HENT:   Head: Normocephalic and atraumatic.   Eyes: No scleral icterus.   Pulmonary/Chest: Effort normal. No respiratory distress.    Neurological: She is alert and oriented to person, place, and time.   Skin: She is not diaphoretic.   Psychiatric: Mood and affect normal.           Diagnoses and health risks identified today and associated recommendations/orders:    1. Encounter for preventive health examination  Annual Health Risk Assessment (HRA) visit today.  Counseling and referral of health maintenance and preventative health measures performed.  Patient given annual wellness paperwork to take home.  Encouraged to return in 1 year for subsequent HRA visit.     2. Recurrent major depressive disorder, in partial remission  Chronic. Stable. She denies suicidal or homicidal ideation. Continue current treatment plan as previously prescribed by PCP.    3. Tortuous aorta  Chronic. Stable. Noted on CXR from 7/29/20. Continue current treatment plan as previously prescribed by PCP.    4. Atherosclerosis of abdominal aorta: minimal see CT 7/16  Chronic. Stable. Continue current treatment plan as previously prescribed by PCP.    5. CKD (chronic kidney disease) stage 3, GFR 30-59 ml/min  Chronic. Stable. Continue current treatment plan as previously prescribed by PCP.    6. Spondylosis of lumbosacral region without myelopathy or radiculopathy  Chronic. Stable. Continue current treatment plan as previously prescribed by PCP.    7. Bilateral dry eyes - Both Eyes  Chronic. Stable. Continue current treatment plan as previously prescribed by PCP.    8. Chronic follicular conjunctivitis of both eyes  Chronic. Stable. Continue current treatment plan as previously prescribed by PCP.    9. Nuclear sclerosis of both eyes  Chronic. Stable. Continue current treatment plan as previously prescribed by PCP.    10. Bilateral ocular hypertension  Chronic. Stable. Continue current treatment plan as previously prescribed by PCP.    11. 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 Stable. Continue current treatment plan as previously prescribed by  PCP.    12. Bilateral scleritis  Chronic. Stable. Continue current treatment plan as previously prescribed by PCP.    13. Steroid-induced glaucoma of both eyes  Chronic. Stable. Continue current treatment plan as previously prescribed by PCP.    14. Chronic pain syndrome  Chronic. Stable. Continue current treatment plan as previously prescribed by PCP.    15. Multiple lung nodules on CT: 9070-0722 no f/u needed  Chronic. Stable. Continue current treatment plan as previously prescribed by PCP.    16. Prominent aorta-Noted on imaging 12/6/2010  Chronic. Stable. Continue current treatment plan as previously prescribed by PCP.    17. Mixed hyperlipidemia  Chronic. Stable. Continue current treatment plan as previously prescribed by PCP.    18. Essential hypertension  Chronic. Stable. Controlled. Encouraged to increase exercise as tolerated (moderate-intensity aerobic activity and muscle-strengthening activities) improve diet to heart healthy, low sodium diet. Continue current treatment plan as previously prescribed by PCP.    19. Neurogenic bladder  Chronic. Stable. Continue current treatment plan as previously prescribed by PCP.    20. Proteinuria, unspecified type  Chronic. Stable. Continue current treatment plan as previously prescribed by PCP.    21. Autoimmune disorder- recurrent iritis  Chronic. Stable. Continue current treatment plan as previously prescribed by PCP.    22. Immunosuppression  Chronic. Stable. Continue current treatment plan as previously prescribed by PCP.    23. Undifferentiated connective tissue disease  Chronic. Stable. Continue current treatment plan as previously prescribed by PCP.    24. Deficiency anemia  Chronic. Stable. Continue current treatment plan as previously prescribed by PCP.    25. Secondary hyperparathyroidism  Chronic. Stable. Continue current treatment plan as previously prescribed by PCP.    26. Colon adenoma: 10/15 repeat in 2020  Chronic. Stable. Continue current treatment  plan as previously prescribed by PCP.    27. Esophageal dysphagia  Chronic. Stable. Continue current treatment plan as previously prescribed by PCP.    28. Gastroesophageal reflux disease with esophagitis: EGD 3/15  Chronic. Stable. Continue current treatment plan as previously prescribed by PCP.    29. Gastroesophageal reflux disease without esophagitis  Chronic. Stable. Continue current treatment plan as previously prescribed by PCP.    30. History of adenomatous polyp of colon  Chronic. Stable. Continue current treatment plan as previously prescribed by PCP.    31. Schatzki's ring: 3/15 dilated  Chronic. Stable. Continue current treatment plan as previously prescribed by PCP.    32. Slow transit constipation  Chronic. Stable. Continue current treatment plan as previously prescribed by PCP.    33. Age-related osteoporosis without current pathological fracture 2016  Chronic. Stable. Continue current treatment plan as previously prescribed by PCP.    34. Sciatica of right side  Chronic. Stable. Continue current treatment plan as previously prescribed by PCP.    35. Family history of colon cancer: maternal uncle and aunt  Chronic. Stable. Continue current treatment plan as previously prescribed by PCP.    36. High risk medication use-Azathioprine 100 mg daily  Chronic. Stable. Continue current treatment plan as previously prescribed by PCP.      Provided Idalmis with a 5-10 year written screening schedule and personal prevention plan. Recommendations were developed using the USPSTF age appropriate recommendations. Education, counseling, and referrals were provided as needed. After Visit Summary printed and given to patient which includes a list of additional screenings\tests needed.    Follow up in about 1 year (around 9/21/2021).    Stephan Fonseca NP  I offered to discuss end of life issues, including information on how to make advance directives that the patient could use to name someone who would make medical  decisions on their behalf if they became too ill to make themselves.    ___Patient declined  _X_Patient is interested, I provided paper work and offered to discuss.

## 2020-09-21 NOTE — PATIENT INSTRUCTIONS
Counseling and Referral of Other Preventative  (Italic type indicates deductible and co-insurance are waived)    Patient Name: Idalmis Morejon  Today's Date: 9/21/2020    Health Maintenance       Date Due Completion Date    High Dose Statin 08/13/1969 ---    Shingles Vaccine (1 of 2) 08/13/1998 ---    Influenza Vaccine (1) 08/01/2020 3/12/2020 (Declined)    Override on 3/12/2020: Declined    Mammogram 11/30/2020 11/30/2019    Override on 3/26/2012: Done    DEXA SCAN 04/25/2022 4/25/2019    Lipid Panel 04/09/2024 4/9/2019    Colorectal Cancer Screening 09/09/2024 9/9/2019    TETANUS VACCINE 02/03/2026 2/3/2016        No orders of the defined types were placed in this encounter.    The following information is provided to all patients.  This information is to help you find resources for any of the problems found today that may be affecting your health:                Living healthy guide: www.[facility].[place].[facility]      Understanding Diabetes: www.diabetes.org      Eating healthy: www.cdc.gov/healthyweight      [facility] home safety checklist: www.cdc.gov/steadi/patient.html      Agency on Aging: www.[employer].[facility]      Alcoholics anonymous (AA): www.aa.org      Physical Activity: www.dago.nih.gov/yv1ajgb      Tobacco use: www.[employer].org     The following information is provided to all patients.  This information is to help you find resources for any of the problems found today that may be affecting your health:                Living healthy guide: www.[facility].[place].gov      Understanding Diabetes: www.diabetes.org      Eating healthy: www.cdc.gov/healthyweight      CDC home safety checklist: www.[facility].gov/steadi/patient.html      Agency on Aging: www.[employer].[place].[facility]      Alcoholics anonymous (AA): www.aa.org      Physical Activity: www.dago.nih.gov/qp5uuyq      Tobacco use: www.quitDriftToItla.org

## 2020-09-22 ENCOUNTER — DOCUMENTATION ONLY (OUTPATIENT)
Dept: ENDOSCOPY | Facility: HOSPITAL | Age: 70
End: 2020-09-22

## 2020-09-22 ENCOUNTER — PATIENT MESSAGE (OUTPATIENT)
Dept: GASTROENTEROLOGY | Facility: CLINIC | Age: 70
End: 2020-09-22

## 2020-09-22 DIAGNOSIS — K59.04 CHRONIC IDIOPATHIC CONSTIPATION: Primary | ICD-10-CM

## 2020-09-23 ENCOUNTER — PATIENT MESSAGE (OUTPATIENT)
Dept: GASTROENTEROLOGY | Facility: CLINIC | Age: 70
End: 2020-09-23

## 2020-09-29 ENCOUNTER — PATIENT MESSAGE (OUTPATIENT)
Dept: OTHER | Facility: OTHER | Age: 70
End: 2020-09-29

## 2020-10-13 ENCOUNTER — PATIENT OUTREACH (OUTPATIENT)
Dept: ADMINISTRATIVE | Facility: OTHER | Age: 70
End: 2020-10-13

## 2020-10-13 NOTE — PROGRESS NOTES
Requested updates within Care Everywhere.  Patient's chart was reviewed for overdue SEBLE topics.  Immunizations reconciled.    Orders placed:n/a  Tasked appts:n/a  Labs Linked:n/a

## 2020-10-14 ENCOUNTER — HOSPITAL ENCOUNTER (OUTPATIENT)
Dept: RADIOLOGY | Facility: HOSPITAL | Age: 70
Discharge: HOME | End: 2020-10-14
Attending: ORTHOPAEDIC SURGERY
Payer: MEDICARE

## 2020-10-14 ENCOUNTER — OFFICE VISIT (OUTPATIENT)
Dept: ORTHOPEDICS | Facility: CLINIC | Age: 70
End: 2020-10-14
Payer: MEDICARE

## 2020-10-14 VITALS
WEIGHT: 158.06 LBS | HEIGHT: 66 IN | DIASTOLIC BLOOD PRESSURE: 79 MMHG | HEART RATE: 62 BPM | BODY MASS INDEX: 25.4 KG/M2 | SYSTOLIC BLOOD PRESSURE: 140 MMHG

## 2020-10-14 DIAGNOSIS — M51.36 DDD (DEGENERATIVE DISC DISEASE), LUMBAR: ICD-10-CM

## 2020-10-14 DIAGNOSIS — M43.27 FUSION OF SPINE, LUMBOSACRAL REGION: ICD-10-CM

## 2020-10-14 DIAGNOSIS — M54.50 LOW BACK PAIN, UNSPECIFIED BACK PAIN LATERALITY, UNSPECIFIED CHRONICITY, UNSPECIFIED WHETHER SCIATICA PRESENT: ICD-10-CM

## 2020-10-14 PROCEDURE — 1101F PR PT FALLS ASSESS DOC 0-1 FALLS W/OUT INJ PAST YR: ICD-10-PCS | Mod: HCNC,CPTII,S$GLB, | Performed by: ORTHOPAEDIC SURGERY

## 2020-10-14 PROCEDURE — 3008F PR BODY MASS INDEX (BMI) DOCUMENTED: ICD-10-PCS | Mod: HCNC,CPTII,S$GLB, | Performed by: ORTHOPAEDIC SURGERY

## 2020-10-14 PROCEDURE — 99214 OFFICE O/P EST MOD 30 MIN: CPT | Mod: HCNC,S$GLB,, | Performed by: ORTHOPAEDIC SURGERY

## 2020-10-14 PROCEDURE — 99999 PR PBB SHADOW E&M-EST. PATIENT-LVL IV: CPT | Mod: PBBFAC,HCNC,, | Performed by: ORTHOPAEDIC SURGERY

## 2020-10-14 PROCEDURE — 99999 PR PBB SHADOW E&M-EST. PATIENT-LVL IV: ICD-10-PCS | Mod: PBBFAC,HCNC,, | Performed by: ORTHOPAEDIC SURGERY

## 2020-10-14 PROCEDURE — 72120 XR LUMBAR SPINE AP AND LAT WITH FLEX/EXT: ICD-10-PCS | Mod: 26,HCNC,, | Performed by: RADIOLOGY

## 2020-10-14 PROCEDURE — 72100 X-RAY EXAM L-S SPINE 2/3 VWS: CPT | Mod: TC,HCNC

## 2020-10-14 PROCEDURE — 72100 X-RAY EXAM L-S SPINE 2/3 VWS: CPT | Mod: 26,HCNC,, | Performed by: RADIOLOGY

## 2020-10-14 PROCEDURE — 3077F SYST BP >= 140 MM HG: CPT | Mod: HCNC,CPTII,S$GLB, | Performed by: ORTHOPAEDIC SURGERY

## 2020-10-14 PROCEDURE — 99214 PR OFFICE/OUTPT VISIT, EST, LEVL IV, 30-39 MIN: ICD-10-PCS | Mod: HCNC,S$GLB,, | Performed by: ORTHOPAEDIC SURGERY

## 2020-10-14 PROCEDURE — 3077F PR MOST RECENT SYSTOLIC BLOOD PRESSURE >= 140 MM HG: ICD-10-PCS | Mod: HCNC,CPTII,S$GLB, | Performed by: ORTHOPAEDIC SURGERY

## 2020-10-14 PROCEDURE — 1125F PR PAIN SEVERITY QUANTIFIED, PAIN PRESENT: ICD-10-PCS | Mod: HCNC,S$GLB,, | Performed by: ORTHOPAEDIC SURGERY

## 2020-10-14 PROCEDURE — 1125F AMNT PAIN NOTED PAIN PRSNT: CPT | Mod: HCNC,S$GLB,, | Performed by: ORTHOPAEDIC SURGERY

## 2020-10-14 PROCEDURE — 3008F BODY MASS INDEX DOCD: CPT | Mod: HCNC,CPTII,S$GLB, | Performed by: ORTHOPAEDIC SURGERY

## 2020-10-14 PROCEDURE — 1159F PR MEDICATION LIST DOCUMENTED IN MEDICAL RECORD: ICD-10-PCS | Mod: HCNC,S$GLB,, | Performed by: ORTHOPAEDIC SURGERY

## 2020-10-14 PROCEDURE — 1159F MED LIST DOCD IN RCRD: CPT | Mod: HCNC,S$GLB,, | Performed by: ORTHOPAEDIC SURGERY

## 2020-10-14 PROCEDURE — 72100 XR LUMBAR SPINE AP AND LAT WITH FLEX/EXT: ICD-10-PCS | Mod: 26,HCNC,, | Performed by: RADIOLOGY

## 2020-10-14 PROCEDURE — 3078F PR MOST RECENT DIASTOLIC BLOOD PRESSURE < 80 MM HG: ICD-10-PCS | Mod: HCNC,CPTII,S$GLB, | Performed by: ORTHOPAEDIC SURGERY

## 2020-10-14 PROCEDURE — 1101F PT FALLS ASSESS-DOCD LE1/YR: CPT | Mod: HCNC,CPTII,S$GLB, | Performed by: ORTHOPAEDIC SURGERY

## 2020-10-14 PROCEDURE — 3078F DIAST BP <80 MM HG: CPT | Mod: HCNC,CPTII,S$GLB, | Performed by: ORTHOPAEDIC SURGERY

## 2020-10-14 PROCEDURE — 72120 X-RAY BEND ONLY L-S SPINE: CPT | Mod: 26,HCNC,, | Performed by: RADIOLOGY

## 2020-10-14 NOTE — LETTER
October 18, 2020      Bonita Kan MD  [address]  [facility] [address]           [id number]  [address]  [facility] [address]  Phone: [phone]          Patient: Idalmis Morejon   MR Number: [id number]   YOB: 1950   Date of Visit: 10/14/2020       Dear Dr. Bonita Kan:    Thank you for referring Idalmis Morejon to me for evaluation. Attached you will find relevant portions of my assessment and plan of care.    If you have questions, please do not hesitate to call me. I look forward to following Idalmis Morejon along with you.    Sincerely,    Syed Pineda MD    Enclosure  CC:  No Recipients    If you would like to receive this communication electronically, please contact [email or web address] or [phone] to request more information on [employer] Link access.    For providers and/or their staff who would like to refer a patient to [facility], please contact us through our one-stop-shop provider referral line, [facility], at [phone].    If you feel you have received this communication in error or would no longer like to receive these types of communications, please e-mail [email or web address]

## 2020-10-14 NOTE — PROGRESS NOTES
DATE: 10/14/2020  PATIENT: Idalmis Morejon    Attending Physician: Syed Pineda M.D.    CHIEF COMPLAINT: LBP    HISTORY:  Idalmis Morejon is a 70 y.o. female with history of 4 lumbar surgeries from [id number] here for initial evaluation of low back and R leg pain (Back - 9, Leg - 9). The pain has been present for 3 months. She has previously had no pain since her last surgery in 2007. The patient describes the pain as sharp.  The pain is worse with activity and improved by tylenol. There is no associated numbness and tingling. There is no subjective weakness. Prior treatments have included tylenol, rest, but no PT, KANDY, surgery.    The Patient denies myelopathic symptoms such as handwriting changes or difficulty with buttons/coins/keys. Denies perineal paresthesias, bowel/bladder dysfunction.    PAST MEDICAL/SURGICAL HISTORY:  Past Medical History:   Diagnosis Date    Abnormal chest CT     Acid reflux     Allergy     Anemia     Anemia     Anxiety     Cataract     Chronic UTI     recently treated with antibiotics -x 3 wks. ago-    Colon adenoma 2015 due 2020 10/21/2015    Colon adenoma 2015 due 2020 10/21/2015    Depression     Disturbed sleep rhythm     DJD (degenerative joint disease)     Dry eyes     Dry mouth     Grief reaction 3/24/2014    Hx of migraine headaches     Hyperlipemia     Hypernatremia 8/8/2014    Hypertension     Iritis     Iritis     Mild vitamin D deficiency 9/9/2013    Multiple lung nodules on CT: 7808-7050 no f/u needed 6/6/2016    Neurogenic bladder     Osteopenia     in hips    Osteoporosis     spine    Osteoporosis: 9/15 see rheumatology notes 6/6/2016    Positive GEORGIANA (antinuclear antibody)     Schatzki's ring: 3/15 dilated 6/6/2016    Sciatica neuralgia 6/21/2013    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 ENDO (4TH FLR);  Service: Endoscopy;  Laterality: N/A;    COLONOSCOPY N/A 9/9/2019    Procedure: COLONOSCOPY;  Surgeon: Bryan Love MD;  Location: [facility] ENDO (4TH FLR);  Service: Endoscopy;  Laterality: N/A;    CYSTOSCOPY      with BOTOX INJECTION    ESOPHAGOGASTRODUODENOSCOPY N/A 5/13/2019    Procedure: EGD (ESOPHAGOGASTRODUODENOSCOPY);  Surgeon: Bryan Love MD;  Location: [facility] ENDO (4TH FLR);  Service: Endoscopy;  Laterality: N/A;    ESOPHAGOGASTRODUODENOSCOPY N/A 9/9/2019    Procedure: EGD (ESOPHAGOGASTRODUODENOSCOPY);  Surgeon: Bryan Love MD;  Location: [facility] ENDO (4TH FLR);  Service: Endoscopy;  Laterality: N/A;    HERNIA REPAIR      umbilical    HYSTERECTOMY      COMPLETE    POSTERIOR FUSION LUMBAR SPINE W/ CORPECTOMY      SPINE SURGERY      TONSILLECTOMY, ADENOIDECTOMY         Current Medications:   Current Outpatient Medications:     amLODIPine (NORVASC) 10 MG tablet, Take 0.5 tablets (5 mg total) by mouth 2 (two) times daily., Disp: 90 tablet, Rfl: 3    ascorbic acid (VITAMIN C ORAL), Take by mouth., Disp: , Rfl:     B-complex with vitamin C (Z-BEC OR EQUIV) tablet, Take 1 tablet by mouth once daily., Disp: , Rfl:     chlorthalidone (HYGROTEN) 25 MG Tab, TAKE 1/2 TABLET (12.5 MG) BY MOUTH ONCE DAILY IN THE MORNING (FOR BLOOD PRESSURE), Disp: 45 tablet, Rfl: 1    DULoxetine (CYMBALTA) 60 MG capsule, Take 1 capsule (60 mg total) by mouth once daily., Disp: 90 capsule, Rfl: 0    linaCLOtide (LINZESS) 145 mcg Cap capsule, Take 1 capsule (145 mcg total) by mouth before breakfast., Disp: 30 capsule, Rfl: 5    oxybutynin (DITROPAN-XL) 10 MG 24 hr tablet, TAKE 1 TABLET (10 MG TOTAL) BY MOUTH 2 (TWO) TIMES DAILY AS NEEDED., Disp: 180 tablet, Rfl: 0    pantoprazole (PROTONIX) 40 MG tablet, TAKE 1 TABLET  BY MOUTH BEFORE BREAKFAST., Disp: 90 tablet, Rfl: 3    promethazine (PHENERGAN) 25 MG tablet, Take 1 tablet (25 mg total) by mouth every 6 (six) hours as needed for Nausea., Disp: 10 tablet, Rfl: 0    timolol maleate 0.5% (TIMOPTIC) 0.5 % Drop, Place 1 drop into both eyes 2 (two) times daily., Disp: 10 mL, Rfl: 6    valsartan (DIOVAN) 160 MG tablet, Take 2 tablets (320 mg total) by mouth once daily., Disp: 180 tablet, Rfl: 3    vitamin D (VITAMIN D3) 1000 units Tab, Take 1,000 Units by mouth once daily., Disp: , Rfl:     Social History:   Social History     Socioeconomic History    Marital status: Single     Spouse name: Not on file    Number of children: Not on file    Years of education: Not on file    Highest education level: Not on file   Occupational History    Not on file   Social Needs    Financial resource strain: Not very hard    Food insecurity     Worry: Never true     Inability: Never true    Transportation needs     Medical: No     Non-medical: No   Tobacco Use    Smoking status: Never Smoker    Smokeless tobacco: Never Used   Substance and Sexual Activity    Alcohol use: No     Frequency: Never    Drug use: No    Sexual activity: Not Currently     Partners: Male   Lifestyle    Physical activity     Days per week: 2 days     Minutes per session: 20 min    Stress: To some extent   Relationships    Social connections     Talks on phone: Twice a week     Gets together: Once a week     Attends Judaism service: Not on file     Active member of club or organization: Yes     Attends meetings of clubs or organizations: More than 4 times per year     Relationship status:    Other Topics Concern    Are you pregnant or think you may be? No    Breast-feeding No   Social History Narrative    Not on file       REVIEW OF SYSTEMS:  Constitution: Negative. Negative for chills, fever and night sweats.   Cardiovascular: Negative for chest pain and syncope.   Respiratory: Negative for cough  "and shortness of breath.   Gastrointestinal: See HPI. Negative for nausea/vomiting. Negative for abdominal pain.  Genitourinary: See HPI. Negative for discoloration or dysuria.  Hematologic/Lymphatic: neg for bleeding/clotting disorders.   Musculoskeletal: Negative for falls and muscle weakness.   Neurological: See HPI. no history of seizures. no history of cranial surgery or shunts.  Neurological: See HPI. No seizures.   Endocrine: Negative for polydipsia, polyphagia and polyuria.   Allergic/Immunologic: Negative for hives and persistent infections.     EXAM:  BP (!) 140/79 (BP Location: Left arm, Patient Position: Sitting)   Pulse 62   Ht 5' 6" (1.676 m)   Wt 71.7 kg (158 lb 1.1 oz)   BMI 25.51 kg/m²     PHYSICAL EXAMINATION:    General: The patient is a 70 y.o. female in no apparent distress, the patient is orientatied to person, place and time.  Psych: Normal mood and affect  HEENT: Vision grossly intact, hearing intact to the spoken word.  Lungs: Respirations unlabored.  Gait: Normal station and gait, no difficulty with toe or heel walk.   Skin: Dorsal lumbar skin negative for rashes, lesions, hairy patches and surgical scars. There is mild lumbar tenderness to palpation.  Range of motion: Lumbar range of motion is acceptable.  Spinal Balance: Global saggital and coronal spinal balance acceptable, no significant for scoliosis and kyphosis.  Musculoskeletal: No pain with the range of motion of the bilateral hips. No trochanteric tenderness to palpation.  Vascular: Bilateral lower extremities warm and well perfused, Dorsalis pedis pulses 2+ bilaterally.  Neurological: Normal strength and tone in all major motor groups in the bilateral lower extremities. Normal sensation to light touch in the L2-S1 dermatomes bilaterally.  Deep tendon reflexes symmetric in the bilateral lower extremities.  Negative Babinski bilaterally. Straight leg raise negative bilaterally.    IMAGING:      Today I personally reviewed AP, " Lat and Flex/Ex  upright L-spine that demonstrate evidence of L5/S1 interbody and fusion      Body mass index is 25.51 kg/m².  Hemoglobin A1C   Date Value Ref Range Status   07/18/2006 5.0 4.5 - 6.2 % Final   01/26/2006 5.2 4.5 - 6.2 % Final       ASSESSMENT/PLAN:    Idalmis was seen today for pain.    Diagnoses and all orders for this visit:    Low back pain, unspecified back pain laterality, unspecified chronicity, unspecified whether sciatica present  -     Ambulatory referral/consult to Orthopedics    Fusion of spine, lumbosacral region  -     MRI Lumbar Spine W WO Contrast; Future  -     Creatinine, serum; Future      No follow-ups on file.    Idalmis Morejon is a 70 y.o. female with previous L5/S1 interbody fusion. Will obtain L spine MRI and have patient follow up to review results.

## 2020-10-22 ENCOUNTER — PATIENT MESSAGE (OUTPATIENT)
Dept: INTERNAL MEDICINE | Facility: CLINIC | Age: 70
End: 2020-10-22

## 2020-10-22 ENCOUNTER — PATIENT MESSAGE (OUTPATIENT)
Dept: NEPHROLOGY | Facility: CLINIC | Age: 70
End: 2020-10-22

## 2020-10-29 ENCOUNTER — PATIENT MESSAGE (OUTPATIENT)
Dept: ORTHOPEDICS | Facility: CLINIC | Age: 70
End: 2020-10-29

## 2020-11-19 ENCOUNTER — PATIENT MESSAGE (OUTPATIENT)
Dept: PSYCHIATRY | Facility: CLINIC | Age: 70
End: 2020-11-19

## 2020-11-19 ENCOUNTER — OFFICE VISIT (OUTPATIENT)
Dept: PSYCHIATRY | Facility: CLINIC | Age: 70
End: 2020-11-19
Payer: MEDICARE

## 2020-11-19 DIAGNOSIS — F33.0 MDD (MAJOR DEPRESSIVE DISORDER), RECURRENT EPISODE, MILD: Primary | ICD-10-CM

## 2020-11-19 PROCEDURE — 99214 OFFICE O/P EST MOD 30 MIN: CPT | Mod: HCNC,95,, | Performed by: PSYCHIATRY & NEUROLOGY

## 2020-11-19 PROCEDURE — 1159F PR MEDICATION LIST DOCUMENTED IN MEDICAL RECORD: ICD-10-PCS | Mod: HCNC,95,, | Performed by: PSYCHIATRY & NEUROLOGY

## 2020-11-19 PROCEDURE — 1159F MED LIST DOCD IN RCRD: CPT | Mod: HCNC,95,, | Performed by: PSYCHIATRY & NEUROLOGY

## 2020-11-19 PROCEDURE — 99214 PR OFFICE/OUTPT VISIT, EST, LEVL IV, 30-39 MIN: ICD-10-PCS | Mod: HCNC,95,, | Performed by: PSYCHIATRY & NEUROLOGY

## 2020-11-19 RX ORDER — QUETIAPINE FUMARATE 50 MG/1
TABLET, FILM COATED ORAL
Qty: 60 TABLET | Refills: 1 | Status: SHIPPED | OUTPATIENT
Start: 2020-11-19 | End: 2021-01-20

## 2020-11-19 NOTE — PROGRESS NOTES
The patient location is: her home  The chief complaint leading to consultation is: depression and anxiety  Visit type: Virtual visit with synchronous audio and video  Total time spent with patient: 26 mins  Each patient to whom he or she provides medical services by telemedicine is:  (1) informed of the relationship between the physician and patient and the respective role of any other health care provider with respect to management of the patient; and (2) notified that he or she may decline to receive medical services by telemedicine and may withdraw from such care at any time.    Notes:     Outpatient Psychiatry Follow-Up Visit (MD/NP)    11/19/2020    Clinical Status of Patient:  Outpatient (Ambulatory)    Chief Complaint:  Idalmis Morejon is a 70 y.o. female who presents today for follow-up of depression and anxiety.      Met with patient.      Interval History and Content of Current Session:  Interim Events/Subjective Report/Content of Current Session:   Pt reports she doing fairly well.  But also reports some up and down with her mood.  She believes it is because it is this time of year.  She does not like the holidays.  Also her mother's birthday was recent..  Much going on with several deaths in the family    60% of the time she is down since around her moms birthday in Late October.  Also COVID worries.  Back problems returned after none since 2007.  Worried about kidneys with an MRI that was ordered.  Denies wanting to die.  Crying a little.   Sleep is not very good and laying in bed is when she may have some teary moments.   Appetite my have decreased particularly in the morning.  Not losing weight.  Enjoying puzzles.   Back no longer allowing her to walk at [employer].  Enjoys court TV programs.  Finds her mind drifting to her health conditions, ping her kidneys. Energy is okay.  Memory is not as good, names of people she should know.      Denies feeling anxious or afraid.  Denies panic attacks.      Prior  trials:  remeron - HA        Psychotherapy:  · Target symptoms: depression, anxiety   · Why chosen therapy is appropriate versus another modality: relevant to diagnosis  · Outcome monitoring methods: self-report, observation  · Therapeutic intervention type: supportive psychotherapy  · Topics discussed/themes: adjusting to coronavirus effects on daily living, illness/death of a loved one, building skills sets for symptom management, symptom recognition  · The patient's response to the intervention is motivated. The patient's progress toward treatment goals is good.   · Duration of intervention: 10 mins    Review of Systems   Constitutional: Negative for fever and weight loss.   Respiratory: Negative for cough, shortness of breath and wheezing.    Cardiovascular: Negative for chest pain and palpitations.   Gastrointestinal: Negative for abdominal pain, diarrhea and vomiting.   Musculoskeletal: Negative for falls.         Past Medical, Family and Social History: The patient's past medical, family and social history have been reviewed and updated as appropriate within the electronic medical record - see encounter notes.    Compliance: yes    Side effects: none    Risk Parameters:  Patient reports no suicidal ideation  Patient reports no homicidal ideation  Patient reports no self-injurious behavior  Patient reports no violent behavior    Exam (detailed: at least 9 elements; comprehensive: all 15 elements)   Constitutional  Vitals:  Most recent vital signs, dated less than 90 days prior to this appointment, were reviewed.    There were no vitals filed for this visit.     General:  age appropriate, casually dressed, neatly groomed     Musculoskeletal  Muscle Strength/Tone:  no dyskinesia, no tremor   Gait & Station:  Not observed      Psychiatric  Speech:  normal tone, normal rate, normal pitch, normal volume, prosody intact   Mood:    Affect:  Mildly depressed  appropriate, full   Thought Process:  goal-directed,  logical   Associations:  intact   Thought Content:  normal, no suicidality, no homicidality, delusions, or paranoia   Insight  Judgement:  good, patient has awareness of illness  Patient's behavior is adequate to circumstances   Orientation:  grossly intact   Memory: intact for content of interview   Language: grossly intact   Attention Span & Concentration:  able to focus   Fund of Knowledge:  intact and appropriate to age and level of education     Assessment and Diagnosis   Status/Progress: Based on the examination today, the patient's problem(s) is/are improved.  New problems have not been presented today.   Comorbidities are complicating management of the primary condition.  There are no active rule-out diagnoses for this patient at this time.    General Impression: Pt with depression and anxiety as well as multiple other medical comorbidities.  She is still affected by the death of her mother in March 2014.      Diagnosis::   MDD single in  rem  specific phobia  R/O social phobia.      Intervention/Counseling/Treatment Plan   · Restart Seroquel 50 mg to 100 mg nightly for augmentation.  We discussed potential side effects and the fact that this would likely be a brief trial  · Continue cymbalta 60 mg daily  · Possible referral to CBT-i      Return to Clinic:  1 month

## 2020-11-22 DIAGNOSIS — H40.1194 PRIMARY OPEN-ANGLE GLAUCOMA, INDETERMINATE STAGE, UNSPECIFIED LATERALITY: ICD-10-CM

## 2020-11-23 RX ORDER — TIMOLOL MALEATE 5 MG/ML
1 SOLUTION/ DROPS OPHTHALMIC 2 TIMES DAILY
Qty: 10 ML | Refills: 6 | Status: SHIPPED | OUTPATIENT
Start: 2020-11-23 | End: 2021-11-01

## 2020-12-01 ENCOUNTER — PATIENT OUTREACH (OUTPATIENT)
Dept: ADMINISTRATIVE | Facility: OTHER | Age: 70
End: 2020-12-01

## 2020-12-01 NOTE — PROGRESS NOTES
LINKS immunization registry not responding  Care Everywhere updated  Health Maintenance updated  Chart reviewed for overdue Proactive [facility] Encounters ([facility]) health maintenance testing (CRS, Breast Ca, Diabetic Eye Exam)   Orders entered:N/A

## 2020-12-02 ENCOUNTER — HOSPITAL ENCOUNTER (OUTPATIENT)
Dept: RADIOLOGY | Facility: HOSPITAL | Age: 70
Discharge: HOME | End: 2020-12-02
Attending: NURSE PRACTITIONER
Payer: MEDICARE

## 2020-12-02 DIAGNOSIS — Z12.31 SCREENING MAMMOGRAM, ENCOUNTER FOR: ICD-10-CM

## 2020-12-02 PROCEDURE — 77067 SCR MAMMO BI INCL CAD: CPT | Mod: TC,HCNC

## 2020-12-02 PROCEDURE — 77063 BREAST TOMOSYNTHESIS BI: CPT | Mod: 26,HCNC,, | Performed by: RADIOLOGY

## 2020-12-02 PROCEDURE — 77067 MAMMO DIGITAL SCREENING BILAT WITH TOMO: ICD-10-PCS | Mod: 26,HCNC,, | Performed by: RADIOLOGY

## 2020-12-02 PROCEDURE — 77067 SCR MAMMO BI INCL CAD: CPT | Mod: 26,HCNC,, | Performed by: RADIOLOGY

## 2020-12-02 PROCEDURE — 77063 MAMMO DIGITAL SCREENING BILAT WITH TOMO: ICD-10-PCS | Mod: 26,HCNC,, | Performed by: RADIOLOGY

## 2020-12-02 NOTE — PROGRESS NOTES
Assessment /Plan     For exam results, see Encounter Report.    Bilateral ocular hypertension  -     Posterior Segment OCT Optic Nerve- Both eyes  -     Posterior Segment OCT Optic Nerve- Both eyes; Future  -     Rojas Visual Field - OU - Extended - Both Eyes; Future    Bilateral scleritis    Autoimmune disorder- recurrent iritis    Bilateral dry eyes - Both Eyes    Nuclear sclerosis of both eyes    Open angle with borderline findings and low glaucoma risk in both eyes  -     Posterior Segment OCT Optic Nerve- Both eyes  -     Posterior Segment OCT Optic Nerve- Both eyes; Future  -     Rojas Visual Field - OU - Extended - Both Eyes; Future        Lost to fu 08/14/2019 --> 12/07/2020    Steroid glaucoma  Steroid use and elevated IOP response    Patient with Hx scleritis  Long term PF 1% use without steroid response in past  Switched from PF 1% --> Durezol q day in April 2/2 cost and lack of coverage  Presents 10/5/2016 45 OU, clear corneas and quiet --> patient felt blur in OS x 3 weeks / no pain    Possible OCT progression OD 2017 --> 2020 as discussed      CCT  489 // 495    Low teens      Both eyes  Timolol BID --> consider Cosopt BID  Combigan BID --> Alphagan Allergy --> resolved // Holding    HESHAM OS  Inf limbus  Flat without vessels        Recurrent Scleritis OU  + GEORGIANA Lupus  Azathioprine -->  Not using    Off Oral Motrin 400 mg TWICE DAILY -> proteinuria     Dry Eye Syndrome: discussed use of warm compresses, preserved & non-preserved artificial tears, gel and PM ointment options.  Also discussed options utilizing medications.      Plan  RTC 6 months IOP and OCT RNFL & HVF  RTC sooner prn with good understanding

## 2020-12-07 ENCOUNTER — OFFICE VISIT (OUTPATIENT)
Dept: OPHTHALMOLOGY | Facility: CLINIC | Age: 70
End: 2020-12-07
Payer: MEDICARE

## 2020-12-07 DIAGNOSIS — D89.89 AUTOIMMUNE DISORDER: ICD-10-CM

## 2020-12-07 DIAGNOSIS — H15.003 BILATERAL SCLERITIS: ICD-10-CM

## 2020-12-07 DIAGNOSIS — H04.123 BILATERAL DRY EYES: ICD-10-CM

## 2020-12-07 DIAGNOSIS — H40.013 OPEN ANGLE WITH BORDERLINE FINDINGS AND LOW GLAUCOMA RISK IN BOTH EYES: ICD-10-CM

## 2020-12-07 DIAGNOSIS — H40.053 BILATERAL OCULAR HYPERTENSION: Primary | ICD-10-CM

## 2020-12-07 DIAGNOSIS — H25.13 NUCLEAR SCLEROSIS OF BOTH EYES: ICD-10-CM

## 2020-12-07 PROCEDURE — 92133 POSTERIOR SEGMENT OCT OPTIC NERVE(OCULAR COHERENCE TOMOGRAPHY) - OU - BOTH EYES: ICD-10-PCS | Mod: HCNC,S$GLB,, | Performed by: OPHTHALMOLOGY

## 2020-12-07 PROCEDURE — 92014 PR EYE EXAM, EST PATIENT,COMPREHESV: ICD-10-PCS | Mod: HCNC,S$GLB,, | Performed by: OPHTHALMOLOGY

## 2020-12-07 PROCEDURE — 1126F AMNT PAIN NOTED NONE PRSNT: CPT | Mod: HCNC,S$GLB,, | Performed by: OPHTHALMOLOGY

## 2020-12-07 PROCEDURE — 99999 PR PBB SHADOW E&M-EST. PATIENT-LVL III: CPT | Mod: PBBFAC,HCNC,, | Performed by: OPHTHALMOLOGY

## 2020-12-07 PROCEDURE — 1126F PR PAIN SEVERITY QUANTIFIED, NO PAIN PRESENT: ICD-10-PCS | Mod: HCNC,S$GLB,, | Performed by: OPHTHALMOLOGY

## 2020-12-07 PROCEDURE — 92014 COMPRE OPH EXAM EST PT 1/>: CPT | Mod: HCNC,S$GLB,, | Performed by: OPHTHALMOLOGY

## 2020-12-07 PROCEDURE — 99999 PR PBB SHADOW E&M-EST. PATIENT-LVL III: ICD-10-PCS | Mod: PBBFAC,HCNC,, | Performed by: OPHTHALMOLOGY

## 2020-12-07 PROCEDURE — 92133 CPTRZD OPH DX IMG PST SGM ON: CPT | Mod: HCNC,S$GLB,, | Performed by: OPHTHALMOLOGY

## 2020-12-11 ENCOUNTER — PATIENT MESSAGE (OUTPATIENT)
Dept: OTHER | Facility: OTHER | Age: 70
End: 2020-12-11

## 2021-01-20 ENCOUNTER — OFFICE VISIT (OUTPATIENT)
Dept: PSYCHIATRY | Facility: CLINIC | Age: 71
End: 2021-01-20
Payer: MEDICARE

## 2021-01-20 DIAGNOSIS — F33.41 RECURRENT MAJOR DEPRESSIVE DISORDER, IN PARTIAL REMISSION: Primary | ICD-10-CM

## 2021-01-20 PROCEDURE — 1159F MED LIST DOCD IN RCRD: CPT | Mod: HCNC,95,, | Performed by: PSYCHIATRY & NEUROLOGY

## 2021-01-20 PROCEDURE — 99213 OFFICE O/P EST LOW 20 MIN: CPT | Mod: HCNC,95,, | Performed by: PSYCHIATRY & NEUROLOGY

## 2021-01-20 PROCEDURE — 1159F PR MEDICATION LIST DOCUMENTED IN MEDICAL RECORD: ICD-10-PCS | Mod: HCNC,95,, | Performed by: PSYCHIATRY & NEUROLOGY

## 2021-01-20 PROCEDURE — 99213 PR OFFICE/OUTPT VISIT, EST, LEVL III, 20-29 MIN: ICD-10-PCS | Mod: HCNC,95,, | Performed by: PSYCHIATRY & NEUROLOGY

## 2021-01-20 RX ORDER — DULOXETIN HYDROCHLORIDE 60 MG/1
60 CAPSULE, DELAYED RELEASE ORAL DAILY
Qty: 90 CAPSULE | Refills: 0 | Status: SHIPPED | OUTPATIENT
Start: 2021-01-20 | End: 2021-04-07 | Stop reason: SDUPTHER

## 2021-01-24 ENCOUNTER — PATIENT MESSAGE (OUTPATIENT)
Dept: INTERNAL MEDICINE | Facility: CLINIC | Age: 71
End: 2021-01-24

## 2021-01-24 DIAGNOSIS — K62.5 RECTAL BLEED: Primary | ICD-10-CM

## 2021-01-25 ENCOUNTER — LAB VISIT (OUTPATIENT)
Dept: LAB | Facility: HOSPITAL | Age: 71
End: 2021-01-25
Attending: INTERNAL MEDICINE
Payer: MEDICARE

## 2021-01-25 ENCOUNTER — PATIENT MESSAGE (OUTPATIENT)
Dept: INTERNAL MEDICINE | Facility: CLINIC | Age: 71
End: 2021-01-25

## 2021-01-25 DIAGNOSIS — K62.5 RECTAL BLEED: ICD-10-CM

## 2021-01-25 LAB
ALBUMIN SERPL BCP-MCNC: 3.7 G/DL (ref 3.5–5.2)
ALP SERPL-CCNC: 100 U/L (ref 55–135)
ALT SERPL W/O P-5'-P-CCNC: 12 U/L (ref 10–44)
ANION GAP SERPL CALC-SCNC: 7 MMOL/L (ref 8–16)
AST SERPL-CCNC: 23 U/L (ref 10–40)
BASOPHILS # BLD AUTO: 0.03 K/UL (ref 0–0.2)
BASOPHILS NFR BLD: 0.4 % (ref 0–1.9)
BILIRUB SERPL-MCNC: 0.4 MG/DL (ref 0.1–1)
BUN SERPL-MCNC: 19 MG/DL (ref 8–23)
CALCIUM SERPL-MCNC: 9.5 MG/DL (ref 8.7–10.5)
CHLORIDE SERPL-SCNC: 105 MMOL/L (ref 95–110)
CO2 SERPL-SCNC: 29 MMOL/L (ref 23–29)
CREAT SERPL-MCNC: 1 MG/DL (ref 0.5–1.4)
DIFFERENTIAL METHOD: ABNORMAL
EOSINOPHIL # BLD AUTO: 0.1 K/UL (ref 0–0.5)
EOSINOPHIL NFR BLD: 1.7 % (ref 0–8)
ERYTHROCYTE [DISTWIDTH] IN BLOOD BY AUTOMATED COUNT: 15.1 % (ref 11.5–14.5)
EST. GFR  (AFRICAN AMERICAN): >60 ML/MIN/1.73 M^2
EST. GFR  (NON AFRICAN AMERICAN): 56.4 ML/MIN/1.73 M^2
GLUCOSE SERPL-MCNC: 76 MG/DL (ref 70–110)
HCT VFR BLD AUTO: 33.8 % (ref 37–48.5)
HGB BLD-MCNC: 10.6 G/DL (ref 12–16)
IMM GRANULOCYTES # BLD AUTO: 0.03 K/UL (ref 0–0.04)
IMM GRANULOCYTES NFR BLD AUTO: 0.4 % (ref 0–0.5)
LYMPHOCYTES # BLD AUTO: 2.3 K/UL (ref 1–4.8)
LYMPHOCYTES NFR BLD: 32.9 % (ref 18–48)
MCH RBC QN AUTO: 26.6 PG (ref 27–31)
MCHC RBC AUTO-ENTMCNC: 31.4 G/DL (ref 32–36)
MCV RBC AUTO: 85 FL (ref 82–98)
MONOCYTES # BLD AUTO: 0.7 K/UL (ref 0.3–1)
MONOCYTES NFR BLD: 9.9 % (ref 4–15)
NEUTROPHILS # BLD AUTO: 3.9 K/UL (ref 1.8–7.7)
NEUTROPHILS NFR BLD: 54.7 % (ref 38–73)
NRBC BLD-RTO: 0 /100 WBC
PLATELET # BLD AUTO: 276 K/UL (ref 150–350)
PMV BLD AUTO: 12.3 FL (ref 9.2–12.9)
POTASSIUM SERPL-SCNC: 3.8 MMOL/L (ref 3.5–5.1)
PROT SERPL-MCNC: 7.3 G/DL (ref 6–8.4)
RBC # BLD AUTO: 3.99 M/UL (ref 4–5.4)
SODIUM SERPL-SCNC: 141 MMOL/L (ref 136–145)
WBC # BLD AUTO: 7.06 K/UL (ref 3.9–12.7)

## 2021-01-25 PROCEDURE — 85025 COMPLETE CBC W/AUTO DIFF WBC: CPT | Mod: HCNC

## 2021-01-25 PROCEDURE — 80053 COMPREHEN METABOLIC PANEL: CPT | Mod: HCNC

## 2021-01-25 PROCEDURE — 36415 COLL VENOUS BLD VENIPUNCTURE: CPT | Mod: HCNC

## 2021-01-26 ENCOUNTER — TELEPHONE (OUTPATIENT)
Dept: INTERNAL MEDICINE | Facility: CLINIC | Age: 71
End: 2021-01-26

## 2021-01-26 ENCOUNTER — PATIENT MESSAGE (OUTPATIENT)
Dept: INTERNAL MEDICINE | Facility: CLINIC | Age: 71
End: 2021-01-26

## 2021-01-26 DIAGNOSIS — D64.9 ANEMIA, UNSPECIFIED TYPE: Primary | ICD-10-CM

## 2021-02-07 ENCOUNTER — PATIENT MESSAGE (OUTPATIENT)
Dept: ORTHOPEDICS | Facility: CLINIC | Age: 71
End: 2021-02-07

## 2021-02-07 DIAGNOSIS — M43.27 FUSION OF SPINE, LUMBOSACRAL REGION: ICD-10-CM

## 2021-02-07 DIAGNOSIS — M54.50 LOW BACK PAIN, UNSPECIFIED BACK PAIN LATERALITY, UNSPECIFIED CHRONICITY, UNSPECIFIED WHETHER SCIATICA PRESENT: Primary | ICD-10-CM

## 2021-02-08 ENCOUNTER — PATIENT MESSAGE (OUTPATIENT)
Dept: ORTHOPEDICS | Facility: CLINIC | Age: 71
End: 2021-02-08

## 2021-02-09 ENCOUNTER — HOSPITAL ENCOUNTER (OUTPATIENT)
Dept: RADIOLOGY | Facility: HOSPITAL | Age: 71
Discharge: HOME | End: 2021-02-09
Attending: PHYSICIAN ASSISTANT
Payer: MEDICARE

## 2021-02-09 DIAGNOSIS — M43.27 FUSION OF SPINE, LUMBOSACRAL REGION: ICD-10-CM

## 2021-02-09 DIAGNOSIS — M54.50 LOW BACK PAIN, UNSPECIFIED BACK PAIN LATERALITY, UNSPECIFIED CHRONICITY, UNSPECIFIED WHETHER SCIATICA PRESENT: ICD-10-CM

## 2021-02-09 PROCEDURE — 25500020 PHARM REV CODE 255: Mod: HCNC | Performed by: PHYSICIAN ASSISTANT

## 2021-02-09 PROCEDURE — 72158 MRI LUMBAR SPINE W WO CONTRAST: ICD-10-PCS | Mod: 26,HCNC,, | Performed by: RADIOLOGY

## 2021-02-09 PROCEDURE — 72158 MRI LUMBAR SPINE W/O & W/DYE: CPT | Mod: TC,HCNC

## 2021-02-09 PROCEDURE — A9585 GADOBUTROL INJECTION: HCPCS | Mod: HCNC | Performed by: PHYSICIAN ASSISTANT

## 2021-02-09 PROCEDURE — 72158 MRI LUMBAR SPINE W/O & W/DYE: CPT | Mod: 26,HCNC,, | Performed by: RADIOLOGY

## 2021-02-09 RX ORDER — GADOBUTROL 604.72 MG/ML
8 INJECTION INTRAVENOUS
Status: COMPLETED | OUTPATIENT
Start: 2021-02-09 | End: 2021-02-09

## 2021-02-09 RX ADMIN — GADOBUTROL 8 ML: 604.72 INJECTION INTRAVENOUS at 07:02

## 2021-02-10 ENCOUNTER — OFFICE VISIT (OUTPATIENT)
Dept: ORTHOPEDICS | Facility: CLINIC | Age: 71
End: 2021-02-10
Payer: MEDICARE

## 2021-02-10 ENCOUNTER — PATIENT MESSAGE (OUTPATIENT)
Dept: ORTHOPEDICS | Facility: CLINIC | Age: 71
End: 2021-02-10

## 2021-02-10 DIAGNOSIS — S32.010A COMPRESSION FRACTURE OF L1 VERTEBRA, INITIAL ENCOUNTER: Primary | ICD-10-CM

## 2021-02-10 PROCEDURE — 1159F PR MEDICATION LIST DOCUMENTED IN MEDICAL RECORD: ICD-10-PCS | Mod: S$GLB,,, | Performed by: ORTHOPAEDIC SURGERY

## 2021-02-10 PROCEDURE — 3288F PR FALLS RISK ASSESSMENT DOCUMENTED: ICD-10-PCS | Mod: CPTII,S$GLB,, | Performed by: ORTHOPAEDIC SURGERY

## 2021-02-10 PROCEDURE — 99999 PR PBB SHADOW E&M-EST. PATIENT-LVL II: ICD-10-PCS | Mod: PBBFAC,,, | Performed by: ORTHOPAEDIC SURGERY

## 2021-02-10 PROCEDURE — 99214 PR OFFICE/OUTPT VISIT, EST, LEVL IV, 30-39 MIN: ICD-10-PCS | Mod: S$GLB,,, | Performed by: ORTHOPAEDIC SURGERY

## 2021-02-10 PROCEDURE — 99214 OFFICE O/P EST MOD 30 MIN: CPT | Mod: S$GLB,,, | Performed by: ORTHOPAEDIC SURGERY

## 2021-02-10 PROCEDURE — 1125F AMNT PAIN NOTED PAIN PRSNT: CPT | Mod: S$GLB,,, | Performed by: ORTHOPAEDIC SURGERY

## 2021-02-10 PROCEDURE — 1159F MED LIST DOCD IN RCRD: CPT | Mod: S$GLB,,, | Performed by: ORTHOPAEDIC SURGERY

## 2021-02-10 PROCEDURE — 1101F PT FALLS ASSESS-DOCD LE1/YR: CPT | Mod: CPTII,S$GLB,, | Performed by: ORTHOPAEDIC SURGERY

## 2021-02-10 PROCEDURE — 1101F PR PT FALLS ASSESS DOC 0-1 FALLS W/OUT INJ PAST YR: ICD-10-PCS | Mod: CPTII,S$GLB,, | Performed by: ORTHOPAEDIC SURGERY

## 2021-02-10 PROCEDURE — 3288F FALL RISK ASSESSMENT DOCD: CPT | Mod: CPTII,S$GLB,, | Performed by: ORTHOPAEDIC SURGERY

## 2021-02-10 PROCEDURE — 99999 PR PBB SHADOW E&M-EST. PATIENT-LVL II: CPT | Mod: PBBFAC,,, | Performed by: ORTHOPAEDIC SURGERY

## 2021-02-10 PROCEDURE — 1125F PR PAIN SEVERITY QUANTIFIED, PAIN PRESENT: ICD-10-PCS | Mod: S$GLB,,, | Performed by: ORTHOPAEDIC SURGERY

## 2021-02-10 RX ORDER — CALCITONIN SALMON 200 [IU]/.09ML
1 SPRAY, METERED NASAL DAILY
Qty: 1 BOTTLE | Refills: 0 | Status: SHIPPED | OUTPATIENT
Start: 2021-02-10 | End: 2021-05-26 | Stop reason: ALTCHOICE

## 2021-02-11 ENCOUNTER — PATIENT MESSAGE (OUTPATIENT)
Dept: ORTHOPEDICS | Facility: CLINIC | Age: 71
End: 2021-02-11

## 2021-02-13 ENCOUNTER — PATIENT MESSAGE (OUTPATIENT)
Dept: ORTHOPEDICS | Facility: CLINIC | Age: 71
End: 2021-02-13

## 2021-02-15 RX ORDER — TRAMADOL HYDROCHLORIDE 50 MG/1
50 TABLET ORAL EVERY 6 HOURS PRN
Qty: 28 TABLET | Refills: 0 | Status: SHIPPED | OUTPATIENT
Start: 2021-02-15 | End: 2021-05-26

## 2021-02-23 ENCOUNTER — PATIENT MESSAGE (OUTPATIENT)
Dept: RHEUMATOLOGY | Facility: CLINIC | Age: 71
End: 2021-02-23

## 2021-02-25 ENCOUNTER — PATIENT OUTREACH (OUTPATIENT)
Dept: ADMINISTRATIVE | Facility: OTHER | Age: 71
End: 2021-02-25

## 2021-02-26 ENCOUNTER — LAB VISIT (OUTPATIENT)
Dept: LAB | Facility: HOSPITAL | Age: 71
End: 2021-02-26
Payer: MEDICARE

## 2021-02-26 ENCOUNTER — PATIENT MESSAGE (OUTPATIENT)
Dept: INTERNAL MEDICINE | Facility: CLINIC | Age: 71
End: 2021-02-26

## 2021-02-26 ENCOUNTER — OFFICE VISIT (OUTPATIENT)
Dept: ORTHOPEDICS | Facility: CLINIC | Age: 71
End: 2021-02-26
Payer: MEDICARE

## 2021-02-26 VITALS
SYSTOLIC BLOOD PRESSURE: 162 MMHG | DIASTOLIC BLOOD PRESSURE: 84 MMHG | HEART RATE: 80 BPM | BODY MASS INDEX: 26.33 KG/M2 | WEIGHT: 163.13 LBS

## 2021-02-26 DIAGNOSIS — M80.08XS PATHOLOGICAL FRACTURE OF VERTEBRA DUE TO AGE-RELATED OSTEOPOROSIS, SEQUELA: Primary | ICD-10-CM

## 2021-02-26 DIAGNOSIS — M81.6 LOCALIZED OSTEOPOROSIS OF LEQUESNE: ICD-10-CM

## 2021-02-26 DIAGNOSIS — M81.0 OSTEOPOROSIS, UNSPECIFIED OSTEOPOROSIS TYPE, UNSPECIFIED PATHOLOGICAL FRACTURE PRESENCE: ICD-10-CM

## 2021-02-26 DIAGNOSIS — M80.80XA PATHOLOGICAL FRACTURE DUE TO OSTEOPOROSIS, UNSPECIFIED FRACTURE SITE, UNSPECIFIED OSTEOPOROSIS TYPE, INITIAL ENCOUNTER: ICD-10-CM

## 2021-02-26 DIAGNOSIS — M80.08XS PATHOLOGICAL FRACTURE OF VERTEBRA DUE TO AGE-RELATED OSTEOPOROSIS, SEQUELA: ICD-10-CM

## 2021-02-26 LAB
25(OH)D3+25(OH)D2 SERPL-MCNC: 42 NG/ML (ref 30–96)
PTH-INTACT SERPL-MCNC: 61 PG/ML (ref 9–77)

## 2021-02-26 PROCEDURE — 99999 PR PBB SHADOW E&M-EST. PATIENT-LVL III: ICD-10-PCS | Mod: PBBFAC,,, | Performed by: PHYSICIAN ASSISTANT

## 2021-02-26 PROCEDURE — 80053 COMPREHEN METABOLIC PANEL: CPT

## 2021-02-26 PROCEDURE — 1159F PR MEDICATION LIST DOCUMENTED IN MEDICAL RECORD: ICD-10-PCS | Mod: S$GLB,,, | Performed by: PHYSICIAN ASSISTANT

## 2021-02-26 PROCEDURE — 99213 OFFICE O/P EST LOW 20 MIN: CPT | Mod: S$GLB,,, | Performed by: PHYSICIAN ASSISTANT

## 2021-02-26 PROCEDURE — 99499 RISK ADDL DX/OHS AUDIT: ICD-10-PCS | Mod: S$GLB,,, | Performed by: PHYSICIAN ASSISTANT

## 2021-02-26 PROCEDURE — 82306 VITAMIN D 25 HYDROXY: CPT

## 2021-02-26 PROCEDURE — 3079F DIAST BP 80-89 MM HG: CPT | Mod: CPTII,S$GLB,, | Performed by: PHYSICIAN ASSISTANT

## 2021-02-26 PROCEDURE — 3008F BODY MASS INDEX DOCD: CPT | Mod: CPTII,S$GLB,, | Performed by: PHYSICIAN ASSISTANT

## 2021-02-26 PROCEDURE — 99999 PR PBB SHADOW E&M-EST. PATIENT-LVL III: CPT | Mod: PBBFAC,,, | Performed by: PHYSICIAN ASSISTANT

## 2021-02-26 PROCEDURE — 3077F PR MOST RECENT SYSTOLIC BLOOD PRESSURE >= 140 MM HG: ICD-10-PCS | Mod: CPTII,S$GLB,, | Performed by: PHYSICIAN ASSISTANT

## 2021-02-26 PROCEDURE — 99213 PR OFFICE/OUTPT VISIT, EST, LEVL III, 20-29 MIN: ICD-10-PCS | Mod: S$GLB,,, | Performed by: PHYSICIAN ASSISTANT

## 2021-02-26 PROCEDURE — 36415 COLL VENOUS BLD VENIPUNCTURE: CPT

## 2021-02-26 PROCEDURE — 99499 UNLISTED E&M SERVICE: CPT | Mod: S$GLB,,, | Performed by: PHYSICIAN ASSISTANT

## 2021-02-26 PROCEDURE — 83970 ASSAY OF PARATHORMONE: CPT

## 2021-02-26 PROCEDURE — 84439 ASSAY OF FREE THYROXINE: CPT

## 2021-02-26 PROCEDURE — 3077F SYST BP >= 140 MM HG: CPT | Mod: CPTII,S$GLB,, | Performed by: PHYSICIAN ASSISTANT

## 2021-02-26 PROCEDURE — 84443 ASSAY THYROID STIM HORMONE: CPT

## 2021-02-26 PROCEDURE — 3079F PR MOST RECENT DIASTOLIC BLOOD PRESSURE 80-89 MM HG: ICD-10-PCS | Mod: CPTII,S$GLB,, | Performed by: PHYSICIAN ASSISTANT

## 2021-02-26 PROCEDURE — 3008F PR BODY MASS INDEX (BMI) DOCUMENTED: ICD-10-PCS | Mod: CPTII,S$GLB,, | Performed by: PHYSICIAN ASSISTANT

## 2021-02-26 PROCEDURE — 1159F MED LIST DOCD IN RCRD: CPT | Mod: S$GLB,,, | Performed by: PHYSICIAN ASSISTANT

## 2021-02-27 LAB
ALBUMIN SERPL BCP-MCNC: 3.8 G/DL (ref 3.5–5.2)
ALP SERPL-CCNC: 130 U/L (ref 55–135)
ALT SERPL W/O P-5'-P-CCNC: 13 U/L (ref 10–44)
ANION GAP SERPL CALC-SCNC: 9 MMOL/L (ref 8–16)
AST SERPL-CCNC: 23 U/L (ref 10–40)
BILIRUB SERPL-MCNC: 0.6 MG/DL (ref 0.1–1)
BUN SERPL-MCNC: 22 MG/DL (ref 8–23)
CALCIUM SERPL-MCNC: 9.8 MG/DL (ref 8.7–10.5)
CHLORIDE SERPL-SCNC: 108 MMOL/L (ref 95–110)
CO2 SERPL-SCNC: 25 MMOL/L (ref 23–29)
CREAT SERPL-MCNC: 1.2 MG/DL (ref 0.5–1.4)
EST. GFR  (AFRICAN AMERICAN): 52.2 ML/MIN/1.73 M^2
EST. GFR  (NON AFRICAN AMERICAN): 45.3 ML/MIN/1.73 M^2
GLUCOSE SERPL-MCNC: 60 MG/DL (ref 70–110)
POTASSIUM SERPL-SCNC: 4.6 MMOL/L (ref 3.5–5.1)
PROT SERPL-MCNC: 7.6 G/DL (ref 6–8.4)
SODIUM SERPL-SCNC: 142 MMOL/L (ref 136–145)
T4 FREE SERPL-MCNC: 0.86 NG/DL (ref 0.71–1.51)
TSH SERPL DL<=0.005 MIU/L-ACNC: 1.21 UIU/ML (ref 0.4–4)

## 2021-03-09 ENCOUNTER — OFFICE VISIT (OUTPATIENT)
Dept: ORTHOPEDICS | Facility: CLINIC | Age: 71
End: 2021-03-09
Payer: MEDICARE

## 2021-03-09 VITALS — WEIGHT: 160.19 LBS | BODY MASS INDEX: 25.74 KG/M2 | HEIGHT: 66 IN

## 2021-03-09 DIAGNOSIS — M54.50 CHRONIC BILATERAL LOW BACK PAIN WITHOUT SCIATICA: ICD-10-CM

## 2021-03-09 DIAGNOSIS — M80.08XS PATHOLOGICAL FRACTURE OF VERTEBRA DUE TO AGE-RELATED OSTEOPOROSIS, SEQUELA: Primary | ICD-10-CM

## 2021-03-09 DIAGNOSIS — G89.29 CHRONIC BILATERAL LOW BACK PAIN WITHOUT SCIATICA: ICD-10-CM

## 2021-03-09 PROCEDURE — 1159F PR MEDICATION LIST DOCUMENTED IN MEDICAL RECORD: ICD-10-PCS | Mod: S$GLB,,, | Performed by: ORTHOPAEDIC SURGERY

## 2021-03-09 PROCEDURE — 3288F PR FALLS RISK ASSESSMENT DOCUMENTED: ICD-10-PCS | Mod: CPTII,S$GLB,, | Performed by: ORTHOPAEDIC SURGERY

## 2021-03-09 PROCEDURE — 3008F PR BODY MASS INDEX (BMI) DOCUMENTED: ICD-10-PCS | Mod: CPTII,S$GLB,, | Performed by: ORTHOPAEDIC SURGERY

## 2021-03-09 PROCEDURE — 1125F AMNT PAIN NOTED PAIN PRSNT: CPT | Mod: S$GLB,,, | Performed by: ORTHOPAEDIC SURGERY

## 2021-03-09 PROCEDURE — 1159F MED LIST DOCD IN RCRD: CPT | Mod: S$GLB,,, | Performed by: ORTHOPAEDIC SURGERY

## 2021-03-09 PROCEDURE — 1101F PR PT FALLS ASSESS DOC 0-1 FALLS W/OUT INJ PAST YR: ICD-10-PCS | Mod: CPTII,S$GLB,, | Performed by: ORTHOPAEDIC SURGERY

## 2021-03-09 PROCEDURE — 1125F PR PAIN SEVERITY QUANTIFIED, PAIN PRESENT: ICD-10-PCS | Mod: S$GLB,,, | Performed by: ORTHOPAEDIC SURGERY

## 2021-03-09 PROCEDURE — 99999 PR PBB SHADOW E&M-EST. PATIENT-LVL III: CPT | Mod: PBBFAC,,, | Performed by: ORTHOPAEDIC SURGERY

## 2021-03-09 PROCEDURE — 99214 OFFICE O/P EST MOD 30 MIN: CPT | Mod: S$GLB,,, | Performed by: ORTHOPAEDIC SURGERY

## 2021-03-09 PROCEDURE — 3288F FALL RISK ASSESSMENT DOCD: CPT | Mod: CPTII,S$GLB,, | Performed by: ORTHOPAEDIC SURGERY

## 2021-03-09 PROCEDURE — 3008F BODY MASS INDEX DOCD: CPT | Mod: CPTII,S$GLB,, | Performed by: ORTHOPAEDIC SURGERY

## 2021-03-09 PROCEDURE — 99214 PR OFFICE/OUTPT VISIT, EST, LEVL IV, 30-39 MIN: ICD-10-PCS | Mod: S$GLB,,, | Performed by: ORTHOPAEDIC SURGERY

## 2021-03-09 PROCEDURE — 99999 PR PBB SHADOW E&M-EST. PATIENT-LVL III: ICD-10-PCS | Mod: PBBFAC,,, | Performed by: ORTHOPAEDIC SURGERY

## 2021-03-09 PROCEDURE — 1101F PT FALLS ASSESS-DOCD LE1/YR: CPT | Mod: CPTII,S$GLB,, | Performed by: ORTHOPAEDIC SURGERY

## 2021-03-22 ENCOUNTER — PATIENT MESSAGE (OUTPATIENT)
Dept: RHEUMATOLOGY | Facility: CLINIC | Age: 71
End: 2021-03-22

## 2021-03-30 ENCOUNTER — OFFICE VISIT (OUTPATIENT)
Dept: RHEUMATOLOGY | Facility: CLINIC | Age: 71
End: 2021-03-30
Payer: MEDICARE

## 2021-03-30 DIAGNOSIS — M80.00XA AGE-RELATED OSTEOPOROSIS WITH CURRENT PATHOLOGICAL FRACTURE, INITIAL ENCOUNTER: Primary | ICD-10-CM

## 2021-03-30 DIAGNOSIS — M35.9 UNDIFFERENTIATED CONNECTIVE TISSUE DISEASE: ICD-10-CM

## 2021-03-30 DIAGNOSIS — Z55.9 EDUCATIONAL CIRCUMSTANCE: ICD-10-CM

## 2021-03-30 DIAGNOSIS — Z92.25 HISTORY OF IMMUNOSUPPRESSION: ICD-10-CM

## 2021-03-30 DIAGNOSIS — M54.50 LOW BACK PAIN, UNSPECIFIED BACK PAIN LATERALITY, UNSPECIFIED CHRONICITY, UNSPECIFIED WHETHER SCIATICA PRESENT: ICD-10-CM

## 2021-03-30 PROCEDURE — 99499 UNLISTED E&M SERVICE: CPT | Mod: 95,,, | Performed by: INTERNAL MEDICINE

## 2021-03-30 PROCEDURE — 99215 PR OFFICE/OUTPT VISIT, EST, LEVL V, 40-54 MIN: ICD-10-PCS | Mod: 95,,, | Performed by: INTERNAL MEDICINE

## 2021-03-30 PROCEDURE — 1159F PR MEDICATION LIST DOCUMENTED IN MEDICAL RECORD: ICD-10-PCS | Mod: 95,,, | Performed by: INTERNAL MEDICINE

## 2021-03-30 PROCEDURE — 99215 OFFICE O/P EST HI 40 MIN: CPT | Mod: 95,,, | Performed by: INTERNAL MEDICINE

## 2021-03-30 PROCEDURE — 1159F MED LIST DOCD IN RCRD: CPT | Mod: 95,,, | Performed by: INTERNAL MEDICINE

## 2021-03-30 PROCEDURE — 99499 RISK ADDL DX/OHS AUDIT: ICD-10-PCS | Mod: 95,,, | Performed by: INTERNAL MEDICINE

## 2021-03-30 RX ORDER — ALENDRONATE SODIUM 70 MG/1
70 TABLET ORAL
Qty: 4 TABLET | Refills: 11 | Status: SHIPPED | OUTPATIENT
Start: 2021-03-30 | End: 2021-05-26

## 2021-03-30 SDOH — SOCIAL DETERMINANTS OF HEALTH (SDOH): PROBLEMS RELATED TO EDUCATION AND LITERACY, UNSPECIFIED: Z55.9

## 2021-03-31 ENCOUNTER — LAB VISIT (OUTPATIENT)
Dept: LAB | Facility: HOSPITAL | Age: 71
End: 2021-03-31
Attending: INTERNAL MEDICINE
Payer: MEDICARE

## 2021-03-31 DIAGNOSIS — M35.9 UNDIFFERENTIATED CONNECTIVE TISSUE DISEASE: ICD-10-CM

## 2021-03-31 DIAGNOSIS — Z92.25 HISTORY OF IMMUNOSUPPRESSION: ICD-10-CM

## 2021-03-31 LAB — ERYTHROCYTE [SEDIMENTATION RATE] IN BLOOD BY WESTERGREN METHOD: 33 MM/HR (ref 0–36)

## 2021-03-31 PROCEDURE — 80053 COMPREHEN METABOLIC PANEL: CPT | Performed by: INTERNAL MEDICINE

## 2021-03-31 PROCEDURE — 36415 COLL VENOUS BLD VENIPUNCTURE: CPT | Performed by: INTERNAL MEDICINE

## 2021-03-31 PROCEDURE — 85025 COMPLETE CBC W/AUTO DIFF WBC: CPT | Performed by: INTERNAL MEDICINE

## 2021-03-31 PROCEDURE — 86140 C-REACTIVE PROTEIN: CPT | Performed by: INTERNAL MEDICINE

## 2021-03-31 PROCEDURE — 85652 RBC SED RATE AUTOMATED: CPT | Performed by: INTERNAL MEDICINE

## 2021-04-01 ENCOUNTER — PES CALL (OUTPATIENT)
Dept: ADMINISTRATIVE | Facility: CLINIC | Age: 71
End: 2021-04-01

## 2021-04-01 LAB
ALBUMIN SERPL BCP-MCNC: 3.9 G/DL (ref 3.5–5.2)
ALP SERPL-CCNC: 108 U/L (ref 55–135)
ALT SERPL W/O P-5'-P-CCNC: 7 U/L (ref 10–44)
ANION GAP SERPL CALC-SCNC: 8 MMOL/L (ref 8–16)
AST SERPL-CCNC: 22 U/L (ref 10–40)
BASOPHILS # BLD AUTO: 0.01 K/UL (ref 0–0.2)
BASOPHILS NFR BLD: 0.2 % (ref 0–1.9)
BILIRUB SERPL-MCNC: 0.7 MG/DL (ref 0.1–1)
BUN SERPL-MCNC: 16 MG/DL (ref 8–23)
CALCIUM SERPL-MCNC: 9.5 MG/DL (ref 8.7–10.5)
CHLORIDE SERPL-SCNC: 103 MMOL/L (ref 95–110)
CO2 SERPL-SCNC: 28 MMOL/L (ref 23–29)
CREAT SERPL-MCNC: 1.2 MG/DL (ref 0.5–1.4)
CRP SERPL-MCNC: 5.7 MG/L (ref 0–8.2)
DIFFERENTIAL METHOD: ABNORMAL
EOSINOPHIL # BLD AUTO: 0.1 K/UL (ref 0–0.5)
EOSINOPHIL NFR BLD: 1.6 % (ref 0–8)
ERYTHROCYTE [DISTWIDTH] IN BLOOD BY AUTOMATED COUNT: 15.3 % (ref 11.5–14.5)
EST. GFR  (AFRICAN AMERICAN): 52.2 ML/MIN/1.73 M^2
EST. GFR  (NON AFRICAN AMERICAN): 45.3 ML/MIN/1.73 M^2
GLUCOSE SERPL-MCNC: 94 MG/DL (ref 70–110)
HCT VFR BLD AUTO: 33.9 % (ref 37–48.5)
HGB BLD-MCNC: 10.7 G/DL (ref 12–16)
IMM GRANULOCYTES # BLD AUTO: 0 K/UL (ref 0–0.04)
IMM GRANULOCYTES NFR BLD AUTO: 0 % (ref 0–0.5)
LYMPHOCYTES # BLD AUTO: 2.8 K/UL (ref 1–4.8)
LYMPHOCYTES NFR BLD: 43.5 % (ref 18–48)
MCH RBC QN AUTO: 26.4 PG (ref 27–31)
MCHC RBC AUTO-ENTMCNC: 31.6 G/DL (ref 32–36)
MCV RBC AUTO: 84 FL (ref 82–98)
MONOCYTES # BLD AUTO: 0.6 K/UL (ref 0.3–1)
MONOCYTES NFR BLD: 9.2 % (ref 4–15)
NEUTROPHILS # BLD AUTO: 2.9 K/UL (ref 1.8–7.7)
NEUTROPHILS NFR BLD: 45.5 % (ref 38–73)
NRBC BLD-RTO: 0 /100 WBC
PLATELET # BLD AUTO: 241 K/UL (ref 150–450)
PMV BLD AUTO: 13 FL (ref 9.2–12.9)
POTASSIUM SERPL-SCNC: 3.6 MMOL/L (ref 3.5–5.1)
PROT SERPL-MCNC: 7.7 G/DL (ref 6–8.4)
RBC # BLD AUTO: 4.06 M/UL (ref 4–5.4)
SODIUM SERPL-SCNC: 139 MMOL/L (ref 136–145)
WBC # BLD AUTO: 6.32 K/UL (ref 3.9–12.7)

## 2021-04-07 ENCOUNTER — OFFICE VISIT (OUTPATIENT)
Dept: PSYCHIATRY | Facility: CLINIC | Age: 71
End: 2021-04-07
Payer: MEDICARE

## 2021-04-07 DIAGNOSIS — F33.41 RECURRENT MAJOR DEPRESSIVE DISORDER, IN PARTIAL REMISSION: Primary | ICD-10-CM

## 2021-04-07 PROCEDURE — 99499 RISK ADDL DX/OHS AUDIT: ICD-10-PCS | Mod: 95,,, | Performed by: PSYCHIATRY & NEUROLOGY

## 2021-04-07 PROCEDURE — 99214 OFFICE O/P EST MOD 30 MIN: CPT | Mod: 95,,, | Performed by: PSYCHIATRY & NEUROLOGY

## 2021-04-07 PROCEDURE — 1159F PR MEDICATION LIST DOCUMENTED IN MEDICAL RECORD: ICD-10-PCS | Mod: 95,,, | Performed by: PSYCHIATRY & NEUROLOGY

## 2021-04-07 PROCEDURE — 1159F MED LIST DOCD IN RCRD: CPT | Mod: 95,,, | Performed by: PSYCHIATRY & NEUROLOGY

## 2021-04-07 PROCEDURE — 99499 UNLISTED E&M SERVICE: CPT | Mod: 95,,, | Performed by: PSYCHIATRY & NEUROLOGY

## 2021-04-07 PROCEDURE — 99214 PR OFFICE/OUTPT VISIT, EST, LEVL IV, 30-39 MIN: ICD-10-PCS | Mod: 95,,, | Performed by: PSYCHIATRY & NEUROLOGY

## 2021-04-07 RX ORDER — DULOXETIN HYDROCHLORIDE 60 MG/1
60 CAPSULE, DELAYED RELEASE ORAL DAILY
Qty: 90 CAPSULE | Refills: 0 | Status: SHIPPED | OUTPATIENT
Start: 2021-04-07 | End: 2021-08-03 | Stop reason: SDUPTHER

## 2021-04-26 ENCOUNTER — OFFICE VISIT (OUTPATIENT)
Dept: ORTHOPEDICS | Facility: CLINIC | Age: 71
End: 2021-04-26
Payer: MEDICARE

## 2021-04-26 ENCOUNTER — HOSPITAL ENCOUNTER (OUTPATIENT)
Dept: RADIOLOGY | Facility: CLINIC | Age: 71
Discharge: HOME | End: 2021-04-26
Attending: PHYSICIAN ASSISTANT
Payer: MEDICARE

## 2021-04-26 DIAGNOSIS — M81.0 AGE-RELATED OSTEOPOROSIS WITHOUT CURRENT PATHOLOGICAL FRACTURE: Primary | ICD-10-CM

## 2021-04-26 DIAGNOSIS — M81.6 LOCALIZED OSTEOPOROSIS OF LEQUESNE: ICD-10-CM

## 2021-04-26 DIAGNOSIS — M81.0 OSTEOPOROSIS, UNSPECIFIED OSTEOPOROSIS TYPE, UNSPECIFIED PATHOLOGICAL FRACTURE PRESENCE: ICD-10-CM

## 2021-04-26 DIAGNOSIS — M80.08XS PATHOLOGICAL FRACTURE OF VERTEBRA DUE TO AGE-RELATED OSTEOPOROSIS, SEQUELA: ICD-10-CM

## 2021-04-26 PROCEDURE — 99214 OFFICE O/P EST MOD 30 MIN: CPT | Mod: S$GLB,,, | Performed by: ORTHOPAEDIC SURGERY

## 2021-04-26 PROCEDURE — 3288F FALL RISK ASSESSMENT DOCD: CPT | Mod: CPTII,S$GLB,, | Performed by: ORTHOPAEDIC SURGERY

## 2021-04-26 PROCEDURE — 99999 PR PBB SHADOW E&M-EST. PATIENT-LVL III: ICD-10-PCS | Mod: PBBFAC,,, | Performed by: ORTHOPAEDIC SURGERY

## 2021-04-26 PROCEDURE — 1125F AMNT PAIN NOTED PAIN PRSNT: CPT | Mod: S$GLB,,, | Performed by: ORTHOPAEDIC SURGERY

## 2021-04-26 PROCEDURE — 3288F PR FALLS RISK ASSESSMENT DOCUMENTED: ICD-10-PCS | Mod: CPTII,S$GLB,, | Performed by: ORTHOPAEDIC SURGERY

## 2021-04-26 PROCEDURE — 1159F PR MEDICATION LIST DOCUMENTED IN MEDICAL RECORD: ICD-10-PCS | Mod: S$GLB,,, | Performed by: ORTHOPAEDIC SURGERY

## 2021-04-26 PROCEDURE — 77080 DXA BONE DENSITY AXIAL: CPT | Mod: 26,,, | Performed by: INTERNAL MEDICINE

## 2021-04-26 PROCEDURE — 1101F PR PT FALLS ASSESS DOC 0-1 FALLS W/OUT INJ PAST YR: ICD-10-PCS | Mod: CPTII,S$GLB,, | Performed by: ORTHOPAEDIC SURGERY

## 2021-04-26 PROCEDURE — 77080 DXA BONE DENSITY AXIAL: CPT | Mod: TC

## 2021-04-26 PROCEDURE — 99999 PR PBB SHADOW E&M-EST. PATIENT-LVL III: CPT | Mod: PBBFAC,,, | Performed by: ORTHOPAEDIC SURGERY

## 2021-04-26 PROCEDURE — 99214 PR OFFICE/OUTPT VISIT, EST, LEVL IV, 30-39 MIN: ICD-10-PCS | Mod: S$GLB,,, | Performed by: ORTHOPAEDIC SURGERY

## 2021-04-26 PROCEDURE — 1101F PT FALLS ASSESS-DOCD LE1/YR: CPT | Mod: CPTII,S$GLB,, | Performed by: ORTHOPAEDIC SURGERY

## 2021-04-26 PROCEDURE — 1159F MED LIST DOCD IN RCRD: CPT | Mod: S$GLB,,, | Performed by: ORTHOPAEDIC SURGERY

## 2021-04-26 PROCEDURE — 77080 DEXA BONE DENSITY SPINE HIP: ICD-10-PCS | Mod: 26,,, | Performed by: INTERNAL MEDICINE

## 2021-04-26 PROCEDURE — 1125F PR PAIN SEVERITY QUANTIFIED, PAIN PRESENT: ICD-10-PCS | Mod: S$GLB,,, | Performed by: ORTHOPAEDIC SURGERY

## 2021-04-28 ENCOUNTER — PATIENT MESSAGE (OUTPATIENT)
Dept: INTERNAL MEDICINE | Facility: CLINIC | Age: 71
End: 2021-04-28

## 2021-05-11 ENCOUNTER — PATIENT MESSAGE (OUTPATIENT)
Dept: ORTHOPEDICS | Facility: CLINIC | Age: 71
End: 2021-05-11

## 2021-05-19 ENCOUNTER — PES CALL (OUTPATIENT)
Dept: ADMINISTRATIVE | Facility: CLINIC | Age: 71
End: 2021-05-19

## 2021-05-26 ENCOUNTER — OFFICE VISIT (OUTPATIENT)
Dept: UROLOGY | Facility: CLINIC | Age: 71
End: 2021-05-26
Payer: MEDICARE

## 2021-05-26 DIAGNOSIS — R35.1 NOCTURIA: ICD-10-CM

## 2021-05-26 DIAGNOSIS — N39.41 URGE INCONTINENCE: Primary | ICD-10-CM

## 2021-05-26 PROCEDURE — 99214 PR OFFICE/OUTPT VISIT, EST, LEVL IV, 30-39 MIN: ICD-10-PCS | Mod: 95,,, | Performed by: UROLOGY

## 2021-05-26 PROCEDURE — 99214 OFFICE O/P EST MOD 30 MIN: CPT | Mod: 95,,, | Performed by: UROLOGY

## 2021-05-26 PROCEDURE — 1159F PR MEDICATION LIST DOCUMENTED IN MEDICAL RECORD: ICD-10-PCS | Mod: 95,,, | Performed by: UROLOGY

## 2021-05-26 PROCEDURE — 1159F MED LIST DOCD IN RCRD: CPT | Mod: 95,,, | Performed by: UROLOGY

## 2021-05-26 RX ORDER — MIRABEGRON 50 MG/1
1 TABLET, FILM COATED, EXTENDED RELEASE ORAL DAILY
Qty: 30 TABLET | Refills: 11 | Status: SHIPPED | OUTPATIENT
Start: 2021-05-26 | End: 2021-12-01 | Stop reason: SINTOL

## 2021-06-01 ENCOUNTER — TELEPHONE (OUTPATIENT)
Dept: NEPHROLOGY | Facility: CLINIC | Age: 71
End: 2021-06-01

## 2021-06-01 DIAGNOSIS — N18.31 STAGE 3A CHRONIC KIDNEY DISEASE: Primary | ICD-10-CM

## 2021-06-09 ENCOUNTER — LAB VISIT (OUTPATIENT)
Dept: LAB | Facility: HOSPITAL | Age: 71
End: 2021-06-09
Payer: MEDICARE

## 2021-06-09 DIAGNOSIS — N18.31 STAGE 3A CHRONIC KIDNEY DISEASE: ICD-10-CM

## 2021-06-09 LAB
BILIRUB UR QL STRIP: NEGATIVE
CLARITY UR REFRACT.AUTO: ABNORMAL
COLOR UR AUTO: YELLOW
CREAT UR-MCNC: 61 MG/DL (ref 15–325)
GLUCOSE UR QL STRIP: NEGATIVE
HGB UR QL STRIP: NEGATIVE
KETONES UR QL STRIP: NEGATIVE
LEUKOCYTE ESTERASE UR QL STRIP: NEGATIVE
NITRITE UR QL STRIP: NEGATIVE
PH UR STRIP: 6 [PH] (ref 5–8)
PROT UR QL STRIP: NEGATIVE
PROT UR-MCNC: 8 MG/DL (ref 0–15)
PROT/CREAT UR: 0.13 MG/G{CREAT} (ref 0–0.2)
SP GR UR STRIP: 1 (ref 1–1.03)
URN SPEC COLLECT METH UR: ABNORMAL

## 2021-06-09 PROCEDURE — 81003 URINALYSIS AUTO W/O SCOPE: CPT | Performed by: NURSE PRACTITIONER

## 2021-06-09 PROCEDURE — 82570 ASSAY OF URINE CREATININE: CPT | Performed by: NURSE PRACTITIONER

## 2021-06-25 ENCOUNTER — PATIENT MESSAGE (OUTPATIENT)
Dept: PHARMACY | Facility: CLINIC | Age: 71
End: 2021-06-25

## 2021-06-30 ENCOUNTER — TELEPHONE (OUTPATIENT)
Dept: INTERNAL MEDICINE | Facility: CLINIC | Age: 71
End: 2021-06-30

## 2021-07-15 NOTE — TELEPHONE ENCOUNTER
[facility]  Progress Note     Damion Stevenson  : 1947    Status: Inpatient  Day #: 2    Attending: Kevon Yang MD  PCP: Daly Santamaria MD      Assessment and Plan       Painful orthopaedic hardware ([address]  HAD LUMBAR SPINE L1 METS Pls call pt to ask her home BPs. (Last 24 hours) at 7/15/2021 1347  Last data filed at 7/15/2021 1311  Gross per 24 hour   Intake 595 ml   Output 830 ml   Net -235 ml         Recent Labs   Lab 07/08/21  1620 07/08/21  1620 07/13/21  1456 07/14/21  0349 07/15/21  0534   WBC 4.0  --   --

## 2021-07-20 ENCOUNTER — PES CALL (OUTPATIENT)
Dept: ADMINISTRATIVE | Facility: CLINIC | Age: 71
End: 2021-07-20

## 2021-08-03 ENCOUNTER — OFFICE VISIT (OUTPATIENT)
Dept: PSYCHIATRY | Facility: CLINIC | Age: 71
End: 2021-08-03
Payer: MEDICARE

## 2021-08-03 DIAGNOSIS — F33.41 RECURRENT MAJOR DEPRESSIVE DISORDER, IN PARTIAL REMISSION: Primary | ICD-10-CM

## 2021-08-03 PROCEDURE — 1159F PR MEDICATION LIST DOCUMENTED IN MEDICAL RECORD: ICD-10-PCS | Mod: CPTII,95,, | Performed by: PSYCHIATRY & NEUROLOGY

## 2021-08-03 PROCEDURE — 99499 UNLISTED E&M SERVICE: CPT | Mod: HCNC,95,, | Performed by: PSYCHIATRY & NEUROLOGY

## 2021-08-03 PROCEDURE — 90833 PSYTX W PT W E/M 30 MIN: CPT | Mod: 95,,, | Performed by: PSYCHIATRY & NEUROLOGY

## 2021-08-03 PROCEDURE — 99499 RISK ADDL DX/OHS AUDIT: ICD-10-PCS | Mod: HCNC,95,, | Performed by: PSYCHIATRY & NEUROLOGY

## 2021-08-03 PROCEDURE — 90833 PR PSYCHOTHERAPY W/PATIENT W/E&M, 30 MIN (ADD ON): ICD-10-PCS | Mod: 95,,, | Performed by: PSYCHIATRY & NEUROLOGY

## 2021-08-03 PROCEDURE — 1160F RVW MEDS BY RX/DR IN RCRD: CPT | Mod: CPTII,95,, | Performed by: PSYCHIATRY & NEUROLOGY

## 2021-08-03 PROCEDURE — 99214 PR OFFICE/OUTPT VISIT, EST, LEVL IV, 30-39 MIN: ICD-10-PCS | Mod: 95,,, | Performed by: PSYCHIATRY & NEUROLOGY

## 2021-08-03 PROCEDURE — 1160F PR REVIEW ALL MEDS BY PRESCRIBER/CLIN PHARMACIST DOCUMENTED: ICD-10-PCS | Mod: CPTII,95,, | Performed by: PSYCHIATRY & NEUROLOGY

## 2021-08-03 PROCEDURE — 99214 OFFICE O/P EST MOD 30 MIN: CPT | Mod: 95,,, | Performed by: PSYCHIATRY & NEUROLOGY

## 2021-08-03 PROCEDURE — 1159F MED LIST DOCD IN RCRD: CPT | Mod: CPTII,95,, | Performed by: PSYCHIATRY & NEUROLOGY

## 2021-08-03 RX ORDER — DULOXETIN HYDROCHLORIDE 60 MG/1
60 CAPSULE, DELAYED RELEASE ORAL DAILY
Qty: 90 CAPSULE | Refills: 0 | Status: SHIPPED | OUTPATIENT
Start: 2021-08-03 | End: 2022-02-03 | Stop reason: SDUPTHER

## 2021-09-09 ENCOUNTER — OFFICE VISIT (OUTPATIENT)
Dept: INTERNAL MEDICINE | Facility: CLINIC | Age: 71
End: 2021-09-09
Payer: MEDICARE

## 2021-09-09 DIAGNOSIS — I10 ESSENTIAL HYPERTENSION: Primary | ICD-10-CM

## 2021-09-09 PROCEDURE — 4010F PR ACE/ARB THEARPY RXD/TAKEN: ICD-10-PCS | Mod: CPTII,95,, | Performed by: INTERNAL MEDICINE

## 2021-09-09 PROCEDURE — 4010F ACE/ARB THERAPY RXD/TAKEN: CPT | Mod: CPTII,95,, | Performed by: INTERNAL MEDICINE

## 2021-09-09 PROCEDURE — 1159F MED LIST DOCD IN RCRD: CPT | Mod: CPTII,95,, | Performed by: INTERNAL MEDICINE

## 2021-09-09 PROCEDURE — 99443 PR PHYSICIAN TELEPHONE EVALUATION 21-30 MIN: ICD-10-PCS | Mod: 95,,, | Performed by: INTERNAL MEDICINE

## 2021-09-09 PROCEDURE — 1159F PR MEDICATION LIST DOCUMENTED IN MEDICAL RECORD: ICD-10-PCS | Mod: CPTII,95,, | Performed by: INTERNAL MEDICINE

## 2021-09-09 PROCEDURE — 99443 PR PHYSICIAN TELEPHONE EVALUATION 21-30 MIN: CPT | Mod: 95,,, | Performed by: INTERNAL MEDICINE

## 2021-09-09 PROCEDURE — 1160F PR REVIEW ALL MEDS BY PRESCRIBER/CLIN PHARMACIST DOCUMENTED: ICD-10-PCS | Mod: CPTII,95,, | Performed by: INTERNAL MEDICINE

## 2021-09-09 PROCEDURE — 1160F RVW MEDS BY RX/DR IN RCRD: CPT | Mod: CPTII,95,, | Performed by: INTERNAL MEDICINE

## 2021-09-09 RX ORDER — HYDROCHLOROTHIAZIDE 12.5 MG/1
12.5 CAPSULE ORAL
Qty: 30 CAPSULE | Refills: 11 | Status: SHIPPED | OUTPATIENT
Start: 2021-09-10 | End: 2022-08-01

## 2021-10-03 ENCOUNTER — PATIENT MESSAGE (OUTPATIENT)
Dept: OPHTHALMOLOGY | Facility: CLINIC | Age: 71
End: 2021-10-03

## 2021-10-19 ENCOUNTER — PES CALL (OUTPATIENT)
Dept: ADMINISTRATIVE | Facility: CLINIC | Age: 71
End: 2021-10-19

## 2021-11-16 ENCOUNTER — PATIENT MESSAGE (OUTPATIENT)
Dept: INTERNAL MEDICINE | Facility: CLINIC | Age: 71
End: 2021-11-16
Payer: MEDICARE

## 2021-11-17 ENCOUNTER — PATIENT MESSAGE (OUTPATIENT)
Dept: INTERNAL MEDICINE | Facility: CLINIC | Age: 71
End: 2021-11-17
Payer: MEDICARE

## 2021-11-17 DIAGNOSIS — Z12.31 VISIT FOR SCREENING MAMMOGRAM: Primary | ICD-10-CM

## 2021-11-20 DIAGNOSIS — Z79.899 ENCOUNTER FOR MONITORING LONG-TERM PROTON PUMP INHIBITOR THERAPY: Primary | ICD-10-CM

## 2021-11-20 DIAGNOSIS — K59.04 CHRONIC IDIOPATHIC CONSTIPATION: ICD-10-CM

## 2021-11-20 DIAGNOSIS — Z51.81 ENCOUNTER FOR MONITORING LONG-TERM PROTON PUMP INHIBITOR THERAPY: Primary | ICD-10-CM

## 2021-11-22 ENCOUNTER — PES CALL (OUTPATIENT)
Dept: ADMINISTRATIVE | Facility: CLINIC | Age: 71
End: 2021-11-22
Payer: MEDICARE

## 2021-11-22 ENCOUNTER — TELEPHONE (OUTPATIENT)
Dept: GASTROENTEROLOGY | Facility: CLINIC | Age: 71
End: 2021-11-22
Payer: MEDICARE

## 2021-11-30 ENCOUNTER — LAB VISIT (OUTPATIENT)
Dept: LAB | Facility: HOSPITAL | Age: 71
End: 2021-11-30
Attending: INTERNAL MEDICINE
Payer: MEDICARE

## 2021-11-30 ENCOUNTER — OFFICE VISIT (OUTPATIENT)
Dept: INTERNAL MEDICINE | Facility: CLINIC | Age: 71
End: 2021-11-30
Payer: MEDICARE

## 2021-11-30 VITALS
HEIGHT: 66 IN | DIASTOLIC BLOOD PRESSURE: 84 MMHG | HEART RATE: 64 BPM | WEIGHT: 164.69 LBS | SYSTOLIC BLOOD PRESSURE: 176 MMHG | BODY MASS INDEX: 26.47 KG/M2

## 2021-11-30 DIAGNOSIS — M81.0 AGE-RELATED OSTEOPOROSIS WITHOUT CURRENT PATHOLOGICAL FRACTURE: ICD-10-CM

## 2021-11-30 DIAGNOSIS — N18.31 STAGE 3A CHRONIC KIDNEY DISEASE: ICD-10-CM

## 2021-11-30 DIAGNOSIS — K59.04 CHRONIC IDIOPATHIC CONSTIPATION: ICD-10-CM

## 2021-11-30 DIAGNOSIS — E78.2 MIXED HYPERLIPIDEMIA: ICD-10-CM

## 2021-11-30 DIAGNOSIS — H40.63X0 STEROID-INDUCED GLAUCOMA OF BOTH EYES: ICD-10-CM

## 2021-11-30 DIAGNOSIS — Z00.00 ENCOUNTER FOR PREVENTIVE HEALTH EXAMINATION: Primary | ICD-10-CM

## 2021-11-30 DIAGNOSIS — R91.8 MULTIPLE LUNG NODULES ON CT: ICD-10-CM

## 2021-11-30 DIAGNOSIS — Z79.899 ENCOUNTER FOR MONITORING LONG-TERM PROTON PUMP INHIBITOR THERAPY: ICD-10-CM

## 2021-11-30 DIAGNOSIS — I70.0 ATHEROSCLEROSIS OF ABDOMINAL AORTA: ICD-10-CM

## 2021-11-30 DIAGNOSIS — N25.81 SECONDARY HYPERPARATHYROIDISM: ICD-10-CM

## 2021-11-30 DIAGNOSIS — H40.053 BILATERAL OCULAR HYPERTENSION: ICD-10-CM

## 2021-11-30 DIAGNOSIS — I10 ESSENTIAL HYPERTENSION: ICD-10-CM

## 2021-11-30 DIAGNOSIS — M35.9 UNDIFFERENTIATED CONNECTIVE TISSUE DISEASE: ICD-10-CM

## 2021-11-30 DIAGNOSIS — M47.817 SPONDYLOSIS OF LUMBOSACRAL REGION WITHOUT MYELOPATHY OR RADICULOPATHY: ICD-10-CM

## 2021-11-30 DIAGNOSIS — Z51.81 ENCOUNTER FOR MONITORING LONG-TERM PROTON PUMP INHIBITOR THERAPY: ICD-10-CM

## 2021-11-30 DIAGNOSIS — Z87.81 HISTORY OF COMPRESSION FRACTURE OF SPINE: ICD-10-CM

## 2021-11-30 DIAGNOSIS — G89.4 CHRONIC PAIN SYNDROME: ICD-10-CM

## 2021-11-30 DIAGNOSIS — K21.9 GASTROESOPHAGEAL REFLUX DISEASE WITHOUT ESOPHAGITIS: ICD-10-CM

## 2021-11-30 DIAGNOSIS — D84.9 IMMUNOSUPPRESSION: ICD-10-CM

## 2021-11-30 DIAGNOSIS — F33.41 RECURRENT MAJOR DEPRESSIVE DISORDER, IN PARTIAL REMISSION: ICD-10-CM

## 2021-11-30 DIAGNOSIS — D53.9 DEFICIENCY ANEMIA: ICD-10-CM

## 2021-11-30 DIAGNOSIS — K59.01 SLOW TRANSIT CONSTIPATION: ICD-10-CM

## 2021-11-30 DIAGNOSIS — D89.89 AUTOIMMUNE DISORDER: ICD-10-CM

## 2021-11-30 DIAGNOSIS — T38.0X5A STEROID-INDUCED GLAUCOMA OF BOTH EYES: ICD-10-CM

## 2021-11-30 LAB
25(OH)D3+25(OH)D2 SERPL-MCNC: 40 NG/ML (ref 30–96)
ALBUMIN SERPL BCP-MCNC: 3.6 G/DL (ref 3.5–5.2)
ALP SERPL-CCNC: 112 U/L (ref 55–135)
ALT SERPL W/O P-5'-P-CCNC: 14 U/L (ref 10–44)
ANION GAP SERPL CALC-SCNC: 6 MMOL/L (ref 8–16)
AST SERPL-CCNC: 24 U/L (ref 10–40)
BASOPHILS # BLD AUTO: 0.03 K/UL (ref 0–0.2)
BASOPHILS NFR BLD: 0.5 % (ref 0–1.9)
BILIRUB SERPL-MCNC: 0.6 MG/DL (ref 0.1–1)
BUN SERPL-MCNC: 14 MG/DL (ref 8–23)
CALCIUM SERPL-MCNC: 9.7 MG/DL (ref 8.7–10.5)
CHLORIDE SERPL-SCNC: 104 MMOL/L (ref 95–110)
CO2 SERPL-SCNC: 28 MMOL/L (ref 23–29)
CREAT SERPL-MCNC: 1 MG/DL (ref 0.5–1.4)
DIFFERENTIAL METHOD: ABNORMAL
EOSINOPHIL # BLD AUTO: 0.1 K/UL (ref 0–0.5)
EOSINOPHIL NFR BLD: 2.4 % (ref 0–8)
ERYTHROCYTE [DISTWIDTH] IN BLOOD BY AUTOMATED COUNT: 15.1 % (ref 11.5–14.5)
EST. GFR  (AFRICAN AMERICAN): >60 ML/MIN/1.73 M^2
EST. GFR  (NON AFRICAN AMERICAN): 56 ML/MIN/1.73 M^2
GLUCOSE SERPL-MCNC: 101 MG/DL (ref 70–110)
HCT VFR BLD AUTO: 36.2 % (ref 37–48.5)
HGB BLD-MCNC: 11.5 G/DL (ref 12–16)
IMM GRANULOCYTES # BLD AUTO: 0.01 K/UL (ref 0–0.04)
IMM GRANULOCYTES NFR BLD AUTO: 0.2 % (ref 0–0.5)
LYMPHOCYTES # BLD AUTO: 2.1 K/UL (ref 1–4.8)
LYMPHOCYTES NFR BLD: 35.6 % (ref 18–48)
MAGNESIUM SERPL-MCNC: 2.1 MG/DL (ref 1.6–2.6)
MCH RBC QN AUTO: 26.5 PG (ref 27–31)
MCHC RBC AUTO-ENTMCNC: 31.8 G/DL (ref 32–36)
MCV RBC AUTO: 83 FL (ref 82–98)
MONOCYTES # BLD AUTO: 0.6 K/UL (ref 0.3–1)
MONOCYTES NFR BLD: 9.4 % (ref 4–15)
NEUTROPHILS # BLD AUTO: 3.1 K/UL (ref 1.8–7.7)
NEUTROPHILS NFR BLD: 51.9 % (ref 38–73)
NRBC BLD-RTO: 0 /100 WBC
PLATELET # BLD AUTO: 270 K/UL (ref 150–450)
PMV BLD AUTO: 12.2 FL (ref 9.2–12.9)
POTASSIUM SERPL-SCNC: 4.1 MMOL/L (ref 3.5–5.1)
PROT SERPL-MCNC: 7.4 G/DL (ref 6–8.4)
RBC # BLD AUTO: 4.34 M/UL (ref 4–5.4)
SODIUM SERPL-SCNC: 138 MMOL/L (ref 136–145)
VIT B12 SERPL-MCNC: 833 PG/ML (ref 210–950)
WBC # BLD AUTO: 5.87 K/UL (ref 3.9–12.7)

## 2021-11-30 PROCEDURE — 36415 COLL VENOUS BLD VENIPUNCTURE: CPT | Mod: HCNC | Performed by: INTERNAL MEDICINE

## 2021-11-30 PROCEDURE — 99499 UNLISTED E&M SERVICE: CPT | Mod: HCNC,S$GLB,, | Performed by: NURSE PRACTITIONER

## 2021-11-30 PROCEDURE — 99999 PR PBB SHADOW E&M-EST. PATIENT-LVL IV: ICD-10-PCS | Mod: PBBFAC,HCNC,, | Performed by: NURSE PRACTITIONER

## 2021-11-30 PROCEDURE — 99999 PR PBB SHADOW E&M-EST. PATIENT-LVL IV: CPT | Mod: PBBFAC,HCNC,, | Performed by: NURSE PRACTITIONER

## 2021-11-30 PROCEDURE — G0439 PPPS, SUBSEQ VISIT: HCPCS | Mod: HCNC,S$GLB,, | Performed by: NURSE PRACTITIONER

## 2021-11-30 PROCEDURE — 4010F PR ACE/ARB THEARPY RXD/TAKEN: ICD-10-PCS | Mod: HCNC,CPTII,S$GLB, | Performed by: NURSE PRACTITIONER

## 2021-11-30 PROCEDURE — 83735 ASSAY OF MAGNESIUM: CPT | Mod: HCNC | Performed by: INTERNAL MEDICINE

## 2021-11-30 PROCEDURE — 85025 COMPLETE CBC W/AUTO DIFF WBC: CPT | Mod: HCNC | Performed by: INTERNAL MEDICINE

## 2021-11-30 PROCEDURE — 4010F ACE/ARB THERAPY RXD/TAKEN: CPT | Mod: HCNC,CPTII,S$GLB, | Performed by: NURSE PRACTITIONER

## 2021-11-30 PROCEDURE — 80053 COMPREHEN METABOLIC PANEL: CPT | Mod: HCNC | Performed by: INTERNAL MEDICINE

## 2021-11-30 PROCEDURE — 99499 RISK ADDL DX/OHS AUDIT: ICD-10-PCS | Mod: HCNC,S$GLB,, | Performed by: NURSE PRACTITIONER

## 2021-11-30 PROCEDURE — 82607 VITAMIN B-12: CPT | Mod: HCNC | Performed by: INTERNAL MEDICINE

## 2021-11-30 PROCEDURE — 82306 VITAMIN D 25 HYDROXY: CPT | Mod: HCNC | Performed by: INTERNAL MEDICINE

## 2021-11-30 PROCEDURE — G0439 PR MEDICARE ANNUAL WELLNESS SUBSEQUENT VISIT: ICD-10-PCS | Mod: HCNC,S$GLB,, | Performed by: NURSE PRACTITIONER

## 2021-12-01 ENCOUNTER — PATIENT MESSAGE (OUTPATIENT)
Dept: INTERNAL MEDICINE | Facility: CLINIC | Age: 71
End: 2021-12-01
Payer: MEDICARE

## 2021-12-01 ENCOUNTER — PATIENT MESSAGE (OUTPATIENT)
Dept: UROLOGY | Facility: CLINIC | Age: 71
End: 2021-12-01
Payer: MEDICARE

## 2021-12-01 DIAGNOSIS — R35.1 NOCTURIA: ICD-10-CM

## 2021-12-01 DIAGNOSIS — N39.41 URGE INCONTINENCE: Primary | ICD-10-CM

## 2021-12-01 RX ORDER — TOLTERODINE 4 MG/1
4 CAPSULE, EXTENDED RELEASE ORAL DAILY
Qty: 30 CAPSULE | Refills: 11 | Status: SHIPPED | OUTPATIENT
Start: 2021-12-01 | End: 2023-04-16

## 2021-12-03 ENCOUNTER — OFFICE VISIT (OUTPATIENT)
Dept: INTERNAL MEDICINE | Facility: CLINIC | Age: 71
End: 2021-12-03
Payer: MEDICARE

## 2021-12-03 ENCOUNTER — TELEPHONE (OUTPATIENT)
Dept: INTERNAL MEDICINE | Facility: CLINIC | Age: 71
End: 2021-12-03

## 2021-12-03 VITALS — DIASTOLIC BLOOD PRESSURE: 78 MMHG | SYSTOLIC BLOOD PRESSURE: 130 MMHG

## 2021-12-03 DIAGNOSIS — E78.2 MIXED HYPERLIPIDEMIA: ICD-10-CM

## 2021-12-03 DIAGNOSIS — I70.0 ATHEROSCLEROSIS OF ABDOMINAL AORTA: Primary | ICD-10-CM

## 2021-12-03 DIAGNOSIS — K21.00 GASTROESOPHAGEAL REFLUX DISEASE WITH ESOPHAGITIS WITHOUT HEMORRHAGE: ICD-10-CM

## 2021-12-03 DIAGNOSIS — I70.0 ATHEROSCLEROSIS OF ABDOMINAL AORTA: ICD-10-CM

## 2021-12-03 DIAGNOSIS — I77.1 TORTUOUS AORTA: ICD-10-CM

## 2021-12-03 DIAGNOSIS — N31.9 NEUROGENIC BLADDER: ICD-10-CM

## 2021-12-03 DIAGNOSIS — I10 ESSENTIAL HYPERTENSION: Primary | ICD-10-CM

## 2021-12-03 PROBLEM — R09.89 PROMINENT AORTA: Status: RESOLVED | Noted: 2019-08-21 | Resolved: 2021-12-03

## 2021-12-03 PROBLEM — K21.9 GASTROESOPHAGEAL REFLUX DISEASE WITHOUT ESOPHAGITIS: Status: RESOLVED | Noted: 2018-05-11 | Resolved: 2021-12-03

## 2021-12-03 PROCEDURE — 99214 PR OFFICE/OUTPT VISIT, EST, LEVL IV, 30-39 MIN: ICD-10-PCS | Mod: HCNC,95,, | Performed by: INTERNAL MEDICINE

## 2021-12-03 PROCEDURE — 4010F ACE/ARB THERAPY RXD/TAKEN: CPT | Mod: HCNC,CPTII,95, | Performed by: INTERNAL MEDICINE

## 2021-12-03 PROCEDURE — 4010F PR ACE/ARB THEARPY RXD/TAKEN: ICD-10-PCS | Mod: HCNC,CPTII,95, | Performed by: INTERNAL MEDICINE

## 2021-12-03 PROCEDURE — 99214 OFFICE O/P EST MOD 30 MIN: CPT | Mod: HCNC,95,, | Performed by: INTERNAL MEDICINE

## 2021-12-09 ENCOUNTER — PATIENT MESSAGE (OUTPATIENT)
Dept: INTERNAL MEDICINE | Facility: CLINIC | Age: 71
End: 2021-12-09
Payer: MEDICARE

## 2021-12-09 ENCOUNTER — LAB VISIT (OUTPATIENT)
Dept: LAB | Facility: HOSPITAL | Age: 71
End: 2021-12-09
Attending: INTERNAL MEDICINE
Payer: MEDICARE

## 2021-12-09 DIAGNOSIS — E78.2 MIXED HYPERLIPIDEMIA: ICD-10-CM

## 2021-12-09 LAB
CHOLEST SERPL-MCNC: 174 MG/DL (ref 120–199)
CHOLEST/HDLC SERPL: 2.9 {RATIO} (ref 2–5)
HDLC SERPL-MCNC: 61 MG/DL (ref 40–75)
HDLC SERPL: 35.1 % (ref 20–50)
LDLC SERPL CALC-MCNC: 96.6 MG/DL (ref 63–159)
NONHDLC SERPL-MCNC: 113 MG/DL
TRIGL SERPL-MCNC: 82 MG/DL (ref 30–150)

## 2021-12-09 PROCEDURE — 80061 LIPID PANEL: CPT | Mod: HCNC | Performed by: INTERNAL MEDICINE

## 2021-12-09 PROCEDURE — 36415 COLL VENOUS BLD VENIPUNCTURE: CPT | Mod: HCNC | Performed by: INTERNAL MEDICINE

## 2021-12-10 ENCOUNTER — PATIENT MESSAGE (OUTPATIENT)
Dept: INTERNAL MEDICINE | Facility: CLINIC | Age: 71
End: 2021-12-10
Payer: MEDICARE

## 2021-12-14 ENCOUNTER — PATIENT MESSAGE (OUTPATIENT)
Dept: INTERNAL MEDICINE | Facility: CLINIC | Age: 71
End: 2021-12-14
Payer: MEDICARE

## 2021-12-19 DIAGNOSIS — D64.9 LOW HEMOGLOBIN: ICD-10-CM

## 2021-12-19 DIAGNOSIS — Z13.0 SCREENING FOR IRON DEFICIENCY ANEMIA: Primary | ICD-10-CM

## 2021-12-20 ENCOUNTER — TELEPHONE (OUTPATIENT)
Dept: GASTROENTEROLOGY | Facility: CLINIC | Age: 71
End: 2021-12-20
Payer: MEDICARE

## 2022-01-06 ENCOUNTER — HOSPITAL ENCOUNTER (OUTPATIENT)
Dept: RADIOLOGY | Facility: HOSPITAL | Age: 72
Discharge: HOME | End: 2022-01-06
Attending: INTERNAL MEDICINE
Payer: MEDICARE

## 2022-01-06 DIAGNOSIS — I70.0 ATHEROSCLEROSIS OF ABDOMINAL AORTA: ICD-10-CM

## 2022-01-06 PROCEDURE — 75571 CT HRT W/O DYE W/CA TEST: CPT | Mod: TC,HCNC

## 2022-01-06 PROCEDURE — 75571 CT HRT W/O DYE W/CA TEST: CPT | Mod: 26,HCNC,, | Performed by: RADIOLOGY

## 2022-01-06 PROCEDURE — 75571 CT CALCIUM SCORING CARDIAC: ICD-10-PCS | Mod: 26,HCNC,, | Performed by: RADIOLOGY

## 2022-01-20 ENCOUNTER — HOSPITAL ENCOUNTER (OUTPATIENT)
Dept: RADIOLOGY | Facility: HOSPITAL | Age: 72
Discharge: HOME | End: 2022-01-20
Attending: INTERNAL MEDICINE
Payer: MEDICARE

## 2022-01-20 DIAGNOSIS — Z12.31 VISIT FOR SCREENING MAMMOGRAM: ICD-10-CM

## 2022-01-20 PROCEDURE — 77067 MAMMO DIGITAL SCREENING BILAT WITH TOMO: ICD-10-PCS | Mod: 26,HCNC,, | Performed by: RADIOLOGY

## 2022-01-20 PROCEDURE — 77067 SCR MAMMO BI INCL CAD: CPT | Mod: 26,HCNC,, | Performed by: RADIOLOGY

## 2022-01-20 PROCEDURE — 77063 MAMMO DIGITAL SCREENING BILAT WITH TOMO: ICD-10-PCS | Mod: 26,HCNC,, | Performed by: RADIOLOGY

## 2022-01-20 PROCEDURE — 77063 BREAST TOMOSYNTHESIS BI: CPT | Mod: TC,HCNC

## 2022-01-20 PROCEDURE — 77067 SCR MAMMO BI INCL CAD: CPT | Mod: TC,HCNC

## 2022-01-20 PROCEDURE — 77063 BREAST TOMOSYNTHESIS BI: CPT | Mod: 26,HCNC,, | Performed by: RADIOLOGY

## 2022-02-03 ENCOUNTER — OFFICE VISIT (OUTPATIENT)
Dept: PSYCHIATRY | Facility: CLINIC | Age: 72
End: 2022-02-03
Payer: MEDICARE

## 2022-02-03 DIAGNOSIS — F33.41 RECURRENT MAJOR DEPRESSIVE DISORDER, IN PARTIAL REMISSION: Primary | ICD-10-CM

## 2022-02-03 PROCEDURE — 1159F PR MEDICATION LIST DOCUMENTED IN MEDICAL RECORD: ICD-10-PCS | Mod: HCNC,CPTII,95, | Performed by: PSYCHIATRY & NEUROLOGY

## 2022-02-03 PROCEDURE — 99499 RISK ADDL DX/OHS AUDIT: ICD-10-PCS | Mod: 95,,, | Performed by: PSYCHIATRY & NEUROLOGY

## 2022-02-03 PROCEDURE — 99213 OFFICE O/P EST LOW 20 MIN: CPT | Mod: HCNC,95,, | Performed by: PSYCHIATRY & NEUROLOGY

## 2022-02-03 PROCEDURE — 99213 PR OFFICE/OUTPT VISIT, EST, LEVL III, 20-29 MIN: ICD-10-PCS | Mod: HCNC,95,, | Performed by: PSYCHIATRY & NEUROLOGY

## 2022-02-03 PROCEDURE — 1160F RVW MEDS BY RX/DR IN RCRD: CPT | Mod: HCNC,CPTII,95, | Performed by: PSYCHIATRY & NEUROLOGY

## 2022-02-03 PROCEDURE — 1159F MED LIST DOCD IN RCRD: CPT | Mod: HCNC,CPTII,95, | Performed by: PSYCHIATRY & NEUROLOGY

## 2022-02-03 PROCEDURE — 1160F PR REVIEW ALL MEDS BY PRESCRIBER/CLIN PHARMACIST DOCUMENTED: ICD-10-PCS | Mod: HCNC,CPTII,95, | Performed by: PSYCHIATRY & NEUROLOGY

## 2022-02-03 PROCEDURE — 99499 UNLISTED E&M SERVICE: CPT | Mod: 95,,, | Performed by: PSYCHIATRY & NEUROLOGY

## 2022-02-03 RX ORDER — DULOXETIN HYDROCHLORIDE 60 MG/1
60 CAPSULE, DELAYED RELEASE ORAL DAILY
Qty: 90 CAPSULE | Refills: 0 | Status: SHIPPED | OUTPATIENT
Start: 2022-02-03 | End: 2022-05-16
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
## 2022-02-06 DIAGNOSIS — D50.9 IRON DEFICIENCY ANEMIA, UNSPECIFIED IRON DEFICIENCY ANEMIA TYPE: Primary | ICD-10-CM

## 2022-02-06 RX ORDER — FERROUS GLUCONATE 324(38)MG
324 TABLET ORAL
Qty: 90 TABLET | Refills: 1 | Status: SHIPPED | OUTPATIENT
Start: 2022-02-06 | End: 2022-04-18

## 2022-02-11 ENCOUNTER — TELEPHONE (OUTPATIENT)
Dept: GASTROENTEROLOGY | Facility: CLINIC | Age: 72
End: 2022-02-11
Payer: MEDICARE

## 2022-02-11 NOTE — TELEPHONE ENCOUNTER
Contact patient per Dr. Love, Please schedule patient in person GI clinic appointment to discuss possibility of small-bowel video capsule endoscopy for further evaluation of her slight iron deficiency anemia.     Dane - please tell patient that they are iron deficient and anemic and recommend that they take ferrous gluconate one 324mg pill once daily for next 3 months.     Please order repeat fasting Hemoglobin, Iron/TIBC, and Ferritin in 8 weeks - Orders placed.     Patient verbalized understanding.

## 2022-02-11 NOTE — TELEPHONE ENCOUNTER
----- Message from Bryan Love MD sent at 2/6/2022  2:27 PM CST -----  Please schedule patient in person GI clinic appointment to discuss possibility of small-bowel video capsule endoscopy for further evaluation of her slight iron deficiency anemia.    Dane - please tell patient that they are iron deficient and anemic and recommend that they take ferrous gluconate one 324mg pill once daily for next 3 months.    Please order repeat fasting Hemoglobin, Iron/TIBC, and Ferritin in 8 weeks - Orders placed.

## 2022-02-23 DIAGNOSIS — D84.9 IMMUNOSUPPRESSED STATUS: ICD-10-CM

## 2022-03-14 ENCOUNTER — PATIENT OUTREACH (OUTPATIENT)
Dept: ADMINISTRATIVE | Facility: OTHER | Age: 72
End: 2022-03-14
Payer: MEDICARE

## 2022-03-14 NOTE — PROGRESS NOTES
LINKS immunization registry updated  Care Everywhere updated  Health Maintenance updated  Chart reviewed for overdue Proactive [facility] Encounters ([facility]) health maintenance testing (CRS, Breast Ca, Diabetic Eye Exam)   Orders entered:N/A

## 2022-04-16 ENCOUNTER — LAB VISIT (OUTPATIENT)
Dept: LAB | Facility: HOSPITAL | Age: 72
End: 2022-04-16
Attending: INTERNAL MEDICINE
Payer: MEDICARE

## 2022-04-16 DIAGNOSIS — D50.9 IRON DEFICIENCY ANEMIA, UNSPECIFIED IRON DEFICIENCY ANEMIA TYPE: ICD-10-CM

## 2022-04-16 LAB
FERRITIN SERPL-MCNC: 33 NG/ML (ref 20–300)
HGB BLD-MCNC: 10.9 G/DL (ref 12–16)
IRON SERPL-MCNC: 39 UG/DL (ref 30–160)
SATURATED IRON: 11 % (ref 20–50)
TOTAL IRON BINDING CAPACITY: 346 UG/DL (ref 250–450)
TRANSFERRIN SERPL-MCNC: 234 MG/DL (ref 200–375)

## 2022-04-16 PROCEDURE — 82728 ASSAY OF FERRITIN: CPT | Performed by: INTERNAL MEDICINE

## 2022-04-16 PROCEDURE — 85018 HEMOGLOBIN: CPT | Performed by: INTERNAL MEDICINE

## 2022-04-16 PROCEDURE — 36415 COLL VENOUS BLD VENIPUNCTURE: CPT | Performed by: INTERNAL MEDICINE

## 2022-04-16 PROCEDURE — 84466 ASSAY OF TRANSFERRIN: CPT | Performed by: INTERNAL MEDICINE

## 2022-04-18 ENCOUNTER — TELEPHONE (OUTPATIENT)
Dept: GASTROENTEROLOGY | Facility: CLINIC | Age: 72
End: 2022-04-18
Payer: MEDICARE

## 2022-04-18 DIAGNOSIS — D50.9 IRON DEFICIENCY ANEMIA, UNSPECIFIED IRON DEFICIENCY ANEMIA TYPE: Primary | ICD-10-CM

## 2022-04-18 RX ORDER — FERROUS GLUCONATE 324(38)MG
324 TABLET ORAL
Qty: 90 TABLET | Refills: 1 | Status: SHIPPED | OUTPATIENT
Start: 2022-04-18 | End: 2022-06-18 | Stop reason: SDUPTHER

## 2022-04-18 RX ORDER — FERROUS SULFATE 324(65)MG
TABLET, DELAYED RELEASE (ENTERIC COATED) ORAL DAILY
COMMUNITY
Start: 2022-02-17 | End: 2022-04-18

## 2022-04-18 NOTE — TELEPHONE ENCOUNTER
Contact patient per Dr. Love, please schedule patient for follow-up GI clinic appointment with me in the next 3-4 weeks for iron deficiency anemia.     Dane - please tell patient that they are iron deficient and anemic and recommend that they take ferrous gluconate one 324mg pill once daily for next 3 months.     Please order repeat fasting Hemoglobin, Iron/TIBC, and Ferritin in 8 weeks - Orders placed.     Patient verbalized understanding.

## 2022-04-18 NOTE — TELEPHONE ENCOUNTER
----- Message from Bryan Love MD sent at 4/18/2022  3:25 PM CDT -----  Dane please schedule patient for follow-up GI clinic appointment with me in the next 3-4 weeks for iron deficiency anemia.    Dane - please tell patient that they are iron deficient and anemic and recommend that they take ferrous gluconate one 324mg pill once daily for next 3 months.    Please order repeat fasting Hemoglobin, Iron/TIBC, and Ferritin in 8 weeks - Orders placed.

## 2022-04-18 NOTE — PROGRESS NOTES
Dane please schedule patient for follow-up GI clinic appointment with me in the next 3-4 weeks for iron deficiency anemia.    Dane - please tell patient that they are iron deficient and anemic and recommend that they take ferrous gluconate one 324mg pill once daily for next 3 months.    Please order repeat fasting Hemoglobin, Iron/TIBC, and Ferritin in 8 weeks - Orders placed.

## 2022-05-11 ENCOUNTER — PATIENT MESSAGE (OUTPATIENT)
Dept: INTERNAL MEDICINE | Facility: CLINIC | Age: 72
End: 2022-05-11
Payer: MEDICARE

## 2022-05-11 DIAGNOSIS — M65.30 TRIGGER FINGER, UNSPECIFIED FINGER, UNSPECIFIED LATERALITY: Primary | ICD-10-CM

## 2022-05-15 ENCOUNTER — PATIENT MESSAGE (OUTPATIENT)
Dept: ORTHOPEDICS | Facility: CLINIC | Age: 72
End: 2022-05-15
Payer: MEDICARE

## 2022-05-15 DIAGNOSIS — M79.641 RIGHT HAND PAIN: Primary | ICD-10-CM

## 2022-05-17 ENCOUNTER — HOSPITAL ENCOUNTER (OUTPATIENT)
Dept: RADIOLOGY | Facility: HOSPITAL | Age: 72
Discharge: HOME | End: 2022-05-17
Attending: ORTHOPAEDIC SURGERY
Payer: MEDICARE

## 2022-05-17 DIAGNOSIS — M79.641 RIGHT HAND PAIN: ICD-10-CM

## 2022-05-17 PROCEDURE — 73130 XR HAND COMPLETE 3 VIEW RIGHT: ICD-10-PCS | Mod: 26,RT,, | Performed by: INTERNAL MEDICINE

## 2022-05-17 PROCEDURE — 73130 X-RAY EXAM OF HAND: CPT | Mod: TC,RT

## 2022-05-17 PROCEDURE — 73130 X-RAY EXAM OF HAND: CPT | Mod: 26,RT,, | Performed by: INTERNAL MEDICINE

## 2022-05-18 ENCOUNTER — PATIENT OUTREACH (OUTPATIENT)
Dept: ADMINISTRATIVE | Facility: OTHER | Age: 72
End: 2022-05-18
Payer: MEDICARE

## 2022-05-19 ENCOUNTER — OFFICE VISIT (OUTPATIENT)
Dept: ORTHOPEDICS | Facility: CLINIC | Age: 72
End: 2022-05-19
Payer: MEDICARE

## 2022-05-19 VITALS
HEIGHT: 66 IN | DIASTOLIC BLOOD PRESSURE: 78 MMHG | SYSTOLIC BLOOD PRESSURE: 151 MMHG | HEART RATE: 101 BPM | WEIGHT: 164 LBS | BODY MASS INDEX: 26.36 KG/M2

## 2022-05-19 DIAGNOSIS — M65.331 TRIGGER MIDDLE FINGER OF RIGHT HAND: ICD-10-CM

## 2022-05-19 DIAGNOSIS — M65.30 TRIGGER FINGER, UNSPECIFIED FINGER, UNSPECIFIED LATERALITY: ICD-10-CM

## 2022-05-19 DIAGNOSIS — M65.30 TRIGGER FINGER, UNSPECIFIED FINGER, UNSPECIFIED LATERALITY: Primary | ICD-10-CM

## 2022-05-19 PROCEDURE — 99999 PR PBB SHADOW E&M-EST. PATIENT-LVL III: ICD-10-PCS | Mod: PBBFAC,,, | Performed by: ORTHOPAEDIC SURGERY

## 2022-05-19 PROCEDURE — 1159F PR MEDICATION LIST DOCUMENTED IN MEDICAL RECORD: ICD-10-PCS | Mod: CPTII,S$GLB,, | Performed by: ORTHOPAEDIC SURGERY

## 2022-05-19 PROCEDURE — 99999 PR PBB SHADOW E&M-EST. PATIENT-LVL III: CPT | Mod: PBBFAC,,, | Performed by: ORTHOPAEDIC SURGERY

## 2022-05-19 PROCEDURE — 3078F PR MOST RECENT DIASTOLIC BLOOD PRESSURE < 80 MM HG: ICD-10-PCS | Mod: CPTII,S$GLB,, | Performed by: ORTHOPAEDIC SURGERY

## 2022-05-19 PROCEDURE — 1101F PT FALLS ASSESS-DOCD LE1/YR: CPT | Mod: CPTII,S$GLB,, | Performed by: ORTHOPAEDIC SURGERY

## 2022-05-19 PROCEDURE — 3288F FALL RISK ASSESSMENT DOCD: CPT | Mod: CPTII,S$GLB,, | Performed by: ORTHOPAEDIC SURGERY

## 2022-05-19 PROCEDURE — 3008F PR BODY MASS INDEX (BMI) DOCUMENTED: ICD-10-PCS | Mod: CPTII,S$GLB,, | Performed by: ORTHOPAEDIC SURGERY

## 2022-05-19 PROCEDURE — 99204 PR OFFICE/OUTPT VISIT, NEW, LEVL IV, 45-59 MIN: ICD-10-PCS | Mod: S$GLB,,, | Performed by: ORTHOPAEDIC SURGERY

## 2022-05-19 PROCEDURE — 3288F PR FALLS RISK ASSESSMENT DOCUMENTED: ICD-10-PCS | Mod: CPTII,S$GLB,, | Performed by: ORTHOPAEDIC SURGERY

## 2022-05-19 PROCEDURE — 3078F DIAST BP <80 MM HG: CPT | Mod: CPTII,S$GLB,, | Performed by: ORTHOPAEDIC SURGERY

## 2022-05-19 PROCEDURE — 99204 OFFICE O/P NEW MOD 45 MIN: CPT | Mod: S$GLB,,, | Performed by: ORTHOPAEDIC SURGERY

## 2022-05-19 PROCEDURE — 3077F PR MOST RECENT SYSTOLIC BLOOD PRESSURE >= 140 MM HG: ICD-10-PCS | Mod: CPTII,S$GLB,, | Performed by: ORTHOPAEDIC SURGERY

## 2022-05-19 PROCEDURE — 4010F PR ACE/ARB THEARPY RXD/TAKEN: ICD-10-PCS | Mod: CPTII,S$GLB,, | Performed by: ORTHOPAEDIC SURGERY

## 2022-05-19 PROCEDURE — 3077F SYST BP >= 140 MM HG: CPT | Mod: CPTII,S$GLB,, | Performed by: ORTHOPAEDIC SURGERY

## 2022-05-19 PROCEDURE — 1159F MED LIST DOCD IN RCRD: CPT | Mod: CPTII,S$GLB,, | Performed by: ORTHOPAEDIC SURGERY

## 2022-05-19 PROCEDURE — 3008F BODY MASS INDEX DOCD: CPT | Mod: CPTII,S$GLB,, | Performed by: ORTHOPAEDIC SURGERY

## 2022-05-19 PROCEDURE — 1101F PR PT FALLS ASSESS DOC 0-1 FALLS W/OUT INJ PAST YR: ICD-10-PCS | Mod: CPTII,S$GLB,, | Performed by: ORTHOPAEDIC SURGERY

## 2022-05-19 PROCEDURE — 4010F ACE/ARB THERAPY RXD/TAKEN: CPT | Mod: CPTII,S$GLB,, | Performed by: ORTHOPAEDIC SURGERY

## 2022-05-19 PROCEDURE — 1125F AMNT PAIN NOTED PAIN PRSNT: CPT | Mod: CPTII,S$GLB,, | Performed by: ORTHOPAEDIC SURGERY

## 2022-05-19 PROCEDURE — 1125F PR PAIN SEVERITY QUANTIFIED, PAIN PRESENT: ICD-10-PCS | Mod: CPTII,S$GLB,, | Performed by: ORTHOPAEDIC SURGERY

## 2022-05-19 NOTE — H&P (VIEW-ONLY)
[facility]  History & Physical  Orthopedics    SUBJECTIVE:        Chief Complaint: right long finger triggering and pain    Referring Provider: Noemy Pugh MD     History of Present Illness:  Patient is a 71 y.o. right hand dominant female who presents today with complaints of severe right long finger pain and triggering. Symptoms have been present for 3 months. Triggering of the digit occurs and patient has to manually unlock the digit which is extremely painful. Patient is right hand dominant. She denies numbness or tingling. She is interested in having the digit taken care of.        Past Medical History:   Diagnosis Date    Abnormal chest CT     Acid reflux     Allergy     Anemia     Anemia     Anxiety     Cataract     Chronic UTI     recently treated with antibiotics -x 3 wks. ago-    Colon adenoma 2015 due 2020 10/21/2015    Colon adenoma 2015 due 2020 10/21/2015    Depression     Disturbed sleep rhythm     DJD (degenerative joint disease)     Dry eyes     Dry mouth     Grief reaction 3/24/2014    Hx of migraine headaches     Hyperlipemia     Hypernatremia 8/8/2014    Hypertension     Iritis     Iritis     Mild vitamin D deficiency 9/9/2013    Multiple lung nodules on CT: 2856-1297 no f/u needed 6/6/2016    Neurogenic bladder     Osteopenia     in hips    Osteoporosis     spine    Osteoporosis: 9/15 see rheumatology notes 6/6/2016    Positive GEORGIANA (antinuclear antibody)     Schatzki's ring: 3/15 dilated 6/6/2016    Sciatica neuralgia 6/21/2013    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    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    Visual impairment      Past Surgical History:   Procedure Laterality Date    APPENDECTOMY      BACK SURGERY  2007    x4    CHOLECYSTECTOMY      lap orin    COLONOSCOPY N/A 10/2/2015    Procedure: COLONOSCOPY;  Surgeon: Bryan Love MD;  [facility] ([address] [place] [address]);  Service: Endoscopy;   Laterality: N/A;    COLONOSCOPY N/A 9/9/2019    Procedure: COLONOSCOPY;  Surgeon: Bryan Love MD;  Location: [facility] ENDO (4TH FLR);  Service: Endoscopy;  Laterality: N/A;    CYSTOSCOPY      with BOTOX INJECTION    ESOPHAGOGASTRODUODENOSCOPY N/A 5/13/2019    Procedure: EGD (ESOPHAGOGASTRODUODENOSCOPY);  Surgeon: Bryan Love MD;  Location: [facility] ENDO (4TH FLR);  Service: Endoscopy;  Laterality: N/A;    ESOPHAGOGASTRODUODENOSCOPY N/A 9/9/2019    Procedure: EGD (ESOPHAGOGASTRODUODENOSCOPY);  Surgeon: Bryan Love MD;  Location: [facility] END[facility] (4TH FLR);  Service: Endoscopy;  Laterality: N/A;    HERNIA REPAIR      umbilical    HYSTERECTOMY      COMPLETE    POSTERIOR FUSION LUMBAR SPINE W/ CORPECTOMY      SPINE SURGERY      TONSILLECTOMY, ADENOIDECTOMY       Review of patient's allergies indicates:   Allergen Reactions    Alendronate Nausea Only     And heartburn    Brimonidine Dermatitis     Follicular Conjunctivitis    Kenalog [triamcinolone acetonide] Hives     Social History     Social History Narrative    Not on file     Family History   Problem Relation Age of Onset    Kidney disease Mother     Hypertension Mother     Diabetes Father     Diabetes Brother     Kidney disease Brother     Heart disease Brother     Hyperlipidemia Sister     Hypertension Sister     Diverticulosis Sister     Hypertension Daughter     Colon cancer Maternal Aunt 70        stomach    Blindness Maternal Aunt     Cancer Maternal Aunt         stomach cancer    Cancer Maternal Uncle         colon    Colon cancer Maternal Uncle 70        two uncles    Glaucoma Neg Hx     Amblyopia Neg Hx     Macular degeneration Neg Hx     Retinal detachment Neg Hx     Anesthesia problems Neg Hx     Celiac disease Neg Hx     Cirrhosis Neg Hx     Crohn's disease Neg Hx     Esophageal cancer Neg Hx     Irritable bowel syndrome Neg Hx     Liver cancer Neg Hx     Liver disease Neg Hx     Rectal cancer Neg Hx      "Stomach cancer Neg Hx     Melanoma Neg Hx          Current Outpatient Medications:     amLODIPine (NORVASC) 10 MG tablet, TAKE 0.5 TABLETS (5 MG TOTAL) BY MOUTH 2 (TWO) TIMES DAILY., Disp: 90 tablet, Rfl: 3    ascorbic acid (VITAMIN C ORAL), Take by mouth., Disp: , Rfl:     B-complex with vitamin C (Z-BEC OR EQUIV) tablet, Take 1 tablet by mouth once daily., Disp: , Rfl:     DULoxetine (CYMBALTA) 60 MG capsule, TAKE 1 CAPSULE BY MOUTH EVERY DAY, Disp: 90 capsule, Rfl: 0    ferrous gluconate (FERGON) 324 MG tablet, Take 1 tablet (324 mg total) by mouth daily with breakfast., Disp: 90 tablet, Rfl: 1    linaCLOtide (LINZESS) 145 mcg Cap capsule, Take 1 capsule (145 mcg total) by mouth before breakfast., Disp: 30 capsule, Rfl: 5    pantoprazole (PROTONIX) 40 MG tablet, TAKE 1 TABLET BY MOUTH EVERY DAY BEFORE BREAKFAST, Disp: 90 tablet, Rfl: 3    timolol maleate 0.5% (TIMOPTIC) 0.5 % Drop, INSTILL 1 DROP INTO BOTH EYES TWICE A DAY, Disp: 30 mL, Rfl: 2    tolterodine (DETROL LA) 4 MG 24 hr capsule, Take 1 capsule (4 mg total) by mouth once daily., Disp: 30 capsule, Rfl: 11    vitamin D (VITAMIN D3) 1000 units Tab, Take 1,000 Units by mouth once daily., Disp: , Rfl:     vitamin E acetate (VITAMIN E ORAL), Take by mouth., Disp: , Rfl:     hydroCHLOROthiazide (MICROZIDE) 12.5 mg capsule, Take 1 capsule (12.5 mg total) by mouth 3 (three) times a week., Disp: 30 capsule, Rfl: 11    valsartan (DIOVAN) 160 MG tablet, TAKE 2 TABLETS (320 MG TOTAL) BY MOUTH ONCE DAILY., Disp: 180 tablet, Rfl: 3      Review of Systems:  As per HPI otherwise noncontributory    OBJECTIVE:      Vital Signs (Most Recent):  Vitals:    05/19/22 1055   BP: (!) 151/78   Pulse: 101   Weight: 74.4 kg (164 lb)   Height: 5' 6" (1.676 m)     Body mass index is 26.47 kg/m².      Physical Exam:  Constitutional: The patient appears well-developed and well-nourished. No distress.   Skin: No lesions appreciated  Head: Normocephalic and atraumatic. "   Nose: Nose normal.   Ears: No deformities seen  Eyes: Conjunctivae and EOM are normal.   Neck: No tracheal deviation present.   Cardiovascular: Normal rate and intact distal pulses.    Pulmonary/Chest: Effort normal. No respiratory distress.   Abdominal: There is no guarding.   Neurological: The patient is alert.   Psychiatric: The patient has a normal mood and affect.     Right Hand/Wrist Examination:    Observation/Inspection:  Swelling  none    Deformity  none  Discoloration  none     Scars   none    Atrophy  none    HAND/WRIST EXAMINATION:  Finkelstein's Test   Neg  WHAT Test    Neg  Snuff box tenderness   Neg  Forte's Test    Neg  Hook of Hamate Tenderness  Neg  CMC grind    Neg  Circumduction test   Neg    Neurovascular Exam:  Digits WWP, brisk CR < 3s throughout  NVI motor/LTS to M/R/U nerves, radial pulse 2+  Tinel's Test - Carpal Tunnel  Neg  Tinel's Test - Cubital Tunnel  Neg  Phalen's Test    Neg  Median Nerve Compression Test Neg    ROM hand full, painless    ROM wrist full, painless    ROM elbow full, painless    Abdomen not guarded  Respirations nonlabored  Perfusion intact    Diagnostic Results:     Imaging - I independently viewed the patient's imaging as well as the radiology report.  Xrays of the patient's right hand  demonstrate no evidence of any acute fractures or dislocations. Mild degenerative changes.  EMG - none    ASSESSMENT/PLAN:      71 y.o. yo female with right long finger trigger finger    Severe pain and locking symptoms. Discussed surgical and non surgical options. Will proceed with right long finger trigger finger release. Consents signed in clinic.      The risks, benefits and alternatives to surgery were discussed with the patient at great length.  These include pain, bleeding, infection, vessel/nerve damage, numbness, tingling, complex regional pain syndrome, recurrent infection need for further surgery, scarring, wound healing complications requiring further procedure for soft  tissue coverage including flap coverage, cartilage damage,  DVT, PE, arthritis and death.  Patient states an understanding and wishes to proceed with surgery.   All questions were answered.  No guarantees were implied or stated.  Informed consent was obtained.

## 2022-05-19 NOTE — PROGRESS NOTES
[facility]  History & Physical  Orthopedics    SUBJECTIVE:        Chief Complaint: right long finger triggering and pain    Referring Provider: Noemy Pugh MD     History of Present Illness:  Patient is a 71 y.o. right hand dominant female who presents today with complaints of severe right long finger pain and triggering. Symptoms have been present for 3 months. Triggering of the digit occurs and patient has to manually unlock the digit which is extremely painful. Patient is right hand dominant. She denies numbness or tingling. She is interested in having the digit taken care of.        Past Medical History:   Diagnosis Date    Abnormal chest CT     Acid reflux     Allergy     Anemia     Anemia     Anxiety     Cataract     Chronic UTI     recently treated with antibiotics -x 3 wks. ago-    Colon adenoma 2015 due 2020 10/21/2015    Colon adenoma 2015 due 2020 10/21/2015    Depression     Disturbed sleep rhythm     DJD (degenerative joint disease)     Dry eyes     Dry mouth     Grief reaction 3/24/2014    Hx of migraine headaches     Hyperlipemia     Hypernatremia 8/8/2014    Hypertension     Iritis     Iritis     Mild vitamin D deficiency 9/9/2013    Multiple lung nodules on CT: 1066-8877 no f/u needed 6/6/2016    Neurogenic bladder     Osteopenia     in hips    Osteoporosis     spine    Osteoporosis: 9/15 see rheumatology notes 6/6/2016    Positive GEORGIANA (antinuclear antibody)     Schatzki's ring: 3/15 dilated 6/6/2016    Sciatica neuralgia 6/21/2013    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    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    Visual impairment      Past Surgical History:   Procedure Laterality Date    APPENDECTOMY      BACK SURGERY  2007    x4    CHOLECYSTECTOMY      lap orin    COLONOSCOPY N/A 10/2/2015    Procedure: COLONOSCOPY;  Surgeon: Bryan Love MD;  Location: [facility] ([address] [place] [address]);  Service: Endoscopy;   Laterality: N/A;    COLONOSCOPY N/A 9/9/2019    Procedure: COLONOSCOPY;  Surgeon: Bryan Love MD;  Location: [facility] ENDO (4TH FLR);  Service: Endoscopy;  Laterality: N/A;    CYSTOSCOPY      with BOTOX INJECTION    ESOPHAGOGASTRODUODENOSCOPY N/A 5/13/2019    Procedure: EGD (ESOPHAGOGASTRODUODENOSCOPY);  Surgeon: Bryan Love MD;  Location: [facility] END[facility] (4TH FLR);  Service: Endoscopy;  Laterality: N/A;    ESOPHAGOGASTRODUODENOSCOPY N/A 9/9/2019    Procedure: EGD (ESOPHAGOGASTRODUODENOSCOPY);  Surgeon: Bryan Love MD;  Location: [facility] END[facility] (4TH FLR);  Service: Endoscopy;  Laterality: N/A;    HERNIA REPAIR      umbilical    HYSTERECTOMY      COMPLETE    POSTERIOR FUSION LUMBAR SPINE W/ CORPECTOMY      SPINE SURGERY      TONSILLECTOMY, ADENOIDECTOMY       Review of patient's allergies indicates:   Allergen Reactions    Alendronate Nausea Only     And heartburn    Brimonidine Dermatitis     Follicular Conjunctivitis    Kenalog [triamcinolone acetonide] Hives     Social History     Social History Narrative    Not on file     Family History   Problem Relation Age of Onset    Kidney disease Mother     Hypertension Mother     Diabetes Father     Diabetes Brother     Kidney disease Brother     Heart disease Brother     Hyperlipidemia Sister     Hypertension Sister     Diverticulosis Sister     Hypertension Daughter     Colon cancer Maternal Aunt 70        stomach    Blindness Maternal Aunt     Cancer Maternal Aunt         stomach cancer    Cancer Maternal Uncle         colon    Colon cancer Maternal Uncle 70        two uncles    Glaucoma Neg Hx     Amblyopia Neg Hx     Macular degeneration Neg Hx     Retinal detachment Neg Hx     Anesthesia problems Neg Hx     Celiac disease Neg Hx     Cirrhosis Neg Hx     Crohn's disease Neg Hx     Esophageal cancer Neg Hx     Irritable bowel syndrome Neg Hx     Liver cancer Neg Hx     Liver disease Neg Hx     Rectal cancer Neg Hx      "Stomach cancer Neg Hx     Melanoma Neg Hx          Current Outpatient Medications:     amLODIPine (NORVASC) 10 MG tablet, TAKE 0.5 TABLETS (5 MG TOTAL) BY MOUTH 2 (TWO) TIMES DAILY., Disp: 90 tablet, Rfl: 3    ascorbic acid (VITAMIN C ORAL), Take by mouth., Disp: , Rfl:     B-complex with vitamin C (Z-BEC OR EQUIV) tablet, Take 1 tablet by mouth once daily., Disp: , Rfl:     DULoxetine (CYMBALTA) 60 MG capsule, TAKE 1 CAPSULE BY MOUTH EVERY DAY, Disp: 90 capsule, Rfl: 0    ferrous gluconate (FERGON) 324 MG tablet, Take 1 tablet (324 mg total) by mouth daily with breakfast., Disp: 90 tablet, Rfl: 1    linaCLOtide (LINZESS) 145 mcg Cap capsule, Take 1 capsule (145 mcg total) by mouth before breakfast., Disp: 30 capsule, Rfl: 5    pantoprazole (PROTONIX) 40 MG tablet, TAKE 1 TABLET BY MOUTH EVERY DAY BEFORE BREAKFAST, Disp: 90 tablet, Rfl: 3    timolol maleate 0.5% (TIMOPTIC) 0.5 % Drop, INSTILL 1 DROP INTO BOTH EYES TWICE A DAY, Disp: 30 mL, Rfl: 2    tolterodine (DETROL LA) 4 MG 24 hr capsule, Take 1 capsule (4 mg total) by mouth once daily., Disp: 30 capsule, Rfl: 11    vitamin D (VITAMIN D3) 1000 units Tab, Take 1,000 Units by mouth once daily., Disp: , Rfl:     vitamin E acetate (VITAMIN E ORAL), Take by mouth., Disp: , Rfl:     hydroCHLOROthiazide (MICROZIDE) 12.5 mg capsule, Take 1 capsule (12.5 mg total) by mouth 3 (three) times a week., Disp: 30 capsule, Rfl: 11    valsartan (DIOVAN) 160 MG tablet, TAKE 2 TABLETS (320 MG TOTAL) BY MOUTH ONCE DAILY., Disp: 180 tablet, Rfl: 3      Review of Systems:  As per HPI otherwise noncontributory    OBJECTIVE:      Vital Signs (Most Recent):  Vitals:    05/19/22 1055   BP: (!) 151/78   Pulse: 101   Weight: 74.4 kg (164 lb)   Height: 5' 6" (1.676 m)     Body mass index is 26.47 kg/m².      Physical Exam:  Constitutional: The patient appears well-developed and well-nourished. No distress.   Skin: No lesions appreciated  Head: Normocephalic and atraumatic. "   Nose: Nose normal.   Ears: No deformities seen  Eyes: Conjunctivae and EOM are normal.   Neck: No tracheal deviation present.   Cardiovascular: Normal rate and intact distal pulses.    Pulmonary/Chest: Effort normal. No respiratory distress.   Abdominal: There is no guarding.   Neurological: The patient is alert.   Psychiatric: The patient has a normal mood and affect.     Right Hand/Wrist Examination:    Observation/Inspection:  Swelling  none    Deformity  none  Discoloration  none     Scars   none    Atrophy  none    HAND/WRIST EXAMINATION:  Finkelstein's Test   Neg  WHAT Test    Neg  Snuff box tenderness   Neg  Forte's Test    Neg  Hook of Hamate Tenderness  Neg  CMC grind    Neg  Circumduction test   Neg    Neurovascular Exam:  Digits WWP, brisk CR < 3s throughout  NVI motor/LTS to M/R/U nerves, radial pulse 2+  Tinel's Test - Carpal Tunnel  Neg  Tinel's Test - Cubital Tunnel  Neg  Phalen's Test    Neg  Median Nerve Compression Test Neg    ROM hand full, painless    ROM wrist full, painless    ROM elbow full, painless    Abdomen not guarded  Respirations nonlabored  Perfusion intact    Diagnostic Results:     Imaging - I independently viewed the patient's imaging as well as the radiology report.  Xrays of the patient's right hand  demonstrate no evidence of any acute fractures or dislocations. Mild degenerative changes.  EMG - none    ASSESSMENT/PLAN:      71 y.o. yo female with right long finger trigger finger    Severe pain and locking symptoms. Discussed surgical and non surgical options. Will proceed with right long finger trigger finger release. Consents signed in clinic.      The risks, benefits and alternatives to surgery were discussed with the patient at great length.  These include pain, bleeding, infection, vessel/nerve damage, numbness, tingling, complex regional pain syndrome, recurrent infection need for further surgery, scarring, wound healing complications requiring further procedure for soft  tissue coverage including flap coverage, cartilage damage,  DVT, PE, arthritis and death.  Patient states an understanding and wishes to proceed with surgery.   All questions were answered.  No guarantees were implied or stated.  Informed consent was obtained.

## 2022-05-19 NOTE — PROGRESS NOTES
I have personally taken the history and examined this patient. I agree with the resident's note as stated above.   plan for trigger finger surgery risks benefits were explained the clinic consents were signed in clinic patient understands the surgery we will proceed with surgical treatment this is a locked trigger finger that is very painful for her to unlock no diabetes

## 2022-05-23 ENCOUNTER — ANESTHESIA EVENT (OUTPATIENT)
Dept: SURGERY | Facility: HOSPITAL | Age: 72
End: 2022-05-23
Payer: MEDICARE

## 2022-05-23 NOTE — ANESTHESIA PREPROCEDURE EVALUATION
Chart review complete. Patient's medical history reviewed.  OK to proceed at [facility].    Kendell Pugh MD   5/23/2022 05/23/2022  Idalmis Morejon is a 71 y.o., female.      Pre-op Assessment    I have reviewed the Patient Summary Reports.     I have reviewed the Nursing Notes.    I have reviewed the Medications.     Review of Systems    Patient Active Problem List   Diagnosis    Essential hypertension    Gastroesophageal reflux disease with esophagitis: EGD 3/15; also 2019    Spondylosis of lumbosacral region without myelopathy or radiculopathy    Mixed hyperlipidemia    Nuclear sclerosis - Both Eyes    Chronic pain syndrome    Sciatica neuralgia    High risk medication use-Azathioprine 100 mg daily    Ocular hypertension - Both Eyes    Bilateral dry eyes - Both Eyes    Autoimmune disorder- recurrent iritis    Neurogenic bladder    Slow transit constipation    Scleritis    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    Colon adenoma: 10/15 acceptable 2019, repeat 2024    Age-related osteoporosis without current pathological fracture 2016    Schatzki's ring: 3/15 dilated    Multiple lung nodules on CT: 5691-3894 no f/u needed    Steroid-induced glaucoma of both eyes    Proteinuria    Family history of colon cancer: maternal uncle and aunt    Recurrent major depressive disorder, in partial remission    Immunosuppression    Atherosclerosis of abdominal aorta: minimal see CT 7/16    CKD (chronic kidney disease) stage 3, GFR 30-59 ml/min    Secondary hyperparathyroidism    Esophageal dysphagia    Chronic follicular conjunctivitis of both eyes    History of adenomatous polyp of colon    Undifferentiated connective tissue disease    Tortuous aorta; on CT 12/10 and CXR 7/20    Open angle with borderline findings and low glaucoma risk in both eyes    History of  compression fracture of spine         Physical Exam  General: Well nourished    Airway:  Mallampati: II / I  Mouth Opening: Normal  TM Distance: Normal  Tongue: Normal  Neck ROM: Normal ROM        Anesthesia Plan  Type of Anesthesia, risks & benefits discussed:    Anesthesia Type: Gen ETT, Gen Supraglottic Airway, Gen Natural Airway, MAC, Regional  Intra-op Monitoring Plan: Standard ASA Monitors  Induction:  IV  ASA Score: 2    Ready For Surgery From Anesthesia Perspective.     .

## 2022-05-31 ENCOUNTER — PATIENT MESSAGE (OUTPATIENT)
Dept: SURGERY | Facility: HOSPITAL | Age: 72
End: 2022-05-31
Payer: MEDICARE

## 2022-06-02 ENCOUNTER — TELEPHONE (OUTPATIENT)
Dept: ORTHOPEDICS | Facility: CLINIC | Age: 72
End: 2022-06-02
Payer: MEDICARE

## 2022-06-02 ENCOUNTER — PATIENT MESSAGE (OUTPATIENT)
Dept: SURGERY | Facility: HOSPITAL | Age: 72
End: 2022-06-02
Payer: MEDICARE

## 2022-06-02 DIAGNOSIS — M65.30 TRIGGER FINGER: ICD-10-CM

## 2022-06-02 DIAGNOSIS — M65.30 TRIGGER FINGER, UNSPECIFIED FINGER, UNSPECIFIED LATERALITY: Primary | ICD-10-CM

## 2022-06-02 RX ORDER — TRAMADOL HYDROCHLORIDE 50 MG/1
50 TABLET ORAL EVERY 6 HOURS PRN
Qty: 10 TABLET | Refills: 0 | Status: SHIPPED | OUTPATIENT
Start: 2022-06-02 | End: 2022-06-18 | Stop reason: HOSPADM

## 2022-06-02 NOTE — TELEPHONE ENCOUNTER
Spoke c pt. Informed pt of 5:00 a.m. arrival time for 06/03/22 surgery at the [facility]. Reminded pt of NPO status & PO appt. Pt expressed understanding & was thankful.

## 2022-06-03 ENCOUNTER — ANESTHESIA (OUTPATIENT)
Dept: SURGERY | Facility: HOSPITAL | Age: 72
End: 2022-06-03
Payer: MEDICARE

## 2022-06-03 ENCOUNTER — HOSPITAL ENCOUNTER (OUTPATIENT)
Facility: HOSPITAL | Age: 72
Discharge: HOME | End: 2022-06-03
Attending: ORTHOPAEDIC SURGERY | Admitting: ORTHOPAEDIC SURGERY
Payer: MEDICARE

## 2022-06-03 VITALS
OXYGEN SATURATION: 98 % | HEART RATE: 64 BPM | RESPIRATION RATE: 20 BRPM | DIASTOLIC BLOOD PRESSURE: 79 MMHG | BODY MASS INDEX: 26.36 KG/M2 | WEIGHT: 164 LBS | HEIGHT: 66 IN | TEMPERATURE: 98 F | SYSTOLIC BLOOD PRESSURE: 167 MMHG

## 2022-06-03 DIAGNOSIS — M65.30 TRIGGER FINGER, UNSPECIFIED FINGER, UNSPECIFIED LATERALITY: ICD-10-CM

## 2022-06-03 DIAGNOSIS — M65.30 TRIGGER FINGER: Primary | ICD-10-CM

## 2022-06-03 PROCEDURE — 25000003 PHARM REV CODE 250: Performed by: STUDENT IN AN ORGANIZED HEALTH CARE EDUCATION/TRAINING PROGRAM

## 2022-06-03 PROCEDURE — 99900035 HC TECH TIME PER 15 MIN (STAT)

## 2022-06-03 PROCEDURE — 63600175 PHARM REV CODE 636 W HCPCS: Performed by: NURSE ANESTHETIST, CERTIFIED REGISTERED

## 2022-06-03 PROCEDURE — 88307 TISSUE EXAM BY PATHOLOGIST: CPT | Mod: 26,,, | Performed by: PATHOLOGY

## 2022-06-03 PROCEDURE — 37000009 HC ANESTHESIA EA ADD 15 MINS: Performed by: ORTHOPAEDIC SURGERY

## 2022-06-03 PROCEDURE — D9220A PRA ANESTHESIA: ICD-10-PCS | Mod: ANES,,, | Performed by: ANESTHESIOLOGY

## 2022-06-03 PROCEDURE — D9220A PRA ANESTHESIA: Mod: CRNA,,, | Performed by: NURSE ANESTHETIST, CERTIFIED REGISTERED

## 2022-06-03 PROCEDURE — 71000033 HC RECOVERY, INTIAL HOUR: Performed by: ORTHOPAEDIC SURGERY

## 2022-06-03 PROCEDURE — 25000003 PHARM REV CODE 250: Performed by: NURSE ANESTHETIST, CERTIFIED REGISTERED

## 2022-06-03 PROCEDURE — 94761 N-INVAS EAR/PLS OXIMETRY MLT: CPT

## 2022-06-03 PROCEDURE — 36000706: Performed by: ORTHOPAEDIC SURGERY

## 2022-06-03 PROCEDURE — 26055 PR INCISE FINGER TENDON SHEATH: ICD-10-PCS | Mod: F7,,, | Performed by: ORTHOPAEDIC SURGERY

## 2022-06-03 PROCEDURE — D9220A PRA ANESTHESIA: ICD-10-PCS | Mod: CRNA,,, | Performed by: NURSE ANESTHETIST, CERTIFIED REGISTERED

## 2022-06-03 PROCEDURE — 71000015 HC POSTOP RECOV 1ST HR: Performed by: ORTHOPAEDIC SURGERY

## 2022-06-03 PROCEDURE — 36000707: Performed by: ORTHOPAEDIC SURGERY

## 2022-06-03 PROCEDURE — 88307 PR  SURG PATH,LEVEL V: ICD-10-PCS | Mod: 26,,, | Performed by: PATHOLOGY

## 2022-06-03 PROCEDURE — 37000008 HC ANESTHESIA 1ST 15 MINUTES: Performed by: ORTHOPAEDIC SURGERY

## 2022-06-03 PROCEDURE — 88307 TISSUE EXAM BY PATHOLOGIST: CPT | Performed by: PATHOLOGY

## 2022-06-03 PROCEDURE — D9220A PRA ANESTHESIA: Mod: ANES,,, | Performed by: ANESTHESIOLOGY

## 2022-06-03 PROCEDURE — 25000003 PHARM REV CODE 250: Performed by: ANESTHESIOLOGY

## 2022-06-03 PROCEDURE — 25000003 PHARM REV CODE 250: Performed by: ORTHOPAEDIC SURGERY

## 2022-06-03 PROCEDURE — 27201423 OPTIME MED/SURG SUP & DEVICES STERILE SUPPLY: Performed by: ORTHOPAEDIC SURGERY

## 2022-06-03 PROCEDURE — 26055 INCISE FINGER TENDON SHEATH: CPT | Mod: F7,,, | Performed by: ORTHOPAEDIC SURGERY

## 2022-06-03 RX ORDER — ONDANSETRON 2 MG/ML
INJECTION INTRAMUSCULAR; INTRAVENOUS
Status: DISCONTINUED | OUTPATIENT
Start: 2022-06-03 | End: 2022-06-03

## 2022-06-03 RX ORDER — CEFAZOLIN SODIUM/D5W 2 G/100 ML
2 PLASTIC BAG, INJECTION (ML) INTRAVENOUS
Status: COMPLETED | OUTPATIENT
Start: 2022-06-03 | End: 2022-06-03

## 2022-06-03 RX ORDER — LIDOCAINE HYDROCHLORIDE 20 MG/ML
INJECTION INTRAVENOUS
Status: DISCONTINUED | OUTPATIENT
Start: 2022-06-03 | End: 2022-06-03

## 2022-06-03 RX ORDER — DEXMEDETOMIDINE HYDROCHLORIDE 100 UG/ML
INJECTION, SOLUTION INTRAVENOUS
Status: DISCONTINUED | OUTPATIENT
Start: 2022-06-03 | End: 2022-06-03

## 2022-06-03 RX ORDER — HALOPERIDOL 5 MG/ML
0.5 INJECTION INTRAMUSCULAR EVERY 10 MIN PRN
Status: DISCONTINUED | OUTPATIENT
Start: 2022-06-03 | End: 2022-06-03 | Stop reason: HOSPADM

## 2022-06-03 RX ORDER — BACITRACIN ZINC 500 UNIT/G
OINTMENT (GRAM) TOPICAL
Status: DISCONTINUED | OUTPATIENT
Start: 2022-06-03 | End: 2022-06-03 | Stop reason: HOSPADM

## 2022-06-03 RX ORDER — LIDOCAINE HYDROCHLORIDE 10 MG/ML
INJECTION, SOLUTION EPIDURAL; INFILTRATION; INTRACAUDAL; PERINEURAL
Status: DISCONTINUED | OUTPATIENT
Start: 2022-06-03 | End: 2022-06-03 | Stop reason: HOSPADM

## 2022-06-03 RX ORDER — PROPOFOL 10 MG/ML
VIAL (ML) INTRAVENOUS
Status: DISCONTINUED | OUTPATIENT
Start: 2022-06-03 | End: 2022-06-03

## 2022-06-03 RX ORDER — FENTANYL CITRATE 50 UG/ML
INJECTION, SOLUTION INTRAMUSCULAR; INTRAVENOUS
Status: DISCONTINUED | OUTPATIENT
Start: 2022-06-03 | End: 2022-06-03

## 2022-06-03 RX ORDER — PROPOFOL 10 MG/ML
VIAL (ML) INTRAVENOUS CONTINUOUS PRN
Status: DISCONTINUED | OUTPATIENT
Start: 2022-06-03 | End: 2022-06-03

## 2022-06-03 RX ORDER — FENTANYL CITRATE 50 UG/ML
25 INJECTION, SOLUTION INTRAMUSCULAR; INTRAVENOUS EVERY 5 MIN PRN
Status: DISCONTINUED | OUTPATIENT
Start: 2022-06-03 | End: 2022-06-03 | Stop reason: HOSPADM

## 2022-06-03 RX ORDER — ACETAMINOPHEN 500 MG
1000 TABLET ORAL
Status: COMPLETED | OUTPATIENT
Start: 2022-06-03 | End: 2022-06-03

## 2022-06-03 RX ORDER — OXYCODONE HYDROCHLORIDE 5 MG/1
5 TABLET ORAL
Status: DISCONTINUED | OUTPATIENT
Start: 2022-06-03 | End: 2022-06-03 | Stop reason: HOSPADM

## 2022-06-03 RX ORDER — BUPIVACAINE HYDROCHLORIDE 2.5 MG/ML
INJECTION, SOLUTION EPIDURAL; INFILTRATION; INTRACAUDAL
Status: DISCONTINUED | OUTPATIENT
Start: 2022-06-03 | End: 2022-06-03 | Stop reason: HOSPADM

## 2022-06-03 RX ADMIN — PROPOFOL 80 MG: 10 INJECTION, EMULSION INTRAVENOUS at 07:06

## 2022-06-03 RX ADMIN — ACETAMINOPHEN 1000 MG: 500 TABLET ORAL at 05:06

## 2022-06-03 RX ADMIN — DEXMEDETOMIDINE HYDROCHLORIDE 4 MCG: 100 INJECTION, SOLUTION, CONCENTRATE INTRAVENOUS at 07:06

## 2022-06-03 RX ADMIN — Medication 2 G: at 07:06

## 2022-06-03 RX ADMIN — SODIUM CHLORIDE: 9 INJECTION, SOLUTION INTRAVENOUS at 06:06

## 2022-06-03 RX ADMIN — OXYCODONE 5 MG: 5 TABLET ORAL at 07:06

## 2022-06-03 RX ADMIN — PROPOFOL 100 MCG/KG/MIN: 10 INJECTION, EMULSION INTRAVENOUS at 07:06

## 2022-06-03 RX ADMIN — ONDANSETRON 4 MG: 2 INJECTION, SOLUTION INTRAMUSCULAR; INTRAVENOUS at 07:06

## 2022-06-03 RX ADMIN — FENTANYL CITRATE 50 MCG: 50 INJECTION, SOLUTION INTRAMUSCULAR; INTRAVENOUS at 07:06

## 2022-06-03 RX ADMIN — LIDOCAINE HYDROCHLORIDE 60 MG: 20 INJECTION, SOLUTION INTRAVENOUS at 07:06

## 2022-06-03 NOTE — ANESTHESIA POSTPROCEDURE EVALUATION
Anesthesia Post Evaluation    Patient: Idalmis Morejon    Procedure(s) Performed: Procedure(s) (LRB):  RELEASE, TRIGGER FINGER,RIGHT,LONG (Right)    Final Anesthesia Type: general      Patient location during evaluation: PACU  Patient participation: Yes- Able to Participate  Level of consciousness: awake and alert and oriented  Post-procedure vital signs: reviewed and stable  Pain management: adequate  Airway patency: patent    PONV status at discharge: No PONV  Anesthetic complications: no      Cardiovascular status: stable  Respiratory status: unassisted  Hydration status: euvolemic  Follow-up not needed.          Vitals Value Taken Time   /79 06/03/22 0815   Temp 36.4 °C (97.5 °F) 06/03/22 0800   Pulse 64 06/03/22 0815   Resp 20 06/03/22 0815   SpO2 98 % 06/03/22 0815         Event Time   Out of Recovery 08:16:00         Pain/Brina Score: Pain Rating Prior to Med Admin: 8 (6/3/2022  7:59 AM)  Pain Rating Post Med Admin: 8 (6/3/2022  7:59 AM)  Brina Score: 10 (6/3/2022  7:46 AM)

## 2022-06-03 NOTE — OP NOTE
[facility])  Surgery Department  Operative Note    SUMMARY     Date of Procedure: 6/3/2022     Procedure: Procedure(s) (LRB):  RELEASE, TRIGGER FINGER,RIGHT,LONG (Right)     Surgeon(s) and Role:     * Noemy Hernandez MD - Primary    Assisting Surgeon: None    Pre-Operative Diagnosis: Trigger middle finger of right hand [M65.331]    Post-Operative Diagnosis: Post-Op Diagnosis Codes:     * Trigger middle finger of right hand [M65.331]    Anesthesia: Local MAC    Technical Procedures Used: surgery    Description of the Findings of the Procedure: IMPLANTS: None.   SPECIMENS: None.   COMPLICATIONS: None.   INDICATIONS FOR PROCEDURE: Idalmis Morejon is a 71 y.o.year-old who has failed   conservative treatment for  right  longfinger trigger finger; therefore,   operative intervention was deemed necessary. Risks and benefits were explained   to the patient in clinic. Consents were performed in clinic.   PROCEDURE IN DETAIL: After the correct site was marked with the patient's   participation in the Holding Area, the patient was brought to the Operating   Room, placed in supine position, underwent MAC anesthesia. A well-padded   nonsterile tourniquet was placed on the right forearm. Time-out was called for   correct site, procedure and patient to be indicated. Under sterile conditions,   an injection of lidocaine 1% without epinephrine was injected at the A1 pulley   space. The arm was prepped and draped in normal sterile fashion. Incision was   marked out in a longitudinal fashion along the pulley. The arm was   exsanguinated using Esmarch. Tourniquet was insufflated 250 mmHg and that is   where it remained for 10 minutes. The incision was made. Careful dissection   down was maintained to the A1 pulley. The neurovascular structures were   retracted out of the way. The A1 pulley was identified. It was sharply   incised. It was completely incised proximally and distally. Portion of    pulley was also  incised. The tendon was then retracted out of the tendon sheath   using a right angle. The finger was placed through range of motion, showed it   did not trigger. The area was irrigated with copious amounts of normal saline.   Nylon closed the skin. Sterile dressing was applied. Tourniquet was deflated.   Brisk cap refill ensued. The patient was transferred the Recovery Room in   stable condition.   POSTOPERATIVE PLAN FOR THIS PATIENT: The patient is to keep the dressing clean, dry and   intact. We will take the sutures out at two weeks.    OF NOTE: PT HAD A CYSTS ASSOCIATE WITH THE PULLEY_ IT was removed and sent for pathology.    Significant Surgical Tasks Conducted by the Assistant(s), if Applicable: none    Complications: No    Estimated Blood Loss (EBL): * No values recorded between 6/3/2022  7:30 AM and 6/3/2022  7:46 AM *           Implants: * No implants in log *    Specimens:   Specimen (24h ago, onward)             Start     Ordered    06/03/22 0740  Specimen to Pathology, Surgery Orthopedics  Once        Comments: Pre-op Diagnosis: Trigger middle finger of right hand [M65.331]Procedure(s):RELEASE, TRIGGER FINGER,RIGHT,LONG Number of specimens: 1Name of specimens: Mass right long finger     References:    Click here for ordering Quick Tip   Question Answer Comment   Procedure Type: Orthopedics    Which provider would you like to cc? KAYLAH PERSAUD    Release to patient Immediate        06/03/22 0740                        Condition: Good    Disposition: PACU - hemodynamically stable.    Attestation: I performed the procedure.    Discharge Note    SUMMARY     Admit Date: 6/3/2022    Discharge Date and Time: 6/3/2022  8:23 AM    Hospital Course (synopsis of major diagnoses, care, treatment, and services provided during the course of the hospital stay): surgery     Final Diagnosis: Post-Op Diagnosis Codes:     * Trigger middle finger of right hand [M65.331]    Disposition: Home or Self Care    Follow  Up/Patient Instructions:     Medications:  Reconciled Home Medications:      Medication List      CONTINUE taking these medications    amLODIPine 10 MG tablet  Commonly known as: NORVASC  TAKE 0.5 TABLETS (5 MG TOTAL) BY MOUTH 2 (TWO) TIMES DAILY.     B-complex with vitamin C tablet  Commonly known as: Z-Bec or Equiv  Take 1 tablet by mouth once daily.     DULoxetine 60 MG capsule  Commonly known as: CYMBALTA  TAKE 1 CAPSULE BY MOUTH EVERY DAY     ferrous gluconate 324 MG tablet  Commonly known as: FERGON  Take 1 tablet (324 mg total) by mouth daily with breakfast.     hydroCHLOROthiazide 12.5 mg capsule  Commonly known as: MICROZIDE  Take 1 capsule (12.5 mg total) by mouth 3 (three) times a week.     LINZESS 145 mcg Cap capsule  Generic drug: linaCLOtide  Take 145 mcg by mouth before breakfast.     pantoprazole 40 MG tablet  Commonly known as: PROTONIX  TAKE 1 TABLET BY MOUTH EVERY DAY BEFORE BREAKFAST     timolol maleate 0.5% 0.5 % Drop  Commonly known as: TIMOPTIC  INSTILL 1 DROP INTO BOTH EYES TWICE A DAY     tolterodine 4 MG 24 hr capsule  Commonly known as: DETROL LA  Take 1 capsule (4 mg total) by mouth once daily.     traMADoL 50 mg tablet  Commonly known as: ULTRAM  Take 1 tablet (50 mg total) by mouth every 6 (six) hours as needed for Pain.     valsartan 160 MG tablet  Commonly known as: DIOVAN  TAKE 2 TABLETS (320 MG TOTAL) BY MOUTH ONCE DAILY.     VITAMIN C ORAL  Take by mouth.     vitamin D 1000 units Tab  Commonly known as: VITAMIN D3  Take 1,000 Units by mouth once daily.     VITAMIN E ORAL  Take by mouth.          Discharge Procedure Orders   Diet Adult Regular     Other restrictions (specify):   Order Comments: No lifting more than 5lbs.     Notify your health care provider if you experience any of the following:  increased confusion or weakness     Notify your health care provider if you experience any of the following:  persistent dizziness, light-headedness, or visual disturbances     Notify your  health care provider if you experience any of the following:  worsening rash     Notify your health care provider if you experience any of the following:  severe persistent headache     Notify your health care provider if you experience any of the following:  difficulty breathing or increased cough     Notify your health care provider if you experience any of the following:  redness, tenderness, or signs of infection (pain, swelling, redness, odor or green/yellow discharge around incision site)     Notify your health care provider if you experience any of the following:  severe uncontrolled pain     Notify your health care provider if you experience any of the following:  persistent nausea and vomiting or diarrhea     Notify your health care provider if you experience any of the following:  temperature >100.4     Leave dressing on - Keep it clean, dry, and intact until clinic visit      Follow-up Information     Noemy Hernandez MD Follow up.    Specialties: Hand Surgery, Orthopedic Surgery  Contact information:  [address]  [address]  [facility]  [facility] [address]  [phone]

## 2022-06-03 NOTE — DISCHARGE INSTRUCTIONS
AFTER HAND SURGERY    DOS:  Keep affected wrist elevated above the level of your heart  Check circulation frequently in fingers by pressing on the nail bed. Nail bed should turn white and then pink when released.  Exercise fingers to promote circulation.  Advance diet as tolerated  Resume home medications today.      DONT:  No driving for 24 hours or while taking narcotic pain medication    CALL PHYSICIAN FOR:  Obvious bleeding  Excessive swelling  Drainage (pus) from the wound  Pain unrelieved by pain medication.

## 2022-06-03 NOTE — BRIEF OP NOTE
[facility] ([facility])  Brief Operative Note    Surgery Date: 6/3/2022     Surgeon(s) and Role:     * Noemy Persaud MD - Primary    Assisting Surgeon: None    Pre-op Diagnosis:  Trigger middle finger of right hand [M65.331]    Post-op Diagnosis:  Post-Op Diagnosis Codes:     * Trigger middle finger of right hand [M65.331]    Procedure(s) (LRB):  RELEASE, TRIGGER FINGER,RIGHT,LONG (Right)    Anesthesia: Local MAC    Operative Findings: see op note    Estimated Blood Loss: * No values recorded between 6/3/2022  7:30 AM and 6/3/2022  7:44 AM *         Specimens:   Specimen (24h ago, onward)             Start     Ordered    06/03/22 0740  Specimen to Pathology, Surgery Orthopedics  Once        Comments: Pre-op Diagnosis: Trigger middle finger of right hand [M65.331]Procedure(s):RELEASE, TRIGGER FINGER,RIGHT,LONG Number of specimens: 1Name of specimens: Mass right long finger     References:    Click here for ordering Quick Tip   Question Answer Comment   Procedure Type: Orthopedics    Which provider would you like to cc? NOEMY PERSAUD    Release to patient Immediate        06/03/22 0740                  Discharge Note    OUTCOME: Patient tolerated treatment/procedure well without complication and is now ready for discharge.    DISPOSITION: Home or Self Care    FINAL DIAGNOSIS:  <principal problem not specified>    FOLLOWUP: In clinic    DISCHARGE INSTRUCTIONS:    Discharge Procedure Orders   Diet Adult Regular     Other restrictions (specify):   Order Comments: No lifting more than 5lbs.     Notify your health care provider if you experience any of the following:  increased confusion or weakness     Notify your health care provider if you experience any of the following:  persistent dizziness, light-headedness, or visual disturbances     Notify your health care provider if you experience any of the following:  worsening rash     Notify your health care provider if you experience any of the  following:  severe persistent headache     Notify your health care provider if you experience any of the following:  difficulty breathing or increased cough     Notify your health care provider if you experience any of the following:  redness, tenderness, or signs of infection (pain, swelling, redness, odor or green/yellow discharge around incision site)     Notify your health care provider if you experience any of the following:  severe uncontrolled pain     Notify your health care provider if you experience any of the following:  persistent nausea and vomiting or diarrhea     Notify your health care provider if you experience any of the following:  temperature >100.4     Leave dressing on - Keep it clean, dry, and intact until clinic visit

## 2022-06-03 NOTE — PLAN OF CARE
VSS. Patient able to tolerate oral liquids. Patient reports tolerable pain level for discharge. Patient/family received home medication per bedside delivery. Dressing intact. No distress noted. Patient states she is ready for discharge. Discharge instructions reviewed with patient and family, verbalized understanding. IV discontinued with catheter tip intact. Family at bedside to help patient dress. Patient voided before d/c. Patient wheeled to lobby via staff.

## 2022-06-03 NOTE — TRANSFER OF CARE
"Anesthesia Transfer of Care Note    Patient: Idalmis Morejon    Procedure(s) Performed: Procedure(s) (LRB):  RELEASE, TRIGGER FINGER,RIGHT,LONG (Right)    Patient location: PACU    Anesthesia Type: general    Transport from OR: Transported from OR on room air with adequate spontaneous ventilation    Post pain: adequate analgesia    Post assessment: no apparent anesthetic complications    Post vital signs: stable    Level of consciousness: awake    Nausea/Vomiting: no nausea/vomiting    Complications: none    Transfer of care protocol was followed      Last vitals:   Visit Vitals  BP (!) 161/77   Pulse 65   Temp 36.7 °C (98.1 °F) (Oral)   Resp 16   Ht 5' 6" (1.676 m)   Wt 74.4 kg (164 lb)   SpO2 100%   Breastfeeding No   BMI 26.47 kg/m²     "

## 2022-06-10 ENCOUNTER — PATIENT MESSAGE (OUTPATIENT)
Dept: ORTHOPEDICS | Facility: CLINIC | Age: 72
End: 2022-06-10
Payer: MEDICARE

## 2022-06-10 NOTE — TELEPHONE ENCOUNTER
Spoke with pt informing her that there are no appts available in the afternoon on 6/17/22. Pt want to keep date and was rescheduled to Kalyeigh at 10:45 am per pt request.

## 2022-06-13 ENCOUNTER — OFFICE VISIT (OUTPATIENT)
Dept: PSYCHIATRY | Facility: CLINIC | Age: 72
End: 2022-06-13
Payer: MEDICARE

## 2022-06-13 ENCOUNTER — PATIENT MESSAGE (OUTPATIENT)
Dept: ORTHOPEDICS | Facility: CLINIC | Age: 72
End: 2022-06-13
Payer: MEDICARE

## 2022-06-13 DIAGNOSIS — F40.298 SPECIFIC PHOBIA: ICD-10-CM

## 2022-06-13 DIAGNOSIS — F33.41 RECURRENT MAJOR DEPRESSIVE DISORDER, IN PARTIAL REMISSION: Primary | ICD-10-CM

## 2022-06-13 LAB
FINAL PATHOLOGIC DIAGNOSIS: NORMAL
GROSS: NORMAL
Lab: NORMAL

## 2022-06-13 PROCEDURE — 1160F RVW MEDS BY RX/DR IN RCRD: CPT | Mod: CPTII,95,, | Performed by: PSYCHIATRY & NEUROLOGY

## 2022-06-13 PROCEDURE — 4010F PR ACE/ARB THEARPY RXD/TAKEN: ICD-10-PCS | Mod: CPTII,95,, | Performed by: PSYCHIATRY & NEUROLOGY

## 2022-06-13 PROCEDURE — 99214 PR OFFICE/OUTPT VISIT, EST, LEVL IV, 30-39 MIN: ICD-10-PCS | Mod: 95,,, | Performed by: PSYCHIATRY & NEUROLOGY

## 2022-06-13 PROCEDURE — 1159F MED LIST DOCD IN RCRD: CPT | Mod: CPTII,95,, | Performed by: PSYCHIATRY & NEUROLOGY

## 2022-06-13 PROCEDURE — 99214 OFFICE O/P EST MOD 30 MIN: CPT | Mod: 95,,, | Performed by: PSYCHIATRY & NEUROLOGY

## 2022-06-13 PROCEDURE — 4010F ACE/ARB THERAPY RXD/TAKEN: CPT | Mod: CPTII,95,, | Performed by: PSYCHIATRY & NEUROLOGY

## 2022-06-13 PROCEDURE — 1159F PR MEDICATION LIST DOCUMENTED IN MEDICAL RECORD: ICD-10-PCS | Mod: CPTII,95,, | Performed by: PSYCHIATRY & NEUROLOGY

## 2022-06-13 PROCEDURE — 1160F PR REVIEW ALL MEDS BY PRESCRIBER/CLIN PHARMACIST DOCUMENTED: ICD-10-PCS | Mod: CPTII,95,, | Performed by: PSYCHIATRY & NEUROLOGY

## 2022-06-13 RX ORDER — DULOXETIN HYDROCHLORIDE 60 MG/1
60 CAPSULE, DELAYED RELEASE ORAL DAILY
Qty: 90 CAPSULE | Refills: 0 | Status: SHIPPED | OUTPATIENT
Start: 2022-06-13 | End: 2022-11-16

## 2022-06-13 NOTE — PROGRESS NOTES
"The patient location is: home  The chief complaint leading to consultation is: depression and anxiety    Visit type: audiovisual    Face to Face time with patient: 25 mins  30 minutes of total time spent on the encounter, which includes face to face time and non-face to face time preparing to see the patient (eg, review of tests), Obtaining and/or reviewing separately obtained history, Documenting clinical information in the electronic or other health record, Independently interpreting results (not separately reported) and communicating results to the patient/family/caregiver, or Care coordination (not separately reported).     Each patient to whom he or she provides medical services by telemedicine is:  (1) informed of the relationship between the physician and patient and the respective role of any other health care provider with respect to management of the patient; and (2) notified that he or she may decline to receive medical services by telemedicine and may withdraw from such care at any time.    Notes:     Outpatient Psychiatry Follow-Up Visit (MD/NP)    6/13/2022    Clinical Status of Patient:  Outpatient (Ambulatory)    Chief Complaint:  Idalmis Morejon is a 71 y.o. female who presents today for follow-up of depression and anxiety.      Met with patient.      Interval History and Content of Current Session:  Interim Events/Subjective Report/Content of Current Session:   "Much better than the last time."  Pt reports her mood has been "pretty good."  Neighbor passed near the anniversity of her mother's death and at the same age.  She managed to get through.  Still attending [employer] and bible study.  Goes out to eat on Fridays with her daughter.  Before it became hot she wentt o the park.  Walkes in the mall on Saturdays.  Weight has been stable.   Denies ever desiring death.  Sleep remains a challenge, falling asleep around awakening each night 2 or 3 am and does not fall asleep again until 5 am.  Daughter " awakens her at 730 and she stays awake until 830.  Then sets alarm for 10 am.      Had surgery for trigger finger 10 days ago.        Prior trials:  remeron - HA  wellbutrin - does not recall      Psychotherapy:  · Target symptoms: depression, anxiety   · Why chosen therapy is appropriate versus another modality: relevant to diagnosis  · Outcome monitoring methods: self-report, observation  · Therapeutic intervention type: supportive psychotherapy  · Topics discussed/themes: illness/death of a loved one, building skills sets for symptom management, symptom recognition  · The patient's response to the intervention is motivated. The patient's progress toward treatment goals is good.   · Duration of intervention: 8 mins      Review of Systems   Constitutional: Negative for chills, fever and weight loss.   Respiratory: Negative for cough, shortness of breath and wheezing.    Cardiovascular: Negative for chest pain and palpitations.   Gastrointestinal: Negative for abdominal pain, diarrhea and vomiting.         Past Medical, Family and Social History: The patient's past medical, family and social history have been reviewed and updated as appropriate within the electronic medical record - see encounter notes.    Compliance: yes    Side effects: none    Risk Parameters:  Patient reports no suicidal ideation  Patient reports no homicidal ideation  Patient reports no self-injurious behavior  Patient reports no violent behavior    Exam (detailed: at least 9 elements; comprehensive: all 15 elements)   Constitutional  Vitals:  Most recent vital signs, dated less than 90 days prior to this appointment, were reviewed.    There were no vitals filed for this visit.     General:  age appropriate, casually dressed, neatly groomed     Musculoskeletal  Muscle Strength/Tone:  no dyskinesia, no tremor   Gait & Station:  not observed      Psychiatric  Speech:  normal tone, normal rate, normal pitch, normal volume, prosody intact   Mood:     Affect:  euthymic  appropriate, full   Thought Process:  goal-directed, logical   Associations:  intact   Thought Content:  normal, no suicidality, no homicidality, delusions, or paranoia   Insight  Judgement:  good, patient has awareness of illness  Patient's behavior is adequate to circumstances   Orientation:  grossly intact   Memory: intact for content of interview   Language: grossly intact   Attention Span & Concentration:  able to focus   Fund of Knowledge:  intact and appropriate to age and level of education     Assessment and Diagnosis   Status/Progress: Based on the examination today, the patient's problem(s) is/are improved.  New problems have not been presented today.   Comorbidities are complicating management of the primary condition.  There are no active rule-out diagnoses for this patient at this time.    General Impression: Pt with depression and anxiety as well as multiple other medical comorbidities.  She is still affected by the death of her mother in March 2014.      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      Intervention/Counseling/Treatment Plan   · Continue cymbalta 60 mg daily.   · Continue melatonin    Return to Clinic:  3-4 months

## 2022-06-13 NOTE — TELEPHONE ENCOUNTER
informed pt that the 8am Appt is no longer open r/s pt to the 9:15 on 6/17  Pt voiced understanding and call ended.

## 2022-06-16 ENCOUNTER — TELEPHONE (OUTPATIENT)
Dept: ORTHOPEDICS | Facility: CLINIC | Age: 72
End: 2022-06-16
Payer: MEDICARE

## 2022-06-16 ENCOUNTER — LAB VISIT (OUTPATIENT)
Dept: LAB | Facility: HOSPITAL | Age: 72
End: 2022-06-16
Attending: INTERNAL MEDICINE
Payer: MEDICARE

## 2022-06-16 DIAGNOSIS — D50.9 IRON DEFICIENCY ANEMIA, UNSPECIFIED IRON DEFICIENCY ANEMIA TYPE: ICD-10-CM

## 2022-06-16 LAB
FERRITIN SERPL-MCNC: 72 NG/ML (ref 20–300)
HGB BLD-MCNC: 11.6 G/DL (ref 12–16)
IRON SERPL-MCNC: 75 UG/DL (ref 30–160)
SATURATED IRON: 22 % (ref 20–50)
TOTAL IRON BINDING CAPACITY: 343 UG/DL (ref 250–450)
TRANSFERRIN SERPL-MCNC: 232 MG/DL (ref 200–375)

## 2022-06-16 PROCEDURE — 85018 HEMOGLOBIN: CPT | Performed by: INTERNAL MEDICINE

## 2022-06-16 PROCEDURE — 36415 COLL VENOUS BLD VENIPUNCTURE: CPT | Performed by: INTERNAL MEDICINE

## 2022-06-16 PROCEDURE — 82728 ASSAY OF FERRITIN: CPT | Performed by: INTERNAL MEDICINE

## 2022-06-16 PROCEDURE — 84466 ASSAY OF TRANSFERRIN: CPT | Performed by: INTERNAL MEDICINE

## 2022-06-17 ENCOUNTER — OFFICE VISIT (OUTPATIENT)
Dept: ORTHOPEDICS | Facility: CLINIC | Age: 72
End: 2022-06-17
Payer: MEDICARE

## 2022-06-17 VITALS
SYSTOLIC BLOOD PRESSURE: 134 MMHG | HEIGHT: 66 IN | DIASTOLIC BLOOD PRESSURE: 83 MMHG | WEIGHT: 164 LBS | BODY MASS INDEX: 26.36 KG/M2 | HEART RATE: 70 BPM

## 2022-06-17 DIAGNOSIS — Z98.890 S/P TRIGGER FINGER RELEASE: Primary | ICD-10-CM

## 2022-06-17 PROCEDURE — 1159F MED LIST DOCD IN RCRD: CPT | Mod: CPTII,S$GLB,, | Performed by: PHYSICIAN ASSISTANT

## 2022-06-17 PROCEDURE — 1101F PR PT FALLS ASSESS DOC 0-1 FALLS W/OUT INJ PAST YR: ICD-10-PCS | Mod: CPTII,S$GLB,, | Performed by: PHYSICIAN ASSISTANT

## 2022-06-17 PROCEDURE — 3075F PR MOST RECENT SYSTOLIC BLOOD PRESS GE 130-139MM HG: ICD-10-PCS | Mod: CPTII,S$GLB,, | Performed by: PHYSICIAN ASSISTANT

## 2022-06-17 PROCEDURE — 99024 POSTOP FOLLOW-UP VISIT: CPT | Mod: S$GLB,,, | Performed by: PHYSICIAN ASSISTANT

## 2022-06-17 PROCEDURE — 3079F DIAST BP 80-89 MM HG: CPT | Mod: CPTII,S$GLB,, | Performed by: PHYSICIAN ASSISTANT

## 2022-06-17 PROCEDURE — 1126F AMNT PAIN NOTED NONE PRSNT: CPT | Mod: CPTII,S$GLB,, | Performed by: PHYSICIAN ASSISTANT

## 2022-06-17 PROCEDURE — 3075F SYST BP GE 130 - 139MM HG: CPT | Mod: CPTII,S$GLB,, | Performed by: PHYSICIAN ASSISTANT

## 2022-06-17 PROCEDURE — 1126F PR PAIN SEVERITY QUANTIFIED, NO PAIN PRESENT: ICD-10-PCS | Mod: CPTII,S$GLB,, | Performed by: PHYSICIAN ASSISTANT

## 2022-06-17 PROCEDURE — 3079F PR MOST RECENT DIASTOLIC BLOOD PRESSURE 80-89 MM HG: ICD-10-PCS | Mod: CPTII,S$GLB,, | Performed by: PHYSICIAN ASSISTANT

## 2022-06-17 PROCEDURE — 99999 PR PBB SHADOW E&M-EST. PATIENT-LVL IV: ICD-10-PCS | Mod: PBBFAC,,, | Performed by: PHYSICIAN ASSISTANT

## 2022-06-17 PROCEDURE — 3288F PR FALLS RISK ASSESSMENT DOCUMENTED: ICD-10-PCS | Mod: CPTII,S$GLB,, | Performed by: PHYSICIAN ASSISTANT

## 2022-06-17 PROCEDURE — 4010F ACE/ARB THERAPY RXD/TAKEN: CPT | Mod: CPTII,S$GLB,, | Performed by: PHYSICIAN ASSISTANT

## 2022-06-17 PROCEDURE — 1101F PT FALLS ASSESS-DOCD LE1/YR: CPT | Mod: CPTII,S$GLB,, | Performed by: PHYSICIAN ASSISTANT

## 2022-06-17 PROCEDURE — 3008F PR BODY MASS INDEX (BMI) DOCUMENTED: ICD-10-PCS | Mod: CPTII,S$GLB,, | Performed by: PHYSICIAN ASSISTANT

## 2022-06-17 PROCEDURE — 3008F BODY MASS INDEX DOCD: CPT | Mod: CPTII,S$GLB,, | Performed by: PHYSICIAN ASSISTANT

## 2022-06-17 PROCEDURE — 99024 PR POST-OP FOLLOW-UP VISIT: ICD-10-PCS | Mod: S$GLB,,, | Performed by: PHYSICIAN ASSISTANT

## 2022-06-17 PROCEDURE — 3288F FALL RISK ASSESSMENT DOCD: CPT | Mod: CPTII,S$GLB,, | Performed by: PHYSICIAN ASSISTANT

## 2022-06-17 PROCEDURE — 1159F PR MEDICATION LIST DOCUMENTED IN MEDICAL RECORD: ICD-10-PCS | Mod: CPTII,S$GLB,, | Performed by: PHYSICIAN ASSISTANT

## 2022-06-17 PROCEDURE — 4010F PR ACE/ARB THEARPY RXD/TAKEN: ICD-10-PCS | Mod: CPTII,S$GLB,, | Performed by: PHYSICIAN ASSISTANT

## 2022-06-17 PROCEDURE — 99999 PR PBB SHADOW E&M-EST. PATIENT-LVL IV: CPT | Mod: PBBFAC,,, | Performed by: PHYSICIAN ASSISTANT

## 2022-06-17 NOTE — PROGRESS NOTES
"Ms. Morejon is here today for a post-operative visit.  She is 14 days status post right long trigger finger release and cyst excision by Dr. Hernandez on 6/3/22. She reports that she is doing well. Pain is minimal not taking pain medication. She denies fever, chills, and sweats since the time of the surgery.     Physical exam:    Vitals:    06/17/22 0911   BP: 134/83   Pulse: 70   Weight: 74.4 kg (164 lb 0.4 oz)   Height: 5' 6" (1.676 m)   PainSc: 0-No pain     Vital signs are stable, patient is afebrile.  Patient is well dressed and well groomed, no acute distress.  Alert and oriented to person, place, and time.  Post op dressing taken down.  Incision is clean, dry and intact.  There is no erythema or exudate.  There is no sign of any infection. She is NVI. Sutures removed without difficulty.  Minimal stiffness with full flexion of the long.    Pathology   Soft tissue, mass right long finger, excision:  - Benign fibroconnective tissue with reactive changes.    Assessment: status post right long trigger finger release and cyst excision by Dr. Hernandez on 6/3/22    Plan:  Idalmis was seen today for post-op evaluation.    Diagnoses and all orders for this visit:    S/P trigger finger release  -     Ambulatory referral/consult to Physical/Occupational Therapy; Future        PO instruction reviewed and provided to patient  Pathology report reviewed and provided to patient   OT ordered for ROM, ROM handout also provided today  RTC 4 wks if needed        "

## 2022-06-18 DIAGNOSIS — D50.9 IRON DEFICIENCY ANEMIA, UNSPECIFIED IRON DEFICIENCY ANEMIA TYPE: ICD-10-CM

## 2022-06-18 RX ORDER — FERROUS GLUCONATE 324(38)MG
324 TABLET ORAL
Qty: 90 TABLET | Refills: 1 | Status: SHIPPED | OUTPATIENT
Start: 2022-06-18 | End: 2022-10-25

## 2022-06-18 NOTE — PROGRESS NOTES
Dane - please tell patient that they are iron deficient and anemic and recommend that they take ferrous gluconate one 324mg pill once daily for next 3 months.    Please order repeat fasting Hemoglobin, Iron/TIBC, and Ferritin in 8 weeks - Orders placed.    Dane please schedule a follow-up GI clinic appointment to discuss iron deficiency anemia

## 2022-06-21 ENCOUNTER — PATIENT MESSAGE (OUTPATIENT)
Dept: GASTROENTEROLOGY | Facility: CLINIC | Age: 72
End: 2022-06-21
Payer: MEDICARE

## 2022-06-22 ENCOUNTER — TELEPHONE (OUTPATIENT)
Dept: ORTHOPEDICS | Facility: CLINIC | Age: 72
End: 2022-06-22
Payer: MEDICARE

## 2022-06-22 ENCOUNTER — PATIENT MESSAGE (OUTPATIENT)
Dept: ORTHOPEDICS | Facility: CLINIC | Age: 72
End: 2022-06-22
Payer: MEDICARE

## 2022-06-22 NOTE — TELEPHONE ENCOUNTER
Spoke with patient she stated that she was experiencing some tenderness in her surgery site, advised that it was to be excepted as she is is 2 wks post op, advised if it does not subside by next appointment to please contact the office for a sooner visit.

## 2022-06-28 DIAGNOSIS — I10 ESSENTIAL HYPERTENSION: ICD-10-CM

## 2022-06-28 RX ORDER — VALSARTAN 160 MG/1
320 TABLET ORAL DAILY
Qty: 180 TABLET | Refills: 0 | Status: SHIPPED | OUTPATIENT
Start: 2022-06-28 | End: 2022-09-29

## 2022-06-30 ENCOUNTER — CLINICAL SUPPORT (OUTPATIENT)
Dept: REHABILITATION | Facility: HOSPITAL | Age: 72
End: 2022-06-30
Payer: MEDICARE

## 2022-06-30 DIAGNOSIS — R29.898 DECREASED GRIP STRENGTH OF RIGHT HAND: ICD-10-CM

## 2022-06-30 DIAGNOSIS — Z98.890 S/P TRIGGER FINGER RELEASE: ICD-10-CM

## 2022-06-30 DIAGNOSIS — M25.641 DECREASED RANGE OF MOTION OF FINGER OF RIGHT HAND: ICD-10-CM

## 2022-06-30 PROCEDURE — 97165 OT EVAL LOW COMPLEX 30 MIN: CPT

## 2022-06-30 NOTE — PROGRESS NOTES
[facility]: Occupational Therapy Note     See plan of care for full initial OT evaluation.    Concepcion Frias, BRITTNY, OTR/L
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
## 2022-06-30 NOTE — PATIENT INSTRUCTIONS
"[facility] - OCCUPATIONAL THERAPY  HOME EXERCISE PROGRAM     Complete the following massages for 2-3 minutes each, 4x/day.                         [facility], OCCUPATIONAL THERAPY  HOME EXERCISE PROGRAM     Complete the following stretches holding for 30 seconds per stretch. Complete 5 of each stretch, 4x/day.     PIP Flexion Stretch   Use other hand to bend the middle joint of your finger down as far as possible.     DIP Flexion Stretch   Use other hand to gently bend tip joint of your finger.       MP Flexion Stretch          Use other hand to gently bend the large knuckle of your finger.      Composite Flexion Stretch  Use other hand to bend your finger at all three joints.     Hook Stretch  Use other hand to bend middle and tip joints of your finger.       Copyright © [facility]. All rights reserved.     Therapist: Concepcion Frias, BRODERICKR/ART      [facility] OCCUPATIONAL THERAPY  HOME EXERCISE PROGRAM     Complete the following exercises with 10 repetitions each, 4x/day. Gentle and pain-free.     AROM: Isolated PIP Flexion  Bend only middle joint of your finger, keeping other fingers straight with other hand.    AROM: Isolated MCP Flexion / Extension ("Wave")   Bend only your large, bottom knuckles. Hold 3 seconds. Keep the tips of your fingers straight. Straighten fingers.    AROM: Isolated IPJ Flexion / Extension ("Hook")  Bend only your middle and end knuckles. Hold 3 seconds.   Straighten your fingers.     AROM: MCP and PIP Flexion / Extension ("Straight Fist")  Bend your bottom and middle knuckles, keeping the tips of your fingers straight. Try to touch the pads of your fingers on your palm. Hold 3 seconds. Straighten your fingers.     AROM: Composite Flexion / Extension ("Full Fist")  Bend every joint in your hand into a fist. Hold 3 seconds. Straighten your fingers.     AROM: Composte Extension ("Finger Lifts")  Lift your finger off of the table one at a time. Hold 3 " seconds. Relax your finger.    AROM: Abduction / Adduction  With hand flat on table, spread all fingers apart, then bring them together as close as possible.    Copyright © I. All rights reserved.     Therapist: CHERI Zuniga/ART

## 2022-07-19 ENCOUNTER — CLINICAL SUPPORT (OUTPATIENT)
Dept: REHABILITATION | Facility: HOSPITAL | Age: 72
End: 2022-07-19
Payer: MEDICARE

## 2022-07-19 DIAGNOSIS — R29.898 DECREASED GRIP STRENGTH OF RIGHT HAND: ICD-10-CM

## 2022-07-19 DIAGNOSIS — M25.641 DECREASED RANGE OF MOTION OF FINGER OF RIGHT HAND: Primary | ICD-10-CM

## 2022-07-19 PROCEDURE — 97018 PARAFFIN BATH THERAPY: CPT | Mod: CO,59

## 2022-07-19 PROCEDURE — 97110 THERAPEUTIC EXERCISES: CPT | Mod: CO

## 2022-07-19 PROCEDURE — 97140 MANUAL THERAPY 1/> REGIONS: CPT | Mod: CO
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
## 2022-07-26 ENCOUNTER — TELEPHONE (OUTPATIENT)
Dept: NEPHROLOGY | Facility: CLINIC | Age: 72
End: 2022-07-26
Payer: MEDICARE

## 2022-07-26 NOTE — TELEPHONE ENCOUNTER
[facility] [place] Staff  Caller: Patient (Today,  8:17 AM)  The pt called and needs to reschedule her virtual visit     Please call her back at [phone]

## 2022-08-01 ENCOUNTER — TELEPHONE (OUTPATIENT)
Dept: NEPHROLOGY | Facility: CLINIC | Age: 72
End: 2022-08-01

## 2022-08-01 ENCOUNTER — OFFICE VISIT (OUTPATIENT)
Dept: NEPHROLOGY | Facility: CLINIC | Age: 72
End: 2022-08-01
Payer: MEDICARE

## 2022-08-01 DIAGNOSIS — N18.31 STAGE 3A CHRONIC KIDNEY DISEASE: Primary | ICD-10-CM

## 2022-08-01 DIAGNOSIS — D64.9 ANEMIA, UNSPECIFIED TYPE: ICD-10-CM

## 2022-08-01 DIAGNOSIS — I10 HYPERTENSION, UNSPECIFIED TYPE: ICD-10-CM

## 2022-08-01 PROCEDURE — 99214 OFFICE O/P EST MOD 30 MIN: CPT | Mod: 95,,, | Performed by: NURSE PRACTITIONER

## 2022-08-01 PROCEDURE — 4010F PR ACE/ARB THEARPY RXD/TAKEN: ICD-10-PCS | Mod: CPTII,95,, | Performed by: NURSE PRACTITIONER

## 2022-08-01 PROCEDURE — 99499 UNLISTED E&M SERVICE: CPT | Mod: 95,,, | Performed by: NURSE PRACTITIONER

## 2022-08-01 PROCEDURE — 1159F PR MEDICATION LIST DOCUMENTED IN MEDICAL RECORD: ICD-10-PCS | Mod: CPTII,95,, | Performed by: NURSE PRACTITIONER

## 2022-08-01 PROCEDURE — 1159F MED LIST DOCD IN RCRD: CPT | Mod: CPTII,95,, | Performed by: NURSE PRACTITIONER

## 2022-08-01 PROCEDURE — 1160F RVW MEDS BY RX/DR IN RCRD: CPT | Mod: CPTII,95,, | Performed by: NURSE PRACTITIONER

## 2022-08-01 PROCEDURE — 3066F PR DOCUMENTATION OF TREATMENT FOR NEPHROPATHY: ICD-10-PCS | Mod: CPTII,95,, | Performed by: NURSE PRACTITIONER

## 2022-08-01 PROCEDURE — 99214 PR OFFICE/OUTPT VISIT, EST, LEVL IV, 30-39 MIN: ICD-10-PCS | Mod: 95,,, | Performed by: NURSE PRACTITIONER

## 2022-08-01 PROCEDURE — 3066F NEPHROPATHY DOC TX: CPT | Mod: CPTII,95,, | Performed by: NURSE PRACTITIONER

## 2022-08-01 PROCEDURE — 1160F PR REVIEW ALL MEDS BY PRESCRIBER/CLIN PHARMACIST DOCUMENTED: ICD-10-PCS | Mod: CPTII,95,, | Performed by: NURSE PRACTITIONER

## 2022-08-01 PROCEDURE — 99499 RISK ADDL DX/OHS AUDIT: ICD-10-PCS | Mod: 95,,, | Performed by: NURSE PRACTITIONER

## 2022-08-01 PROCEDURE — 4010F ACE/ARB THERAPY RXD/TAKEN: CPT | Mod: CPTII,95,, | Performed by: NURSE PRACTITIONER

## 2022-08-01 RX ORDER — CHLORTHALIDONE 25 MG/1
12.5 TABLET ORAL EVERY OTHER DAY
Qty: 23 TABLET | Refills: 3 | Status: SHIPPED | OUTPATIENT
Start: 2022-08-01 | End: 2023-06-09

## 2022-08-01 NOTE — PROGRESS NOTES
"Subjective:       Patient ID: Idalmis Morejon is a 71 y.o. AA female who presents for follow-up evaluation of CKD   No chief complaint on file.    HPI   Patient is new to me. Last seen by Dr. Sena in June 2019.  Prior pertinent chart reviewed since this is patient's first appointment with me.    Patient presents for f/u of CKD.  Baseline creatinine of 1.0-1.2 mg/dL. Pt admits to prior heavy use of various NSAID like Ibuprofen, goody powder and other compounds. She was still using NSAID until later part of 2016.    Home BPs: 140s/80s (when not taking amlodipine); 120s/70s (when taking amlodipine).     Significant hx includes longstanding hypertension "for decades", Schatzki's ring, DJD, sciatica, osteoporosis, hyperlipidemia.      The patient denies taking NSAIDs, herbal supplements, or new antibiotics, recreational drugs, recent episode of dehydration, diarrhea, nausea or vomiting, acute illness, hospitalization or exposure to IV radiocontrast.     Significant family hx includes: HTN; brother, father, nephew c ESRD on dialysis    Last renal US: April 2019, reviewed.    Update 8/1/22:  The patient location is: home ([place])  The chief complaint leading to consultation is: f/u for CKD    Visit type: audiovisual    Face to Face time with patient: 13 minutes  22 minutes of total time spent on the encounter, which includes face to face time and non-face to face time preparing to see the patient (eg, review of tests), Obtaining and/or reviewing separately obtained history, Documenting clinical information in the electronic or other health record, Independently interpreting results (not separately reported) and communicating results to the patient/family/caregiver, or Care coordination (not separately reported).     Each patient to whom he or she provides medical services by telemedicine is:  (1) informed of the relationship between the physician and patient and the respective role of any other health care provider with " respect to management of the patient; and (2) notified that he or she may decline to receive medical services by telemedicine and may withdraw from such care at any time.    Notes:   Had audio only visit in June 2021.  Presents today for f/u of CKD. No recent labs.  Had trigger finger surgery.  Home BPs: 140-145/78-80s. After medications.  Denies NSAIDs. Eats a lot salt diet.  Mother also had kidney disease.      Review of Systems   Respiratory: Positive for shortness of breath (on exertion (distance)).    Cardiovascular: Negative for leg swelling.   Gastrointestinal: Positive for constipation. Negative for diarrhea, nausea and vomiting.   Genitourinary: Positive for frequency (nocturia). Negative for difficulty urinating, dysuria, hematuria and urgency.   Neurological: Negative for dizziness and light-headedness.       Objective:       There were no vitals taken for this visit.  Physical Exam  Constitutional:       General: She is not in acute distress.     Appearance: Normal appearance. She is well-developed. She is not diaphoretic.   Pulmonary:      Effort: Pulmonary effort is normal. No respiratory distress.   Neurological:      Mental Status: She is alert and oriented to person, place, and time.   Psychiatric:         Mood and Affect: Mood normal.         Behavior: Behavior normal.         Thought Content: Thought content normal.         Judgment: Judgment normal.         Lab Results   Component Value Date    CREATININE 1.0 11/30/2021    URICACID 2.8 06/20/2012     Prot/Creat Ratio, Urine   Date Value Ref Range Status   06/09/2021 0.13 0.00 - 0.20 Final   03/03/2020 0.05 0.00 - 0.20 Final   11/22/2017 0.12 0.00 - 0.20 Final     Lab Results   Component Value Date     11/30/2021    K 4.1 11/30/2021    CO2 28 11/30/2021     11/30/2021     Lab Results   Component Value Date    PTH 61.0 02/26/2021    CALCIUM 9.7 11/30/2021    PHOS 3.9 03/03/2020     Lab Results   Component Value Date    HGB 11.6 (L)  06/16/2022    WBC 5.41 01/06/2022    HCT 36.1 (L) 01/06/2022      Lab Results   Component Value Date    HGBA1C 5.0 07/18/2006     01/06/2022    BUN 14 11/30/2021     Lab Results   Component Value Date    LDLCALC 96.6 12/09/2021           Assessment:       1. Stage 3a chronic kidney disease    2. Hypertension, unspecified type    3. Anemia, unspecified type        Plan:   CKD stage 3A c eGFR 53-58 mL/min - likely r/t longstanding HTN and heavy NSAID use, possible APOL-1 component. Stable. Non-proteinuric. Educated patient to control BP, remain well-hydrated, and avoid NSAIDs to prevent progression of CKD.    UPCR Non-proteinuric. On valsartan 160 mg BID.   Acid-base WNL.   Renal osteodystrophy Ca, phos okay. PTH improved.   Anemia At goal for CKD. On PO iron per GI.   DM Non-diabetic.   Lipid Management Defer to PCP.   ESRD planning Reassurance provided     HTN - High at home amlodipine 10, Hctz 12.5 mg TID, valsartan 320 mg  - d/c hctz; start chlorthalidone 12.5 mg qod    All questions patient had were answered.  Asked if further questions. None. F/u in clinic 12 mos  with labs and urine prior to next visit or sooner if needed.  ER for emergency concerns.    Summary of Plan:  1. RFP, UA, UPCR  2. avoid NSAID/ bactrim/ IV contrast/ gadolinium/ aminoglycoside where possible  3. RTC in 12 mos (or sooner depending on how labs come back)

## 2022-08-01 NOTE — TELEPHONE ENCOUNTER
Message  Received: Today  Mercy Maldonado, YUSRA Morejon MA  Pls contact pt to schedule RFP, UA, UCPR. For ASAP. Orders in. 12 mos f/u pls.

## 2022-08-02 ENCOUNTER — LAB VISIT (OUTPATIENT)
Dept: LAB | Facility: HOSPITAL | Age: 72
End: 2022-08-02
Attending: INTERNAL MEDICINE
Payer: MEDICARE

## 2022-08-02 ENCOUNTER — CLINICAL SUPPORT (OUTPATIENT)
Dept: REHABILITATION | Facility: HOSPITAL | Age: 72
End: 2022-08-02
Payer: MEDICARE

## 2022-08-02 DIAGNOSIS — R29.898 DECREASED GRIP STRENGTH OF RIGHT HAND: ICD-10-CM

## 2022-08-02 DIAGNOSIS — M25.641 DECREASED RANGE OF MOTION OF FINGER OF RIGHT HAND: Primary | ICD-10-CM

## 2022-08-02 DIAGNOSIS — N18.31 STAGE 3A CHRONIC KIDNEY DISEASE: ICD-10-CM

## 2022-08-02 LAB
ALBUMIN SERPL BCP-MCNC: 3.8 G/DL (ref 3.5–5.2)
ANION GAP SERPL CALC-SCNC: 9 MMOL/L (ref 8–16)
BUN SERPL-MCNC: 22 MG/DL (ref 8–23)
CALCIUM SERPL-MCNC: 10.2 MG/DL (ref 8.7–10.5)
CHLORIDE SERPL-SCNC: 107 MMOL/L (ref 95–110)
CO2 SERPL-SCNC: 27 MMOL/L (ref 23–29)
CREAT SERPL-MCNC: 1.1 MG/DL (ref 0.5–1.4)
EST. GFR  (NO RACE VARIABLE): 53.1 ML/MIN/1.73 M^2
GLUCOSE SERPL-MCNC: 114 MG/DL (ref 70–110)
PHOSPHATE SERPL-MCNC: 3 MG/DL (ref 2.7–4.5)
POTASSIUM SERPL-SCNC: 3.8 MMOL/L (ref 3.5–5.1)
SODIUM SERPL-SCNC: 143 MMOL/L (ref 136–145)

## 2022-08-02 PROCEDURE — 36415 COLL VENOUS BLD VENIPUNCTURE: CPT | Performed by: NURSE PRACTITIONER

## 2022-08-02 PROCEDURE — 97140 MANUAL THERAPY 1/> REGIONS: CPT

## 2022-08-02 PROCEDURE — 97110 THERAPEUTIC EXERCISES: CPT

## 2022-08-02 PROCEDURE — 80069 RENAL FUNCTION PANEL: CPT | Performed by: NURSE PRACTITIONER

## 2022-08-02 PROCEDURE — 97022 WHIRLPOOL THERAPY: CPT

## 2022-08-02 NOTE — PROGRESS NOTES
"      [facility]  Occupational Therapy Progress/Treatment Note    Date: 8/2/2022  Name: Idalmis Morejon  Clinic Number: [id number]    Therapy Diagnosis:   Encounter Diagnoses   Name Primary?    Decreased range of motion of finger of right hand Yes    Decreased  strength of right hand      Physician: Kayleigh Penaloza PA-C    Physician Orders: OT Eval Only  Medical Diagnosis: Z98.890 (ICD-10-CM) - S/P trigger finger release  Surgical Procedure and Date: RELEASE, TRIGGER FINGER,RIGHT,LONG (Right), 6/3/22 / Date of Injury/Onset: gradual onset, ~3 months before Sx  Evaluation Date: 6/30/2022  Insurance Authorization Period Expiration: 06/17/2023  Plan of Care Expiration: 8/26/22 (8 weeks)  Progress Note Due: 7/29/22   Date of Return to MD: 7/15/2022   Visit # / Visits authorized: 2 / 20  FOTO: initial eval        Precautions:  Standard and Immunosuppression  Time In: 09:00 am  Time Out: 9:59 am  Total Billable Time: 59 minutes      SUBJECTIVE     Pt reports: "It's really tender and swollen. I don't know if it that scar tissue building up or what - I can move it though"  She was compliant with home exercise program given last session.   Response to previous treatment: participated in HEP and improved digit ROM  Functional change: none reported     Pain: 7/10 (pre-tx), 5/10 (post-tx)  Location: right hands      OBJECTIVE   Objective Measures updated at progress report unless specified.  Edema. Measured in centimeters.    6/30/2022 6/30/2022     Left Right   MCPs 19.4 19.4      Edema. Measured in centimeters.    6/30/2022 6/30/2022 8/2/2022     Left Right Right   Long:          P1 5.5 6.5 5.9    PIP 5.9 6.7 5.9   P2 5.0 6.8 5.1    DIP 4.9 5.6 4.8   P3 4.6 5.5 4.5      Elbow and Wrist ROM. Measured in degrees.    6/30/2022 6/30/2022     Left Right   Elbow Ext/Flex WFL WFL   Supination/Pronation WFL WFL   Wrist Ext/Flex WFL WFL   Wrist RD/UD WFL WFL      Hand ROM. Measured in degrees and cm to " touch.     6/30/2022 6/30/2022 8/2/2022     Left Right Right             Index: MP ext 0 0               DPC touch Touch              Long:  MP 0/60 +30/95 -2/86*              PIP -5/85 0/80 -2/100*              DIP 0/60 -25*/25* -2/68*              TONY 200 205 248 (+43)            Ring:   MP ext 0 0               DPC touch touch             Small:  MP ext 0 0                DPC touch touch             Thumb:                Opposition WFL touch to SF DPC WFL touch to SF DPC      * measurements taken after heat and manual        Strength (Dynamometer) and Pinch Strength (Pinch Gauge)  Date 8/2/2022 8/2/2022    Left  Right   Rung II 47 28   Lateral pinch 12 10   Tripod pinch 9 4.5   Tip pinch 6 6       Sensation. Pt endorses no numbness and/or tingling in bilateral upper extremities.     6/30/2022 6/30/2022     Left Right   Austin Hernán       Normal 1.65-2.83 X X   Diminished Light Touch 3.22-3.61       Diminished Protective 3.84-4.31       Loss of Protective 4.56-6.65       Untestable >6.65       2 Point Discrimination       Static       Dynamic          Limitation/Restriction for FOTO Hand Survey     Therapist reviewed FOTO scores for Idalmis Morejon on 6/30/2022.   FOTO documents entered into [employer] - see Media section.     Limitation Score: 59%             Treatment     Idalmis received the treatments listed below:       Supervised modalities after being cleared for contradictions: Fluidotherapy: To R hand  for 10 min, continuous air, 115 deg, air speed 50 to decrease pain, edema & scar tissue, sensory re- education, and increased tissue extensibility prior to therex      Manual therapy techniques: Manual Lymphatic Drainage and Soft tissue Mobilization were applied to the: R MF and volar base for 10 minutes, including:  -Performed Retrograde massage to decrease edema, improve joint mobility, decrease pain and improve lymphatic drainage to increase hand function.   -Performed scar massage with vibrating tool to  decrease adhesions and improve tensile glide  - IASTM to forearm flexors, palmar aponeurosis, R LF with star tool    Therapeutic exercises to develop ROM for 39 minutes, including:     AROM   DIP blocking  PIP blocking   X 10 reps each    Wave  Hook  straight fist, composite fist finger spreads finger lifts     X 10 reps each (2 sets)   isospheres  X 2 minutes   In-hand manipulation with marbles X 2 sets   Yellow putty squeezes, spreads, logs X 10 each     - updated objective measurements, see above    Patient Education and Home Exercises      Education provided:   - HEP   - Progress towards goals   - added: yellow putty with HEP    Written Home Exercises Provided: Patient instructed to cont prior HEP.  Exercises were reviewed and Idalmis was able to demonstrate them prior to the end of the session.  Idalmis demonstrated fair  understanding of the HEP provided. See EMR under Patient Instructions for exercises provided during therapy sessions.      ASSESSMENT     Pt would continue to benefit from skilled OT. Pt presented with increased pain and edema levels at beginning of session, which improved after manual therapy and therapeutic exercise. Updated objective measurements today as seen above - pt is improving with treatment as demonstrated by decreased edema levels and increased LF ROM as compared to evaluation. Pt participated well and is motivated. Added yellow putty to HEP to promote  and pinch strengthening at home. Will continue to progress as tolerated.     Idalmis is progressing well towards her goals and there are no updates to goals at this time. Pt prognosis is Good.     Pt will continue to benefit from skilled outpatient occupational therapy to address the deficits listed in the problem list on initial evaluation, provide pt/family education and to maximize pt's level of independence in the home and community environment.     Pt's spiritual, cultural and educational needs considered and pt agreeable to  plan of care and goals.    Anticipated barriers to occupational therapy: none    Goals:   The following goals were discussed with the patient and patient is in agreement with them as to be addressed in the treatment plan.   Long Term Goals (LTGs); to be met by discharge.  LTG #1: Pt will report a pain level of 1-3 out of 10 with average functional use of her right hand.  ------progressing not met 8/2/2022  LTG #2: Pt will demo improved FOTO score by 10-15 points. ------progressing not met 8/2/2022   LTG #3: Pt will return to prior level of function for ADLs and household management. ------progressing not met 8/2/2022     Short Term Goals (STGs); to be met within 4 weeks (7/29/22).  STG #1: Pt will report 4-5 out of 10 pain level with average functional use of her right hand. ------progressing not met 8/2/2022  STG #2: Pt will report/demo [place] with bathing using her right hand. ------progressing not met 8/2/2022  STG #3: Pt will report/demo [place] with dressing/grooming using her right hand. ------progressing not met 8/2/2022  STG #4: Pt will demonstrate independence with issued HEP and modalities for pain management. ------progressing not met 8/2/2022  STG #5: Pt will demo improved R LF TONY by 15-30 degrees needed to aid with grasping objects. MET 8/2/2022  STG #6: Decrease edema of R LF by 0.2-0.5 cm to increase joint mobility/flexibility for hand use.  MET 8/2/2022  STG #7: Assess /pinch strength when appropriate and update goals as needed with focus on return to housework. MET 8/2/2022      PLAN     Continue skilled occupational therapy with individualized plan of care focusing on increasing ROM and strength to incr participation in daily activities.     Updates/Grading for next session: continue as tolerated    MABLE Trejo    I certify that I was present in the room directing the student in service delivery and guiding them using my skilled judgment. As the co-signing therapist I have  reviewed the students documentation and am responsible for the treatment, assessment, and plan.       CHERI Nowak/ART         .

## 2022-08-02 NOTE — PATIENT INSTRUCTIONS
[facility], OCCUPATIONAL THERAPY  HOME EXERCISE PROGRAM         MABLE Trejo, OTR/L

## 2022-08-11 ENCOUNTER — TELEPHONE (OUTPATIENT)
Dept: NEPHROLOGY | Facility: CLINIC | Age: 72
End: 2022-08-11
Payer: MEDICARE

## 2022-08-16 ENCOUNTER — CLINICAL SUPPORT (OUTPATIENT)
Dept: REHABILITATION | Facility: HOSPITAL | Age: 72
End: 2022-08-16
Payer: MEDICARE

## 2022-08-16 DIAGNOSIS — M25.641 DECREASED RANGE OF MOTION OF FINGER OF RIGHT HAND: Primary | ICD-10-CM

## 2022-08-16 DIAGNOSIS — R29.898 DECREASED GRIP STRENGTH OF RIGHT HAND: ICD-10-CM

## 2022-08-16 PROCEDURE — 97022 WHIRLPOOL THERAPY: CPT | Mod: CO

## 2022-08-16 PROCEDURE — 97140 MANUAL THERAPY 1/> REGIONS: CPT | Mod: CO

## 2022-08-16 PROCEDURE — 97110 THERAPEUTIC EXERCISES: CPT | Mod: CO

## 2022-08-16 NOTE — PROGRESS NOTES
" [facility]  Occupational Therapy Progress/Treatment Note    Date: 8/16/2022  Name: Idalmis Morejon  Clinic Number: [id number]    Therapy Diagnosis:   Encounter Diagnoses   Name Primary?    Decreased range of motion of finger of right hand Yes    Decreased  strength of right hand      Physician: Kayleigh Penaloza PA-C    Physician Orders: OT Eval Only  Medical Diagnosis: Z98.890 (ICD-10-CM) - S/P trigger finger release  Surgical Procedure and Date: RELEASE, TRIGGER FINGER,RIGHT,LONG (Right), 6/3/22 / Date of Injury/Onset: gradual onset, ~3 months before Sx  Evaluation Date: 6/30/2022  Insurance Authorization Period Expiration: 06/17/2023  Plan of Care Expiration: 8/26/22 (8 weeks)  Progress Note Due: 7/29/22   Date of Return to MD: 7/15/2022   Visit # / Visits authorized: 3 / 20  FOTO: initial eval        Precautions:  Standard and Immunosuppression  Time In: 8:49 am  Time Out: 9:44 am  Total Billable Time: 55 minutes      SUBJECTIVE     Pt reports: "It's doing good besides the tenderness in my scar"   She was compliant with home exercise program given last session.   Response to previous treatment: participated in HEP and improved digit ROM  Functional change: none reported     Pain: 0/10  Location: right hands      OBJECTIVE   Objective Measures updated at progress report unless specified.  Edema. Measured in centimeters.    6/30/2022 6/30/2022     Left Right   MCPs 19.4 19.4      Edema. Measured in centimeters.    6/30/2022 6/30/2022 8/2/2022     Left Right Right   Long:          P1 5.5 6.5 5.9    PIP 5.9 6.7 5.9   P2 5.0 6.8 5.1    DIP 4.9 5.6 4.8   P3 4.6 5.5 4.5      Elbow and Wrist ROM. Measured in degrees.    6/30/2022 6/30/2022     Left Right   Elbow Ext/Flex WFL WFL   Supination/Pronation WFL WFL   Wrist Ext/Flex WFL WFL   Wrist RD/UD WFL WFL      Hand ROM. Measured in degrees and cm to touch.     6/30/2022 6/30/2022 8/2/2022     Left Right Right             Index: MP ext 0 " 0               DPC touch Touch              Long:  MP 0/60 +30/95 -2/86*              PIP -5/85 0/80 -2/100*              DIP 0/60 -25*/25* -2/68*              TONY 200 205 248 (+43)            Ring:   MP ext 0 0               DPC touch touch             Small:  MP ext 0 0                DPC touch touch             Thumb:                Opposition WFL touch to SF DPC WFL touch to SF DPC      * measurements taken after heat and manual        Strength (Dynamometer) and Pinch Strength (Pinch Gauge)  Date 8/2/2022 8/2/2022    Left  Right   Rung II 47 28   Lateral pinch 12 10   Tripod pinch 9 4.5   Tip pinch 6 6       Sensation. Pt endorses no numbness and/or tingling in bilateral upper extremities.     6/30/2022 6/30/2022     Left Right   Kimper Hernán       Normal 1.65-2.83 X X   Diminished Light Touch 3.22-3.61       Diminished Protective 3.84-4.31       Loss of Protective 4.56-6.65       Untestable >6.65       2 Point Discrimination       Static       Dynamic          Limitation/Restriction for FOTO Hand Survey     Therapist reviewed FOTO scores for Idalmis Morejon on 6/30/2022.   FOTO documents entered into [employer] - see Media section.     Limitation Score: 59%             Treatment     Idalmis received the treatments listed below:       Supervised modalities after being cleared for contradictions: Fluidotherapy: To R hand  for 10 min, continuous air, 115 deg, air speed 50 to decrease pain, edema & scar tissue, sensory re- education, and increased tissue extensibility prior to therex      Manual therapy techniques: Manual Lymphatic Drainage and Soft tissue Mobilization were applied to the: R MF and volar base for 10 minutes, including:  -Performed Retrograde massage to decrease edema, improve joint mobility, decrease pain and improve lymphatic drainage to increase hand function.   -Performed scar massage with vibrating tool to decrease adhesions and improve tensile glide  - IASTM to forearm flexors, palmar  aponeurosis, R LF with star tool    Therapeutic exercises to develop ROM for 35 minutes, including:    AROM   DIP blocking  PIP blocking  Reverse blocking    X 10 reps each    Wave  Hook  straight fist, composite fist finger spreads finger lifts     X 10 reps each (2 sets)   isospheres  X 2 minutes   In-hand manipulation with coins  X 1 set   Yellow putty squeezes, spreads, logs X 10 each   Prayer stretch 30 sec hold (3 sets)   Hand gripper 2 yellow RB  digi extender yellow x2 min ea       Patient Education and Home Exercises      Education provided:   - HEP   - Progress towards goals   - added: yellow putty with HEP    Written Home Exercises Provided: Patient instructed to cont prior HEP.  Exercises were reviewed and Idalmis was able to demonstrate them prior to the end of the session.  Idalmis demonstrated fair  understanding of the HEP provided. See EMR under Patient Instructions for exercises provided during therapy sessions.      ASSESSMENT     Pt would continue to benefit from skilled OT. Tolerated session well today with no c/o pain. Continues to demo deficits with digit ext. Cues needed for correct technique of joint blocking exercises. Will continue to progress as tolerated.    Idalmis is progressing well towards her goals and there are no updates to goals at this time. Pt prognosis is Good.     Pt will continue to benefit from skilled outpatient occupational therapy to address the deficits listed in the problem list on initial evaluation, provide pt/family education and to maximize pt's level of independence in the home and community environment.     Pt's spiritual, cultural and educational needs considered and pt agreeable to plan of care and goals.    Anticipated barriers to occupational therapy: none    Goals:   The following goals were discussed with the patient and patient is in agreement with them as to be addressed in the treatment plan.   Long Term Goals (LTGs); to be met by discharge.  LTG #1: Pt  will report a pain level of 1-3 out of 10 with average functional use of her right hand.  ------progressing not met 8/16/2022  LTG #2: Pt will demo improved FOTO score by 10-15 points. ------progressing not met 8/16/2022   LTG #3: Pt will return to prior level of function for ADLs and household management. ------progressing not met 8/16/2022     Short Term Goals (STGs); to be met within 4 weeks (7/29/22).  STG #1: Pt will report 4-5 out of 10 pain level with average functional use of her right hand. ------progressing not met 8/16/2022  STG #2: Pt will report/demo [place] with bathing using her right hand. ------progressing not met 8/16/2022  STG #3: Pt will report/demo [place] with dressing/grooming using her right hand. ------progressing not met 8/16/2022  STG #4: Pt will demonstrate independence with issued HEP and modalities for pain management. ------progressing not met 8/16/2022  STG #5: Pt will demo improved R LF TONY by 15-30 degrees needed to aid with grasping objects. MET 8/2/2022  STG #6: Decrease edema of R LF by 0.2-0.5 cm to increase joint mobility/flexibility for hand use.  MET 8/2/2022  STG #7: Assess /pinch strength when appropriate and update goals as needed with focus on return to housework. MET 8/2/2022      PLAN     Continue skilled occupational therapy with individualized plan of care focusing on increasing ROM and strength to incr participation in daily activities.     Updates/Grading for next session: continue as tolerated    JIHAN Peralta/ART Zimmerman

## 2022-08-17 ENCOUNTER — TELEPHONE (OUTPATIENT)
Dept: INTERNAL MEDICINE | Facility: CLINIC | Age: 72
End: 2022-08-17
Payer: MEDICARE

## 2022-08-17 DIAGNOSIS — I10 ESSENTIAL HYPERTENSION: ICD-10-CM

## 2022-08-17 DIAGNOSIS — R53.83 FATIGUE, UNSPECIFIED TYPE: ICD-10-CM

## 2022-08-17 DIAGNOSIS — E78.2 MIXED HYPERLIPIDEMIA: Primary | ICD-10-CM

## 2022-08-17 RX ORDER — VALSARTAN 160 MG/1
320 TABLET ORAL DAILY
Qty: 180 TABLET | Refills: 0 | Status: CANCELLED | OUTPATIENT
Start: 2022-08-17 | End: 2023-08-17

## 2022-08-17 NOTE — TELEPHONE ENCOUNTER
No new care gaps identified.  [facility] Embedded Care Gaps. Reference number: [id number]. 8/17/2022   5:43:23 PM CDT

## 2022-08-18 RX ORDER — HYDROCHLOROTHIAZIDE 12.5 MG/1
CAPSULE ORAL
COMMUNITY
Start: 2022-08-08 | End: 2022-08-18

## 2022-08-18 RX ORDER — ONDANSETRON 2 MG/ML
INJECTION INTRAMUSCULAR; INTRAVENOUS
COMMUNITY
Start: 2022-06-03 | End: 2022-10-25

## 2022-08-18 RX ORDER — AMLODIPINE BESYLATE 10 MG/1
5 TABLET ORAL 2 TIMES DAILY
Qty: 90 TABLET | Refills: 3 | Status: SHIPPED | OUTPATIENT
Start: 2022-08-18 | End: 2023-09-03

## 2022-08-18 NOTE — TELEPHONE ENCOUNTER
She is due for her annual exam with me, okay to work her in sometime in the next 2-3 months    Labs prior, orders in, thank you

## 2022-08-23 ENCOUNTER — CLINICAL SUPPORT (OUTPATIENT)
Dept: REHABILITATION | Facility: HOSPITAL | Age: 72
End: 2022-08-23
Payer: MEDICARE

## 2022-08-23 DIAGNOSIS — R29.898 DECREASED GRIP STRENGTH OF RIGHT HAND: ICD-10-CM

## 2022-08-23 DIAGNOSIS — M25.641 DECREASED RANGE OF MOTION OF FINGER OF RIGHT HAND: Primary | ICD-10-CM

## 2022-08-23 PROCEDURE — 97110 THERAPEUTIC EXERCISES: CPT

## 2022-08-23 PROCEDURE — 97035 APP MDLTY 1+ULTRASOUND EA 15: CPT

## 2022-08-23 PROCEDURE — 97140 MANUAL THERAPY 1/> REGIONS: CPT

## 2022-08-23 PROCEDURE — 97022 WHIRLPOOL THERAPY: CPT

## 2022-08-23 NOTE — PROGRESS NOTES
"      [facility]  Occupational Therapy Updated POC     Date: 8/23/2022  Name: Idalmis Morejon  Clinic Number: [id number]    Therapy Diagnosis:   Encounter Diagnoses   Name Primary?    Decreased range of motion of finger of right hand Yes    Decreased  strength of right hand      Physician: Kayleigh Penaloza PA-C    Physician Orders: OT Eval Only  Medical Diagnosis: Z98.890 (ICD-10-CM) - S/P trigger finger release  Surgical Procedure and Date: RELEASE, TRIGGER FINGER,RIGHT,LONG (Right), 6/3/22 / Date of Injury/Onset: gradual onset, ~3 months before Sx  Evaluation Date: 6/30/2022  Insurance Authorization Period Expiration: 06/17/2023  Plan of Care Expiration: 8/26/22 (8 weeks)  Progress Note Due: 7/29/22   Date of Return to MD: 7/15/2022   Visit # / Visits authorized: 4 / 20  FOTO: initial eval        Precautions:  Standard and Immunosuppression  Time In:  09:01 am  Time Out: 10:05 am  Total Billable Time: 64 minutes      SUBJECTIVE     Pt reports: "I don't know why it's so tender. I can't put any pressure on it. It stays swollen"   She was compliant with home exercise program given last session.   Response to previous treatment: participated in HEP and improved digit ROM  Functional change: none reported     Pain: 0/10  Location: right hands      OBJECTIVE   Objective Measures updated at progress report unless specified.  Edema. Measured in centimeters.    6/30/2022 6/30/2022     Left Right   MCPs 19.4 19.4      Edema. Measured in centimeters.    6/30/2022 6/30/2022 8/2/2022     Left Right Right   Long:          P1 5.5 6.5 5.9    PIP 5.9 6.7 5.9   P2 5.0 6.8 5.1    DIP 4.9 5.6 4.8   P3 4.6 5.5 4.5      Elbow and Wrist ROM. Measured in degrees.    6/30/2022 6/30/2022     Left Right   Elbow Ext/Flex WFL WFL   Supination/Pronation WFL WFL   Wrist Ext/Flex WFL WFL   Wrist RD/UD WFL WFL      Hand ROM. Measured in degrees and cm to touch.     6/30/2022 6/30/2022 8/2/2022 8/23/2022     Left Right " CM met with pt to discuss the role of CM  Pt lives with her  in a 2 story home which has 2STE and 12 steps inside  Pt works, drives, and was fully independent PTA  Pt's pharmacy is [facility]  Pt owns no DME  Pt has no living will  Pt has no hx of mental health, substance or IP rehab  Pt was recommended for OP Cog  CM will provide pt with a script  Pt will not need any DME upon d/c  Pt made aware of the [facility] program and she would like to utilize  Pt's auto insurance is  with a policy number of [id number], but a claim has yet to be filed  CM informed the pt of the importance of filing the claim   Pt's  will transport once medically stable Right  Right             Index: MP ext 0 0                DPC touch Touch                Long:  MP 0/60 +30/95 -2/86* -24/81              PIP -5/85 0/80 -2/100* -15/90              DIP 0/60 -25*/25* -2/68* -10/54              TONY 200 205 248 (+43) 176             Ring:   MP ext 0 0                DPC touch touch               Small:  MP ext 0 0                 DPC touch touch               Thumb:                 Opposition WFL touch to SF DPC WFL touch to SF DPC             Strength (Dynamometer) and Pinch Strength (Pinch Gauge)  Date 8/2/2022 8/2/2022 8/23/2022    Left  Right Right   Rung II 47 28 22.5   Lateral pinch 12 10 14   Tripod pinch 9 4.5 10   Tip pinch 6 6 8.5       Sensation. Pt endorses no numbness and/or tingling in bilateral upper extremities.     6/30/2022 6/30/2022     Left Right   [place]       Normal 1.65-2.83 X X   Diminished Light Touch 3.22-3.61       Diminished Protective 3.84-4.31       Loss of Protective 4.56-6.65       Untestable >6.65       2 Point Discrimination       Static       Dynamic          Limitation/Restriction for FOTO Hand Survey     Therapist reviewed FOTO scores for Idalmis Morejon on 6/30/2022.   FOTO documents entered into [employer] - see Media section.     Limitation Score: 59%             Treatment     Idalmis received the treatments listed below:       Supervised modalities after being cleared for contradictions: Fluidotherapy: To R hand  for 10 min, continuous air, 115 deg, air speed 50 to decrease pain, edema & scar tissue, sensory re- education, and increased tissue extensibility prior to therex    Direct supervised modalities: Patient received ultrasound to R volar palm LF area to increase blood flow, circulation, tissue elasticity, and for pain management for 8 minutes @ 3 Mhz, Intensity .9 w/cm2 at 100% duty cycle.     Manual therapy techniques: Manual Lymphatic Drainage and Soft tissue Mobilization were applied to the: R MF and volar base for 25 minutes,  including:  - Performed Retrograde massage to decrease edema, improve joint mobility, decrease pain and improve lymphatic drainage to increase hand function.   - Performed scar massage with star tool  to decrease adhesions and improve tensile glide  - IASTM to forearm flexors, palmar aponeurosis, R LF with star tool    *KT applied for intrinsic stretch and increased tensile gliding     Therapeutic exercises to develop ROM for 21 minutes, including:  AROM   DIP blocking  PIP blocking  Reverse blocking     PROM short arc fist + full composite fist         X 15 reps each    MP blocking Hook grasp (hyperext)    MP blocking hook grasp (ext)     MP blocking hook grasp (slightly flex)        X 10 reps each    Wave  Hook  straight fist, composite fist       Finger lifts/finger spreads      X 10 reps each (2 sets)          1 min each    Towel walks  3 min    isospheres  X 2 minutes   In-hand manipulation with coins  X 1 set  (NT)   Yellow putty squeezes, spreads, logs X 10 each (@home)    Prayer stretch 30 sec hold (3 sets)   Hand gripper 2 yellow RB  digi extender yellow x2 min ea (NT)       Patient Education and Home Exercises      Education provided:   - HEP   - KT duration; remove if irritation  - Progress towards goals   - added: yellow putty with HEP    Written Home Exercises Provided: Patient instructed to cont prior HEP.  Exercises were reviewed and Idalmis was able to demonstrate them prior to the end of the session.  Idalmis demonstrated fair  understanding of the HEP provided. See EMR under Patient Instructions for exercises provided during therapy sessions.      ASSESSMENT     Pt would continue to benefit from skilled OT.  Pt c/o severe tenderness near incision - main limiting factor with full independence with use of R hand. Inflammation noted within tendon sheath in LF.  Pt verbalized she felt decreased tightness and pain following heat and manual. Per patient medical history, pt has connective tissue disorder  and reported she gets very thicken scar tissue following her past surgeries - may be hindering her progression with LF. KT applied to reinforce intrinsic stretch and tensile glide of FDP/FDS on LF.  Will continue to progress as tolerated.     Idalmis is progressing well towards her goals and there are no updates to goals at this time. Pt prognosis is Good.     Pt will continue to benefit from skilled outpatient occupational therapy to address the deficits listed in the problem list on initial evaluation, provide pt/family education and to maximize pt's level of independence in the home and community environment.     Pt's spiritual, cultural and educational needs considered and pt agreeable to plan of care and goals.    Anticipated barriers to occupational therapy: none    Goals:   The following goals were discussed with the patient and patient is in agreement with them as to be addressed in the treatment plan.   Long Term Goals (LTGs); to be met by discharge.  LTG #1: Pt will report a pain level of 1-3 out of 10 with average functional use of her right hand.  ------progressing not met 8/23/2022  LTG #2: Pt will demo improved FOTO score by 10-15 points. ------progressing not met 8/23/2022   LTG #3: Pt will return to prior level of function for ADLs and household management. ------progressing not met 8/23/2022     Short Term Goals (STGs); to be met within 4 weeks (7/29/22).  STG #1: Pt will report 4-5 out of 10 pain level with average functional use of her right hand. ------progressing not met 8/23/2022  STG #2: Pt will report/demo [place] with bathing using her right hand. ------progressing not met 8/23/2022  STG #3: Pt will report/demo [place] with dressing/grooming using her right hand. ------progressing not met 8/23/2022  STG #4: Pt will demonstrate independence with issued HEP and modalities for pain management. ------progressing not met 8/23/2022  STG #5: Pt will demo improved R LF TONY by 15-30 degrees  needed to aid with grasping objects. MET 8/2/2022  STG #6: Decrease edema of R LF by 0.2-0.5 cm to increase joint mobility/flexibility for hand use.  MET 8/2/2022  STG #7: Assess /pinch strength when appropriate and update goals as needed with focus on return to housework. MET 8/2/2022      Updated Certification Period: 8/23/2022 to 9/23/2022  Recommended Treatment Plan: 2 times per week for 4 weeks: Fluidotherapy, Manual Therapy, Moist Heat/ Ice, Paraffin, Patient Education, Therapeutic Activities, Therapeutic Exercise and Ultrasound  Other Recommendations:     Edie Akins OT  8/23/2022      I CERTIFY THE NEED FOR THESE SERVICES FURNISHED UNDER THIS PLAN OF TREATMENT AND WHILE UNDER MY CARE    Physician's comments:        Physician's Signature: ___________________________________________________

## 2022-08-30 ENCOUNTER — CLINICAL SUPPORT (OUTPATIENT)
Dept: REHABILITATION | Facility: HOSPITAL | Age: 72
End: 2022-08-30
Payer: MEDICARE

## 2022-08-30 DIAGNOSIS — M25.641 DECREASED RANGE OF MOTION OF FINGER OF RIGHT HAND: Primary | ICD-10-CM

## 2022-08-30 DIAGNOSIS — R29.898 DECREASED GRIP STRENGTH OF RIGHT HAND: ICD-10-CM

## 2022-08-30 PROCEDURE — 97022 WHIRLPOOL THERAPY: CPT

## 2022-08-30 PROCEDURE — 97140 MANUAL THERAPY 1/> REGIONS: CPT

## 2022-08-30 PROCEDURE — 97110 THERAPEUTIC EXERCISES: CPT

## 2022-08-30 NOTE — PROGRESS NOTES
"      [facility]  Occupational Therapy Treatment Note    Date: 8/30/2022  Name: Idalmis Morejon  Clinic Number: [id number]    Therapy Diagnosis:   Encounter Diagnoses   Name Primary?    Decreased range of motion of finger of right hand Yes    Decreased  strength of right hand        Physician: Kayleigh Penaloza PA-C    Physician Orders: OT Eval Only  Medical Diagnosis: Z98.890 (ICD-10-CM) - S/P trigger finger release  Surgical Procedure and Date: RELEASE, TRIGGER FINGER,RIGHT,LONG (Right), 6/3/22 / Date of Injury/Onset: gradual onset, ~3 months before Sx  Evaluation Date: 6/30/2022  Insurance Authorization Period Expiration: 06/17/2023  Plan of Care Expiration: 9/23/22 (8 weeks)  Progress Note Due: 9/9/22   Date of Return to MD: 7/15/2022   Visit # / Visits authorized: 5 / 20  FOTO: initial eval        Precautions:  Standard and Immunosuppression  Time In:  8:47 am  Time Out: 9:47 am  Total Billable Time: 50 minutes      SUBJECTIVE     Pt reports: "It's still sore"   She was compliant with home exercise program given last session.   Response to previous treatment: pt reported some relief from tape  Functional change: able to make a composite fist a few times before     Pain: 0/10 at rest  Location: right hands      OBJECTIVE   Objective Measures updated at progress report unless specified.  Edema. Measured in centimeters.    6/30/2022 6/30/2022     Left Right   MCPs 19.4 19.4      Edema. Measured in centimeters.    6/30/2022 6/30/2022 8/2/2022     Left Right Right   Long:          P1 5.5 6.5 5.9    PIP 5.9 6.7 5.9   P2 5.0 6.8 5.1    DIP 4.9 5.6 4.8   P3 4.6 5.5 4.5      Elbow and Wrist ROM. Measured in degrees.    6/30/2022 6/30/2022     Left Right   Elbow Ext/Flex WFL WFL   Supination/Pronation WFL WFL   Wrist Ext/Flex WFL WFL   Wrist RD/UD WFL WFL      Hand ROM. Measured in degrees and cm to touch.     6/30/2022 6/30/2022 8/2/2022 8/23/2022     Left Right Right  Right             Index: " MP ext 0 0                DPC touch Touch                Long:  MP 0/60 +30/95 -2/86* -24/81              PIP -5/85 0/80 -2/100* -15/90              DIP 0/60 -25*/25* -2/68* -10/54              TONY 200 205 248 (+43) 176             Ring:   MP ext 0 0                DPC touch touch               Small:  MP ext 0 0                 DPC touch touch               Thumb:                 Opposition WFL touch to SF DPC WFL touch to SF DPC             Strength (Dynamometer) and Pinch Strength (Pinch Gauge)  Date 8/2/2022 8/2/2022 8/23/2022    Left  Right Right   Rung II 47 28 22.5   Lateral pinch 12 10 14   Tripod pinch 9 4.5 10   Tip pinch 6 6 8.5       Sensation. Pt endorses no numbness and/or tingling in bilateral upper extremities.     6/30/2022 6/30/2022     Left Right   [place]       Normal 1.65-2.83 X X   Diminished Light Touch 3.22-3.61       Diminished Protective 3.84-4.31       Loss of Protective 4.56-6.65       Untestable >6.65       2 Point Discrimination       Static       Dynamic          Limitation/Restriction for FOTO Hand Survey     Therapist reviewed FOTO scores for Idalmis Morejon on 6/30/2022.   FOTO documents entered into [employer] - see Media section.     Limitation Score: 59%             Treatment     Idalmis received the treatments listed below:       Supervised modalities after being cleared for contradictions: Fluidotherapy: To R hand  for 10 min, continuous air, 115 deg, air speed 50 to decrease pain, edema & scar tissue, sensory re- education, and increased tissue extensibility prior to therex    Direct supervised modalities: Patient received ultrasound to R volar palm LF area to increase blood flow, circulation, tissue elasticity, and for pain management for 8 minutes @ 3 Mhz, Intensity .9 w/cm2 at 100% duty cycle.     Manual therapy techniques: Manual Lymphatic Drainage and Soft tissue Mobilization were applied to the: R MF and volar base for 12 minutes, including:  - Performed Retrograde  massage to decrease edema, improve joint mobility, decrease pain and improve lymphatic drainage to increase hand function.   - Performed scar massage with manuela tool to decrease adhesions and improve tensile glide   - IASTM to forearm flexors, palmar aponeurosis, R LF with star tool    *KT applied for intrinsic stretch and increased tensile gliding (NT)    Therapeutic exercises to develop ROM for 28 minutes, including:  AROM   DIP blocking  PIP blocking  Reverse blocking     PROM short arc fist + full composite fist (NT)         X 15 reps each    MP blocking Hook grasp (hyperext) (NT)    MP blocking hook grasp (ext)     MP blocking hook grasp (slightly flex) (NT)        X 10 reps each    Wave  Hook  straight fist, composite fist       Finger lifts/finger spreads (Spreads NT)     X 10 reps each (1 set)          1 min each    Towel walks  3 min (NT)   isospheres  X 2 minutes   In-hand manipulation with coins  X 1 set  (NT)   Yellow putty squeezes, spreads, logs X 10 each (@home)    Prayer stretch 30 sec hold (3 sets) (NT)   Hand gripper 2 yellow RB  digi extender yellow x2 min ea (NT)       Patient Education and Home Exercises      Education provided:   - HEP   - Progress towards goals     Written Home Exercises Provided: Patient instructed to cont prior HEP.  Exercises were reviewed and Idalmis was able to demonstrate them prior to the end of the session.  Idalmis demonstrated fair  understanding of the HEP provided. See EMR under Patient Instructions for exercises provided during therapy sessions.      ASSESSMENT     Pt agreeable to therapy today. Pt reported stinging/burning pain over scar while making composite fist. Pt able to lay finger more flat on table after manual stretching. She tolerated treatment well today and participated well.     Idalmis is progressing well towards her goals and there are no updates to goals at this time. Pt prognosis is Good.     Pt will continue to benefit from skilled outpatient  occupational therapy to address the deficits listed in the problem list on initial evaluation, provide pt/family education and to maximize pt's level of independence in the home and community environment.     Pt's spiritual, cultural and educational needs considered and pt agreeable to plan of care and goals.    Anticipated barriers to occupational therapy: none    Goals:   The following goals were discussed with the patient and patient is in agreement with them as to be addressed in the treatment plan.   Long Term Goals (LTGs); to be met by discharge.  LTG #1: Pt will report a pain level of 1-3 out of 10 with average functional use of her right hand.  ------progressing not met 8/30/2022  LTG #2: Pt will demo improved FOTO score by 10-15 points. ------progressing not met 8/30/2022   LTG #3: Pt will return to prior level of function for ADLs and household management. ------progressing not met 8/30/2022     Short Term Goals (STGs); to be met within 4 weeks (7/29/22).  STG #1: Pt will report 4-5 out of 10 pain level with average functional use of her right hand. ------progressing not met 8/30/2022  STG #2: Pt will report/demo [place] with bathing using her right hand. ------progressing not met 8/30/2022  STG #3: Pt will report/demo [place] with dressing/grooming using her right hand. ------progressing not met 8/30/2022  STG #4: Pt will demonstrate independence with issued HEP and modalities for pain management. ------progressing not met 8/30/2022  STG #5: Pt will demo improved R LF TONY by 15-30 degrees needed to aid with grasping objects. MET 8/2/2022  STG #6: Decrease edema of R LF by 0.2-0.5 cm to increase joint mobility/flexibility for hand use.  MET 8/2/2022  STG #7: Assess /pinch strength when appropriate and update goals as needed with focus on return to housework. MET 8/2/2022    PLAN      Continue skilled occupational therapy with individualized plan of care focusing on increasing ROM and strength  "to incr participation in daily activities.      Updates/Grading for next session: continue as tolerated    FANNY Pastrana  8/30/2022    Student:     " I certify that I was present in the room directing the student in service delivery and guiding them using my skilled judgement. As the co-signing therapist, I have reviewed the student's documentation and am responsible for the treatment, assessment and plan."    Therapist: YASMINE Haynes CHT                  "

## 2022-10-10 ENCOUNTER — DOCUMENTATION ONLY (OUTPATIENT)
Dept: PSYCHIATRY | Facility: CLINIC | Age: 72
End: 2022-10-10
Payer: MEDICARE

## 2022-10-10 RX ORDER — QUETIAPINE FUMARATE 25 MG/1
25 TABLET, FILM COATED ORAL NIGHTLY
Qty: 30 TABLET | Refills: 0 | Status: SHIPPED | OUTPATIENT
Start: 2022-10-10 | End: 2022-12-15 | Stop reason: SDUPTHER

## 2022-10-10 NOTE — PROGRESS NOTES
Pt cited illusions of tarp across the street as if crabs are on it and her cleaning gloves seems  disfigured . Cant sleep at night.    Plan will call in seroquel 25 mg q hs .    Manueltomasz Kitchen MD

## 2022-10-19 ENCOUNTER — PATIENT MESSAGE (OUTPATIENT)
Dept: GASTROENTEROLOGY | Facility: CLINIC | Age: 72
End: 2022-10-19
Payer: MEDICARE

## 2022-10-25 ENCOUNTER — OFFICE VISIT (OUTPATIENT)
Dept: INTERNAL MEDICINE | Facility: CLINIC | Age: 72
End: 2022-10-25
Payer: MEDICARE

## 2022-10-25 ENCOUNTER — LAB VISIT (OUTPATIENT)
Dept: LAB | Facility: HOSPITAL | Age: 72
End: 2022-10-25
Attending: INTERNAL MEDICINE
Payer: MEDICARE

## 2022-10-25 VITALS
BODY MASS INDEX: 26.82 KG/M2 | SYSTOLIC BLOOD PRESSURE: 137 MMHG | WEIGHT: 166.88 LBS | DIASTOLIC BLOOD PRESSURE: 60 MMHG | HEIGHT: 66 IN

## 2022-10-25 DIAGNOSIS — D89.89 AUTOIMMUNE DISORDER: ICD-10-CM

## 2022-10-25 DIAGNOSIS — R53.83 FATIGUE, UNSPECIFIED TYPE: ICD-10-CM

## 2022-10-25 DIAGNOSIS — Z00.00 ANNUAL PHYSICAL EXAM: Primary | ICD-10-CM

## 2022-10-25 DIAGNOSIS — E78.2 MIXED HYPERLIPIDEMIA: ICD-10-CM

## 2022-10-25 DIAGNOSIS — N25.81 SECONDARY HYPERPARATHYROIDISM: ICD-10-CM

## 2022-10-25 DIAGNOSIS — I10 ESSENTIAL HYPERTENSION: ICD-10-CM

## 2022-10-25 DIAGNOSIS — R73.01 IFG (IMPAIRED FASTING GLUCOSE): ICD-10-CM

## 2022-10-25 DIAGNOSIS — M81.0 AGE-RELATED OSTEOPOROSIS WITHOUT CURRENT PATHOLOGICAL FRACTURE: ICD-10-CM

## 2022-10-25 DIAGNOSIS — I70.0 ATHEROSCLEROSIS OF ABDOMINAL AORTA: ICD-10-CM

## 2022-10-25 DIAGNOSIS — D50.9 IRON DEFICIENCY ANEMIA, UNSPECIFIED IRON DEFICIENCY ANEMIA TYPE: ICD-10-CM

## 2022-10-25 DIAGNOSIS — I77.1 TORTUOUS AORTA: ICD-10-CM

## 2022-10-25 DIAGNOSIS — F33.41 RECURRENT MAJOR DEPRESSIVE DISORDER, IN PARTIAL REMISSION: ICD-10-CM

## 2022-10-25 DIAGNOSIS — H15.003 BILATERAL SCLERITIS: ICD-10-CM

## 2022-10-25 DIAGNOSIS — Z12.31 SCREENING MAMMOGRAM, ENCOUNTER FOR: ICD-10-CM

## 2022-10-25 DIAGNOSIS — N18.31 STAGE 3A CHRONIC KIDNEY DISEASE: ICD-10-CM

## 2022-10-25 DIAGNOSIS — M35.9 UNDIFFERENTIATED CONNECTIVE TISSUE DISEASE: ICD-10-CM

## 2022-10-25 PROBLEM — Z86.010 HISTORY OF ADENOMATOUS POLYP OF COLON: Status: RESOLVED | Noted: 2019-09-09 | Resolved: 2022-10-25

## 2022-10-25 PROBLEM — Z86.0101 HISTORY OF ADENOMATOUS POLYP OF COLON: Status: RESOLVED | Noted: 2019-09-09 | Resolved: 2022-10-25

## 2022-10-25 LAB
ALBUMIN SERPL BCP-MCNC: 3.9 G/DL (ref 3.5–5.2)
ALBUMIN SERPL BCP-MCNC: 3.9 G/DL (ref 3.5–5.2)
ALP SERPL-CCNC: 101 U/L (ref 55–135)
ALT SERPL W/O P-5'-P-CCNC: 12 U/L (ref 10–44)
ANION GAP SERPL CALC-SCNC: 10 MMOL/L (ref 8–16)
ANION GAP SERPL CALC-SCNC: 10 MMOL/L (ref 8–16)
ANISOCYTOSIS BLD QL SMEAR: SLIGHT
AST SERPL-CCNC: 24 U/L (ref 10–40)
BASOPHILS # BLD AUTO: 0.02 K/UL (ref 0–0.2)
BASOPHILS NFR BLD: 0.4 % (ref 0–1.9)
BILIRUB SERPL-MCNC: 0.8 MG/DL (ref 0.1–1)
BUN SERPL-MCNC: 16 MG/DL (ref 8–23)
BUN SERPL-MCNC: 16 MG/DL (ref 8–23)
CALCIUM SERPL-MCNC: 9.7 MG/DL (ref 8.7–10.5)
CALCIUM SERPL-MCNC: 9.7 MG/DL (ref 8.7–10.5)
CHLORIDE SERPL-SCNC: 107 MMOL/L (ref 95–110)
CHLORIDE SERPL-SCNC: 107 MMOL/L (ref 95–110)
CHOLEST SERPL-MCNC: 179 MG/DL (ref 120–199)
CHOLEST/HDLC SERPL: 3.1 {RATIO} (ref 2–5)
CO2 SERPL-SCNC: 25 MMOL/L (ref 23–29)
CO2 SERPL-SCNC: 25 MMOL/L (ref 23–29)
CREAT SERPL-MCNC: 1.2 MG/DL (ref 0.5–1.4)
CREAT SERPL-MCNC: 1.2 MG/DL (ref 0.5–1.4)
DIFFERENTIAL METHOD: ABNORMAL
EOSINOPHIL # BLD AUTO: 0.1 K/UL (ref 0–0.5)
EOSINOPHIL NFR BLD: 2.1 % (ref 0–8)
ERYTHROCYTE [DISTWIDTH] IN BLOOD BY AUTOMATED COUNT: 14.7 % (ref 11.5–14.5)
EST. GFR  (NO RACE VARIABLE): 47.5 ML/MIN/1.73 M^2
EST. GFR  (NO RACE VARIABLE): 47.5 ML/MIN/1.73 M^2
ESTIMATED AVG GLUCOSE: 108 MG/DL (ref 68–131)
FERRITIN SERPL-MCNC: 73 NG/ML (ref 20–300)
GLUCOSE SERPL-MCNC: 83 MG/DL (ref 70–110)
GLUCOSE SERPL-MCNC: 83 MG/DL (ref 70–110)
HBA1C MFR BLD: 5.4 % (ref 4–5.6)
HCT VFR BLD AUTO: 34 % (ref 37–48.5)
HDLC SERPL-MCNC: 57 MG/DL (ref 40–75)
HDLC SERPL: 31.8 % (ref 20–50)
HGB BLD-MCNC: 11.2 G/DL (ref 12–16)
HGB BLD-MCNC: 11.2 G/DL (ref 12–16)
HYPOCHROMIA BLD QL SMEAR: ABNORMAL
IMM GRANULOCYTES # BLD AUTO: 0.02 K/UL (ref 0–0.04)
IMM GRANULOCYTES NFR BLD AUTO: 0.4 % (ref 0–0.5)
IRON SERPL-MCNC: 104 UG/DL (ref 30–160)
LDLC SERPL CALC-MCNC: 98.2 MG/DL (ref 63–159)
LYMPHOCYTES # BLD AUTO: 1.7 K/UL (ref 1–4.8)
LYMPHOCYTES NFR BLD: 31 % (ref 18–48)
MCH RBC QN AUTO: 28.1 PG (ref 27–31)
MCHC RBC AUTO-ENTMCNC: 32.9 G/DL (ref 32–36)
MCV RBC AUTO: 85 FL (ref 82–98)
MONOCYTES # BLD AUTO: 0.4 K/UL (ref 0.3–1)
MONOCYTES NFR BLD: 7.7 % (ref 4–15)
NEUTROPHILS # BLD AUTO: 3.1 K/UL (ref 1.8–7.7)
NEUTROPHILS NFR BLD: 58.4 % (ref 38–73)
NONHDLC SERPL-MCNC: 122 MG/DL
NRBC BLD-RTO: 0 /100 WBC
OVALOCYTES BLD QL SMEAR: ABNORMAL
PHOSPHATE SERPL-MCNC: 3.5 MG/DL (ref 2.7–4.5)
PLATELET # BLD AUTO: 214 K/UL (ref 150–450)
PLATELET BLD QL SMEAR: ABNORMAL
PMV BLD AUTO: 12.1 FL (ref 9.2–12.9)
POIKILOCYTOSIS BLD QL SMEAR: SLIGHT
POLYCHROMASIA BLD QL SMEAR: ABNORMAL
POTASSIUM SERPL-SCNC: 3.6 MMOL/L (ref 3.5–5.1)
POTASSIUM SERPL-SCNC: 3.6 MMOL/L (ref 3.5–5.1)
PROT SERPL-MCNC: 7.8 G/DL (ref 6–8.4)
RBC # BLD AUTO: 3.98 M/UL (ref 4–5.4)
SATURATED IRON: 30 % (ref 20–50)
SODIUM SERPL-SCNC: 142 MMOL/L (ref 136–145)
SODIUM SERPL-SCNC: 142 MMOL/L (ref 136–145)
TARGETS BLD QL SMEAR: ABNORMAL
TOTAL IRON BINDING CAPACITY: 349 UG/DL (ref 250–450)
TRANSFERRIN SERPL-MCNC: 236 MG/DL (ref 200–375)
TRIGL SERPL-MCNC: 119 MG/DL (ref 30–150)
TSH SERPL DL<=0.005 MIU/L-ACNC: 1.8 UIU/ML (ref 0.4–4)
WBC # BLD AUTO: 5.32 K/UL (ref 3.9–12.7)

## 2022-10-25 PROCEDURE — 83036 HEMOGLOBIN GLYCOSYLATED A1C: CPT | Performed by: INTERNAL MEDICINE

## 2022-10-25 PROCEDURE — 3044F HG A1C LEVEL LT 7.0%: CPT | Mod: CPTII,S$GLB,, | Performed by: INTERNAL MEDICINE

## 2022-10-25 PROCEDURE — 84466 ASSAY OF TRANSFERRIN: CPT | Performed by: INTERNAL MEDICINE

## 2022-10-25 PROCEDURE — 84100 ASSAY OF PHOSPHORUS: CPT | Performed by: NURSE PRACTITIONER

## 2022-10-25 PROCEDURE — 1101F PT FALLS ASSESS-DOCD LE1/YR: CPT | Mod: CPTII,S$GLB,, | Performed by: INTERNAL MEDICINE

## 2022-10-25 PROCEDURE — 3066F PR DOCUMENTATION OF TREATMENT FOR NEPHROPATHY: ICD-10-PCS | Mod: CPTII,S$GLB,, | Performed by: INTERNAL MEDICINE

## 2022-10-25 PROCEDURE — 1160F PR REVIEW ALL MEDS BY PRESCRIBER/CLIN PHARMACIST DOCUMENTED: ICD-10-PCS | Mod: CPTII,S$GLB,, | Performed by: INTERNAL MEDICINE

## 2022-10-25 PROCEDURE — 84443 ASSAY THYROID STIM HORMONE: CPT | Performed by: INTERNAL MEDICINE

## 2022-10-25 PROCEDURE — 99999 PR PBB SHADOW E&M-EST. PATIENT-LVL V: CPT | Mod: PBBFAC,,, | Performed by: INTERNAL MEDICINE

## 2022-10-25 PROCEDURE — 3066F NEPHROPATHY DOC TX: CPT | Mod: CPTII,S$GLB,, | Performed by: INTERNAL MEDICINE

## 2022-10-25 PROCEDURE — 80061 LIPID PANEL: CPT | Performed by: INTERNAL MEDICINE

## 2022-10-25 PROCEDURE — 3075F PR MOST RECENT SYSTOLIC BLOOD PRESS GE 130-139MM HG: ICD-10-PCS | Mod: CPTII,S$GLB,, | Performed by: INTERNAL MEDICINE

## 2022-10-25 PROCEDURE — 3075F SYST BP GE 130 - 139MM HG: CPT | Mod: CPTII,S$GLB,, | Performed by: INTERNAL MEDICINE

## 2022-10-25 PROCEDURE — 99999 PR PBB SHADOW E&M-EST. PATIENT-LVL V: ICD-10-PCS | Mod: PBBFAC,,, | Performed by: INTERNAL MEDICINE

## 2022-10-25 PROCEDURE — 1101F PR PT FALLS ASSESS DOC 0-1 FALLS W/OUT INJ PAST YR: ICD-10-PCS | Mod: CPTII,S$GLB,, | Performed by: INTERNAL MEDICINE

## 2022-10-25 PROCEDURE — 4010F PR ACE/ARB THEARPY RXD/TAKEN: ICD-10-PCS | Mod: CPTII,S$GLB,, | Performed by: INTERNAL MEDICINE

## 2022-10-25 PROCEDURE — 99397 PR PREVENTIVE VISIT,EST,65 & OVER: ICD-10-PCS | Mod: S$GLB,,, | Performed by: INTERNAL MEDICINE

## 2022-10-25 PROCEDURE — 82728 ASSAY OF FERRITIN: CPT | Performed by: INTERNAL MEDICINE

## 2022-10-25 PROCEDURE — 3078F DIAST BP <80 MM HG: CPT | Mod: CPTII,S$GLB,, | Performed by: INTERNAL MEDICINE

## 2022-10-25 PROCEDURE — 99397 PER PM REEVAL EST PAT 65+ YR: CPT | Mod: S$GLB,,, | Performed by: INTERNAL MEDICINE

## 2022-10-25 PROCEDURE — 1160F RVW MEDS BY RX/DR IN RCRD: CPT | Mod: CPTII,S$GLB,, | Performed by: INTERNAL MEDICINE

## 2022-10-25 PROCEDURE — 3288F PR FALLS RISK ASSESSMENT DOCUMENTED: ICD-10-PCS | Mod: CPTII,S$GLB,, | Performed by: INTERNAL MEDICINE

## 2022-10-25 PROCEDURE — 36415 COLL VENOUS BLD VENIPUNCTURE: CPT | Performed by: INTERNAL MEDICINE

## 2022-10-25 PROCEDURE — 3078F PR MOST RECENT DIASTOLIC BLOOD PRESSURE < 80 MM HG: ICD-10-PCS | Mod: CPTII,S$GLB,, | Performed by: INTERNAL MEDICINE

## 2022-10-25 PROCEDURE — 1159F PR MEDICATION LIST DOCUMENTED IN MEDICAL RECORD: ICD-10-PCS | Mod: CPTII,S$GLB,, | Performed by: INTERNAL MEDICINE

## 2022-10-25 PROCEDURE — 99499 RISK ADDL DX/OHS AUDIT: ICD-10-PCS | Mod: S$GLB,,, | Performed by: INTERNAL MEDICINE

## 2022-10-25 PROCEDURE — 1159F MED LIST DOCD IN RCRD: CPT | Mod: CPTII,S$GLB,, | Performed by: INTERNAL MEDICINE

## 2022-10-25 PROCEDURE — 4010F ACE/ARB THERAPY RXD/TAKEN: CPT | Mod: CPTII,S$GLB,, | Performed by: INTERNAL MEDICINE

## 2022-10-25 PROCEDURE — 85025 COMPLETE CBC W/AUTO DIFF WBC: CPT | Performed by: INTERNAL MEDICINE

## 2022-10-25 PROCEDURE — 80053 COMPREHEN METABOLIC PANEL: CPT | Performed by: INTERNAL MEDICINE

## 2022-10-25 PROCEDURE — 99499 UNLISTED E&M SERVICE: CPT | Mod: S$GLB,,, | Performed by: INTERNAL MEDICINE

## 2022-10-25 PROCEDURE — 3288F FALL RISK ASSESSMENT DOCD: CPT | Mod: CPTII,S$GLB,, | Performed by: INTERNAL MEDICINE

## 2022-10-25 PROCEDURE — 3044F PR MOST RECENT HEMOGLOBIN A1C LEVEL <7.0%: ICD-10-PCS | Mod: CPTII,S$GLB,, | Performed by: INTERNAL MEDICINE

## 2022-10-25 NOTE — PROGRESS NOTES
Patient ID: Idalmis Morejon is a 72 y.o. female.    Chief Complaint:  Annual exam        Assessment:       1. Annual physical exam    2. Recurrent major depressive disorder, in partial remission    3. Essential hypertension    4. Mixed hyperlipidemia    5. Atherosclerosis of abdominal aorta: minimal see CT 7/16    6. Autoimmune disorder- recurrent iritis    7. Undifferentiated connective tissue disease    8. Secondary hyperparathyroidism    9. Age-related osteoporosis 2016; also 4/21    10. IFG (impaired fasting glucose)    11. Screening mammogram, encounter for    12. Bilateral scleritis    13. Tortuous aorta; on CT 12/10 and CXR 7/20: CT SCORE 0 1/22            Plan:         Idalmis was seen today for annual exam.    Diagnoses and all orders for this visit:    Annual physical exam    Recurrent major depressive disorder, in partial remission; stable on regimen; keep psych follow-up    Essential hypertension: Low salt diet, exercise.  Call if BP > 130/80 on a regular basis.     Mixed hyperlipidemia; labs and review, continue current regimen    Atherosclerosis of abdominal aorta: minimal see CT 7/16; labs and review    Autoimmune disorder- recurrent iritis; schedule ophthalmology follow-up    Undifferentiated connective tissue disease; schedule rheumatology follow-up    Secondary hyperparathyroidism; labs and review    Age-related osteoporosis 2016; also 4/21; has declined treatment; schedule repeat DEXA within the next 1-2 years    IFG (impaired fasting glucose)  -     Hemoglobin A1C; Future    Screening mammogram, encounter for  -     Mammo Digital Screening Bilat w/ [facility]; Standing    Bilateral scleritis  -     Ambulatory referral/consult to Ophthalmology; Future  -     Ambulatory referral/consult to Rheumatology; Future    Tortuous aorta; on CT 12/10 and CXR 7/20: CT SCORE 0 1/22     I will review all studies and determine further tx depending on findings  She declines flu shot, shingles vaccine and COVID booster      Subjective:   Annual exam    No recent Rheum assessment since .  Had been given alendronate and she did not tolerate then was given Prolia but is no longer on this.  Uveitis diagnosis- in the past was on azathioprine.  Last seen 2021 and 6 month follow up recommended.    Also no recent eye exam although she states no new sx.    Ongoing Psych issues and some hallucinations of late?  Dr Chavez and was given Seroquel.  Those symptoms have abated.  She feels well, mood is good.  She does get a little bit depressed at the end of October because out of the month that her mother .  No SI or HI.  Able to contract for safety.  Strong family support.    Trigger finger surgery, did more or less well.        Nephrology   Psych   Rheum - 6 month follow up recommended  Ophthalmology - 6 month follow up recommended    Patient Active Problem List:     Essential hypertension     Gastroesophageal reflux disease with esophagitis: EGD 3/15; also 2019     Spondylosis of lumbosacral region without myelopathy or radiculopathy     Mixed hyperlipidemia     Nuclear sclerosis - Both Eyes     Chronic pain syndrome     Sciatica neuralgia     High risk medication use-Azathioprine 100 mg daily     Ocular hypertension - Both Eyes     Bilateral dry eyes - Both Eyes     Autoimmune disorder- recurrent iritis     Neurogenic bladder     Slow transit constipation     Scleritis     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     Colon adenoma: 10/15 acceptable 2019, repeat      Age-related osteoporosis ; also      Schatzki's ring: 3/15 dilated     Multiple lung nodules on CT: 8120-6722 no f/u needed     Steroid-induced glaucoma of both eyes     Proteinuria     Family history of colon cancer: maternal uncle and aunt     Recurrent major depressive disorder, in partial remission     Immunosuppression     Atherosclerosis of abdominal aorta: minimal see CT ; CT SCORE 0      CKD (chronic kidney  disease) stage 3, GFR 30-59 ml/min     Secondary hyperparathyroidism     Esophageal dysphagia     Chronic follicular conjunctivitis of both eyes     Undifferentiated connective tissue disease     Tortuous aorta; on CT 12/10 and CXR 7/20: CT SCORE 0 1/22     Open angle with borderline findings and low glaucoma risk in both eyes     History of compression fracture of spine     Decreased range of motion of finger of right hand     Decreased  strength of right hand           Review of Systems   Constitutional:  Negative for activity change, appetite change, chills, fatigue and fever.   HENT:  Negative for congestion, hearing loss, sinus pressure and sore throat.    Eyes:  Negative for visual disturbance.   Respiratory:  Negative for apnea, cough, shortness of breath and wheezing.    Cardiovascular:  Negative for chest pain, palpitations and leg swelling.   Gastrointestinal:  Negative for abdominal distention, abdominal pain, constipation, diarrhea, nausea and vomiting.   Genitourinary:  Negative for dysuria, frequency, hematuria and vaginal bleeding.   Musculoskeletal:  Positive for arthralgias. Negative for gait problem, joint swelling and myalgias.   Skin:  Negative for rash.   Neurological:  Negative for dizziness, weakness, light-headedness and headaches.   Hematological:  Negative for adenopathy. Does not bruise/bleed easily.   Psychiatric/Behavioral:  Positive for dysphoric mood. Negative for confusion, hallucinations, sleep disturbance and suicidal ideas.         See above  No further issues with hallucinations  Sleep is good       Objective:      Physical Exam  Vitals and nursing note reviewed.   Constitutional:       Appearance: She is well-developed.   HENT:      Head: Normocephalic and atraumatic.      Right Ear: External ear normal.      Left Ear: External ear normal.      Nose: Nose normal.      Mouth/Throat:      Pharynx: No oropharyngeal exudate.   Eyes:      General: No scleral icterus.      Extraocular Movements: Extraocular movements intact.      Conjunctiva/sclera: Conjunctivae normal.   Neck:      Thyroid: No thyromegaly.      Vascular: No JVD.   Cardiovascular:      Rate and Rhythm: Normal rate and regular rhythm.      Heart sounds: Normal heart sounds. No murmur heard.    No gallop.   Pulmonary:      Effort: Pulmonary effort is normal. No respiratory distress.      Breath sounds: Normal breath sounds. No wheezing.   Abdominal:      General: Bowel sounds are normal. There is no distension.      Palpations: Abdomen is soft. There is no mass.      Tenderness: There is no abdominal tenderness. There is no guarding or rebound.   Musculoskeletal:         General: No tenderness. Normal range of motion.      Cervical back: Normal range of motion and neck supple.   Lymphadenopathy:      Cervical: No cervical adenopathy.   Skin:     General: Skin is warm.      Findings: No erythema or rash.   Neurological:      General: No focal deficit present.      Mental Status: She is alert and oriented to person, place, and time.      Cranial Nerves: No cranial nerve deficit.      Coordination: Coordination normal.   Psychiatric:         Behavior: Behavior normal.         Thought Content: Thought content normal.         Judgment: Judgment normal.           Health Maintenance Due   Topic Date Due    COVID-19 Vaccine (5 - Booster for Moderna series) 09/16/2022

## 2022-11-05 DIAGNOSIS — D50.9 IRON DEFICIENCY ANEMIA, UNSPECIFIED IRON DEFICIENCY ANEMIA TYPE: Primary | ICD-10-CM

## 2022-11-05 RX ORDER — FERROUS GLUCONATE 324(38)MG
324 TABLET ORAL
Qty: 90 TABLET | Refills: 1 | Status: SHIPPED | OUTPATIENT
Start: 2022-11-05 | End: 2023-03-03

## 2022-11-05 NOTE — PROGRESS NOTES
Dane - please tell patient that they are iron deficient and anemic and recommend that they take ferrous gluconate one 324mg pill once daily for next 6 months.    Please order repeat fasting Hemoglobin, Iron/TIBC, and Ferritin in 12 weeks - Orders placed.

## 2022-11-23 ENCOUNTER — PES CALL (OUTPATIENT)
Dept: ADMINISTRATIVE | Facility: CLINIC | Age: 72
End: 2022-11-23
Payer: MEDICARE

## 2022-11-29 ENCOUNTER — OFFICE VISIT (OUTPATIENT)
Dept: GASTROENTEROLOGY | Facility: CLINIC | Age: 72
End: 2022-11-29
Payer: MEDICARE

## 2022-11-29 DIAGNOSIS — R13.19 ESOPHAGEAL DYSPHAGIA: Primary | ICD-10-CM

## 2022-11-29 DIAGNOSIS — K22.2 LOWER ESOPHAGEAL RING: ICD-10-CM

## 2022-11-29 DIAGNOSIS — R19.8 ABNORMAL DEFECATION: ICD-10-CM

## 2022-11-29 DIAGNOSIS — K59.09 CONSTIPATION, CHRONIC: ICD-10-CM

## 2022-11-29 DIAGNOSIS — Q39.4 ESOPHAGEAL WEB: ICD-10-CM

## 2022-11-29 PROCEDURE — 99214 PR OFFICE/OUTPT VISIT, EST, LEVL IV, 30-39 MIN: ICD-10-PCS | Mod: 95,,, | Performed by: INTERNAL MEDICINE

## 2022-11-29 PROCEDURE — 1159F MED LIST DOCD IN RCRD: CPT | Mod: CPTII,95,, | Performed by: INTERNAL MEDICINE

## 2022-11-29 PROCEDURE — 4010F PR ACE/ARB THEARPY RXD/TAKEN: ICD-10-PCS | Mod: CPTII,95,, | Performed by: INTERNAL MEDICINE

## 2022-11-29 PROCEDURE — 1159F PR MEDICATION LIST DOCUMENTED IN MEDICAL RECORD: ICD-10-PCS | Mod: CPTII,95,, | Performed by: INTERNAL MEDICINE

## 2022-11-29 PROCEDURE — 3066F PR DOCUMENTATION OF TREATMENT FOR NEPHROPATHY: ICD-10-PCS | Mod: CPTII,95,, | Performed by: INTERNAL MEDICINE

## 2022-11-29 PROCEDURE — 1160F PR REVIEW ALL MEDS BY PRESCRIBER/CLIN PHARMACIST DOCUMENTED: ICD-10-PCS | Mod: CPTII,95,, | Performed by: INTERNAL MEDICINE

## 2022-11-29 PROCEDURE — 4010F ACE/ARB THERAPY RXD/TAKEN: CPT | Mod: CPTII,95,, | Performed by: INTERNAL MEDICINE

## 2022-11-29 PROCEDURE — 99214 OFFICE O/P EST MOD 30 MIN: CPT | Mod: 95,,, | Performed by: INTERNAL MEDICINE

## 2022-11-29 PROCEDURE — 3044F HG A1C LEVEL LT 7.0%: CPT | Mod: CPTII,95,, | Performed by: INTERNAL MEDICINE

## 2022-11-29 PROCEDURE — 1160F RVW MEDS BY RX/DR IN RCRD: CPT | Mod: CPTII,95,, | Performed by: INTERNAL MEDICINE

## 2022-11-29 PROCEDURE — 3044F PR MOST RECENT HEMOGLOBIN A1C LEVEL <7.0%: ICD-10-PCS | Mod: CPTII,95,, | Performed by: INTERNAL MEDICINE

## 2022-11-29 PROCEDURE — 3066F NEPHROPATHY DOC TX: CPT | Mod: CPTII,95,, | Performed by: INTERNAL MEDICINE

## 2022-11-29 NOTE — PROGRESS NOTES
The patient location is:  At home  The chief complaint leading to consultation is:  Esophageal dysphagia and chronic constipation    Visit type: audiovisual    Face to Face time with patient:  30 minutes of total time spent on the encounter, which includes face to face time and non-face to face time preparing to see the patient (eg, review of tests), Obtaining and/or reviewing separately obtained history, Documenting clinical information in the electronic or other health record, Independently interpreting results (not separately reported) and communicating results to the patient/family/caregiver, or Care coordination (not separately reported).     Each patient to whom he or she provides medical services by telemedicine is:  (1) informed of the relationship between the physician and patient and the respective role of any other health care provider with respect to management of the patient; and (2) notified that he or she may decline to receive medical services by telemedicine and may withdraw from such care at any time.    Notes:         [facility] Consultation Note    Reason for Consult:  The primary encounter diagnosis was Esophageal dysphagia. Diagnoses of Esophageal web, Lower esophageal ring, Constipation, chronic, and Abnormal defecation were also pertinent to this visit.    PCP:   Noemy Pugh       Referring MD:  No referring provider defined for this encounter.    Initial History of Present Illness (HPI):  This is a 72 y.o. female here for evaluation of worsening of esophageal dysphagia.  She has an upper esophageal web in lower esophageal ring dilated 09/2019 with great improvement but recently her symptoms have gotten worse and she feels like she needs an urgent esophageal dilation no current food impactions but intermittent solid food dysphagia her constipation is requiring her take her Linzess 290 mcg once daily but still with constipation she would like to undergo anorectal  manometry for further evaluation no fever no chills no shortness of breath no weight loss no abdominal pain no blood in her stool no change in bowel habits colonoscopy is up-to-date.  She is currently taking her PPI as directed no coughing no shortness of breath no pneumonias.  Heartburn symptoms are well controlled    Abdominal pain - no  Reflux - asthma  Dysphagia - no   Bowel habits - constipated  GI bleeding - none  NSAID usage - none    Interval HPI 11/29/2022:  The patient's last visit with me was on 8/13/2020.      ROS:  Constitutional: No fevers, chills, No weight loss  ENT:  No heartburn as a dysphagia no odynophagia no hoarseness  CV: No chest pain, no palpitation  Pulm: No cough, No shortness of breath, no wheezing  Ophtho: No vision changes  GI: see HPI  Derm: No rash, no itching  Heme: No lymphadenopathy, No easy bruising  MSK:  Osteopenia and osteoporosis  : No dysuria, No hematuria  Endo: No hot or cold intolerance  Neuro: No syncope, No seizure, no strokes  Psych: No uncontrolled anxiety, No uncontrolled depression    Medical History:  has a past medical history of Abnormal chest CT, Acid reflux, Allergy, Anemia, Anemia, Anxiety, Cataract, Chronic UTI, Colon adenoma 2015 due 2020 (10/21/2015), Colon adenoma 2015 due 2020 (10/21/2015), Depression, Disturbed sleep rhythm, DJD (degenerative joint disease), Dry eyes, Dry mouth, Grief reaction (3/24/2014), migraine headaches, Hyperlipemia, Hypernatremia (8/8/2014), Hypertension, Iritis, Iritis, Mild vitamin D deficiency (9/9/2013), Multiple lung nodules on CT: 3599-1883 no f/u needed (6/6/2016), Neurogenic bladder, Osteopenia, Osteoporosis, Osteoporosis: 9/15 see rheumatology notes (6/6/2016), Positive [place] (antinuclear antibody), Schatzki's ring: 3/15 dilated (6/6/2016), Sciatica neuralgia (6/21/2013), 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    Surgical History:   has a past surgical history that includes Cholecystectomy; Appendectomy; Posterior fusion lumbar spine w/ corpectomy; Back surgery (2007); TONSILLECTOMY, ADENOIDECTOMY; Cystoscopy; Hernia repair; Colonoscopy (N/A, 10/2/2015); Hysterectomy; Esophagogastroduodenoscopy (N/A, 5/13/2019); Esophagogastroduodenoscopy (N/A, 9/9/2019); Colonoscopy (N/A, 9/9/2019); Spine surgery; and Trigger finger release (Right, 6/3/2022).    Family History: family history includes Blindness in her maternal aunt; Cancer in her maternal aunt and maternal uncle; Colon cancer (age of onset: 70) in her maternal aunt and maternal uncle; Diabetes in her brother and father; Diverticulosis in her sister; Heart disease in her brother; Hyperlipidemia in her sister; Hypertension in her daughter, mother, and sister; Kidney disease in her brother and mother..     Social History:  reports that she has never smoked. She has never used smokeless tobacco. She reports current alcohol use. She reports that she does not use drugs.    Review of patient's allergies indicates:   Allergen Reactions    Alendronate Nausea Only     And heartburn    Brimonidine Dermatitis     Follicular Conjunctivitis    Kenalog [triamcinolone acetonide] Hives       Medication List with Changes/Refills   Current Medications    AMLODIPINE (NORVASC) 10 MG TABLET    Take 0.5 tablets (5 mg total) by mouth 2 (two) times daily.    ASCORBIC ACID (VITAMIN C ORAL)    Take by mouth.    B-COMPLEX WITH VITAMIN C (Z-BEC OR EQUIV) TABLET    Take 1 tablet by mouth once daily.    CHLORTHALIDONE (HYGROTEN) 25 MG TAB    Take 0.5 tablets (12.5 mg total) by mouth every other day.    DULOXETINE (CYMBALTA) 60 MG CAPSULE    TAKE 1 CAPSULE BY MOUTH EVERY DAY    FERROUS GLUCONATE (FERGON) 324 MG TABLET    Take 1 tablet (324 mg total) by mouth daily with breakfast.    LINZESS 145 MCG CAP CAPSULE    TAKE 1 CAPSULE (145 MCG TOTAL) BY MOUTH BEFORE BREAKFAST.    MELATONIN ORAL    Take by mouth.    PANTOPRAZOLE  (PROTONIX) 40 MG TABLET    TAKE 1 TABLET BY MOUTH EVERY DAY BEFORE BREAKFAST    QUETIAPINE (SEROQUEL) 25 MG TAB    Take 1 tablet (25 mg total) by mouth every evening.    TIMOLOL MALEATE 0.5% (TIMOPTIC) 0.5 % DROP    Place 1 drop into both eyes 2 (two) times daily.    TOLTERODINE (DETROL LA) 4 MG 24 HR CAPSULE    Take 1 capsule (4 mg total) by mouth once daily.    VALSARTAN (DIOVAN) 160 MG TABLET    TAKE 2 TABLETS (320 MG TOTAL) BY MOUTH ONCE DAILY.    VITAMIN D (VITAMIN D3) 1000 UNITS TAB    Take 1,000 Units by mouth once daily.    VITAMIN E ACETATE (VITAMIN E ORAL)    Take by mouth.         Objective Findings:    Vital Signs:  There were no vitals taken for this visit.  There is no height or weight on file to calculate BMI.    Physical Exam:  Telemedicine video visit  General Appearance: Well appearing in no acute distress  Neurologic:  Alert and oriented x4  Psychiatric:  Normal speech mentation and affect    Labs:  Lab Results   Component Value Date    WBC 5.32 10/25/2022    HGB 11.2 (L) 10/25/2022    HGB 11.2 (L) 10/25/2022    HCT 34.0 (L) 10/25/2022     10/25/2022    CHOL 179 10/25/2022    TRIG 119 10/25/2022    HDL 57 10/25/2022    ALT 12 10/25/2022    AST 24 10/25/2022     10/25/2022     10/25/2022    K 3.6 10/25/2022    K 3.6 10/25/2022     10/25/2022     10/25/2022    CREATININE 1.2 10/25/2022    CREATININE 1.2 10/25/2022    BUN 16 10/25/2022    BUN 16 10/25/2022    CO2 25 10/25/2022    CO2 25 10/25/2022    TSH 1.802 10/25/2022    INR 1.0 03/21/2015    HGBA1C 5.4 10/25/2022             Medical Decision Making:  The                         Olympus scope GIF- ([id number]) was introduced                         through the mouth, and advanced to the third part                         of duodenum. The upper GI endoscopy was                         accomplished without difficulty. The patient                         tolerated the procedure well.   Findings:        The examined  duodenum was normal.        A few diminutive bleeding angioectasias were found in the gastric        body. Coagulation for hemostasis using argon plasma at 1        liter/minute and 20 barajas was successful. Estimated blood loss: none.        Patchy mildly erythematous mucosa without bleeding was found in the        gastric fundus, in the gastric body, in the gastric antrum and in        the prepyloric region of the stomach. Biopsies were taken with a        cold forceps for histology. Biopsies were taken with a cold forceps        for histology. Estimated blood loss was minimal.        The cardia and gastric fundus were normal on retroflexion.        A widely patent, non-obstructing and mild Schatzki ring was found at        the gastroesophageal junction. A TTS dilator was passed through the        scope. Dilation with a 15-16.5-18 mm balloon dilator was performed        to 18 mm. The dilation site was examined following endoscope        reinsertion and showed no bleeding, mucosal tear or perforation.        Estimated blood loss: none.        A web was found in the proximal esophagus. A guidewire was placed        and the scope was withdrawn. Dilation was performed with a Savary        dilator with no resistance at 45 Fr. The dilation site was examined        following endoscope reinsertion and showed no bleeding, mucosal tear        or perforation. Estimated blood loss: none.        The Z-line was regular and was found 40 cm from the incisors. Z-line        was sharp. No esophagitis and no columnar esophagus and no lesions        seen with NBI or white light.   Impression:           - Normal examined duodenum.                         - A few bleeding angioectasias in the stomach.                         Treated with argon plasma coagulation (APC).                         - Erythematous mucosa in the gastric fundus,                         gastric body, antrum and prepyloric region of the                          stomach. Biopsied.                         - Widely patent, non-obstructing and mild Schatzki                         ring. Dilated.                         - Web in the proximal esophagus. Dilated.                         - Z-line regular, 40 cm from the incisors.   Recommendation:       - Discharge patient to home.                         - Await pathology results. Please get labs today                         before your go home to check for anemia - orders                         placed.                         - Telephone endoscopist for pathology results in 2                         weeks.                         - Consider avoiding all non-steroidal                         anti-inflammatory drugs (aspirin, ibuprofen,                         naproxen, etc.), unless needed for cardiovascular                         protection. Recommend you discuss with your                         prescribing doctor (of your aspirin) to see if                         cardiovascular benefits of your aspirin outweigh                         the risks of GI bleeding.                         - Use a proton pump inhibitor PO daily.                         - The findings and recommendations were discussed                         with the patient.                         - Return to GI clinic at the next available                         appointment.                         - Return to primary care physician.   Attending Participation:        I personally performed the entire procedure.   Bryan Love MD   9/9/2019      The Olympus scope PCF-H190DL ([id number]) was                         introduced through the anus and advanced to the                         terminal ileum, with identification of the                         appendiceal orifice and IC valve. The colonoscopy                         was performed without difficulty. The patient                         tolerated the procedure well. The quality of the                          bowel preparation was excellent. Scope insertion                         time was 2 minutes. Scope withdrawal time was 9                         minutes. Good look. The terminal ileum, ileocecal                         valve, appendiceal orifice, and rectum were                         photographed.   Findings:        The terminal ileum appeared normal.        The perianal and digital rectal examinations were normal.        The retroflexed view of the distal rectum and anal verge was normal        and showed no anal or rectal abnormalities.        Non-bleeding internal hemorrhoids were found during retroflexion.        The hemorrhoids were mild.        Diverticula were found in the recto-sigmoid colon, sigmoid colon and        descending colon.        The entire examined colon appeared normal.   Impression:           - The examined portion of the ileum was normal.                         - Non-bleeding internal hemorrhoids.                         - Diverticulosis in the recto-sigmoid colon, in the                         sigmoid colon and in the descending colon.                         - The entire examined colon is normal.                         - No specimens collected.   Recommendation:       - Discharge patient to home.                         - Repeat colonoscopy in 5 years for surveillance.                         - The findings and recommendations were discussed                         with the patient.                         - Return to primary care physician.   Attending Participation:        I personally performed the entire procedure.   Bryan Love MD   9/9/2019    Idalmis renteria EGD pathology was benign no dysplasia no H pylori.     SPECIMEN  1) Stomach to rule out H. pylori.  FINAL PATHOLOGIC DIAGNOSIS  Stomach, biopsy:  - Antral and oxyntic mucosa with no significant histologic abnormality.  - Negative for Helicobacter organisms on routine H&E sections.  Diagnosed by: Amber Hughes M.D.    Written by Bryan Love MD on 9/12/2019     SPECIMEN  1) Stomach biopsy.     FINAL PATHOLOGIC DIAGNOSIS  GASTRIC BIOPSY:  EQUIVOCAL MINIMAL NONSPECIFIC CHRONIC INFLAMMATION  NO HELICOBACTER HAVE BEEN IDENTIFIED WITH A SPECIAL STAIN  THE POSITIVE AND NEGATIVE CONTROLS STAINED APPROPRIATELY  NO CYTOMEGALIC INCLUSION VIRUS, HERPESVIRUS 1, OR HERPES VIRUS 2 IDENTIFIED WITH SPECIAL  STAINS FOR THOSE ORGANISMS. THE POSITIVE AND NEGATIVE CONTROLS STAINED APPROPRIATELY.  ASR     Diagnosed by: Malcolm Santacruz M.D.        Assessment:  1. Esophageal dysphagia    2. Esophageal web    3. Lower esophageal ring    4. Constipation, chronic    5. Abnormal defecation         Recommendations:  1. Urgent EGD for esophageal dilation patient is symptomatic again from her lower esophageal ring and esophageal web referral placed  2. Referral for anal rectal manometry to rule out dyssynergy genic defecation referral placed  3. Next screening colonoscopy 09/2020 for 5 years from her last  4. Return to GI clinic 3 months for follow-up    No follow-ups on file.      Order summary:  Orders Placed This Encounter    Ambulatory referral/consult to Endo Procedure     Ambulatory referral/consult to Endo Procedure          Thank you so much for allowing me to participate in the care of Idalmis Morejon    Bryan Love MD    DISCLAIMER: This note was prepared with [employer] voice recognition transcription software. Garbled syntax, mangled or inadvertent pronouns, and other bizarre constructions may be attributed to that software system. While efforts were made to correct any mistakes made by this voice recognition program, some errors and/or omissions may remain in the note that were missed when the note was originally created.

## 2022-11-30 ENCOUNTER — PATIENT MESSAGE (OUTPATIENT)
Dept: ENDOSCOPY | Facility: HOSPITAL | Age: 72
End: 2022-11-30
Payer: MEDICARE

## 2022-11-30 ENCOUNTER — TELEPHONE (OUTPATIENT)
Dept: ENDOSCOPY | Facility: HOSPITAL | Age: 72
End: 2022-11-30
Payer: MEDICARE

## 2022-11-30 VITALS — BODY MASS INDEX: 24.11 KG/M2 | HEIGHT: 66 IN | WEIGHT: 150 LBS

## 2022-11-30 DIAGNOSIS — R13.19 ESOPHAGEAL DYSPHAGIA: Primary | ICD-10-CM

## 2022-12-07 ENCOUNTER — ANESTHESIA (OUTPATIENT)
Dept: ENDOSCOPY | Facility: HOSPITAL | Age: 72
End: 2022-12-07
Payer: MEDICARE

## 2022-12-07 ENCOUNTER — HOSPITAL ENCOUNTER (OUTPATIENT)
Facility: HOSPITAL | Age: 72
Discharge: HOME | End: 2022-12-07
Attending: INTERNAL MEDICINE | Admitting: INTERNAL MEDICINE
Payer: MEDICARE

## 2022-12-07 ENCOUNTER — ANESTHESIA EVENT (OUTPATIENT)
Dept: ENDOSCOPY | Facility: HOSPITAL | Age: 72
End: 2022-12-07
Payer: MEDICARE

## 2022-12-07 VITALS
OXYGEN SATURATION: 98 % | TEMPERATURE: 98 F | RESPIRATION RATE: 16 BRPM | BODY MASS INDEX: 24.11 KG/M2 | HEART RATE: 76 BPM | SYSTOLIC BLOOD PRESSURE: 157 MMHG | HEIGHT: 66 IN | DIASTOLIC BLOOD PRESSURE: 74 MMHG | WEIGHT: 150 LBS

## 2022-12-07 DIAGNOSIS — R13.10 DYSPHAGIA, UNSPECIFIED TYPE: ICD-10-CM

## 2022-12-07 PROCEDURE — 43249 ESOPH EGD DILATION <30 MM: CPT | Mod: ,,, | Performed by: INTERNAL MEDICINE

## 2022-12-07 PROCEDURE — 37000009 HC ANESTHESIA EA ADD 15 MINS: Performed by: INTERNAL MEDICINE

## 2022-12-07 PROCEDURE — 25000003 PHARM REV CODE 250: Performed by: NURSE ANESTHETIST, CERTIFIED REGISTERED

## 2022-12-07 PROCEDURE — C1726 CATH, BAL DIL, NON-VASCULAR: HCPCS | Performed by: INTERNAL MEDICINE

## 2022-12-07 PROCEDURE — 37000008 HC ANESTHESIA 1ST 15 MINUTES: Performed by: INTERNAL MEDICINE

## 2022-12-07 PROCEDURE — 25000003 PHARM REV CODE 250: Performed by: INTERNAL MEDICINE

## 2022-12-07 PROCEDURE — 43249 ESOPH EGD DILATION <30 MM: CPT | Performed by: INTERNAL MEDICINE

## 2022-12-07 PROCEDURE — 88305 TISSUE EXAM BY PATHOLOGIST: CPT | Mod: 26,,, | Performed by: PATHOLOGY

## 2022-12-07 PROCEDURE — 27201012 HC FORCEPS, HOT/COLD, DISP: Performed by: INTERNAL MEDICINE

## 2022-12-07 PROCEDURE — 63600175 PHARM REV CODE 636 W HCPCS: Performed by: NURSE ANESTHETIST, CERTIFIED REGISTERED

## 2022-12-07 PROCEDURE — 43249 PR EGD, FLEX, W/BALL DILATION, < 30MM: ICD-10-PCS | Mod: ,,, | Performed by: INTERNAL MEDICINE

## 2022-12-07 PROCEDURE — 88305 TISSUE EXAM BY PATHOLOGIST: ICD-10-PCS | Mod: 26,,, | Performed by: PATHOLOGY

## 2022-12-07 PROCEDURE — 88305 TISSUE EXAM BY PATHOLOGIST: CPT | Mod: 59 | Performed by: PATHOLOGY

## 2022-12-07 RX ORDER — LIDOCAINE HYDROCHLORIDE 20 MG/ML
INJECTION INTRAVENOUS
Status: DISCONTINUED | OUTPATIENT
Start: 2022-12-07 | End: 2022-12-07

## 2022-12-07 RX ORDER — SODIUM CHLORIDE 9 MG/ML
INJECTION, SOLUTION INTRAVENOUS CONTINUOUS
Status: DISCONTINUED | OUTPATIENT
Start: 2022-12-07 | End: 2022-12-07 | Stop reason: HOSPADM

## 2022-12-07 RX ORDER — PROPOFOL 10 MG/ML
VIAL (ML) INTRAVENOUS
Status: DISCONTINUED | OUTPATIENT
Start: 2022-12-07 | End: 2022-12-07

## 2022-12-07 RX ADMIN — GLYCOPYRROLATE 0.2 MG: 0.2 INJECTION, SOLUTION INTRAMUSCULAR; INTRAVENOUS at 02:12

## 2022-12-07 RX ADMIN — LIDOCAINE HYDROCHLORIDE 100 MG: 20 INJECTION INTRAVENOUS at 02:12

## 2022-12-07 RX ADMIN — PROPOFOL 50 MG: 10 INJECTION, EMULSION INTRAVENOUS at 02:12

## 2022-12-07 RX ADMIN — SODIUM CHLORIDE: 0.9 INJECTION, SOLUTION INTRAVENOUS at 02:12

## 2022-12-07 NOTE — TRANSFER OF CARE
"Anesthesia Transfer of Care Note    Patient: Idalmis Morejon    Procedure(s) Performed: Procedure(s) (LRB):  EGD (ESOPHAGOGASTRODUODENOSCOPY) (N/A)    Patient location: PACU    Anesthesia Type: MAC and general    Transport from OR: Transported from OR on room air with adequate spontaneous ventilation    Post pain: adequate analgesia    Post assessment: no apparent anesthetic complications and tolerated procedure well    Post vital signs: stable    Level of consciousness: awake and alert    Nausea/Vomiting: no nausea/vomiting    Complications: none    Transfer of care protocol was followed      Last vitals:   Visit Vitals  BP (!) 157/79   Pulse 85   Temp 36.7 °C (98.1 °F)   Resp 16   Ht 5' 6" (1.676 m)   Wt 68 kg (150 lb)   SpO2 99%   Breastfeeding No   BMI 24.21 kg/m²     "

## 2022-12-07 NOTE — ANESTHESIA POSTPROCEDURE EVALUATION
Anesthesia Post Evaluation    Patient: Idalmis Morejon    Procedure(s) Performed: Procedure(s) (LRB):  EGD (ESOPHAGOGASTRODUODENOSCOPY) (N/A)    Final Anesthesia Type: general      Patient location during evaluation: GI PACU  Patient participation: Yes- Able to Participate  Level of consciousness: awake and alert  Post-procedure vital signs: reviewed and stable  Pain management: adequate  Airway patency: patent    PONV status at discharge: No PONV  Anesthetic complications: no      Cardiovascular status: blood pressure returned to baseline  Respiratory status: unassisted  Hydration status: euvolemic  Follow-up not needed.          Vitals Value Taken Time   /74 12/07/22 1505   Temp 36.7 °C (98.1 °F) 12/07/22 1436   Pulse 76 12/07/22 1505   Resp 16 12/07/22 1505   SpO2 98 % 12/07/22 1505         Event Time   Out of Recovery 15:06:00         Pain/Brina Score: Brina Score: 8 (12/7/2022  2:36 PM)

## 2022-12-07 NOTE — H&P
[facility] 4[facility]  History & Physical    Subjective:      Chief Complaint/Reason for Admission:     EGD for esophageal dilation for dysphagia    Idalmis Morejon is a 72 y.o. female.    Past Medical History:   Diagnosis Date    Abnormal chest CT     Acid reflux     Allergy     Anemia     Anemia     Anxiety     Cataract     Chronic UTI     recently treated with antibiotics -x 3 wks. ago-    Colon adenoma 2015 due 2020 10/21/2015    Colon adenoma 2015 due 2020 10/21/2015    Depression     Disturbed sleep rhythm     DJD (degenerative joint disease)     Dry eyes     Dry mouth     Grief reaction 3/24/2014    Hx of migraine headaches     Hyperlipemia     Hypernatremia 8/8/2014    Hypertension     Iritis     Iritis     Mild vitamin D deficiency 9/9/2013    Multiple lung nodules on CT: 8162-0433 no f/u needed 6/6/2016    Neurogenic bladder     Osteopenia     in hips    Osteoporosis     spine    Osteoporosis: 9/15 see rheumatology notes 6/6/2016    Positive GEORGIANA (antinuclear antibody)     Schatzki's ring: 3/15 dilated 6/6/2016    Sciatica neuralgia 6/21/2013    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    Visual impairment      Past Surgical History:   Procedure Laterality Date    APPENDECTOMY      BACK SURGERY  2007    x4    CHOLECYSTECTOMY      lap orin    COLONOSCOPY N/A 10/2/2015    Procedure: COLONOSCOPY;  Surgeon: Bryan Love MD;  Location: [address]);  Service: Endoscopy;  Laterality: N/A;    COLONOSCOPY N/A 9/9/2019    Procedure: COLONOSCOPY;  Surgeon: Bryan Love MD;  Location: [address]);  Service: Endoscopy;  Laterality: N/A;    CYSTOSCOPY      with BOTOX INJECTION    ESOPHAGOGASTRODUODENOSCOPY N/A 5/13/2019    Procedure: EGD (ESOPHAGOGASTRODUODENOSCOPY);  Surgeon: Bryan Love MD;  Location: [address]);  Service: Endoscopy;  Laterality: N/A;    ESOPHAGOGASTRODUODENOSCOPY N/A 9/9/2019     Procedure: EGD (ESOPHAGOGASTRODUODENOSCOPY);  Surgeon: Bryan Love MD;  Location: [facility] ([address] [place] [address]);  Service: Endoscopy;  Laterality: N/A;    HERNIA REPAIR      umbilical    HYSTERECTOMY      COMPLETE    POSTERIOR FUSION LUMBAR SPINE W/ CORPECTOMY      SPINE SURGERY      TONSILLECTOMY, ADENOIDECTOMY      TRIGGER FINGER RELEASE Right 6/3/2022    Procedure: RELEASE, TRIGGER FINGER,RIGHT,LONG;  Surgeon: Noemy Hernandez MD;  Location: [facility];  Service: Orthopedics;  Laterality: Right;     Family History   Problem Relation Age of Onset    Kidney disease Mother     Hypertension Mother     Diabetes Father     Diabetes Brother     Kidney disease Brother     Heart disease Brother     Hyperlipidemia Sister     Hypertension Sister     Diverticulosis Sister     Hypertension Daughter     Colon cancer Maternal Aunt 70        stomach    Blindness Maternal Aunt     Cancer Maternal Aunt         stomach cancer    Cancer Maternal Uncle         colon    Colon cancer Maternal Uncle 70        two uncles    Glaucoma Neg Hx     Amblyopia Neg Hx     Macular degeneration Neg Hx     Retinal detachment Neg Hx     Anesthesia problems Neg Hx     Celiac disease Neg Hx     Cirrhosis Neg Hx     Crohn's disease Neg Hx     Esophageal cancer Neg Hx     Irritable bowel syndrome Neg Hx     Liver cancer Neg Hx     Liver disease Neg Hx     Rectal cancer Neg Hx     Stomach cancer Neg Hx     Melanoma Neg Hx      Social History     Tobacco Use    Smoking status: Never    Smokeless tobacco: Never   Substance Use Topics    Alcohol use: Yes     Comment: red wine occasionally    Drug use: No       PTA Medications   Medication Sig    amLODIPine (NORVASC) 10 MG tablet Take 0.5 tablets (5 mg total) by mouth 2 (two) times daily.    ascorbic acid (VITAMIN C ORAL) Take by mouth.    B-complex with vitamin C (Z-BEC OR EQUIV) tablet Take 1 tablet by mouth once daily.    chlorthalidone (HYGROTEN) 25 MG Tab Take 0.5 tablets (12.5 mg total) by mouth every  other day.    DULoxetine (CYMBALTA) 60 MG capsule TAKE 1 CAPSULE BY MOUTH EVERY DAY    ferrous gluconate (FERGON) 324 MG tablet Take 1 tablet (324 mg total) by mouth daily with breakfast.    LINZESS 145 mcg Cap capsule TAKE 1 CAPSULE (145 MCG TOTAL) BY MOUTH BEFORE BREAKFAST.    MELATONIN ORAL Take by mouth.    pantoprazole (PROTONIX) 40 MG tablet TAKE 1 TABLET BY MOUTH EVERY DAY BEFORE BREAKFAST    QUEtiapine (SEROQUEL) 25 MG Tab Take 1 tablet (25 mg total) by mouth every evening.    timolol maleate 0.5% (TIMOPTIC) 0.5 % Drop Place 1 drop into both eyes 2 (two) times daily.    tolterodine (DETROL LA) 4 MG 24 hr capsule Take 1 capsule (4 mg total) by mouth once daily.    valsartan (DIOVAN) 160 MG tablet TAKE 2 TABLETS (320 MG TOTAL) BY MOUTH ONCE DAILY.    vitamin D (VITAMIN D3) 1000 units Tab Take 1,000 Units by mouth once daily.    vitamin E acetate (VITAMIN E ORAL) Take by mouth.     Review of patient's allergies indicates:   Allergen Reactions    Alendronate Nausea Only     And heartburn    Brimonidine Dermatitis     Follicular Conjunctivitis    Kenalog [triamcinolone acetonide] Hives        Review of Systems   Constitutional:  Negative for fever.   Respiratory:  Negative for shortness of breath.    Cardiovascular:  Negative for chest pain.     Objective:      Vital Signs (Most Recent)  Temp: 97.9 °F (36.6 °C) (12/07/22 1253)  Pulse: 71 (12/07/22 1253)  Resp: 15 (12/07/22 1253)  BP: (!) 175/79 (12/07/22 1253)  SpO2: 100 % (12/07/22 1253)    Vital Signs Range (Last 24H):  Temp:  [97.9 °F (36.6 °C)]   Pulse:  [71]   Resp:  [15]   BP: (175)/(79)   SpO2:  [100 %]     Physical Exam  Cardiovascular:      Rate and Rhythm: Normal rate.   Pulmonary:      Effort: Pulmonary effort is normal.   Neurological:      Mental Status: She is alert and oriented to person, place, and time.         Assessment:      EGD for esophageal dilation for dysphagia        Plan:      EGD for esophageal dilation for dysphagia

## 2022-12-07 NOTE — PROVATION PATIENT INSTRUCTIONS
Discharge Summary/Instructions after an Endoscopic Procedure  Patient Name: Idalmis Morejon  Patient MRN: [id number]  Patient YOB: 1950 Wednesday, December 7, 2022  Bryan Love MD  Dear patient,  As a result of recent federal legislation (The Federal Cures Act), you may   receive lab or pathology results from your procedure in your [facility]   account before your physician is able to contact you. Your physician or   their representative will relay the results to you with their   recommendations at their soonest availability.  Thank you,  RESTRICTIONS:  During your procedure today, you received medications for sedation.  These   medications may affect your judgment, balance and coordination.  Therefore,   for 24 hours, you have the following restrictions:   - DO NOT drive a car, operate machinery, make legal/financial decisions,   sign important papers or drink alcohol.    ACTIVITY:  Today: no heavy lifting, straining or running due to procedural   sedation/anesthesia.  The following day: return to full activity including work.  DIET:  Eat and drink normally unless instructed otherwise.     TREATMENT FOR COMMON SIDE EFFECTS:  - Mild abdominal pain, nausea, belching, bloating or excessive gas:  rest,   eat lightly and use a heating pad.  - Sore Throat: treat with throat lozenges and/or gargle with warm salt   water.  - Because air was used during the procedure, expelling large amounts of air   from your rectum or belching is normal.  - If a bowel prep was taken, you may not have a bowel movement for 1-3 days.    This is normal.  SYMPTOMS TO WATCH FOR AND REPORT TO YOUR PHYSICIAN:  1. Abdominal pain or bloating, other than gas cramps.  2. Chest pain.  3. Back pain.  4. Signs of infection such as: chills or fever occurring within 24 hours   after the procedure.  5. Rectal bleeding, which would show as bright red, maroon, or black stools.   (A tablespoon of blood from the rectum is not serious, especially  if   hemorrhoids are present.)  6. Vomiting.  7. Weakness or dizziness.  GO DIRECTLY TO THE NEAREST EMERGENCY ROOM IF YOU HAVE ANY OF THE FOLLOWING:      Difficulty breathing              Chills and/or fever over 101 F   Persistent vomiting and/or vomiting blood   Severe abdominal pain   Severe chest pain   Black, tarry stools   Bleeding- more than one tablespoon   Any other symptom or condition that you feel may need urgent attention  Your doctor recommends these additional instructions:  If any biopsies were taken, your doctors clinic will contact you in 1 to 2   weeks with any results.  - Discharge patient to home.   - Follow an antireflux regimen.   - Await pathology results.   - Telephone endoscopist for pathology results in 3 weeks.   - Return to GI clinic at the next available appointment.   - The findings and recommendations were discussed with the patient.  For questions, problems or results please call your physician - Bryan Love MD at Work:  [phone].  [facility], EMERGENCY ROOM PHONE NUMBER: [phone]  IF A COMPLICATION OR EMERGENCY SITUATION ARISES AND YOU ARE UNABLE TO REACH   YOUR PHYSICIAN - GO DIRECTLY TO THE EMERGENCY ROOM.  Bryan Love MD  12/7/2022 2:33:58 PM  This report has been verified and signed electronically.  Dear patient,  As a result of recent federal legislation (The Federal Cures Act), you may   receive lab or pathology results from your procedure in your [facility]   account before your physician is able to contact you. Your physician or   their representative will relay the results to you with their   recommendations at their soonest availability.  Thank you,  PROVATION

## 2022-12-07 NOTE — ANESTHESIA PREPROCEDURE EVALUATION
Chart review complete. Patient's medical history reviewed.  OK to proceed at [facility].    Renee Santos, CRNA   12/7/2022  Past Surgical History:   Procedure Laterality Date    APPENDECTOMY      BACK SURGERY  2007    x4    CHOLECYSTECTOMY      lap orin    COLONOSCOPY N/A 10/2/2015    Procedure: COLONOSCOPY;  Surgeon: Bryan Love MD;  Location: [facility] (4TH FLR);  Service: Endoscopy;  Laterality: N/A;    COLONOSCOPY N/A 9/9/2019    Procedure: COLONOSCOPY;  Surgeon: Bryan Love MD;  [facility]  Service: Endoscopy;  Laterality: N/A;    CYSTOSCOPY      with BOTOX INJECTION    ESOPHAGOGASTRODUODENOSCOPY N/A 5/13/2019    Procedure: EGD (ESOPHAGOGASTRODUODENOSCOPY);  Surgeon: Bryan Love MD;  [facility]  Service: Endoscopy;  Laterality: N/A;    ESOPHAGOGASTRODUODENOSCOPY N/A 9/9/2019    Procedure: EGD (ESOPHAGOGASTRODUODENOSCOPY);  Surgeon: Bryan Love MD;  [facility]  Service: Endoscopy;  Laterality: N/A;    HERNIA REPAIR      umbilical    HYSTERECTOMY      COMPLETE    POSTERIOR FUSION LUMBAR SPINE W/ CORPECTOMY      SPINE SURGERY      TONSILLECTOMY, ADENOIDECTOMY      TRIGGER FINGER RELEASE Right 6/3/2022    Procedure: RELEASE, TRIGGER FINGER,RIGHT,LONG;  Surgeon: Noemy Hernandez MD;  Location: [facility];  Service: Orthopedics;  Laterality: Right;     Results for orders placed or performed during the hospital encounter of 02/21/18   EKG 12-lead    Collection Time: 02/21/18 11:19 AM    Narrative    Test Reason : R07.9  Blood Pressure : mmHG  Vent. Rate : 088 BPM     Atrial Rate : 088 BPM     P-R Int : 146 ms          QRS Dur : 070 ms      QT Int : 342 ms       P-R-T Axes : 084 089 010 degrees     QTc Int : 413 ms    Normal sinus rhythm  Possible Left atrial enlargement  LVH with repolarization abnormality  Abnormal ECG  When compared with ECG of 21-MAR-2015 13:48,  Nonspecific T wave abnormality now evident in Inferior  leads  T wave inversion now evident in Lateral leads  Confirmed by Devaughn Eric MD ([id number]) on 2/21/2018 7:07:59 PM    Referred By: GIACOMO RUSS           Confirmed By:Devaughn Eric MD                                                                                                                        12/07/2022  Idalmis Morejon is a 72 y.o., female.      Pre-op Assessment    I have reviewed the Patient Summary Reports.     I have reviewed the Nursing Notes. I have reviewed the NPO Status.   I have reviewed the Medications.     Review of Systems  Social:  Non-Smoker, Social Alcohol Use    Hematology/Oncology:  Hematology Normal   Oncology Normal     EENT/Dental:EENT/Dental Normal   Cardiovascular:   Hypertension    Pulmonary:  Pulmonary Normal    Renal/:   Chronic Renal Disease    Hepatic/GI:   GERD    Musculoskeletal:   Arthritis     Neurological:   Neuromuscular Disease,    Endocrine:  Endocrine Normal    Dermatological:  Skin Normal    Psych:   Psychiatric History        Patient Active Problem List   Diagnosis    Essential hypertension    Gastroesophageal reflux disease with esophagitis: EGD 3/15; also 2019    Spondylosis of lumbosacral region without myelopathy or radiculopathy    Mixed hyperlipidemia    Nuclear sclerosis - Both Eyes    Chronic pain syndrome    Sciatica neuralgia    High risk medication use-Azathioprine 100 mg daily    Ocular hypertension - Both Eyes    Bilateral dry eyes - Both Eyes    Autoimmune disorder- recurrent iritis    Neurogenic bladder    Slow transit constipation    Scleritis    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    Colon adenoma: 10/15 acceptable 2019, repeat 2024    Age-related osteoporosis 2016; also 4/21    Schatzki's ring: 3/15 dilated    Multiple lung nodules on CT: 8093-2111 no f/u needed    Steroid-induced glaucoma of both eyes    Proteinuria    Family history of colon cancer: maternal uncle and aunt    Recurrent major depressive  disorder, in partial remission    Immunosuppression    Atherosclerosis of abdominal aorta: minimal see CT 7/16; CT SCORE 0 1/22    CKD (chronic kidney disease) stage 3, GFR 30-59 ml/min    Secondary hyperparathyroidism    Esophageal dysphagia    Chronic follicular conjunctivitis of both eyes    Undifferentiated connective tissue disease    Tortuous aorta; on CT 12/10 and CXR 7/20: CT SCORE 0 1/22    Open angle with borderline findings and low glaucoma risk in both eyes    History of compression fracture of spine    Decreased range of motion of finger of right hand    Decreased  strength of right hand         Physical Exam  General: Well nourished    Airway:  Mallampati: II / I  Mouth Opening: Normal  TM Distance: Normal  Tongue: Normal  Neck ROM: Normal ROM    Dental:  Intact    Heart:  Rate: Normal  Rhythm: Regular Rhythm        Anesthesia Plan  Type of Anesthesia, risks & benefits discussed:    Anesthesia Type: Gen ETT, Gen Supraglottic Airway, Gen Natural Airway, MAC, Regional  Intra-op Monitoring Plan: Standard ASA Monitors  Post Op Pain Control Plan: multimodal analgesia  Induction:  IV  Informed Consent: Informed consent signed with the Patient and all parties understand the risks and agree with anesthesia plan.  All questions answered.   ASA Score: 2  Day of Surgery Review of History & Physical: H&P Update referred to the surgeon/provider.    Ready For Surgery From Anesthesia Perspective.     .

## 2022-12-15 ENCOUNTER — OFFICE VISIT (OUTPATIENT)
Dept: PSYCHIATRY | Facility: CLINIC | Age: 72
End: 2022-12-15
Payer: MEDICARE

## 2022-12-15 DIAGNOSIS — F33.41 RECURRENT MAJOR DEPRESSIVE DISORDER, IN PARTIAL REMISSION: Primary | ICD-10-CM

## 2022-12-15 DIAGNOSIS — F40.298 SPECIFIC PHOBIA: ICD-10-CM

## 2022-12-15 DIAGNOSIS — R44.2 ALTERATION IN SENSORY PERCEPTION AS EVIDENCED BY ILLUSIONS: ICD-10-CM

## 2022-12-15 PROCEDURE — 1160F RVW MEDS BY RX/DR IN RCRD: CPT | Mod: CPTII,95,, | Performed by: PSYCHIATRY & NEUROLOGY

## 2022-12-15 PROCEDURE — 3044F HG A1C LEVEL LT 7.0%: CPT | Mod: CPTII,95,, | Performed by: PSYCHIATRY & NEUROLOGY

## 2022-12-15 PROCEDURE — 3066F PR DOCUMENTATION OF TREATMENT FOR NEPHROPATHY: ICD-10-PCS | Mod: CPTII,95,, | Performed by: PSYCHIATRY & NEUROLOGY

## 2022-12-15 PROCEDURE — 1160F PR REVIEW ALL MEDS BY PRESCRIBER/CLIN PHARMACIST DOCUMENTED: ICD-10-PCS | Mod: CPTII,95,, | Performed by: PSYCHIATRY & NEUROLOGY

## 2022-12-15 PROCEDURE — 1159F MED LIST DOCD IN RCRD: CPT | Mod: CPTII,95,, | Performed by: PSYCHIATRY & NEUROLOGY

## 2022-12-15 PROCEDURE — 3066F NEPHROPATHY DOC TX: CPT | Mod: CPTII,95,, | Performed by: PSYCHIATRY & NEUROLOGY

## 2022-12-15 PROCEDURE — 4010F ACE/ARB THERAPY RXD/TAKEN: CPT | Mod: CPTII,95,, | Performed by: PSYCHIATRY & NEUROLOGY

## 2022-12-15 PROCEDURE — 3044F PR MOST RECENT HEMOGLOBIN A1C LEVEL <7.0%: ICD-10-PCS | Mod: CPTII,95,, | Performed by: PSYCHIATRY & NEUROLOGY

## 2022-12-15 PROCEDURE — 1159F PR MEDICATION LIST DOCUMENTED IN MEDICAL RECORD: ICD-10-PCS | Mod: CPTII,95,, | Performed by: PSYCHIATRY & NEUROLOGY

## 2022-12-15 PROCEDURE — 4010F PR ACE/ARB THEARPY RXD/TAKEN: ICD-10-PCS | Mod: CPTII,95,, | Performed by: PSYCHIATRY & NEUROLOGY

## 2022-12-15 PROCEDURE — 99214 OFFICE O/P EST MOD 30 MIN: CPT | Mod: 95,,, | Performed by: PSYCHIATRY & NEUROLOGY

## 2022-12-15 PROCEDURE — 99214 PR OFFICE/OUTPT VISIT, EST, LEVL IV, 30-39 MIN: ICD-10-PCS | Mod: 95,,, | Performed by: PSYCHIATRY & NEUROLOGY

## 2022-12-15 RX ORDER — QUETIAPINE FUMARATE 25 MG/1
25 TABLET, FILM COATED ORAL NIGHTLY
Qty: 30 TABLET | Refills: 3 | Status: SHIPPED | OUTPATIENT
Start: 2022-12-15 | End: 2023-04-16

## 2022-12-15 NOTE — PROGRESS NOTES
The patient location is: home  The chief complaint leading to consultation is: depression and anxiety    Visit type: audiovisual    Face to Face time with patient: 25 mins  30 minutes of total time spent on the encounter, which includes face to face time and non-face to face time preparing to see the patient (eg, review of tests), Obtaining and/or reviewing separately obtained history, Documenting clinical information in the electronic or other health record, Independently interpreting results (not separately reported) and communicating results to the patient/family/caregiver, or Care coordination (not separately reported).     Each patient to whom he or she provides medical services by telemedicine is:  (1) informed of the relationship between the physician and patient and the respective role of any other health care provider with respect to management of the patient; and (2) notified that he or she may decline to receive medical services by telemedicine and may withdraw from such care at any time.    Notes:     Outpatient Psychiatry Follow-Up Visit (MD/NP)    12/15/2022    Clinical Status of Patient:  Outpatient (Ambulatory)    Chief Complaint:  Idalmis Morejon is a 72 y.o. female who presents today for follow-up of depression and anxiety.      Met with patient.      Interval History and Content of Current Session:  Interim Events/Subjective Report/Content of Current Session:   Pt reports she is much better. 90% better.   However she reports sometimes things look funny.  Gives the example of a commercial on TV will have shabby looking dogs.  She will turn the channel or close her eyes. Don't look like ordinary dogs, too large too much hair.  Grocery store adds displaying crab claws have pictures that look like deformed fingers.  Not afraid of it as I was before.  It started after [place] game where the quarterback had a concussion and his fingers were curled up.  After this many things she saw looked like  deformed hands.  Some trees and the roof with a tarp looked like fingers.  Wasn't sleeping well.  Had no AH.  Did not feel unsafe.     Now she is only concerned for what might have been the cause.  Denies medication changes but was restarted on Chlorthalidone in August.      Currently appetite is not good.  Maintaining weight. Drinks boost.  Still walking in the mall twice a week.  Goes to [employer] on sundays and does puzzles.  Sleeping well with melatonin and seroquel.      Denies feeling depressed but this not a good time of the year for her without her mom.        Prior trials:  remeron - HA  wellbutrin - does not recall      Psychotherapy:  Target symptoms: depression, anxiety   Why chosen therapy is appropriate versus another modality: relevant to diagnosis  Outcome monitoring methods: self-report, observation  Therapeutic intervention type: supportive psychotherapy  Topics discussed/themes: illness/death of a loved one, building skills sets for symptom management, symptom recognition  The patient's response to the intervention is motivated. The patient's progress toward treatment goals is good.   Duration of intervention:  mins      Review of Systems   Constitutional:  Negative for chills, fever and weight loss.   Respiratory:  Negative for cough, shortness of breath and wheezing.    Cardiovascular:  Negative for chest pain and palpitations.   Gastrointestinal:  Negative for abdominal pain, diarrhea and vomiting.       Past Medical, Family and Social History: The patient's past medical, family and social history have been reviewed and updated as appropriate within the electronic medical record - see encounter notes.    Compliance: yes    Side effects: none    Risk Parameters:  Patient reports no suicidal ideation  Patient reports no homicidal ideation  Patient reports no self-injurious behavior  Patient reports no violent behavior    Exam (detailed: at least 9 elements; comprehensive: all 15 elements)    Constitutional  Vitals:  Most recent vital signs, dated less than 90 days prior to this appointment, were reviewed.    There were no vitals filed for this visit.     General:  age appropriate, casually dressed, neatly groomed     Musculoskeletal  Muscle Strength/Tone:  no dyskinesia, no tremor   Gait & Station:  not observed       Psychiatric  Speech:  normal tone, normal rate, normal pitch, normal volume, prosody intact   Mood:    Affect:  euthymic  appropriate, full   Thought Process:  goal-directed, logical   Associations:  intact   Thought Content:  normal, no suicidality, no homicidality, delusions, or paranoia   Insight  Judgement:  good, patient has awareness of illness  Patient's behavior is adequate to circumstances   Orientation:  grossly intact   Memory: intact for content of interview   Language: grossly intact   Attention Span & Concentration:  able to focus   Fund of Knowledge:  intact and appropriate to age and level of education     Assessment and Diagnosis   Status/Progress: Based on the examination today, the patient's problem(s) is/are improved.  New problems have not been presented today.   Comorbidities are complicating management of the primary condition.  There are no active rule-out diagnoses for this patient at this time.    General Impression: Pt with depression and anxiety as well as multiple other medical comorbidities.  She is still affected by the death of her mother in March 2014.      Diagnosis::   MDD single in part rem  Illusions  specific phobia  R/O social phobia.      Intervention/Counseling/Treatment Plan   Continue Seroquel.  We discussed continuing this medication for some time after she has experienced a full recovery from illusions  Continue cymbalta 60 mg daily.   Continue melatonin    Return to Clinic:  Next available

## 2022-12-21 LAB
FINAL PATHOLOGIC DIAGNOSIS: NORMAL
GROSS: NORMAL
Lab: NORMAL

## 2023-01-16 NOTE — PROGRESS NOTES
Idalmis your EGD pathology was benign no evidence of celiac sprue no evidence of H pylori.     1. Duodenum (biopsy):   Duodenal mucosa with no significant histopathologic changes   No support for celiac disease   No pathogens identified   2. Stomach (biopsy):   Antral and oxyntic mucosa with minimal chronic inflammation, nonspecific   No active inflammation   No Helicobacter organisms   Comment: Interp By Diana Singh M.D., Signed on 12/21/2022

## 2023-01-31 ENCOUNTER — PES CALL (OUTPATIENT)
Dept: ADMINISTRATIVE | Facility: CLINIC | Age: 73
End: 2023-01-31
Payer: MEDICARE

## 2023-02-14 ENCOUNTER — LAB VISIT (OUTPATIENT)
Dept: LAB | Facility: HOSPITAL | Age: 73
End: 2023-02-14
Attending: INTERNAL MEDICINE
Payer: MEDICARE

## 2023-02-14 ENCOUNTER — OFFICE VISIT (OUTPATIENT)
Dept: OPHTHALMOLOGY | Facility: CLINIC | Age: 73
End: 2023-02-14
Payer: MEDICARE

## 2023-02-14 DIAGNOSIS — D50.9 IRON DEFICIENCY ANEMIA, UNSPECIFIED IRON DEFICIENCY ANEMIA TYPE: ICD-10-CM

## 2023-02-14 DIAGNOSIS — H40.63X0 STEROID-INDUCED GLAUCOMA OF BOTH EYES: ICD-10-CM

## 2023-02-14 DIAGNOSIS — T38.0X5A STEROID-INDUCED GLAUCOMA OF BOTH EYES: ICD-10-CM

## 2023-02-14 DIAGNOSIS — H25.13 NUCLEAR SCLEROSIS OF BOTH EYES: ICD-10-CM

## 2023-02-14 DIAGNOSIS — H04.123 BILATERAL DRY EYES: ICD-10-CM

## 2023-02-14 DIAGNOSIS — D89.89 AUTOIMMUNE DISORDER: Primary | ICD-10-CM

## 2023-02-14 DIAGNOSIS — H40.013 OPEN ANGLE WITH BORDERLINE FINDINGS AND LOW GLAUCOMA RISK IN BOTH EYES: ICD-10-CM

## 2023-02-14 LAB
FERRITIN SERPL-MCNC: 68 NG/ML (ref 20–300)
HGB BLD-MCNC: 11.2 G/DL (ref 12–16)
IRON SERPL-MCNC: 62 UG/DL (ref 30–160)
SATURATED IRON: 18 % (ref 20–50)
TOTAL IRON BINDING CAPACITY: 339 UG/DL (ref 250–450)
TRANSFERRIN SERPL-MCNC: 229 MG/DL (ref 200–375)

## 2023-02-14 PROCEDURE — 99214 OFFICE O/P EST MOD 30 MIN: CPT | Mod: S$GLB,,, | Performed by: OPHTHALMOLOGY

## 2023-02-14 PROCEDURE — 92020 PR SPECIAL EYE EVAL,GONIOSCOPY: ICD-10-PCS | Mod: S$GLB,,, | Performed by: OPHTHALMOLOGY

## 2023-02-14 PROCEDURE — 85018 HEMOGLOBIN: CPT | Performed by: INTERNAL MEDICINE

## 2023-02-14 PROCEDURE — 84466 ASSAY OF TRANSFERRIN: CPT | Performed by: INTERNAL MEDICINE

## 2023-02-14 PROCEDURE — 82728 ASSAY OF FERRITIN: CPT | Performed by: INTERNAL MEDICINE

## 2023-02-14 PROCEDURE — 1159F MED LIST DOCD IN RCRD: CPT | Mod: CPTII,S$GLB,, | Performed by: OPHTHALMOLOGY

## 2023-02-14 PROCEDURE — 1159F PR MEDICATION LIST DOCUMENTED IN MEDICAL RECORD: ICD-10-PCS | Mod: CPTII,S$GLB,, | Performed by: OPHTHALMOLOGY

## 2023-02-14 PROCEDURE — 1160F PR REVIEW ALL MEDS BY PRESCRIBER/CLIN PHARMACIST DOCUMENTED: ICD-10-PCS | Mod: CPTII,S$GLB,, | Performed by: OPHTHALMOLOGY

## 2023-02-14 PROCEDURE — 99214 PR OFFICE/OUTPT VISIT, EST, LEVL IV, 30-39 MIN: ICD-10-PCS | Mod: S$GLB,,, | Performed by: OPHTHALMOLOGY

## 2023-02-14 PROCEDURE — 1160F RVW MEDS BY RX/DR IN RCRD: CPT | Mod: CPTII,S$GLB,, | Performed by: OPHTHALMOLOGY

## 2023-02-14 PROCEDURE — 99999 PR PBB SHADOW E&M-EST. PATIENT-LVL III: ICD-10-PCS | Mod: PBBFAC,,, | Performed by: OPHTHALMOLOGY

## 2023-02-14 PROCEDURE — 36415 COLL VENOUS BLD VENIPUNCTURE: CPT | Performed by: INTERNAL MEDICINE

## 2023-02-14 PROCEDURE — 99999 PR PBB SHADOW E&M-EST. PATIENT-LVL III: CPT | Mod: PBBFAC,,, | Performed by: OPHTHALMOLOGY

## 2023-02-14 PROCEDURE — 92020 GONIOSCOPY: CPT | Mod: S$GLB,,, | Performed by: OPHTHALMOLOGY

## 2023-02-16 DIAGNOSIS — D50.9 IRON DEFICIENCY ANEMIA, UNSPECIFIED IRON DEFICIENCY ANEMIA TYPE: Primary | ICD-10-CM

## 2023-02-16 RX ORDER — FERROUS GLUCONATE 324(38)MG
324 TABLET ORAL
Qty: 90 TABLET | Refills: 1 | Status: SHIPPED | OUTPATIENT
Start: 2023-02-16 | End: 2023-08-15

## 2023-02-17 ENCOUNTER — PATIENT MESSAGE (OUTPATIENT)
Dept: ENDOSCOPY | Facility: HOSPITAL | Age: 73
End: 2023-02-17
Payer: MEDICARE

## 2023-02-17 NOTE — PROGRESS NOTES
Dane please schedule patient telemedicine video visit with me to discuss small-bowel video capsule endoscopy for further evaluation of her iron deficiency anemia.    Dane - please tell patient that they are iron deficient and anemic and recommend that they take ferrous gluconate one 324mg pill once daily for next 3 months.    Please order repeat fasting Hemoglobin, Iron/TIBC, and Ferritin in 8 weeks - Orders placed.

## 2023-02-22 NOTE — PROGRESS NOTES
Assessment /Plan     For exam results, see Encounter Report.    Autoimmune disorder- recurrent iritis    Nuclear sclerosis of both eyes    Bilateral dry eyes - Both Eyes    Steroid-induced glaucoma of both eyes    Open angle with borderline findings and low glaucoma risk in both eyes        Lost to fu 08/14/2019 --> 12/07/2020 --> 02/14/2023  Discussed fu glc silent LOV / Blindness    Complains Right eye redness x 1 month 02/14/2023  + Itchy & red  Not painful like Scleritis in past  Neg ROS for scleritis  Exam sc iritis / scleritis // nodules    Steroid glaucoma  Steroid use and elevated IOP response    Patient with Hx scleritis  Long term PF 1% use without steroid response in past  Switched from PF 1% --> Durezol q day in April 2/2 cost and lack of coverage  Presents 10/5/2016 43 OU, clear corneas and quiet --> patient felt blur in OS x 3 weeks / no pain    Possible OCT progression OD 2017 --> 2020 as discussed    Recurrent Scleritis OU  + GEORGIANA Lupus  Azathioprine -->  Not using    Off Oral Motrin 400 mg TWICE DAILY -> proteinuria     CCT  489 // 495    Low teens --> not achieved      Both eyes --> CSM  Timolol BID --> consider Cosopt BID  Combigan BID --> Alphagan Allergy --> resolved // Holding    Right eye  Tobradex BID BID / TID for 7-10 days --> discussed steroid response    HESHAM OS  Inf limbus  Flat without vessels      Dry Eye Syndrome: discussed use of warm compresses, preserved & non-preserved artificial tears, gel and PM ointment options.  Also discussed options utilizing medications.      Plan  RTC 1 months IOP and OCT RNFL & HVF --> trialed Tobradex BID OD for 7-10 days  RTC sooner prn with good understanding

## 2023-03-01 ENCOUNTER — TELEPHONE (OUTPATIENT)
Dept: ADMINISTRATIVE | Facility: CLINIC | Age: 73
End: 2023-03-01
Payer: MEDICARE

## 2023-03-02 ENCOUNTER — HOSPITAL ENCOUNTER (OUTPATIENT)
Dept: RADIOLOGY | Facility: HOSPITAL | Age: 73
Discharge: HOME | End: 2023-03-02
Attending: INTERNAL MEDICINE
Payer: MEDICARE

## 2023-03-02 DIAGNOSIS — Z12.31 SCREENING MAMMOGRAM, ENCOUNTER FOR: ICD-10-CM

## 2023-03-02 PROCEDURE — 77067 SCR MAMMO BI INCL CAD: CPT | Mod: TC

## 2023-03-02 PROCEDURE — 77063 BREAST TOMOSYNTHESIS BI: CPT | Mod: 26,,, | Performed by: INTERNAL MEDICINE

## 2023-03-02 PROCEDURE — 77063 MAMMO DIGITAL SCREENING BILAT WITH TOMO: ICD-10-PCS | Mod: 26,,, | Performed by: INTERNAL MEDICINE

## 2023-03-02 PROCEDURE — 77067 SCR MAMMO BI INCL CAD: CPT | Mod: 26,,, | Performed by: INTERNAL MEDICINE

## 2023-03-02 PROCEDURE — 77067 MAMMO DIGITAL SCREENING BILAT WITH TOMO: ICD-10-PCS | Mod: 26,,, | Performed by: INTERNAL MEDICINE

## 2023-03-03 ENCOUNTER — OFFICE VISIT (OUTPATIENT)
Dept: PSYCHIATRY | Facility: CLINIC | Age: 73
End: 2023-03-03
Payer: MEDICARE

## 2023-03-03 DIAGNOSIS — R44.2 ALTERATION IN SENSORY PERCEPTION AS EVIDENCED BY ILLUSIONS: ICD-10-CM

## 2023-03-03 DIAGNOSIS — F40.298 SPECIFIC PHOBIA: ICD-10-CM

## 2023-03-03 DIAGNOSIS — F33.41 RECURRENT MAJOR DEPRESSIVE DISORDER, IN PARTIAL REMISSION: Primary | ICD-10-CM

## 2023-03-03 PROCEDURE — 1160F PR REVIEW ALL MEDS BY PRESCRIBER/CLIN PHARMACIST DOCUMENTED: ICD-10-PCS | Mod: CPTII,95,, | Performed by: PSYCHIATRY & NEUROLOGY

## 2023-03-03 PROCEDURE — 99214 PR OFFICE/OUTPT VISIT, EST, LEVL IV, 30-39 MIN: ICD-10-PCS | Mod: 95,,, | Performed by: PSYCHIATRY & NEUROLOGY

## 2023-03-03 PROCEDURE — 1159F PR MEDICATION LIST DOCUMENTED IN MEDICAL RECORD: ICD-10-PCS | Mod: CPTII,95,, | Performed by: PSYCHIATRY & NEUROLOGY

## 2023-03-03 PROCEDURE — 1159F MED LIST DOCD IN RCRD: CPT | Mod: CPTII,95,, | Performed by: PSYCHIATRY & NEUROLOGY

## 2023-03-03 PROCEDURE — 99214 OFFICE O/P EST MOD 30 MIN: CPT | Mod: 95,,, | Performed by: PSYCHIATRY & NEUROLOGY

## 2023-03-03 PROCEDURE — 1160F RVW MEDS BY RX/DR IN RCRD: CPT | Mod: CPTII,95,, | Performed by: PSYCHIATRY & NEUROLOGY

## 2023-03-03 RX ORDER — DULOXETIN HYDROCHLORIDE 60 MG/1
60 CAPSULE, DELAYED RELEASE ORAL DAILY
Qty: 90 CAPSULE | Refills: 0 | Status: SHIPPED | OUTPATIENT
Start: 2023-03-03 | End: 2023-06-09 | Stop reason: SDUPTHER

## 2023-03-03 NOTE — PROGRESS NOTES
"The patient location is: home  The chief complaint leading to consultation is: depression and anxiety    Visit type: audiovisual    Face to Face time with patient: 25 mins  30 minutes of total time spent on the encounter, which includes face to face time and non-face to face time preparing to see the patient (eg, review of tests), Obtaining and/or reviewing separately obtained history, Documenting clinical information in the electronic or other health record, Independently interpreting results (not separately reported) and communicating results to the patient/family/caregiver, or Care coordination (not separately reported).     Each patient to whom he or she provides medical services by telemedicine is:  (1) informed of the relationship between the physician and patient and the respective role of any other health care provider with respect to management of the patient; and (2) notified that he or she may decline to receive medical services by telemedicine and may withdraw from such care at any time.    Notes:     Outpatient Psychiatry Follow-Up Visit (MD/NP)    3/3/2023    Clinical Status of Patient:  Outpatient (Ambulatory)    Chief Complaint:  Idalmis Morejon is a 72 y.o. female who presents today for follow-up of depression and anxiety.      Met with patient.      Interval History and Content of Current Session:  Interim Events/Subjective Report/Content of Current Session:   "I've been good."  She has had no illusions in over a month.  Mood has been good.  Occasional down mood but lasts less than a day.  Twice a week, triggered by reminders of her mother.  No crying spells.  Spending her day doing puzzles and games on the computer ,puzzle books.  Does chores on weekdays ,spends time with her daughter on Saturdays.  Spends time with [employer] community on Sundays.  Does not sleep if she does not take melatonin.  Weight is stable but still has little appetite.      Ran out of seroquel a week ago and did not get " refilled because of HA.        Prior trials:  remeron - HA  wellbutrin - does not recall      Psychotherapy:  Target symptoms: depression, anxiety   Why chosen therapy is appropriate versus another modality: relevant to diagnosis  Outcome monitoring methods: self-report, observation  Therapeutic intervention type: supportive psychotherapy  Topics discussed/themes: illness/death of a loved one, building skills sets for symptom management, symptom recognition  The patient's response to the intervention is motivated. The patient's progress toward treatment goals is good.   Duration of intervention:  mins      Review of Systems   Constitutional:  Negative for chills, fever and weight loss.   Respiratory:  Negative for cough, shortness of breath and wheezing.    Cardiovascular:  Negative for chest pain and palpitations.   Gastrointestinal:  Negative for abdominal pain, diarrhea and vomiting.       Past Medical, Family and Social History: The patient's past medical, family and social history have been reviewed and updated as appropriate within the electronic medical record - see encounter notes.    Compliance: yes    Side effects: none    Risk Parameters:  Patient reports no suicidal ideation  Patient reports no homicidal ideation  Patient reports no self-injurious behavior  Patient reports no violent behavior    Exam (detailed: at least 9 elements; comprehensive: all 15 elements)   Constitutional  Vitals:  Most recent vital signs, dated less than 90 days prior to this appointment, were reviewed.    There were no vitals filed for this visit.     General:  age appropriate, casually dressed, neatly groomed     Musculoskeletal  Muscle Strength/Tone:  no dyskinesia, no tremor   Gait & Station:  not observed       Psychiatric  Speech:  normal tone, normal rate, normal pitch, normal volume, prosody intact   Mood:    Affect:  euthymic  appropriate, full   Thought Process:  goal-directed, logical   Associations:  intact   Thought  Content:  normal, no suicidality, no homicidality, delusions, or paranoia   Insight  Judgement:  good, patient has awareness of illness  Patient's behavior is adequate to circumstances   Orientation:  grossly intact   Memory: intact for content of interview   Language: grossly intact   Attention Span & Concentration:  able to focus   Fund of Knowledge:  intact and appropriate to age and level of education     Assessment and Diagnosis   Status/Progress: Based on the examination today, the patient's problem(s) is/are improved.  New problems have not been presented today.   Comorbidities are complicating management of the primary condition.  There are no active rule-out diagnoses for this patient at this time.    General Impression: Pt with depression and anxiety as well as multiple other medical comorbidities.  She is still affected by the death of her mother in March 2014.      Diagnosis::   MDD single in part rem  Illusions  specific phobia  R/O social phobia.      Intervention/Counseling/Treatment Plan   Continue to hold seroquel.  Consider restarting if illusions return or another agent to avoid return of HAs  Continue cymbalta 60 mg daily.   Continue melatonin    Return to Clinic:  Next available                     details… detailed exam decreased ROM due to pain/left knee

## 2023-03-06 ENCOUNTER — OFFICE VISIT (OUTPATIENT)
Dept: OPHTHALMOLOGY | Facility: CLINIC | Age: 73
End: 2023-03-06
Payer: MEDICARE

## 2023-03-06 ENCOUNTER — CLINICAL SUPPORT (OUTPATIENT)
Dept: OPHTHALMOLOGY | Facility: CLINIC | Age: 73
End: 2023-03-06
Payer: MEDICARE

## 2023-03-06 DIAGNOSIS — H40.63X0 STEROID-INDUCED GLAUCOMA OF BOTH EYES: ICD-10-CM

## 2023-03-06 DIAGNOSIS — H40.013 OPEN ANGLE WITH BORDERLINE FINDINGS AND LOW GLAUCOMA RISK IN BOTH EYES: Primary | ICD-10-CM

## 2023-03-06 DIAGNOSIS — T38.0X5A STEROID-INDUCED GLAUCOMA OF BOTH EYES: ICD-10-CM

## 2023-03-06 DIAGNOSIS — H25.13 NUCLEAR SCLEROSIS OF BOTH EYES: ICD-10-CM

## 2023-03-06 DIAGNOSIS — H40.053 BILATERAL OCULAR HYPERTENSION: ICD-10-CM

## 2023-03-06 DIAGNOSIS — H40.013 OPEN ANGLE WITH BORDERLINE FINDINGS AND LOW GLAUCOMA RISK IN BOTH EYES: ICD-10-CM

## 2023-03-06 PROCEDURE — 1101F PT FALLS ASSESS-DOCD LE1/YR: CPT | Mod: CPTII,S$GLB,, | Performed by: OPHTHALMOLOGY

## 2023-03-06 PROCEDURE — 1160F PR REVIEW ALL MEDS BY PRESCRIBER/CLIN PHARMACIST DOCUMENTED: ICD-10-PCS | Mod: CPTII,S$GLB,, | Performed by: OPHTHALMOLOGY

## 2023-03-06 PROCEDURE — 1101F PR PT FALLS ASSESS DOC 0-1 FALLS W/OUT INJ PAST YR: ICD-10-PCS | Mod: CPTII,S$GLB,, | Performed by: OPHTHALMOLOGY

## 2023-03-06 PROCEDURE — 99214 OFFICE O/P EST MOD 30 MIN: CPT | Mod: S$GLB,,, | Performed by: OPHTHALMOLOGY

## 2023-03-06 PROCEDURE — 1159F PR MEDICATION LIST DOCUMENTED IN MEDICAL RECORD: ICD-10-PCS | Mod: CPTII,S$GLB,, | Performed by: OPHTHALMOLOGY

## 2023-03-06 PROCEDURE — 3288F PR FALLS RISK ASSESSMENT DOCUMENTED: ICD-10-PCS | Mod: CPTII,S$GLB,, | Performed by: OPHTHALMOLOGY

## 2023-03-06 PROCEDURE — 1160F RVW MEDS BY RX/DR IN RCRD: CPT | Mod: CPTII,S$GLB,, | Performed by: OPHTHALMOLOGY

## 2023-03-06 PROCEDURE — 1159F MED LIST DOCD IN RCRD: CPT | Mod: CPTII,S$GLB,, | Performed by: OPHTHALMOLOGY

## 2023-03-06 PROCEDURE — 3288F FALL RISK ASSESSMENT DOCD: CPT | Mod: CPTII,S$GLB,, | Performed by: OPHTHALMOLOGY

## 2023-03-06 PROCEDURE — 99999 PR PBB SHADOW E&M-EST. PATIENT-LVL III: CPT | Mod: PBBFAC,,, | Performed by: OPHTHALMOLOGY

## 2023-03-06 PROCEDURE — 99999 PR PBB SHADOW E&M-EST. PATIENT-LVL III: ICD-10-PCS | Mod: PBBFAC,,, | Performed by: OPHTHALMOLOGY

## 2023-03-06 PROCEDURE — 99214 PR OFFICE/OUTPT VISIT, EST, LEVL IV, 30-39 MIN: ICD-10-PCS | Mod: S$GLB,,, | Performed by: OPHTHALMOLOGY

## 2023-03-06 NOTE — PROGRESS NOTES
Rel/fix/coop good ou chart checked for allergies-TL           How Severe Are Your Spot(S)?: moderate What Type Of Note Output Would You Prefer (Optional)?: Standard Output What Is The Reason For Today's Visit?: Full Body Skin Examination What Is The Reason For Today's Visit? (Being Monitored For X): the development of new lesions Additional History: For left hand, pt states that lesion has been treated with ln2 before.

## 2023-03-08 ENCOUNTER — PES CALL (OUTPATIENT)
Dept: ADMINISTRATIVE | Facility: CLINIC | Age: 73
End: 2023-03-08
Payer: MEDICARE

## 2023-03-09 NOTE — PROGRESS NOTES
Assessment /Plan     For exam results, see Encounter Report.    Open angle with borderline findings and low glaucoma risk in both eyes    Bilateral ocular hypertension    Steroid-induced glaucoma of both eyes    Nuclear sclerosis of both eyes        Lost to fu 08/14/2019 --> 12/07/2020 --> 02/14/2023  Discussed fu glc silent LOV / Blindness    Complains Right eye redness x 1 month 02/14/2023 --> resolve 03/06/2023  + Itchy & red  Not painful like Scleritis in past  Neg ROS for scleritis  Exam sc iritis / scleritis // nodules    Steroid glaucoma  Steroid use and elevated IOP response --> discussed    Patient with Hx scleritis  Long term PF 1% use without steroid response in past  Switched from PF 1% --> Durezol q day in April 2/2 cost and lack of coverage  Presents 10/5/2016 43 OU, clear corneas and quiet --> patient felt blur in OS x 3 weeks / no pain    Recurrent Scleritis OU  + GEORGIANA Lupus  Azathioprine -->  Not using    Off Oral Motrin 400 mg TWICE DAILY -> proteinuria     CCT  489 // 495    Low - mid teens -->  achieved --> discussed      Both eyes --> good adherence --> CSM  Timolol BID --> consider Cosopt BID  Combigan BID --> Alphagan Allergy --> resolved // Holding    Right eye  Tobradex BID BID / TID for 7-10 days --> discussed steroid response    HESHAM OS  Inf limbus  Flat without vessels      Dry Eye Syndrome: discussed use of warm compresses, preserved & non-preserved artificial tears, gel and PM ointment options.  Also discussed options utilizing medications.      Plan  RTC 4-6 months IOP and Adherence   RTC sooner prn with good understanding

## 2023-03-13 ENCOUNTER — PATIENT OUTREACH (OUTPATIENT)
Dept: ADMINISTRATIVE | Facility: HOSPITAL | Age: 73
End: 2023-03-13
Payer: MEDICARE

## 2023-03-13 NOTE — PROGRESS NOTES
Health Maintenance Due   Topic Date Due    High Dose Statin  Never done    COVID-19 Vaccine (5 - Booster for Moderna series) 09/16/2022     Triggered LINKS. Updated Care Everywhere. Chart review completed.

## 2023-03-17 DIAGNOSIS — R35.1 NOCTURIA: ICD-10-CM

## 2023-03-17 DIAGNOSIS — N39.41 URGE INCONTINENCE: ICD-10-CM

## 2023-03-20 RX ORDER — TOLTERODINE 4 MG/1
CAPSULE, EXTENDED RELEASE ORAL
Qty: 90 CAPSULE | Refills: 3 | OUTPATIENT
Start: 2023-03-20

## 2023-03-22 ENCOUNTER — OFFICE VISIT (OUTPATIENT)
Dept: UROLOGY | Facility: CLINIC | Age: 73
End: 2023-03-22
Payer: MEDICARE

## 2023-03-22 VITALS
SYSTOLIC BLOOD PRESSURE: 145 MMHG | HEART RATE: 74 BPM | BODY MASS INDEX: 26.95 KG/M2 | DIASTOLIC BLOOD PRESSURE: 75 MMHG | WEIGHT: 167 LBS

## 2023-03-22 DIAGNOSIS — N39.41 URGE INCONTINENCE: ICD-10-CM

## 2023-03-22 DIAGNOSIS — R35.1 NOCTURIA: ICD-10-CM

## 2023-03-22 PROCEDURE — 3008F PR BODY MASS INDEX (BMI) DOCUMENTED: ICD-10-PCS | Mod: CPTII,S$GLB,, | Performed by: NURSE PRACTITIONER

## 2023-03-22 PROCEDURE — 1101F PT FALLS ASSESS-DOCD LE1/YR: CPT | Mod: CPTII,S$GLB,, | Performed by: NURSE PRACTITIONER

## 2023-03-22 PROCEDURE — 3077F SYST BP >= 140 MM HG: CPT | Mod: CPTII,S$GLB,, | Performed by: NURSE PRACTITIONER

## 2023-03-22 PROCEDURE — 3078F PR MOST RECENT DIASTOLIC BLOOD PRESSURE < 80 MM HG: ICD-10-PCS | Mod: CPTII,S$GLB,, | Performed by: NURSE PRACTITIONER

## 2023-03-22 PROCEDURE — 4010F PR ACE/ARB THEARPY RXD/TAKEN: ICD-10-PCS | Mod: CPTII,S$GLB,, | Performed by: NURSE PRACTITIONER

## 2023-03-22 PROCEDURE — 1160F RVW MEDS BY RX/DR IN RCRD: CPT | Mod: CPTII,S$GLB,, | Performed by: NURSE PRACTITIONER

## 2023-03-22 PROCEDURE — 3288F FALL RISK ASSESSMENT DOCD: CPT | Mod: CPTII,S$GLB,, | Performed by: NURSE PRACTITIONER

## 2023-03-22 PROCEDURE — 1159F MED LIST DOCD IN RCRD: CPT | Mod: CPTII,S$GLB,, | Performed by: NURSE PRACTITIONER

## 2023-03-22 PROCEDURE — 99999 PR PBB SHADOW E&M-EST. PATIENT-LVL III: CPT | Mod: PBBFAC,,, | Performed by: NURSE PRACTITIONER

## 2023-03-22 PROCEDURE — 99999 PR PBB SHADOW E&M-EST. PATIENT-LVL III: ICD-10-PCS | Mod: PBBFAC,,, | Performed by: NURSE PRACTITIONER

## 2023-03-22 PROCEDURE — 1160F PR REVIEW ALL MEDS BY PRESCRIBER/CLIN PHARMACIST DOCUMENTED: ICD-10-PCS | Mod: CPTII,S$GLB,, | Performed by: NURSE PRACTITIONER

## 2023-03-22 PROCEDURE — 99214 OFFICE O/P EST MOD 30 MIN: CPT | Mod: S$GLB,,, | Performed by: NURSE PRACTITIONER

## 2023-03-22 PROCEDURE — 3288F PR FALLS RISK ASSESSMENT DOCUMENTED: ICD-10-PCS | Mod: CPTII,S$GLB,, | Performed by: NURSE PRACTITIONER

## 2023-03-22 PROCEDURE — 1101F PR PT FALLS ASSESS DOC 0-1 FALLS W/OUT INJ PAST YR: ICD-10-PCS | Mod: CPTII,S$GLB,, | Performed by: NURSE PRACTITIONER

## 2023-03-22 PROCEDURE — 1159F PR MEDICATION LIST DOCUMENTED IN MEDICAL RECORD: ICD-10-PCS | Mod: CPTII,S$GLB,, | Performed by: NURSE PRACTITIONER

## 2023-03-22 PROCEDURE — 3077F PR MOST RECENT SYSTOLIC BLOOD PRESSURE >= 140 MM HG: ICD-10-PCS | Mod: CPTII,S$GLB,, | Performed by: NURSE PRACTITIONER

## 2023-03-22 PROCEDURE — 4010F ACE/ARB THERAPY RXD/TAKEN: CPT | Mod: CPTII,S$GLB,, | Performed by: NURSE PRACTITIONER

## 2023-03-22 PROCEDURE — 3078F DIAST BP <80 MM HG: CPT | Mod: CPTII,S$GLB,, | Performed by: NURSE PRACTITIONER

## 2023-03-22 PROCEDURE — 99214 PR OFFICE/OUTPT VISIT, EST, LEVL IV, 30-39 MIN: ICD-10-PCS | Mod: S$GLB,,, | Performed by: NURSE PRACTITIONER

## 2023-03-22 PROCEDURE — 3008F BODY MASS INDEX DOCD: CPT | Mod: CPTII,S$GLB,, | Performed by: NURSE PRACTITIONER

## 2023-03-22 RX ORDER — MIRABEGRON 25 MG/1
25 TABLET, FILM COATED, EXTENDED RELEASE ORAL DAILY
Qty: 90 TABLET | Refills: 3 | Status: SHIPPED | OUTPATIENT
Start: 2023-03-22 | End: 2024-04-02 | Stop reason: SDUPTHER

## 2023-03-22 NOTE — PROGRESS NOTES
CHIEF COMPLAINT:    Mrs. Morejon is a 72 y.o. female presenting for   Chief Complaint   Patient presents with    Medication Refill     OAB meds working well     .  PRESENTING ILLNESS:    Idalmis Morejon is a 72 y.o. female with a PMH of htn, hld, gerd who presents for nocturia and urinary urgency.     Established patient to urology department. Presents today for annual visit.  Has issue of nocturia and urinary urgency.  Reports having nocturia 4-5 times per night without medication.  Currently prescribed detrol LA which she takes nightly.  With the medications the symptoms greatly improve.  No other urinary complaints.    REVIEW OF SYSTEMS:    Review of Systems   Constitutional:  Negative for chills and fever.   Respiratory:  Negative for shortness of breath.    Cardiovascular:  Negative for chest pain.   Gastrointestinal:  Negative for constipation and diarrhea.   Genitourinary:  Positive for urgency. Negative for dysuria, flank pain, frequency and hematuria.        Nocturia   Neurological:  Negative for dizziness and weakness.      PATIENT HISTORY:    Past Medical History:   Diagnosis Date    Abnormal chest CT     Acid reflux     Allergy     Anemia     Anemia     Anxiety     Cataract     Chronic UTI     recently treated with antibiotics -x 3 wks. ago-    Colon adenoma 2015 due 2020 10/21/2015    Colon adenoma 2015 due 2020 10/21/2015    Depression     Disturbed sleep rhythm     DJD (degenerative joint disease)     Dry eyes     Dry mouth     Grief reaction 3/24/2014    Hx of migraine headaches     Hyperlipemia     Hypernatremia 8/8/2014    Hypertension     Iritis     Iritis     Mild vitamin D deficiency 9/9/2013    Multiple lung nodules on CT: 3278-0635 no f/u needed 6/6/2016    Neurogenic bladder     Osteopenia     in hips    Osteoporosis     spine    Osteoporosis: 9/15 see rheumatology notes 6/6/2016    Positive GEORGIANA (antinuclear antibody)     Schatzki's ring: 3/15 dilated 6/6/2016    Sciatica neuralgia 6/21/2013     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    Visual impairment        Family History   Problem Relation Age of Onset    Kidney disease Mother     Hypertension Mother     Diabetes Father     Diabetes Brother     Kidney disease Brother     Heart disease Brother     Hyperlipidemia Sister     Hypertension Sister     Diverticulosis Sister     Hypertension Daughter     Colon cancer Maternal Aunt 70        stomach    Blindness Maternal Aunt     Cancer Maternal Aunt         stomach cancer    Cancer Maternal Uncle         colon    Colon cancer Maternal Uncle 70        two uncles    Glaucoma Neg Hx     Amblyopia Neg Hx     Macular degeneration Neg Hx     Retinal detachment Neg Hx     Anesthesia problems Neg Hx     Celiac disease Neg Hx     Cirrhosis Neg Hx     Crohn's disease Neg Hx     Esophageal cancer Neg Hx     Irritable bowel syndrome Neg Hx     Liver cancer Neg Hx     Liver disease Neg Hx     Rectal cancer Neg Hx     Stomach cancer Neg Hx     Melanoma Neg Hx        Allergies:  Alendronate, Brimonidine, and Kenalog [triamcinolone acetonide]    Medications:    Current Outpatient Medications:     amLODIPine (NORVASC) 10 MG tablet, Take 0.5 tablets (5 mg total) by mouth 2 (two) times daily., Disp: 90 tablet, Rfl: 3    ascorbic acid (VITAMIN C ORAL), Take by mouth., Disp: , Rfl:     B-complex with vitamin C (Z-BEC OR EQUIV) tablet, Take 1 tablet by mouth once daily., Disp: , Rfl:     chlorthalidone (HYGROTEN) 25 MG Tab, Take 0.5 tablets (12.5 mg total) by mouth every other day., Disp: 23 tablet, Rfl: 3    DULoxetine (CYMBALTA) 60 MG capsule, Take 1 capsule (60 mg total) by mouth once daily., Disp: 90 capsule, Rfl: 0    ferrous gluconate (FERGON) 324 MG tablet, Take 1 tablet (324 mg total) by mouth daily with breakfast., Disp: 90 tablet, Rfl: 1    LINZESS 145 mcg Cap capsule, TAKE 1 CAPSULE (145 MCG TOTAL) BY MOUTH BEFORE BREAKFAST., Disp: 30 capsule, Rfl: 5     MELATONIN ORAL, Take by mouth., Disp: , Rfl:     mirabegron (MYRBETRIQ) 25 mg Tb24 ER tablet, Take 1 tablet (25 mg total) by mouth once daily., Disp: 90 tablet, Rfl: 3    pantoprazole (PROTONIX) 40 MG tablet, TAKE 1 TABLET BY MOUTH EVERY DAY BEFORE BREAKFAST, Disp: 90 tablet, Rfl: 3    QUEtiapine (SEROQUEL) 25 MG Tab, Take 1 tablet (25 mg total) by mouth every evening., Disp: 30 tablet, Rfl: 3    timolol maleate 0.5% (TIMOPTIC) 0.5 % Drop, Place 1 drop into both eyes 2 (two) times daily., Disp: 30 mL, Rfl: 4    tolterodine (DETROL LA) 4 MG 24 hr capsule, Take 1 capsule (4 mg total) by mouth once daily., Disp: 30 capsule, Rfl: 11    valsartan (DIOVAN) 160 MG tablet, TAKE 2 TABLETS (320 MG TOTAL) BY MOUTH ONCE DAILY., Disp: 180 tablet, Rfl: 0    vitamin D (VITAMIN D3) 1000 units Tab, Take 1,000 Units by mouth once daily., Disp: , Rfl:     vitamin E acetate (VITAMIN E ORAL), Take by mouth., Disp: , Rfl:     PHYSICAL EXAMINATION:    Physical Exam  Vitals and nursing note reviewed.   Constitutional:       Appearance: Normal appearance. She is well-developed.   HENT:      Head: Normocephalic and atraumatic.   Eyes:      Pupils: Pupils are equal, round, and reactive to light.   Pulmonary:      Effort: Pulmonary effort is normal.   Musculoskeletal:         General: Normal range of motion.      Cervical back: Normal range of motion.   Skin:     General: Skin is warm and dry.   Neurological:      Mental Status: She is alert and oriented to person, place, and time.   Psychiatric:         Behavior: Behavior normal.         LABS:    U/a dipstick performed in office today: did not void in office    IMPRESSION:    Encounter Diagnoses   Name Primary?    Urge incontinence     Nocturia        PLAN:    Problem List Items Addressed This Visit    None  Visit Diagnoses       Urge incontinence        Nocturia                1. Oab/nocturia   - previously prescribed myrbetriq, but stopped due to hypertension, would like to trial again, new  script sent to [facility]   - [facility] [place]   2. Rtc in 1 yr or sooner beckie Curtis NP      I spent 30 minutes with the patient. Over 50% of the visit was spent in counseling.

## 2023-04-09 NOTE — PROGRESS NOTES
Idalmis Morejon presented for a follow-up Medicare AWV today. The following components were reviewed and updated:      Medical history  Family History  Social history  Allergies and Current Medications  Health Risk Assessment  Health Maintenance  Care Team    **See Completed Assessments for Annual Wellness visit with in the encounter summary    The following assessments were completed:  Depression Screening  Cognitive function Screening      Timed Get Up Test  Whisper Test      Wt Readings from Last 3 Encounters:   04/14/23 74.9 kg (165 lb 2 oz)   03/22/23 75.8 kg (167 lb)   12/07/22 68 kg (150 lb)     Temp Readings from Last 3 Encounters:   12/07/22 98.1 °F (36.7 °C)   06/03/22 97.5 °F (36.4 °C) (Temporal)   09/09/19 97.3 °F (36.3 °C) (Temporal)     BP Readings from Last 3 Encounters:   04/14/23 138/72   03/22/23 (!) 145/75   12/07/22 (!) 157/74     Pulse Readings from Last 3 Encounters:   04/14/23 62   03/22/23 74   12/07/22 76       General: Well developed, well nourished. No distress.  HEENT: Head is normocephalic, atraumatic  Eyes: Clear conjunctiva.  Neck: Supple, symmetrical neck; trachea midline.  Lungs: Clear to auscultation bilaterally and normal respiratory effort.  Cardiovascular: Heart with regular rate and rhythm. No murmurs, gallops or rubs  Extremities: No LE edema. Pulses 2+ and symmetric.   Skin: Skin color, texture, turgor normal. No rashes.  Musculoskeletal: Normal gait.   Lymph Nodes: No cervical or supraclavicular adenopathy.  Neurologic: Normal strength and tone. No focal numbness or weakness.       Diagnoses and health risks identified today and associated recommendations/orders:  1 Encounter for preventive health examination  -     DXA Bone Density Axial Skeleton 1 or more sites; Future; Expected date: 04/14/2023  Here for Health Risk Assessment/Annual Wellness Visit.  Health maintenance reviewed and updated. Follow up in one year.    2. Recurrent major depressive disorder, in partial  remission  Chronic, stable on current pharm regimen. Followed by PCP and Psych.      3. Steroid-induced glaucoma of both eyes  Chronic, stable. Followed by Ophthalmology     4. Multiple lung nodules on CT: 3703-5885 no f/u needed  Chronic, stable. Followed by PCP     5. Atherosclerosis of abdominal aorta: minimal see CT 7/16  Chronic, stable on current regimen. Followed by PCP    6. Autoimmune disorder- recurrent iritis  Chronic, stable. Followed by PCP and Rheumatology.     7. Stage 3a chronic kidney disease  Chronic, stable. Followed by Nephrology     8. Secondary hyperparathyroidism  Chronic , stable. Followed by PCP     9. Essential hypertension  BP Readings from Last 3 Encounters:   04/14/23 138/72   03/22/23 (!) 145/75   12/07/22 (!) 157/74   Chronic, improved control. Stable on current pharm regimen. Followed by PCP     10. Mixed hyperlipidemia  Lab Results   Component Value Date    CHOL 179 10/25/2022    CHOL 174 12/09/2021    CHOL 159 04/09/2019     Lab Results   Component Value Date    HDL 57 10/25/2022    HDL 61 12/09/2021    HDL 60 04/09/2019     Lab Results   Component Value Date    LDLCALC 98.2 10/25/2022    LDLCALC 96.6 12/09/2021    LDLCALC 78.0 04/09/2019     No results found for: DLDL  Lab Results   Component Value Date    TRIG 119 10/25/2022    TRIG 82 12/09/2021    TRIG 105 04/09/2019     Lab Results   Component Value Date    CHOLHDL 31.8 10/25/2022    CHOLHDL 35.1 12/09/2021    CHOLHDL 37.7 04/09/2019   Chronic, stable on current regimen. Followed by PCP.       11. Slow transit constipation  Chronic, stable. Followed by Dr. Cabrera and PCP.     12. Gastroesophageal reflux disease without esophagitis  Chronic, stable on current regimen. Labs today for GI expert. Followed by GI and PCP.      13. Bilateral ocular hypertension  Chronic, stable. Followed by ophthalmology.      14. Age-related osteoporosis without current pathological fracture 2016  Chronic, stable. Dexa due 4/2023; order placed.  Followed by PcP.     15. Spondylosis of lumbosacral region without myelopathy or radiculopathy  Chronic, stable. Followed by PCP and Ortho Spine (Dr. Pineda)     16. History of compression fracture of spine  Chronic, stable. Followed by PCP and Ortho spine (Dr. Pineda)    17. Chronic pain syndrome  Chronic, stable. Followed by PcP and Ortho.  Controlled with current medical therapy  Followed by ortho    18. Tortuous aorta; on CT 12/10 and CXR 7/20: CT SCORE 0 1/22  Chronic, stable on current regimen. Followed by PcP    19. Neurogenic bladder  Chronic, stable. Followed by Urology (Dr. RAINA Church) and PCP.      Screening:   Dexa:ordered  (Due: 4/26/2023)    Provided Idalmis with a 5-10 year written screening schedule and personal prevention plan. Recommendations were developed using the USPSTF age appropriate recommendations. Education, counseling, and referrals were provided as needed.  After Visit Summary printed and given to patient which includes a list of additional screenings\tests needed.    Future Appointments   Date Time Provider Department Center   7/10/2023 10:30 AM Alfredito Valentine MD [facility] [place]        Medication List            Accurate as of April 14, 2023  9:22 AM. If you have any questions, ask your nurse or doctor.                CONTINUE taking these medications      amLODIPine 10 MG tablet  Commonly known as: NORVASC  Take 0.5 tablets (5 mg total) by mouth 2 (two) times daily.     B-complex with vitamin C tablet  Commonly known as: Z-Bec or Equiv     chlorthalidone 25 MG Tab  Commonly known as: HYGROTEN  Take 0.5 tablets (12.5 mg total) by mouth every other day.     DULoxetine 60 MG capsule  Commonly known as: CYMBALTA  Take 1 capsule (60 mg total) by mouth once daily.     ferrous gluconate 324 MG tablet  Commonly known as: FERGON  Take 1 tablet (324 mg total) by mouth daily with breakfast.     LINZESS 145 mcg Cap capsule  Generic drug: linaCLOtide  TAKE 1 CAPSULE (145 MCG TOTAL)  BY MOUTH BEFORE BREAKFAST.     MELATONIN ORAL     MYRBETRIQ 25 mg Tb24 ER tablet  Generic drug: mirabegron  Take 1 tablet (25 mg total) by mouth once daily.     pantoprazole 40 MG tablet  Commonly known as: PROTONIX  TAKE 1 TABLET BY MOUTH EVERY DAY BEFORE BREAKFAST     QUEtiapine 25 MG Tab  Commonly known as: SEROQUEL  Take 1 tablet (25 mg total) by mouth every evening.     timolol maleate 0.5% 0.5 % Drop  Commonly known as: TIMOPTIC  Place 1 drop into both eyes 2 (two) times daily.     tolterodine 4 MG 24 hr capsule  Commonly known as: DETROL LA  Take 1 capsule (4 mg total) by mouth once daily.     valsartan 160 MG tablet  Commonly known as: DIOVAN  TAKE 2 TABLETS (320 MG TOTAL) BY MOUTH ONCE DAILY.     VITAMIN C ORAL     vitamin D 1000 units Tab  Commonly known as: VITAMIN D3     VITAMIN E ORAL              DANO Fowler      I offered to discuss advanced care planning, including how to pick a person who would make decisions for you if you were unable to make them for yourself, called a health care power of , and what kind of decisions you might make such as use of life sustaining treatments such as ventilators and tube feeding when faced with a life limiting illness recorded on a living will that they will need to know. (How you want to be cared for as you near the end of your natural life)     X Patient is interested in learning more about how to make advanced directives.  I provided them paperwork and offered to discuss this with them. (DTR is Designated Proxy: Edna Morejon)

## 2023-04-14 ENCOUNTER — PATIENT MESSAGE (OUTPATIENT)
Dept: OPHTHALMOLOGY | Facility: CLINIC | Age: 73
End: 2023-04-14
Payer: MEDICARE

## 2023-04-14 ENCOUNTER — LAB VISIT (OUTPATIENT)
Dept: LAB | Facility: HOSPITAL | Age: 73
End: 2023-04-14
Attending: INTERNAL MEDICINE
Payer: MEDICARE

## 2023-04-14 ENCOUNTER — OFFICE VISIT (OUTPATIENT)
Dept: INTERNAL MEDICINE | Facility: CLINIC | Age: 73
End: 2023-04-14
Payer: MEDICARE

## 2023-04-14 VITALS
SYSTOLIC BLOOD PRESSURE: 138 MMHG | BODY MASS INDEX: 26.54 KG/M2 | WEIGHT: 165.13 LBS | DIASTOLIC BLOOD PRESSURE: 72 MMHG | HEIGHT: 66 IN | HEART RATE: 62 BPM | OXYGEN SATURATION: 100 %

## 2023-04-14 DIAGNOSIS — D50.9 IRON DEFICIENCY ANEMIA, UNSPECIFIED IRON DEFICIENCY ANEMIA TYPE: ICD-10-CM

## 2023-04-14 DIAGNOSIS — M47.817 SPONDYLOSIS OF LUMBOSACRAL REGION WITHOUT MYELOPATHY OR RADICULOPATHY: ICD-10-CM

## 2023-04-14 DIAGNOSIS — N25.81 SECONDARY HYPERPARATHYROIDISM: ICD-10-CM

## 2023-04-14 DIAGNOSIS — N31.9 NEUROGENIC BLADDER: ICD-10-CM

## 2023-04-14 DIAGNOSIS — Z87.81 HISTORY OF COMPRESSION FRACTURE OF SPINE: ICD-10-CM

## 2023-04-14 DIAGNOSIS — Z00.00 ENCOUNTER FOR PREVENTIVE HEALTH EXAMINATION: Primary | ICD-10-CM

## 2023-04-14 DIAGNOSIS — I77.1 TORTUOUS AORTA: ICD-10-CM

## 2023-04-14 DIAGNOSIS — M81.0 AGE-RELATED OSTEOPOROSIS WITHOUT CURRENT PATHOLOGICAL FRACTURE: ICD-10-CM

## 2023-04-14 DIAGNOSIS — I70.0 ATHEROSCLEROSIS OF ABDOMINAL AORTA: ICD-10-CM

## 2023-04-14 DIAGNOSIS — N18.31 STAGE 3A CHRONIC KIDNEY DISEASE: ICD-10-CM

## 2023-04-14 DIAGNOSIS — F33.41 RECURRENT MAJOR DEPRESSIVE DISORDER, IN PARTIAL REMISSION: ICD-10-CM

## 2023-04-14 DIAGNOSIS — K21.00 GASTROESOPHAGEAL REFLUX DISEASE WITH ESOPHAGITIS WITHOUT HEMORRHAGE: ICD-10-CM

## 2023-04-14 DIAGNOSIS — R91.8 MULTIPLE LUNG NODULES ON CT: ICD-10-CM

## 2023-04-14 DIAGNOSIS — K59.01 SLOW TRANSIT CONSTIPATION: ICD-10-CM

## 2023-04-14 DIAGNOSIS — E78.2 MIXED HYPERLIPIDEMIA: ICD-10-CM

## 2023-04-14 DIAGNOSIS — H40.053 BILATERAL OCULAR HYPERTENSION: ICD-10-CM

## 2023-04-14 DIAGNOSIS — D89.89 AUTOIMMUNE DISORDER: ICD-10-CM

## 2023-04-14 DIAGNOSIS — T38.0X5A STEROID-INDUCED GLAUCOMA OF BOTH EYES: ICD-10-CM

## 2023-04-14 DIAGNOSIS — H40.63X0 STEROID-INDUCED GLAUCOMA OF BOTH EYES: ICD-10-CM

## 2023-04-14 DIAGNOSIS — G89.4 CHRONIC PAIN SYNDROME: ICD-10-CM

## 2023-04-14 DIAGNOSIS — I10 ESSENTIAL HYPERTENSION: ICD-10-CM

## 2023-04-14 LAB
BASOPHILS # BLD AUTO: 0.03 K/UL (ref 0–0.2)
BASOPHILS NFR BLD: 0.5 % (ref 0–1.9)
DIFFERENTIAL METHOD: ABNORMAL
EOSINOPHIL # BLD AUTO: 0.1 K/UL (ref 0–0.5)
EOSINOPHIL NFR BLD: 1.7 % (ref 0–8)
ERYTHROCYTE [DISTWIDTH] IN BLOOD BY AUTOMATED COUNT: 14.9 % (ref 11.5–14.5)
FERRITIN SERPL-MCNC: 60 NG/ML (ref 20–300)
HCT VFR BLD AUTO: 34.4 % (ref 37–48.5)
HGB BLD-MCNC: 10.8 G/DL (ref 12–16)
HGB BLD-MCNC: 10.8 G/DL (ref 12–16)
IMM GRANULOCYTES # BLD AUTO: 0.01 K/UL (ref 0–0.04)
IMM GRANULOCYTES NFR BLD AUTO: 0.2 % (ref 0–0.5)
IRON SERPL-MCNC: 71 UG/DL (ref 30–160)
LYMPHOCYTES # BLD AUTO: 2.5 K/UL (ref 1–4.8)
LYMPHOCYTES NFR BLD: 38.6 % (ref 18–48)
MCH RBC QN AUTO: 26.9 PG (ref 27–31)
MCHC RBC AUTO-ENTMCNC: 31.4 G/DL (ref 32–36)
MCV RBC AUTO: 86 FL (ref 82–98)
MONOCYTES # BLD AUTO: 0.5 K/UL (ref 0.3–1)
MONOCYTES NFR BLD: 8.2 % (ref 4–15)
NEUTROPHILS # BLD AUTO: 3.2 K/UL (ref 1.8–7.7)
NEUTROPHILS NFR BLD: 50.8 % (ref 38–73)
NRBC BLD-RTO: 0 /100 WBC
PLATELET # BLD AUTO: 278 K/UL (ref 150–450)
PMV BLD AUTO: 12.2 FL (ref 9.2–12.9)
RBC # BLD AUTO: 4.01 M/UL (ref 4–5.4)
SATURATED IRON: 21 % (ref 20–50)
TOTAL IRON BINDING CAPACITY: 340 UG/DL (ref 250–450)
TRANSFERRIN SERPL-MCNC: 230 MG/DL (ref 200–375)
WBC # BLD AUTO: 6.37 K/UL (ref 3.9–12.7)

## 2023-04-14 PROCEDURE — 1160F RVW MEDS BY RX/DR IN RCRD: CPT | Mod: CPTII,S$GLB,, | Performed by: NURSE PRACTITIONER

## 2023-04-14 PROCEDURE — G0439 PPPS, SUBSEQ VISIT: HCPCS | Mod: S$GLB,,, | Performed by: NURSE PRACTITIONER

## 2023-04-14 PROCEDURE — 84466 ASSAY OF TRANSFERRIN: CPT | Performed by: INTERNAL MEDICINE

## 2023-04-14 PROCEDURE — 3075F SYST BP GE 130 - 139MM HG: CPT | Mod: CPTII,S$GLB,, | Performed by: NURSE PRACTITIONER

## 2023-04-14 PROCEDURE — 3288F PR FALLS RISK ASSESSMENT DOCUMENTED: ICD-10-PCS | Mod: CPTII,S$GLB,, | Performed by: NURSE PRACTITIONER

## 2023-04-14 PROCEDURE — 99999 PR PBB SHADOW E&M-EST. PATIENT-LVL V: ICD-10-PCS | Mod: PBBFAC,,, | Performed by: NURSE PRACTITIONER

## 2023-04-14 PROCEDURE — 3075F PR MOST RECENT SYSTOLIC BLOOD PRESS GE 130-139MM HG: ICD-10-PCS | Mod: CPTII,S$GLB,, | Performed by: NURSE PRACTITIONER

## 2023-04-14 PROCEDURE — G0439 PR MEDICARE ANNUAL WELLNESS SUBSEQUENT VISIT: ICD-10-PCS | Mod: S$GLB,,, | Performed by: NURSE PRACTITIONER

## 2023-04-14 PROCEDURE — 3008F PR BODY MASS INDEX (BMI) DOCUMENTED: ICD-10-PCS | Mod: CPTII,S$GLB,, | Performed by: NURSE PRACTITIONER

## 2023-04-14 PROCEDURE — 4010F PR ACE/ARB THEARPY RXD/TAKEN: ICD-10-PCS | Mod: CPTII,S$GLB,, | Performed by: NURSE PRACTITIONER

## 2023-04-14 PROCEDURE — 1160F PR REVIEW ALL MEDS BY PRESCRIBER/CLIN PHARMACIST DOCUMENTED: ICD-10-PCS | Mod: CPTII,S$GLB,, | Performed by: NURSE PRACTITIONER

## 2023-04-14 PROCEDURE — 1101F PR PT FALLS ASSESS DOC 0-1 FALLS W/OUT INJ PAST YR: ICD-10-PCS | Mod: CPTII,S$GLB,, | Performed by: NURSE PRACTITIONER

## 2023-04-14 PROCEDURE — 1159F MED LIST DOCD IN RCRD: CPT | Mod: CPTII,S$GLB,, | Performed by: NURSE PRACTITIONER

## 2023-04-14 PROCEDURE — 1101F PT FALLS ASSESS-DOCD LE1/YR: CPT | Mod: CPTII,S$GLB,, | Performed by: NURSE PRACTITIONER

## 2023-04-14 PROCEDURE — 85025 COMPLETE CBC W/AUTO DIFF WBC: CPT | Performed by: NURSE PRACTITIONER

## 2023-04-14 PROCEDURE — 4010F ACE/ARB THERAPY RXD/TAKEN: CPT | Mod: CPTII,S$GLB,, | Performed by: NURSE PRACTITIONER

## 2023-04-14 PROCEDURE — 36415 COLL VENOUS BLD VENIPUNCTURE: CPT | Performed by: INTERNAL MEDICINE

## 2023-04-14 PROCEDURE — 3288F FALL RISK ASSESSMENT DOCD: CPT | Mod: CPTII,S$GLB,, | Performed by: NURSE PRACTITIONER

## 2023-04-14 PROCEDURE — 1170F FXNL STATUS ASSESSED: CPT | Mod: CPTII,S$GLB,, | Performed by: NURSE PRACTITIONER

## 2023-04-14 PROCEDURE — 99999 PR PBB SHADOW E&M-EST. PATIENT-LVL V: CPT | Mod: PBBFAC,,, | Performed by: NURSE PRACTITIONER

## 2023-04-14 PROCEDURE — 3078F PR MOST RECENT DIASTOLIC BLOOD PRESSURE < 80 MM HG: ICD-10-PCS | Mod: CPTII,S$GLB,, | Performed by: NURSE PRACTITIONER

## 2023-04-14 PROCEDURE — 1126F AMNT PAIN NOTED NONE PRSNT: CPT | Mod: CPTII,S$GLB,, | Performed by: NURSE PRACTITIONER

## 2023-04-14 PROCEDURE — 3008F BODY MASS INDEX DOCD: CPT | Mod: CPTII,S$GLB,, | Performed by: NURSE PRACTITIONER

## 2023-04-14 PROCEDURE — 1170F PR FUNCTIONAL STATUS ASSESSED: ICD-10-PCS | Mod: CPTII,S$GLB,, | Performed by: NURSE PRACTITIONER

## 2023-04-14 PROCEDURE — 82728 ASSAY OF FERRITIN: CPT | Performed by: INTERNAL MEDICINE

## 2023-04-14 PROCEDURE — 3078F DIAST BP <80 MM HG: CPT | Mod: CPTII,S$GLB,, | Performed by: NURSE PRACTITIONER

## 2023-04-14 PROCEDURE — 1126F PR PAIN SEVERITY QUANTIFIED, NO PAIN PRESENT: ICD-10-PCS | Mod: CPTII,S$GLB,, | Performed by: NURSE PRACTITIONER

## 2023-04-14 PROCEDURE — 1159F PR MEDICATION LIST DOCUMENTED IN MEDICAL RECORD: ICD-10-PCS | Mod: CPTII,S$GLB,, | Performed by: NURSE PRACTITIONER

## 2023-04-14 NOTE — PATIENT INSTRUCTIONS
Counseling and Referral of Other Preventative  (Italic type indicates deductible and co-insurance are waived)    Patient Name: Idalmis Morejon  Today's Date: 4/14/2023    Health Maintenance       Date Due Completion Date    High Dose Statin Never done ---    COVID-19 Vaccine (5 - Booster for Moderna series) 09/16/2022 7/22/2022    DEXA Scan 04/26/2023 4/26/2021    Influenza Vaccine (1) 06/30/2023 (Originally 9/1/2022) 3/12/2020 (Declined)    Override on 3/12/2020: Declined    Shingles Vaccine (1 of 2) 10/25/2023 (Originally 8/13/1998) ---    Hemoglobin A1c (Prediabetes) 10/25/2023 10/25/2022    Mammogram 03/02/2024 3/2/2023    Override on 3/26/2012: Done    Colorectal Cancer Screening 09/09/2024 9/9/2019    TETANUS VACCINE 02/03/2026 2/3/2016    Lipid Panel 10/25/2027 10/25/2022        No orders of the defined types were placed in this encounter.    The following information is provided to all patients.  This information is to help you find resources for any of the problems found today that may be affecting your health:                Living healthy guide: www.[facility].[place].gov      Understanding Diabetes: www.diabetes.org      Eating healthy: www.cdc.gov/healthyweight      CDC home safety checklist: www.cdc.gov/steadi/patient.html      Agency on Aging: www.goea.[place].[facility]      Alcoholics anonymous (AA): www.aa.org      Physical Activity: www.dago.nih.gov/et0vvgu      Tobacco use: www.quitwithusla.org

## 2023-04-16 ENCOUNTER — TELEPHONE (OUTPATIENT)
Dept: GASTROENTEROLOGY | Facility: CLINIC | Age: 73
End: 2023-04-16
Payer: MEDICARE

## 2023-04-16 DIAGNOSIS — D50.9 IRON DEFICIENCY ANEMIA, UNSPECIFIED IRON DEFICIENCY ANEMIA TYPE: Primary | ICD-10-CM

## 2023-04-16 NOTE — PROGRESS NOTES
Dane - please tell patient that they are iron deficient and anemic and recommend that they take ferrous gluconate one 324mg pill once daily for next 3 months.    Please order repeat fasting Hemoglobin, Iron/TIBC, and Ferritin in 8 weeks - Orders placed.     Idalmis your still slightly anemic will recommend you continue taking your iron pills hold it 5 days prior to your colonoscopy iron can make it harder to bowel prep I will recommend a follow-up colonoscopy for further evaluation your iron deficiency anemia history of colon polyps in your family history of colon cancer.  Referral placed.

## 2023-04-17 ENCOUNTER — TELEPHONE (OUTPATIENT)
Dept: ENDOSCOPY | Facility: HOSPITAL | Age: 73
End: 2023-04-17
Payer: MEDICARE

## 2023-04-17 ENCOUNTER — TELEPHONE (OUTPATIENT)
Dept: INTERNAL MEDICINE | Facility: CLINIC | Age: 73
End: 2023-04-17

## 2023-04-17 NOTE — TELEPHONE ENCOUNTER
"Patient Calls  (Newest Message First)   Bryan Love MD  [facility] 20 hours ago (12:40 PM)       Procedure: Colonoscopy     Diagnosis: Personal history of colonic polyps. Family history of multiple 2nd degree relative with CRC.  Iron deficiency anemia     Procedure Timin-4 weeks     *If within 4 weeks selected, please lachelle as high priority*     *If greater than 12 weeks, please select "5-12 weeks" and delay sending until 2 months prior to requested date*     Provider: Myself     Location: [address]     Additional Scheduling Information:  Extended bowel prep for constipation     Prep Specifications:Extended/Constipation prep     Have you attached a patient to this message: yes    Lvm and portal message sent for pt to schedule procedure  "

## 2023-04-19 ENCOUNTER — PATIENT MESSAGE (OUTPATIENT)
Dept: INTERNAL MEDICINE | Facility: CLINIC | Age: 73
End: 2023-04-19
Payer: MEDICARE

## 2023-04-19 NOTE — TELEPHONE ENCOUNTER
I do not recall her mentioning the shortness of breath or headaches and fatigue, either in her last visit or in any messages.  Please schedule her for an appointment with Edie or me in next week thank you

## 2023-04-25 ENCOUNTER — LAB VISIT (OUTPATIENT)
Dept: LAB | Facility: HOSPITAL | Age: 73
End: 2023-04-25
Attending: NURSE PRACTITIONER
Payer: MEDICARE

## 2023-04-25 ENCOUNTER — OFFICE VISIT (OUTPATIENT)
Dept: INTERNAL MEDICINE | Facility: CLINIC | Age: 73
End: 2023-04-25
Payer: MEDICARE

## 2023-04-25 VITALS
DIASTOLIC BLOOD PRESSURE: 70 MMHG | HEART RATE: 69 BPM | SYSTOLIC BLOOD PRESSURE: 140 MMHG | WEIGHT: 164.25 LBS | HEIGHT: 66 IN | BODY MASS INDEX: 26.4 KG/M2 | OXYGEN SATURATION: 97 %

## 2023-04-25 DIAGNOSIS — D84.9 IMMUNOSUPPRESSION: ICD-10-CM

## 2023-04-25 DIAGNOSIS — E78.2 MIXED HYPERLIPIDEMIA: ICD-10-CM

## 2023-04-25 DIAGNOSIS — D64.9 ANEMIA, UNSPECIFIED TYPE: ICD-10-CM

## 2023-04-25 DIAGNOSIS — R06.02 SHORTNESS OF BREATH: Primary | ICD-10-CM

## 2023-04-25 DIAGNOSIS — D89.89 AUTOIMMUNE DISORDER: ICD-10-CM

## 2023-04-25 DIAGNOSIS — I10 ESSENTIAL HYPERTENSION: ICD-10-CM

## 2023-04-25 DIAGNOSIS — R06.02 SHORTNESS OF BREATH: ICD-10-CM

## 2023-04-25 DIAGNOSIS — I70.0 ATHEROSCLEROSIS OF ABDOMINAL AORTA: ICD-10-CM

## 2023-04-25 DIAGNOSIS — M35.9 UNDIFFERENTIATED CONNECTIVE TISSUE DISEASE: ICD-10-CM

## 2023-04-25 DIAGNOSIS — R91.8 MULTIPLE LUNG NODULES ON CT: ICD-10-CM

## 2023-04-25 LAB
ALBUMIN SERPL BCP-MCNC: 3.9 G/DL (ref 3.5–5.2)
ALP SERPL-CCNC: 95 U/L (ref 55–135)
ALT SERPL W/O P-5'-P-CCNC: 14 U/L (ref 10–44)
ANION GAP SERPL CALC-SCNC: 9 MMOL/L (ref 8–16)
AST SERPL-CCNC: 24 U/L (ref 10–40)
BILIRUB SERPL-MCNC: 0.7 MG/DL (ref 0.1–1)
BNP SERPL-MCNC: 43 PG/ML (ref 0–99)
BUN SERPL-MCNC: 20 MG/DL (ref 8–23)
CALCIUM SERPL-MCNC: 10 MG/DL (ref 8.7–10.5)
CHLORIDE SERPL-SCNC: 106 MMOL/L (ref 95–110)
CO2 SERPL-SCNC: 26 MMOL/L (ref 23–29)
CREAT SERPL-MCNC: 1.2 MG/DL (ref 0.5–1.4)
EST. GFR  (NO RACE VARIABLE): 47.5 ML/MIN/1.73 M^2
GLUCOSE SERPL-MCNC: 95 MG/DL (ref 70–110)
POTASSIUM SERPL-SCNC: 4.7 MMOL/L (ref 3.5–5.1)
PROT SERPL-MCNC: 7.7 G/DL (ref 6–8.4)
SODIUM SERPL-SCNC: 141 MMOL/L (ref 136–145)
TSH SERPL DL<=0.005 MIU/L-ACNC: 2.2 UIU/ML (ref 0.4–4)

## 2023-04-25 PROCEDURE — 3008F BODY MASS INDEX DOCD: CPT | Mod: CPTII,S$GLB,, | Performed by: NURSE PRACTITIONER

## 2023-04-25 PROCEDURE — 3077F SYST BP >= 140 MM HG: CPT | Mod: CPTII,S$GLB,, | Performed by: NURSE PRACTITIONER

## 2023-04-25 PROCEDURE — 3077F PR MOST RECENT SYSTOLIC BLOOD PRESSURE >= 140 MM HG: ICD-10-PCS | Mod: CPTII,S$GLB,, | Performed by: NURSE PRACTITIONER

## 2023-04-25 PROCEDURE — 93010 EKG 12-LEAD: ICD-10-PCS | Mod: S$GLB,,, | Performed by: INTERNAL MEDICINE

## 2023-04-25 PROCEDURE — 99999 PR PBB SHADOW E&M-EST. PATIENT-LVL IV: ICD-10-PCS | Mod: PBBFAC,,, | Performed by: NURSE PRACTITIONER

## 2023-04-25 PROCEDURE — 1101F PR PT FALLS ASSESS DOC 0-1 FALLS W/OUT INJ PAST YR: ICD-10-PCS | Mod: CPTII,S$GLB,, | Performed by: NURSE PRACTITIONER

## 2023-04-25 PROCEDURE — 1159F PR MEDICATION LIST DOCUMENTED IN MEDICAL RECORD: ICD-10-PCS | Mod: CPTII,S$GLB,, | Performed by: NURSE PRACTITIONER

## 2023-04-25 PROCEDURE — 1101F PT FALLS ASSESS-DOCD LE1/YR: CPT | Mod: CPTII,S$GLB,, | Performed by: NURSE PRACTITIONER

## 2023-04-25 PROCEDURE — 93005 ELECTROCARDIOGRAM TRACING: CPT | Mod: S$GLB,,, | Performed by: NURSE PRACTITIONER

## 2023-04-25 PROCEDURE — 80053 COMPREHEN METABOLIC PANEL: CPT | Performed by: NURSE PRACTITIONER

## 2023-04-25 PROCEDURE — 3288F FALL RISK ASSESSMENT DOCD: CPT | Mod: CPTII,S$GLB,, | Performed by: NURSE PRACTITIONER

## 2023-04-25 PROCEDURE — 36415 COLL VENOUS BLD VENIPUNCTURE: CPT | Performed by: NURSE PRACTITIONER

## 2023-04-25 PROCEDURE — 3008F PR BODY MASS INDEX (BMI) DOCUMENTED: ICD-10-PCS | Mod: CPTII,S$GLB,, | Performed by: NURSE PRACTITIONER

## 2023-04-25 PROCEDURE — 3288F PR FALLS RISK ASSESSMENT DOCUMENTED: ICD-10-PCS | Mod: CPTII,S$GLB,, | Performed by: NURSE PRACTITIONER

## 2023-04-25 PROCEDURE — 4010F PR ACE/ARB THEARPY RXD/TAKEN: ICD-10-PCS | Mod: CPTII,S$GLB,, | Performed by: NURSE PRACTITIONER

## 2023-04-25 PROCEDURE — 93010 ELECTROCARDIOGRAM REPORT: CPT | Mod: S$GLB,,, | Performed by: INTERNAL MEDICINE

## 2023-04-25 PROCEDURE — 93005 EKG 12-LEAD: ICD-10-PCS | Mod: S$GLB,,, | Performed by: NURSE PRACTITIONER

## 2023-04-25 PROCEDURE — 3078F PR MOST RECENT DIASTOLIC BLOOD PRESSURE < 80 MM HG: ICD-10-PCS | Mod: CPTII,S$GLB,, | Performed by: NURSE PRACTITIONER

## 2023-04-25 PROCEDURE — 99999 PR PBB SHADOW E&M-EST. PATIENT-LVL IV: CPT | Mod: PBBFAC,,, | Performed by: NURSE PRACTITIONER

## 2023-04-25 PROCEDURE — 84443 ASSAY THYROID STIM HORMONE: CPT | Performed by: NURSE PRACTITIONER

## 2023-04-25 PROCEDURE — 99214 OFFICE O/P EST MOD 30 MIN: CPT | Mod: S$GLB,,, | Performed by: NURSE PRACTITIONER

## 2023-04-25 PROCEDURE — 1159F MED LIST DOCD IN RCRD: CPT | Mod: CPTII,S$GLB,, | Performed by: NURSE PRACTITIONER

## 2023-04-25 PROCEDURE — 3078F DIAST BP <80 MM HG: CPT | Mod: CPTII,S$GLB,, | Performed by: NURSE PRACTITIONER

## 2023-04-25 PROCEDURE — 4010F ACE/ARB THERAPY RXD/TAKEN: CPT | Mod: CPTII,S$GLB,, | Performed by: NURSE PRACTITIONER

## 2023-04-25 PROCEDURE — 83880 ASSAY OF NATRIURETIC PEPTIDE: CPT | Performed by: NURSE PRACTITIONER

## 2023-04-25 PROCEDURE — 99214 PR OFFICE/OUTPT VISIT, EST, LEVL IV, 30-39 MIN: ICD-10-PCS | Mod: S$GLB,,, | Performed by: NURSE PRACTITIONER

## 2023-04-25 NOTE — PROGRESS NOTES
INTERNAL MEDICINE PROGRESS/URGENT CARE NOTE    CHIEF COMPLAINT     Chief Complaint   Patient presents with    Shortness of Breath     A few months        HPI     Idalmis Morejon is a 72 y.o. female with depression, HTN, HLD, CKD stage 3, undifferentiated connective tissue disease, iritis, anemia, secondary hyperparathyroidism, GERD, and osteoporosis chronic pain, h/o compression fracture, who presents for an urgent today.    Here with c/o sob x months with activity (walking, changing positions, showering/bathing, etc). Feels like she cannot take a deep breath.   Uses 2 pillow  Mild cough   No wheezing   No swelling in legs       Also having right sided headache- has h/o of migraines but has not had in years.     Anemia- taking iron pills every third day 2/2 constipation   Eating beats instead           Past Medical History:  Past Medical History:   Diagnosis Date    Abnormal chest CT     Acid reflux     Allergy     Anemia     Anemia     Anxiety     Cataract     Chronic UTI     recently treated with antibiotics -x 3 wks. ago-    Colon adenoma 2015 due 2020 10/21/2015    Colon adenoma 2015 due 2020 10/21/2015    Depression     Disturbed sleep rhythm     DJD (degenerative joint disease)     Dry eyes     Dry mouth     Grief reaction 3/24/2014    Hx of migraine headaches     Hyperlipemia     Hypernatremia 8/8/2014    Hypertension     Iritis     Iritis     Mild vitamin D deficiency 9/9/2013    Multiple lung nodules on CT: 1154-1551 no f/u needed 6/6/2016    Neurogenic bladder     Osteopenia     in hips    Osteoporosis     spine    Osteoporosis: 9/15 see rheumatology notes 6/6/2016    Positive GEORGIANA (antinuclear antibody)     Schatzki's ring: 3/15 dilated 6/6/2016    Sciatica neuralgia 6/21/2013    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  Authorizing Provider   amLODIPine (NORVASC) 10 MG tablet Take 0.5 tablets (5 mg total) by mouth 2 (two) times daily. 8/18/22  Yes Noemy Pugh MD   ascorbic acid (VITAMIN C ORAL) Take by mouth.   Yes Historical Provider   B-complex with vitamin C (Z-BEC OR EQUIV) tablet Take 1 tablet by mouth once daily.   Yes Historical Provider   chlorthalidone (HYGROTEN) 25 MG Tab Take 0.5 tablets (12.5 mg total) by mouth every other day. 8/1/22 8/1/23 Yes Mercy Maldonado NP   DULoxetine (CYMBALTA) 60 MG capsule Take 1 capsule (60 mg total) by mouth once daily. 3/3/23  Yes Warren Campbell MD   ferrous gluconate (FERGON) 324 MG tablet Take 1 tablet (324 mg total) by mouth daily with breakfast. 2/16/23 8/15/23 Yes Bryan Love MD   MELATONIN ORAL Take by mouth.   Yes Historical Provider   mirabegron (MYRBETRIQ) 25 mg Tb24 ER tablet Take 1 tablet (25 mg total) by mouth once daily. 3/22/23 3/21/24 Yes Edie Curtis NP   pantoprazole (PROTONIX) 40 MG tablet TAKE 1 TABLET BY MOUTH EVERY DAY BEFORE BREAKFAST 5/14/22  Yes Bryan Love MD   timolol maleate 0.5% (TIMOPTIC) 0.5 % Drop Place 1 drop into both eyes 2 (two) times daily. 11/15/22  Yes Alfredito Valentine MD   valsartan (DIOVAN) 160 MG tablet TAKE 2 TABLETS (320 MG TOTAL) BY MOUTH ONCE DAILY. 3/20/23 3/19/24 Yes Mercy Maldonado NP   vitamin D (VITAMIN D3) 1000 units Tab Take 1,000 Units by mouth once daily.   Yes Historical Provider   vitamin E acetate (VITAMIN E ORAL) Take by mouth.   Yes Historical Provider   LINZESS 145 mcg Cap capsule TAKE 1 CAPSULE (145 MCG TOTAL) BY MOUTH BEFORE BREAKFAST.  Patient not taking: Reported on 4/25/2023 1/17/23   Bryan Love MD       Review of Systems:  Review of Systems   Constitutional:  Positive for fatigue. Negative for chills and fever.   Respiratory:  Positive for cough, chest tightness and shortness of breath. Negative for wheezing.    Cardiovascular:  Negative for chest pain, palpitations and leg  "swelling.   Gastrointestinal:  Positive for abdominal pain (heartburn). Negative for blood in stool, constipation, diarrhea, nausea and vomiting.     Health Maintainence:   Immunizations:  Health Maintenance         Date Due Completion Date    High Dose Statin Never done ---    COVID-19 Vaccine (5 - Booster for Moderna series) 09/16/2022 7/22/2022    DEXA Scan 04/26/2023 4/26/2021    Influenza Vaccine (1) 06/30/2023 (Originally 9/1/2022) 3/12/2020 (Declined)    Override on 3/12/2020: Declined    Shingles Vaccine (1 of 2) 10/25/2023 (Originally 8/13/1998) ---    Hemoglobin A1c (Prediabetes) 10/25/2023 10/25/2022    Mammogram 03/02/2024 3/2/2023    Override on 3/26/2012: Done    Colorectal Cancer Screening 09/09/2024 9/9/2019    TETANUS VACCINE 02/03/2026 2/3/2016    Lipid Panel 10/25/2027 10/25/2022             PHYSICAL EXAM     BP (!) 140/70 (BP Location: Left arm, Patient Position: Sitting, BP Method: Medium (Manual))   Pulse 69   Ht 5' 6" (1.676 m)   Wt 74.5 kg (164 lb 3.9 oz)   SpO2 97%   BMI 26.51 kg/m²     Physical Exam  Vitals reviewed.   Constitutional:       Appearance: She is well-developed.   HENT:      Head: Normocephalic.      Right Ear: External ear normal.      Left Ear: External ear normal.      Nose: Nose normal.      Mouth/Throat:      Pharynx: No oropharyngeal exudate.   Eyes:      Pupils: Pupils are equal, round, and reactive to light.   Neck:      Thyroid: No thyromegaly.      Vascular: No JVD.      Trachea: No tracheal deviation.   Cardiovascular:      Rate and Rhythm: Normal rate and regular rhythm.      Heart sounds: Normal heart sounds. No murmur heard.    No friction rub. No gallop.   Pulmonary:      Effort: Pulmonary effort is normal. No respiratory distress.      Breath sounds: Normal breath sounds. No wheezing or rales.   Abdominal:      General: Bowel sounds are normal. There is no distension.      Palpations: Abdomen is soft.      Tenderness: There is no abdominal tenderness. "   Musculoskeletal:         General: No tenderness. Normal range of motion.      Cervical back: Neck supple.      Right lower leg: No edema.      Left lower leg: No edema.   Lymphadenopathy:      Cervical: No cervical adenopathy.   Skin:     General: Skin is warm and dry.      Coloration: Skin is pale.      Findings: No rash.   Neurological:      Mental Status: She is alert and oriented to person, place, and time.   Psychiatric:         Behavior: Behavior normal.       LABS     Lab Results   Component Value Date    HGBA1C 5.4 10/25/2022     CMP  Sodium   Date Value Ref Range Status   10/25/2022 142 136 - 145 mmol/L Final   10/25/2022 142 136 - 145 mmol/L Final     Potassium   Date Value Ref Range Status   10/25/2022 3.6 3.5 - 5.1 mmol/L Final   10/25/2022 3.6 3.5 - 5.1 mmol/L Final     Chloride   Date Value Ref Range Status   10/25/2022 107 95 - 110 mmol/L Final   10/25/2022 107 95 - 110 mmol/L Final     CO2   Date Value Ref Range Status   10/25/2022 25 23 - 29 mmol/L Final   10/25/2022 25 23 - 29 mmol/L Final     Glucose   Date Value Ref Range Status   10/25/2022 83 70 - 110 mg/dL Final   10/25/2022 83 70 - 110 mg/dL Final     BUN   Date Value Ref Range Status   10/25/2022 16 8 - 23 mg/dL Final   10/25/2022 16 8 - 23 mg/dL Final     Creatinine   Date Value Ref Range Status   10/25/2022 1.2 0.5 - 1.4 mg/dL Final   10/25/2022 1.2 0.5 - 1.4 mg/dL Final     Calcium   Date Value Ref Range Status   10/25/2022 9.7 8.7 - 10.5 mg/dL Final   10/25/2022 9.7 8.7 - 10.5 mg/dL Final     Total Protein   Date Value Ref Range Status   10/25/2022 7.8 6.0 - 8.4 g/dL Final     Albumin   Date Value Ref Range Status   10/25/2022 3.9 3.5 - 5.2 g/dL Final   10/25/2022 3.9 3.5 - 5.2 g/dL Final     Total Bilirubin   Date Value Ref Range Status   10/25/2022 0.8 0.1 - 1.0 mg/dL Final     Comment:     For infants and newborns, interpretation of results should be based  on gestational age, weight and in agreement with  clinical  observations.    Premature Infant recommended reference ranges:  Up to 24 hours.............<8.0 mg/dL  Up to 48 hours............<12.0 mg/dL  3-5 days..................<15.0 mg/dL  6-29 days.................<15.0 mg/dL       Alkaline Phosphatase   Date Value Ref Range Status   10/25/2022 101 55 - 135 U/L Final     AST   Date Value Ref Range Status   10/25/2022 24 10 - 40 U/L Final     ALT   Date Value Ref Range Status   10/25/2022 12 10 - 44 U/L Final     Anion Gap   Date Value Ref Range Status   10/25/2022 10 8 - 16 mmol/L Final   10/25/2022 10 8 - 16 mmol/L Final     eGFR if    Date Value Ref Range Status   11/30/2021 >60.0 >60 mL/min/1.73 m^2 Final     eGFR if non    Date Value Ref Range Status   11/30/2021 56.0 (A) >60 mL/min/1.73 m^2 Final     Comment:     Calculation used to obtain the estimated glomerular filtration  rate (eGFR) is the CKD-EPI equation.        Lab Results   Component Value Date    WBC 6.37 04/14/2023    HGB 10.8 (L) 04/14/2023    HGB 10.8 (L) 04/14/2023    HCT 34.4 (L) 04/14/2023    MCV 86 04/14/2023     04/14/2023     Lab Results   Component Value Date    CHOL 179 10/25/2022    CHOL 174 12/09/2021    CHOL 159 04/09/2019     Lab Results   Component Value Date    HDL 57 10/25/2022    HDL 61 12/09/2021    HDL 60 04/09/2019     Lab Results   Component Value Date    LDLCALC 98.2 10/25/2022    LDLCALC 96.6 12/09/2021    LDLCALC 78.0 04/09/2019     Lab Results   Component Value Date    TRIG 119 10/25/2022    TRIG 82 12/09/2021    TRIG 105 04/09/2019     Lab Results   Component Value Date    CHOLHDL 31.8 10/25/2022    CHOLHDL 35.1 12/09/2021    CHOLHDL 37.7 04/09/2019     Lab Results   Component Value Date    TSH 1.802 10/25/2022       ASSESSMENT/PLAN     Idalmis T Jean-Pierre is a 72 y.o. female     Shortness of breath- EKG unchanged from previous, will send for labs and cxr. Will send for stress echo   -     Stress Echo Which stress agent will be used?  Treadmill Exercise; Color Flow Doppler? No; Future  -     B-TYPE NATRIURETIC PEPTIDE; Future; Expected date: 04/25/2023  -     X-Ray Chest PA And Lateral; Future; Expected date: 04/25/2023  -     IN OFFICE EKG 12-LEAD (to Muse)  -     Comprehensive Metabolic Panel; Future; Expected date: 04/25/2023  -     TSH; Future; Expected date: 04/25/2023    Anemia, unspecified type    Multiple lung nodules on CT: 4261-6655 no f/u needed    Essential hypertension    Atherosclerosis of abdominal aorta: minimal see CT 7/16; CT SCORE 0 1/22    Mixed hyperlipidemia    Undifferentiated connective tissue disease    Immunosuppression    Autoimmune disorder- recurrent iritis           Follow up with PCP    Patient education provided from Evelin. Patient was counseled on when and how to seek emergent care.       Edie ABERNATHY, ROJELIO, FNP-c   Department of Internal Medicine - [facility]  11:38 AM

## 2023-04-27 ENCOUNTER — HOSPITAL ENCOUNTER (OUTPATIENT)
Dept: RADIOLOGY | Facility: HOSPITAL | Age: 73
Discharge: HOME | End: 2023-04-27
Attending: NURSE PRACTITIONER
Payer: MEDICARE

## 2023-04-27 ENCOUNTER — TELEPHONE (OUTPATIENT)
Dept: ENDOSCOPY | Facility: HOSPITAL | Age: 73
End: 2023-04-27
Payer: MEDICARE

## 2023-04-27 DIAGNOSIS — R06.02 SHORTNESS OF BREATH: ICD-10-CM

## 2023-04-27 PROCEDURE — 71046 XR CHEST PA AND LATERAL: ICD-10-PCS | Mod: 26,,, | Performed by: RADIOLOGY

## 2023-04-27 PROCEDURE — 71046 X-RAY EXAM CHEST 2 VIEWS: CPT | Mod: 26,,, | Performed by: RADIOLOGY

## 2023-04-27 PROCEDURE — 71046 X-RAY EXAM CHEST 2 VIEWS: CPT | Mod: TC

## 2023-04-27 NOTE — TELEPHONE ENCOUNTER
Called and spoke to patient that Dr. Love ordered for a colonoscopy. Patient states she is not due for one yet. Patient has a stress echo scheduled on 5/4/23 for SOB. Informed patient she will receive a follow up call after stress echo to schedule for a colonoscopy. Patient verbalized an understanding.

## 2023-04-27 NOTE — TELEPHONE ENCOUNTER
"Bryan Love MD  [facility] 11 days ago       Procedure: Colonoscopy     Diagnosis: Personal history of colonic polyps. Family history of multiple 2nd degree relative with CRC.  Iron deficiency anemia     Procedure Timin-4 weeks     *If within 4 weeks selected, please lachelle as high priority*     *If greater than 12 weeks, please select "5-12 weeks" and delay sending until 2 months prior to requested date*     Provider: Myself     Location: [address]     Additional Scheduling Information:  Extended bowel prep for constipation     Prep Specifications:Extended/Constipation prep     Have you attached a patient to this message: yes      "

## 2023-05-04 ENCOUNTER — HOSPITAL ENCOUNTER (OUTPATIENT)
Dept: CARDIOLOGY | Facility: HOSPITAL | Age: 73
Discharge: HOME | End: 2023-05-04
Attending: NURSE PRACTITIONER
Payer: MEDICARE

## 2023-05-04 VITALS — WEIGHT: 164 LBS | BODY MASS INDEX: 26.36 KG/M2 | HEIGHT: 66 IN

## 2023-05-04 DIAGNOSIS — R06.02 SHORTNESS OF BREATH: ICD-10-CM

## 2023-05-04 LAB
ASCENDING AORTA: 3.23 CM
BSA FOR ECHO PROCEDURE: 1.86 M2
CV ECHO LV RWT: 0.6 CM
CV STRESS BASE HR: 78 BPM
DIASTOLIC BLOOD PRESSURE: 81 MMHG
DOP CALC LVOT AREA: 3.1 CM2
DOP CALC LVOT DIAMETER: 2 CM
DOP CALC LVOT PEAK VEL: 1.12 M/S
DOP CALC LVOT STROKE VOLUME: 72.79 CM3
DOP CALCLVOT PEAK VEL VTI: 23.18 CM
E WAVE DECELERATION TIME: 214.76 MSEC
E/A RATIO: 0.58
E/E' RATIO: 10.4 M/S
ECHO LV POSTERIOR WALL: 1.1 CM (ref 0.6–1.1)
EJECTION FRACTION: 65 %
FRACTIONAL SHORTENING: 33 % (ref 28–44)
INTERVENTRICULAR SEPTUM: 0.84 CM (ref 0.6–1.1)
IVRT: 117.03 MSEC
LA MAJOR: 4.91 CM
LA MINOR: 4.92 CM
LA WIDTH: 3.86 CM
LEFT ATRIUM SIZE: 3.64 CM
LEFT ATRIUM VOLUME INDEX MOD: 22.9 ML/M2
LEFT ATRIUM VOLUME INDEX: 31.9 ML/M2
LEFT ATRIUM VOLUME MOD: 42.14 CM3
LEFT ATRIUM VOLUME: 58.7 CM3
LEFT INTERNAL DIMENSION IN SYSTOLE: 2.46 CM (ref 2.1–4)
LEFT VENTRICLE DIASTOLIC VOLUME INDEX: 31.39 ML/M2
LEFT VENTRICLE DIASTOLIC VOLUME: 57.76 ML
LEFT VENTRICLE MASS INDEX: 58 G/M2
LEFT VENTRICLE SYSTOLIC VOLUME INDEX: 11.6 ML/M2
LEFT VENTRICLE SYSTOLIC VOLUME: 21.42 ML
LEFT VENTRICULAR INTERNAL DIMENSION IN DIASTOLE: 3.69 CM (ref 3.5–6)
LEFT VENTRICULAR MASS: 107.27 G
LV LATERAL E/E' RATIO: 10.4 M/S
LV SEPTAL E/E' RATIO: 10.4 M/S
MV A" WAVE DURATION": 9.13 MSEC
MV PEAK A VEL: 0.9 M/S
MV PEAK E VEL: 0.52 M/S
MV STENOSIS PRESSURE HALF TIME: 62.28 MS
MV VALVE AREA P 1/2 METHOD: 3.53 CM2
OHS CV CPX 1 MINUTE RECOVERY HEART RATE: 100 BPM
OHS CV CPX 85 PERCENT MAX PREDICTED HEART RATE MALE: 120
OHS CV CPX ESTIMATED METS: 7
OHS CV CPX MAX PREDICTED HEART RATE: 141
OHS CV CPX PATIENT IS FEMALE: 1
OHS CV CPX PATIENT IS MALE: 0
OHS CV CPX PEAK DIASTOLIC BLOOD PRESSURE: 97 MMHG
OHS CV CPX PEAK HEAR RATE: 123 BPM
OHS CV CPX PEAK RATE PRESSURE PRODUCT: NORMAL
OHS CV CPX PEAK SYSTOLIC BLOOD PRESSURE: 201 MMHG
OHS CV CPX PERCENT MAX PREDICTED HEART RATE ACHIEVED: 87
OHS CV CPX RATE PRESSURE PRODUCT PRESENTING: NORMAL
PISA TR MAX VEL: 2.44 M/S
PULM VEIN S/D RATIO: 1.79
PV PEAK D VEL: 0.42 M/S
PV PEAK S VEL: 0.75 M/S
RA MAJOR: 4.29 CM
RA PRESSURE: 3 MMHG
RA WIDTH: 3.02 CM
RIGHT VENTRICULAR END-DIASTOLIC DIMENSION: 2.55 CM
RV TISSUE DOPPLER FREE WALL SYSTOLIC VELOCITY 1 (APICAL 4 CHAMBER VIEW): 13.37 CM/S
SINUS: 2.79 CM
STJ: 2.5 CM
STRESS ECHO POST EXERCISE DUR MIN: 4 MINUTES
STRESS ECHO POST EXERCISE DUR SEC: 40 SECONDS
SYSTOLIC BLOOD PRESSURE: 138 MMHG
TDI LATERAL: 0.05 M/S
TDI SEPTAL: 0.05 M/S
TDI: 0.05 M/S
TR MAX PG: 24 MMHG
TRICUSPID ANNULAR PLANE SYSTOLIC EXCURSION: 1.86 CM
TV REST PULMONARY ARTERY PRESSURE: 27 MMHG

## 2023-05-04 PROCEDURE — 93351 STRESS TTE COMPLETE: CPT | Mod: 26,,, | Performed by: INTERNAL MEDICINE

## 2023-05-04 PROCEDURE — 93351 STRESS TTE COMPLETE: CPT

## 2023-05-04 PROCEDURE — 93351 STRESS ECHO (CUPID ONLY): ICD-10-PCS | Mod: 26,,, | Performed by: INTERNAL MEDICINE

## 2023-05-06 DIAGNOSIS — R10.13 EPIGASTRIC PAIN: ICD-10-CM

## 2023-05-06 DIAGNOSIS — Z79.899 ENCOUNTER FOR MONITORING PROTON PUMP INHIBITOR THERAPY: ICD-10-CM

## 2023-05-06 DIAGNOSIS — K21.9 GASTROESOPHAGEAL REFLUX DISEASE: ICD-10-CM

## 2023-05-06 DIAGNOSIS — Z51.81 ENCOUNTER FOR MONITORING PROTON PUMP INHIBITOR THERAPY: ICD-10-CM

## 2023-05-08 RX ORDER — PANTOPRAZOLE SODIUM 40 MG/1
TABLET, DELAYED RELEASE ORAL
Qty: 90 TABLET | Refills: 3 | Status: SHIPPED | OUTPATIENT
Start: 2023-05-08

## 2023-05-09 ENCOUNTER — TELEPHONE (OUTPATIENT)
Dept: ENDOSCOPY | Facility: HOSPITAL | Age: 73
End: 2023-05-09
Payer: MEDICARE

## 2023-05-09 NOTE — TELEPHONE ENCOUNTER
"2023  Bryan Love MD  to [facility]          12:40 PM   Procedure: Colonoscopy     Diagnosis: Personal history of colonic polyps. Family history of multiple 2nd degree relative with CRC.  Iron deficiency anemia     Procedure Timin-4 weeks     *If within 4 weeks selected, please lachelle as high priority*     *If greater than 12 weeks, please select "5-12 weeks" and delay sending until 2 months prior to requested date*     Provider: Myself     Location: [address]     Additional Scheduling Information:  Extended bowel prep for constipation     Prep Specifications:Extended/Constipation prep     Have you attached a patient to this message: yes   "

## 2023-05-09 NOTE — TELEPHONE ENCOUNTER
Called patient. No answer. Left message for patient to call Endoscopy Scheduling.  Main Endoscopy scheduling phone number provided.

## 2023-05-10 ENCOUNTER — TELEPHONE (OUTPATIENT)
Dept: ENDOSCOPY | Facility: HOSPITAL | Age: 73
End: 2023-05-10
Payer: MEDICARE

## 2023-05-10 DIAGNOSIS — D50.8 OTHER IRON DEFICIENCY ANEMIA: Primary | ICD-10-CM

## 2023-05-10 DIAGNOSIS — Z80.0 FAMILY HISTORY OF COLON CANCER: ICD-10-CM

## 2023-05-10 DIAGNOSIS — Z86.010 HX OF COLONIC POLYPS: ICD-10-CM

## 2023-05-10 NOTE — TELEPHONE ENCOUNTER
Endoscopy procedure scheduled,reviewed prep instructions.Pt verbalized understanding. Pt declined any earlier dates

## 2023-05-15 ENCOUNTER — PATIENT MESSAGE (OUTPATIENT)
Dept: PSYCHIATRY | Facility: CLINIC | Age: 73
End: 2023-05-15
Payer: MEDICARE

## 2023-06-09 ENCOUNTER — OFFICE VISIT (OUTPATIENT)
Dept: PSYCHIATRY | Facility: CLINIC | Age: 73
End: 2023-06-09
Payer: MEDICARE

## 2023-06-09 DIAGNOSIS — F40.298 SPECIFIC PHOBIA: ICD-10-CM

## 2023-06-09 DIAGNOSIS — F33.41 RECURRENT MAJOR DEPRESSIVE DISORDER, IN PARTIAL REMISSION: Primary | ICD-10-CM

## 2023-06-09 PROCEDURE — 1160F PR REVIEW ALL MEDS BY PRESCRIBER/CLIN PHARMACIST DOCUMENTED: ICD-10-PCS | Mod: CPTII,95,, | Performed by: PSYCHIATRY & NEUROLOGY

## 2023-06-09 PROCEDURE — 4010F ACE/ARB THERAPY RXD/TAKEN: CPT | Mod: CPTII,95,, | Performed by: PSYCHIATRY & NEUROLOGY

## 2023-06-09 PROCEDURE — 1159F PR MEDICATION LIST DOCUMENTED IN MEDICAL RECORD: ICD-10-PCS | Mod: CPTII,95,, | Performed by: PSYCHIATRY & NEUROLOGY

## 2023-06-09 PROCEDURE — 99214 OFFICE O/P EST MOD 30 MIN: CPT | Mod: 95,,, | Performed by: PSYCHIATRY & NEUROLOGY

## 2023-06-09 PROCEDURE — 1160F RVW MEDS BY RX/DR IN RCRD: CPT | Mod: CPTII,95,, | Performed by: PSYCHIATRY & NEUROLOGY

## 2023-06-09 PROCEDURE — 99214 PR OFFICE/OUTPT VISIT, EST, LEVL IV, 30-39 MIN: ICD-10-PCS | Mod: 95,,, | Performed by: PSYCHIATRY & NEUROLOGY

## 2023-06-09 PROCEDURE — 1159F MED LIST DOCD IN RCRD: CPT | Mod: CPTII,95,, | Performed by: PSYCHIATRY & NEUROLOGY

## 2023-06-09 PROCEDURE — 4010F PR ACE/ARB THEARPY RXD/TAKEN: ICD-10-PCS | Mod: CPTII,95,, | Performed by: PSYCHIATRY & NEUROLOGY

## 2023-06-09 RX ORDER — CHLORTHALIDONE 25 MG/1
TABLET ORAL
Qty: 23 TABLET | Refills: 3 | Status: SHIPPED | OUTPATIENT
Start: 2023-06-09 | End: 2024-01-09

## 2023-06-09 RX ORDER — DULOXETIN HYDROCHLORIDE 60 MG/1
60 CAPSULE, DELAYED RELEASE ORAL DAILY
Qty: 90 CAPSULE | Refills: 0 | Status: SHIPPED | OUTPATIENT
Start: 2023-06-09 | End: 2023-12-28 | Stop reason: SDUPTHER
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
## 2023-06-14 ENCOUNTER — PATIENT MESSAGE (OUTPATIENT)
Dept: PSYCHIATRY | Facility: CLINIC | Age: 73
End: 2023-06-14
Payer: MEDICARE

## 2023-06-19 DIAGNOSIS — I10 ESSENTIAL HYPERTENSION: ICD-10-CM

## 2023-06-20 RX ORDER — VALSARTAN 160 MG/1
320 TABLET ORAL DAILY
Qty: 180 TABLET | Refills: 0 | Status: SHIPPED | OUTPATIENT
Start: 2023-06-20 | End: 2023-09-08

## 2023-06-30 ENCOUNTER — PATIENT MESSAGE (OUTPATIENT)
Dept: PSYCHIATRY | Facility: CLINIC | Age: 73
End: 2023-06-30
Payer: MEDICARE

## 2023-07-05 NOTE — TELEPHONE ENCOUNTER
No, [facility] is individual only right now. The brochure might have some outdated information regarding groups.

## 2023-07-21 ENCOUNTER — ANESTHESIA (OUTPATIENT)
Dept: ENDOSCOPY | Facility: HOSPITAL | Age: 73
End: 2023-07-21
Payer: MEDICARE

## 2023-07-21 ENCOUNTER — ANESTHESIA EVENT (OUTPATIENT)
Dept: ENDOSCOPY | Facility: HOSPITAL | Age: 73
End: 2023-07-21
Payer: MEDICARE

## 2023-07-21 ENCOUNTER — HOSPITAL ENCOUNTER (OUTPATIENT)
Facility: HOSPITAL | Age: 73
Discharge: HOME | End: 2023-07-21
Attending: INTERNAL MEDICINE | Admitting: INTERNAL MEDICINE
Payer: MEDICARE

## 2023-07-21 VITALS
OXYGEN SATURATION: 100 % | HEART RATE: 62 BPM | DIASTOLIC BLOOD PRESSURE: 68 MMHG | TEMPERATURE: 98 F | RESPIRATION RATE: 20 BRPM | SYSTOLIC BLOOD PRESSURE: 124 MMHG

## 2023-07-21 DIAGNOSIS — D50.9 IRON DEFICIENCY ANEMIA: ICD-10-CM

## 2023-07-21 PROCEDURE — 25000003 PHARM REV CODE 250: Performed by: NURSE ANESTHETIST, CERTIFIED REGISTERED

## 2023-07-21 PROCEDURE — 45385 COLONOSCOPY W/LESION REMOVAL: CPT | Mod: ,,, | Performed by: INTERNAL MEDICINE

## 2023-07-21 PROCEDURE — 88305 TISSUE EXAM BY PATHOLOGIST: ICD-10-PCS | Mod: 26,,, | Performed by: PATHOLOGY

## 2023-07-21 PROCEDURE — 27201089 HC SNARE, DISP (ANY): Performed by: INTERNAL MEDICINE

## 2023-07-21 PROCEDURE — E9220 PRA ENDO ANESTHESIA: ICD-10-PCS | Mod: ,,, | Performed by: NURSE ANESTHETIST, CERTIFIED REGISTERED

## 2023-07-21 PROCEDURE — 45385 COLONOSCOPY W/LESION REMOVAL: CPT | Performed by: INTERNAL MEDICINE

## 2023-07-21 PROCEDURE — 88305 TISSUE EXAM BY PATHOLOGIST: CPT | Mod: 59 | Performed by: PATHOLOGY

## 2023-07-21 PROCEDURE — 25000003 PHARM REV CODE 250: Performed by: INTERNAL MEDICINE

## 2023-07-21 PROCEDURE — 37000009 HC ANESTHESIA EA ADD 15 MINS: Performed by: INTERNAL MEDICINE

## 2023-07-21 PROCEDURE — 37000008 HC ANESTHESIA 1ST 15 MINUTES: Performed by: INTERNAL MEDICINE

## 2023-07-21 PROCEDURE — 88305 TISSUE EXAM BY PATHOLOGIST: CPT | Mod: 26,,, | Performed by: PATHOLOGY

## 2023-07-21 PROCEDURE — E9220 PRA ENDO ANESTHESIA: HCPCS | Mod: ,,, | Performed by: NURSE ANESTHETIST, CERTIFIED REGISTERED

## 2023-07-21 PROCEDURE — 45385 PR COLONOSCOPY,REMV LESN,SNARE: ICD-10-PCS | Mod: ,,, | Performed by: INTERNAL MEDICINE

## 2023-07-21 PROCEDURE — 63600175 PHARM REV CODE 636 W HCPCS: Performed by: NURSE ANESTHETIST, CERTIFIED REGISTERED

## 2023-07-21 RX ORDER — LIDOCAINE HYDROCHLORIDE 20 MG/ML
INJECTION INTRAVENOUS
Status: DISCONTINUED | OUTPATIENT
Start: 2023-07-21 | End: 2023-07-21

## 2023-07-21 RX ORDER — PROPOFOL 10 MG/ML
VIAL (ML) INTRAVENOUS
Status: DISCONTINUED | OUTPATIENT
Start: 2023-07-21 | End: 2023-07-21

## 2023-07-21 RX ORDER — PROPOFOL 10 MG/ML
VIAL (ML) INTRAVENOUS CONTINUOUS PRN
Status: DISCONTINUED | OUTPATIENT
Start: 2023-07-21 | End: 2023-07-21

## 2023-07-21 RX ORDER — SODIUM CHLORIDE 9 MG/ML
INJECTION, SOLUTION INTRAVENOUS CONTINUOUS
Status: DISCONTINUED | OUTPATIENT
Start: 2023-07-21 | End: 2023-07-21 | Stop reason: HOSPADM

## 2023-07-21 RX ADMIN — PROPOFOL 125 MCG/KG/MIN: 10 INJECTION, EMULSION INTRAVENOUS at 08:07

## 2023-07-21 RX ADMIN — LIDOCAINE HYDROCHLORIDE 50 MG: 20 INJECTION INTRAVENOUS at 08:07

## 2023-07-21 RX ADMIN — PROPOFOL 50 MG: 10 INJECTION, EMULSION INTRAVENOUS at 08:07

## 2023-07-21 RX ADMIN — SODIUM CHLORIDE: 0.9 INJECTION, SOLUTION INTRAVENOUS at 08:07

## 2023-07-21 NOTE — H&P
[facility] 4th [facility]  History & Physical    Subjective:      Chief Complaint/Reason for Admission:       Colonoscopy for iron deficiency anemia and family historyof 2 SDR with colon cancer and history of colon polyp    Idalmis Morejon is a 72 y.o. female.    Past Medical History:   Diagnosis Date    Abnormal chest CT     Acid reflux     Allergy     Anemia     Anemia     Anxiety     Cataract     Chronic UTI     recently treated with antibiotics -x 3 wks. ago-    Colon adenoma 2015 due 2020 10/21/2015    Colon adenoma 2015 due 2020 10/21/2015    Depression     Disturbed sleep rhythm     DJD (degenerative joint disease)     Dry eyes     Dry mouth     Grief reaction 3/24/2014    Hx of migraine headaches     Hyperlipemia     Hypernatremia 8/8/2014    Hypertension     Iritis     Iritis     Mild vitamin D deficiency 9/9/2013    Multiple lung nodules on CT: 2533-4181 no f/u needed 6/6/2016    Neurogenic bladder     Osteopenia     in hips    Osteoporosis     spine    Osteoporosis: 9/15 see rheumatology notes 6/6/2016    Positive GEORGIANA (antinuclear antibody)     Schatzki's ring: 3/15 dilated 6/6/2016    Sciatica neuralgia 6/21/2013    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    Visual impairment      Past Surgical History:   Procedure Laterality Date    APPENDECTOMY      BACK SURGERY  2007    x4    CHOLECYSTECTOMY      lap orin    COLONOSCOPY N/A 10/2/2015    Procedure: COLONOSCOPY;  Surgeon: Bryan Love MD;  Location: [address]);  Service: Endoscopy;  Laterality: N/A;    COLONOSCOPY N/A 9/9/2019    Procedure: COLONOSCOPY;  Surgeon: Bryan Love MD;  Location: [address]);  Service: Endoscopy;  Laterality: N/A;    CYSTOSCOPY      with BOTOX INJECTION    ESOPHAGOGASTRODUODENOSCOPY N/A 5/13/2019    Procedure: EGD (ESOPHAGOGASTRODUODENOSCOPY);  Surgeon: Bryan Love MD;  Location: [facility] ([address] [place] [address]);  Service: Endoscopy;   Laterality: N/A;    ESOPHAGOGASTRODUODENOSCOPY N/A 9/9/2019    Procedure: EGD (ESOPHAGOGASTRODUODENOSCOPY);  Surgeon: Bryan Love MD;  Location: [facility] (4TH FLR);  Service: Endoscopy;  Laterality: N/A;    ESOPHAGOGASTRODUODENOSCOPY N/A 12/7/2022    Procedure: EGD (ESOPHAGOGASTRODUODENOSCOPY);  Surgeon: Bryan Love MD;  Location: [facility] (4TH FLR);  Service: Endoscopy;  Laterality: N/A;  Endoscopy schedulers please schedule patient as soon as possible with me for EGD for esophageal dilation for dysphagia.  Thank you     instr portal-GT  pre call complete-as    HERNIA REPAIR      umbilical    HYSTERECTOMY      COMPLETE    POSTERIOR FUSION LUMBAR SPINE W/ CORPECTOMY      SPINE SURGERY      TONSILLECTOMY, ADENOIDECTOMY      TRIGGER FINGER RELEASE Right 6/3/2022    Procedure: RELEASE, TRIGGER FINGER,RIGHT,LONG;  Surgeon: Noemy Hernandez MD;  Location: [facility];  Service: Orthopedics;  Laterality: Right;     Family History   Problem Relation Age of Onset    Kidney disease Mother     Hypertension Mother     Diabetes Father     Diabetes Brother     Kidney disease Brother     Heart disease Brother     Hyperlipidemia Sister     Hypertension Sister     Diverticulosis Sister     Hypertension Daughter     Colon cancer Maternal Aunt 70        stomach    Blindness Maternal Aunt     Cancer Maternal Aunt         stomach cancer    Cancer Maternal Uncle         colon    Colon cancer Maternal Uncle 70        two uncles    Glaucoma Neg Hx     Amblyopia Neg Hx     Macular degeneration Neg Hx     Retinal detachment Neg Hx     Anesthesia problems Neg Hx     Celiac disease Neg Hx     Cirrhosis Neg Hx     Crohn's disease Neg Hx     Esophageal cancer Neg Hx     Irritable bowel syndrome Neg Hx     Liver cancer Neg Hx     Liver disease Neg Hx     Rectal cancer Neg Hx     Stomach cancer Neg Hx     Melanoma Neg Hx      Social History     Tobacco Use    Smoking status: Never    Smokeless tobacco: Never   Substance Use Topics     Alcohol use: Yes     Comment: red wine occasionally    Drug use: No       PTA Medications   Medication Sig    amLODIPine (NORVASC) 10 MG tablet Take 0.5 tablets (5 mg total) by mouth 2 (two) times daily.    ascorbic acid (VITAMIN C ORAL) Take by mouth.    chlorthalidone (HYGROTEN) 25 MG Tab TAKE 1/2 TABLET BY MOUTH EVERY OTHER DAY    DULoxetine (CYMBALTA) 60 MG capsule Take 1 capsule (60 mg total) by mouth once daily.    mirabegron (MYRBETRIQ) 25 mg Tb24 ER tablet Take 1 tablet (25 mg total) by mouth once daily.    pantoprazole (PROTONIX) 40 MG tablet TAKE 1 TABLET BY MOUTH EVERY DAY BEFORE BREAKFAST    timolol maleate 0.5% (TIMOPTIC) 0.5 % Drop Place 1 drop into both eyes 2 (two) times daily.    valsartan (DIOVAN) 160 MG tablet TAKE 2 TABLETS (320 MG TOTAL) BY MOUTH ONCE DAILY.    vitamin D (VITAMIN D3) 1000 units Tab Take 1,000 Units by mouth once daily.    vitamin E acetate (VITAMIN E ORAL) Take by mouth.    B-complex with vitamin C (Z-BEC OR EQUIV) tablet Take 1 tablet by mouth once daily.    ferrous gluconate (FERGON) 324 MG tablet Take 1 tablet (324 mg total) by mouth daily with breakfast. (Patient not taking: Reported on 6/9/2023)     Review of patient's allergies indicates:   Allergen Reactions    Alendronate Nausea Only     And heartburn    Brimonidine Dermatitis     Follicular Conjunctivitis    Kenalog [triamcinolone acetonide] Hives        Review of Systems   Constitutional:  Negative for fever.   Respiratory:  Negative for shortness of breath.    Cardiovascular:  Negative for chest pain.   Gastrointestinal:  Positive for constipation. Negative for abdominal pain, blood in stool and melena.     Objective:      Vital Signs (Most Recent)  Temp: 98.2 °F (36.8 °C) (07/21/23 0807)  Pulse: 76 (07/21/23 0807)  Resp: 16 (07/21/23 0807)  BP: (!) 171/82 (07/21/23 0807)  SpO2: 100 % (07/21/23 0807)    Vital Signs Range (Last 24H):  Temp:  [98.2 °F (36.8 °C)]   Pulse:  [76]   Resp:  [16]   BP: (171)/(82)   SpO2:   [100 %]     Physical Exam  Constitutional:       Appearance: Normal appearance.   Cardiovascular:      Rate and Rhythm: Normal rate.   Pulmonary:      Effort: Pulmonary effort is normal.   Neurological:      Mental Status: She is alert and oriented to person, place, and time.           Assessment:      Colonoscopy for iron deficiency anemia and family historyof 2 SDR with colon cancer and history of colon polyp      Plan:      Colonoscopy for iron deficiency anemia and family historyof 2 SDR with colon cancer and history of colon polyp

## 2023-07-21 NOTE — ANESTHESIA PREPROCEDURE EVALUATION
07/21/2023  Idalmis Morejon is a 72 y.o., female.    Past Medical History:   Diagnosis Date    Abnormal chest CT     Acid reflux     Allergy     Anemia     Anemia     Anxiety     Cataract     Chronic UTI     recently treated with antibiotics -x 3 wks. ago-    Colon adenoma 2015 due 2020 10/21/2015    Colon adenoma 2015 due 2020 10/21/2015    Depression     Disturbed sleep rhythm     DJD (degenerative joint disease)     Dry eyes     Dry mouth     Grief reaction 3/24/2014    Hx of migraine headaches     Hyperlipemia     Hypernatremia 8/8/2014    Hypertension     Iritis     Iritis     Mild vitamin D deficiency 9/9/2013    Multiple lung nodules on CT: 4211-5765 no f/u needed 6/6/2016    Neurogenic bladder     Osteopenia     in hips    Osteoporosis     spine    Osteoporosis: 9/15 see rheumatology notes 6/6/2016    Positive GEORGIANA (antinuclear antibody)     Schatzki's ring: 3/15 dilated 6/6/2016    Sciatica neuralgia 6/21/2013    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    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    Visual impairment      Past Surgical History:   Procedure Laterality Date    APPENDECTOMY      BACK SURGERY  2007    x4    CHOLECYSTECTOMY      lap orin    COLONOSCOPY N/A 10/2/2015    Procedure: COLONOSCOPY;  Surgeon: Bryan Love MD;  Location: [address]);  Service: Endoscopy;  Laterality: N/A;    COLONOSCOPY N/A 9/9/2019    Procedure: COLONOSCOPY;  Surgeon: Bryan Love MD;  Location: [facility] ([address] [place] [address]);  Service: Endoscopy;  Laterality: N/A;    CYSTOSCOPY      with BOTOX INJECTION    ESOPHAGOGASTRODUODENOSCOPY N/A 5/13/2019    Procedure: EGD (ESOPHAGOGASTRODUODENOSCOPY);  Surgeon: Bryan Love MD;  Location: [address]);  Service: Endoscopy;  Laterality: N/A;    ESOPHAGOGASTRODUODENOSCOPY N/A 9/9/2019     Procedure: EGD (ESOPHAGOGASTRODUODENOSCOPY);  Surgeon: Bryan Love MD;  Location: [facility] (4TH FLR);  Service: Endoscopy;  Laterality: N/A;    ESOPHAGOGASTRODUODENOSCOPY N/A 12/7/2022    Procedure: EGD (ESOPHAGOGASTRODUODENOSCOPY);  Surgeon: Bryan Love MD;  Location: [facility] (4TH FLR);  Service: Endoscopy;  Laterality: N/A;  Endoscopy schedulers please schedule patient as soon as possible with me for EGD for esophageal dilation for dysphagia.  Thank you     instr portal-GT  pre call complete-as    HERNIA REPAIR      umbilical    HYSTERECTOMY      COMPLETE    POSTERIOR FUSION LUMBAR SPINE W/ CORPECTOMY      SPINE SURGERY      TONSILLECTOMY, ADENOIDECTOMY      TRIGGER FINGER RELEASE Right 6/3/2022    Procedure: RELEASE, TRIGGER FINGER,RIGHT,LONG;  Surgeon: Noemy Hernandez MD;  Location: [facility];  Service: Orthopedics;  Laterality: Right;       Stress Echo Which stress agent will be used? Treadmill Exercise; Color Flow Doppler? No    Result Date: 5/4/2023  · The test was stopped because the patient experienced shortness of   breath.  · There were no arrhythmias during stress.  · The patient's exercise capacity was below average.  · The ECG portion of this study is negative for myocardial ischemia.  · The left ventricle is normal in size with concentric remodeling and   normal systolic function.  · The estimated ejection fraction is 65%.  · Normal left ventricular diastolic function.  · Normal right ventricular size with normal right ventricular systolic   function.  · Normal central venous pressure (3 mmHg).  · The estimated PA systolic pressure is 27 mmHg.  · Trivial posterior pericardial effusion.  · The stress ECG and echo portions of this study are negative for   myocardial ischemia despite the 7/10 dyspnea reported.             Pre-op Assessment    I have reviewed the Patient Summary Reports.    I have reviewed the NPO Status.   I have reviewed the Medications.     Review of  Systems  Anesthesia Hx:  No problems with previous Anesthesia    Hematology/Oncology:  Hematology Normal   Oncology Normal     EENT/Dental:EENT/Dental Normal   Cardiovascular:   Hypertension    Pulmonary:  Pulmonary Normal    Renal/:   Chronic Renal Disease    Hepatic/GI:   GERD    Musculoskeletal:  Musculoskeletal Normal    Neurological:   Neuromuscular Disease,    Endocrine:  Endocrine Normal    Dermatological:  Skin Normal    Psych:   Psychiatric History          Physical Exam  General: Well nourished, Cooperative, Alert and Oriented    Airway:  Mallampati: II   Mouth Opening: Normal  TM Distance: Normal  Tongue: Normal  Neck ROM: Normal ROM    Dental:  Intact    Chest/Lungs:  Normal Respiratory Rate        Anesthesia Plan  Type of Anesthesia, risks & benefits discussed:    Anesthesia Type: Gen Natural Airway  Intra-op Monitoring Plan: Standard ASA Monitors  Post Op Pain Control Plan: multimodal analgesia  Induction:  IV  Informed Consent: Informed consent signed with the Patient and all parties understand the risks and agree with anesthesia plan.  All questions answered.   ASA Score: 2  Day of Surgery Review of History & Physical: H&P Update referred to the surgeon/provider.    Ready For Surgery From Anesthesia Perspective.     .

## 2023-07-21 NOTE — TRANSFER OF CARE
Anesthesia Transfer of Care Note    Patient: Idalmis Morejon    Procedure(s) Performed: Procedure(s) (LRB):  COLONOSCOPY (N/A)    Patient location: PACU    Anesthesia Type: general    Transport from OR: Transported from OR on room air with adequate spontaneous ventilation    Post pain: adequate analgesia    Post assessment: no apparent anesthetic complications    Post vital signs: stable    Level of consciousness: responds to stimulation    Nausea/Vomiting: no nausea/vomiting    Complications: none    Transfer of care protocol was followed      Last vitals:   Visit Vitals  BP (!) 171/82 (BP Location: Left arm, Patient Position: Lying)   Pulse 76   Temp 36.8 °C (98.2 °F) (Skin)   Resp 16   SpO2 100%   Breastfeeding No     
Discharged

## 2023-07-21 NOTE — PROVATION PATIENT INSTRUCTIONS
Discharge Summary/Instructions after an Endoscopic Procedure  Patient Name: Idalmis Morejon  Patient MRN: [id number]  Patient YOB: 1950 Friday, July 21, 2023  Bryan Love MD  Dear patient,  As a result of recent federal legislation (The Federal Cures Act), you may   receive lab or pathology results from your procedure in your [facility]   account before your physician is able to contact you. Your physician or   their representative will relay the results to you with their   recommendations at their soonest availability.  Thank you,  RESTRICTIONS:  During your procedure today, you received medications for sedation.  These   medications may affect your judgment, balance and coordination.  Therefore,   for 24 hours, you have the following restrictions:   - DO NOT drive a car, operate machinery, make legal/financial decisions,   sign important papers or drink alcohol.    ACTIVITY:  Today: no heavy lifting, straining or running due to procedural   sedation/anesthesia.  The following day: return to full activity including work.  DIET:  Eat and drink normally unless instructed otherwise.     TREATMENT FOR COMMON SIDE EFFECTS:  - Mild abdominal pain, nausea, belching, bloating or excessive gas:  rest,   eat lightly and use a heating pad.  - Sore Throat: treat with throat lozenges and/or gargle with warm salt   water.  - Because air was used during the procedure, expelling large amounts of air   from your rectum or belching is normal.  - If a bowel prep was taken, you may not have a bowel movement for 1-3 days.    This is normal.  SYMPTOMS TO WATCH FOR AND REPORT TO YOUR PHYSICIAN:  1. Abdominal pain or bloating, other than gas cramps.  2. Chest pain.  3. Back pain.  4. Signs of infection such as: chills or fever occurring within 24 hours   after the procedure.  5. Rectal bleeding, which would show as bright red, maroon, or black stools.   (A tablespoon of blood from the rectum is not serious, especially if    hemorrhoids are present.)  6. Vomiting.  7. Weakness or dizziness.  GO DIRECTLY TO THE NEAREST EMERGENCY ROOM IF YOU HAVE ANY OF THE FOLLOWING:      Difficulty breathing              Chills and/or fever over 101 F   Persistent vomiting and/or vomiting blood   Severe abdominal pain   Severe chest pain   Black, tarry stools   Bleeding- more than one tablespoon   Any other symptom or condition that you feel may need urgent attention  Your doctor recommends these additional instructions:  If any biopsies were taken, your doctors clinic will contact you in 1 to 2   weeks with any results.  - Discharge patient to home.   - Repeat colonoscopy in 5 years for surveillance of multiple polyps.   - Return to GI clinic at the next available appointment.   - Return to primary care physician.   - The findings and recommendations were discussed with the patient.  For questions, problems or results please call your physician - Bryan Love MD at Work:  [phone].  [facility], EMERGENCY ROOM PHONE NUMBER: [phone]  IF A COMPLICATION OR EMERGENCY SITUATION ARISES AND YOU ARE UNABLE TO REACH   YOUR PHYSICIAN - GO DIRECTLY TO THE EMERGENCY ROOM.  Bryna Love MD  7/21/2023 9:04:30 AM  This report has been verified and signed electronically.  Dear patient,  As a result of recent federal legislation (The Federal Cures Act), you may   receive lab or pathology results from your procedure in your [facility]   account before your physician is able to contact you. Your physician or   their representative will relay the results to you with their   recommendations at their soonest availability.  Thank you,  PROVATION

## 2023-07-21 NOTE — ANESTHESIA POSTPROCEDURE EVALUATION
Anesthesia Post Evaluation    Patient: Idalmis Morejon    Procedure(s) Performed: Procedure(s) (LRB):  COLONOSCOPY (N/A)    Final Anesthesia Type: general      Patient location during evaluation: GI PACU  Patient participation: Yes- Able to Participate  Level of consciousness: awake and alert  Post-procedure vital signs: reviewed and stable  Pain management: adequate  Airway patency: patent  ZENON mitigation strategies: Multimodal analgesia  PONV status at discharge: No PONV  Anesthetic complications: no      Cardiovascular status: stable  Respiratory status: unassisted and spontaneous ventilation  Hydration status: euvolemic  Follow-up not needed.          Vitals Value Taken Time   /68 07/21/23 0910   Temp 36.4 °C (97.5 °F) 07/21/23 0855   Pulse 62 07/21/23 0910   Resp 20 07/21/23 0910   SpO2 100 % 07/21/23 0910         Event Time   Out of Recovery 09:27:39         Pain/Brina Score: Brina Score: 10 (7/21/2023  9:12 AM)

## 2023-07-24 ENCOUNTER — OFFICE VISIT (OUTPATIENT)
Dept: OPHTHALMOLOGY | Facility: CLINIC | Age: 73
End: 2023-07-24
Payer: MEDICARE

## 2023-07-24 ENCOUNTER — HOSPITAL ENCOUNTER (OUTPATIENT)
Dept: RADIOLOGY | Facility: CLINIC | Age: 73
Discharge: HOME | End: 2023-07-24
Attending: NURSE PRACTITIONER
Payer: MEDICARE

## 2023-07-24 DIAGNOSIS — H40.63X0 STEROID-INDUCED GLAUCOMA OF BOTH EYES: ICD-10-CM

## 2023-07-24 DIAGNOSIS — T38.0X5A STEROID-INDUCED GLAUCOMA OF BOTH EYES: ICD-10-CM

## 2023-07-24 DIAGNOSIS — H40.013 OPEN ANGLE WITH BORDERLINE FINDINGS AND LOW GLAUCOMA RISK IN BOTH EYES: Primary | ICD-10-CM

## 2023-07-24 DIAGNOSIS — Z00.00 ENCOUNTER FOR PREVENTIVE HEALTH EXAMINATION: ICD-10-CM

## 2023-07-24 DIAGNOSIS — H11.132 PRIMARY ACQUIRED MELANOSIS OF CONJUNCTIVA OF LEFT EYE: ICD-10-CM

## 2023-07-24 DIAGNOSIS — D89.89 AUTOIMMUNE DISORDER: ICD-10-CM

## 2023-07-24 DIAGNOSIS — H25.13 NUCLEAR SCLEROSIS OF BOTH EYES: ICD-10-CM

## 2023-07-24 PROCEDURE — 77080 DXA BONE DENSITY AXIAL: CPT | Mod: TC

## 2023-07-24 PROCEDURE — 3288F PR FALLS RISK ASSESSMENT DOCUMENTED: ICD-10-PCS | Mod: CPTII,S$GLB,, | Performed by: OPHTHALMOLOGY

## 2023-07-24 PROCEDURE — 99999 PR PBB SHADOW E&M-EST. PATIENT-LVL III: CPT | Mod: PBBFAC,,, | Performed by: OPHTHALMOLOGY

## 2023-07-24 PROCEDURE — 1160F RVW MEDS BY RX/DR IN RCRD: CPT | Mod: CPTII,S$GLB,, | Performed by: OPHTHALMOLOGY

## 2023-07-24 PROCEDURE — 1126F AMNT PAIN NOTED NONE PRSNT: CPT | Mod: CPTII,S$GLB,, | Performed by: OPHTHALMOLOGY

## 2023-07-24 PROCEDURE — 1159F MED LIST DOCD IN RCRD: CPT | Mod: CPTII,S$GLB,, | Performed by: OPHTHALMOLOGY

## 2023-07-24 PROCEDURE — 77080 DXA BONE DENSITY AXIAL: CPT | Mod: 26,,, | Performed by: INTERNAL MEDICINE

## 2023-07-24 PROCEDURE — 77080 DXA BONE DENSITY AXIAL SKELETON 1 OR MORE SITES: ICD-10-PCS | Mod: 26,,, | Performed by: INTERNAL MEDICINE

## 2023-07-24 PROCEDURE — 1160F PR REVIEW ALL MEDS BY PRESCRIBER/CLIN PHARMACIST DOCUMENTED: ICD-10-PCS | Mod: CPTII,S$GLB,, | Performed by: OPHTHALMOLOGY

## 2023-07-24 PROCEDURE — 1159F PR MEDICATION LIST DOCUMENTED IN MEDICAL RECORD: ICD-10-PCS | Mod: CPTII,S$GLB,, | Performed by: OPHTHALMOLOGY

## 2023-07-24 PROCEDURE — 1101F PR PT FALLS ASSESS DOC 0-1 FALLS W/OUT INJ PAST YR: ICD-10-PCS | Mod: CPTII,S$GLB,, | Performed by: OPHTHALMOLOGY

## 2023-07-24 PROCEDURE — 4010F PR ACE/ARB THEARPY RXD/TAKEN: ICD-10-PCS | Mod: CPTII,S$GLB,, | Performed by: OPHTHALMOLOGY

## 2023-07-24 PROCEDURE — 1101F PT FALLS ASSESS-DOCD LE1/YR: CPT | Mod: CPTII,S$GLB,, | Performed by: OPHTHALMOLOGY

## 2023-07-24 PROCEDURE — 99214 PR OFFICE/OUTPT VISIT, EST, LEVL IV, 30-39 MIN: ICD-10-PCS | Mod: S$GLB,,, | Performed by: OPHTHALMOLOGY

## 2023-07-24 PROCEDURE — 99999 PR PBB SHADOW E&M-EST. PATIENT-LVL III: ICD-10-PCS | Mod: PBBFAC,,, | Performed by: OPHTHALMOLOGY

## 2023-07-24 PROCEDURE — 1126F PR PAIN SEVERITY QUANTIFIED, NO PAIN PRESENT: ICD-10-PCS | Mod: CPTII,S$GLB,, | Performed by: OPHTHALMOLOGY

## 2023-07-24 PROCEDURE — 92020 GONIOSCOPY: CPT | Mod: S$GLB,,, | Performed by: OPHTHALMOLOGY

## 2023-07-24 PROCEDURE — 4010F ACE/ARB THERAPY RXD/TAKEN: CPT | Mod: CPTII,S$GLB,, | Performed by: OPHTHALMOLOGY

## 2023-07-24 PROCEDURE — 99214 OFFICE O/P EST MOD 30 MIN: CPT | Mod: S$GLB,,, | Performed by: OPHTHALMOLOGY

## 2023-07-24 PROCEDURE — 92020 PR SPECIAL EYE EVAL,GONIOSCOPY: ICD-10-PCS | Mod: S$GLB,,, | Performed by: OPHTHALMOLOGY

## 2023-07-24 PROCEDURE — 3288F FALL RISK ASSESSMENT DOCD: CPT | Mod: CPTII,S$GLB,, | Performed by: OPHTHALMOLOGY

## 2023-07-25 LAB
FINAL PATHOLOGIC DIAGNOSIS: NORMAL
GROSS: NORMAL
Lab: NORMAL

## 2023-07-26 NOTE — PROGRESS NOTES
Assessment /Plan     For exam results, see Encounter Report.    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    Nuclear sclerosis of both eyes    Autoimmune disorder- recurrent iritis    Steroid-induced glaucoma of both eyes        Lost to fu 08/14/2019 --> 12/07/2020 --> 02/14/2023  Discussed fu glc silent LOV / Blindness      Steroid glaucoma  Steroid use and elevated IOP response --> discussed    Patient with Hx scleritis --> resolved  Long term PF 1% use without steroid response in past  Switched from PF 1% --> Durezol q day in April 2/2 cost and lack of coverage  Presents 10/5/2016  45 OU, clear corneas and quiet --> patient felt blur in OS x 3 weeks / no pain    Recurrent Scleritis OU  + GEORGIANA Lupus  Azathioprine -->  Not using    Off Oral Motrin 400 mg TWICE DAILY -> proteinuria     CCT  489 // 495    Low - mid teens -->  achieved --> discussed    Both eyes --> good adherence --> CSM  Timolol BID --> consider Cosopt BID  Combigan BID --> Alphagan Allergy --> resolved // Holding    NSC OU  CE PRN --> diff with reading and glare --> discussed CE IOL Options & Expectations & R & B  Patient to consider      HESHAM OS  Inf limbus  Flat without vessels      Dry Eye Syndrome: discussed use of warm compresses, preserved & non-preserved artificial tears, gel and PM ointment options.  Also discussed options utilizing medications.      Plan  RTC with NARDA Gore --> consider CE IOL OU  RTC 4 months IOP and Adherence --> HVF & OCT RNFL p CE IOL OU  RTC sooner prn with good understanding

## 2023-07-27 ENCOUNTER — TELEPHONE (OUTPATIENT)
Dept: INTERNAL MEDICINE | Facility: CLINIC | Age: 73
End: 2023-07-27
Payer: MEDICARE

## 2023-07-27 NOTE — TELEPHONE ENCOUNTER
Called and spoke with pt, gave her the information about the bone density scan. Pt states she will just follow up with doctor Keaton to discuss, appt set for next week.

## 2023-07-27 NOTE — TELEPHONE ENCOUNTER
----- Message from DANO Fischer sent at 7/27/2023  6:46 AM CDT -----  Can you please call this patient of Dr. Pugh.. (patient does not have an active portal).   I saw her in April of this year, did her Bone Density study this month...  No significant changes, and remains at risk for pathological fractures (Had a compression fx in the recent past..).  Seen by Martin Mccann, in Orthopedics, back in 2021 with consideration for starting 'bone builder agents'..  `See if she would like a referral back to Ortho to discuss the options once again, or follow up with Dr. Pugh and discuss with her at the next visit?    Thanks.

## 2023-08-01 ENCOUNTER — OFFICE VISIT (OUTPATIENT)
Dept: INTERNAL MEDICINE | Facility: CLINIC | Age: 73
End: 2023-08-01
Payer: MEDICARE

## 2023-08-01 DIAGNOSIS — M81.0 AGE-RELATED OSTEOPOROSIS WITHOUT CURRENT PATHOLOGICAL FRACTURE: Primary | ICD-10-CM

## 2023-08-01 DIAGNOSIS — E55.9 VITAMIN D DEFICIENCY DISEASE: ICD-10-CM

## 2023-08-01 DIAGNOSIS — R79.89 LOW VITAMIN B12 LEVEL: ICD-10-CM

## 2023-08-01 DIAGNOSIS — I10 ESSENTIAL HYPERTENSION: ICD-10-CM

## 2023-08-01 DIAGNOSIS — K22.2 SCHATZKI'S RING: ICD-10-CM

## 2023-08-01 DIAGNOSIS — K21.00 GASTROESOPHAGEAL REFLUX DISEASE WITH ESOPHAGITIS WITHOUT HEMORRHAGE: ICD-10-CM

## 2023-08-01 DIAGNOSIS — R53.83 FATIGUE, UNSPECIFIED TYPE: ICD-10-CM

## 2023-08-01 DIAGNOSIS — R79.89 LOW SERUM VITAMIN D: ICD-10-CM

## 2023-08-01 DIAGNOSIS — E78.2 MIXED HYPERLIPIDEMIA: ICD-10-CM

## 2023-08-01 DIAGNOSIS — R73.01 IFG (IMPAIRED FASTING GLUCOSE): ICD-10-CM

## 2023-08-01 PROCEDURE — 4010F PR ACE/ARB THEARPY RXD/TAKEN: ICD-10-PCS | Mod: CPTII,95,, | Performed by: INTERNAL MEDICINE

## 2023-08-01 PROCEDURE — 99213 PR OFFICE/OUTPT VISIT, EST, LEVL III, 20-29 MIN: ICD-10-PCS | Mod: 95,,, | Performed by: INTERNAL MEDICINE

## 2023-08-01 PROCEDURE — 4010F ACE/ARB THERAPY RXD/TAKEN: CPT | Mod: CPTII,95,, | Performed by: INTERNAL MEDICINE

## 2023-08-01 PROCEDURE — 99213 OFFICE O/P EST LOW 20 MIN: CPT | Mod: 95,,, | Performed by: INTERNAL MEDICINE

## 2023-08-01 NOTE — PROGRESS NOTES
Telemedicine Video Visit    The patient location is:  Patient Home   The chief complaint leading to consultation is: osteoporosis  Visit type: Virtual visit with synchronous audio and video  Total time spent with patient: 15 minutes  Each patient to whom he or she provides medical services by telemedicine is:  (1) informed of the relationship between the physician and patient and the respective role of any other health care provider with respect to management of the patient; and (2) notified that he or she may decline to receive medical services by telemedicine and may withdraw from such care at any time.     Recent bone density shows osteoporosis which looks slightly worse.  She is a candidate for treatment.  However, she has a history of GERD and Schatzki ring and did not tolerate alendronate prior.  She also did not tolerate Prolia before.  Given all of these issues, I am going to get her established in endocrinology for assistance with management.  Exercise and fall prevention were reviewed.  She tries to wear proper footwear.  She takes vitamin-D.    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    Colon adenoma: 10/15 acceptable 2019, repeat 2024    Age-related osteoporosis 2016; also 4/21, 7/23    Schatzki's ring: 3/15 dilated, also 12/22    Multiple lung nodules on CT: 0544-2027 no f/u needed    Steroid-induced glaucoma of both eyes     Proteinuria    Family history of colon cancer: maternal uncle and aunt    Recurrent major depressive disorder, in partial remission    Immunosuppression    Atherosclerosis of abdominal aorta: minimal see CT 7/16; CT SCORE 0 1/22    CKD (chronic kidney disease) stage 3, GFR 30-59 ml/min    Secondary hyperparathyroidism    Esophageal dysphagia    Chronic follicular conjunctivitis of both eyes    Undifferentiated connective tissue disease    Tortuous aorta; on CT 12/10 and CXR 7/20: CT SCORE 0 1/22    Open angle with borderline findings and low glaucoma risk in both eyes    History of compression fracture of spine    Decreased range of motion of finger of right hand    Decreased  strength of right hand      Review of Systems   HENT:  Negative for hearing loss.    Eyes:  Negative for discharge.   Respiratory:  Negative for wheezing.    Cardiovascular:  Negative for chest pain and palpitations.   Gastrointestinal:  Negative for blood in stool, constipation, diarrhea and vomiting.   Genitourinary:  Negative for dysuria and hematuria.   Musculoskeletal:  Negative for neck pain.   Neurological:  Negative for weakness and headaches.   Endo/Heme/Allergies:  Negative for polydipsia.      Physical Exam  Constitutional:       Appearance: She is well-developed.   HENT:      Head: Normocephalic and atraumatic.   Eyes:      Extraocular Movements: Extraocular movements intact.      Conjunctiva/sclera: Conjunctivae normal.   Neck:      Thyroid: No thyromegaly.   Pulmonary:      Effort: No respiratory distress.   Neurological:      General: No focal deficit present.      Mental Status: She is alert and oriented to person, place, and time.   Psychiatric:         Mood and Affect: Mood normal.         Behavior: Behavior normal.         Thought Content: Thought content normal.         Judgment: Judgment normal.        1. Age-related osteoporosis 2016; also 4/21, 7/23  -     Ambulatory referral/consult to Endocrinology; Future;  Expected date: 08/08/2023    2. Gastroesophageal reflux disease with esophagitis without hemorrhage    3. Schatzki's ring: 3/15 dilated, also 12/22    4. Essential hypertension  -     CBC Auto Differential; Future; Expected date: 08/01/2023  -     Comprehensive Metabolic Panel; Future; Expected date: 08/01/2023    5. Mixed hyperlipidemia  -     Lipid Panel; Future; Expected date: 08/01/2023    6. Fatigue, unspecified type  -     TSH; Future; Expected date: 08/01/2023    7. Low vitamin B12 level  -     Vitamin B12; Future; Expected date: 08/01/2023    8. Low serum vitamin D  -     Vitamin D; Future; Expected date: 08/01/2023    9. IFG (impaired fasting glucose)  -     Hemoglobin A1C; Future; Expected date: 08/01/2023    10. Vitamin D deficiency disease  -     Vitamin D; Future; Expected date: 08/01/2023       Schedule annual exam with me in the next several months, labs prior  Osteoporosis issues reviewed at length with patient  Schedule endocrinology appointment  Exercise, fall prevention reviewed      Answers submitted by the patient for this visit:  Review of Systems Questionnaire (Submitted on 7/31/2023)  activity change: No  unexpected weight change: No  rhinorrhea: No  trouble swallowing: No  visual disturbance: No  chest tightness: No  polyuria: No  difficulty urinating: No  menstrual problem: No  joint swelling: No  arthralgias: No  confusion: No  dysphoric mood: No

## 2023-08-01 NOTE — Clinical Note
Please call her to schedule her annual exam with Edie or me before the end of the year, labs prior, I ordered those today  She also needs an appointment with endocrinology for osteoporosis, referral is in thank you

## 2023-08-27 NOTE — PROGRESS NOTES
Idalmis your colonoscopy pathology was benign at least 1 of your colon polyps was an adenoma benign but a precancerous type of colon polyp but completely benign recommend you next surveillance colonoscopy in 5 years.    1. Cecum, polyp, biopsy:   Tubular adenoma.     2. Transverse colon, polyp, biopsy:   Colonic mucosa with no significant histopathologic abnormality.   Comment: Interp By Evan Odom M.D., Signed on 07/25/2023

## 2023-09-05 DIAGNOSIS — I10 ESSENTIAL HYPERTENSION: ICD-10-CM

## 2023-09-07 ENCOUNTER — PATIENT MESSAGE (OUTPATIENT)
Dept: INTERNAL MEDICINE | Facility: CLINIC | Age: 73
End: 2023-09-07
Payer: MEDICARE

## 2023-09-08 RX ORDER — VALSARTAN 160 MG/1
320 TABLET ORAL DAILY
Qty: 180 TABLET | Refills: 3 | Status: SHIPPED | OUTPATIENT
Start: 2023-09-08 | End: 2024-09-07

## 2023-09-08 NOTE — TELEPHONE ENCOUNTER
Not sure exactly what part of the arm she is having trouble with, I would recommend an urgent care visit so they can examine her and see what needs to be x-rayed, thank you

## 2023-09-09 ENCOUNTER — OFFICE VISIT (OUTPATIENT)
Dept: URGENT CARE | Facility: CLINIC | Age: 73
End: 2023-09-09
Payer: MEDICARE

## 2023-09-09 VITALS
BODY MASS INDEX: 24.59 KG/M2 | OXYGEN SATURATION: 97 % | WEIGHT: 153 LBS | RESPIRATION RATE: 16 BRPM | TEMPERATURE: 98 F | SYSTOLIC BLOOD PRESSURE: 134 MMHG | HEART RATE: 60 BPM | HEIGHT: 66 IN | DIASTOLIC BLOOD PRESSURE: 75 MMHG

## 2023-09-09 DIAGNOSIS — S46.811A STRAIN OF RIGHT TRAPEZIUS MUSCLE, INITIAL ENCOUNTER: Primary | ICD-10-CM

## 2023-09-09 PROCEDURE — 99213 OFFICE O/P EST LOW 20 MIN: CPT | Mod: S$GLB,,,

## 2023-09-09 PROCEDURE — 99213 PR OFFICE/OUTPT VISIT, EST, LEVL III, 20-29 MIN: ICD-10-PCS | Mod: S$GLB,,,

## 2023-09-09 RX ORDER — PREDNISONE 20 MG/1
20 TABLET ORAL DAILY
Qty: 4 TABLET | Refills: 0 | Status: CANCELLED | OUTPATIENT
Start: 2023-09-09 | End: 2023-09-13

## 2023-09-09 NOTE — PATIENT INSTRUCTIONS
- Warm compresses to the area 3-4 times a day for 20 minutes at a time.   - Passive range of motion exercises as discussed.   - A referral for Orthopedics has been placed. Call [phone] to schedule an appointment. Make sure to follow up in 1-2 weeks or sooner if symptoms continue or worsen.     - Rest.    - Drink plenty of fluids.    - Acetaminophen (tylenol) as directed as needed for fever/pain. Avoid tylenol if you have a history of liver disease.  - Tylenol dosing for adults: [By mouth route, immediate-release form] Dose: 325-1000 mg by mouth every 4-6h as needed; Max: 1 g/4h and 4 g/day from all sources. [By mouth route, extended-release form] Dose: 650-1300 mg Extended Release by mouth every 8h as needed; Max: 4 g/day from all sources.     - You must understand that you have received an Urgent Care treatment only and that you may be released before all of your medical problems are known or treated.   - You, the patient, will arrange for follow up care as instructed.   - If your condition worsens or fails to improve we recommend that you receive another evaluation at the ER immediately or contact your PCP to discuss your concerns or return here.   - Follow up with your PCP or specialty clinic as directed in the next 1-2 weeks if not improved or as needed.  You can call [phone] to schedule an appointment with the appropriate provider.    If your symptoms do not improve or worsen, go to the emergency room immediately.

## 2023-09-09 NOTE — PROGRESS NOTES
"Subjective:      Patient ID: Idalmis Morejon is a 73 y.o. female.    Vitals:  height is 5' 6" (1.676 m) and weight is 69.4 kg (153 lb). Her oral temperature is 98.1 °F (36.7 °C). Her blood pressure is 134/75 and her pulse is 60. Her respiration is 16 and oxygen saturation is 97%.     Chief Complaint: Shoulder Pain    This is a 73 y.o. female who presents today with a chief complaint of R shoulder pain with radiating pain from the R side of the neck down to her hand. It started 2 weeks ago. She states that she first noticed the pain when she was getting out of the car and pushing the car door open. Since then she has been having this sharp shooting pain. She states that sleeping on her right side at night makes the pain worse. Moving the right arm around makes it worse. She has been taking tylenol for the pain with relief. She denies any recent falls. She has been doing warm compresses to the area which does help.       Shoulder Pain   The pain is present in the right shoulder, right arm, right wrist, right hand and neck. This is a new problem. The current episode started in the past 7 days. There has been no history of extremity trauma. The problem occurs constantly. Quality: pulsating. The pain is at a severity of 8/10. The pain is moderate. Pertinent negatives include no fever, headaches, inability to bear weight, itching, joint locking, joint swelling, limited range of motion, numbness, stiffness, tingling or visual symptoms. The symptoms are aggravated by activity. She has tried acetaminophen for the symptoms. The treatment provided mild relief.     Constitution: Negative for fever.   Musculoskeletal:  Positive for pain. Negative for trauma, joint pain, joint swelling and abnormal ROM of joint.   Skin:  Negative for erythema.   Neurological:  Negative for headaches, disorientation, altered mental status and numbness.   Psychiatric/Behavioral:  Negative for altered mental status and disorientation.     "   Objective:     Physical Exam   Constitutional: She is oriented to person, place, and time.  Non-toxic appearance. She does not appear ill. No distress.      Comments:Patient sits comfortably in exam chair. Answers questions in complete sentences. Does not show any signs of distress or discoloration.        HENT:   Head: Normocephalic and atraumatic.   Ears:   Right Ear: External ear normal.   Left Ear: External ear normal.   Nose: Nose normal.   Eyes: Pupils are equal, round, and reactive to light. Extraocular movement intact   Pulmonary/Chest: Effort normal. No respiratory distress.   Abdominal: Normal appearance.   Musculoskeletal:         General: No swelling, tenderness or signs of injury.        Back:       Comments: No tenderness to palpation. Palpation of the right trapezius reveals a more firm muscle than the left side. Pain starts at the right trapezius muscle and radiates down the right arm following a C7 or C8 pattern. No pain to the ventral aspect of the arm. 5/5 upper extremity strength. Pain with abduction and internal rotation of the right shoulder. No bony tenderness. No bony step offs palpated. Full ROM of the neck. Pain with lateral bending to the right and turning the head to the left.    Neurological: no focal deficit. She is alert and oriented to person, place, and time.   Skin: Skin is warm, dry, not diaphoretic, not pale and no rash. Capillary refill takes less than 2 seconds. No bruising, No erythema and No lesion jaundice  Psychiatric: Her behavior is normal. Mood, judgment and thought content normal.       Assessment:     1. Strain of right trapezius muscle, initial encounter        Plan:       Strain of right trapezius muscle, initial encounter  -     Ambulatory referral/consult to Orthopedics                Patient Instructions   - Warm compresses to the area 3-4 times a day for 20 minutes at a time.   - Passive range of motion exercises as discussed.   - A referral for Orthopedics has  been placed. Call [phone] to schedule an appointment. Make sure to follow up in 1-2 weeks or sooner if symptoms continue or worsen.     - Rest.    - Drink plenty of fluids.    - Acetaminophen (tylenol) as directed as needed for fever/pain. Avoid tylenol if you have a history of liver disease.  - Tylenol dosing for adults: [By mouth route, immediate-release form] Dose: 325-1000 mg by mouth every 4-6h as needed; Max: 1 g/4h and 4 g/day from all sources. [By mouth route, extended-release form] Dose: 650-1300 mg Extended Release by mouth every 8h as needed; Max: 4 g/day from all sources.     - You must understand that you have received an Urgent Care treatment only and that you may be released before all of your medical problems are known or treated.   - You, the patient, will arrange for follow up care as instructed.   - If your condition worsens or fails to improve we recommend that you receive another evaluation at the ER immediately or contact your PCP to discuss your concerns or return here.   - Follow up with your PCP or specialty clinic as directed in the next 1-2 weeks if not improved or as needed.  You can call [phone] to schedule an appointment with the appropriate provider.    If your symptoms do not improve or worsen, go to the emergency room immediately.

## 2023-09-13 ENCOUNTER — HOSPITAL ENCOUNTER (OUTPATIENT)
Dept: RADIOLOGY | Facility: HOSPITAL | Age: 73
Discharge: HOME | End: 2023-09-13
Attending: PHYSICIAN ASSISTANT
Payer: MEDICARE

## 2023-09-13 ENCOUNTER — OFFICE VISIT (OUTPATIENT)
Dept: ORTHOPEDICS | Facility: CLINIC | Age: 73
End: 2023-09-13
Payer: MEDICARE

## 2023-09-13 VITALS — BODY MASS INDEX: 26.47 KG/M2 | HEIGHT: 66 IN | WEIGHT: 164.69 LBS

## 2023-09-13 DIAGNOSIS — M25.511 ACUTE PAIN OF RIGHT SHOULDER: Primary | ICD-10-CM

## 2023-09-13 DIAGNOSIS — M75.31 CALCIFIC TENDONITIS OF RIGHT SHOULDER REGION: ICD-10-CM

## 2023-09-13 DIAGNOSIS — R52 PAIN: ICD-10-CM

## 2023-09-13 DIAGNOSIS — M75.51 SUBACROMIAL BURSITIS OF RIGHT SHOULDER JOINT: ICD-10-CM

## 2023-09-13 PROCEDURE — 1160F PR REVIEW ALL MEDS BY PRESCRIBER/CLIN PHARMACIST DOCUMENTED: ICD-10-PCS | Mod: CPTII,S$GLB,, | Performed by: PHYSICIAN ASSISTANT

## 2023-09-13 PROCEDURE — 1159F MED LIST DOCD IN RCRD: CPT | Mod: CPTII,S$GLB,, | Performed by: PHYSICIAN ASSISTANT

## 2023-09-13 PROCEDURE — 1101F PR PT FALLS ASSESS DOC 0-1 FALLS W/OUT INJ PAST YR: ICD-10-PCS | Mod: CPTII,S$GLB,, | Performed by: PHYSICIAN ASSISTANT

## 2023-09-13 PROCEDURE — 99213 PR OFFICE/OUTPT VISIT, EST, LEVL III, 20-29 MIN: ICD-10-PCS | Mod: S$GLB,,, | Performed by: PHYSICIAN ASSISTANT

## 2023-09-13 PROCEDURE — 1125F PR PAIN SEVERITY QUANTIFIED, PAIN PRESENT: ICD-10-PCS | Mod: CPTII,S$GLB,, | Performed by: PHYSICIAN ASSISTANT

## 2023-09-13 PROCEDURE — 73030 X-RAY EXAM OF SHOULDER: CPT | Mod: 26,RT,, | Performed by: RADIOLOGY

## 2023-09-13 PROCEDURE — 1160F RVW MEDS BY RX/DR IN RCRD: CPT | Mod: CPTII,S$GLB,, | Performed by: PHYSICIAN ASSISTANT

## 2023-09-13 PROCEDURE — 99213 OFFICE O/P EST LOW 20 MIN: CPT | Mod: S$GLB,,, | Performed by: PHYSICIAN ASSISTANT

## 2023-09-13 PROCEDURE — 1125F AMNT PAIN NOTED PAIN PRSNT: CPT | Mod: CPTII,S$GLB,, | Performed by: PHYSICIAN ASSISTANT

## 2023-09-13 PROCEDURE — 4010F PR ACE/ARB THEARPY RXD/TAKEN: ICD-10-PCS | Mod: CPTII,S$GLB,, | Performed by: PHYSICIAN ASSISTANT

## 2023-09-13 PROCEDURE — 1159F PR MEDICATION LIST DOCUMENTED IN MEDICAL RECORD: ICD-10-PCS | Mod: CPTII,S$GLB,, | Performed by: PHYSICIAN ASSISTANT

## 2023-09-13 PROCEDURE — 73030 XR SHOULDER TRAUMA 3 VIEW RIGHT: ICD-10-PCS | Mod: 26,RT,, | Performed by: RADIOLOGY

## 2023-09-13 PROCEDURE — 99999 PR PBB SHADOW E&M-EST. PATIENT-LVL III: CPT | Mod: PBBFAC,,, | Performed by: PHYSICIAN ASSISTANT

## 2023-09-13 PROCEDURE — 3288F PR FALLS RISK ASSESSMENT DOCUMENTED: ICD-10-PCS | Mod: CPTII,S$GLB,, | Performed by: PHYSICIAN ASSISTANT

## 2023-09-13 PROCEDURE — 1101F PT FALLS ASSESS-DOCD LE1/YR: CPT | Mod: CPTII,S$GLB,, | Performed by: PHYSICIAN ASSISTANT

## 2023-09-13 PROCEDURE — 3288F FALL RISK ASSESSMENT DOCD: CPT | Mod: CPTII,S$GLB,, | Performed by: PHYSICIAN ASSISTANT

## 2023-09-13 PROCEDURE — 4010F ACE/ARB THERAPY RXD/TAKEN: CPT | Mod: CPTII,S$GLB,, | Performed by: PHYSICIAN ASSISTANT

## 2023-09-13 PROCEDURE — 99999 PR PBB SHADOW E&M-EST. PATIENT-LVL III: ICD-10-PCS | Mod: PBBFAC,,, | Performed by: PHYSICIAN ASSISTANT

## 2023-09-13 PROCEDURE — 73030 X-RAY EXAM OF SHOULDER: CPT | Mod: TC,RT

## 2023-09-13 RX ORDER — TIZANIDINE 2 MG/1
4 TABLET ORAL NIGHTLY PRN
Qty: 30 TABLET | Refills: 0 | Status: SHIPPED | OUTPATIENT
Start: 2023-09-13 | End: 2023-09-21

## 2023-09-13 RX ORDER — MELOXICAM 7.5 MG/1
7.5 TABLET ORAL DAILY
Qty: 14 TABLET | Refills: 0 | Status: SHIPPED | OUTPATIENT
Start: 2023-09-13 | End: 2023-09-26

## 2023-09-13 NOTE — PROGRESS NOTES
SUBJECTIVE:     Chief Complaint & History of Present Illness:  Idalmis Morejon is a 73 y.o. year old female who presents today with constant right arm pain that started 2 weeks ago.  She is Right hand dominant.  There is not a history of injury.  The pain is located in the  lateral and  upper arm aspect of the shoulder and radiates down to her wrist.  The pain is described as achy.  It is aggravated by any activity, especially reaching or lifting.  Previous treatments include rest which have provided minimal relief.  There is not a history of previous injury or surgery to the shoulder.      Review of patient's allergies indicates:   Allergen Reactions    Alendronate Nausea Only     And heartburn    Brimonidine Dermatitis     Follicular Conjunctivitis    Kenalog [triamcinolone acetonide] Hives         Current Outpatient Medications   Medication Sig Dispense Refill    amLODIPine (NORVASC) 10 MG tablet TAKE 1/2 OF A TABLET BY MOUTH TWICE DAILY 90 tablet 3    ascorbic acid (VITAMIN C ORAL) Take by mouth.      B-complex with vitamin C (Z-BEC OR EQUIV) tablet Take 1 tablet by mouth once daily.      chlorthalidone (HYGROTEN) 25 MG Tab TAKE 1/2 TABLET BY MOUTH EVERY OTHER DAY 23 tablet 3    DULoxetine (CYMBALTA) 60 MG capsule Take 1 capsule (60 mg total) by mouth once daily. 90 capsule 0    mirabegron (MYRBETRIQ) 25 mg Tb24 ER tablet Take 1 tablet (25 mg total) by mouth once daily. 90 tablet 3    pantoprazole (PROTONIX) 40 MG tablet TAKE 1 TABLET BY MOUTH EVERY DAY BEFORE BREAKFAST 90 tablet 3    timolol maleate 0.5% (TIMOPTIC) 0.5 % Drop Place 1 drop into both eyes 2 (two) times daily. 30 mL 4    valsartan (DIOVAN) 160 MG tablet Take 2 tablets (320 mg total) by mouth once daily. 180 tablet 3    vitamin D (VITAMIN D3) 1000 units Tab Take 1,000 Units by mouth once daily.      vitamin E acetate (VITAMIN E ORAL) Take by mouth.       No current facility-administered medications for this visit.       Past Medical History:    Diagnosis Date    Abnormal chest CT     Acid reflux     Allergy     Anemia     Anemia     Anxiety     Cataract     Chronic UTI     recently treated with antibiotics -x 3 wks. ago-    Colon adenoma 2015 due 2020 10/21/2015    Colon adenoma 2015 due 2020 10/21/2015    Depression     Disturbed sleep rhythm     DJD (degenerative joint disease)     Dry eyes     Dry mouth     Grief reaction 3/24/2014    Hx of migraine headaches     Hyperlipemia     Hypernatremia 8/8/2014    Hypertension     Iritis     Iritis     Mild vitamin D deficiency 9/9/2013    Multiple lung nodules on CT: 3221-9827 no f/u needed 6/6/2016    Neurogenic bladder     Osteopenia     in hips    Osteoporosis     spine    Osteoporosis: 9/15 see rheumatology notes 6/6/2016    Positive GEOR[place]IANA (antinuclear antibody)     Schatzki's ring: 3/15 dilated 6/6/2016    Sciatica neuralgia 6/21/2013    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    Visual impairment        Past Surgical History:   Procedure Laterality Date    APPENDECTOMY      BACK SURGERY  2007    x4    CHOLECYSTECTOMY      lap orin    COLONOSCOPY N/A 10/2/2015    Procedure: COLONOSCOPY;  Surgeon: Bryan Love MD;  Location: [facility] ([address] [place] [address]);  Service: Endoscopy;  Laterality: N/A;    COLONOSCOPY N/A 9/9/2019    Procedure: COLONOSCOPY;  Surgeon: Bryan Love MD;  [facility]  Service: Endoscopy;  Laterality: N/A;    COLONOSCOPY N/A 7/21/2023    Procedure: COLONOSCOPY;  Surgeon: Bryan Love MD;  [facility]  Service: Endoscopy;  Laterality: N/A;  extended miralax prep-instr portal-pt declined any earlier dates-tb-hx of colon ca in multiple 2nd degree relatives    CYSTOSCOPY      with BOTOX INJECTION    ESOPHAGOGASTRODUODENOSCOPY N/A 5/13/2019    Procedure: EGD (ESOPHAGOGASTRODUODENOSCOPY);  Surgeon: Bryan Love MD;  [facility]  Service: Endoscopy;  Laterality:  N/A;    ESOPHAGOGASTRODUODENOSCOPY N/A 9/9/2019    Procedure: EGD (ESOPHAGOGASTRODUODENOSCOPY);  Surgeon: Bryan Love MD;  Location: [facility] (4TH FLR);  Service: Endoscopy;  Laterality: N/A;    ESOPHAGOGASTRODUODENOSCOPY N/A 12/7/2022    Procedure: EGD (ESOPHAGOGASTRODUODENOSCOPY);  Surgeon: Bryan Love MD;  Location: [facility] (4TH FLR);  Service: Endoscopy;  Laterality: N/A;  Endoscopy schedulers please schedule patient as soon as possible with me for EGD for esophageal dilation for dysphagia.  Thank you     instr portal-GT  pre call complete-as    HERNIA REPAIR      umbilical    HYSTERECTOMY      COMPLETE    POSTERIOR FUSION LUMBAR SPINE W/ CORPECTOMY      SPINE SURGERY      TONSILLECTOMY, ADENOIDECTOMY      TRIGGER FINGER RELEASE Right 6/3/2022    Procedure: RELEASE, TRIGGER FINGER,RIGHT,LONG;  Surgeon: Noemy Hernandez MD;  Location: [facility];  Service: Orthopedics;  Laterality: Right;       Review of Systems:  ROS:  Constitutional: no fever or chills  Eyes: no visual changes  ENT: no nasal congestion or sore throat  Respiratory: no cough or shortness of breath  Musculoskeletal: no arthralgias or myalgias      OBJECTIVE:     PHYSICAL EXAM:    General: Weight: 74.7 kg (164 lb 10.9 oz) Body mass index is 26.59 kg/m².  Patient is alert, awake and oriented to time, place and person. Mood and affect are appropriate.  Patient does not appear to be in any distress, denies any constitutional symptoms and appears stated age.   HEENT: Pupils are equal and round, sclera are not injected. External examination of ears and nose reveals no abnormalities. Cranial nerves II-X are grossly intact  Neck: examination demonstrates painless active range of motion. Spurling's sign is negative  Skin: no rashes, abrasions or open wounds on the affected extremity   Resp: No respiratory distress or audible wheezing   Psych:  normal mood and behavior  CV: 2+  pulses, all extremities warm and well perfused   Right Shoulder    Skin intact  No effusion or warmth  Tenderness: globally  Range of motion is painful   ROM: limited due to pain  Shoulder Strength: not tested  positive for impingement sign, sensory exam normal, and motor exam normal  Stability tests: normal  Special Tests:    Crossbody test: negative    Neer's positive  Hawkin's positive    Becky's positive  Drop arm negative    IMAGING:  X-rays of the right shoulder, personally reviewed by me, demonstrate calcification near proximal humerus.  No fracture or dislocation.     ASSESSMENT     1. Acute pain of right shoulder    2. Subacromial bursitis of right shoulder joint    3. Calcific tendonitis of right shoulder region      PLAN:     Discussed with the patient at length all the different treatment options available for her right shoulder including anti-inflammatories, acetaminophen, rest, ice, Physical therapy, occasional cortisone injections for temporary relief, and shoulder arthroscopy    - Conservative options limited as she reports allergy to kenalog- not sure if she has ever had another type of steroid in the past so we will avoid at this time.  Needs to limit NSAIDS due to CKD- will try only 7 day course of meloxicam then advised to stop.  Recommend rest, ice.  Can consider PT once pain starts to improve.  Muscle relaxant to take at night  - Follow up if symptoms worsen or fail to improve

## 2023-09-14 NOTE — PROGRESS NOTES
Subjective:      Patient ID: Idalmis Morejon is a 73 y.o. female.    Chief Complaint:  Osteoporosis    History of Present Illness  Idalmis Morejon presents today for initial evaluation & management of osteoporosis. This is her first visit with me.     The patient location is: at home  The chief complaint leading to consultation is: osteoporosis    Visit type: audiovisual    Face to Face time with patient: 18 minutes  30 minutes of total time spent on the encounter, which includes face to face time and non-face to face time preparing to see the patient (eg, review of tests), Obtaining and/or reviewing separately obtained history, Documenting clinical information in the electronic or other health record, Independently interpreting results (not separately reported) and communicating results to the patient/family/caregiver, or Care coordination (not separately reported).    Each patient to whom he or she provides medical services by telemedicine is:  (1) informed of the relationship between the physician and patient and the respective role of any other health care provider with respect to management of the patient; and (2) notified that he or she may decline to receive medical services by telemedicine and may withdraw from such care at any time.    With regards to osteoporosis:     Family history: denies     Menopause at age 40s    current medication: Prolia   Start date: 1 dose in 2016, 2018, 2019  No follow up. Does not know why stopped     Calcium intake-no supplementation; some calcium in diet.  Vit D intake- 2000 IU daily     Weight bearing exercise- mall walking once week  Recent falls-denies     They have made fall precautions in house   Dental work-denies     s/p 4 lumbar surgeries from [id number]   Saw  Dr Pineda in 2021 for L1 compression fracture- does not know how happened.    No significant height loss (>2 inches)    tob use- denies   etoh use-denies       No current diarrhea or h/o malabsorption    Denies  chronic exposure to steroid therapy, anticoagulants, or antiseizure medications.   + proton pump inhibitors     + GERD, indigestion,   Denies gastric bypass surgery.     Denies history of thyroid disease, kidney stones   + kidney disease and anemia,     Denies active malignancy, history of malignancy the involved the bone, prior radiation treatment, or unexplained elevations of alk phos on labs     Last BMD dated  7/24/2023  COMPARISON:  Comparison study dated 04/26/2021     FINDINGS:  Lumbar spine (L1, L2):              T-score is -0.9, and Z-score is +0.7.     L3/L4 were excluded due to presence of surgical hardware.  Prior compression fracture at L1 elevates BMD when compared to L2.     Compared with previous DXA, BMD at the lumbar spine has increased by 4.8%.     Femoral neck:                          T-score is -1.2, and Z-score is -0.1.     Total hip:                                T-score is -1.0, and Z-score is 0.0.     Compared with previous DXA, BMD at the total hip has increased by 4.6%.     Distal 1/3 radius:                      T-score is -1.2, and Z-score is +1.3.   Compared with previous DXA, BMD at the distal 1/3 radius has remained stable.     Fracture Risk (FRAX)     7.0% risk of a major osteoporotic fracture in the next 10 years.     1.0% risk of hip fracture in the next 10 years.     Impression:     *Osteoporosis based on history of lumbar spine fracture  *Fracture risk is high.     RECOMMENDATIONS:  *Daily calcium intake 0777-6962 mg, dietary sources preferred; Vitamin D 9490-7116 IU daily.  *Weight bearing exercise and fall precautions.  *Recommend medical therapy for osteoporosis with high risk for fracture. Consider bisphosphonates (alendronate, risedronate, zoledronic acid), or denosumab.  *Repeat BMD in 2 years.     Latest Reference Range & Units 02/26/21 10:55 11/30/21 07:45   Vit D, 25-Hydroxy 30 - 96 ng/mL 42 40       Review of Systems   Musculoskeletal:  Negative for arthralgias, back  pain and gait problem.     Objective:   Physical Exam  Neurological:      Mental Status: She is alert.         There were no vitals taken for this visit.     Lab Review:   Lab Results   Component Value Date    HGBA1C 5.4 10/25/2022    HGBA1C 5.0 07/18/2006    HGBA1C 5.2 01/26/2006      Lab Results   Component Value Date    CHOL 179 10/25/2022    HDL 57 10/25/2022    LDLCALC 98.2 10/25/2022    TRIG 119 10/25/2022    CHOLHDL 31.8 10/25/2022     Lab Results   Component Value Date     04/25/2023    K 4.7 04/25/2023     04/25/2023    CO2 26 04/25/2023    GLU 95 04/25/2023    BUN 20 04/25/2023    CREATININE 1.2 04/25/2023    CALCIUM 10.0 04/25/2023    PROT 7.7 04/25/2023    ALBUMIN 3.9 04/25/2023    BILITOT 0.7 04/25/2023    ALKPHOS 95 04/25/2023    AST 24 04/25/2023    ALT 14 04/25/2023    ANIONGAP 9 04/25/2023    ESTGFRAFRICA >60.0 11/30/2021    EGFRNONAA 56.0 (A) 11/30/2021    TSH 2.204 04/25/2023     Vit D, 25-Hydroxy   Date Value Ref Range Status   11/30/2021 40 30 - 96 ng/mL Final     Comment:     Vitamin D deficiency.........<10 ng/mL                              Vitamin D insufficiency......10-29 ng/mL       Vitamin D sufficiency........> or equal to 30 ng/mL  Vitamin D toxicity............>100 ng/mL       Assessment and Plan     1. Age-related osteoporosis 2016; also 4/21, 7/23  Ambulatory referral/consult to Endocrinology    Vitamin D    Tissue Transglutaminase, IgA    Renal Function Panel        Age-related osteoporosis 2016; also 4/21, 7/23  -- Risks include menopause, PPI therapy  She fractured L1 from unknown cause in 2021    Discussed osteoporosis. I would like to avoid oral medications- due to her history of GERD.  Would like to avoid oral bisphosphonate's due to CKD with varying GFR. I recommend treatment for her her bones with Prolia. Will order and check labs.    -- Reviewed basics of quantity versus quality  -- Reassuring she is not fracturing     -- RDA of calcium (7856-4678 mg /day in  divided doses) and vitamin D 2000iu a day  discussed  -- Calcium from food sources preferred. Given list of calcium in foods.  -- Fall precautions emphasized  -- +weight bearing exercise  -- [employer] website information given  -- Pharmacological therapy is recommended for patients with osteopenia if the 10 year probability of a hip fracture is >3% and 10 year probability of other major osteoporotic fractures is >20%.   -- Treatment options and potential side effects discussed for PO bisphosphonates, reclast, Denosumab, and Teriparatide.  -- Low risk for ONJ and atypical fractures reviewed and discussed. Alerted that if dental work needs to be done it should be done prior to initiating therapy   -- Discussed optimal duration of bisphosphonate use is unknown - drug holiday after 5-10 po versus drug hold ay after 3 reclast treatment   -- Repeat DEXA scan in 2 yrs time.     Follow up in about 1 year (around 9/18/2024).

## 2023-09-18 ENCOUNTER — PATIENT MESSAGE (OUTPATIENT)
Dept: ENDOCRINOLOGY | Facility: CLINIC | Age: 73
End: 2023-09-18

## 2023-09-18 ENCOUNTER — OFFICE VISIT (OUTPATIENT)
Dept: ENDOCRINOLOGY | Facility: CLINIC | Age: 73
End: 2023-09-18
Payer: MEDICARE

## 2023-09-18 DIAGNOSIS — M81.0 AGE-RELATED OSTEOPOROSIS WITHOUT CURRENT PATHOLOGICAL FRACTURE: ICD-10-CM

## 2023-09-18 PROCEDURE — 99204 OFFICE O/P NEW MOD 45 MIN: CPT | Mod: 95,,, | Performed by: NURSE PRACTITIONER

## 2023-09-18 PROCEDURE — 1101F PR PT FALLS ASSESS DOC 0-1 FALLS W/OUT INJ PAST YR: ICD-10-PCS | Mod: CPTII,95,, | Performed by: NURSE PRACTITIONER

## 2023-09-18 PROCEDURE — 4010F ACE/ARB THERAPY RXD/TAKEN: CPT | Mod: CPTII,95,, | Performed by: NURSE PRACTITIONER

## 2023-09-18 PROCEDURE — 1159F PR MEDICATION LIST DOCUMENTED IN MEDICAL RECORD: ICD-10-PCS | Mod: CPTII,95,, | Performed by: NURSE PRACTITIONER

## 2023-09-18 PROCEDURE — 3288F FALL RISK ASSESSMENT DOCD: CPT | Mod: CPTII,95,, | Performed by: NURSE PRACTITIONER

## 2023-09-18 PROCEDURE — 1160F RVW MEDS BY RX/DR IN RCRD: CPT | Mod: CPTII,95,, | Performed by: NURSE PRACTITIONER

## 2023-09-18 PROCEDURE — 99204 PR OFFICE/OUTPT VISIT, NEW, LEVL IV, 45-59 MIN: ICD-10-PCS | Mod: 95,,, | Performed by: NURSE PRACTITIONER

## 2023-09-18 PROCEDURE — 1101F PT FALLS ASSESS-DOCD LE1/YR: CPT | Mod: CPTII,95,, | Performed by: NURSE PRACTITIONER

## 2023-09-18 PROCEDURE — 4010F PR ACE/ARB THEARPY RXD/TAKEN: ICD-10-PCS | Mod: CPTII,95,, | Performed by: NURSE PRACTITIONER

## 2023-09-18 PROCEDURE — 3288F PR FALLS RISK ASSESSMENT DOCUMENTED: ICD-10-PCS | Mod: CPTII,95,, | Performed by: NURSE PRACTITIONER

## 2023-09-18 PROCEDURE — 1160F PR REVIEW ALL MEDS BY PRESCRIBER/CLIN PHARMACIST DOCUMENTED: ICD-10-PCS | Mod: CPTII,95,, | Performed by: NURSE PRACTITIONER

## 2023-09-18 PROCEDURE — 1159F MED LIST DOCD IN RCRD: CPT | Mod: CPTII,95,, | Performed by: NURSE PRACTITIONER

## 2023-09-18 NOTE — ASSESSMENT & PLAN NOTE
-- Risks include menopause, PPI therapy  She fractured L1 from unknown cause in 2021    Discussed osteoporosis. I would like to avoid oral medications- due to her history of GERD.  Would like to avoid oral bisphosphonate's due to CKD with varying GFR. I recommend treatment for her her bones with Prolia. Will order and check labs.    -- Reviewed basics of quantity versus quality  -- Reassuring she is not fracturing     -- RDA of calcium (6594-5528 mg /day in divided doses) and vitamin D 2000iu a day  discussed  -- Calcium from food sources preferred. Given list of calcium in foods.  -- Fall precautions emphasized  -- +weight bearing exercise  -- NOF.org website information given  -- Pharmacological therapy is recommended for patients with osteopenia if the 10 year probability of a hip fracture is >3% and 10 year probability of other major osteoporotic fractures is >20%.   -- Treatment options and potential side effects discussed for PO bisphosphonates, reclast, Denosumab, and Teriparatide.  -- Low risk for ONJ and atypical fractures reviewed and discussed. Alerted that if dental work needs to be done it should be done prior to initiating therapy   -- Discussed optimal duration of bisphosphonate use is unknown - drug holiday after 5-10 po versus drug hold ay after 3 reclast treatment   -- Repeat DEXA scan in 2 yrs time.

## 2023-09-18 NOTE — PATIENT INSTRUCTIONS
Discussed osteoporosis. I would like to avoid oral medications- due to her history of GERD.  Would like to avoid oral bisphosphonate's due to CKD with varying GFR. I recommend treatment for her her bones with Prolia. Will order and check labs.    Side effects of Prolia reviewed, including risk of hypocalcemia, eczema/skin complications and possible increased risk of infections.  Also reviewed association with ONJ and rare atypical femur fractures.  Advise to see the dentist regularly, notify dentist of using this medication and avoid non-emergent dental implants and extractions.  Also advise to contact us if develops new, persistent thigh or groin pain.    Discussed ongoing concern and accumulating data describing rebound-associated vertebral fractures (RAVFs) after denosumab discontinuation.  We now know that discontinuation of denosumab is associated with up to 50% reduction in BMD that is only partially blocked by Reclast.  Current recommendations are to either continue indefinitely or to switch to oral bisphosphonate for at least a year.    Recommended daily allowance of calcium is 8120-3627 mg daily, preferably from food. See below  Recommended daily allowance of vitamin D is 3094-5232 IU daily    Encouraged weight bearing exercise  Encouraged to avoid falls  Avoid alcohol and tobacco    Estimated Calcium Content of Foods:  Produce  Serving Size Estimated Calcium*    [place]rina greens, frozen 8 oz 360 mg   Broccoli ruperto 8 oz 200 mg   Kale, frozen 8 oz 180 mg   Soy Beans, green, boiled 8 oz 175 mg   Bok Jess, cooked, boiled 8 oz 160 mg   Figs, dried 2 figs 65 mg   Broccoli, fresh, cooked 8 oz 60 mg   Oranges 1 whole 55 mg   Seafood Serving Size Estimated Calcium*    Sardines, canned with bones 3 oz 325 mg   [place], canned with bones 3 oz 180 mg   Shrimp, canned 3 oz 125 mg   Dairy Serving Size Estimated Calcium*    Ricotta, part-skim 4 oz 335 mg   Yogurt, plain, low-fat 6 oz 310 mg   Milk, skim, low-fat, whole 8  "oz 300 mg   Yogurt with fruit, low-fat 6 oz 260 mg   Mozzarella, part-skim 1 oz 210 mg   Cheddar 1 oz 205 mg   Yogurt, Greek 6 oz 200 mg   American Cheese 1 oz 195 mg   Feta Cheese 4 oz 140 mg   Cottage Cheese, 2% 4 oz 105 mg   Frozen yogurt, vanilla 8 oz 105 mg   Ice Cream, vanilla 8 oz 85 mg   Parmesan 1 tbsp 55 mg   Fortified Food Serving Size Estimated Calcium*   [place] milk, rice milk or soy milk, fortified 8 oz 300 mg   Orange juice and other fruit juices, fortified 8 oz 300 mg   Tofu, prepared with calcium 4 oz 205 mg   Waffle, frozen, fortified 2 pieces 200 mg   Oatmeal, fortified 1 packet 140 mg   English muffin, fortified 1 muffin 100 mg   Cereal, fortified 8 oz 100-1,000 mg   Other Serving Size Estimated Calcium*   Mac & cheese, frozen 1 package 325 mg   Pizza, cheese, frozen 1 serving 115 mg   Pudding, chocolate, prepared with 2% milk 4 oz 160 mg   Beans, baked, canned 4 oz 160 mg   *The calcium content listed for most foods is estimated and can vary due to multiple factors. Check the food label to determine how much calcium is in a particular product.  If you read the nutrition label for a food source, it lists the % calcium in that food.  For an 8 oz glass of milk, for example, the label states calcium 30%.  This is equivalent to 300 mg of calcium (multiply the listed number by 10).   **Table from the National Osteoporosis Foundation      Osteoporosis medications  These are the medications we discussed for osteoporosis treatment:    - Bisphosphonates:         - these slow down bone loss         - oral forms (Fosamax and Actonel are examples) - taken once weekly or once monthly         - IV form (Reclast) - given intravenously once yearly    - Prolia (denosumab):          - slows down bone loss           - it is a subcutaneous injection (under the skin) every 6 months, given in the office    - Forteo (teriparatide) or Tymlos (abaloparatide):           - medications that "build bone" or increases bone " "formation           - subcutaneous injection (under the skin) taken once daily that you self-administer at home for 18-24 months    -Evenity (romosozumab)   -medications that "build bone" or increases bone formation   - subcutaneous injection (under the skin) taken once monthly for one year    For more information on medications: https://www.nof.org/patients/treatment/medicationadherence/    "

## 2023-09-21 DIAGNOSIS — N18.31 STAGE 3A CHRONIC KIDNEY DISEASE: Primary | ICD-10-CM

## 2023-09-25 ENCOUNTER — LAB VISIT (OUTPATIENT)
Dept: LAB | Facility: HOSPITAL | Age: 73
End: 2023-09-25
Attending: NURSE PRACTITIONER
Payer: MEDICARE

## 2023-09-25 DIAGNOSIS — N18.31 STAGE 3A CHRONIC KIDNEY DISEASE: ICD-10-CM

## 2023-09-25 DIAGNOSIS — M81.0 AGE-RELATED OSTEOPOROSIS WITHOUT CURRENT PATHOLOGICAL FRACTURE: ICD-10-CM

## 2023-09-25 LAB
25(OH)D3+25(OH)D2 SERPL-MCNC: 67 NG/ML (ref 30–96)
ALBUMIN SERPL BCP-MCNC: 3.9 G/DL (ref 3.5–5.2)
ANION GAP SERPL CALC-SCNC: 9 MMOL/L (ref 8–16)
BASOPHILS # BLD AUTO: 0.03 K/UL (ref 0–0.2)
BASOPHILS NFR BLD: 0.4 % (ref 0–1.9)
BUN SERPL-MCNC: 18 MG/DL (ref 8–23)
CALCIUM SERPL-MCNC: 10.1 MG/DL (ref 8.7–10.5)
CHLORIDE SERPL-SCNC: 109 MMOL/L (ref 95–110)
CO2 SERPL-SCNC: 26 MMOL/L (ref 23–29)
CREAT SERPL-MCNC: 1.4 MG/DL (ref 0.5–1.4)
DIFFERENTIAL METHOD: ABNORMAL
EOSINOPHIL # BLD AUTO: 0.1 K/UL (ref 0–0.5)
EOSINOPHIL NFR BLD: 1.7 % (ref 0–8)
ERYTHROCYTE [DISTWIDTH] IN BLOOD BY AUTOMATED COUNT: 15 % (ref 11.5–14.5)
EST. GFR  (NO RACE VARIABLE): 39.2 ML/MIN/1.73 M^2
GLUCOSE SERPL-MCNC: 88 MG/DL (ref 70–110)
HCT VFR BLD AUTO: 35.3 % (ref 37–48.5)
HGB BLD-MCNC: 11.4 G/DL (ref 12–16)
IMM GRANULOCYTES # BLD AUTO: 0.02 K/UL (ref 0–0.04)
IMM GRANULOCYTES NFR BLD AUTO: 0.3 % (ref 0–0.5)
LYMPHOCYTES # BLD AUTO: 2.3 K/UL (ref 1–4.8)
LYMPHOCYTES NFR BLD: 30.3 % (ref 18–48)
MCH RBC QN AUTO: 27.3 PG (ref 27–31)
MCHC RBC AUTO-ENTMCNC: 32.3 G/DL (ref 32–36)
MCV RBC AUTO: 84 FL (ref 82–98)
MONOCYTES # BLD AUTO: 0.6 K/UL (ref 0.3–1)
MONOCYTES NFR BLD: 7.8 % (ref 4–15)
NEUTROPHILS # BLD AUTO: 4.5 K/UL (ref 1.8–7.7)
NEUTROPHILS NFR BLD: 59.5 % (ref 38–73)
NRBC BLD-RTO: 0 /100 WBC
PHOSPHATE SERPL-MCNC: 3.2 MG/DL (ref 2.7–4.5)
PLATELET # BLD AUTO: 321 K/UL (ref 150–450)
PMV BLD AUTO: 12.1 FL (ref 9.2–12.9)
POTASSIUM SERPL-SCNC: 4.1 MMOL/L (ref 3.5–5.1)
PTH-INTACT SERPL-MCNC: 148.6 PG/ML (ref 9–77)
RBC # BLD AUTO: 4.18 M/UL (ref 4–5.4)
SODIUM SERPL-SCNC: 144 MMOL/L (ref 136–145)
WBC # BLD AUTO: 7.55 K/UL (ref 3.9–12.7)

## 2023-09-25 PROCEDURE — 36415 COLL VENOUS BLD VENIPUNCTURE: CPT | Performed by: NURSE PRACTITIONER

## 2023-09-25 PROCEDURE — 85025 COMPLETE CBC W/AUTO DIFF WBC: CPT | Performed by: NURSE PRACTITIONER

## 2023-09-25 PROCEDURE — 80069 RENAL FUNCTION PANEL: CPT | Performed by: NURSE PRACTITIONER

## 2023-09-25 PROCEDURE — 82306 VITAMIN D 25 HYDROXY: CPT | Performed by: NURSE PRACTITIONER

## 2023-09-25 PROCEDURE — 86364 TISS TRNSGLTMNASE EA IG CLAS: CPT | Performed by: NURSE PRACTITIONER

## 2023-09-25 PROCEDURE — 83970 ASSAY OF PARATHORMONE: CPT | Performed by: NURSE PRACTITIONER

## 2023-09-26 ENCOUNTER — OFFICE VISIT (OUTPATIENT)
Dept: NEPHROLOGY | Facility: CLINIC | Age: 73
End: 2023-09-26
Payer: MEDICARE

## 2023-09-26 DIAGNOSIS — R82.71 ASYMPTOMATIC BACTERIURIA: ICD-10-CM

## 2023-09-26 DIAGNOSIS — N18.30 STAGE 3 CHRONIC KIDNEY DISEASE, UNSPECIFIED WHETHER STAGE 3A OR 3B CKD: Primary | ICD-10-CM

## 2023-09-26 DIAGNOSIS — D64.9 ANEMIA, UNSPECIFIED TYPE: ICD-10-CM

## 2023-09-26 DIAGNOSIS — N25.81 SECONDARY HYPERPARATHYROIDISM: ICD-10-CM

## 2023-09-26 DIAGNOSIS — I10 HYPERTENSION, UNSPECIFIED TYPE: ICD-10-CM

## 2023-09-26 PROCEDURE — 4010F ACE/ARB THERAPY RXD/TAKEN: CPT | Mod: CPTII,95,, | Performed by: NURSE PRACTITIONER

## 2023-09-26 PROCEDURE — 99214 PR OFFICE/OUTPT VISIT, EST, LEVL IV, 30-39 MIN: ICD-10-PCS | Mod: 95,,, | Performed by: NURSE PRACTITIONER

## 2023-09-26 PROCEDURE — 3066F NEPHROPATHY DOC TX: CPT | Mod: CPTII,95,, | Performed by: NURSE PRACTITIONER

## 2023-09-26 PROCEDURE — 4010F PR ACE/ARB THEARPY RXD/TAKEN: ICD-10-PCS | Mod: CPTII,95,, | Performed by: NURSE PRACTITIONER

## 2023-09-26 PROCEDURE — 1159F PR MEDICATION LIST DOCUMENTED IN MEDICAL RECORD: ICD-10-PCS | Mod: CPTII,95,, | Performed by: NURSE PRACTITIONER

## 2023-09-26 PROCEDURE — 1159F MED LIST DOCD IN RCRD: CPT | Mod: CPTII,95,, | Performed by: NURSE PRACTITIONER

## 2023-09-26 PROCEDURE — 99214 OFFICE O/P EST MOD 30 MIN: CPT | Mod: 95,,, | Performed by: NURSE PRACTITIONER

## 2023-09-26 PROCEDURE — 3066F PR DOCUMENTATION OF TREATMENT FOR NEPHROPATHY: ICD-10-PCS | Mod: CPTII,95,, | Performed by: NURSE PRACTITIONER

## 2023-09-26 PROCEDURE — 1160F PR REVIEW ALL MEDS BY PRESCRIBER/CLIN PHARMACIST DOCUMENTED: ICD-10-PCS | Mod: CPTII,95,, | Performed by: NURSE PRACTITIONER

## 2023-09-26 PROCEDURE — 1160F RVW MEDS BY RX/DR IN RCRD: CPT | Mod: CPTII,95,, | Performed by: NURSE PRACTITIONER

## 2023-09-26 NOTE — PROGRESS NOTES
"Subjective:       Patient ID: Idalmis Morejon is a 73 y.o. AA female who presents for follow-up evaluation of CKD       HPI   Patient is new to me. Last seen by Dr. Sena in June 2019.  Prior pertinent chart reviewed since this is patient's first appointment with me.    Patient presents for f/u of CKD.  Baseline creatinine of 1.0-1.2 mg/dL. Pt admits to prior heavy use of various NSAID like Ibuprofen, goody powder and other compounds. She was still using NSAID until later part of 2016.    Home BPs: 140s/80s (when not taking amlodipine); 120s/70s (when taking amlodipine).     Significant hx includes longstanding hypertension "for decades", Schatzki's ring, DJD, sciatica, osteoporosis, hyperlipidemia.      The patient denies taking NSAIDs, herbal supplements, or new antibiotics, recreational drugs, recent episode of dehydration, diarrhea, nausea or vomiting, acute illness, hospitalization or exposure to IV radiocontrast.     Significant family hx includes: HTN; brother, father, nephew c ESRD on dialysis    Last renal US: April 2019, reviewed.    Update 8/1/22: (virtual)  Notes:   Had audio only visit in June 2021.  Presents today for f/u of CKD. No recent labs.  Had trigger finger surgery.  Home BPs: 140-145/78-80s. After medications.  Denies NSAIDs. Eats a lot salt diet.  Mother also had kidney disease.    Update 8/1/22:  The patient location is: [place]  The chief complaint leading to consultation is: f/u CKD    Visit type: audiovisual    Face to Face time with patient: 9 minutes  16 minutes of total time spent on the encounter, which includes face to face time and non-face to face time preparing to see the patient (eg, review of tests), Obtaining and/or reviewing separately obtained history, Documenting clinical information in the electronic or other health record, Independently interpreting results (not separately reported) and communicating results to the patient/family/caregiver, or Care coordination (not separately " reported).         Each patient to whom he or she provides medical services by telemedicine is:  (1) informed of the relationship between the physician and patient and the respective role of any other health care provider with respect to management of the patient; and (2) notified that he or she may decline to receive medical services by telemedicine and may withdraw from such care at any time.    Notes:   Presents today for f/u of CKD.   Recent sCr up to 1.4 from 1.0-1.2 mg/dL.  Home BPs: 130s/80s after medications  Prescribed mobic but did not take.  Takes tylenol for pain.  Starting Prolia in December.    Review of Systems   Respiratory:  Negative for shortness of breath.    Cardiovascular:  Negative for leg swelling.   Gastrointestinal:  Negative for diarrhea, nausea and vomiting.   Genitourinary:  Negative for difficulty urinating, dysuria, frequency, hematuria and urgency.        Nocturia   Neurological:  Negative for dizziness.       Objective:       There were no vitals taken for this visit.  Physical Exam  Constitutional:       General: She is not in acute distress.     Appearance: Normal appearance. She is well-developed. She is not diaphoretic.   Pulmonary:      Effort: Pulmonary effort is normal. No respiratory distress.   Neurological:      Mental Status: She is alert and oriented to person, place, and time.   Psychiatric:         Mood and Affect: Mood normal.         Behavior: Behavior normal.         Thought Content: Thought content normal.         Judgment: Judgment normal.         Lab Results   Component Value Date    CREATININE 1.4 09/25/2023    URICACID 2.8 06/20/2012     Prot/Creat Ratio, Urine   Date Value Ref Range Status   09/25/2023 0.11 0.00 - 0.20 Final   08/02/2022 0.08 0.00 - 0.20 Final   06/09/2021 0.13 0.00 - 0.20 Final     Lab Results   Component Value Date     09/25/2023    K 4.1 09/25/2023    CO2 26 09/25/2023     09/25/2023     Lab Results   Component Value Date    PTH  148.6 (H) 09/25/2023    CALCIUM 10.1 09/25/2023    PHOS 3.2 09/25/2023     Lab Results   Component Value Date    HGB 11.4 (L) 09/25/2023    WBC 7.55 09/25/2023    HCT 35.3 (L) 09/25/2023      Lab Results   Component Value Date    HGBA1C 5.4 10/25/2022     09/25/2023    BUN 18 09/25/2023     Lab Results   Component Value Date    LDLCALC 98.2 10/25/2022           Assessment:       1. Stage 3 chronic kidney disease, unspecified whether stage 3a or 3b CKD    2. Secondary hyperparathyroidism    3. Hypertension, unspecified type    4. Anemia, unspecified type    5. Asymptomatic bacteriuria          Plan:   CKD stage 3 - clinically r/t longstanding HTN and heavy NSAID use, possible APOL-1 component. Slight rise in sCr recently. Non-proteinuric. Educated patient to control BP, remain well-hydrated, and avoid NSAIDs to prevent progression of CKD.    UPCR Non-proteinuric. On valsartan 160 mg BID.   Acid-base WNL.   Secondary hyperparathyroidism Ca, phos okay. PTH elevated. On vitamin D.   Anemia At goal for CKD. On PO iron per GI.   DM Non-diabetic.   Lipid Management Defer to PCP.   ESRD planning Defer     HTN - WNL at home amlodipine 5 mg BID, Hctz 12.5 mg TID, valsartan 320 mg, chlorthalidone 12.5 mg qod    Asymptomatic bacteriuria - Educated on symptoms to report    All questions patient had were answered.  Asked if further questions. None. F/u in clinic in 3 mos  with labs and urine prior to next visit or sooner if needed.  ER for emergency concerns.    Summary of Plan:  1. avoid NSAID/ bactrim/ IV contrast/ gadolinium/ aminoglycoside where possible  2. Continue current BP medications  3. RTC in 3 mos

## 2023-09-28 RX ORDER — TIZANIDINE 2 MG/1
4 TABLET ORAL NIGHTLY PRN
Qty: 90 TABLET | Refills: 1 | Status: SHIPPED | OUTPATIENT
Start: 2023-09-28 | End: 2023-10-31

## 2023-09-29 LAB — TTG IGA SER-ACNC: 0.4 U/ML

## 2023-10-31 ENCOUNTER — OFFICE VISIT (OUTPATIENT)
Dept: PSYCHIATRY | Facility: CLINIC | Age: 73
End: 2023-10-31
Payer: MEDICARE

## 2023-10-31 DIAGNOSIS — F33.41 RECURRENT MAJOR DEPRESSIVE DISORDER, IN PARTIAL REMISSION: Primary | ICD-10-CM

## 2023-10-31 DIAGNOSIS — F40.298 SPECIFIC PHOBIA: ICD-10-CM

## 2023-10-31 PROCEDURE — 99214 OFFICE O/P EST MOD 30 MIN: CPT | Mod: 95,,, | Performed by: PSYCHIATRY & NEUROLOGY

## 2023-10-31 PROCEDURE — 1159F PR MEDICATION LIST DOCUMENTED IN MEDICAL RECORD: ICD-10-PCS | Mod: CPTII,95,, | Performed by: PSYCHIATRY & NEUROLOGY

## 2023-10-31 PROCEDURE — 1159F MED LIST DOCD IN RCRD: CPT | Mod: CPTII,95,, | Performed by: PSYCHIATRY & NEUROLOGY

## 2023-10-31 PROCEDURE — 3066F PR DOCUMENTATION OF TREATMENT FOR NEPHROPATHY: ICD-10-PCS | Mod: CPTII,95,, | Performed by: PSYCHIATRY & NEUROLOGY

## 2023-10-31 PROCEDURE — 1160F PR REVIEW ALL MEDS BY PRESCRIBER/CLIN PHARMACIST DOCUMENTED: ICD-10-PCS | Mod: CPTII,95,, | Performed by: PSYCHIATRY & NEUROLOGY

## 2023-10-31 PROCEDURE — 4010F PR ACE/ARB THEARPY RXD/TAKEN: ICD-10-PCS | Mod: CPTII,95,, | Performed by: PSYCHIATRY & NEUROLOGY

## 2023-10-31 PROCEDURE — 99214 PR OFFICE/OUTPT VISIT, EST, LEVL IV, 30-39 MIN: ICD-10-PCS | Mod: 95,,, | Performed by: PSYCHIATRY & NEUROLOGY

## 2023-10-31 PROCEDURE — 4010F ACE/ARB THERAPY RXD/TAKEN: CPT | Mod: CPTII,95,, | Performed by: PSYCHIATRY & NEUROLOGY

## 2023-10-31 PROCEDURE — 3066F NEPHROPATHY DOC TX: CPT | Mod: CPTII,95,, | Performed by: PSYCHIATRY & NEUROLOGY

## 2023-10-31 PROCEDURE — 1160F RVW MEDS BY RX/DR IN RCRD: CPT | Mod: CPTII,95,, | Performed by: PSYCHIATRY & NEUROLOGY

## 2023-10-31 NOTE — PROGRESS NOTES
"The patient location is: home  The chief complaint leading to consultation is: depression and anxiety    Visit type: audiovisual    Face to Face time with patient: 25 mins  30 minutes of total time spent on the encounter, which includes face to face time and non-face to face time preparing to see the patient (eg, review of tests), Obtaining and/or reviewing separately obtained history, Documenting clinical information in the electronic or other health record, Independently interpreting results (not separately reported) and communicating results to the patient/family/caregiver, or Care coordination (not separately reported).     Each patient to whom he or she provides medical services by telemedicine is:  (1) informed of the relationship between the physician and patient and the respective role of any other health care provider with respect to management of the patient; and (2) notified that he or she may decline to receive medical services by telemedicine and may withdraw from such care at any time.    Notes:     Outpatient Psychiatry Follow-Up Visit (MD/NP)    10/31/2023    Clinical Status of Patient:  Outpatient (Ambulatory)    Chief Complaint:  Idalmis Morejon is a 73 y.o. female who presents today for follow-up of depression and anxiety.      Met with patient.      Interval History and Content of Current Session:  Interim Events/Subjective Report/Content of Current Session:   "Oh I'm doing okay."  She reports she had some lows about a month ago. She became overwhelmed with medical issues, arm pain, arthritis, declining kidney function.  She is worried about her kidneys.  Now having to do more frequent labs for kidneys.  It has her down.  Has been crying.  Sunday was her mother's birthday.  Using diphenhydramine alternating with tylenol PM.  Appetite is not very good.  Drinks smoothies and boost.  Not sure if her weight is stable, minimal weight loss she thinks.  Never desires to die.  Denies worry about anything " other than her kidneys.  She has a h/o family members who had been on dialysis.     Doing puzzles.  She spends time with her daughter on the weekends but does not let her know what she is going through.    She does not wish to change her medications at this point as she believes her mood will improve if she gets more encouraging result from upcoming renal eval.          Prior trials:  remeron - HA  wellbutrin - does not recall      Psychotherapy:  Target symptoms: depression, anxiety   Why chosen therapy is appropriate versus another modality: relevant to diagnosis  Outcome monitoring methods: self-report, observation  Therapeutic intervention type: supportive psychotherapy  Topics discussed/themes: illness/death of a loved one, building skills sets for symptom management, symptom recognition  The patient's response to the intervention is motivated. The patient's progress toward treatment goals is good.   Duration of intervention:  mins      Review of Systems   Constitutional:  Negative for chills, fever and weight loss.   Respiratory:  Negative for cough, shortness of breath and wheezing.    Cardiovascular:  Negative for chest pain and palpitations.         Past Medical, Family and Social History: The patient's past medical, family and social history have been reviewed and updated as appropriate within the electronic medical record - see encounter notes.    Compliance: yes    Side effects: none    Risk Parameters:  Patient reports no suicidal ideation  Patient reports no homicidal ideation  Patient reports no self-injurious behavior  Patient reports no violent behavior    Exam (detailed: at least 9 elements; comprehensive: all 15 elements)   Constitutional  Vitals:  Most recent vital signs, dated less than 90 days prior to this appointment, were reviewed.    There were no vitals filed for this visit.     General:  age appropriate, casually dressed, neatly groomed     Musculoskeletal  Muscle Strength/Tone:  no  dyskinesia, no tremor   Gait & Station:  Ambulates during session without difficulty       Psychiatric  Speech:  normal tone, normal rate, normal pitch, normal volume, prosody intact   Mood:    Affect:  euthymic  appropriate, full   Thought Process:  goal-directed, logical   Associations:  intact   Thought Content:  normal, no suicidality, no homicidality, delusions, or paranoia   Insight  Judgement:  good, patient has awareness of illness  Patient's behavior is adequate to circumstances   Orientation:  grossly intact   Memory: intact for content of interview   Language: grossly intact   Attention Span & Concentration:  able to focus   Fund of Knowledge:  intact and appropriate to age and level of education     Assessment and Diagnosis   Status/Progress: Based on the examination today, the patient's problem(s) is/are improved.  New problems have not been presented today.   Comorbidities are complicating management of the primary condition.  There are no active rule-out diagnoses for this patient at this time.    General Impression: Pt with depression and anxiety as well as multiple other medical comorbidities.  She is still affected by the death of her mother in March 2014.      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  Declining renal function  History of illusions      Intervention/Counseling/Treatment Plan   Continue cymbalta 60 mg daily.   Consider restart of remeron or seroquel    Return to Clinic:  Next available

## 2023-11-09 ENCOUNTER — OFFICE VISIT (OUTPATIENT)
Dept: URGENT CARE | Facility: CLINIC | Age: 73
End: 2023-11-09
Payer: MEDICARE

## 2023-11-09 ENCOUNTER — PATIENT MESSAGE (OUTPATIENT)
Dept: INTERNAL MEDICINE | Facility: CLINIC | Age: 73
End: 2023-11-09
Payer: MEDICARE

## 2023-11-09 VITALS
BODY MASS INDEX: 26.36 KG/M2 | OXYGEN SATURATION: 98 % | HEIGHT: 66 IN | DIASTOLIC BLOOD PRESSURE: 86 MMHG | TEMPERATURE: 100 F | HEART RATE: 102 BPM | WEIGHT: 164 LBS | RESPIRATION RATE: 18 BRPM | SYSTOLIC BLOOD PRESSURE: 163 MMHG

## 2023-11-09 DIAGNOSIS — J10.1 INFLUENZA A: Primary | ICD-10-CM

## 2023-11-09 DIAGNOSIS — R05.1 ACUTE COUGH: ICD-10-CM

## 2023-11-09 DIAGNOSIS — J00 ACUTE RHINITIS: ICD-10-CM

## 2023-11-09 LAB
CTP QC/QA: YES
CTP QC/QA: YES
POC MOLECULAR INFLUENZA A AGN: POSITIVE
POC MOLECULAR INFLUENZA B AGN: NEGATIVE
SARS-COV-2 AG RESP QL IA.RAPID: NEGATIVE

## 2023-11-09 PROCEDURE — 87811 SARS CORONAVIRUS 2 ANTIGEN POCT, MANUAL READ: ICD-10-PCS | Mod: QW,S$GLB,, | Performed by: NURSE PRACTITIONER

## 2023-11-09 PROCEDURE — 87502 POCT INFLUENZA A/B MOLECULAR: ICD-10-PCS | Mod: QW,S$GLB,, | Performed by: NURSE PRACTITIONER

## 2023-11-09 PROCEDURE — 87811 SARS-COV-2 COVID19 W/OPTIC: CPT | Mod: QW,S$GLB,, | Performed by: NURSE PRACTITIONER

## 2023-11-09 PROCEDURE — 99214 OFFICE O/P EST MOD 30 MIN: CPT | Mod: S$GLB,,, | Performed by: NURSE PRACTITIONER

## 2023-11-09 PROCEDURE — 87502 INFLUENZA DNA AMP PROBE: CPT | Mod: QW,S$GLB,, | Performed by: NURSE PRACTITIONER

## 2023-11-09 PROCEDURE — 99214 PR OFFICE/OUTPT VISIT, EST, LEVL IV, 30-39 MIN: ICD-10-PCS | Mod: S$GLB,,, | Performed by: NURSE PRACTITIONER

## 2023-11-09 RX ORDER — IPRATROPIUM BROMIDE 21 UG/1
1 SPRAY, METERED NASAL 2 TIMES DAILY
Qty: 30 ML | Refills: 0 | Status: SHIPPED | OUTPATIENT
Start: 2023-11-09 | End: 2023-11-16

## 2023-11-09 RX ORDER — OSELTAMIVIR PHOSPHATE 75 MG/1
75 CAPSULE ORAL 2 TIMES DAILY
Qty: 10 CAPSULE | Refills: 0 | Status: SHIPPED | OUTPATIENT
Start: 2023-11-09 | End: 2023-11-14

## 2023-11-09 RX ORDER — BENZONATATE 100 MG/1
100 CAPSULE ORAL EVERY 6 HOURS PRN
Qty: 30 CAPSULE | Refills: 0 | Status: SHIPPED | OUTPATIENT
Start: 2023-11-09 | End: 2024-01-09

## 2023-11-10 RX ORDER — DOXYCYCLINE 100 MG/1
100 CAPSULE ORAL 2 TIMES DAILY
Qty: 20 CAPSULE | Refills: 0 | Status: SHIPPED | OUTPATIENT
Start: 2023-11-10 | End: 2024-01-09

## 2023-11-10 NOTE — PROGRESS NOTES
"Subjective:      Patient ID: Idalmis Morejon is a 73 y.o. female.    Vitals:  height is 5' 5.98" (1.676 m) and weight is 74.4 kg (164 lb). Her tympanic temperature is 100.3 °F (37.9 °C). Her blood pressure is 163/86 (abnormal) and her pulse is 102. Her respiration is 18 and oxygen saturation is 98%.     Chief Complaint: Cough    Admits to COVID exposure (daughter tested positive for COVID). Reports a negative at-home COVID test performed yesterday.    Provider note begins below:    Patient comes to the clinic with 2 days of sore throat, fever, cough and sinus congestion.  As above, daughter ill with COVID-19.  Patient tested negative for COVID-19.  Denies difficulty breathing or shortness of breath.    Cough  This is a new problem. The current episode started in the past 7 days (2 days ago). The problem has been gradually worsening. The problem occurs constantly. The cough is Non-productive. Associated symptoms include chills, a fever (102 degrees F at highest), headaches, postnasal drip and a sore throat. Pertinent negatives include no chest pain, ear congestion, ear pain, heartburn, hemoptysis, myalgias, nasal congestion, rash, rhinorrhea, shortness of breath, sweats, weight loss or wheezing. Treatments tried: tylneol (last taken at 3PM), coricidin HBP (last taken at 5PM) The treatment provided mild relief. There is no history of asthma, bronchiectasis, bronchitis, COPD, emphysema, environmental allergies or pneumonia.       Constitution: Positive for chills and fever (102 degrees F at highest).   HENT:  Positive for postnasal drip and sore throat. Negative for ear pain.    Cardiovascular:  Negative for chest pain.   Respiratory:  Positive for cough. Negative for bloody sputum, shortness of breath and wheezing.    Gastrointestinal:  Negative for heartburn.   Musculoskeletal:  Negative for muscle ache.   Skin:  Negative for rash.   Allergic/Immunologic: Negative for environmental allergies.   Neurological:  Positive " for headaches.      Objective:     Physical Exam   Constitutional: She is oriented to person, place, and time. She appears well-developed. She is cooperative.  Non-toxic appearance. She appears ill. No distress.   HENT:   Head: Normocephalic and atraumatic.   Ears:   Right Ear: Hearing and external ear normal.   Left Ear: Hearing and external ear normal.   Nose: Rhinorrhea present. No mucosal edema.   Mouth/Throat: Uvula is midline, oropharynx is clear and moist and mucous membranes are normal. No trismus in the jaw. Normal dentition. No uvula swelling. Cobblestoning present. No oropharyngeal exudate, posterior oropharyngeal edema or posterior oropharyngeal erythema.   Eyes: Conjunctivae and lids are normal. No scleral icterus.   Neck: Trachea normal and phonation normal. Neck supple. No edema present. No erythema present. No neck rigidity present.   Cardiovascular: Normal rate, regular rhythm, normal heart sounds and normal pulses.   Pulmonary/Chest: Effort normal and breath sounds normal. No stridor. No respiratory distress. She has no decreased breath sounds. She has no wheezes. She has no rhonchi. She has no rales.   Abdominal: Normal appearance.   Musculoskeletal: Normal range of motion.         General: No deformity. Normal range of motion.   Neurological: She is alert and oriented to person, place, and time. She exhibits normal muscle tone. Coordination normal.   Skin: Skin is warm, dry, intact, not diaphoretic and not pale.   Psychiatric: Her speech is normal and behavior is normal. Judgment and thought content normal.   Nursing note and vitals reviewed.    Results for orders placed or performed in visit on 11/09/23   SARS Coronavirus 2 Antigen, POCT Manual Read   Result Value Ref Range    SARS Coronavirus 2 Antigen Negative Negative     Acceptable Yes    POCT Influenza A/B MOLECULAR   Result Value Ref Range    POC Molecular Influenza A Ag Positive (A) Negative, Not Reported    POC Molecular  Influenza B Ag Negative Negative, Not Reported     Acceptable Yes      *Note: Due to a large number of results and/or encounters for the requested time period, some results have not been displayed. A complete set of results can be found in Results Review.       Assessment:     1. Influenza A    2. Acute cough    3. Acute rhinitis        Plan:   Labs ordered at this visit reviewed.       Influenza A  -     oseltamivir (TAMIFLU) 75 MG capsule; Take 1 capsule (75 mg total) by mouth 2 (two) times daily. for 5 days  Dispense: 10 capsule; Refill: 0    Acute cough  -     SARS Coronavirus 2 Antigen, POCT Manual Read  -     POCT Influenza A/B MOLECULAR  -     benzonatate (TESSALON PERLES) 100 MG capsule; Take 1 capsule (100 mg total) by mouth every 6 (six) hours as needed for Cough.  Dispense: 30 capsule; Refill: 0    Acute rhinitis  -     ipratropium (ATROVENT) 21 mcg (0.03 %) nasal spray; 1 spray by Each Nostril route 2 (two) times daily. for 7 days  Dispense: 30 mL; Refill: 0      Patient Instructions   Please continue taking Tylenol regularly for your fever.

## 2023-12-14 ENCOUNTER — PATIENT MESSAGE (OUTPATIENT)
Dept: INFECTIOUS DISEASES | Facility: HOSPITAL | Age: 73
End: 2023-12-14
Payer: MEDICARE

## 2023-12-28 DIAGNOSIS — F33.41 RECURRENT MAJOR DEPRESSIVE DISORDER, IN PARTIAL REMISSION: Primary | ICD-10-CM

## 2023-12-29 RX ORDER — DULOXETIN HYDROCHLORIDE 60 MG/1
60 CAPSULE, DELAYED RELEASE ORAL DAILY
Qty: 90 CAPSULE | Refills: 0 | Status: SHIPPED | OUTPATIENT
Start: 2023-12-29 | End: 2024-03-19 | Stop reason: SDUPTHER

## 2024-01-04 ENCOUNTER — PATIENT MESSAGE (OUTPATIENT)
Dept: ORTHOPEDICS | Facility: CLINIC | Age: 74
End: 2024-01-04
Payer: MEDICARE

## 2024-01-05 ENCOUNTER — PATIENT MESSAGE (OUTPATIENT)
Dept: NEPHROLOGY | Facility: CLINIC | Age: 74
End: 2024-01-05
Payer: MEDICARE

## 2024-01-05 ENCOUNTER — INFUSION (OUTPATIENT)
Dept: INFECTIOUS DISEASES | Facility: HOSPITAL | Age: 74
End: 2024-01-05
Attending: NURSE PRACTITIONER
Payer: MEDICARE

## 2024-01-05 VITALS
WEIGHT: 162.06 LBS | HEART RATE: 70 BPM | TEMPERATURE: 99 F | BODY MASS INDEX: 26.05 KG/M2 | OXYGEN SATURATION: 96 % | HEIGHT: 66 IN | DIASTOLIC BLOOD PRESSURE: 77 MMHG | SYSTOLIC BLOOD PRESSURE: 170 MMHG | RESPIRATION RATE: 16 BRPM

## 2024-01-05 DIAGNOSIS — M81.0 AGE-RELATED OSTEOPOROSIS WITHOUT CURRENT PATHOLOGICAL FRACTURE: Primary | ICD-10-CM

## 2024-01-05 DIAGNOSIS — N18.30 STAGE 3 CHRONIC KIDNEY DISEASE, UNSPECIFIED WHETHER STAGE 3A OR 3B CKD: Primary | ICD-10-CM

## 2024-01-05 PROCEDURE — 63600175 PHARM REV CODE 636 W HCPCS: Mod: JZ,JG | Performed by: NURSE PRACTITIONER

## 2024-01-05 PROCEDURE — 96372 THER/PROPH/DIAG INJ SC/IM: CPT

## 2024-01-05 RX ADMIN — DENOSUMAB 60 MG: 60 INJECTION SUBCUTANEOUS at 10:01

## 2024-01-05 NOTE — PROGRESS NOTES
Patient arrives for Prolia injection - confirms use of calcium and vitamin D supplements and denies dental procedures over past 3 months - administered per guidelines.    Given in [facility]. Pt toelrated well. Currently in observation for 15 minutes. Return appt scheduled. Limited head-to-toe assessment due to privacy issues and visit reason though the opportunity was given for patient to express any concerns

## 2024-01-08 ENCOUNTER — PATIENT MESSAGE (OUTPATIENT)
Dept: ORTHOPEDICS | Facility: CLINIC | Age: 74
End: 2024-01-08
Payer: MEDICARE

## 2024-01-08 ENCOUNTER — LAB VISIT (OUTPATIENT)
Dept: LAB | Facility: HOSPITAL | Age: 74
End: 2024-01-08
Attending: NURSE PRACTITIONER
Payer: MEDICARE

## 2024-01-08 DIAGNOSIS — N18.30 STAGE 3 CHRONIC KIDNEY DISEASE, UNSPECIFIED WHETHER STAGE 3A OR 3B CKD: ICD-10-CM

## 2024-01-08 LAB
ANION GAP SERPL CALC-SCNC: 9 MMOL/L (ref 8–16)
BUN SERPL-MCNC: 16 MG/DL (ref 8–23)
CALCIUM SERPL-MCNC: 9.2 MG/DL (ref 8.7–10.5)
CHLORIDE SERPL-SCNC: 109 MMOL/L (ref 95–110)
CO2 SERPL-SCNC: 25 MMOL/L (ref 23–29)
CREAT SERPL-MCNC: 1.1 MG/DL (ref 0.5–1.4)
EST. GFR  (NO RACE VARIABLE): 52.4 ML/MIN/1.73 M^2
GLUCOSE SERPL-MCNC: 134 MG/DL (ref 70–110)
POTASSIUM SERPL-SCNC: 3.6 MMOL/L (ref 3.5–5.1)
SODIUM SERPL-SCNC: 143 MMOL/L (ref 136–145)

## 2024-01-08 PROCEDURE — 80048 BASIC METABOLIC PNL TOTAL CA: CPT | Performed by: NURSE PRACTITIONER

## 2024-01-08 PROCEDURE — 36415 COLL VENOUS BLD VENIPUNCTURE: CPT | Performed by: NURSE PRACTITIONER

## 2024-01-09 ENCOUNTER — OFFICE VISIT (OUTPATIENT)
Dept: NEPHROLOGY | Facility: CLINIC | Age: 74
End: 2024-01-09
Payer: MEDICARE

## 2024-01-09 DIAGNOSIS — N25.81 SECONDARY HYPERPARATHYROIDISM: ICD-10-CM

## 2024-01-09 DIAGNOSIS — N17.9 AKI (ACUTE KIDNEY INJURY): ICD-10-CM

## 2024-01-09 DIAGNOSIS — D64.9 ANEMIA, UNSPECIFIED TYPE: ICD-10-CM

## 2024-01-09 DIAGNOSIS — N18.30 STAGE 3 CHRONIC KIDNEY DISEASE, UNSPECIFIED WHETHER STAGE 3A OR 3B CKD: Primary | ICD-10-CM

## 2024-01-09 DIAGNOSIS — N18.31 STAGE 3A CHRONIC KIDNEY DISEASE: ICD-10-CM

## 2024-01-09 DIAGNOSIS — I10 HYPERTENSION, UNSPECIFIED TYPE: ICD-10-CM

## 2024-01-09 PROCEDURE — G2211 COMPLEX E/M VISIT ADD ON: HCPCS | Mod: 95,,, | Performed by: NURSE PRACTITIONER

## 2024-01-09 PROCEDURE — 1160F RVW MEDS BY RX/DR IN RCRD: CPT | Mod: CPTII,95,, | Performed by: NURSE PRACTITIONER

## 2024-01-09 PROCEDURE — 1159F MED LIST DOCD IN RCRD: CPT | Mod: CPTII,95,, | Performed by: NURSE PRACTITIONER

## 2024-01-09 PROCEDURE — 99214 OFFICE O/P EST MOD 30 MIN: CPT | Mod: 95,,, | Performed by: NURSE PRACTITIONER

## 2024-01-09 NOTE — PROGRESS NOTES
"Subjective:       Patient ID: Idalmis Morejon is a 73 y.o. AA female who presents for follow-up evaluation of CKD       HPI   Patient is new to me. Last seen by Dr. Sena in June 2019.  Prior pertinent chart reviewed since this is patient's first appointment with me.    Patient presents for f/u of CKD.  Baseline creatinine of 1.0-1.2 mg/dL. Pt admits to prior heavy use of various NSAID like Ibuprofen, goody powder and other compounds. She was still using NSAID until later part of 2016.    Home BPs: 140s/80s (when not taking amlodipine); 120s/70s (when taking amlodipine).     Significant hx includes longstanding hypertension "for decades", Schatzki's ring, DJD, sciatica, osteoporosis, hyperlipidemia.      The patient denies taking NSAIDs, herbal supplements, or new antibiotics, recreational drugs, recent episode of dehydration, diarrhea, nausea or vomiting, acute illness, hospitalization or exposure to IV radiocontrast.     Significant family hx includes: HTN; brother, father, nephew c ESRD on dialysis    Last renal US: April 2019, reviewed.    Update 8/1/22: (virtual)  Notes:   Had audio only visit in June 2021.  Presents today for f/u of CKD. No recent labs.  Had trigger finger surgery.  Home BPs: 140-145/78-80s. After medications.  Denies NSAIDs. Eats a lot salt diet.  Mother also had kidney disease.    Update 8/1/22 (virtual):  Notes:   Presents today for f/u of CKD.   Recent sCr up to 1.4 from 1.0-1.2 mg/dL.  Home BPs: 130s/80s after medications  Prescribed mobic but did not take.  Takes tylenol for pain.  Starting Prolia in December.    Update 1/9/24 (virtual):  The patient location is: home  The chief complaint leading to consultation is: f/u CKD    Visit type: audiovisual    Face to Face time with patient: 13 minutes  18 minutes of total time spent on the encounter, which includes face to face time and non-face to face time preparing to see the patient (eg, review of tests), Obtaining and/or reviewing " separately obtained history, Documenting clinical information in the electronic or other health record, Independently interpreting results (not separately reported) and communicating results to the patient/family/caregiver, or Care coordination (not separately reported).         Each patient to whom he or she provides medical services by telemedicine is:  (1) informed of the relationship between the physician and patient and the respective role of any other health care provider with respect to management of the patient; and (2) notified that he or she may decline to receive medical services by telemedicine and may withdraw from such care at any time.    Notes:   Presents for f/u of CKD.  Baseline sCr 1.1-1.3 mg/dL.  sCr was 1.6 on  1/5/24.  Was only drinking about 32 oz of water daily + ensure/boost or juice + green tea + occasional coffee.    She increased her water intake to about 50 oz of water daily + the other fluids.    Home BPs: 130s/70s-80s    Currently holding 12.5 mg chlorthalidone qod due to BRANDT.    Review of Systems   Respiratory:  Negative for shortness of breath.    Cardiovascular:  Negative for leg swelling.   Gastrointestinal:  Negative for diarrhea, nausea and vomiting.   Genitourinary:  Negative for difficulty urinating, dysuria and hematuria.       Objective:       There were no vitals taken for this visit.  Physical Exam  Constitutional:       General: She is not in acute distress.     Appearance: Normal appearance. She is well-developed. She is not diaphoretic.   Pulmonary:      Effort: Pulmonary effort is normal. No respiratory distress.   Neurological:      Mental Status: She is alert and oriented to person, place, and time.   Psychiatric:         Mood and Affect: Mood normal.         Behavior: Behavior normal.         Thought Content: Thought content normal.         Judgment: Judgment normal.         Lab Results   Component Value Date    CREATININE 1.1 01/08/2024    URICACID 2.8 06/20/2012      Prot/Creat Ratio, Urine   Date Value Ref Range Status   01/05/2024 0.16 0.00 - 0.20 Final   09/25/2023 0.11 0.00 - 0.20 Final   08/02/2022 0.08 0.00 - 0.20 Final     Lab Results   Component Value Date     01/08/2024    K 3.6 01/08/2024    CO2 25 01/08/2024     01/08/2024     Lab Results   Component Value Date    .7 (H) 01/05/2024    CALCIUM 9.2 01/08/2024    PHOS 3.2 01/05/2024     Lab Results   Component Value Date    HGB 11.4 (L) 01/05/2024    WBC 5.63 01/05/2024    HCT 34.7 (L) 01/05/2024      Lab Results   Component Value Date    HGBA1C 5.4 10/25/2022     01/05/2024    BUN 16 01/08/2024     Lab Results   Component Value Date    LDLCALC 98.2 10/25/2022           Assessment:       1. Stage 3 chronic kidney disease, unspecified whether stage 3a or 3b CKD    2. Stage 3a chronic kidney disease    3. BRANDT (acute kidney injury)    4. Secondary hyperparathyroidism    5. Hypertension, unspecified type    6. Anemia, unspecified type            Plan:   CKD stage 3 - clinically r/t longstanding HTN and heavy NSAID use, possible APOL-1 component. Slight rise in sCr recently. Non-proteinuric. Educated patient to control BP, remain well-hydrated, and avoid NSAIDs to prevent progression of CKD.    Recent BRANDT resolved off chlorthalidone and with increased hydration.    Refer to intro to CKD (virtual).    UPCR Mild albuminuria. On valsartan 160 mg BID.    May consider SGLT2i if it persists, but also concerned for risk of volume depletion.   Acid-base WNL.   Secondary hyperparathyroidism Ca, phos okay. PTH elevated. On vitamin D.   Anemia At goal for CKD.   DM Non-diabetic.   Lipid Management Defer to PCP.   ESRD planning Defer     HTN - WNL at home amlodipine 5 mg BID,  valsartan 320 mg   - holding chlorthalidone 12.5 mg qod due to BRANDT  - Continue to monitor home BPs and report if they rise further or if edema occurs  - If another agent is needed consider betablocker or other agent without diuretic  effect unless hypervolemic    All questions patient had were answered.  Asked if further questions. None. F/u in clinic in 3 mos  with labs and urine prior to next visit or sooner if needed.  ER for emergency concerns.    Summary of Plan:  1. avoid NSAID/ bactrim/ IV contrast/ gadolinium/ aminoglycoside where possible  2. Remain off chlorthalidone  3. Intro to CKD  4. RTC in 3 mos     Continued longitudinal management of serious, complex disease.

## 2024-01-17 ENCOUNTER — OFFICE VISIT (OUTPATIENT)
Dept: ORTHOPEDICS | Facility: CLINIC | Age: 74
End: 2024-01-17
Payer: MEDICARE

## 2024-01-17 DIAGNOSIS — M75.31 CALCIFIC TENDONITIS OF RIGHT SHOULDER REGION: ICD-10-CM

## 2024-01-17 DIAGNOSIS — G89.29 CHRONIC RIGHT SHOULDER PAIN: Primary | ICD-10-CM

## 2024-01-17 DIAGNOSIS — M25.511 CHRONIC RIGHT SHOULDER PAIN: Primary | ICD-10-CM

## 2024-01-17 PROCEDURE — 99499 UNLISTED E&M SERVICE: CPT | Mod: 95,,, | Performed by: PHYSICIAN ASSISTANT

## 2024-01-17 NOTE — PROGRESS NOTES
Established Patient - Audio Only Telehealth Visit     The patient location is: home  The chief complaint leading to consultation is: right shoulder pain  Visit type: Virtual visit with audio only (telephone)  Total time spent with patient: 5 minutes       The reason for the audio only service rather than synchronous audio and video virtual visit was related to technical difficulties or patient preference/necessity.     Each patient to whom I provide medical services by telemedicine is:  (1) informed of the relationship between the physician and patient and the respective role of any other health care provider with respect to management of the patient; and (2) notified that they may decline to receive medical services by telemedicine and may withdraw from such care at any time. Patient verbally consented to receive this service via voice-only telephone call.       HPI: Patient continues to have right shoulder pain following her visit with me in September 2023.  He has pain worse with activity, lifting, reaching.  She cannot take NSAIDS and did not have any relief with prior medications, rest, home exercises.     Assessment and plan:  Right shoulder RTC tendonitis, possible rotator cuff tear.  Given worsening symptoms over the past 4 months despite conservative treatment, I have recommended MRI of the right shoulder to further evaluate.  I will call her to discuss results.                        This service was not originating from a related E/M service provided within the previous 7 days nor will  to an E/M service or procedure within the next 24 hours or my soonest available appointment.  Prevailing standard of care was able to be met in this audio-only visit.

## 2024-01-18 ENCOUNTER — HOSPITAL ENCOUNTER (OUTPATIENT)
Dept: RADIOLOGY | Facility: HOSPITAL | Age: 74
Discharge: HOME | End: 2024-01-18
Attending: PHYSICIAN ASSISTANT
Payer: MEDICARE

## 2024-01-18 DIAGNOSIS — G89.29 CHRONIC RIGHT SHOULDER PAIN: ICD-10-CM

## 2024-01-18 DIAGNOSIS — M25.511 CHRONIC RIGHT SHOULDER PAIN: ICD-10-CM

## 2024-01-18 PROCEDURE — 73221 MRI JOINT UPR EXTREM W/O DYE: CPT | Mod: 26,RT,, | Performed by: RADIOLOGY

## 2024-01-18 PROCEDURE — 73221 MRI JOINT UPR EXTREM W/O DYE: CPT | Mod: TC,RT

## 2024-01-22 ENCOUNTER — TELEPHONE (OUTPATIENT)
Dept: ORTHOPEDICS | Facility: CLINIC | Age: 74
End: 2024-01-22
Payer: MEDICARE

## 2024-01-22 ENCOUNTER — PATIENT MESSAGE (OUTPATIENT)
Dept: ORTHOPEDICS | Facility: CLINIC | Age: 74
End: 2024-01-22
Payer: MEDICARE

## 2024-01-22 NOTE — TELEPHONE ENCOUNTER
Called patient to discuss MRI results  Will have her follow up with sports to discuss options for worsening shoulder pain

## 2024-01-26 ENCOUNTER — TELEPHONE (OUTPATIENT)
Dept: NEPHROLOGY | Facility: CLINIC | Age: 74
End: 2024-01-26
Payer: MEDICARE

## 2024-01-30 ENCOUNTER — OFFICE VISIT (OUTPATIENT)
Dept: SPORTS MEDICINE | Facility: CLINIC | Age: 74
End: 2024-01-30
Payer: MEDICARE

## 2024-01-30 VITALS
WEIGHT: 161 LBS | BODY MASS INDEX: 25.88 KG/M2 | HEART RATE: 66 BPM | SYSTOLIC BLOOD PRESSURE: 134 MMHG | HEIGHT: 66 IN | DIASTOLIC BLOOD PRESSURE: 79 MMHG

## 2024-01-30 DIAGNOSIS — M67.911 DYSFUNCTION OF RIGHT ROTATOR CUFF: ICD-10-CM

## 2024-01-30 DIAGNOSIS — M67.80 TENDINOSIS: ICD-10-CM

## 2024-01-30 DIAGNOSIS — M19.011 OSTEOARTHRITIS OF RIGHT GLENOHUMERAL JOINT: ICD-10-CM

## 2024-01-30 DIAGNOSIS — S46.819S SPRAIN OF SUPRASPINATUS MUSCLE OR TENDON, UNSPECIFIED LATERALITY, SEQUELA: ICD-10-CM

## 2024-01-30 DIAGNOSIS — M25.511 RIGHT SHOULDER PAIN, UNSPECIFIED CHRONICITY: Primary | ICD-10-CM

## 2024-01-30 PROCEDURE — 99999 PR PBB SHADOW E&M-EST. PATIENT-LVL IV: CPT | Mod: PBBFAC,,, | Performed by: FAMILY MEDICINE

## 2024-01-30 PROCEDURE — 76942 ECHO GUIDE FOR BIOPSY: CPT | Mod: S$GLB,,, | Performed by: FAMILY MEDICINE

## 2024-01-30 PROCEDURE — 99204 OFFICE O/P NEW MOD 45 MIN: CPT | Mod: 25,S$GLB,, | Performed by: FAMILY MEDICINE

## 2024-01-30 PROCEDURE — 20551 NJX 1 TENDON ORIGIN/INSJ: CPT | Mod: RT,S$GLB,, | Performed by: FAMILY MEDICINE

## 2024-01-30 PROCEDURE — 3078F DIAST BP <80 MM HG: CPT | Mod: CPTII,S$GLB,, | Performed by: FAMILY MEDICINE

## 2024-01-30 PROCEDURE — 1160F RVW MEDS BY RX/DR IN RCRD: CPT | Mod: CPTII,S$GLB,, | Performed by: FAMILY MEDICINE

## 2024-01-30 PROCEDURE — 1159F MED LIST DOCD IN RCRD: CPT | Mod: CPTII,S$GLB,, | Performed by: FAMILY MEDICINE

## 2024-01-30 PROCEDURE — 1125F AMNT PAIN NOTED PAIN PRSNT: CPT | Mod: CPTII,S$GLB,, | Performed by: FAMILY MEDICINE

## 2024-01-30 PROCEDURE — 3075F SYST BP GE 130 - 139MM HG: CPT | Mod: CPTII,S$GLB,, | Performed by: FAMILY MEDICINE

## 2024-01-30 PROCEDURE — 3288F FALL RISK ASSESSMENT DOCD: CPT | Mod: CPTII,S$GLB,, | Performed by: FAMILY MEDICINE

## 2024-01-30 PROCEDURE — 1101F PT FALLS ASSESS-DOCD LE1/YR: CPT | Mod: CPTII,S$GLB,, | Performed by: FAMILY MEDICINE

## 2024-01-30 RX ORDER — TRIAMCINOLONE ACETONIDE 40 MG/ML
40 INJECTION, SUSPENSION INTRA-ARTICULAR; INTRAMUSCULAR
Status: DISCONTINUED | OUTPATIENT
Start: 2024-01-30 | End: 2024-01-30 | Stop reason: HOSPADM

## 2024-01-30 RX ADMIN — TRIAMCINOLONE ACETONIDE 40 MG: 40 INJECTION, SUSPENSION INTRA-ARTICULAR; INTRAMUSCULAR at 09:01

## 2024-01-30 NOTE — PROCEDURES
"Tendon Origin    Date/Time: 1/30/2024 9:00 AM    Performed by: Michael Gross MD  Authorized by: Michael Gross MD    Consent Done?:  Yes (Verbal)  Timeout: prior to procedure the correct patient, procedure, and site was verified    Indications:  Pain  Site marked: the procedure site was marked    Timeout: prior to procedure the correct patient, procedure, and site was verified    Location: shoulder.  Prep: patient was prepped and draped in usual sterile fashion    Ultrasonic Guidance for Needle Placement?: Yes    Needle size:  25 G  Approach:  Posterolateral  Medications:  40 mg triamcinolone acetonide 40 mg/mL  Patient tolerance:  Patient tolerated the procedure well with no immediate complications   Supraspinatus tendon and tendon insertion injection RIGHT    Description of ultrasound utilization for needle guidance:   Ultrasound guidance used for needle localization. Images saved and stored for documentation. The supraspinatus muscle, myotendinous junction, and tendon insertion on the greater tuberosity of the humeral head were visualized. Dynamic visualization of the 22g x 1.5" needle was continuous throughout the procedure.    "

## 2024-01-30 NOTE — PROGRESS NOTES
HISTORY OF PRESENT ILLNESS   Idalmis Morejon, a 73 y.o. female, presents today for evaluation of her RIGHT SHOULDER.     History of Present Illness (HPI)  #1  Location: diffuse shoulder, lateral  Onset duration: insidious  Palliative modifying factors: relative rest, OTC analgesics,   Provocative modifying factors: GH ABduction, lifting or reaching overhead  Prior: none  Progression: plateau discomfort  Quality: sharp pain  Radiation [associated signs and symptoms]: none  Severity: per nursing documentation  Timing: intermittent w/ use  Trauma:   AAFP/FPM: Pocket Guide Documentation Guidelines, (c)2014    (37234=1+, 63802=0+)    Review of systems (ROS):  A 10+ review of systems was performed with pertinent positives and negatives noted above in the history of present illness. Other systems were negative unless otherwise specified.    PHYSICAL EXAMINATION  General:  The patient is alert and oriented x 3. Mood is pleasant. Observation of ears, eyes and nose reveal no gross abnormalities. HEENT: NCAT, sclera anicteric. Lungs: Respirations are equal and unlabored.     SHOULDER EXAMINATION     OBSERVATION:     Swelling  none  Deformity  none   Discoloration  none   Scapular winging none   Scars   none  Atrophy  none    TENDERNESS / CREPITUS (T/C):          T/C      T/C   Clavicle   -/-  SUPRAspinatus    -/-     AC Jt.    -/-  INFRAspinatus  -/-    SC Jt.    -/-  Deltoid    -/-      G. Tuberosity  -/-  LH BICEP groove  -/-   Acromion:  -/-  Midline Neck   -/-     Scapular Spine -/-  Trapezium   -/-   SMA Scapula  -/-  GH jt. line - post  -/-     Scapulothoracic  -/-         ROM:     Right shoulder   Left shoulder        AROM (PROM)   AROM (PROM)   FE    170° (175°)     170° (175°)     ER at 0°    60°  (65°)    60°  (65°)   ER at 90° ABD  90°  (90°)    90°  (90°)   IR at 90°  ABD   NA  (40°)     NA  (40°)      IR (spine level)   T10     T10    STRENGTH: (* = with pain) RIGHT SHOULDER  LEFT  SHOULDER   SCAPTION   5/5    5/5    IR    5/5    5/5   ER    5/5    5/5   BICEPS   5/5    5/5   Deltoid    5/5    5/5     SIGNS:  Painful side       NEER   -   OJJS        -    CLEMENTS   -   SPEEDS        -   DROP ARM   -   BELLY PRESS       -    X-Body ADD    -   LIFT-OFF        -   HORNBLOWERS      -              STABILITY TESTING   RIGHT SHOULDER  LEFT SHOULDER     Translation     Anterior up face    up face    Posterior up face   up face    Sulcus  < 10mm   < 10 mm     Signs   Apprehension   neg     neg       Relocation   no change    no change      Jerk test  neg    neg    EXTREMITY NEURO-VASCULAR EXAM    Sensation grossly intact to light touch all dermatomal regions.    DTR 2+ Biceps, Triceps, BR and Negative Adelas sign   Grossly intact motor function at Elbow, Wrist and Hand   Distal pulses radial and ulnar 2+, brisk cap refill, symmetric.      NECK:  Painless FROM and spinous processes non-tender. Negative Spurlings sign.       Other Findings:    ASSESSMENT & PLAN   Assessment  #1 Osteoarthritis of glenohumeral joint, right /d   W/ supraspinatus tendinosis    No evidence of neurologic pathology  No evidence of vascular pathology    Imaging studies reviewed:   X-ray shoulder, right 23.09  MRI shoulder, right 24.01    Plan  Failing fPT  Long discussion re: triamcinolone 'allergy' marked in her chart  As the only other option would be surgery, after a risks v. Benefits discussion   We decided to proceed with CSI and carefully observe for allergic response afterwards in the office    We discussed options including    Watchful waiting / relative rest    Physical therapy X    Injection therapy CSI supraspinatus tendon right   Consultation    The patient chooses As above   x = prescribed  CSI = corticosteroid injection  VSI = viscosupplement injection  PRPI = platelet rich plasma injection  ia = intra articular  R = right  L = left  B = bilateral   nfSx = surgical consultation was recommended, but  patient is not interested in consultation at this time    Physical Therapy        Formal (fPT), @ [facility] facility r   Formal (fPT), @ [facility] facility        Homegoing (hgPT), per concurrent fPT recommendations    Homegoing (hgPT), per prior fPT recommendations    Homegoing (hgPT), handout provided        w/  (atPT)    [blank] = not prescribed  x = prescribed  b = prescribed, and begin as indicated  t = continue as indicated  r = prescribed, and restart as indicated  p = completed prior as indicated  hs = prescribed, and with high school   col = prescribed, and with [employer] or university   nfPT = physical therapy was recommended, but patient is not interested in PT at this time    Activity (e.g. sports, work) restrictions    [blank] = as tolerated  pt = per physical therapist  at = per   NWB = non weight bearing on affected lower extremity, with crutches assistance for ambulation    Bracing    [blank] = not prescribed  r = recommended, but not fit with at todays visit  f = prescribed and fit with at todays visit  t = continue as indicated  d = d/c  p = as needed  rare = use on rare, as-needed basis; advised against chronic use    Pain management    [blank] = No prescription necessary. A handout detailing dosing of appropriate   over-the-counter musculoskeletal analgesics was made available to the patient.   m = meloxicam x 14 days  mp = 14 day course of meloxicam prescribed prior    Follow up 12 weeks    [blank] = as needed  [number] = in [number] weeks  CSI = for corticosteroid injection  VSI = for viscosupplement injection or injection series  PRP = for platelet rich plasma injection or injection series  MRI = after MRI imaging  ns = should surgical options be deferred (no surgery)  o = appointment offered, deferred by patient    Should symptoms worsen or fail to resolve, consider    Revisiting the above options and / or Csi iagh   before  Consultation  w/ surgical colleague     Vocation:   Retired [employer]

## 2024-02-02 ENCOUNTER — TELEPHONE (OUTPATIENT)
Dept: SPORTS MEDICINE | Facility: CLINIC | Age: 74
End: 2024-02-02
Payer: MEDICARE

## 2024-02-02 NOTE — TELEPHONE ENCOUNTER
Called pt to ask what kind of symptoms she's experiencing. Pt stated she has small bumps on her arms and chest. Informed pt she can get over the counter benadryl and if her symptoms worsen she should go to the ER.

## 2024-02-05 ENCOUNTER — TELEPHONE (OUTPATIENT)
Dept: SPORTS MEDICINE | Facility: CLINIC | Age: 74
End: 2024-02-05
Payer: MEDICARE

## 2024-02-05 NOTE — TELEPHONE ENCOUNTER
Spoke w/ pt  After csi  Yes urticaria  No breathing difficulties, wheeze, cough, etc.  Yes responded to diphenhydramine    Shoulder improving w/ recent csi  To begin fPT in the next couple of days    Will follow over the next few weeks    Next steps would be csi iagh vs. Surgical consultation

## 2024-02-06 ENCOUNTER — CLINICAL SUPPORT (OUTPATIENT)
Dept: REHABILITATION | Facility: HOSPITAL | Age: 74
End: 2024-02-06
Payer: MEDICARE

## 2024-02-06 DIAGNOSIS — M25.511 RIGHT SHOULDER PAIN, UNSPECIFIED CHRONICITY: ICD-10-CM

## 2024-02-06 DIAGNOSIS — M67.911 DYSFUNCTION OF RIGHT ROTATOR CUFF: ICD-10-CM

## 2024-02-06 DIAGNOSIS — M19.011 OSTEOARTHRITIS OF RIGHT GLENOHUMERAL JOINT: ICD-10-CM

## 2024-02-06 PROCEDURE — 97110 THERAPEUTIC EXERCISES: CPT

## 2024-02-06 PROCEDURE — 97165 OT EVAL LOW COMPLEX 30 MIN: CPT

## 2024-02-06 NOTE — PATIENT INSTRUCTIONS
Access Code: [email or web address]  URL: [email or web address]  Date: 02/06/2024  Prepared by: Edie Akins    Exercises  - Seated Shoulder Scaption AAROM with Pulley at Side  - 2 x daily - 7 x weekly - 1 sets - 10 reps  - Seated Shoulder Flexion AAROM with Pulley Behind  - 2 x daily - 7 x weekly - 1 sets - 10 reps  - Seated Shoulder Abduction AAROM with Pulley Behind  - 2 x daily - 7 x weekly - 1 sets - 10 reps  - Sidelying Shoulder External Rotation  - 2 x daily - 7 x weekly - 3 sets - 10 reps  - Seated Shoulder Flexion Towel Slide at Table Top  - 2 x daily - 7 x weekly - 1 sets - 10 reps  - Seated Shoulder Scaption Slide at Table Top with Forearm in Neutral  - 2 x daily - 7 x weekly - 1 sets - 10 reps

## 2024-02-21 ENCOUNTER — PATIENT MESSAGE (OUTPATIENT)
Dept: SPORTS MEDICINE | Facility: CLINIC | Age: 74
End: 2024-02-21
Payer: MEDICARE

## 2024-02-22 NOTE — PLAN OF CARE
[facility]  Occupational Therapy Initial Evaluation     Name: Idalmis Morejon  Clinic Number: [id number]    Therapy Diagnosis:   Encounter Diagnoses   Name Primary?    Right shoulder pain, unspecified chronicity     Osteoarthritis of right glenohumeral joint     Dysfunction of right rotator cuff      Physician: Michael Gross, *    Physician Orders: Eval and Treat  Medical Diagnosis:   M25.511 (ICD-10-CM) - Pain in right shoulder   M19.011 (ICD-10-CM) - Primary osteoarthritis, right shoulder   M67.911 (ICD-10-CM) - Unspecified disorder of synovium and tendon, right shoulder     Surgical Procedure and Date: ,   Evaluation Date: 2/6/2024  Insurance Authorization Period Expiration: 12/31/2024  Plan of Care Certification Period: 8 weeks; 4/5/2024  Date of Return to MD: Isael  Visit # / Visits authorized: 1 / 1  FOTO: 1/ 3    Precautions:  Standard    Time In: 09:00A   Time Out: 10:00A  Total Billable Time: 60 minutes    Subjective     Date of Onset: September 2023     History of Current Condition/Mechanism of Injury: Idalmis reports:  Started in September. December started to get worse - went to urgent care and was dx with muscle spasm. Muscle relaxer not helping. She reports her pain now starts in her upper arm and travels down to the top of her hand. Everyday she does house chore and by Friday she is very tired. She did verbalize she separates her chores between days of the week to reduce work load.       Falls: 0    Involved Side: Right   Dominant Side: Right        Prior Therapy: no     Pain:  Functional Pain Scale Rating 0-10:   6/10 on average  6/10 at best  10/10 at worst  Location:  middle deltoid, dorsum of hand   Description: Throbbing, Tight, Sharp, and Shooting  Aggravating Factors: Night Time and Lifting  Easing Factors: CSI     Occupation:  Retired   Working presently: retired   Duties:     Functional Limitations/Social History:    Previous functional status includes:  Independent with all ADLs.     Current Functional Status   Home/Living environment: lives with their daughter          Limitation of Functional Status as follows:   ADLs/IADLs:     - Feeding: difficulty with heavy pots and pans     - Bathing: min difficulty with washing hair     - Dressing: pain when doffing reno   - Grooming: I    - Home Management: mod difficulty     - Driving: n/a      Leisure: puzzles     Patient's Goals for Therapy:  avoiding surgery     Past Medical History/Physical Systems Review:   Idalmis Morejon  has a past medical history of Abnormal chest CT, Acid reflux, Allergy, Anemia, Anemia, Anxiety, Cataract, Chronic UTI, Colon adenoma 2015 due 2020, Colon adenoma 2015 due 2020, Depression, Disturbed sleep rhythm, DJD (degenerative joint disease), Dry eyes, Dry mouth, Grief reaction, migraine headaches, Hyperlipemia, Hypernatremia, Hypertension, Iritis, Iritis, Mild vitamin D deficiency, Multiple lung nodules on CT: 1047-0413 no f/u needed, Neurogenic bladder, Osteopenia, Osteoporosis, Osteoporosis: 9/15 see rheumatology notes, Positive [place] (antinuclear antibody), Schatzki's ring: 3/15 dilated, Sciatica neuralgia, Steroid-induced glaucoma of both eyes, Undifferentiated connective tissue disease, and Visual impairment.    Idalmis Morejon  has a past surgical history that includes Cholecystectomy; Appendectomy; Posterior fusion lumbar spine w/ corpectomy; Back surgery (2007); TONSILLECTOMY, ADENOIDECTOMY; Cystoscopy; Hernia repair; Colonoscopy (N/A, 10/2/2015); Hysterectomy; Esophagogastroduodenoscopy (N/A, 5/13/2019); Esophagogastroduodenoscopy (N/A, 9/9/2019); Colonoscopy (N/A, 9/9/2019); Spine surgery; Trigger finger release (Right, 6/3/2022); Esophagogastroduodenoscopy (N/A, 12/7/2022); and Colonoscopy (N/A, 7/21/2023).    Idalmis has a current medication list which includes the following prescription(s): amlodipine, ascorbic acid, b-complex with vitamin c, duloxetine, myrbetriq, pantoprazole,  timolol maleate 0.5%, valsartan, vitamin d, and vitamin e acetate.    Review of patient's allergies indicates:   Allergen Reactions    Alendronate Nausea Only     And heartburn    Brimonidine Dermatitis     Follicular Conjunctivitis    Kenalog [triamcinolone acetonide] Hives        Objective             Shoulder ROM. Measured in degrees   2/6/2024 2/6/2024    Left Right   Shoulder Flexion  95   Shoulder Abduction  85   Shoulder Extension      Shoulder IR Lower thoracic  Sacrum    Shoulder ER  65       Elbow and Wrist ROM. Measured in degrees.   2/22/2024 2/22/2024    Left Right   Elbow ext/flex 5/  10/   Supination/Pronation     Wrist Ext/Flex     Wrist RD/UD                Strength (Dyanmometer) and Pinch Strength (Pinch Gauge)  Measured in pounds and psi. Average of three trials.   2/6/2024 2/6/2024    Right  Left    Rung II 40.5 46.5    Key Pinch     3pt Pinch     2pt Pinch         Intake Outcome Measure for FOTO initial eval; shoulder  Survey    Therapist reviewed FOTO scores for Idalmis Morejon on 2/6/2024.   FOTO report - see Media section or FOTO account episode details.    Intake Score: 64.2%         Treatment     Total Treatment time separate from Evaluation time:20    Idalmis received the treatments listed below:      therapeutic exercises to develop strength for 10 minutes including:  See patient instructions for HEP          hot pack for R shoulder minutes to 10.      Patient Education and Home Exercises      Education provided:   -role of OT, goals for OT, scheduling/cancellations, insurance limitations with patient.  -Additional Education provided: HEP as directed, [facility]    Written Home Exercises Provided: yes.  Exercises were reviewed and Idalmis was able to demonstrate them prior to the end of the session.    Idalmis demonstrated good  understanding of the education provided.     Pt was advised to perform these exercises free of pain, and to stop performing them if pain occurs.    See EMR under  Patient Instructions for exercises provided 2/6/2024.    Assessment     Idalmis Morejon is a 73 y.o. female referred to outpatient occupational therapy and presents with a medical diagnosis of R shoulder.    Following medical record review it is determined that pt will benefit from occupational therapy services in order to maximize pain free and/or functional use of right shoulder. The following goals were discussed with the patient and patient is in agreement with them as to be addressed in the treatment plan. The patient's rehab potential is Fair.     Anticipated barriers to occupational therapy:     Plan of care discussed with patient: Yes  Patient's spiritual, cultural and educational needs considered and patient is agreeable to the plan of care and goals as stated below:     Medical Necessity is demonstrated by the following  Occupational Profile/History  Co-morbidities and personal factors that may impact the plan of care [x] LOW: Brief chart review  [] MODERATE: Expanded chart review   [] HIGH: Extensive chart review    Moderate / High Support Documentation:      Examination  Performance deficits relating to physical, cognitive or psychosocial skills that result in activity limitations and/or participation restrictions  [x] LOW: addressing 1-3 Performance deficits  [] MODERATE: 3-5 Performance deficits  [] HIGH: 5+ Performance deficits (please support below)    Moderate / High Support Documentation:    Physical:  Joint Stability  Muscle Power/Strength  Muscle Endurance   Strength  Muscle Tone  Pain    Cognitive:  No Deficits    Psychosocial:    Habits  Routines  Rituals     Treatment Options [] LOW: Limited options  [] MODERATE: Several options  [] HIGH: Multiple options      Decision Making/ Complexity Score: low       The following goals were discussed with the patient and patient is in agreement with them as to be addressed in the treatment plan.       Goals:     To  be determined next visit     Plan    Certification Period/Plan of care expiration: 2/6/2024 to 4/5/2024.    Outpatient Occupational Therapy 1 times weekly for 8 weeks to include the following interventions: Manual therapy/joint mobilizations, Modalities for pain management, Therapeutic exercises/activities., Strengthening, Joint Protection, and Energy Conservation.    Edie Akins OT        Physician's Signature: _________________________________________ Date: ________________

## 2024-03-12 ENCOUNTER — OFFICE VISIT (OUTPATIENT)
Dept: INTERNAL MEDICINE | Facility: CLINIC | Age: 74
End: 2024-03-12
Payer: MEDICARE

## 2024-03-12 VITALS
SYSTOLIC BLOOD PRESSURE: 135 MMHG | DIASTOLIC BLOOD PRESSURE: 70 MMHG | WEIGHT: 158 LBS | BODY MASS INDEX: 25.39 KG/M2 | HEIGHT: 66 IN

## 2024-03-12 DIAGNOSIS — D12.6 COLON ADENOMA: ICD-10-CM

## 2024-03-12 DIAGNOSIS — N25.81 SECONDARY HYPERPARATHYROIDISM: ICD-10-CM

## 2024-03-12 DIAGNOSIS — M81.0 AGE-RELATED OSTEOPOROSIS WITHOUT CURRENT PATHOLOGICAL FRACTURE: ICD-10-CM

## 2024-03-12 DIAGNOSIS — F33.41 RECURRENT MAJOR DEPRESSIVE DISORDER, IN PARTIAL REMISSION: ICD-10-CM

## 2024-03-12 DIAGNOSIS — I77.1 TORTUOUS AORTA: ICD-10-CM

## 2024-03-12 DIAGNOSIS — E78.2 MIXED HYPERLIPIDEMIA: ICD-10-CM

## 2024-03-12 DIAGNOSIS — I70.0 ATHEROSCLEROSIS OF ABDOMINAL AORTA: ICD-10-CM

## 2024-03-12 DIAGNOSIS — D89.89 AUTOIMMUNE DISORDER: ICD-10-CM

## 2024-03-12 DIAGNOSIS — I10 ESSENTIAL HYPERTENSION: Primary | ICD-10-CM

## 2024-03-12 DIAGNOSIS — N18.31 STAGE 3A CHRONIC KIDNEY DISEASE: ICD-10-CM

## 2024-03-12 DIAGNOSIS — R91.8 MULTIPLE LUNG NODULES ON CT: ICD-10-CM

## 2024-03-12 DIAGNOSIS — D84.9 IMMUNOSUPPRESSION: ICD-10-CM

## 2024-03-12 DIAGNOSIS — M35.9 UNDIFFERENTIATED CONNECTIVE TISSUE DISEASE: ICD-10-CM

## 2024-03-12 PROCEDURE — G2211 COMPLEX E/M VISIT ADD ON: HCPCS | Mod: S$GLB,,, | Performed by: INTERNAL MEDICINE

## 2024-03-12 PROCEDURE — 1160F RVW MEDS BY RX/DR IN RCRD: CPT | Mod: CPTII,S$GLB,, | Performed by: INTERNAL MEDICINE

## 2024-03-12 PROCEDURE — 99214 OFFICE O/P EST MOD 30 MIN: CPT | Mod: S$GLB,,, | Performed by: INTERNAL MEDICINE

## 2024-03-12 PROCEDURE — 1101F PT FALLS ASSESS-DOCD LE1/YR: CPT | Mod: CPTII,S$GLB,, | Performed by: INTERNAL MEDICINE

## 2024-03-12 PROCEDURE — 3075F SYST BP GE 130 - 139MM HG: CPT | Mod: CPTII,S$GLB,, | Performed by: INTERNAL MEDICINE

## 2024-03-12 PROCEDURE — 1159F MED LIST DOCD IN RCRD: CPT | Mod: CPTII,S$GLB,, | Performed by: INTERNAL MEDICINE

## 2024-03-12 PROCEDURE — 3288F FALL RISK ASSESSMENT DOCD: CPT | Mod: CPTII,S$GLB,, | Performed by: INTERNAL MEDICINE

## 2024-03-12 PROCEDURE — 1126F AMNT PAIN NOTED NONE PRSNT: CPT | Mod: CPTII,S$GLB,, | Performed by: INTERNAL MEDICINE

## 2024-03-12 PROCEDURE — 3078F DIAST BP <80 MM HG: CPT | Mod: CPTII,S$GLB,, | Performed by: INTERNAL MEDICINE

## 2024-03-12 PROCEDURE — 99999 PR PBB SHADOW E&M-EST. PATIENT-LVL IV: CPT | Mod: PBBFAC,,, | Performed by: INTERNAL MEDICINE

## 2024-03-12 NOTE — PROGRESS NOTES
Patient ID: Idalmis Morejon is a 73 y.o. female.    Chief Complaint: Hypertension      Assessment:       1. Essential hypertension    2. Mixed hyperlipidemia    3. Atherosclerosis of abdominal aorta: minimal see CT 7/16; CT SCORE 0 1/22    4. Tortuous aorta; on CT 12/10 and CXR 7/20: CT SCORE 0 1/22    5. Stage 3a chronic kidney disease    6. Autoimmune disorder- recurrent iritis    7. Immunosuppression    8. Undifferentiated connective tissue disease    9. Age-related osteoporosis 2016; also 4/21, 7/23    10. Secondary hyperparathyroidism    11. Recurrent major depressive disorder, in partial remission    12. Multiple lung nodules on CT: 1007-1060 no f/u needed    13. Colon adenoma: 10/15 acceptable 2019, repeated 2023- repeat 5 years          Plan:         1. Essential hypertension: Low salt diet, exercise, 15-20-# weight loss in next 6-12 months' time.  Call if BP > 130/80 on a regular basis.    2. Mixed hyperlipidemia:  CT score is 0 last year, declines treatment    3. Atherosclerosis of abdominal aorta: minimal see CT 7/16; CT SCORE 0 1/22    4. Tortuous aorta; on CT 12/10 and CXR 7/20: CT SCORE 0 1/22  Overview:  Noted on CXR from 7/29/20.      5. Stage 3a chronic kidney disease; gentle hydration, avoid nephrotoxins; keep Nephrology follow-up    6. Autoimmune disorder- recurrent iritis  -     Ambulatory referral/consult to Rheumatology; Future; Expected date: 03/19/2024    7. Immunosuppression:  No alarm symptoms    8. Undifferentiated connective tissue disease  -     Ambulatory referral/consult to Rheumatology; Future; Expected date: 03/19/2024    9. Age-related osteoporosis 2016; also 4/21, 7/23    10. Secondary hyperparathyroidism:  Stable, keep Nephrology follow-up    11. Recurrent major depressive disorder, in partial remission:  Stable, follows in Psychiatry    12. Multiple lung nodules on CT: 9539-5378 no f/u needed    13. Colon adenoma: 10/15 acceptable 2019, repeated 2023- repeat 5 years    Schedule  labs which have been previously ordered  RSV vaccine, shingles vaccine recommended  Schedule mammogram  I will review all studies and determine further tx depending on findings  Visit today manifests increased complexity associated with the care of the multiple chronic and episodic problems I addressed.  I am managing a longitudinal care plan of the patient due to the serious and complex problems listed above.      Subjective:   Follow up multiple issues    BP stable.  Doing well today.  Sometimes systolic is above 140.  No headaches or blurry vision.  No chest pain or tightness.  No SOB.    Energy level OK.  Sleep not great, takes OTC.  Ortho issues- rotator cuff.  Got a steroid shot; did PT, not much help.    No longer on any meds for iritis.  No immunosuppression.  No visit in Rheum since 3/21.  No fever, chills or sweats.  No morning stiffness.  No rashes.    CKD follows in Nephrology.  GFR better since getting off diuretic.    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     Colon adenoma: 10/15 acceptable 2019, repeated 2023- repeat 5 years     Age-related osteoporosis 2016; also 4/21, 7/23     Schatzki's ring: 3/15 dilated, also 12/22     Multiple lung nodules on CT: 4315-7349 no f/u needed     Steroid-induced glaucoma of both eyes     Proteinuria     Family history of colon cancer: maternal uncle and aunt     Recurrent major  depressive disorder, in partial remission     Immunosuppression     Atherosclerosis of abdominal aorta: minimal see CT 7/16; CT SCORE 0 1/22     Stage 3a chronic kidney disease     Secondary hyperparathyroidism     Esophageal dysphagia     Chronic follicular conjunctivitis of both eyes     Undifferentiated connective tissue disease     Tortuous aorta; on CT 12/10 and CXR 7/20: CT SCORE 0 1/22     Open angle with borderline findings and low glaucoma risk in both eyes     History of compression fracture of spine     Decreased range of motion of finger of right hand     Decreased  strength of right hand               Review of Systems   Constitutional:  Positive for activity change. Negative for unexpected weight change.   HENT:  Negative for hearing loss, rhinorrhea and trouble swallowing.    Eyes:  Negative for discharge and visual disturbance.   Respiratory:  Negative for chest tightness and wheezing.    Gastrointestinal:  Negative for blood in stool, constipation, diarrhea and vomiting.   Endocrine: Negative for polydipsia and polyuria.   Genitourinary:  Negative for difficulty urinating, dysuria, hematuria and menstrual problem.   Musculoskeletal:  Positive for arthralgias. Negative for joint swelling.   Neurological:  Negative for weakness.   Psychiatric/Behavioral:  Negative for confusion and dysphoric mood.         Stable mood         Objective:      Physical Exam  Vitals and nursing note reviewed.   Constitutional:       Appearance: She is well-developed.   HENT:      Head: Normocephalic and atraumatic.      Right Ear: External ear normal.      Left Ear: External ear normal.      Nose: Nose normal.      Mouth/Throat:      Pharynx: No oropharyngeal exudate.   Eyes:      General: No scleral icterus.     Extraocular Movements: Extraocular movements intact.      Conjunctiva/sclera: Conjunctivae normal.   Neck:      Thyroid: No thyromegaly.      Vascular: No JVD.   Cardiovascular:      Rate and Rhythm: Normal  rate and regular rhythm.      Heart sounds: Normal heart sounds. No murmur heard.     No gallop.   Pulmonary:      Effort: Pulmonary effort is normal. No respiratory distress.      Breath sounds: Normal breath sounds. No wheezing.   Abdominal:      General: Bowel sounds are normal. There is no distension.      Palpations: Abdomen is soft. There is no mass.      Tenderness: There is no abdominal tenderness. There is no guarding or rebound.   Musculoskeletal:         General: No tenderness. Normal range of motion.      Cervical back: Normal range of motion and neck supple.   Lymphadenopathy:      Cervical: No cervical adenopathy.   Skin:     General: Skin is warm.      Findings: No erythema or rash.   Neurological:      General: No focal deficit present.      Mental Status: She is alert and oriented to person, place, and time.      Cranial Nerves: No cranial nerve deficit.      Coordination: Coordination normal.   Psychiatric:         Behavior: Behavior normal.         Thought Content: Thought content normal.         Judgment: Judgment normal.             Health Maintenance Due   Topic Date Due    Shingles Vaccine (1 of 2) Never done    RSV Vaccine (Age 60+ and Pregnant patients) (1 - 1-dose 60+ series) Never done    Hemoglobin A1c (Prediabetes)  10/25/2023    Mammogram  03/02/2024

## 2024-03-13 ENCOUNTER — HOSPITAL ENCOUNTER (OUTPATIENT)
Dept: RADIOLOGY | Facility: HOSPITAL | Age: 74
Discharge: HOME | End: 2024-03-13
Attending: INTERNAL MEDICINE
Payer: MEDICARE

## 2024-03-13 DIAGNOSIS — Z12.31 SCREENING MAMMOGRAM, ENCOUNTER FOR: ICD-10-CM

## 2024-03-13 PROCEDURE — 77063 BREAST TOMOSYNTHESIS BI: CPT | Mod: 26,,, | Performed by: RADIOLOGY

## 2024-03-13 PROCEDURE — 77067 SCR MAMMO BI INCL CAD: CPT | Mod: TC

## 2024-03-13 PROCEDURE — 77067 SCR MAMMO BI INCL CAD: CPT | Mod: 26,,, | Performed by: RADIOLOGY

## 2024-03-19 ENCOUNTER — OFFICE VISIT (OUTPATIENT)
Dept: PSYCHIATRY | Facility: CLINIC | Age: 74
End: 2024-03-19
Payer: MEDICARE

## 2024-03-19 DIAGNOSIS — F40.298 SPECIFIC PHOBIA: Primary | ICD-10-CM

## 2024-03-19 DIAGNOSIS — F33.41 RECURRENT MAJOR DEPRESSIVE DISORDER, IN PARTIAL REMISSION: ICD-10-CM

## 2024-03-19 PROCEDURE — 1159F MED LIST DOCD IN RCRD: CPT | Mod: CPTII,95,, | Performed by: PSYCHIATRY & NEUROLOGY

## 2024-03-19 PROCEDURE — 1160F RVW MEDS BY RX/DR IN RCRD: CPT | Mod: CPTII,95,, | Performed by: PSYCHIATRY & NEUROLOGY

## 2024-03-19 PROCEDURE — 99214 OFFICE O/P EST MOD 30 MIN: CPT | Mod: 95,,, | Performed by: PSYCHIATRY & NEUROLOGY

## 2024-03-19 RX ORDER — DULOXETIN HYDROCHLORIDE 60 MG/1
60 CAPSULE, DELAYED RELEASE ORAL DAILY
Qty: 90 CAPSULE | Refills: 1 | Status: SHIPPED | OUTPATIENT
Start: 2024-03-19

## 2024-03-19 NOTE — PROGRESS NOTES
"The patient location is: home  The chief complaint leading to consultation is: depression and anxiety    Visit type: audiovisual    Face to Face time with patient: 25 mins  30 minutes of total time spent on the encounter, which includes face to face time and non-face to face time preparing to see the patient (eg, review of tests), Obtaining and/or reviewing separately obtained history, Documenting clinical information in the electronic or other health record, Independently interpreting results (not separately reported) and communicating results to the patient/family/caregiver, or Care coordination (not separately reported).     Each patient to whom he or she provides medical services by telemedicine is:  (1) informed of the relationship between the physician and patient and the respective role of any other health care provider with respect to management of the patient; and (2) notified that he or she may decline to receive medical services by telemedicine and may withdraw from such care at any time.    Notes:     Outpatient Psychiatry Follow-Up Visit (MD/NP)    3/19/2024    Clinical Status of Patient:  Outpatient (Ambulatory)    Chief Complaint:  Idalmis Morejon is a 73 y.o. female who presents today for follow-up of depression and anxiety.      Met with patient.      Interval History and Content of Current Session:  Interim Events/Subjective Report/Content of Current Session:   "Much better than the last time we met."  "Not as down and out as I was before."  She had a better report about her kidneys and anxiety has decreased.  Not as worried.  Still needs meds to sleep.  Using liquid sleep aid from [facility] diphenhydramine or tylenol PM.   Appetite is about the same, not very good but is not losing weight.  Still drinking boost. Has not been crying.  Never desires to die.      Has been having shoulder pain, rotator cuff tear and osteoarhritis.        Prior trials:  remeron - HA  wellbutrin - does not " recall      Psychotherapy:  Target symptoms: depression, anxiety   Why chosen therapy is appropriate versus another modality: relevant to diagnosis  Outcome monitoring methods: self-report, observation  Therapeutic intervention type: supportive psychotherapy  Topics discussed/themes: illness/death of a loved one, building skills sets for symptom management, symptom recognition  The patient's response to the intervention is motivated. The patient's progress toward treatment goals is good.   Duration of intervention:  mins      Review of Systems   Constitutional:  Negative for chills, fever and weight loss.   Respiratory:  Negative for cough, shortness of breath and wheezing.    Cardiovascular:  Negative for chest pain and palpitations.         Past Medical, Family and Social History: The patient's past medical, family and social history have been reviewed and updated as appropriate within the electronic medical record - see encounter notes.    Compliance: yes    Side effects: none    Risk Parameters:  Patient reports no suicidal ideation  Patient reports no homicidal ideation  Patient reports no self-injurious behavior  Patient reports no violent behavior    Exam (detailed: at least 9 elements; comprehensive: all 15 elements)   Constitutional  Vitals:  Most recent vital signs, dated less than 90 days prior to this appointment, were reviewed.    There were no vitals filed for this visit.     General:  age appropriate, casually dressed, neatly groomed     Musculoskeletal  Muscle Strength/Tone:  no dyskinesia, no tremor   Gait & Station:  Ambulates during session without difficulty       Psychiatric  Speech:  normal tone, normal rate, normal pitch, normal volume, prosody intact   Mood:    Affect:  euthymic  appropriate, full   Thought Process:  goal-directed, logical   Associations:  intact   Thought Content:  normal, no suicidality, no homicidality, delusions, or paranoia   Insight  Judgement:  good, patient has  awareness of illness  Patient's behavior is adequate to circumstances   Orientation:  grossly intact   Memory: intact for content of interview   Language: grossly intact   Attention Span & Concentration:  able to focus   Fund of Knowledge:  intact and appropriate to age and level of education     Assessment and Diagnosis   Status/Progress: Based on the examination today, the patient's problem(s) is/are improved.  New problems have not been presented today.   Comorbidities are complicating management of the primary condition.  There are no active rule-out diagnoses for this patient at this time.    General Impression: Pt with depression and anxiety as well as multiple other medical comorbidities.  She is still affected by the death of her mother in March 2014.      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  Chronic kidney disease stage 3  History of illusions      Intervention/Counseling/Treatment Plan   Continue cymbalta 60 mg daily.  Discussed the possibility of discontinuing this medication if her GFR drops below 30    Return to Clinic:  3-4 months

## 2024-03-25 ENCOUNTER — PATIENT MESSAGE (OUTPATIENT)
Dept: SPORTS MEDICINE | Facility: CLINIC | Age: 74
End: 2024-03-25
Payer: MEDICARE

## 2024-03-28 ENCOUNTER — OFFICE VISIT (OUTPATIENT)
Dept: SPORTS MEDICINE | Facility: CLINIC | Age: 74
End: 2024-03-28
Payer: MEDICARE

## 2024-03-28 VITALS
WEIGHT: 161.19 LBS | HEIGHT: 66 IN | BODY MASS INDEX: 25.9 KG/M2 | SYSTOLIC BLOOD PRESSURE: 153 MMHG | HEART RATE: 64 BPM | DIASTOLIC BLOOD PRESSURE: 79 MMHG

## 2024-03-28 DIAGNOSIS — M19.011 OSTEOARTHRITIS OF RIGHT GLENOHUMERAL JOINT: Primary | ICD-10-CM

## 2024-03-28 DIAGNOSIS — G89.29 CHRONIC RIGHT SHOULDER PAIN: ICD-10-CM

## 2024-03-28 DIAGNOSIS — S46.819S SPRAIN OF SUPRASPINATUS MUSCLE OR TENDON, UNSPECIFIED LATERALITY, SEQUELA: ICD-10-CM

## 2024-03-28 DIAGNOSIS — M25.511 CHRONIC RIGHT SHOULDER PAIN: ICD-10-CM

## 2024-03-28 DIAGNOSIS — M25.411 EFFUSION OF GLENOHUMERAL JOINT OF RIGHT UPPER EXTREMITY: ICD-10-CM

## 2024-03-28 DIAGNOSIS — M67.80 TENDINOSIS: ICD-10-CM

## 2024-03-28 PROCEDURE — 3078F DIAST BP <80 MM HG: CPT | Mod: CPTII,S$GLB,, | Performed by: FAMILY MEDICINE

## 2024-03-28 PROCEDURE — 1160F RVW MEDS BY RX/DR IN RCRD: CPT | Mod: CPTII,S$GLB,, | Performed by: FAMILY MEDICINE

## 2024-03-28 PROCEDURE — 99214 OFFICE O/P EST MOD 30 MIN: CPT | Mod: S$GLB,,, | Performed by: FAMILY MEDICINE

## 2024-03-28 PROCEDURE — 1101F PT FALLS ASSESS-DOCD LE1/YR: CPT | Mod: CPTII,S$GLB,, | Performed by: FAMILY MEDICINE

## 2024-03-28 PROCEDURE — 3077F SYST BP >= 140 MM HG: CPT | Mod: CPTII,S$GLB,, | Performed by: FAMILY MEDICINE

## 2024-03-28 PROCEDURE — 1125F AMNT PAIN NOTED PAIN PRSNT: CPT | Mod: CPTII,S$GLB,, | Performed by: FAMILY MEDICINE

## 2024-03-28 PROCEDURE — 3288F FALL RISK ASSESSMENT DOCD: CPT | Mod: CPTII,S$GLB,, | Performed by: FAMILY MEDICINE

## 2024-03-28 PROCEDURE — 99999 PR PBB SHADOW E&M-EST. PATIENT-LVL III: CPT | Mod: PBBFAC,,, | Performed by: FAMILY MEDICINE

## 2024-03-28 PROCEDURE — 1159F MED LIST DOCD IN RCRD: CPT | Mod: CPTII,S$GLB,, | Performed by: FAMILY MEDICINE

## 2024-03-28 NOTE — PROGRESS NOTES
HISTORY OF PRESENT ILLNESS   Idalmis Morejon, a 73 y.o. female, presents today for evaluation of her RIGHT SHOULDER.     History of Present Illness (HPI)  #1  Location: diffuse shoulder, lateral  Onset duration: insidious  Palliative modifying factors: relative rest, OTC analgesics,   Provocative modifying factors: GH ABduction, lifting or reaching overhead  Prior: none  Progression: plateau discomfort  Quality: sharp pain  Radiation [associated signs and symptoms]: none  Severity: per nursing documentation  Timing: intermittent w/ use  Trauma:   AAFP/FPM: Pocket Guide Documentation Guidelines, (c)2014    (48492=5+, 38880=9+)    Review of systems (ROS):  A 10+ review of systems was performed with pertinent positives and negatives noted above in the history of present illness. Other systems were negative unless otherwise specified.    PHYSICAL EXAMINATION  General:  The patient is alert and oriented x 3. Mood is pleasant. Observation of ears, eyes and nose reveal no gross abnormalities. HEENT: NCAT, sclera anicteric. Lungs: Respirations are equal and unlabored.     SHOULDER EXAMINATION     OBSERVATION:     Swelling  none  Deformity  none   Discoloration  none   Scapular winging none   Scars   none  Atrophy  none    TENDERNESS / CREPITUS (T/C):          T/C      T/C   Clavicle   -/-  SUPRAspinatus    -/-     AC Jt.    -/-  INFRAspinatus  -/-    SC Jt.    -/-  Deltoid    -/-      G. Tuberosity  -/-  LH BICEP groove  -/-   Acromion:  -/-  Midline Neck   -/-     Scapular Spine -/-  Trapezium   -/-   SMA Scapula  -/-  GH jt. line - post  -/-     Scapulothoracic  -/-         ROM:     Right shoulder   Left shoulder        AROM (PROM)   AROM (PROM)   FE    170° (175°)     170° (175°)     ER at 0°    60°  (65°)    60°  (65°)   ER at 90° ABD  90°  (90°)    90°  (90°)   IR at 90°  ABD   NA  (40°)     NA  (40°)      IR (spine level)   T10     T10    STRENGTH: (* = with pain) RIGHT SHOULDER  LEFT  SHOULDER   SCAPTION   5/5    5/5    IR    5/5    5/5   ER    5/5    5/5   BICEPS   5/5    5/5   Deltoid    5/5    5/5     SIGNS:  Painful side       NEER   -   OJJS        -    CLEMENTS   -   SPEEDS        -   DROP ARM   -   BELLY PRESS       -    X-Body ADD    -   LIFT-OFF        -   HORNBLOWERS      -              STABILITY TESTING   RIGHT SHOULDER  LEFT SHOULDER     Translation     Anterior up face    up face    Posterior up face   up face    Sulcus  < 10mm   < 10 mm     Signs   Apprehension   neg     neg       Relocation   no change    no change      Jerk test  neg    neg    EXTREMITY NEURO-VASCULAR EXAM    Sensation grossly intact to light touch all dermatomal regions.    DTR 2+ Biceps, Triceps, BR and Negative Adelas sign   Grossly intact motor function at Elbow, Wrist and Hand   Distal pulses radial and ulnar 2+, brisk cap refill, symmetric.      NECK:  Painless FROM and spinous processes non-tender. Negative Spurlings sign.       Other Findings:    ASSESSMENT & PLAN   Assessment  #1 Osteoarthritis of glenohumeral joint, right /d   W/ loose bodies and GH effusino  #2 true allergy to CSI injection   W/ urticaria, w/out respiration compromise  #3 cannot take NSAIDs    No evidence of neurologic pathology  No evidence of vascular pathology    Imaging studies reviewed:   X-ray shoulder, right 23.09  MRI shoulder, right 24.01    Plan  Failing conservative therapies    We discussed options including    Watchful waiting / relative rest    Physical therapy X    Injection therapy    Consultation W/ Team Bruce  Re: ADELINE debridement   The patient chooses As above   x = prescribed  CSI = corticosteroid injection  VSI = viscosupplement injection  PRPI = platelet rich plasma injection  ia = intra articular  R = right  L = left  B = bilateral   nfSx = surgical consultation was recommended, but patient is not interested in consultation at this time    Physical Therapy        Formal (fPT), @ [facility] facility p    Formal (fPT), @ OSH facility        Homegoing (hgPT), per concurrent fPT recommendations    Homegoing (hgPT), per prior fPT recommendations t   Homegoing (hgPT), handout provided        w/  (atPT)    [blank] = not prescribed  x = prescribed  b = prescribed, and begin as indicated  t = continue as indicated  r = prescribed, and restart as indicated  p = completed prior as indicated  hs = prescribed, and with high school   col = prescribed, and with [facility] or university   nfPT = physical therapy was recommended, but patient is not interested in PT at this time    Activity (e.g. sports, work) restrictions    [blank] = as tolerated  pt = per physical therapist  at = per   NWB = non weight bearing on affected lower extremity, with crutches assistance for ambulation    Bracing    [blank] = not prescribed  r = recommended, but not fit with at todays visit  f = prescribed and fit with at todays visit  t = continue as indicated  d = d/c  p = as needed  rare = use on rare, as-needed basis; advised against chronic use    Pain management    [blank] = No prescription necessary. A handout detailing dosing of appropriate   over-the-counter musculoskeletal analgesics was made available to the patient.   m = meloxicam x 14 days  mp = 14 day course of meloxicam prescribed prior    Follow up ns   [blank] = as needed  [number] = in [number] weeks  CSI = for corticosteroid injection  VSI = for viscosupplement injection or injection series  PRP = for platelet rich plasma injection or injection series  MRI = after MRI imaging  ns = should surgical options be deferred (no surgery)  o = appointment offered, deferred by patient    Should symptoms worsen or fail to resolve, consider    Revisiting the above options and / or      Vocation:   Retired [employer] employee

## 2024-04-02 ENCOUNTER — PATIENT MESSAGE (OUTPATIENT)
Dept: INTERNAL MEDICINE | Facility: CLINIC | Age: 74
End: 2024-04-02
Payer: MEDICARE

## 2024-04-02 ENCOUNTER — TELEPHONE (OUTPATIENT)
Dept: SPORTS MEDICINE | Facility: CLINIC | Age: 74
End: 2024-04-02
Payer: MEDICARE

## 2024-04-02 DIAGNOSIS — N32.81 OVERACTIVE BLADDER: Primary | ICD-10-CM

## 2024-04-02 NOTE — TELEPHONE ENCOUNTER
Pt requesting med refill, last prescribed by YUSRA Curtis in urology and is not longer there. Pt asking if PCP can prescribe until pt finds a new provider. Pended     LOV with Noemy Pugh MD , 3/12/2024

## 2024-04-02 NOTE — TELEPHONE ENCOUNTER
No care due was identified.  [facility] Embedded Care Due Messages. Reference number: [id number].   4/02/2024 5:17:45 PM CDT

## 2024-04-02 NOTE — TELEPHONE ENCOUNTER
----- Message from John Saldivar sent at 3/28/2024  4:24 PM CDT -----  Can schedule for shoulder consult.   ----- Message -----  From: Michael Gross MD  Sent: 3/28/2024  12:05 PM CDT  To: Jean-Pierre Barrera MD; #    Would you please see this pt  RCR + GH debridement if you feel appropriate

## 2024-04-03 ENCOUNTER — PATIENT MESSAGE (OUTPATIENT)
Dept: SPORTS MEDICINE | Facility: CLINIC | Age: 74
End: 2024-04-03
Payer: MEDICARE

## 2024-04-03 RX ORDER — MIRABEGRON 25 MG/1
25 TABLET, FILM COATED, EXTENDED RELEASE ORAL DAILY
Qty: 90 TABLET | Refills: 3 | Status: SHIPPED | OUTPATIENT
Start: 2024-04-03 | End: 2024-04-04 | Stop reason: SDUPTHER

## 2024-04-04 ENCOUNTER — OFFICE VISIT (OUTPATIENT)
Dept: UROLOGY | Facility: CLINIC | Age: 74
End: 2024-04-04
Payer: MEDICARE

## 2024-04-04 DIAGNOSIS — N32.81 OVERACTIVE BLADDER: ICD-10-CM

## 2024-04-04 PROCEDURE — 99213 OFFICE O/P EST LOW 20 MIN: CPT | Mod: 95,,, | Performed by: UROLOGY

## 2024-04-04 RX ORDER — MIRABEGRON 25 MG/1
25 TABLET, FILM COATED, EXTENDED RELEASE ORAL DAILY
Qty: 90 TABLET | Refills: 3 | Status: SHIPPED | OUTPATIENT
Start: 2024-04-04 | End: 2025-04-04

## 2024-04-04 NOTE — PROGRESS NOTES
The patient location is: [place], [place]  The chief complaint leading to consultation is: nocturia and urgency    Visit type: audiovisual    Time with patient:  20 minutes of total time spent on the encounter, which includes face to face time and non-face to face time preparing to see the patient (eg, review of tests), obtaining and/or reviewing separately obtained history, documenting clinical information in the electronic or other health record, independently interpreting results (not separately reported) and communicating results to the patient/family/caregiver, or care coordination (not separately reported).     Each patient to whom he or she provides medical services by telemedicine is:  (1) informed of the relationship between the physician and patient and the respective role of any other health care provider with respect to management of the patient; and (2) notified that he or she may decline to receive medical services by telemedicine and may withdraw from such care at any time.    CHIEF COMPLAINT:    Mrs. Morejon is a 73 y.o. female presenting for a follow up on nocturia.      PRESENTING ILLNESS:    Idalmis Morejon is a 73 y.o. female who presents with a history of nocturia for which she takes Myrbetriq 25 mg.  She takes it at 18:30 and it works well for her.  She is in need a refill.  Her last visit in the department was with Edie Curtis on 3/22/2023.  She has no side effects and is pleased with the results.     REVIEW OF SYSTEMS:    Review of Systems   Constitutional: Negative.    HENT: Negative.     Eyes: Negative.    Respiratory: Negative.     Cardiovascular: Negative.    Gastrointestinal:  Positive for heartburn.   Genitourinary:         Nocturia   Musculoskeletal: Negative.    Skin: Negative.    Neurological: Negative.    Endo/Heme/Allergies: Negative.    Psychiatric/Behavioral: Negative.         PATIENT HISTORY:    Past Medical History:   Diagnosis Date    Abnormal chest CT     Acid reflux     Allergy      Anemia     Anemia     Anxiety     Cataract     Chronic UTI     recently treated with antibiotics -x 3 wks. ago-    Colon adenoma 2015 due 2020 10/21/2015    Colon adenoma 2015 due 2020 10/21/2015    Depression     Disturbed sleep rhythm     DJD (degenerative joint disease)     Dry eyes     Dry mouth     Grief reaction 3/24/2014    Hx of migraine headaches     Hyperlipemia     Hypernatremia 8/8/2014    Hypertension     Iritis     Iritis     Mild vitamin D deficiency 9/9/2013    Multiple lung nodules on CT: [id number] no f/u needed 6/6/2016    Neurogenic bladder     Osteopenia     in hips    Osteoporosis     spine    Osteoporosis: 9/15 see rheumatology notes 6/6/2016    Positive [place] (antinuclear antibody)     Schatzki's ring: 3/15 dilated 6/6/2016    Sciatica neuralgia 6/21/2013    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    Visual impairment        Past Surgical History:   Procedure Laterality Date    APPENDECTOMY      BACK SURGERY  2007    x4    CHOLECYSTECTOMY      lap orin    COLONOSCOPY N/A 10/2/2015    Procedure: COLONOSCOPY;  Surgeon: Bryan Love MD;  [facility]  Service: Endoscopy;  Laterality: N/A;    COLONOSCOPY N/A 9/9/2019    Procedure: COLONOSCOPY;  Surgeon: Bryan Love MD;  Location: [facility] (4TH FLR);  Service: Endoscopy;  Laterality: N/A;    COLONOSCOPY N/A 7/21/2023    Procedure: COLONOSCOPY;  Surgeon: Bryan Love MD;  Location: [facility] (4TH FLR);  Service: Endoscopy;  Laterality: N/A;  extended miralax prep-instr portal-pt declined any earlier dates-tb-hx of colon ca in multiple 2nd degree relatives    CYSTOSCOPY      with BOTOX INJECTION    ESOPHAGOGASTRODUODENOSCOPY N/A 5/13/2019    Procedure: EGD (ESOPHAGOGASTRODUODENOSCOPY);  Surgeon: Bryan Love MD;  Location: [facility] (4TH FLR);  Service: Endoscopy;  Laterality: N/A;    ESOPHAGOGASTRODUODENOSCOPY N/A 9/9/2019    Procedure: EGD  (ESOPHAGOGASTRODUODENOSCOPY);  Surgeon: Bryan Love MD;  Location: [facility] (4TH FLR);  Service: Endoscopy;  Laterality: N/A;    ESOPHAGOGASTRODUODENOSCOPY N/A 12/7/2022    Procedure: EGD (ESOPHAGOGASTRODUODENOSCOPY);  Surgeon: Bryan Love MD;  Location: [facility] (4TH FLR);  Service: Endoscopy;  Laterality: N/A;  Endoscopy schedulers please schedule patient as soon as possible with me for EGD for esophageal dilation for dysphagia.  Thank you     instr portal-GT  pre call complete-as    HERNIA REPAIR      umbilical    HYSTERECTOMY      COMPLETE    POSTERIOR FUSION LUMBAR SPINE W/ CORPECTOMY      SPINE SURGERY      TONSILLECTOMY, ADENOIDECTOMY      TRIGGER FINGER RELEASE Right 6/3/2022    Procedure: RELEASE, TRIGGER FINGER,RIGHT,LONG;  Surgeon: Noemy Hernandez MD;  Location: [facility];  Service: Orthopedics;  Laterality: Right;       Family History   Problem Relation Age of Onset    Kidney disease Mother     Hypertension Mother     Diabetes Father     Diabetes Brother     Kidney disease Brother     Heart disease Brother     Hyperlipidemia Sister     Hypertension Sister     Diverticulosis Sister     Hypertension Daughter     Colon cancer Maternal Aunt 70        stomach    Blindness Maternal Aunt     Cancer Maternal Aunt         stomach cancer    Cancer Maternal Uncle         colon    Colon cancer Maternal Uncle 70        two uncles    Glaucoma Neg Hx     Amblyopia Neg Hx     Macular degeneration Neg Hx     Retinal detachment Neg Hx     Anesthesia problems Neg Hx     Celiac disease Neg Hx     Cirrhosis Neg Hx     Crohn's disease Neg Hx     Esophageal cancer Neg Hx     Irritable bowel syndrome Neg Hx     Liver cancer Neg Hx     Liver disease Neg Hx     Rectal cancer Neg Hx     Stomach cancer Neg Hx     Melanoma Neg Hx        Social History     Socioeconomic History    Marital status: Single   Tobacco Use    Smoking status: Never    Smokeless tobacco: Never   Substance and Sexual Activity    Alcohol use:  Yes     Comment: red wine occasionally    Drug use: No    Sexual activity: Not Currently     Partners: Male   Other Topics Concern    Are you pregnant or think you may be? No    Breast-feeding No     Social Determinants of Health     Financial Resource Strain: Low Risk  (1/9/2024)    Overall Financial Resource Strain (CARDIA)     Difficulty of Paying Living Expenses: Not hard at all   Food Insecurity: No Food Insecurity (1/9/2024)    Hunger Vital Sign     Worried About Running Out of Food in the Last Year: Never true     Ran Out of Food in the Last Year: Never true   Transportation Needs: No Transportation Needs (1/9/2024)    PRAPARE - Transportation     Lack of Transportation (Medical): No     Lack of Transportation (Non-Medical): No   Physical Activity: Insufficiently Active (1/9/2024)    Exercise Vital Sign     Days of Exercise per Week: 2 days     Minutes of Exercise per Session: 30 min   Stress: No Stress Concern Present (1/9/2024)    [place] [employer] - Occupational Stress Questionnaire     Feeling of Stress : Not at all   Recent Concern: Stress - Stress Concern Present (10/30/2023)    [place] [employer] - Occupational Stress Questionnaire     Feeling of Stress : To some extent   Social Connections: Unknown (1/9/2024)    Social Connection and Isolation Panel [NHANES]     Frequency of Communication with Friends and Family: Three times a week     Frequency of Social Gatherings with Friends and Family: Once a week     Active Member of Clubs or Organizations: Yes     Attends Club or Organization Meetings: More than 4 times per year     Marital Status:    Housing Stability: Low Risk  (1/9/2024)    Housing Stability Vital Sign     Unable to Pay for Housing in the Last Year: No     Number of Places Lived in the Last Year: 1     Unstable Housing in the Last Year: No       Allergies:  Alendronate, Brimonidine, and Kenalog [triamcinolone  acetonide]    Medications:  Outpatient Encounter Medications as of 4/4/2024   Medication Sig Dispense Refill    acetaminophen/diphenhydramine (TYLENOL PM ORAL) Take by mouth.      amLODIPine (NORVASC) 10 MG tablet TAKE 1/2 OF A TABLET BY MOUTH TWICE DAILY 90 tablet 3    ascorbic acid (VITAMIN C ORAL) Take by mouth.      B-complex with vitamin C (Z-BEC OR EQUIV) tablet Take 1 tablet by mouth once daily.      DULoxetine (CYMBALTA) 60 MG capsule Take 1 capsule (60 mg total) by mouth once daily. 90 capsule 1    mirabegron (MYRBETRIQ) 25 mg Tb24 ER tablet Take 1 tablet (25 mg total) by mouth once daily. 90 tablet 3    pantoprazole (PROTONIX) 40 MG tablet TAKE 1 TABLET BY MOUTH EVERY DAY BEFORE BREAKFAST 90 tablet 3    timolol maleate 0.5% (TIMOPTIC) 0.5 % Drop Place 1 drop into both eyes 2 (two) times daily. 30 mL 4    valsartan (DIOVAN) 160 MG tablet Take 2 tablets (320 mg total) by mouth once daily. 180 tablet 3    vitamin D (VITAMIN D3) 1000 units Tab Take 1,000 Units by mouth once daily.      vitamin E acetate (VITAMIN E ORAL) Take by mouth.       No facility-administered encounter medications on file as of 4/4/2024.         PHYSICAL EXAMINATION:    The patient generally appears in good health, is appropriately interactive, and is in no apparent distress.    Mental: Cooperative with normal affect.    Chest:  normal inspiratory effort.    LABS:    Lab Results   Component Value Date    BUN 16 01/08/2024    CREATININE 1.1 01/08/2024       IMPRESSION:    nocturia    PLAN:    1. Refilled the Myrbetriq  2.  Follow up in 1 year.     I spent 20 minutes with the patient of which more than half was spent in direct consultation with the patient in regards to our treatment and plan.

## 2024-04-08 ENCOUNTER — LAB VISIT (OUTPATIENT)
Dept: LAB | Facility: HOSPITAL | Age: 74
End: 2024-04-08
Payer: MEDICARE

## 2024-04-08 DIAGNOSIS — R73.01 IFG (IMPAIRED FASTING GLUCOSE): ICD-10-CM

## 2024-04-08 DIAGNOSIS — R79.89 LOW VITAMIN B12 LEVEL: ICD-10-CM

## 2024-04-08 DIAGNOSIS — E55.9 VITAMIN D DEFICIENCY DISEASE: ICD-10-CM

## 2024-04-08 DIAGNOSIS — E78.2 MIXED HYPERLIPIDEMIA: ICD-10-CM

## 2024-04-08 DIAGNOSIS — R79.89 LOW SERUM VITAMIN D: ICD-10-CM

## 2024-04-08 DIAGNOSIS — D50.9 IRON DEFICIENCY ANEMIA, UNSPECIFIED IRON DEFICIENCY ANEMIA TYPE: ICD-10-CM

## 2024-04-08 DIAGNOSIS — N18.30 STAGE 3 CHRONIC KIDNEY DISEASE, UNSPECIFIED WHETHER STAGE 3A OR 3B CKD: ICD-10-CM

## 2024-04-08 DIAGNOSIS — I10 ESSENTIAL HYPERTENSION: ICD-10-CM

## 2024-04-08 DIAGNOSIS — R53.83 FATIGUE, UNSPECIFIED TYPE: ICD-10-CM

## 2024-04-08 LAB
25(OH)D3+25(OH)D2 SERPL-MCNC: 46 NG/ML (ref 30–96)
ALBUMIN SERPL BCP-MCNC: 3.7 G/DL (ref 3.5–5.2)
ALBUMIN SERPL BCP-MCNC: 3.7 G/DL (ref 3.5–5.2)
ALP SERPL-CCNC: 59 U/L (ref 55–135)
ALT SERPL W/O P-5'-P-CCNC: 13 U/L (ref 10–44)
ANION GAP SERPL CALC-SCNC: 10 MMOL/L (ref 8–16)
ANION GAP SERPL CALC-SCNC: 9 MMOL/L (ref 8–16)
AST SERPL-CCNC: 23 U/L (ref 10–40)
BASOPHILS # BLD AUTO: 0.05 K/UL (ref 0–0.2)
BASOPHILS NFR BLD: 0.8 % (ref 0–1.9)
BILIRUB SERPL-MCNC: 0.5 MG/DL (ref 0.1–1)
BUN SERPL-MCNC: 17 MG/DL (ref 8–23)
BUN SERPL-MCNC: 17 MG/DL (ref 8–23)
CALCIUM SERPL-MCNC: 9.8 MG/DL (ref 8.7–10.5)
CALCIUM SERPL-MCNC: 9.8 MG/DL (ref 8.7–10.5)
CHLORIDE SERPL-SCNC: 107 MMOL/L (ref 95–110)
CHLORIDE SERPL-SCNC: 108 MMOL/L (ref 95–110)
CHOLEST SERPL-MCNC: 188 MG/DL (ref 120–199)
CHOLEST/HDLC SERPL: 3 {RATIO} (ref 2–5)
CO2 SERPL-SCNC: 25 MMOL/L (ref 23–29)
CO2 SERPL-SCNC: 25 MMOL/L (ref 23–29)
CREAT SERPL-MCNC: 1.3 MG/DL (ref 0.5–1.4)
CREAT SERPL-MCNC: 1.3 MG/DL (ref 0.5–1.4)
DIFFERENTIAL METHOD BLD: ABNORMAL
EOSINOPHIL # BLD AUTO: 0.1 K/UL (ref 0–0.5)
EOSINOPHIL NFR BLD: 2.1 % (ref 0–8)
ERYTHROCYTE [DISTWIDTH] IN BLOOD BY AUTOMATED COUNT: 15.7 % (ref 11.5–14.5)
EST. GFR  (NO RACE VARIABLE): 42.9 ML/MIN/1.73 M^2
EST. GFR  (NO RACE VARIABLE): 42.9 ML/MIN/1.73 M^2
ESTIMATED AVG GLUCOSE: 111 MG/DL (ref 68–131)
FERRITIN SERPL-MCNC: 52 NG/ML (ref 20–300)
GLUCOSE SERPL-MCNC: 93 MG/DL (ref 70–110)
GLUCOSE SERPL-MCNC: 93 MG/DL (ref 70–110)
HBA1C MFR BLD: 5.5 % (ref 4–5.6)
HCT VFR BLD AUTO: 35.1 % (ref 37–48.5)
HDLC SERPL-MCNC: 63 MG/DL (ref 40–75)
HDLC SERPL: 33.5 % (ref 20–50)
HGB BLD-MCNC: 11.3 G/DL (ref 12–16)
HGB BLD-MCNC: 11.3 G/DL (ref 12–16)
IMM GRANULOCYTES # BLD AUTO: 0.02 K/UL (ref 0–0.04)
IMM GRANULOCYTES NFR BLD AUTO: 0.3 % (ref 0–0.5)
IRON SERPL-MCNC: 72 UG/DL (ref 30–160)
LDLC SERPL CALC-MCNC: 108.8 MG/DL (ref 63–159)
LYMPHOCYTES # BLD AUTO: 2.4 K/UL (ref 1–4.8)
LYMPHOCYTES NFR BLD: 36 % (ref 18–48)
MCH RBC QN AUTO: 27.4 PG (ref 27–31)
MCHC RBC AUTO-ENTMCNC: 32.2 G/DL (ref 32–36)
MCV RBC AUTO: 85 FL (ref 82–98)
MONOCYTES # BLD AUTO: 0.6 K/UL (ref 0.3–1)
MONOCYTES NFR BLD: 9.8 % (ref 4–15)
NEUTROPHILS # BLD AUTO: 3.4 K/UL (ref 1.8–7.7)
NEUTROPHILS NFR BLD: 51 % (ref 38–73)
NONHDLC SERPL-MCNC: 125 MG/DL
NRBC BLD-RTO: 0 /100 WBC
PHOSPHATE SERPL-MCNC: 2.9 MG/DL (ref 2.7–4.5)
PLATELET # BLD AUTO: 294 K/UL (ref 150–450)
PMV BLD AUTO: 11.7 FL (ref 9.2–12.9)
POTASSIUM SERPL-SCNC: 4.3 MMOL/L (ref 3.5–5.1)
POTASSIUM SERPL-SCNC: 4.4 MMOL/L (ref 3.5–5.1)
PROT SERPL-MCNC: 7.3 G/DL (ref 6–8.4)
PTH-INTACT SERPL-MCNC: 135.7 PG/ML (ref 9–77)
RBC # BLD AUTO: 4.12 M/UL (ref 4–5.4)
SATURATED IRON: 20 % (ref 20–50)
SODIUM SERPL-SCNC: 142 MMOL/L (ref 136–145)
SODIUM SERPL-SCNC: 142 MMOL/L (ref 136–145)
TOTAL IRON BINDING CAPACITY: 355 UG/DL (ref 250–450)
TRANSFERRIN SERPL-MCNC: 240 MG/DL (ref 200–375)
TRIGL SERPL-MCNC: 81 MG/DL (ref 30–150)
TSH SERPL DL<=0.005 MIU/L-ACNC: 1.85 UIU/ML (ref 0.4–4)
VIT B12 SERPL-MCNC: 1013 PG/ML (ref 210–950)
WBC # BLD AUTO: 6.56 K/UL (ref 3.9–12.7)

## 2024-04-08 PROCEDURE — 83036 HEMOGLOBIN GLYCOSYLATED A1C: CPT | Performed by: INTERNAL MEDICINE

## 2024-04-08 PROCEDURE — 80061 LIPID PANEL: CPT | Performed by: INTERNAL MEDICINE

## 2024-04-08 PROCEDURE — 84100 ASSAY OF PHOSPHORUS: CPT | Performed by: NURSE PRACTITIONER

## 2024-04-08 PROCEDURE — 82728 ASSAY OF FERRITIN: CPT | Performed by: INTERNAL MEDICINE

## 2024-04-08 PROCEDURE — 82607 VITAMIN B-12: CPT | Performed by: INTERNAL MEDICINE

## 2024-04-08 PROCEDURE — 85025 COMPLETE CBC W/AUTO DIFF WBC: CPT | Performed by: NURSE PRACTITIONER

## 2024-04-08 PROCEDURE — 83540 ASSAY OF IRON: CPT | Performed by: INTERNAL MEDICINE

## 2024-04-08 PROCEDURE — 36415 COLL VENOUS BLD VENIPUNCTURE: CPT | Performed by: NURSE PRACTITIONER

## 2024-04-08 PROCEDURE — 82306 VITAMIN D 25 HYDROXY: CPT | Performed by: INTERNAL MEDICINE

## 2024-04-08 PROCEDURE — 84443 ASSAY THYROID STIM HORMONE: CPT | Performed by: INTERNAL MEDICINE

## 2024-04-08 PROCEDURE — 83970 ASSAY OF PARATHORMONE: CPT | Performed by: NURSE PRACTITIONER

## 2024-04-08 PROCEDURE — 80053 COMPREHEN METABOLIC PANEL: CPT | Performed by: INTERNAL MEDICINE

## 2024-04-08 PROCEDURE — 85018 HEMOGLOBIN: CPT | Mod: 91 | Performed by: INTERNAL MEDICINE

## 2024-04-09 ENCOUNTER — OFFICE VISIT (OUTPATIENT)
Dept: NEPHROLOGY | Facility: CLINIC | Age: 74
End: 2024-04-09
Payer: MEDICARE

## 2024-04-09 ENCOUNTER — TELEPHONE (OUTPATIENT)
Dept: NEPHROLOGY | Facility: CLINIC | Age: 74
End: 2024-04-09
Payer: MEDICARE

## 2024-04-09 ENCOUNTER — TELEPHONE (OUTPATIENT)
Dept: INTERNAL MEDICINE | Facility: CLINIC | Age: 74
End: 2024-04-09
Payer: MEDICARE

## 2024-04-09 DIAGNOSIS — I10 HYPERTENSION, UNSPECIFIED TYPE: ICD-10-CM

## 2024-04-09 DIAGNOSIS — N18.32 STAGE 3B CHRONIC KIDNEY DISEASE: Primary | ICD-10-CM

## 2024-04-09 DIAGNOSIS — N25.81 SECONDARY HYPERPARATHYROIDISM: ICD-10-CM

## 2024-04-09 DIAGNOSIS — D64.9 ANEMIA, UNSPECIFIED TYPE: ICD-10-CM

## 2024-04-09 PROCEDURE — 1159F MED LIST DOCD IN RCRD: CPT | Mod: CPTII,95,, | Performed by: NURSE PRACTITIONER

## 2024-04-09 PROCEDURE — 1160F RVW MEDS BY RX/DR IN RCRD: CPT | Mod: CPTII,95,, | Performed by: NURSE PRACTITIONER

## 2024-04-09 PROCEDURE — 3044F HG A1C LEVEL LT 7.0%: CPT | Mod: CPTII,95,, | Performed by: NURSE PRACTITIONER

## 2024-04-09 PROCEDURE — 99441 PR PHYSICIAN TELEPHONE EVALUATION 5-10 MIN: CPT | Mod: 95,,, | Performed by: NURSE PRACTITIONER

## 2024-04-09 RX ORDER — LABETALOL 100 MG/1
100 TABLET, FILM COATED ORAL 2 TIMES DAILY
Qty: 180 TABLET | Refills: 3 | Status: SHIPPED | OUTPATIENT
Start: 2024-04-09 | End: 2025-04-09

## 2024-04-09 NOTE — PROGRESS NOTES
"Subjective:       Patient ID: Idalmis Morejon is a 73 y.o. AA female who presents for follow-up evaluation of CKD       HPI   Patient is new to me. Last seen by Dr. Sena in June 2019.  Prior pertinent chart reviewed since this is patient's first appointment with me.    Patient presents for f/u of CKD.  Baseline creatinine of 1.0-1.2 mg/dL. Pt admits to prior heavy use of various NSAID like Ibuprofen, goody powder and other compounds. She was still using NSAID until later part of 2016.    Home BPs: 140s/80s (when not taking amlodipine); 120s/70s (when taking amlodipine).     Significant hx includes longstanding hypertension "for decades", Schatzki's ring, DJD, sciatica, osteoporosis, hyperlipidemia.      The patient denies taking NSAIDs, herbal supplements, or new antibiotics, recreational drugs, recent episode of dehydration, diarrhea, nausea or vomiting, acute illness, hospitalization or exposure to IV radiocontrast.     Significant family hx includes: HTN; brother, father, nephew c ESRD on dialysis    Last renal US: April 2019, reviewed.    Update 8/1/22: (virtual)  Notes:   Had audio only visit in June 2021.  Presents today for f/u of CKD. No recent labs.  Had trigger finger surgery.  Home BPs: 140-145/78-80s. After medications.  Denies NSAIDs. Eats a lot salt diet.  Mother also had kidney disease.    Update 8/1/22 (virtual):  Notes:   Presents today for f/u of CKD.   Recent sCr up to 1.4 from 1.0-1.2 mg/dL.  Home BPs: 130s/80s after medications  Prescribed mobic but did not take.  Takes tylenol for pain.  Starting Prolia in December.    Update 1/9/24 (virtual):  Notes:   Presents for f/u of CKD.  Baseline sCr 1.1-1.3 mg/dL.  sCr was 1.6 on  1/5/24.  Was only drinking about 32 oz of water daily + ensure/boost or juice + green tea + occasional coffee.    She increased her water intake to about 50 oz of water daily + the other fluids.    Home BPs: 130s/70s-80s    Currently holding 12.5 mg chlorthalidone qod due to " BRANDT.    Update 4/9/24 (virtual):  Audio Only Telehealth Visit     The patient location is: home  The chief complaint leading to consultation is: f/u of CKD  Visit type: Virtual visit with audio only (telephone)  Total time spent with patient: 10 minutes     The reason for the audio only service rather than synchronous audio and video virtual visit was related to technical difficulties or patient preference/necessity.     Each patient to whom I provide medical services by telemedicine is:  (1) informed of the relationship between the physician and patient and the respective role of any other health care provider with respect to management of the patient; and (2) notified that they may decline to receive medical services by telemedicine and may withdraw from such care at any time. Patient verbally consented to receive this service via voice-only telephone call.     This service was not originating from a related E/M service provided within the previous 7 days nor will  to an E/M service or procedure within the next 24 hours or my soonest available appointment.  Prevailing standard of care was able to be met in this audio-only visit.        Notes:   Presents for f/u of CKD.  Zeetl did not register that patient had checked into appointment until over 50% appt slot was over. Pt requested phone call to review labs instead of rescheduling.  Baseline sCr 1.1-1.3 mg/dL.  Home BPs:  (lowest)/ 70-80s (1 hr p meds)      Review of Systems   Respiratory:  Negative for shortness of breath.    Cardiovascular:  Negative for leg swelling.   Gastrointestinal:  Negative for diarrhea, nausea and vomiting.   Genitourinary:  Negative for difficulty urinating, dysuria and hematuria.       Objective:       There were no vitals taken for this visit.  Physical Exam  Constitutional:       General: She is not in acute distress.     Appearance: Normal appearance. She is well-developed. She is not diaphoretic.   Pulmonary:       Effort: Pulmonary effort is normal. No respiratory distress.   Neurological:      Mental Status: She is alert and oriented to person, place, and time.   Psychiatric:         Mood and Affect: Mood normal.         Behavior: Behavior normal.         Thought Content: Thought content normal.         Judgment: Judgment normal.         Lab Results   Component Value Date    CREATININE 1.3 04/08/2024    URICACID 2.8 06/20/2012     Prot/Creat Ratio, Urine   Date Value Ref Range Status   04/08/2024 0.09 0.00 - 0.20 Final   01/05/2024 0.16 0.00 - 0.20 Final   09/25/2023 0.11 0.00 - 0.20 Final     Lab Results   Component Value Date     04/08/2024    K 4.4 04/08/2024    CO2 25 04/08/2024     04/08/2024     Lab Results   Component Value Date    .7 (H) 04/08/2024    CALCIUM 9.8 04/08/2024    PHOS 2.9 04/08/2024     Lab Results   Component Value Date    HGB 11.3 (L) 04/08/2024    WBC 6.56 04/08/2024    HCT 35.1 (L) 04/08/2024      Lab Results   Component Value Date    HGBA1C 5.5 04/08/2024     04/08/2024    BUN 17 04/08/2024     Lab Results   Component Value Date    LDLCALC 108.8 04/08/2024           Assessment:       1. Stage 3b chronic kidney disease    2. Secondary hyperparathyroidism    3. Hypertension, unspecified type    4. Anemia, unspecified type              Plan:   CKD stage 3 - clinically r/t longstanding HTN and heavy NSAID use, possible APOL-1 component. Stable. Non-proteinuric.     Previous BRANDT resolved off chlorthalidone and with increased hydration.    Refer to intro to CKD (virtual).    UPCR Mild albuminuria. On valsartan 160 mg BID.    May consider SGLT2i if it persists, but also concerned for risk of volume depletion.   Acid-base WNL.   Secondary hyperparathyroidism Ca, phos okay. PTH elevated. Vitamin D okay. On vitamin D.   Anemia At goal for CKD.   DM Non-diabetic.   Lipid Management Defer to PCP.   ESRD planning Defer     HTN - WNL at home amlodipine 5 mg BID,  valsartan 320 mg  -  holding chlorthalidone 12.5 mg qod due to BRANDT  - Continue to monitor home BPs and report if they rise further or if edema occurs  - If another agent is needed consider betablocker or other agent without diuretic effect unless hypervolemic    All questions patient had were answered.  Asked if further questions. None. F/u in clinic in 3 mos  with labs and urine prior to next visit or sooner if needed.  ER for emergency concerns.    Summary of Plan:  1. Start labetolol 100 mg BID  2. avoid NSAID/ bactrim/ IV contrast/ gadolinium/ aminoglycoside where possible  3. Intro to CKD (virtual, stage 3); order in from previously  4. RTC in 3 mos

## 2024-04-09 NOTE — TELEPHONE ENCOUNTER
KFRE 2-Year: 0.3% at 4/8/2024 10:36 AM  Calculated from:  Serum Creatinine: 1.3 mg/dL at 4/8/2024 10:36 AM  Urine Albumin Creatinine Ratio: 18.2 ug/mg at 4/8/2024 10:19 AM  Age: 73 years  Sex: Female at 4/8/2024 10:36 AM  Has CKD-3 to CKD-5: Yes     KFRE 5-Year: 1.1% at 4/8/2024 10:36 AM  Calculated from:  Serum Creatinine: 1.3 mg/dL at 4/8/2024 10:36 AM  Urine Albumin Creatinine Ratio: 18.2 ug/mg at 4/8/2024 10:19 AM  Age: 73 years  Sex: Female at 4/8/2024 10:36 AM  Has CKD-3 to CKD-5: Yes     She is scheduling with Nephrology

## 2024-04-09 NOTE — Clinical Note
I called her and did a virtual appt. Please schedule for virtual Intro to CKD (stage 3) as well as 3 mos f/u c labs. Thanks

## 2024-04-15 ENCOUNTER — PATIENT MESSAGE (OUTPATIENT)
Dept: PSYCHIATRY | Facility: CLINIC | Age: 74
End: 2024-04-15
Payer: MEDICARE

## 2024-04-24 DIAGNOSIS — R10.13 EPIGASTRIC PAIN: ICD-10-CM

## 2024-04-24 DIAGNOSIS — Z79.899 ENCOUNTER FOR MONITORING PROTON PUMP INHIBITOR THERAPY: ICD-10-CM

## 2024-04-24 DIAGNOSIS — Z51.81 ENCOUNTER FOR MONITORING PROTON PUMP INHIBITOR THERAPY: ICD-10-CM

## 2024-04-24 DIAGNOSIS — K21.9 GASTROESOPHAGEAL REFLUX DISEASE: ICD-10-CM

## 2024-04-25 ENCOUNTER — OFFICE VISIT (OUTPATIENT)
Dept: SPORTS MEDICINE | Facility: CLINIC | Age: 74
End: 2024-04-25
Payer: MEDICARE

## 2024-04-25 ENCOUNTER — HOSPITAL ENCOUNTER (OUTPATIENT)
Dept: RADIOLOGY | Facility: HOSPITAL | Age: 74
Discharge: HOME | End: 2024-04-25
Attending: STUDENT IN AN ORGANIZED HEALTH CARE EDUCATION/TRAINING PROGRAM
Payer: MEDICARE

## 2024-04-25 ENCOUNTER — PATIENT MESSAGE (OUTPATIENT)
Dept: INTERNAL MEDICINE | Facility: CLINIC | Age: 74
End: 2024-04-25
Payer: MEDICARE

## 2024-04-25 VITALS
DIASTOLIC BLOOD PRESSURE: 72 MMHG | SYSTOLIC BLOOD PRESSURE: 126 MMHG | HEIGHT: 66 IN | BODY MASS INDEX: 26.45 KG/M2 | HEART RATE: 63 BPM | WEIGHT: 164.56 LBS

## 2024-04-25 DIAGNOSIS — M25.511 CHRONIC RIGHT SHOULDER PAIN: ICD-10-CM

## 2024-04-25 DIAGNOSIS — M75.100 TEAR OF ROTATOR CUFF, UNSPECIFIED LATERALITY, UNSPECIFIED TEAR EXTENT, UNSPECIFIED WHETHER TRAUMATIC: ICD-10-CM

## 2024-04-25 DIAGNOSIS — G89.29 CHRONIC RIGHT SHOULDER PAIN: ICD-10-CM

## 2024-04-25 DIAGNOSIS — M75.111 INCOMPLETE ROTATOR CUFF TEAR OR RUPTURE OF RIGHT SHOULDER, NOT SPECIFIED AS TRAUMATIC: ICD-10-CM

## 2024-04-25 DIAGNOSIS — M19.011 PRIMARY OSTEOARTHRITIS OF RIGHT SHOULDER: Primary | ICD-10-CM

## 2024-04-25 PROCEDURE — 1101F PT FALLS ASSESS-DOCD LE1/YR: CPT | Mod: CPTII,S$GLB,, | Performed by: ORTHOPAEDIC SURGERY

## 2024-04-25 PROCEDURE — 99214 OFFICE O/P EST MOD 30 MIN: CPT | Mod: S$GLB,,, | Performed by: ORTHOPAEDIC SURGERY

## 2024-04-25 PROCEDURE — 3078F DIAST BP <80 MM HG: CPT | Mod: CPTII,S$GLB,, | Performed by: ORTHOPAEDIC SURGERY

## 2024-04-25 PROCEDURE — 1125F AMNT PAIN NOTED PAIN PRSNT: CPT | Mod: CPTII,S$GLB,, | Performed by: ORTHOPAEDIC SURGERY

## 2024-04-25 PROCEDURE — 73020 X-RAY EXAM OF SHOULDER: CPT | Mod: TC,RT

## 2024-04-25 PROCEDURE — 73020 X-RAY EXAM OF SHOULDER: CPT | Mod: 26,RT,, | Performed by: RADIOLOGY

## 2024-04-25 PROCEDURE — 3288F FALL RISK ASSESSMENT DOCD: CPT | Mod: CPTII,S$GLB,, | Performed by: ORTHOPAEDIC SURGERY

## 2024-04-25 PROCEDURE — 1159F MED LIST DOCD IN RCRD: CPT | Mod: CPTII,S$GLB,, | Performed by: ORTHOPAEDIC SURGERY

## 2024-04-25 PROCEDURE — 3044F HG A1C LEVEL LT 7.0%: CPT | Mod: CPTII,S$GLB,, | Performed by: ORTHOPAEDIC SURGERY

## 2024-04-25 PROCEDURE — 3074F SYST BP LT 130 MM HG: CPT | Mod: CPTII,S$GLB,, | Performed by: ORTHOPAEDIC SURGERY

## 2024-04-25 PROCEDURE — 99999 PR PBB SHADOW E&M-EST. PATIENT-LVL IV: CPT | Mod: PBBFAC,,, | Performed by: ORTHOPAEDIC SURGERY

## 2024-04-25 RX ORDER — PANTOPRAZOLE SODIUM 40 MG/1
TABLET, DELAYED RELEASE ORAL
Qty: 90 TABLET | Refills: 2 | Status: SHIPPED | OUTPATIENT
Start: 2024-04-25

## 2024-04-25 NOTE — PROGRESS NOTES
CC: RIGHT shoulder pain     73 y.o. Female with a 8 month history of right shoulder atraumatic pain. Patient works as retired. She is right hand dominant.      She states that the pain is severe and not responding to any conservative care.      She reports that the pain and weakness is worse with overhead activity. It also bothers her at night.  She has undergone PT and CSI previously. CSI provided about 30 days of relief but pain has returned.     She reports daily crepitus with ROM     Is affecting ADLs.       Past Medical History:   Diagnosis Date    Abnormal chest CT     Acid reflux     Allergy     Anemia     Anemia     Anxiety     Cataract     Chronic UTI     recently treated with antibiotics -x 3 wks. ago-    Colon adenoma 2015 due 2020 10/21/2015    Colon adenoma 2015 due 2020 10/21/2015    Depression     Disturbed sleep rhythm     DJD (degenerative joint disease)     Dry eyes     Dry mouth     Grief reaction 3/24/2014    Hx of migraine headaches     Hyperlipemia     Hypernatremia 8/8/2014    Hypertension     Iritis     Iritis     Mild vitamin D deficiency 9/9/2013    Multiple lung nodules on CT: 8005-1386 no f/u needed 6/6/2016    Neurogenic bladder     Osteopenia     in hips    Osteoporosis     spine    Osteoporosis: 9/15 see rheumatology notes 6/6/2016    Positive GEORGIANA (antinuclear antibody)     Schatzki's ring: 3/15 dilated 6/6/2016    Sciatica neuralgia 6/21/2013    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    Visual impairment        Past Surgical History:   Procedure Laterality Date    APPENDECTOMY      BACK SURGERY  2007    x4    CHOLECYSTECTOMY      lap orin    COLONOSCOPY N/A 10/2/2015    Procedure: COLONOSCOPY;  Surgeon: Bryan Love MD;  Location: [facility] ([address] [place] [address]);  Service: Endoscopy;  Laterality: N/A;    COLONOSCOPY N/A 9/9/2019    Procedure: COLONOSCOPY;  Surgeon: Bryan Love MD;  [facility]   Service: Endoscopy;  Laterality: N/A;    COLONOSCOPY N/A 7/21/2023    Procedure: COLONOSCOPY;  Surgeon: Bryan Love MD;  Location: [facility] (4TH FLR);  Service: Endoscopy;  Laterality: N/A;  extended miralax prep-instr portal-pt declined any earlier dates-tb-hx of colon ca in multiple 2nd degree relatives    CYSTOSCOPY      with BOTOX INJECTION    ESOPHAGOGASTRODUODENOSCOPY N/A 5/13/2019    Procedure: EGD (ESOPHAGOGASTRODUODENOSCOPY);  Surgeon: Bryan Love MD;  Location: [facility] (4TH FLR);  Service: Endoscopy;  Laterality: N/A;    ESOPHAGOGASTRODUODENOSCOPY N/A 9/9/2019    Procedure: EGD (ESOPHAGOGASTRODUODENOSCOPY);  Surgeon: Bryan Love MD;  Location: [facility] (4TH FLR);  Service: Endoscopy;  Laterality: N/A;    ESOPHAGOGASTRODUODENOSCOPY N/A 12/7/2022    Procedure: EGD (ESOPHAGOGASTRODUODENOSCOPY);  Surgeon: Bryan Love MD;  Location: [facility] (4TH FLR);  Service: Endoscopy;  Laterality: N/A;  Endoscopy schedulers please schedule patient as soon as possible with me for EGD for esophageal dilation for dysphagia.  Thank you     instr portal-GT  pre call complete-as    HERNIA REPAIR      umbilical    HYSTERECTOMY      COMPLETE    POSTERIOR FUSION LUMBAR SPINE W/ CORPECTOMY      SPINE SURGERY      TONSILLECTOMY, ADENOIDECTOMY      TRIGGER FINGER RELEASE Right 6/3/2022    Procedure: RELEASE, TRIGGER FINGER,RIGHT,LONG;  Surgeon: Noemy Hernandez MD;  Location: [facility];  Service: Orthopedics;  Laterality: Right;       Family History   Problem Relation Name Age of Onset    Kidney disease Mother      Hypertension Mother      Diabetes Father      Diabetes Brother      Kidney disease Brother      Heart disease Brother      Hyperlipidemia Sister Soraya Morgan     Hypertension Sister Soraya Morgan     Diverticulosis Sister Soraya Morgan     Hypertension Daughter Edna Morejon     Colon cancer Maternal Aunt  70        stomach    Blindness Maternal Aunt      Cancer Maternal Aunt          stomach cancer     Cancer Maternal Uncle          colon    Colon cancer Maternal Uncle  70        two uncles    Glaucoma Neg Hx      Amblyopia Neg Hx      Macular degeneration Neg Hx      Retinal detachment Neg Hx      Anesthesia problems Neg Hx      Celiac disease Neg Hx      Cirrhosis Neg Hx      Crohn's disease Neg Hx      Esophageal cancer Neg Hx      Irritable bowel syndrome Neg Hx      Liver cancer Neg Hx      Liver disease Neg Hx      Rectal cancer Neg Hx      Stomach cancer Neg Hx      Melanoma Neg Hx           Current Outpatient Medications:     acetaminophen/diphenhydramine (TYLENOL PM ORAL), Take by mouth., Disp: , Rfl:     amLODIPine (NORVASC) 10 MG tablet, TAKE 1/2 OF A TABLET BY MOUTH TWICE DAILY, Disp: 90 tablet, Rfl: 3    ascorbic acid (VITAMIN C ORAL), Take by mouth., Disp: , Rfl:     B-complex with vitamin C (Z-BEC OR EQUIV) tablet, Take 1 tablet by mouth once daily., Disp: , Rfl:     DULoxetine (CYMBALTA) 60 MG capsule, Take 1 capsule (60 mg total) by mouth once daily., Disp: 90 capsule, Rfl: 1    labetaloL (NORMODYNE) 100 MG tablet, Take 1 tablet (100 mg total) by mouth 2 (two) times daily., Disp: 180 tablet, Rfl: 3    mirabegron (MYRBETRIQ) 25 mg Tb24 ER tablet, Take 1 tablet (25 mg total) by mouth once daily., Disp: 90 tablet, Rfl: 3    pantoprazole (PROTONIX) 40 MG tablet, TAKE 1 TABLET BY MOUTH EVERY DAY BEFORE BREAKFAST, Disp: 90 tablet, Rfl: 2    timolol maleate 0.5% (TIMOPTIC) 0.5 % Drop, Place 1 drop into both eyes 2 (two) times daily., Disp: 30 mL, Rfl: 4    valsartan (DIOVAN) 160 MG tablet, Take 2 tablets (320 mg total) by mouth once daily., Disp: 180 tablet, Rfl: 3    vitamin D (VITAMIN D3) 1000 units Tab, Take 1,000 Units by mouth once daily., Disp: , Rfl:     vitamin E acetate (VITAMIN E ORAL), Take by mouth., Disp: , Rfl:     Review of patient's allergies indicates:   Allergen Reactions    Alendronate Nausea Only     And heartburn    Brimonidine Dermatitis     Follicular Conjunctivitis    Kenalog  "[triamcinolone acetonide] Hives          REVIEW OF SYSTEMS:  Constitution: Negative. Negative for chills, fever and night sweats.   HENT: Negative for congestion and headaches.    Eyes: Negative for blurred vision, left vision loss and right vision loss.   Cardiovascular: Negative for chest pain and syncope.   Respiratory: Negative for cough and shortness of breath.    Endocrine: Negative for polydipsia, polyphagia and polyuria.   Hematologic/Lymphatic: Negative for bleeding problem. Does not bruise/bleed easily.   Skin: Negative for dry skin, itching and rash.   Musculoskeletal: Negative for falls.  Positive for right shoulder pain and muscle weakness.   Gastrointestinal: Negative for abdominal pain and bowel incontinence.   Genitourinary: Negative for bladder incontinence and nocturia.   Neurological: Negative for disturbances in coordination, loss of balance and seizures.   Psychiatric/Behavioral: Negative for depression. The patient does not have insomnia.    Allergic/Immunologic: Negative for hives and persistent infections.      PHYSICAL EXAMINATION:  Vitals:  /72   Pulse 63   Ht 5' 6" (1.676 m)   Wt 74.6 kg (164 lb 9.2 oz)   BMI 26.56 kg/m²    General: The patient is alert and oriented x 3.  Mood is pleasant.  Observation of ears, eyes and nose reveal no gross abnormalities.  No labored breathing observed.  Gait is coordinated. Patient can toe walk and heel walk without difficulty.      RIGHT SHOULDER / UPPER EXTREMITY EXAM    OBSERVATION:     Swelling  none  Deformity  none   Discoloration  none   Scapular winging none   Scars   none  Atrophy  none    TENDERNESS / CREPITUS (T/C):          T/C      T/C   Clavicle   -/-  SUPRAspinatus    -/-     AC Jt.    -/-  INFRAspinatus  -/-    SC Jt.    -/-  Deltoid    -/-      G. Tuberosity  +/-  LH BICEP groove  +/-   Acromion:  -/-  Midline Neck   -/-     Scapular Spine -/-  Trapezium   -/-   SMA Scapula  -/-  GH jt. line - post  +/-     Scapulothoracic "  -/-         ROM: (* = with pain)  Left shoulder   Right shoulder        AROM (PROM)   AROM (PROM)   FE    170° (175°)     110°* (165°*)     ER at 0°    60°  (65°)    60°  (65°)   ER at 90° ABD  90°  (90°)    90°  (90°)   IR at 90°  ABD   NA  (40°)     NA  (40°)      IR (spine level)   T10     T10    STRENGTH: (* = with pain) Left shoulder   Right shoulder   SCAPTION   5/5    4*/5    IR    5/5    5/5   ER    5/5    5-*/5   BICEPS   5/5    5/5   Deltoid    5/5    5/5     SIGNS:  Painful side       NEER   -    OJJS  neg    CLEMENTS   +    SPEEDS  neg     DROP ARM   +   BELLY PRESS neg   Superior escape none    LIFT-OFF  neg   X-Body ADD    neg    MOVING VALGUS neg        STABILITY TESTING    Left shoulder   Right shoulder    Translation     Anterior  up face     up face    Posterior  up face    up face    Sulcus   < 10mm    < 10 mm     Signs   Apprehension   neg      neg       Relocation   no change     no change      Jerk test  neg     neg    EXTREMITY NEURO-VASCULAR EXAM:    Sensation grossly intact to light touch all dermatomal regions.    DTR 2+ Biceps, Triceps, BR and Negative Adelas sign   Grossly intact motor function at Elbow, Wrist and Hand   Distal pulses radial and ulnar 2+, brisk cap refill, symmetric.      NECK:  Painless FROM and spinous processes non-tender. Negative Spurlings sign.      OTHER FINDINGS:  + bear hug     XRAYS:  Xrays including AP, Outlet and Axillary Lateral of shoulder are ordered / images reviewed by me:   No fracture dislocation or other pathology   Acromion type 2   Proximal migration of humeral head: None   GH arthritis: None          ASSESSMENT:   Right shoulder pain:  1. Primary osteoarthritis of right shoulder    2. Incomplete rotator cuff tear or rupture of right shoulder, not specified as traumatic        PLAN:    Ct for surgical planning   she would benefit from reverse  shoulder arthoplasty, given the above.     PLAN: We have discussed the surgery and recovery  of shoulder arthroplasty surgery. she understands that there may be limited mobility up to several weeks after surgery depending on procedures that are performed at the time of surgery.    The spectrum of treatment options were discussed with the patient, including nonoperative and operative options.  After thorough discussion, the patient has elected to undergo surgical treatment to include:    right   a. Shoulder reverse shoulder arthroplasty    b. Shoulder biceps tenodesis    The details of the surgical procedure were explained, including the location of probable incisions and a description of likely hardware and/or grafts to be used.  The patient understands the likely convalescence after surgery.  Also, we have thoroughly discussed the risks, benefits and alternatives to surgery, including, but not limited to, the risk of infection, joint stiffness, blood clot (including DVT and/or pulmonary embolus), neurologic and vascular injury.  It was explained that, if tissue has been repaired or reconstructed, there is a chance of failure, which may require further management.      Will proceed with medical clearance.       All questions were answered, patient will contact us for questions or concerns in the interim.

## 2024-04-26 NOTE — TELEPHONE ENCOUNTER
I do recommend she come in for a preop, we need to keep an eye on her blood pressure and review her meds.  It could be with Edie or me

## 2024-04-28 ENCOUNTER — PATIENT MESSAGE (OUTPATIENT)
Dept: GASTROENTEROLOGY | Facility: CLINIC | Age: 74
End: 2024-04-28
Payer: MEDICARE

## 2024-04-28 DIAGNOSIS — D50.9 IRON DEFICIENCY ANEMIA, UNSPECIFIED IRON DEFICIENCY ANEMIA TYPE: Primary | ICD-10-CM

## 2024-04-28 RX ORDER — FERROUS GLUCONATE 324(38)MG
324 TABLET ORAL
Qty: 90 TABLET | Refills: 1 | Status: SHIPPED | OUTPATIENT
Start: 2024-04-28 | End: 2024-10-25

## 2024-04-29 ENCOUNTER — TELEPHONE (OUTPATIENT)
Dept: SPORTS MEDICINE | Facility: CLINIC | Age: 74
End: 2024-04-29
Payer: MEDICARE

## 2024-04-29 DIAGNOSIS — M75.101 NONTRAUMATIC TEAR OF RIGHT ROTATOR CUFF, UNSPECIFIED TEAR EXTENT: ICD-10-CM

## 2024-04-29 DIAGNOSIS — M19.011 PRIMARY OSTEOARTHRITIS OF RIGHT SHOULDER: Primary | ICD-10-CM

## 2024-04-29 NOTE — TELEPHONE ENCOUNTER
POST OP PT -     MRI Impression:   MRI SHOULDER WITHOUT CONTRAST RIGHT     CLINICAL HISTORY:  Shoulder pain, rotator cuff disorder suspected, xray done;  Pain in right shoulder     TECHNIQUE:  MRI right shoulder performed without contrast per routine protocol.     COMPARISON:  Shoulder radiograph 09/13/2023     FINDINGS:  Rotator cuff: Full-thickness 1.1 cm width tear (series 4, image 5) of the supraspinatus with 0.9 cm of retraction (series 7, image 11) and subacromial/subdeltoid fluid.  There is prominent thickening/increased signal of the residual supraspinatus and adjacent infraspinatus, suggestive of tendinosis.  Component of interstitial tearing noted in infraspinatus.  Insertional tendinosis of the subscapularis noted.  Teres minor is intact..  Muscle bulk is maintained.  No fatty atrophy.  Decreased signal noted in the rotator interval, which can be seen with adhesive capsulitis.     Labrum: Degenerative fraying of the labrum, noting limited assessment without intra-articular contrast.  No paralabral cyst..     Biceps: Tendinosis of the intra-articular biceps..     Bone: There is no fracture.  Bone marrow signal is unremarkable.     Acromioclavicular joint: Moderate arthropathy, noting hypertrophy with subcortical marrow edema and surrounding inflammatory changes and mild undersurface spurring.  The acromion is flat, type 1.     Cartilage: Moderate amount of partial to full-thickness loss in the central glenoid with opposing cartilage loss in the humeral head with the prominent osteophytosis.     Miscellaneous: 0.9 cm loose body in the subcoracoid bursa and multiple small loose bodies in the biceps tendon sheath.     Impression:     Small full-thickness rotator cuff tear, involving the insertional fibers of the supraspinatus tendon, as above.  Additional prominent tendinosis of the residual supraspinatus, adjacent infraspinatus, and subscapularis noted.     Moderate cartilage loss/ glenohumeral joint  osteoarthritis with multiple loose bodies and joint effusion, as above.     Intra-articular biceps tendinosis.     Prominent subdeltoid fluid/bursitis.     Moderate AC joint arthropathy.    Surgical Plan:   right              a. Shoulder reverse shoulder arthroplasty               b. Shoulder biceps tenodesis    DOS 5/15/24    Start PT on 5/29/24      ----- Message from Kassandra Dee MA sent at 4/29/2024  9:27 AM CDT -----  Patient will do PT at [facility]    Pre op - 5/14/24    Date of surgery - 5/15/24

## 2024-04-30 DIAGNOSIS — Z00.00 ENCOUNTER FOR MEDICARE ANNUAL WELLNESS EXAM: ICD-10-CM

## 2024-05-07 ENCOUNTER — OFFICE VISIT (OUTPATIENT)
Dept: INTERNAL MEDICINE | Facility: CLINIC | Age: 74
End: 2024-05-07
Payer: MEDICARE

## 2024-05-07 ENCOUNTER — HOSPITAL ENCOUNTER (OUTPATIENT)
Dept: RADIOLOGY | Facility: HOSPITAL | Age: 74
Discharge: HOME | End: 2024-05-07
Attending: STUDENT IN AN ORGANIZED HEALTH CARE EDUCATION/TRAINING PROGRAM
Payer: MEDICARE

## 2024-05-07 VITALS
DIASTOLIC BLOOD PRESSURE: 60 MMHG | WEIGHT: 160 LBS | HEIGHT: 66 IN | SYSTOLIC BLOOD PRESSURE: 125 MMHG | BODY MASS INDEX: 25.71 KG/M2

## 2024-05-07 DIAGNOSIS — M25.511 CHRONIC RIGHT SHOULDER PAIN: ICD-10-CM

## 2024-05-07 DIAGNOSIS — M25.511 ARTHRALGIA OF SHOULDER REGION, RIGHT: ICD-10-CM

## 2024-05-07 DIAGNOSIS — I10 ESSENTIAL HYPERTENSION: ICD-10-CM

## 2024-05-07 DIAGNOSIS — Z01.810 PREOP CARDIOVASCULAR EXAM: Primary | ICD-10-CM

## 2024-05-07 DIAGNOSIS — Z79.899 HIGH RISK MEDICATION USE: ICD-10-CM

## 2024-05-07 DIAGNOSIS — D89.89 AUTOIMMUNE DISORDER: ICD-10-CM

## 2024-05-07 DIAGNOSIS — M35.9 UNDIFFERENTIATED CONNECTIVE TISSUE DISEASE: ICD-10-CM

## 2024-05-07 DIAGNOSIS — G89.29 CHRONIC RIGHT SHOULDER PAIN: ICD-10-CM

## 2024-05-07 DIAGNOSIS — N18.32 STAGE 3B CHRONIC KIDNEY DISEASE: ICD-10-CM

## 2024-05-07 PROBLEM — N18.31 STAGE 3A CHRONIC KIDNEY DISEASE: Status: RESOLVED | Noted: 2019-05-01 | Resolved: 2024-05-07

## 2024-05-07 PROCEDURE — 3044F HG A1C LEVEL LT 7.0%: CPT | Mod: CPTII,S$GLB,, | Performed by: INTERNAL MEDICINE

## 2024-05-07 PROCEDURE — 73200 CT UPPER EXTREMITY W/O DYE: CPT | Mod: TC,RT

## 2024-05-07 PROCEDURE — 99215 OFFICE O/P EST HI 40 MIN: CPT | Mod: S$GLB,,, | Performed by: INTERNAL MEDICINE

## 2024-05-07 PROCEDURE — 3078F DIAST BP <80 MM HG: CPT | Mod: CPTII,S$GLB,, | Performed by: INTERNAL MEDICINE

## 2024-05-07 PROCEDURE — 99999 PR PBB SHADOW E&M-EST. PATIENT-LVL II: CPT | Mod: PBBFAC,,, | Performed by: INTERNAL MEDICINE

## 2024-05-07 PROCEDURE — 3074F SYST BP LT 130 MM HG: CPT | Mod: CPTII,S$GLB,, | Performed by: INTERNAL MEDICINE

## 2024-05-07 PROCEDURE — 73200 CT UPPER EXTREMITY W/O DYE: CPT | Mod: 26,RT,, | Performed by: INTERNAL MEDICINE

## 2024-05-10 ENCOUNTER — ANESTHESIA EVENT (OUTPATIENT)
Dept: SURGERY | Facility: HOSPITAL | Age: 74
End: 2024-05-10
Payer: MEDICARE

## 2024-05-14 ENCOUNTER — OFFICE VISIT (OUTPATIENT)
Dept: SPORTS MEDICINE | Facility: CLINIC | Age: 74
End: 2024-05-14
Payer: MEDICARE

## 2024-05-14 DIAGNOSIS — M75.101 NONTRAUMATIC TEAR OF RIGHT ROTATOR CUFF, UNSPECIFIED TEAR EXTENT: ICD-10-CM

## 2024-05-14 DIAGNOSIS — M19.011 PRIMARY OSTEOARTHRITIS OF RIGHT SHOULDER: Primary | ICD-10-CM

## 2024-05-14 PROCEDURE — 99499 UNLISTED E&M SERVICE: CPT | Mod: S$GLB,,, | Performed by: PHYSICIAN ASSISTANT

## 2024-05-14 PROCEDURE — 99999 PR PBB SHADOW E&M-EST. PATIENT-LVL II: CPT | Mod: PBBFAC,,, | Performed by: PHYSICIAN ASSISTANT

## 2024-05-14 RX ORDER — PREGABALIN 75 MG/1
75 CAPSULE ORAL NIGHTLY
Status: CANCELLED | OUTPATIENT
Start: 2024-05-14

## 2024-05-14 RX ORDER — CELECOXIB 200 MG/1
200 CAPSULE ORAL DAILY
Status: CANCELLED | OUTPATIENT
Start: 2024-05-15

## 2024-05-14 RX ORDER — CEFAZOLIN SODIUM 2 G/50ML
2 SOLUTION INTRAVENOUS
Status: CANCELLED | OUTPATIENT
Start: 2024-05-14 | End: 2024-05-15

## 2024-05-14 RX ORDER — FAMOTIDINE 20 MG/1
20 TABLET, FILM COATED ORAL 2 TIMES DAILY
Status: CANCELLED | OUTPATIENT
Start: 2024-05-14

## 2024-05-14 RX ORDER — MUPIROCIN 20 MG/G
1 OINTMENT TOPICAL 2 TIMES DAILY
Status: CANCELLED | OUTPATIENT
Start: 2024-05-14 | End: 2024-05-19

## 2024-05-14 RX ORDER — CELECOXIB 200 MG/1
400 CAPSULE ORAL
Status: CANCELLED | OUTPATIENT
Start: 2024-05-14

## 2024-05-14 RX ORDER — ONDANSETRON 8 MG/1
8 TABLET, ORALLY DISINTEGRATING ORAL EVERY 8 HOURS PRN
Status: CANCELLED | OUTPATIENT
Start: 2024-05-14

## 2024-05-14 RX ORDER — ACETAMINOPHEN 500 MG
1000 TABLET ORAL
Status: CANCELLED | OUTPATIENT
Start: 2024-05-14

## 2024-05-14 RX ORDER — CELECOXIB 200 MG/1
400 CAPSULE ORAL
Status: CANCELLED | OUTPATIENT
Start: 2024-05-15 | End: 2024-05-15

## 2024-05-14 RX ORDER — ACETAMINOPHEN 500 MG
1000 TABLET ORAL EVERY 6 HOURS
Status: CANCELLED | OUTPATIENT
Start: 2024-05-14 | End: 2024-05-16

## 2024-05-14 RX ORDER — POLYETHYLENE GLYCOL 3350 17 G/17G
17 POWDER, FOR SOLUTION ORAL DAILY
Status: CANCELLED | OUTPATIENT
Start: 2024-05-14

## 2024-05-14 RX ORDER — DEXTROSE MONOHYDRATE AND SODIUM CHLORIDE 5; .9 G/100ML; G/100ML
INJECTION, SOLUTION INTRAVENOUS CONTINUOUS
Status: CANCELLED | OUTPATIENT
Start: 2024-05-14

## 2024-05-14 RX ORDER — TRAMADOL HYDROCHLORIDE 50 MG/1
50 TABLET ORAL EVERY 6 HOURS PRN
Qty: 20 TABLET | Refills: 0 | Status: SHIPPED | OUTPATIENT
Start: 2024-05-14 | End: 2024-05-29

## 2024-05-14 RX ORDER — OXYCODONE AND ACETAMINOPHEN 10; 325 MG/1; MG/1
1 TABLET ORAL EVERY 6 HOURS PRN
Qty: 28 TABLET | Refills: 0 | Status: SHIPPED | OUTPATIENT
Start: 2024-05-14 | End: 2024-05-29

## 2024-05-14 RX ORDER — ASPIRIN 325 MG
325 TABLET ORAL DAILY
Qty: 21 TABLET | Refills: 0 | Status: SHIPPED | OUTPATIENT
Start: 2024-05-14 | End: 2024-06-05

## 2024-05-14 RX ORDER — PROMETHAZINE HYDROCHLORIDE 25 MG/1
25 TABLET ORAL EVERY 6 HOURS PRN
Qty: 20 TABLET | Refills: 0 | Status: SHIPPED | OUTPATIENT
Start: 2024-05-14

## 2024-05-14 RX ORDER — ACETAMINOPHEN 500 MG
1000 TABLET ORAL
Status: CANCELLED | OUTPATIENT
Start: 2024-05-15 | End: 2024-05-15

## 2024-05-14 RX ORDER — OXYCODONE HYDROCHLORIDE 5 MG/1
5 TABLET ORAL
Status: CANCELLED | OUTPATIENT
Start: 2024-05-14

## 2024-05-14 RX ORDER — PROCHLORPERAZINE EDISYLATE 5 MG/ML
5 INJECTION INTRAMUSCULAR; INTRAVENOUS EVERY 6 HOURS PRN
Status: CANCELLED | OUTPATIENT
Start: 2024-05-14

## 2024-05-14 RX ORDER — CEFAZOLIN SODIUM 2 G/50ML
2 SOLUTION INTRAVENOUS
Status: CANCELLED | OUTPATIENT
Start: 2024-05-14

## 2024-05-14 RX ORDER — MUPIROCIN 20 MG/G
OINTMENT TOPICAL
Status: CANCELLED | OUTPATIENT
Start: 2024-05-14

## 2024-05-14 RX ORDER — AMOXICILLIN 250 MG
1 CAPSULE ORAL 2 TIMES DAILY
Status: CANCELLED | OUTPATIENT
Start: 2024-05-14

## 2024-05-14 RX ORDER — SODIUM CHLORIDE 9 MG/ML
INJECTION, SOLUTION INTRAVENOUS CONTINUOUS
Status: CANCELLED | OUTPATIENT
Start: 2024-05-14

## 2024-05-14 NOTE — H&P
Idalmis Morejon  is here for a completion of her perioperative paperwork. she  Is scheduled to undergo:      right              a. Shoulder reverse shoulder arthroplasty               b. Shoulder biceps tenodesis     on 5/15/2024.      She is a healthy individual but does need clearance for this procedure.    She has been cleared to proceed with surgery.      PAST MEDICAL HISTORY:   Past Medical History:   Diagnosis Date    Abnormal chest CT     Acid reflux     Allergy     Anemia     Anxiety     Cataract     Chronic UTI     recently treated with antibiotics -x 3 wks. ago-    Colon adenoma 2015 due 2020 10/21/2015    Colon adenoma 2015 due 2020 10/21/2015    Depression     Disturbed sleep rhythm     DJD (degenerative joint disease)     Dry eyes     Dry mouth     Grief reaction 03/24/2014    Hx of migraine headaches     Hyperlipemia     Hypernatremia 08/08/2014    Hypertension     Iritis     Mild vitamin D deficiency 09/09/2013    Multiple lung nodules on CT: [id number] no f/u needed 06/06/2016    Neurogenic bladder     Osteoporosis: 9/15 see rheumatology notes 06/06/2016    Positive GE[place]IANA (antinuclear antibody)     Schatzki's ring: 3/15 dilated 06/06/2016    Sciatica neuralgia 06/21/2013    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/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    Undifferentiated connective tissue disease 06/30/2020    Visual impairment      PAST SURGICAL HISTORY:   Past Surgical History:   Procedure Laterality Date    APPENDECTOMY      BACK SURGERY  2007    x4    CHOLECYSTECTOMY      lap orin    COLONOSCOPY N/A 10/2/2015    Procedure: COLONOSCOPY;  Surgeon: Bryan Love MD;  Location: [address]);  Service: Endoscopy;  Laterality: N/A;    COLONOSCOPY N/A 9/9/2019    Procedure: COLONOSCOPY;  Surgeon: Bryan Love MD;  Location: [facility] ([address] [place] [address]);  Service: Endoscopy;  Laterality: N/A;    COLONOSCOPY N/A 7/21/2023    Procedure: COLONOSCOPY;  Surgeon: Bryan Love MD;  Location: [facility] ([address] [place] [address]);  Service:  Endoscopy;  Laterality: N/A;  extended miralax prep-instr portal-pt declined any earlier dates-tb-hx of colon ca in multiple 2nd degree relatives    CYSTOSCOPY      with BOTOX INJECTION    ESOPHAGOGASTRODUODENOSCOPY N/A 5/13/2019    Procedure: EGD (ESOPHAGOGASTRODUODENOSCOPY);  Surgeon: Bryan Love MD;  Location: [address]);  Service: Endoscopy;  Laterality: N/A;    ESOPHAGOGASTRODUODENOSCOPY N/A 9/9/2019    Procedure: EGD (ESOPHAGOGASTRODUODENOSCOPY);  Surgeon: Bryan Love MD;  Location: [address]);  Service: Endoscopy;  Laterality: N/A;    ESOPHAGOGASTRODUODENOSCOPY N/A 12/7/2022    Procedure: EGD (ESOPHAGOGASTRODUODENOSCOPY);  Surgeon: Bryan Love MD;  Location: [address]);  Service: Endoscopy;  Laterality: N/A;  Endoscopy schedulers please schedule patient as soon as possible with me for EGD for esophageal dilation for dysphagia.  Thank you     instr portal-GT  pre call complete-as    HERNIA REPAIR      umbilical    HYSTERECTOMY      COMPLETE    POSTERIOR FUSION LUMBAR SPINE W/ CORPECTOMY      SPINE SURGERY      TONSILLECTOMY, ADENOIDECTOMY      TRIGGER FINGER RELEASE Right 6/3/2022    Procedure: RELEASE, TRIGGER FINGER,RIGHT,LONG;  Surgeon: Noemy Hernandez MD;  Location: [facility];  Service: Orthopedics;  Laterality: Right;     FAMILY HISTORY:   Family History   Problem Relation Name Age of Onset    Kidney disease Mother      Hypertension Mother      Diabetes Father      Diabetes Brother      Kidney disease Brother      Heart disease Brother      Hyperlipidemia Sister Soraya Morgan     Hypertension Sister Soraya Morgan     Diverticulosis Sister Soraya Morgan     Hypertension Daughter Edna Morejon     Colon cancer Maternal Aunt  70        stomach    Blindness Maternal Aunt      Cancer Maternal Aunt          stomach cancer    Cancer Maternal Uncle          colon    Colon cancer Maternal Uncle  70        two uncles    Glaucoma Neg Hx      Amblyopia Neg Hx      Macular  degeneration Neg Hx      Retinal detachment Neg Hx      Anesthesia problems Neg Hx      Celiac disease Neg Hx      Cirrhosis Neg Hx      Crohn's disease Neg Hx      Esophageal cancer Neg Hx      Irritable bowel syndrome Neg Hx      Liver cancer Neg Hx      Liver disease Neg Hx      Rectal cancer Neg Hx      Stomach cancer Neg Hx      Melanoma Neg Hx       SOCIAL HISTORY:   Social History     Socioeconomic History    Marital status: Single   Tobacco Use    Smoking status: Never    Smokeless tobacco: Never   Substance and Sexual Activity    Alcohol use: Yes     Comment: red wine occasionally    Drug use: No    Sexual activity: Not Currently     Partners: Male   Other Topics Concern    Are you pregnant or think you may be? No    Breast-feeding No     Social Determinants of Health     Financial Resource Strain: Low Risk  (1/9/2024)    Overall Financial Resource Strain (CARDIA)     Difficulty of Paying Living Expenses: Not hard at all   Food Insecurity: No Food Insecurity (1/9/2024)    Hunger Vital Sign     Worried About Running Out of Food in the Last Year: Never true     Ran Out of Food in the Last Year: Never true   Transportation Needs: No Transportation Needs (1/9/2024)    PRAPARE - Transportation     Lack of Transportation (Medical): No     Lack of Transportation (Non-Medical): No   Physical Activity: Insufficiently Active (1/9/2024)    Exercise Vital Sign     Days of Exercise per Week: 2 days     Minutes of Exercise per Session: 30 min   Stress: No Stress Concern Present (1/9/2024)    [employer] [place] [employer] - Occupational Stress Questionnaire     Feeling of Stress : Not at all   Recent Concern: Stress - Stress Concern Present (10/30/2023)    [employer] [place] [employer] - Occupational Stress Questionnaire     Feeling of Stress : To some extent   Housing Stability: Low Risk  (1/9/2024)    Housing Stability Vital Sign     Unable to Pay for Housing in the Last Year: No     Number of  Places Lived in the Last Year: 1     Unstable Housing in the Last Year: No       MEDICATIONS:   Current Outpatient Medications:     acetaminophen/diphenhydramine (TYLENOL PM ORAL), Take by mouth., Disp: , Rfl:     amLODIPine (NORVASC) 10 MG tablet, TAKE 1/2 OF A TABLET BY MOUTH TWICE DAILY, Disp: 90 tablet, Rfl: 3    ascorbic acid (VITAMIN C ORAL), Take by mouth., Disp: , Rfl:     B-complex with vitamin C (Z-BEC OR EQUIV) tablet, Take 1 tablet by mouth once daily., Disp: , Rfl:     DULoxetine (CYMBALTA) 60 MG capsule, Take 1 capsule (60 mg total) by mouth once daily., Disp: 90 capsule, Rfl: 1    ferrous gluconate (FERGON) 324 MG tablet, Take 1 tablet (324 mg total) by mouth daily with breakfast., Disp: 90 tablet, Rfl: 1    labetaloL (NORMODYNE) 100 MG tablet, Take 1 tablet (100 mg total) by mouth 2 (two) times daily., Disp: 180 tablet, Rfl: 3    mirabegron (MYRBETRIQ) 25 mg Tb24 ER tablet, Take 1 tablet (25 mg total) by mouth once daily., Disp: 90 tablet, Rfl: 3    pantoprazole (PROTONIX) 40 MG tablet, TAKE 1 TABLET BY MOUTH EVERY DAY BEFORE BREAKFAST, Disp: 90 tablet, Rfl: 2    timolol maleate 0.5% (TIMOPTIC) 0.5 % Drop, Place 1 drop into both eyes 2 (two) times daily., Disp: 30 mL, Rfl: 4    valsartan (DIOVAN) 160 MG tablet, Take 2 tablets (320 mg total) by mouth once daily., Disp: 180 tablet, Rfl: 3    vitamin D (VITAMIN D3) 1000 units Tab, Take 1,000 Units by mouth once daily., Disp: , Rfl:   ALLERGIES:   Review of patient's allergies indicates:   Allergen Reactions    Alendronate Nausea Only     And heartburn    Brimonidine Dermatitis     Follicular Conjunctivitis    Kenalog [triamcinolone acetonide] Hives       VITAL SIGNS: There were no vitals taken for this visit.     Risks, indications and benefits of the surgical procedure were discussed with the patient. All questions with regard to surgery, rehab, expected return to functional activities, activities of daily living and recreational endeavors were  answered to her satisfaction.    It was explained to the patient that there may be an increase in surgical risks if the patient has certain co-morbidities such as but not limited to: Obesity, Cardiovascular issues (CHF, CAD, Arrhythmias), chronic pulmonary issues, previous or current neurovascular/neurological issues, previous strokes, diabetes mellitus, previous wound healing issues, previous wound or skin infections, PVD, clotting disorders, if the patient uses chronic steroids, if the patient takes or has immune compromising medications or diseases, or has previously or currently used tobacco products.     The patient verbalized that he/she does not have any additional clotting, bleeding, or blood disorders, other than what is list in her chart on today's review.     Then a brief history and physical exam were performed.    Review of Systems   Constitution: Negative. Negative for chills, fever and night sweats.   HENT: Negative for congestion and headaches.    Eyes: Negative for blurred vision, left vision loss and right vision loss.   Cardiovascular: Negative for chest pain and syncope.   Respiratory: Negative for cough and shortness of breath.    Endocrine: Negative for polydipsia, polyphagia and polyuria.   Hematologic/Lymphatic: Negative for bleeding problem. Does not bruise/bleed easily.   Skin: Negative for dry skin, itching and rash.   Musculoskeletal: Negative for falls and muscle weakness.   Gastrointestinal: Negative for abdominal pain and bowel incontinence.   Genitourinary: Negative for bladder incontinence and nocturia.   Neurological: Negative for disturbances in coordination, loss of balance and seizures.   Psychiatric/Behavioral: Negative for depression. The patient does not have insomnia.    Allergic/Immunologic: Negative for hives and persistent infections.     PHYSICAL EXAM:  GEN: A&Ox3, WD WN NAD  HEENT: WNL  CHEST: CTAB, no W/R/R  HEART: RRR, no M/R/G  ABD: Soft, NT ND, BS x4 QUADS  MS; See  Epic  NEURO: CN II-XII intact       The surgical consent was then reviewed with the patient, who agreed with all the contents of the consent form and it was signed. she was then given the [facility] surgery packet to bring with her to surgery for the anesthesia portion of her perioperative paperwork.   For all physicians except for Dr. Barrera, we will email and possibly fax the consent forms and booking sheets to [facility] pre-admit.    The patient was given the opportunity to ask questions about the surgical plan and consent form, and once no other questions were asked, I proceeded with the pre-op appointment.    PHYSICAL THERAPY:  She was also instructed regarding physical therapy and will begin approximately 2 weeks after surgery at the [facility].     POST OP CARE:instructions were reviewed including care of the wound and dressing after surgery and when she can shower.     CRUTCHES OR WALKER: It was explained to the patient that if they are having a lower extremity surgery that they will require either a walker or crutches to ambulate safely with after surgery. It was explained that a cane or other assistive devices are not sufficient to safely ambulate with after surgery. I explained to the patient that I will place an order for them to receive either crutches or a walker after surgery to go home with. It was explained that if they have crutches or a walker at home already, that they are REQUIRED to bring them to the hospital on the day of surgery. It was explained that if they do not have them at the hospital on the day of surgery that they WILL be provided a new pair or crutches or a walker to go home with to ensure ambulation will be safe if the patient needs to stop somewhere on the way home.      PAIN MANAGEMENT: Idalmis Morejon was also given their pain management regimen, which includes the TENS unit given to her by [facility] along with the education required for its use.  She was also instructed regarding the Polar ice unit that will be in place after surgery and her postoperative pain medications.     PAIN MEDICATION:  Percocet 10/325mg 1 po q 4-6 hours prn pain  Ultram 50 mg Take 1-2 p.o. q.6 hours p.r.n. breakthrough pain,   Phenergan 25 mg one p.o. q.6 hours p.r.n. nausea and vomiting.    Post op meds to be delivered bedside prior to discharge. Deliver to family if patient is in surgery at 5pm.    The patient was told that narcotic pain medications may make them drowsy and instructions were given to not sign legal documents, drive or operate heavy machinery, cars, or equipment while under the influence of narcotic medications. The patient was told and understands that narcotic pain medications should only be used as needed to control pain and that other options of pain control include TENs unit and ice packs/unit.     Patient was instructed to purchase and take Colace to counter possible GI side effects of taking opiates.     DVT prophylaxis was discussed with the patient today including risk factors for developing DVTs and history of DVTs. The patient was asked if any specific recommendations were given from the doctor/s that did pre-operative surgical clearance. The patient was then given an education sheet about DVTs and PE with warning signs and symptoms of both and steps to take if they suspect either of these.    Patient was asked if they were taking or using OCP pills or devices. If they answered yes, then they were instructed to stop using OCPs at this pre-operative appointment until 2 months post-op to help prevent DVT development. They understand that there are other forms of birth control that do not involve hormones. They expressed understanding that ignoring/not following this instruction could result in a DVT which could turn into a deadly pulmonary embolism.     This along with the Modified Caprini risk assessment model for VTE in general surgical patients was used  to determine the patient's DVT risk.     From: Annia MK, Abel DA, Maribell SM, et al. Prevention of VTE in nonorthopedic surgical patients: antithrombotic therapy and prevention of thrombosis, 9th ed: American College of Chest Physicians evidence-based clinical practical guidelines. Chest 2012; 141:e227S. Copyright © 2012. Reproduced with permission from the American College of Chest Physicians.    The below listed DVT prophylaxis regimen was discussed along with SCDs during surgery and bilateral NATALIE compression stockings to be used post-op. Length of treatment has been determined to be 10-42 days post-op by the above noted Caprini assessment model. Early ambulation post-op was also discussed and emphasized with the patient.     Patient was instructed to buy and take:  Aspirin 325mg QD x 3 weeks for DVT prophylaxis starting on the evening after surgery.  Patient will also use bilateral TEDs on lower extremities, SCDs during surgery, and early ambulation post-op. If the patient was previously taking 81mg baby aspirin, they were told to not take it will using the above stated aspirin and to restart the 81mg aspirin after completion of the aspirin dose.      Patient was also told to buy over the counter Prilosec medication and take it once daily for GI protection as long as they are taking NSAIDs or Aspirin.     Published data in Sascha GK, et al. J Arthroplasty. Oct; 31(10):2237-40, 2016; showed that aspirin use as prophylaxis during revision total joint arthroplasty was more effective than warfarin in preventing symptomatic venous thromboembolic events and was associated with lower complications.  Patients in the study were also treated with intermittent pneumatic compression devices. Compression stockings would be our method of mechanical prophylaxis, which has been shown to be similar to pneumatic compression in the systematic review, Jesse ZHAO, et al. Selena Surg. Feb; 239(2): 162-171, 2004.     Results showed a  significantly higher incidence of symptomatic venous thromboembolic events among patients in the warfarin group vs. the aspirin group (1.75% vs. 0.56%). Researchers also noted a bleeding event rate of 1.5% among patients who received warfarin compared with a rate of 0.4% among patients who received aspirin.    Patient denies history of seizures.     I explained to following and the patient expressed understanding:  The patient is currently aware of the COVID19 pandemic and that proceeding with their surgical procedure could potentially increase exposure to coronavirus in the community. The patient understands that there is the possibility of delayed or cancelled appts or PT visits in the future. They understand that infection with the coronavirus could complicate their surgery recovery. They are aware of the current policies and procedures of [facility] and the government regarding the pandemic and they were given the option of delaying my surgery. The patient elects to proceed with surgery at this time.     The patient was instructed to practice strict social distancing, hand washing/hygiene, respiratory hygiene, and cough etiquette from now until 6 weeks following surgery to reduce the risk of kwame coronavirus.    As there were no other questions to be asked, she was given my business card along with Jean-Pierre Barrera MD business card if she has any questions or concerns prior to surgery or in the postop period.

## 2024-05-14 NOTE — H&P (VIEW-ONLY)
Idalmis Morejon  is here for a completion of her perioperative paperwork. she  Is scheduled to undergo:      right              a. Shoulder reverse shoulder arthroplasty               b. Shoulder biceps tenodesis     on 5/15/2024.      She is a healthy individual but does need clearance for this procedure.    She has been cleared to proceed with surgery.      PAST MEDICAL HISTORY:   Past Medical History:   Diagnosis Date    Abnormal chest CT     Acid reflux     Allergy     Anemia     Anxiety     Cataract     Chronic UTI     recently treated with antibiotics -x 3 wks. ago-    Colon adenoma 2015 due 2020 10/21/2015    Colon adenoma 2015 due 2020 10/21/2015    Depression     Disturbed sleep rhythm     DJD (degenerative joint disease)     Dry eyes     Dry mouth     Grief reaction 03/24/2014    Hx of migraine headaches     Hyperlipemia     Hypernatremia 08/08/2014    Hypertension     Iritis     Mild vitamin D deficiency 09/09/2013    Multiple lung nodules on CT: [id number] no f/u needed 06/06/2016    Neurogenic bladder     Osteoporosis: 9/15 see rheumatology notes 06/06/2016    Positive [place]NA (antinuclear antibody)     Schatzki's ring: 3/15 dilated 06/06/2016    Sciatica neuralgia 06/21/2013    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/05/2016    Undifferentiated connective tissue disease 06/30/2020    Visual impairment      PAST SURGICAL HISTORY:   Past Surgical History:   Procedure Laterality Date    APPENDECTOMY      BACK SURGERY  2007    x4    CHOLECYSTECTOMY      lap orin    COLONOSCOPY N/A 10/2/2015    Procedure: COLONOSCOPY;  Surgeon: Bryan Love MD;  Location: [address]);  Service: Endoscopy;  Laterality: N/A;    COLONOSCOPY N/A 9/9/2019    Procedure: COLONOSCOPY;  Surgeon: Bryan Love MD;  Location: [facility] ([address] [place] [address]);  Service: Endoscopy;  Laterality: N/A;    COLONOSCOPY N/A 7/21/2023    Procedure: COLONOSCOPY;  Surgeon: Bryan Love MD;  Location: [facility] ([address] [place] [address]);  Service:  Endoscopy;  Laterality: N/A;  extended miralax prep-instr portal-pt declined any earlier dates-tb-hx of colon ca in multiple 2nd degree relatives    CYSTOSCOPY      with BOTOX INJECTION    ESOPHAGOGASTRODUODENOSCOPY N/A 5/13/2019    Procedure: EGD (ESOPHAGOGASTRODUODENOSCOPY);  Surgeon: Bryan Love MD;  Location: [address]);  Service: Endoscopy;  Laterality: N/A;    ESOPHAGOGASTRODUODENOSCOPY N/A 9/9/2019    Procedure: EGD (ESOPHAGOGASTRODUODENOSCOPY);  Surgeon: Bryan Love MD;  Location: [address]);  Service: Endoscopy;  Laterality: N/A;    ESOPHAGOGASTRODUODENOSCOPY N/A 12/7/2022    Procedure: EGD (ESOPHAGOGASTRODUODENOSCOPY);  Surgeon: Bryan Love MD;  Location: [address]);  Service: Endoscopy;  Laterality: N/A;  Endoscopy schedulers please schedule patient as soon as possible with me for EGD for esophageal dilation for dysphagia.  Thank you     instr portal-GT  pre call complete-as    HERNIA REPAIR      umbilical    HYSTERECTOMY      COMPLETE    POSTERIOR FUSION LUMBAR SPINE W/ CORPECTOMY      SPINE SURGERY      TONSILLECTOMY, ADENOIDECTOMY      TRIGGER FINGER RELEASE Right 6/3/2022    Procedure: RELEASE, TRIGGER FINGER,RIGHT,LONG;  Surgeon: Noemy Hernandez MD;  Location: [facility];  Service: Orthopedics;  Laterality: Right;     FAMILY HISTORY:   Family History   Problem Relation Name Age of Onset    Kidney disease Mother      Hypertension Mother      Diabetes Father      Diabetes Brother      Kidney disease Brother      Heart disease Brother      Hyperlipidemia Sister Soraya Morgan     Hypertension Sister Soraya Morgan     Diverticulosis Sister Soraya Morgan     Hypertension Daughter Edna Morejon     Colon cancer Maternal Aunt  70        stomach    Blindness Maternal Aunt      Cancer Maternal Aunt          stomach cancer    Cancer Maternal Uncle          colon    Colon cancer Maternal Uncle  70        two uncles    Glaucoma Neg Hx      Amblyopia Neg Hx      Macular  degeneration Neg Hx      Retinal detachment Neg Hx      Anesthesia problems Neg Hx      Celiac disease Neg Hx      Cirrhosis Neg Hx      Crohn's disease Neg Hx      Esophageal cancer Neg Hx      Irritable bowel syndrome Neg Hx      Liver cancer Neg Hx      Liver disease Neg Hx      Rectal cancer Neg Hx      Stomach cancer Neg Hx      Melanoma Neg Hx       SOCIAL HISTORY:   Social History     Socioeconomic History    Marital status: Single   Tobacco Use    Smoking status: Never    Smokeless tobacco: Never   Substance and Sexual Activity    Alcohol use: Yes     Comment: red wine occasionally    Drug use: No    Sexual activity: Not Currently     Partners: Male   Other Topics Concern    Are you pregnant or think you may be? No    Breast-feeding No     Social Determinants of Health     Financial Resource Strain: Low Risk  (1/9/2024)    Overall Financial Resource Strain (CARDIA)     Difficulty of Paying Living Expenses: Not hard at all   Food Insecurity: No Food Insecurity (1/9/2024)    Hunger Vital Sign     Worried About Running Out of Food in the Last Year: Never true     Ran Out of Food in the Last Year: Never true   Transportation Needs: No Transportation Needs (1/9/2024)    PRAPARE - Transportation     Lack of Transportation (Medical): No     Lack of Transportation (Non-Medical): No   Physical Activity: Insufficiently Active (1/9/2024)    Exercise Vital Sign     Days of Exercise per Week: 2 days     Minutes of Exercise per Session: 30 min   Stress: No Stress Concern Present (1/9/2024)    [employer] - Occupational Stress Questionnaire     Feeling of Stress : Not at all   Recent Concern: Stress - Stress Concern Present (10/30/2023)    [employer] - Occupational Stress Questionnaire     Feeling of Stress : To some extent   Housing Stability: Low Risk  (1/9/2024)    Housing Stability Vital Sign     Unable to Pay for Housing in the Last Year: No     Number of  Places Lived in the Last Year: 1     Unstable Housing in the Last Year: No       MEDICATIONS:   Current Outpatient Medications:     acetaminophen/diphenhydramine (TYLENOL PM ORAL), Take by mouth., Disp: , Rfl:     amLODIPine (NORVASC) 10 MG tablet, TAKE 1/2 OF A TABLET BY MOUTH TWICE DAILY, Disp: 90 tablet, Rfl: 3    ascorbic acid (VITAMIN C ORAL), Take by mouth., Disp: , Rfl:     B-complex with vitamin C (Z-BEC OR EQUIV) tablet, Take 1 tablet by mouth once daily., Disp: , Rfl:     DULoxetine (CYMBALTA) 60 MG capsule, Take 1 capsule (60 mg total) by mouth once daily., Disp: 90 capsule, Rfl: 1    ferrous gluconate (FERGON) 324 MG tablet, Take 1 tablet (324 mg total) by mouth daily with breakfast., Disp: 90 tablet, Rfl: 1    labetaloL (NORMODYNE) 100 MG tablet, Take 1 tablet (100 mg total) by mouth 2 (two) times daily., Disp: 180 tablet, Rfl: 3    mirabegron (MYRBETRIQ) 25 mg Tb24 ER tablet, Take 1 tablet (25 mg total) by mouth once daily., Disp: 90 tablet, Rfl: 3    pantoprazole (PROTONIX) 40 MG tablet, TAKE 1 TABLET BY MOUTH EVERY DAY BEFORE BREAKFAST, Disp: 90 tablet, Rfl: 2    timolol maleate 0.5% (TIMOPTIC) 0.5 % Drop, Place 1 drop into both eyes 2 (two) times daily., Disp: 30 mL, Rfl: 4    valsartan (DIOVAN) 160 MG tablet, Take 2 tablets (320 mg total) by mouth once daily., Disp: 180 tablet, Rfl: 3    vitamin D (VITAMIN D3) 1000 units Tab, Take 1,000 Units by mouth once daily., Disp: , Rfl:   ALLERGIES:   Review of patient's allergies indicates:   Allergen Reactions    Alendronate Nausea Only     And heartburn    Brimonidine Dermatitis     Follicular Conjunctivitis    Kenalog [triamcinolone acetonide] Hives       VITAL SIGNS: There were no vitals taken for this visit.     Risks, indications and benefits of the surgical procedure were discussed with the patient. All questions with regard to surgery, rehab, expected return to functional activities, activities of daily living and recreational endeavors were  answered to her satisfaction.    It was explained to the patient that there may be an increase in surgical risks if the patient has certain co-morbidities such as but not limited to: Obesity, Cardiovascular issues (CHF, CAD, Arrhythmias), chronic pulmonary issues, previous or current neurovascular/neurological issues, previous strokes, diabetes mellitus, previous wound healing issues, previous wound or skin infections, PVD, clotting disorders, if the patient uses chronic steroids, if the patient takes or has immune compromising medications or diseases, or has previously or currently used tobacco products.     The patient verbalized that he/she does not have any additional clotting, bleeding, or blood disorders, other than what is list in her chart on today's review.     Then a brief history and physical exam were performed.    Review of Systems   Constitution: Negative. Negative for chills, fever and night sweats.   HENT: Negative for congestion and headaches.    Eyes: Negative for blurred vision, left vision loss and right vision loss.   Cardiovascular: Negative for chest pain and syncope.   Respiratory: Negative for cough and shortness of breath.    Endocrine: Negative for polydipsia, polyphagia and polyuria.   Hematologic/Lymphatic: Negative for bleeding problem. Does not bruise/bleed easily.   Skin: Negative for dry skin, itching and rash.   Musculoskeletal: Negative for falls and muscle weakness.   Gastrointestinal: Negative for abdominal pain and bowel incontinence.   Genitourinary: Negative for bladder incontinence and nocturia.   Neurological: Negative for disturbances in coordination, loss of balance and seizures.   Psychiatric/Behavioral: Negative for depression. The patient does not have insomnia.    Allergic/Immunologic: Negative for hives and persistent infections.     PHYSICAL EXAM:  GEN: A&Ox3, WD WN NAD  HEENT: WNL  CHEST: CTAB, no W/R/R  HEART: RRR, no M/R/G  ABD: Soft, NT ND, BS x4 QUADS  MS; See  Epic  NEURO: CN II-XII intact       The surgical consent was then reviewed with the patient, who agreed with all the contents of the consent form and it was signed. she was then given the [facility] surgery packet to bring with her to surgery for the anesthesia portion of her perioperative paperwork.   For all physicians except for Dr. Barrera, we will email and possibly fax the consent forms and booking sheets to [facility] pre-admit.    The patient was given the opportunity to ask questions about the surgical plan and consent form, and once no other questions were asked, I proceeded with the pre-op appointment.    PHYSICAL THERAPY:  She was also instructed regarding physical therapy and will begin approximately 2 weeks after surgery at the [facility].     POST OP CARE:instructions were reviewed including care of the wound and dressing after surgery and when she can shower.     CRUTCHES OR WALKER: It was explained to the patient that if they are having a lower extremity surgery that they will require either a walker or crutches to ambulate safely with after surgery. It was explained that a cane or other assistive devices are not sufficient to safely ambulate with after surgery. I explained to the patient that I will place an order for them to receive either crutches or a walker after surgery to go home with. It was explained that if they have crutches or a walker at home already, that they are REQUIRED to bring them to the hospital on the day of surgery. It was explained that if they do not have them at the hospital on the day of surgery that they WILL be provided a new pair or crutches or a walker to go home with to ensure ambulation will be safe if the patient needs to stop somewhere on the way home.      PAIN MANAGEMENT: Idalmis Morejon was also given their pain management regimen, which includes the TENS unit given to her by [facility] along with the education required for its use.  She was also instructed regarding the Polar ice unit that will be in place after surgery and her postoperative pain medications.     PAIN MEDICATION:  Percocet 10/325mg 1 po q 4-6 hours prn pain  Ultram 50 mg Take 1-2 p.o. q.6 hours p.r.n. breakthrough pain,   Phenergan 25 mg one p.o. q.6 hours p.r.n. nausea and vomiting.    Post op meds to be delivered bedside prior to discharge. Deliver to family if patient is in surgery at 5pm.    The patient was told that narcotic pain medications may make them drowsy and instructions were given to not sign legal documents, drive or operate heavy machinery, cars, or equipment while under the influence of narcotic medications. The patient was told and understands that narcotic pain medications should only be used as needed to control pain and that other options of pain control include TENs unit and ice packs/unit.     Patient was instructed to purchase and take Colace to counter possible GI side effects of taking opiates.     DVT prophylaxis was discussed with the patient today including risk factors for developing DVTs and history of DVTs. The patient was asked if any specific recommendations were given from the doctor/s that did pre-operative surgical clearance. The patient was then given an education sheet about DVTs and PE with warning signs and symptoms of both and steps to take if they suspect either of these.    Patient was asked if they were taking or using OCP pills or devices. If they answered yes, then they were instructed to stop using OCPs at this pre-operative appointment until 2 months post-op to help prevent DVT development. They understand that there are other forms of birth control that do not involve hormones. They expressed understanding that ignoring/not following this instruction could result in a DVT which could turn into a deadly pulmonary embolism.     This along with the Modified Caprini risk assessment model for VTE in general surgical patients was used  to determine the patient's DVT risk.     From: Annia MK, Abel DA, Maribell SM, et al. Prevention of VTE in nonorthopedic surgical patients: antithrombotic therapy and prevention of thrombosis, 9th ed: American College of Chest Physicians evidence-based clinical practical guidelines. Chest 2012; 141:e227S. Copyright © 2012. Reproduced with permission from the American College of Chest Physicians.    The below listed DVT prophylaxis regimen was discussed along with SCDs during surgery and bilateral NATALIE compression stockings to be used post-op. Length of treatment has been determined to be 10-42 days post-op by the above noted Caprini assessment model. Early ambulation post-op was also discussed and emphasized with the patient.     Patient was instructed to buy and take:  Aspirin 325mg QD x 3 weeks for DVT prophylaxis starting on the evening after surgery.  Patient will also use bilateral TEDs on lower extremities, SCDs during surgery, and early ambulation post-op. If the patient was previously taking 81mg baby aspirin, they were told to not take it will using the above stated aspirin and to restart the 81mg aspirin after completion of the aspirin dose.      Patient was also told to buy over the counter Prilosec medication and take it once daily for GI protection as long as they are taking NSAIDs or Aspirin.     Published data in Sascha GK, et al. J Arthroplasty. Oct; 31(10):2237-40, 2016; showed that aspirin use as prophylaxis during revision total joint arthroplasty was more effective than warfarin in preventing symptomatic venous thromboembolic events and was associated with lower complications.  Patients in the study were also treated with intermittent pneumatic compression devices. Compression stockings would be our method of mechanical prophylaxis, which has been shown to be similar to pneumatic compression in the systematic review, Jesse ZHAO, et al. Selena Surg. Feb; 239(2): 162-171, 2004.     Results showed a  significantly higher incidence of symptomatic venous thromboembolic events among patients in the warfarin group vs. the aspirin group (1.75% vs. 0.56%). Researchers also noted a bleeding event rate of 1.5% among patients who received warfarin compared with a rate of 0.4% among patients who received aspirin.    Patient denies history of seizures.     I explained to following and the patient expressed understanding:  The patient is currently aware of the COVID19 pandemic and that proceeding with their surgical procedure could potentially increase exposure to coronavirus in the community. The patient understands that there is the possibility of delayed or cancelled appts or PT visits in the future. They understand that infection with the coronavirus could complicate their surgery recovery. They are aware of the current policies and procedures of [facility] and the government regarding the pandemic and they were given the option of delaying my surgery. The patient elects to proceed with surgery at this time.     The patient was instructed to practice strict social distancing, hand washing/hygiene, respiratory hygiene, and cough etiquette from now until 6 weeks following surgery to reduce the risk of kwame coronavirus.    As there were no other questions to be asked, she was given my business card along with Jean-Pierre Barrera MD business card if she has any questions or concerns prior to surgery or in the postop period.

## 2024-05-15 ENCOUNTER — HOSPITAL ENCOUNTER (OUTPATIENT)
Facility: HOSPITAL | Age: 74
Discharge: HOME | End: 2024-05-15
Attending: ORTHOPAEDIC SURGERY | Admitting: ORTHOPAEDIC SURGERY
Payer: MEDICARE

## 2024-05-15 ENCOUNTER — ANESTHESIA (OUTPATIENT)
Dept: SURGERY | Facility: HOSPITAL | Age: 74
End: 2024-05-15
Payer: MEDICARE

## 2024-05-15 VITALS
RESPIRATION RATE: 31 BRPM | TEMPERATURE: 97 F | DIASTOLIC BLOOD PRESSURE: 68 MMHG | SYSTOLIC BLOOD PRESSURE: 152 MMHG | HEART RATE: 67 BPM | OXYGEN SATURATION: 97 % | WEIGHT: 159 LBS | HEIGHT: 66 IN | BODY MASS INDEX: 25.55 KG/M2

## 2024-05-15 DIAGNOSIS — M19.011 PRIMARY OSTEOARTHRITIS OF RIGHT SHOULDER: ICD-10-CM

## 2024-05-15 PROCEDURE — 25000003 PHARM REV CODE 250: Performed by: PHYSICIAN ASSISTANT

## 2024-05-15 PROCEDURE — 25000003 PHARM REV CODE 250: Performed by: NURSE ANESTHETIST, CERTIFIED REGISTERED

## 2024-05-15 PROCEDURE — 23472 RECONSTRUCT SHOULDER JOINT: CPT | Mod: 82,RT,, | Performed by: STUDENT IN AN ORGANIZED HEALTH CARE EDUCATION/TRAINING PROGRAM

## 2024-05-15 PROCEDURE — 99900035 HC TECH TIME PER 15 MIN (STAT)

## 2024-05-15 PROCEDURE — 71000033 HC RECOVERY, INTIAL HOUR: Performed by: ORTHOPAEDIC SURGERY

## 2024-05-15 PROCEDURE — C1769 GUIDE WIRE: HCPCS | Performed by: ORTHOPAEDIC SURGERY

## 2024-05-15 PROCEDURE — D9220A PRA ANESTHESIA: Mod: ANES,,, | Performed by: ANESTHESIOLOGY

## 2024-05-15 PROCEDURE — 71000015 HC POSTOP RECOV 1ST HR: Performed by: ORTHOPAEDIC SURGERY

## 2024-05-15 PROCEDURE — 94761 N-INVAS EAR/PLS OXIMETRY MLT: CPT

## 2024-05-15 PROCEDURE — 63600175 PHARM REV CODE 636 W HCPCS: Mod: JZ,JG | Performed by: ANESTHESIOLOGY

## 2024-05-15 PROCEDURE — 63600175 PHARM REV CODE 636 W HCPCS: Performed by: PHYSICIAN ASSISTANT

## 2024-05-15 PROCEDURE — 23472 RECONSTRUCT SHOULDER JOINT: CPT | Mod: RT,,, | Performed by: ORTHOPAEDIC SURGERY

## 2024-05-15 PROCEDURE — 63600175 PHARM REV CODE 636 W HCPCS: Performed by: ANESTHESIOLOGY

## 2024-05-15 PROCEDURE — D9220A PRA ANESTHESIA: Mod: CRNA,,, | Performed by: NURSE ANESTHETIST, CERTIFIED REGISTERED

## 2024-05-15 PROCEDURE — C1776 JOINT DEVICE (IMPLANTABLE): HCPCS | Performed by: ORTHOPAEDIC SURGERY

## 2024-05-15 PROCEDURE — 37000009 HC ANESTHESIA EA ADD 15 MINS: Performed by: ORTHOPAEDIC SURGERY

## 2024-05-15 PROCEDURE — C1713 ANCHOR/SCREW BN/BN,TIS/BN: HCPCS | Performed by: ORTHOPAEDIC SURGERY

## 2024-05-15 PROCEDURE — 25000003 PHARM REV CODE 250: Performed by: ANESTHESIOLOGY

## 2024-05-15 PROCEDURE — 63600175 PHARM REV CODE 636 W HCPCS: Performed by: ORTHOPAEDIC SURGERY

## 2024-05-15 PROCEDURE — 36000710: Performed by: ORTHOPAEDIC SURGERY

## 2024-05-15 PROCEDURE — 37000008 HC ANESTHESIA 1ST 15 MINUTES: Performed by: ORTHOPAEDIC SURGERY

## 2024-05-15 PROCEDURE — 36000711: Performed by: ORTHOPAEDIC SURGERY

## 2024-05-15 PROCEDURE — 27201423 OPTIME MED/SURG SUP & DEVICES STERILE SUPPLY: Performed by: ORTHOPAEDIC SURGERY

## 2024-05-15 PROCEDURE — 63600175 PHARM REV CODE 636 W HCPCS: Performed by: NURSE ANESTHETIST, CERTIFIED REGISTERED

## 2024-05-15 PROCEDURE — 64416 NJX AA&/STRD BRCH PL NFS IMG: CPT | Mod: 59,RT | Performed by: ANESTHESIOLOGY

## 2024-05-15 DEVICE — HUMERAL CUP
Type: IMPLANTABLE DEVICE | Site: SHOULDER | Status: FUNCTIONAL
Brand: REUNION

## 2024-05-15 DEVICE — GLENOID BASEPLATE
Type: IMPLANTABLE DEVICE | Site: SHOULDER | Status: FUNCTIONAL
Brand: REUNION

## 2024-05-15 DEVICE — 6.5MM CENTER SCREW
Type: IMPLANTABLE DEVICE | Site: SHOULDER | Status: FUNCTIONAL
Brand: REUNION

## 2024-05-15 DEVICE — X3 HUMERAL INSERT
Type: IMPLANTABLE DEVICE | Site: SHOULDER | Status: FUNCTIONAL
Brand: REUNION

## 2024-05-15 DEVICE — PRESS-FIT HUMERAL STEM
Type: IMPLANTABLE DEVICE | Site: SHOULDER | Status: FUNCTIONAL
Brand: REUNION

## 2024-05-15 DEVICE — GLENOSPHERE , CONCENTRIC
Type: IMPLANTABLE DEVICE | Site: SHOULDER | Status: FUNCTIONAL
Brand: REUNION

## 2024-05-15 DEVICE — 4.5MM PERIPHERAL SCREW
Type: IMPLANTABLE DEVICE | Site: SHOULDER | Status: FUNCTIONAL
Brand: REUNION

## 2024-05-15 RX ORDER — ONDANSETRON 4 MG/1
8 TABLET, ORALLY DISINTEGRATING ORAL EVERY 8 HOURS PRN
Status: DISCONTINUED | OUTPATIENT
Start: 2024-05-15 | End: 2024-05-15 | Stop reason: HOSPADM

## 2024-05-15 RX ORDER — FAMOTIDINE 20 MG/1
20 TABLET, FILM COATED ORAL 2 TIMES DAILY
Status: DISCONTINUED | OUTPATIENT
Start: 2024-05-15 | End: 2024-05-15 | Stop reason: HOSPADM

## 2024-05-15 RX ORDER — BUPIVACAINE HYDROCHLORIDE 2.5 MG/ML
INJECTION, SOLUTION EPIDURAL; INFILTRATION; INTRACAUDAL
Status: COMPLETED | OUTPATIENT
Start: 2024-05-15 | End: 2024-05-15

## 2024-05-15 RX ORDER — POLYETHYLENE GLYCOL 3350 17 G/17G
17 POWDER, FOR SOLUTION ORAL DAILY
Status: DISCONTINUED | OUTPATIENT
Start: 2024-05-15 | End: 2024-05-15 | Stop reason: HOSPADM

## 2024-05-15 RX ORDER — CELECOXIB 200 MG/1
200 CAPSULE ORAL DAILY
Status: DISCONTINUED | OUTPATIENT
Start: 2024-05-16 | End: 2024-05-15 | Stop reason: HOSPADM

## 2024-05-15 RX ORDER — DEXTROSE MONOHYDRATE AND SODIUM CHLORIDE 5; .9 G/100ML; G/100ML
INJECTION, SOLUTION INTRAVENOUS CONTINUOUS
Status: DISCONTINUED | OUTPATIENT
Start: 2024-05-15 | End: 2024-05-15

## 2024-05-15 RX ORDER — OXYCODONE HYDROCHLORIDE 5 MG/1
5 TABLET ORAL EVERY 4 HOURS PRN
Status: DISCONTINUED | OUTPATIENT
Start: 2024-05-15 | End: 2024-05-15 | Stop reason: HOSPADM

## 2024-05-15 RX ORDER — SODIUM CHLORIDE 0.9 % (FLUSH) 0.9 %
3 SYRINGE (ML) INJECTION
Status: DISCONTINUED | OUTPATIENT
Start: 2024-05-15 | End: 2024-05-15 | Stop reason: HOSPADM

## 2024-05-15 RX ORDER — VANCOMYCIN HYDROCHLORIDE 1 G/20ML
INJECTION, POWDER, LYOPHILIZED, FOR SOLUTION INTRAVENOUS
Status: DISCONTINUED | OUTPATIENT
Start: 2024-05-15 | End: 2024-05-15 | Stop reason: HOSPADM

## 2024-05-15 RX ORDER — FENTANYL CITRATE 50 UG/ML
100 INJECTION, SOLUTION INTRAMUSCULAR; INTRAVENOUS
Status: DISCONTINUED | OUTPATIENT
Start: 2024-05-16 | End: 2024-05-15

## 2024-05-15 RX ORDER — ROPIVACAINE HYDROCHLORIDE 2 MG/ML
INJECTION, SOLUTION EPIDURAL; INFILTRATION; PERINEURAL CONTINUOUS
Status: DISCONTINUED | OUTPATIENT
Start: 2024-05-15 | End: 2024-05-15 | Stop reason: HOSPADM

## 2024-05-15 RX ORDER — EPHEDRINE SULFATE 50 MG/ML
INJECTION, SOLUTION INTRAVENOUS
Status: DISCONTINUED | OUTPATIENT
Start: 2024-05-15 | End: 2024-05-15

## 2024-05-15 RX ORDER — ONDANSETRON HYDROCHLORIDE 2 MG/ML
4 INJECTION, SOLUTION INTRAVENOUS ONCE AS NEEDED
Status: DISCONTINUED | OUTPATIENT
Start: 2024-05-15 | End: 2024-05-15 | Stop reason: HOSPADM

## 2024-05-15 RX ORDER — CARBOXYMETHYLCELLULOSE SODIUM 10 MG/ML
GEL OPHTHALMIC
Status: DISCONTINUED | OUTPATIENT
Start: 2024-05-15 | End: 2024-05-15

## 2024-05-15 RX ORDER — MUPIROCIN 20 MG/G
1 OINTMENT TOPICAL 2 TIMES DAILY
Status: DISCONTINUED | OUTPATIENT
Start: 2024-05-15 | End: 2024-05-15 | Stop reason: HOSPADM

## 2024-05-15 RX ORDER — ACETAMINOPHEN 500 MG
1000 TABLET ORAL
Status: COMPLETED | OUTPATIENT
Start: 2024-05-15 | End: 2024-05-15

## 2024-05-15 RX ORDER — ACETAMINOPHEN 500 MG
1000 TABLET ORAL EVERY 6 HOURS
Status: DISCONTINUED | OUTPATIENT
Start: 2024-05-15 | End: 2024-05-15 | Stop reason: HOSPADM

## 2024-05-15 RX ORDER — DEXAMETHASONE SODIUM PHOSPHATE 4 MG/ML
INJECTION, SOLUTION INTRA-ARTICULAR; INTRALESIONAL; INTRAMUSCULAR; INTRAVENOUS; SOFT TISSUE
Status: DISCONTINUED | OUTPATIENT
Start: 2024-05-15 | End: 2024-05-15

## 2024-05-15 RX ORDER — FAMOTIDINE 10 MG/ML
INJECTION INTRAVENOUS
Status: DISCONTINUED | OUTPATIENT
Start: 2024-05-15 | End: 2024-05-15

## 2024-05-15 RX ORDER — OXYCODONE HYDROCHLORIDE 5 MG/1
5 TABLET ORAL
Status: DISCONTINUED | OUTPATIENT
Start: 2024-05-15 | End: 2024-05-15 | Stop reason: HOSPADM

## 2024-05-15 RX ORDER — KETAMINE HCL IN 0.9 % NACL 50 MG/5 ML
SYRINGE (ML) INTRAVENOUS
Status: DISCONTINUED | OUTPATIENT
Start: 2024-05-15 | End: 2024-05-15

## 2024-05-15 RX ORDER — ROCURONIUM BROMIDE 10 MG/ML
INJECTION, SOLUTION INTRAVENOUS
Status: DISCONTINUED | OUTPATIENT
Start: 2024-05-15 | End: 2024-05-15

## 2024-05-15 RX ORDER — SODIUM CHLORIDE 9 MG/ML
INJECTION, SOLUTION INTRAVENOUS CONTINUOUS
Status: DISCONTINUED | OUTPATIENT
Start: 2024-05-15 | End: 2024-05-15 | Stop reason: HOSPADM

## 2024-05-15 RX ORDER — MUPIROCIN 20 MG/G
OINTMENT TOPICAL
Status: DISCONTINUED | OUTPATIENT
Start: 2024-05-15 | End: 2024-05-15 | Stop reason: HOSPADM

## 2024-05-15 RX ORDER — LIDOCAINE HYDROCHLORIDE 20 MG/ML
INJECTION INTRAVENOUS
Status: DISCONTINUED | OUTPATIENT
Start: 2024-05-15 | End: 2024-05-15

## 2024-05-15 RX ORDER — FENTANYL CITRATE 50 UG/ML
100 INJECTION, SOLUTION INTRAMUSCULAR; INTRAVENOUS
Status: DISCONTINUED | OUTPATIENT
Start: 2024-05-15 | End: 2024-05-15 | Stop reason: HOSPADM

## 2024-05-15 RX ORDER — PROCHLORPERAZINE EDISYLATE 5 MG/ML
5 INJECTION INTRAMUSCULAR; INTRAVENOUS EVERY 6 HOURS PRN
Status: DISCONTINUED | OUTPATIENT
Start: 2024-05-15 | End: 2024-05-15 | Stop reason: HOSPADM

## 2024-05-15 RX ORDER — PHENYLEPHRINE HYDROCHLORIDE 10 MG/ML
INJECTION INTRAVENOUS
Status: DISCONTINUED | OUTPATIENT
Start: 2024-05-15 | End: 2024-05-15

## 2024-05-15 RX ORDER — AMOXICILLIN 250 MG
1 CAPSULE ORAL 2 TIMES DAILY
Status: DISCONTINUED | OUTPATIENT
Start: 2024-05-15 | End: 2024-05-15 | Stop reason: HOSPADM

## 2024-05-15 RX ORDER — MIDAZOLAM HYDROCHLORIDE 1 MG/ML
1 INJECTION, SOLUTION INTRAMUSCULAR; INTRAVENOUS
Status: DISCONTINUED | OUTPATIENT
Start: 2024-05-15 | End: 2024-05-15 | Stop reason: HOSPADM

## 2024-05-15 RX ORDER — METHOCARBAMOL 750 MG/1
750 TABLET, FILM COATED ORAL ONCE AS NEEDED
Status: COMPLETED | OUTPATIENT
Start: 2024-05-15 | End: 2024-05-15

## 2024-05-15 RX ORDER — ROPIVACAINE HYDROCHLORIDE 2 MG/ML
INJECTION, SOLUTION EPIDURAL; INFILTRATION; PERINEURAL CONTINUOUS
Status: DISCONTINUED | OUTPATIENT
Start: 2024-05-16 | End: 2024-05-15

## 2024-05-15 RX ORDER — PROPOFOL 10 MG/ML
VIAL (ML) INTRAVENOUS
Status: DISCONTINUED | OUTPATIENT
Start: 2024-05-15 | End: 2024-05-15

## 2024-05-15 RX ORDER — ONDANSETRON HYDROCHLORIDE 2 MG/ML
INJECTION, SOLUTION INTRAVENOUS
Status: DISCONTINUED | OUTPATIENT
Start: 2024-05-15 | End: 2024-05-15

## 2024-05-15 RX ORDER — CELECOXIB 200 MG/1
400 CAPSULE ORAL
Status: DISCONTINUED | OUTPATIENT
Start: 2024-05-15 | End: 2024-05-15 | Stop reason: HOSPADM

## 2024-05-15 RX ORDER — PREGABALIN 75 MG/1
75 CAPSULE ORAL NIGHTLY
Status: DISCONTINUED | OUTPATIENT
Start: 2024-05-15 | End: 2024-05-15 | Stop reason: HOSPADM

## 2024-05-15 RX ADMIN — FENTANYL CITRATE 50 MCG: 50 INJECTION, SOLUTION INTRAMUSCULAR; INTRAVENOUS at 07:05

## 2024-05-15 RX ADMIN — CEFAZOLIN 2 G: 2 INJECTION, POWDER, FOR SOLUTION INTRAMUSCULAR; INTRAVENOUS at 07:05

## 2024-05-15 RX ADMIN — SODIUM CHLORIDE: 0.9 INJECTION, SOLUTION INTRAVENOUS at 05:05

## 2024-05-15 RX ADMIN — PROPOFOL 100 MG: 10 INJECTION, EMULSION INTRAVENOUS at 07:05

## 2024-05-15 RX ADMIN — TRANEXAMIC ACID 1000 MG: 100 INJECTION INTRAVENOUS at 09:05

## 2024-05-15 RX ADMIN — SODIUM CHLORIDE, SODIUM GLUCONATE, SODIUM ACETATE, POTASSIUM CHLORIDE, MAGNESIUM CHLORIDE, SODIUM PHOSPHATE, DIBASIC, AND POTASSIUM PHOSPHATE: .53; .5; .37; .037; .03; .012; .00082 INJECTION, SOLUTION INTRAVENOUS at 09:05

## 2024-05-15 RX ADMIN — SODIUM CHLORIDE, SODIUM GLUCONATE, SODIUM ACETATE, POTASSIUM CHLORIDE, MAGNESIUM CHLORIDE, SODIUM PHOSPHATE, DIBASIC, AND POTASSIUM PHOSPHATE: .53; .5; .37; .037; .03; .012; .00082 INJECTION, SOLUTION INTRAVENOUS at 08:05

## 2024-05-15 RX ADMIN — SUGAMMADEX 200 MG: 100 INJECTION, SOLUTION INTRAVENOUS at 09:05

## 2024-05-15 RX ADMIN — PHENYLEPHRINE HYDROCHLORIDE 100 MCG: 10 INJECTION INTRAVENOUS at 08:05

## 2024-05-15 RX ADMIN — MUPIROCIN: 20 OINTMENT TOPICAL at 05:05

## 2024-05-15 RX ADMIN — ROCURONIUM BROMIDE 10 MG: 10 INJECTION, SOLUTION INTRAVENOUS at 08:05

## 2024-05-15 RX ADMIN — CARBOXYMETHYLCELLULOSE SODIUM 4 DROP: 10 GEL OPHTHALMIC at 07:05

## 2024-05-15 RX ADMIN — Medication 20 MG: at 07:05

## 2024-05-15 RX ADMIN — Medication: at 10:05

## 2024-05-15 RX ADMIN — LIDOCAINE HYDROCHLORIDE 80 MG: 20 INJECTION INTRAVENOUS at 07:05

## 2024-05-15 RX ADMIN — MUPIROCIN 1 G: 20 OINTMENT TOPICAL at 10:05

## 2024-05-15 RX ADMIN — MIDAZOLAM HYDROCHLORIDE 1 MG: 1 INJECTION, SOLUTION INTRAMUSCULAR; INTRAVENOUS at 07:05

## 2024-05-15 RX ADMIN — ACETAMINOPHEN 1000 MG: 500 TABLET ORAL at 05:05

## 2024-05-15 RX ADMIN — FENTANYL CITRATE 50 MCG: 50 INJECTION INTRAMUSCULAR; INTRAVENOUS at 07:05

## 2024-05-15 RX ADMIN — FAMOTIDINE 20 MG: 10 INJECTION, SOLUTION INTRAVENOUS at 07:05

## 2024-05-15 RX ADMIN — METHOCARBAMOL 750 MG: 750 TABLET ORAL at 10:05

## 2024-05-15 RX ADMIN — EPHEDRINE SULFATE 10 MG: 50 INJECTION INTRAVENOUS at 08:05

## 2024-05-15 RX ADMIN — BUPIVACAINE HYDROCHLORIDE 15 ML: 2.5 INJECTION, SOLUTION EPIDURAL; INFILTRATION; INTRACAUDAL; PERINEURAL at 07:05

## 2024-05-15 RX ADMIN — PHENYLEPHRINE HYDROCHLORIDE 0.5 MCG/KG/MIN: 10 INJECTION INTRAVENOUS at 08:05

## 2024-05-15 RX ADMIN — ROCURONIUM BROMIDE 50 MG: 10 INJECTION, SOLUTION INTRAVENOUS at 07:05

## 2024-05-15 RX ADMIN — DEXAMETHASONE SODIUM PHOSPHATE 8 MG: 4 INJECTION, SOLUTION INTRAMUSCULAR; INTRAVENOUS at 07:05

## 2024-05-15 RX ADMIN — ONDANSETRON 4 MG: 2 INJECTION INTRAMUSCULAR; INTRAVENOUS at 09:05

## 2024-05-15 RX ADMIN — SODIUM CHLORIDE: 9 INJECTION, SOLUTION INTRAVENOUS at 10:05

## 2024-05-15 RX ADMIN — TRANEXAMIC ACID 1000 MG: 100 INJECTION INTRAVENOUS at 07:05

## 2024-05-15 RX ADMIN — PHENYLEPHRINE HYDROCHLORIDE 50 MCG: 10 INJECTION INTRAVENOUS at 07:05

## 2024-05-15 NOTE — ANESTHESIA PREPROCEDURE EVALUATION
05/15/2024  Pre-operative evaluation for Procedure(s) (LRB):  ARTHROPLASTY, SHOULDER (Right)    Idalmis Morejon is a 73 y.o. female     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    Colon adenoma: 10/15 acceptable 2019, repeated 2023- repeat 5 years    Age-related osteoporosis 2016; also 4/21, 7/23    Schatzki's ring: 3/15 dilated, also 12/22    Multiple lung nodules on CT: [id number] no f/u needed    Steroid-induced glaucoma of both eyes    Proteinuria    Family history of colon cancer: maternal uncle and aunt    Recurrent major depressive disorder, in partial remission    Atherosclerosis of abdominal aorta: minimal see CT 7/16; CT SCORE 0 1/22    Secondary hyperparathyroidism    Esophageal dysphagia    Chronic follicular conjunctivitis of both eyes    Undifferentiated connective tissue disease    Tortuous aorta; on CT 12/10 and CXR 7/20: CT SCORE 0 1/22    Open angle with borderline findings and low glaucoma risk in both eyes    History of compression fracture of spine    Decreased range of motion of finger of right hand    Decreased  strength of right hand    Stage 3b chronic kidney disease       Review of patient's allergies indicates:   Allergen Reactions    Alendronate Nausea Only     And heartburn    Brimonidine Dermatitis      Follicular Conjunctivitis    Kenalog [triamcinolone acetonide] Hives       No current facility-administered medications on file prior to encounter.     Current Outpatient Medications on File Prior to Encounter   Medication Sig Dispense Refill    acetaminophen/diphenhydramine (TYLENOL PM ORAL) Take by mouth.      amLODIPine (NORVASC) 10 MG tablet TAKE 1/2 OF A TABLET BY MOUTH TWICE DAILY 90 tablet 3    DULoxetine (CYMBALTA) 60 MG capsule Take 1 capsule (60 mg total) by mouth once daily. 90 capsule 1    ferrous gluconate (FERGON) 324 MG tablet Take 1 tablet (324 mg total) by mouth daily with breakfast. 90 tablet 1    labetaloL (NORMODYNE) 100 MG tablet Take 1 tablet (100 mg total) by mouth 2 (two) times daily. 180 tablet 3    pantoprazole (PROTONIX) 40 MG tablet TAKE 1 TABLET BY MOUTH EVERY DAY BEFORE BREAKFAST 90 tablet 2    timolol maleate 0.5% (TIMOPTIC) 0.5 % Drop Place 1 drop into both eyes 2 (two) times daily. 30 mL 4    valsartan (DIOVAN) 160 MG tablet Take 2 tablets (320 mg total) by mouth once daily. 180 tablet 3    ascorbic acid (VITAMIN C ORAL) Take by mouth.      B-complex with vitamin C (Z-BEC OR EQUIV) tablet Take 1 tablet by mouth once daily.      mirabegron (MYRBETRIQ) 25 mg Tb24 ER tablet Take 1 tablet (25 mg total) by mouth once daily. 90 tablet 3    vitamin D (VITAMIN D3) 1000 units Tab Take 1,000 Units by mouth once daily.         Past Surgical History:   Procedure Laterality Date    APPENDECTOMY      BACK SURGERY  2007    x4    CHOLECYSTECTOMY      lap orin    COLONOSCOPY N/A 10/2/2015    Procedure: COLONOSCOPY;  Surgeon: Bryan Love MD;  [facility]  Service: Endoscopy;  Laterality: N/A;    COLONOSCOPY N/A 9/9/2019    Procedure: COLONOSCOPY;  Surgeon: Bryan Love MD;  [facility]  Service: Endoscopy;  Laterality: N/A;    COLONOSCOPY N/A 7/21/2023    Procedure: COLONOSCOPY;  Surgeon: Bryan Love MD;  Location: [facility] (4TH FLR);  Service:  Endoscopy;  Laterality: N/A;  extended miralax prep-instr portal-pt declined any earlier dates-tb-hx of colon ca in multiple 2nd degree relatives    CYSTOSCOPY      with BOTOX INJECTION    ESOPHAGOGASTRODUODENOSCOPY N/A 5/13/2019    Procedure: EGD (ESOPHAGOGASTRODUODENOSCOPY);  Surgeon: Bryan Love MD;  Location: [address]);  Service: Endoscopy;  Laterality: N/A;    ESOPHAGOGASTRODUODENOSCOPY N/A 9/9/2019    Procedure: EGD (ESOPHAGOGASTRODUODENOSCOPY);  Surgeon: Bryan Love MD;  Location: [address]);  Service: Endoscopy;  Laterality: N/A;    ESOPHAGOGASTRODUODENOSCOPY N/A 12/7/2022    Procedure: EGD (ESOPHAGOGASTRODUODENOSCOPY);  Surgeon: Bryan Love MD;  Location: [address]);  Service: Endoscopy;  Laterality: N/A;  Endoscopy schedulers please schedule patient as soon as possible with me for EGD for esophageal dilation for dysphagia.  Thank you     instr portal-GT  pre call complete-as    HERNIA REPAIR      umbilical    HYSTERECTOMY      COMPLETE    POSTERIOR FUSION LUMBAR SPINE W/ CORPECTOMY      SPINE SURGERY      TONSILLECTOMY, ADENOIDECTOMY      TRIGGER FINGER RELEASE Right 6/3/2022    Procedure: RELEASE, TRIGGER FINGER,RIGHT,LONG;  Surgeon: Noemy Hernandez MD;  Location: [facility];  Service: Orthopedics;  Laterality: Right;       Social History     Socioeconomic History    Marital status: Single   Tobacco Use    Smoking status: Never    Smokeless tobacco: Never   Substance and Sexual Activity    Alcohol use: Yes     Comment: red wine occasionally    Drug use: No    Sexual activity: Not Currently     Partners: Male   Other Topics Concern    Are you pregnant or think you may be? No    Breast-feeding No     Social Determinants of Health     Financial Resource Strain: Low Risk  (1/9/2024)    Overall Financial Resource Strain (CARDIA)     Difficulty of Paying Living Expenses: Not hard at all   Food Insecurity: No Food Insecurity (1/9/2024)    Hunger Vital Sign      Worried About Running Out of Food in the Last Year: Never true     Ran Out of Food in the Last Year: Never true   Transportation Needs: No Transportation Needs (1/9/2024)    PRAPARE - Transportation     Lack of Transportation (Medical): No     Lack of Transportation (Non-Medical): No   Physical Activity: Insufficiently Active (1/9/2024)    Exercise Vital Sign     Days of Exercise per Week: 2 days     Minutes of Exercise per Session: 30 min   Stress: No Stress Concern Present (1/9/2024)    [employer] - Occupational Stress Questionnaire     Feeling of Stress : Not at all   Recent Concern: Stress - Stress Concern Present (10/30/2023)    [employer] - Occupational Stress Questionnaire     Feeling of Stress : To some extent   Housing Stability: Low Risk  (1/9/2024)    Housing Stability Vital Sign     Unable to Pay for Housing in the Last Year: No     Number of Places Lived in the Last Year: 1     Unstable Housing in the Last Year: No     EKG:  Normal sinus rhythm   Early repolarization   Moderate voltage criteria for LVH, may be normal variant   Nonspecific T wave abnormality   Abnormal ECG   When compared with ECG of 21-FEB-2018 11:19,   Questionable change in The axis   ST elevation now present in Anterior leads   T wave inversion no longer evident in Lateral leads   Confirmed by JOSE GUADALUPE ERNST MD ([id number]) on 4/25/2023 4:03:13 PM     2D Echo:  No results found. However, due to the size of the patient record, not all encounters were searched. Please check Results Review for a complete set of results.  The test was stopped because the patient experienced shortness of breath.  There were no arrhythmias during stress.  The patient's exercise capacity was below average.  The ECG portion of this study is negative for myocardial ischemia.  The left ventricle is normal in size with concentric remodeling and normal systolic function.  The estimated ejection fraction is  65%.  Normal left ventricular diastolic function.  Normal right ventricular size with normal right ventricular systolic function.  Normal central venous pressure (3 mmHg).  The estimated PA systolic pressure is 27 mmHg.  Trivial posterior pericardial effusion.  The stress ECG and echo portions of this study are negative for myocardial ischemia despite the 7/10 dyspnea reported.         Pre-op Assessment    I have reviewed the Patient Summary Reports.     I have reviewed the Nursing Notes. I have reviewed the NPO Status.   I have reviewed the Medications.     Review of Systems  Anesthesia Hx:             Denies Family Hx of Anesthesia complications.    Denies Personal Hx of Anesthesia complications.                    Cardiovascular:     Hypertension                                        Pulmonary:    Denies COPD.  Denies Asthma.                    Renal/:  Chronic Renal Disease, CKD                Hepatic/GI:     GERD             Musculoskeletal:  Arthritis               Neurological:    Denies CVA.    Denies Seizures.          Peripheral Neuropathy                          Endocrine:  Denies Diabetes.           Psych:    depression                Physical Exam  General: Well nourished, Cooperative, Alert and Oriented    Airway:  Mallampati: II   Mouth Opening: Normal  TM Distance: Normal  Tongue: Normal  Neck ROM: Normal ROM    Dental:  Intact        Anesthesia Plan  Type of Anesthesia, risks & benefits discussed:    Anesthesia Type: Gen ETT, Regional  Intra-op Monitoring Plan: Standard ASA Monitors  Post Op Pain Control Plan: multimodal analgesia, IV/PO Opioids PRN and peripheral nerve block  Induction:  IV  Airway Plan: Direct, Post-Induction  Informed Consent: Informed consent signed with the Patient and all parties understand the risks and agree with anesthesia plan.  All questions answered.   ASA Score: 3  Day of Surgery Review of History & Physical: H&P Update referred to the surgeon/provider.    Ready For  Surgery From Anesthesia Perspective.     .

## 2024-05-15 NOTE — ANESTHESIA PROCEDURE NOTES
Interscalene Continuous Nerve Catheter    Patient location during procedure: pre-op   Block not for primary anesthetic.  Reason for block: at surgeon's request and post-op pain management   Post-op Pain Location: R shoulder pain   Start time: 5/15/2024 7:15 AM  Timeout: 5/15/2024 7:15 AM   End time: 5/15/2024 7:18 AM    Staffing  Authorizing Provider: Kendell Pugh MD  Performing Provider: Kendell Pugh MD    Staffing  Performed by: Kendell Pugh MD  Authorized by: Kendell Pugh MD    Preanesthetic Checklist  Completed: patient identified, IV checked, site marked, risks and benefits discussed, surgical consent, monitors and equipment checked, pre-op evaluation and timeout performed  Peripheral Block  Patient position: sitting  Prep: ChloraPrep and site prepped and draped  Patient monitoring: heart rate, cardiac monitor, continuous pulse ox, continuous capnometry and frequent blood pressure checks  Block type: interscalene  Laterality: right  Injection technique: continuous  Needle  Needle type: Tuohy   Needle gauge: 18 G  Needle length: 2 in  Needle localization: anatomical landmarks and ultrasound guidance  Catheter type: non-stimulating  Catheter size: 20 G  Test dose: lidocaine 1.5% with Epi 1-to-200,000 and negative   -ultrasound image captured on disc.  Assessment  Injection assessment: negative aspiration, negative parasthesia and local visualized surrounding nerve  Paresthesia pain: none  Heart rate change: no  Slow fractionated injection: yes  Pain Tolerance: comfortable throughout block and no complaints  Medications:    Medications: bupivacaine (pf) (MARCAINE) injection 0.25% - Perineural   15 mL - 5/15/2024 7:18:00 AM    Additional Notes  VSS.  [facility] RN monitoring vitals throughout procedure.  Patient tolerated procedure well.

## 2024-05-15 NOTE — TRANSFER OF CARE
"Anesthesia Transfer of Care Note    Patient: Idalmis Morejon    Procedure(s) Performed: Procedure(s) (LRB):  ARTHROPLASTY, REVERSE TOTAL SHOULDER (Right)  BICEPS TENODESIS, FIXATION, TENDON (Right)    Patient location: PACU    Anesthesia Type: general and regional    Transport from OR: Transported from OR on 6-10 L/min O2 by face mask with adequate spontaneous ventilation    Post pain: adequate analgesia    Post assessment: no apparent anesthetic complications and tolerated procedure well    Post vital signs: stable    Level of consciousness: awake    Nausea/Vomiting: no nausea/vomiting    Complications: none    Transfer of care protocol was followed      Last vitals: Visit Vitals  BP (!) 165/84 (BP Location: Left arm, Patient Position: Lying)   Pulse 68   Temp 36.6 °C (97.8 °F) (Temporal)   Resp (!) 24   Ht 5' 6" (1.676 m)   Wt 72.1 kg (159 lb)   SpO2 100%   Breastfeeding No   BMI 25.66 kg/m²     "

## 2024-05-15 NOTE — ANESTHESIA POSTPROCEDURE EVALUATION
Anesthesia Post Evaluation    Patient: Idalmis Morejno    Procedure(s) Performed: Procedure(s) (LRB):  ARTHROPLASTY, REVERSE TOTAL SHOULDER (Right)  BICEPS TENODESIS, FIXATION, TENDON (Right)    Final Anesthesia Type: general      Patient location during evaluation: PACU  Patient participation: Yes- Able to Participate  Level of consciousness: awake and alert and oriented  Post-procedure vital signs: reviewed and stable  Pain management: adequate  Airway patency: patent    PONV status at discharge: No PONV  Anesthetic complications: no      Cardiovascular status: blood pressure returned to baseline and hemodynamically stable  Respiratory status: unassisted, room air and spontaneous ventilation  Hydration status: euvolemic  Follow-up not needed.              Vitals Value Taken Time   /68 05/15/24 1117   Temp 36.3 °C (97.4 °F) 05/15/24 1009   Pulse 66 05/15/24 1123   Resp 24 05/15/24 1120   SpO2 96 % 05/15/24 1123   Vitals shown include unfiled device data.      Event Time   Out of Recovery 10:45:00         Pain/Brina Score: Pain Rating Prior to Med Admin: 0 (5/15/2024 10:19 AM)  Brina Score: 10 (5/15/2024 10:30 AM)

## 2024-05-15 NOTE — OPERATIVE NOTE ADDENDUM
Certification of Assistant at Surgery       Surgery Date: 5/15/2024     Participating Surgeons:  Surgeons and Role:     * Jean-Pierre Barrera MD - Primary    Procedures:  Procedure(s) (LRB):  ARTHROPLASTY, REVERSE TOTAL SHOULDER (Right)  BICEPS TENODESIS, FIXATION, TENDON (Right)    Assistant Surgeon's Certification of Necessity:  I understand that section 1842 (b) (6) (d) of the Social Security Act generally prohibits Medicare Part B reasonable charge payment for the services of assistants at surgery in teaching hospitals when qualified residents are available to furnish such services. I certify that the services for which payment is claimed were medically necessary, and that no qualified resident was available to perform the services. I further understand that these services are subject to post-payment review by the Medicare carrier.      Bryan Stewart DO    05/15/2024  10:09 AM

## 2024-05-15 NOTE — DISCHARGE SUMMARY
OUTPATIENT ORTHOPAEDIC SURGERY    Brief Operative Note       Surgery Date: 5/15/2024     Pre-op Diagnosis:  Primary osteoarthritis of right shoulder [M19.011]  Nontraumatic tear of right rotator cuff, unspecified tear extent [M75.101]    Post-op Diagnosis: Primary osteoarthritis of right shoulder [M19.011]  Nontraumatic tear of right rotator cuff, unspecified tear extent [M75.101]    Procedures: Procedure(s) (LRB):  ARTHROPLASTY, REVERSE TOTAL SHOULDER (Right)  BICEPS TENODESIS, FIXATION, TENDON (Right)    Surgeon: Surgeons and Role:     * Jean-Pierre Barrera MD - Primary    Anesthesia: General    Findings/Key Components: OA and large cuff tear     Core Measure Documentation:  Were antibiotics extended? No  Was the patient administered a VTE Prophylaxis? No. Short procedure; low risk  Estimated Blood Loss: minimal           Specimens (From admission, onward)      None            Implants:   Implant Name Type Inv. Item Serial No.  Lot No. LRB No. Used Action   CUP HUM REUNION RFX TRAIL ADPT - [id number]  CUP HUM REUNION RFX TRAIL ADPT  EMERALDEntropySoft LESLIE. [id number] Right 1 Implanted and Explanted   WIRE FIXATION REUN NIT PILT - [id number]  WIRE FIXATION REUN NIT PILT  EMERALD toucanBox LESLIE. [id number] Right 1 Implanted and Explanted   BASEPLATE REUNION MARY ELLEN 59E70NC - [id number]  BASEPLATE REUNION MARY ELLEN 50A07NL  EMERALD toucanBox LESLIE. [id number] Right 1 Implanted   SCREW BONE CENTER 28X6.5MM - [id number]  SCREW BONE CENTER 28X6.5MM  EMERALDEntropySoft LESLIE. [id number] Right 1 Implanted   SCREW BONE TSA 4.5X20MM - [id number]  SCREW BONE TSA 4.5X20MM  EMERALDEntropySoft LESLIE. [id number] Right 1 Implanted   SCREW BONE TSA 4.5X20MM - [id number]  SCREW BONE TSA 4.5X20MM  EMERALD toucanBox LESLIE. [id number] Right 1 Implanted   SCREW BONE TSA 4.5X20MM - [id number]  SCREW BONE TSA 4.5X20MM  [employer] LESLIE. [id number] Right 1 Implanted   SCREW BONE GLENOID 4.5X24MM -  [id number]  SCREW BONE GLENOID 4.5X24MM  EMERALD Limundo LESLIE. [id number] Right 1 Implanted   COMPONENT GLENOSPHERE 32X2MM - [id number]  COMPONENT GLENOSPHERE 32X2MM  EMERALD Limundo LESLIE. [id number] Right 1 Implanted   REUNION TSA MODULAR HUMERAL STEM SIZE 11 STM LNTH 123 MM     [id number] Right 1 Wasted   STEM REUNION HUM  11MM - [id number]  STEM REUNION HUM  11MM  EMERALD Limundo LESLIE. [id number] Right 1 Implanted   REUNION RSA X3 HUMERAL INSERT FELICITY 32 MM YHKNS 4 MM     [id number] Right 1 Implanted   CUP HUMERAL REUN RSA 32X4MM - [id number]  CUP HUMERAL REUN RSA 32X4MM  EMERALD Limundo LESLIE. [id number] Right 1 Implanted       Complications: none           Disposition: PACU - hemodynamically stable.           Condition: Stable    Attestation:  I was present for the entire procedure.    Discharge Note      SUMMARY     Admit Date: 5/15/2024    Attending Physician: Jean-Pierre Barrera MD     Discharge Date: 05/15/2024    Final Diagnosis: please see operative report    Hospital Course of Care: Patient underwent elective surgery surgery and was transferred to PACU in stable condition.  In PACU, patient received appropriate post-operative care and was transferred to medical floor for neurovascular monitoring and pain control.  There patient progressed appropriately. Once met anesthesia derived discharge criteria, the patient was discharged home with plans for physical therapy and follow-up with the operative surgeon.    Disposition: Home or Self Care    Patient Instructions:   Current Discharge Medication List        CONTINUE these medications which have NOT CHANGED    Details   acetaminophen/diphenhydramine (TYLENOL PM ORAL) Take by mouth.      amLODIPine (NORVASC) 10 MG tablet TAKE 1/2 OF A TABLET BY MOUTH TWICE DAILY  Qty: 90 tablet, Refills: 3    Comments: .  Associated Diagnoses: Essential hypertension      DULoxetine (CYMBALTA) 60 MG capsule Take 1 capsule (60 mg total) by mouth  once daily.  Qty: 90 capsule, Refills: 1    Associated Diagnoses: Recurrent major depressive disorder, in partial remission      ferrous gluconate (FERGON) 324 MG tablet Take 1 tablet (324 mg total) by mouth daily with breakfast.  Qty: 90 tablet, Refills: 1    Associated Diagnoses: Iron deficiency anemia, unspecified iron deficiency anemia type      labetaloL (NORMODYNE) 100 MG tablet Take 1 tablet (100 mg total) by mouth 2 (two) times daily.  Qty: 180 tablet, Refills: 3    Comments: .  Associated Diagnoses: Stage 3b chronic kidney disease      pantoprazole (PROTONIX) 40 MG tablet TAKE 1 TABLET BY MOUTH EVERY DAY BEFORE BREAKFAST  Qty: 90 tablet, Refills: 2    Associated Diagnoses: Gastroesophageal reflux disease; Encounter for monitoring proton pump inhibitor therapy; Epigastric pain      timolol maleate 0.5% (TIMOPTIC) 0.5 % Drop Place 1 drop into both eyes 2 (two) times daily.  Qty: 30 mL, Refills: 4    Associated Diagnoses: Primary open-angle glaucoma, indeterminate stage, unspecified laterality      valsartan (DIOVAN) 160 MG tablet Take 2 tablets (320 mg total) by mouth once daily.  Qty: 180 tablet, Refills: 3    Comments: .  Associated Diagnoses: Essential hypertension      ascorbic acid (VITAMIN C ORAL) Take by mouth.      aspirin 325 MG tablet Take 1 tablet (325 mg total) by mouth once daily. for 21 days  Qty: 21 tablet, Refills: 0    Associated Diagnoses: Primary osteoarthritis of right shoulder      B-complex with vitamin C (Z-BEC OR EQUIV) tablet Take 1 tablet by mouth once daily.      mirabegron (MYRBETRIQ) 25 mg Tb24 ER tablet Take 1 tablet (25 mg total) by mouth once daily.  Qty: 90 tablet, Refills: 3    Associated Diagnoses: Overactive bladder      oxyCODONE-acetaminophen (PERCOCET)  mg per tablet Take 1 tablet by mouth every 6 (six) hours as needed for Pain.  Qty: 28 tablet, Refills: 0    Associated Diagnoses: Primary osteoarthritis of right shoulder      promethazine (PHENERGAN) 25 MG tablet  Take 1 tablet (25 mg total) by mouth every 6 (six) hours as needed for Nausea.  Qty: 20 tablet, Refills: 0    Associated Diagnoses: Primary osteoarthritis of right shoulder      traMADoL (ULTRAM) 50 mg tablet Take 1 tablet (50 mg total) by mouth every 6 (six) hours as needed for Pain.  Qty: 20 tablet, Refills: 0    Associated Diagnoses: Primary osteoarthritis of right shoulder      vitamin D (VITAMIN D3) 1000 units Tab Take 1,000 Units by mouth once daily.             Discharge Procedure Orders (must include Diet, Follow-up, Activity)   Discharge Procedure Orders (must include Diet, Follow-up, Activity)   Discharge diet   Order Comments: Eat a bland diet for the first day after surgery. Progress your diet as tolerated. Constipation may occur with Narcotic usage, contact our office if you are experiencing constipation.     Notify physician   Order Comments: Call the doctor's office immediately if you experience any of the following:  - Excessive bleeding or pus like drainage at the incision site  - Uncontrollable pain not relieved by pain medication  - Excessive swelling or redness at the incision site  - Fever above 101.5 degrees not controlled with Tylenol or Motrin  - Shortness of Breath  - Any foul odor or blistering from the surgery site  - Persistent nausea and vomiting or diarrhea  - Difficulty breathing or increased cough  - Severe persistent headache  - Persistent dizziness, light-headedness, or visual disturbances  - Increased confusion or weakness     Change dressing - Aquacel   Order Comments: Keep in place until clinic visit 2 weeks after surgery     Discharge instructions - Pain Management Information   Order Comments: Pain Management: A cold therapy cuff, pain medications, local injections, and in some cases, regional anesthesia injections are used to manage your post-operative pain. The decision to use each of these options is based on their risks and benefits.  Medications: You were given one or more  of the following medication prescriptions before leaving the hospital. Have the prescriptions filled at a pharmacy on your way home and follow the instructions on the bottles. If you need a refill, please call your pharmacy.   Narcotic Medication (usually Vicodin ES, Lortab, Percocet or Nucynta): Begin taking the medication before your shoulder starts to hurt. Some patients do not like to take any medication, but if you wait until your pain is severe before taking, you will be very uncomfortable for several hours waiting for the narcotic to work. Always take with food.  Nausea / Vomiting: For this issue, we prescribe Phenergan, use this medication as directed.  Cold Therapy: You may have been sent home with a [facility] cold therapy unit and wrap for your shoulder. Fill with ice and water to the indicated fill line and use throughout the day for the first two days and then as needed to help relieve pain and control swelling.   Regional Anesthesia Injections (Blocks): You may have been given a regional nerve block either before or after surgery. This may make your entire shoulder numb for 24-36 hours.     Showering - Aquacel   Order Comments: Sponge bath is recommended to upper body.  Do not get bandage wet.     1:  Activity   Order Comments: Exercises to be performed 5 times per day for 5 minutes each time; You may have been sent home with a sling / pillow attachment holding your arm away from your body. You may remove the sling when changing clothes or bathing. Make sure to wear the sling while sleeping unless instructed otherwise. You may remove at rest or for exercises; no abduction, no external rotation, non weight bearing, and no active ROM on shoulder.   Exercises:   Pendulums: Bend forward allowing your arm to hang down in front of you. Gently swing your arm side-to-side and front to back  Elbow Motion: Straighten and bend your elbow.  Ball Squeezes: Use ball attached to sling/pillow or soft (nerf) ball for   strengthening  Shoulder Shrugs: Shrug your shoulders up and down.  Shoulder Retractions: (Squeeze shoulder blades together): Squeeze shoulder blades together while slightly pulling them down (do not shrug your shoulders upward); You can perform 10-15 reps, several times throughout the day, when seated at your desk, driving in the car, etc.        Activity: Per printed postoperative instructions.     Diet: Resume pre-surgery diet.    Follow-up: 2 weeks with Dr. Barrera

## 2024-05-15 NOTE — OP NOTE
[facility])  Surgery Department  Operative Note    SUMMARY     Date of Procedure: 5/15/2024     Procedure: Procedure(s) (LRB):  ARTHROPLASTY, REVERSE TOTAL SHOULDER (Right)  BICEPS TENODESIS, FIXATION, TENDON (Right)     Surgeons and Role:     * Jean-Pierre Barrera MD - Primary    First Assistant:  Bryan Stewart DO     **The use of a first assistant was necessary for bone cuts, glenosphere placement, tissue handling, implantation intraoperative decision making and closure.  The case could not have been done without the use of a qualified first assistant.       Assisting Surgeon:   John Saldivar    Pre-Operative Diagnosis: Primary osteoarthritis of right shoulder [M19.011]  Nontraumatic tear of right rotator cuff, unspecified tear extent [M75.101]    Post-Operative Diagnosis: Post-Op Diagnosis Codes:     * Primary osteoarthritis of right shoulder [M19.011]     * Nontraumatic tear of right rotator cuff, unspecified tear extent [M75.101]    Anesthesia: General    Technical Procedures Used:     5/15/2024     PREOPERATIVE DIAGNOSIS:   1. Right shoulder rotator cuff arthropathy / degenerative arthritis  2. Right shoulder biceps tendinopathy    POSTOPERATIVE DIAGNOSIS:   1. Right shoulder rotator cuff arthropathy / degenerative arthritis  2. Right shoulder biceps tendinopathy    PROCEDURE:   1. Right reverse total shoulder arthroplasty   2. Right shoulder biceps tenodesis    SURGEON: Jean-Pierre Barrera M.D.     First Assistant:  Bryan Stewart DO     **The use of a first assistant was necessary for bone cuts, glenosphere placement, tissue handling, implantation intraoperative decision making and closure.  The case could not have been done without the use of a qualified first assistant.       Assisting Surgeon:   John Saldivar    COMPLICATIONS: None.     POSITION: Beach chair.     ANESTHESIA: General endotracheal plus right upper extremity interscalene block with catheter    IMPLANTS USED:   1. Foley oncedric  reverse total shoulder replacement   11 ReUnion Press Fit Stem - 95 mm length, 135-degree cut   32mm x 4mm Humeral Cup   4mm poly insert   32 mm ReUnion Glenoid Baseplate, 28 mm central screw   32 concentric glenosphere (2 mm offset)   Baseplate screws:   24 superior      20 inferior      20 anterior      10 posterior     INDICATIONS: The patient is a 73 year-old female with a history of right shoulder degenerative arthritis and long standing rotator cuff pathology.  She was determined to have significant rotator cuff arthropathy, cuff muscle atrophy and tearing along with significant biceps tendinopathy.  She had progressed and failed all non-operative management.   She failed nonoperative management, and agreed with the postoperative restrictions for a reverse total shoulder replacement, which was judged to be the best choice for her. All risks and benefits were discussed including infection, dislocation, instability, persistent pain and neurovascular injury. The patient seemed to understand and wished to proceed.     DESCRIPTION OF PROCEDURE: The patient was brought in the room. After undergoing a right upper extremity interscalene block with catheter and general endotracheal anesthesia, she was placed in a well-padded beach chair position. The right upper extremity was prepped and draped in usual sterile fashion. Perioperative antibiotics had been given prior to the procedure, and space suits and [place] draping were used.     The standard deltopectoral approach was performed slightly medially. An incision was made from 3 cm medial to the AC joint, down to the lateral deltoid insertion. After going through skin and subcutaneous tissues, full-thickness skin flaps were developed. Excellent hemostasis was achieved. Blunt dissection was taken down. The deltopectoral interval was identified. The cephalic vein was identified and kept lateral during the case. The interval was developed, and perforating branches to the  deltoid were coagulated. The subdeltoid fascia was released for visualization. The top 1 cm of the pectoralis was released for visualization. The axillary nerve was identified and protected during the entire case. The 3 sisters were ligated. The subscapularis was released directly or peeled off its insertion on the humerus with tagging sutures placed for possible later transosseous repair. The bursa was removed to allow exposure.     BICEPS TENODESIS:  The long head of the biceps was identified and found to be tendinopathic.  This was released within the groove and was lifted up for planned tenodesis.  #2 Orthocord sutures were placed in the biceps and it was tagged into the pec tendon insertion. The proximal aspect of the biceps was removed and discarded.    After soft tissue releases, the proximal humerus was dislocated. No residual supraspinatus or infraspinatus remained. Small osteophytes were removed. The neck cut was made with 30 degrees of retroversion across the humeral neck with an extramedullary guide at 135-degrees.  A starting reamer was used up to a size 6 followed by sequential reaming up to a size 11 stem. A 95 mm stem was judged to be the best fit for her humerus.  Ultimately a size 11, 135-degree 95 mm length stem was used.     Attention turned to exposure of the glenoid. A 360 degree subscapularis release was performed. An inferior capsulotomy was performed. The labrum was removed and completely 360 degrees.  Forked glenoid retractor was placed anteriorly, and a posterior retractor and forked inferior retractor were placed to allow complete exposure of the glenoid.  Excellent visualization of the glenoid was achieved. The lower 1/2 of the glenoid was achieved under direct visualization, and the center aspect of the lower half was used for our central pin.  Our guide wire was aimed at 10 degree inferior tilt to the scapula and using the wedge guide and based on our template to ream out the inferior  bone.   A greater than 25 mm depth was achieved, which was judged to be adequate for our central peg. A tap followed by sequential wedge reaming got us down to bleeding bone. Our permanent 6.5 mm x 28 mm screw with got excellent stability.  The posterior-superior screw was a compression screw and the other three were variable angle locking screws.  All four were placed.  A trial followed by a permanent 32 +2 concentric head was put in place.     Attention was turned back to the humerus. The finishing reamings were made, and our trial 32 mm +4 tray and +4 poly insert was inserted. This was judged to be a very stable. There was no evidence of impinging bone or osteophytes. The deltoid was under good tension, and full range of motion was achievable with excellent forward elevation.     The shoulder was dislocated again.  Pulsatile lavage washed our area to remove all bony pieces.  The permanent size 11, 135-degree stem with 32 mm +4 mm humeral cup and +4 poly was inserted without difficulty to give us a 135-degree construct.  This was relocated, and was judged to have full range of motion including in abduction, external and internal rotation, with no evidence of impingement or instability.     The deltopectoral interval was reapproximated with 0 Vicryl. The skin was closed with 3-0 Vicryl, 4-0 Monocryl and Mastisol, Steri-Strips, Xeroform, 4 x 4 fluffs, ABD pads and tape.     The patient was placed in a sling and pillow for protection, was extubated in the room, transferred to Recovery Room in stable condition accompanied by his physician. There were no complications in the case. I was present, scrubbed and did perform all critical portions of the case.     Postop plan for the patient is to proceed with our standard reverse total shoulder replacement protocol.         Jean-Pierre Barrera MD         Description of the Findings of the Procedure: above    Significant Surgical Tasks Conducted by the Assistant(s), if  Applicable: none    Complications: No    Estimated Blood Loss (EBL): * No values recorded between 5/15/2024  8:04 AM and 5/15/2024  9:50 AM *           Implants:   Implant Name Type Inv. Item Serial No.  Lot No. LRB No. Used Action   CUP HUM REUNION RFX TRAIL ADPT - [id number]  CUP HUM REUNION RFX TRAIL ADPT  EMERALD [employer] LESLIE. [id number] Right 1 Implanted and Explanted   WIRE FIXATION REUN NIT PILT - [id number]  WIRE FIXATION REUN NIT PILT  EMERALD [employer] LESLIE. [id number] Right 1 Implanted and Explanted   BASEPLATE REUNION MARY ELLEN 13D59OS - [id number]  BASEPLATE REUNION MARY ELLEN 46D95EN  EMERALD [employer] LESLIE. [id number] Right 1 Implanted   SCREW BONE CENTER 28X6.5MM - [id number]  SCREW BONE CENTER 28X6.5MM  EMERALD [employer] LESLIE. [id number] Right 1 Implanted   SCREW BONE TSA 4.5X20MM - [id number]  SCREW BONE TSA 4.5X20MM  EMERALD [employer] LESLIE. [id number] Right 1 Implanted   SCREW BONE TSA 4.5X20MM - [id number]  SCREW BONE TSA 4.5X20MM  EMERALD [employer] LESLIE. [id number] Right 1 Implanted   SCREW BONE TSA 4.5X20MM - [id number]  SCREW BONE TSA 4.5X20MM  EMERALD [employer] LESLIE. [id number] Right 1 Implanted   SCREW BONE GLENOID 4.5X24MM - [id number]  SCREW BONE GLENOID 4.5X24MM  EMERALD [employer] LESLIE. [id number] Right 1 Implanted   COMPONENT GLENOSPHERE 32X2MM - [id number]  COMPONENT GLENOSPHERE 32X2MM  EMERALD [employer] LESLIE. [id number] Right 1 Implanted   REUNION TSA MODULAR HUMERAL STEM SIZE 11 STM LNTH 123 MM     [id number] Right 1 Wasted   STEM REUNION HUM  11MM - [id number]  STEM REUNION HUM  11MM  EMERALD [employer] LESLIE. [id number] Right 1 Implanted   REUNION RSA X3 HUMERAL INSERT FELICITY 32 MM YHKNS 4 MM     [id number] Right 1 Implanted   CUP HUMERAL REUN RSA 32X4MM - [id number]  CUP HUMERAL REUN RSA 32X4MM  [employer] LESLIE. [id number] Right 1 Implanted       Specimens:   Specimen (24h ago, onward)      None                     Condition: Good    Disposition: PACU - hemodynamically stable.    Attestation: I was present and scrubbed for the entire procedure.    Discharge Note    SUMMARY     Admit Date: 5/15/2024    Discharge Date and Time:  05/15/2024 9:53 AM    Hospital Course (synopsis of major diagnoses, care, treatment, and services provided during the course of the hospital stay): outpatient surgery     Final Diagnosis: Post-Op Diagnosis Codes:     * Primary osteoarthritis of right shoulder [M19.011]     * Nontraumatic tear of right rotator cuff, unspecified tear extent [M75.101]    Disposition: Home or Self Care    Follow Up/Patient Instructions:     Medications:  Reconciled Home Medications:      Medication List        ASK your doctor about these medications      amLODIPine 10 MG tablet  Commonly known as: NORVASC  TAKE 1/2 OF A TABLET BY MOUTH TWICE DAILY     aspirin 325 MG tablet  Take 1 tablet (325 mg total) by mouth once daily. for 21 days     B-complex with vitamin C tablet  Commonly known as: Z-Bec or Equiv  Take 1 tablet by mouth once daily.     DULoxetine 60 MG capsule  Commonly known as: CYMBALTA  Take 1 capsule (60 mg total) by mouth once daily.     ferrous gluconate 324 MG tablet  Commonly known as: FERGON  Take 1 tablet (324 mg total) by mouth daily with breakfast.     labetaloL 100 MG tablet  Commonly known as: NORMODYNE  Take 1 tablet (100 mg total) by mouth 2 (two) times daily.     MYRBETRIQ 25 mg Tb24 ER tablet  Generic drug: mirabegron  Take 1 tablet (25 mg total) by mouth once daily.     oxyCODONE-acetaminophen  mg per tablet  Commonly known as: PERCOCET  Take 1 tablet by mouth every 6 (six) hours as needed for Pain.     pantoprazole 40 MG tablet  Commonly known as: PROTONIX  TAKE 1 TABLET BY MOUTH EVERY DAY BEFORE BREAKFAST     promethazine 25 MG tablet  Commonly known as: PHENERGAN  Take 1 tablet (25 mg total) by mouth every 6 (six) hours as needed for Nausea.     timolol maleate 0.5% 0.5 % Drop  Commonly  known as: TIMOPTIC  Place 1 drop into both eyes 2 (two) times daily.     traMADoL 50 mg tablet  Commonly known as: ULTRAM  Take 1 tablet (50 mg total) by mouth every 6 (six) hours as needed for Pain.     TYLENOL PM ORAL  Take by mouth.     valsartan 160 MG tablet  Commonly known as: DIOVAN  Take 2 tablets (320 mg total) by mouth once daily.     VITAMIN C ORAL  Take by mouth.     vitamin D 1000 units Tab  Commonly known as: VITAMIN D3  Take 1,000 Units by mouth once daily.            No discharge procedures on file.

## 2024-05-15 NOTE — PLAN OF CARE
Discharge instructions given and explained to patient and family with verbalization of understanding all instructions. Patient is AAOx3,v/s stable, denies n/v and tolerating po, rates pain level tolerable, IV removed, and family at bedside. Medications delivered to bedside. Patient discharged to home with sling, ice, polar care, and pain pump. Patient transported off unit via wheelchair to private vehicle with family.

## 2024-05-15 NOTE — DISCHARGE INSTRUCTIONS
[address], [place]                                    [phone]             Postoperative Instructions for Shoulder Surgery               Your Surgery Included:   Open              Arthroscopic [] Instability Repair     [] Diagnostic   [] Rotator Cuff Repair     [] Lysis of Adhesions / Manipulation [] Distal Clavicle Resection    [] Debridement [x] Biceps Tenodesis       [] Labrum  [] Rotator Cuff   [] Cartilage   [] Contracture Release    [] SLAP Repair   [] Fracture Fixation    [] Instability Repair  [] AC Joint Reconstruction      [] Rotator Cuff Repair [x] Joint Replacement     [] Subacromial Decompression / Bursectomy   [] Hemiarthroplasty  [] Total Shoulder    [] Biceps Tenotomy / Tenodesis    [x] Reverse Total Shoulder       [] Distal Clavicle Resection          [] Amniox Arthrocentesis    [] Contracture Release                 Call our office ([phone]) immediately if you experience any of the following:      Excessive bleeding or pus like drainage at the incision site      Uncontrollable pain not relieved by pain medication      Excessive swelling or redness at the incision site      Fever above 101.5 degrees not controlled with Tylenol or Motrin      Shortness of Breath      Any foul odor or blistering from the surgery site   FOR EMERGENCIES: If any unusual problems or difficulties occur, call our office at [phone], or page the  at [phone] who will direct your call appropriately   1.   Pain Management: A cold therapy cuff, pain medications, local injections, and in some cases, regional anesthesia injections are used to manage your post-operative pain. The decision to use each of these options is based on their risks and benefits.    Medications: You were given one or more of the following medication prescriptions before leaving the hospital. Have the prescriptions filled at a pharmacy on your way home and follow the instructions on the  bottles. If you need a refill, please call your pharmacy.     Narcotic Medication (usually Vicodin ES, Lortab, Percocet or Nucynta): Begin taking the medication before your shoulder starts to hurt. Some patients do not like to take any medication, but if you wait until your pain is severe before taking, you will be very uncomfortable for several hours waiting for the narcotic to work. Always take with food.    Nausea / Vomiting: For this issue, we prescribe Phenergan, use this medication as directed.    Cold Therapy: You may have been sent home with an ice pack. Use throughout the day for the first two days and then as needed to help relieve pain and control swelling. Wear for 30min intervals. Do not sleep with ice on.    Regional Anesthesia Injections (Blocks): You may have been given a regional nerve block either before or after surgery. This may make your entire shoulder numb for 24-36 hours.                    2.   Diet: Eat a bland diet for the first day after surgery. Progress your diet as tolerated. Constipation may occur with Narcotic usage, contact our office if you are experiencing constipation.   3.   Activity: After you arrive at home, spend most of the first 24 hours resting in bed, on the couch, or in a reclining chair. After the first 24 hours at home, slowly increase your activity level based on your symptoms.   4.   Dressing Change: Remove the dressing on the 3rd day (5/18) . It is normal for some blood to be seen on the dressings. It is also normal for you to see apparent bruising on the skin around your shoulder when you remove the dressing. If present, leave the steri-strip tape across the incisions. If you are concerned by the drainage or the appearance of your shoulder, please call one of the numbers listed below.   5.   Showering: You may shower once the pain pump is removed with waterproof bandages over small incisions. Do not submerge in any water until after your postoperative appointment  in clinic.   6.   Shoulder Sling (with/without Pillow attachment): You may have been sent home with a sling / pillow attachment holding your arm away from your body. You may remove the sling when changing clothes or bathing. Make sure to wear the sling while sleeping unless instructed otherwise. You may remove at rest or for exercises.           [x] You need to wear the sling with pillow for 24 hours a day for 6 weeks.   7.  Shoulder Exercises: Begin these exercises the first day after surgery in order to help you regain your motion and strength. You may do the following marked exercises for 2-5 mins five times a day:   [] Shoulder shrugs - Shrug your shoulders up and down.   [] Pendulums - Bend forward allowing your arm to hang down in front of you. Gently swing your arm side-to-side and front to back.                                                                                                                               [] Passive abduction - Have a family member gently lift your arm away from your body bringing your elbow up to the level of your shoulder.                                                                                                                   [] Shoulder rotation - With your arm at your side, have a family member gently rotate your arm internally and externally.                                                                                                                  [x] Scapular retractions - (Squeeze shoulder blades together): Squeeze shoulder blades together while slightly pulling them down (do not shrug your shoulders upward); You can perform 10-15 reps, several times throughout the day, when seated at your desk, driving in the car, etc.                                                                                                                     [] Pulley exercises - Put a towel or long sleeve shirt over the top of a door. Stand facing the door. Use your good arm to  gently pull your operative arm up in front of you.   [] Elbow motion - Straighten and bend your elbow.                                                                                                               [x] Ball squeezes - use ball attached to sling/pillow or soft (nerf) ball for  strengthening                                                                                                                 8.  Physical Therapy: Physical therapy is an essential component to your recovery from surgery. Your physical therapy will start in 3 days.   FIRST POSTOPERATIVE VISIT: As scheduled.

## 2024-05-17 ENCOUNTER — TELEPHONE (OUTPATIENT)
Dept: NEPHROLOGY | Facility: CLINIC | Age: 74
End: 2024-05-17
Payer: MEDICARE

## 2024-05-29 ENCOUNTER — CLINICAL SUPPORT (OUTPATIENT)
Dept: REHABILITATION | Facility: HOSPITAL | Age: 74
End: 2024-05-29
Attending: ORTHOPAEDIC SURGERY
Payer: MEDICARE

## 2024-05-29 ENCOUNTER — HOSPITAL ENCOUNTER (OUTPATIENT)
Dept: RADIOLOGY | Facility: HOSPITAL | Age: 74
Discharge: HOME | End: 2024-05-29
Attending: PHYSICIAN ASSISTANT
Payer: MEDICARE

## 2024-05-29 ENCOUNTER — OFFICE VISIT (OUTPATIENT)
Dept: SPORTS MEDICINE | Facility: CLINIC | Age: 74
End: 2024-05-29
Payer: MEDICARE

## 2024-05-29 VITALS
SYSTOLIC BLOOD PRESSURE: 126 MMHG | BODY MASS INDEX: 26.58 KG/M2 | HEART RATE: 70 BPM | HEIGHT: 66 IN | DIASTOLIC BLOOD PRESSURE: 63 MMHG | WEIGHT: 165.38 LBS

## 2024-05-29 DIAGNOSIS — Z09 SURGERY FOLLOW-UP EXAMINATION: ICD-10-CM

## 2024-05-29 DIAGNOSIS — Z96.611 STATUS POST REVERSE TOTAL REPLACEMENT OF RIGHT SHOULDER: ICD-10-CM

## 2024-05-29 DIAGNOSIS — M25.511 ACUTE POSTOPERATIVE PAIN OF RIGHT SHOULDER: ICD-10-CM

## 2024-05-29 DIAGNOSIS — M19.011 PRIMARY OSTEOARTHRITIS OF RIGHT SHOULDER: ICD-10-CM

## 2024-05-29 DIAGNOSIS — G89.18 ACUTE POSTOPERATIVE PAIN OF RIGHT SHOULDER: ICD-10-CM

## 2024-05-29 DIAGNOSIS — M25.511 ACUTE PAIN OF RIGHT SHOULDER: Primary | ICD-10-CM

## 2024-05-29 DIAGNOSIS — M75.101 NONTRAUMATIC TEAR OF RIGHT ROTATOR CUFF, UNSPECIFIED TEAR EXTENT: ICD-10-CM

## 2024-05-29 DIAGNOSIS — Z09 SURGERY FOLLOW-UP EXAMINATION: Primary | ICD-10-CM

## 2024-05-29 PROCEDURE — 1125F AMNT PAIN NOTED PAIN PRSNT: CPT | Mod: CPTII,S$GLB,, | Performed by: PHYSICIAN ASSISTANT

## 2024-05-29 PROCEDURE — 3288F FALL RISK ASSESSMENT DOCD: CPT | Mod: CPTII,S$GLB,, | Performed by: PHYSICIAN ASSISTANT

## 2024-05-29 PROCEDURE — 3074F SYST BP LT 130 MM HG: CPT | Mod: CPTII,S$GLB,, | Performed by: PHYSICIAN ASSISTANT

## 2024-05-29 PROCEDURE — 73030 X-RAY EXAM OF SHOULDER: CPT | Mod: 26,RT,, | Performed by: RADIOLOGY

## 2024-05-29 PROCEDURE — 97110 THERAPEUTIC EXERCISES: CPT

## 2024-05-29 PROCEDURE — 1101F PT FALLS ASSESS-DOCD LE1/YR: CPT | Mod: CPTII,S$GLB,, | Performed by: PHYSICIAN ASSISTANT

## 2024-05-29 PROCEDURE — 97161 PT EVAL LOW COMPLEX 20 MIN: CPT

## 2024-05-29 PROCEDURE — 1159F MED LIST DOCD IN RCRD: CPT | Mod: CPTII,S$GLB,, | Performed by: PHYSICIAN ASSISTANT

## 2024-05-29 PROCEDURE — 3044F HG A1C LEVEL LT 7.0%: CPT | Mod: CPTII,S$GLB,, | Performed by: PHYSICIAN ASSISTANT

## 2024-05-29 PROCEDURE — 99999 PR PBB SHADOW E&M-EST. PATIENT-LVL IV: CPT | Mod: PBBFAC,,, | Performed by: PHYSICIAN ASSISTANT

## 2024-05-29 PROCEDURE — 1160F RVW MEDS BY RX/DR IN RCRD: CPT | Mod: CPTII,S$GLB,, | Performed by: PHYSICIAN ASSISTANT

## 2024-05-29 PROCEDURE — 3078F DIAST BP <80 MM HG: CPT | Mod: CPTII,S$GLB,, | Performed by: PHYSICIAN ASSISTANT

## 2024-05-29 PROCEDURE — 99024 POSTOP FOLLOW-UP VISIT: CPT | Mod: S$GLB,,, | Performed by: PHYSICIAN ASSISTANT

## 2024-05-29 PROCEDURE — 73030 X-RAY EXAM OF SHOULDER: CPT | Mod: TC,RT

## 2024-05-29 RX ORDER — ACETAMINOPHEN AND CODEINE PHOSPHATE 300; 30 MG/1; MG/1
1 TABLET ORAL EVERY 8 HOURS PRN
Qty: 10 TABLET | Refills: 0 | Status: SHIPPED | OUTPATIENT
Start: 2024-05-29 | End: 2024-06-08

## 2024-05-29 NOTE — PROGRESS NOTES
[facility]  Physical Therapy Initial Evaluation  [facility] 1[address]    Name: Idalmis Morejon  Clinic Number: [id number]    Therapy Diagnosis:   Encounter Diagnoses   Name Primary?    Primary osteoarthritis of right shoulder     Nontraumatic tear of right rotator cuff, unspecified tear extent     Acute pain of right shoulder Yes     Physician: Jean-Pierre Barrera MD    Physician Orders: PT Eval and Treat  Medical Diagnosis from Referral:   M19.011 (ICD-10-CM) - Primary osteoarthritis of right shoulder   M75.101 (ICD-10-CM) - Nontraumatic tear of right rotator cuff, unspecified tear extent   Evaluation Date: 5/29/2024  Authorization Period Expiration: 12/31/2024  Plan of Care Expiration: 08/29/2024  Visit # / Visits authorized: 1/ 1    FOTO: 8  FOTO 1st Follow-up: NA  FOTO 2nd Follow-up: NA    Time In: 1200  Time Out: 1300  Total Billable Time: 60 minutes    Precautions: Standard    POD: 2 weeks and 0 days as of 5/29/2024    Procedure Date: 05/15/2024  Procedure:  1. Right reverse total shoulder arthroplasty   2. Right shoulder biceps tenodesis    Weeks 4-6:  1. Supine shoulder PROM: flexion/abd as tolerated, ER as tolerated, IR to belt line   2. No IR or extension, no lifting arm against gravity   3. Begin gentle resisted exercises of elbow, wrist, and hand   4. Begin rotator cuff strengthening and deltoid strengthening with gravity eliminated   5. Progress scapula and trapezius work with light resistance     Subjective     Date of Onset: 05/15/2024  History of Current Condition - Idalmis reports: issues with R shoulder pain dating back to September 2023. Attempted conservative management for a few months, but eventually elected to see Dr. Barrera and pursue surgery.  R-hand dominant  Works around cleaning the house. Has a schedule that she adheres to to clean the house.  Daughter lives in house and can help post-op.   Likes to work puzzles and games on phone. Was having issues prior to  surgery where she would get pain the shoulder when trying to write. Issues with OH activities     Imaging: see chart    Prior Therapy: Yes; a few sessions of PT 2nd Floor  Social History: Lives with daughter   Occupation: Retired  Prior Level of Function: Issues with OH RUE capacity  Current Level of Function: Post-op precautions    Pain:  Current 8/10, worst 10/10, best 5/10   Location: right shoulder  Description: Aching, Dull, Tight, and Deep  Aggravating Factors: Bending, Touching, Night Time, Morning, Extension, Flexing, and Lifting  Easing Factors: ice and rest    Pts Goals: Improved RUE OH tolerance to activity    Medical History:   Past Medical History:   Diagnosis Date    Abnormal chest CT     Acid reflux     Allergy     Anemia     Anxiety     Cataract     Chronic UTI     recently treated with antibiotics -x 3 wks. ago-    Colon adenoma 2015 due 2020 10/21/2015    Colon adenoma 2015 due 2020 10/21/2015    Depression     Disturbed sleep rhythm     DJD (degenerative joint disease)     Dry eyes     Dry mouth     Grief reaction 03/24/2014    Hx of migraine headaches     Hyperlipemia     Hypernatremia 08/08/2014    Hypertension     Iritis     Mild vitamin D deficiency 09/09/2013    Multiple lung nodules on CT: 1012-5519 no f/u needed 06/06/2016    Neurogenic bladder     Osteoporosis: 9/15 see rheumatology notes 06/06/2016    Positive GEORGIANA (antinuclear antibody)     Schatzki's ring: 3/15 dilated 06/06/2016    Sciatica neuralgia 06/21/2013    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/05/2016    Undifferentiated connective tissue disease 06/30/2020    Visual impairment        Surgical History:   Idalmis Morejon  has a past surgical history that includes Cholecystectomy; Appendectomy; Posterior fusion lumbar spine w/ corpectomy; Back surgery (2007); TONSILLECTOMY, ADENOIDECTOMY; Cystoscopy; Hernia repair; Colonoscopy (N/A, 10/02/2015); Hysterectomy; Esophagogastroduodenoscopy (N/A, 05/13/2019);  Esophagogastroduodenoscopy (N/A, 09/09/2019); Colonoscopy (N/A, 09/09/2019); Spine surgery; Trigger finger release (Right, 06/03/2022); Esophagogastroduodenoscopy (N/A, 12/07/2022); Colonoscopy (N/A, 07/21/2023); Arthroplasty of shoulder (Right, 05/15/2024); Fixation of tendon (Right, 05/15/2024); and Shoulder surgery.    Medications:   Idalmis has a current medication list which includes the following prescription(s): acetaminophen-codeine 300-30mg, acetaminophen/diphenhydramine, amlodipine, ascorbic acid, aspirin, b-complex with vitamin c, duloxetine, ferrous gluconate, labetalol, myrbetriq, pantoprazole, promethazine, timolol maleate 0.5%, valsartan, and vitamin d.    Allergies:   Review of patient's allergies indicates:   Allergen Reactions    Alendronate Nausea Only     And heartburn    Brimonidine Dermatitis     Follicular Conjunctivitis    Kenalog [triamcinolone acetonide] Hives        Objective     Posture: pt in shoulder sling with abduction pillow    Passive Range of Motion:   Shoulder R   Flexion 90   Abduction NP   ER at 20 30   IR waistline      Active Range of Motion:   Shoulder Right Left   Flexion    Abduction    ER at 0 NT 75   ER at 90 NT NT   IR (behind back) NT NT     Upper Extremity Strength:  Formal MMT not performed 2/2 POD #14 and increased pain.      Joint Mobility: Hypomobile R scap as expected post-op.    Palpation:  TTP R shoulder as expected post-op.    Sensation: Intact but diminished 2/2 residual nerve block.    TREATMENT   Treatment Time In: 1200  Treatment Time Out: 1300  Total Treatment Time separate from Evaluation: 45 minutes    Idalmis received the treatments listed below:      Idalmis received therapeutic exercises to R shoulder to develop strength, endurance, ROM, flexibility, and posture for 30 minutes including:  Access Code: [email or web address]  URL: [email or web address]  Date: 05/29/2024  Prepared by: Ramón Rossi    Exercises  - Seated Shoulder Flexion Towel Slide at  Table Top  - 1 x daily - 7 x weekly - 3 sets - 10 reps  - Circular Shoulder Pendulum with Table Support  - 1 x daily - 7 x weekly - 3 sets - 10 reps  - Seated Scapular Retraction  - 1 x daily - 7 x weekly - 3 sets - 10 reps  - Seated Wrist Extension with Dumbbell  - 1 x daily - 7 x weekly - 3 sets - 10 reps  - Seated Wrist Flexion with Dumbbell  - 1 x daily - 7 x weekly - 3 sets - 10 reps  - Supported Elbow Flexion Extension PROM  - 1 x daily - 7 x weekly - 3 sets - 10 reps     Idalmis received the following manual therapy techniques: Joint mobilizations were applied to the: R scapula and R shoulder for 15 minutes, including:  PROM R shoulder within precautions  Sling adjustment     Idalmis participated in neuromuscular re-education activities to improve: Balance, Coordination, Sense, Proprioception, and Posture for 0 minutes. The following activities were included:    Idalmis participated in therapeutic activities to improve functional performance for 0 minutes, including:    Home Exercises and Patient Education Provided     Education provided:   - PT POC   - PT goals   - HEP  - Post-Op positioning, sling wear, protocol    Written Home Exercises Provided:   Exercises were reviewed and Idalmis was able to demonstrate them prior to the end of the session.   Pt received a written copy of exercises to perform at home. Idalmis demonstrated good understanding of the education provided.     See EMR under patient instructions for exercises given.     Assessment   Idalmis is a 73 y.o. female referred to outpatient Physical Therapy s/p R rTSA and Biceps Tendosis 5/15/2024. The patient demonstrates normal post operative restrictions in shoulder AROM/PROM, underlying strength deficits, pain, difficulty sleeping and decreased ability to independently complete ADLs and IADLs decreasing quality.    Pt will benefit from skilled outpatient Physical Therapy to address the deficits stated above and in the chart below, provide pt/family  education, and to maximize pt's level of independence. Pt prognosis is Good.     Plan of care discussed with patient: Yes  Pt's spiritual, cultural and educational needs considered and patient is agreeable to the plan of care and goals as stated below:     Anticipated Barriers for therapy: Age    Medical Necessity is demonstrated by the following:    History  Co-morbidities and personal factors that may impact the plan of care [x] LOW: no personal factors / co-morbidities  [] MODERATE: 1-2 personal factors / co-morbidities  [] HIGH: 3+ personal factors / co-morbidities    Moderate / High Support Documentation:   Co-morbidities affecting plan of care: None    Personal Factors:   No deficits     Examination  Body Structures and Functions, activity limitations and participation restrictions that may impact the plan of care [x] LOW: addressing 1-2 elements  [] MODERATE: 3+ elements  [] HIGH: 4+ elements (please support below)    Moderate / High Support Documentation: None     Clinical Presentation [x] LOW: stable  [] MODERATE: Evolving  [] HIGH: Unstable     Decision Making/ Complexity Score: low       GOALS   Short Term Goals:  6 weeks  Pt will report decreased R shoulder pain  < / =  3/10  to increase tolerance for ADL performance and recreational routine.  Pt will improve R shoulder ROM to WFL in order to be able to perform ADLs without difficulty.  Pt will demonstrate tolerance to being out of the sling for 24 hours with pain <2/10.  Pt will demonstrate tolerance to HEP to improve independence with ADL's    Long Term Goals: 24 weeks  1. Pt will report decreased R shoulder pain  < / =  1/10  to increase tolerance for ADL performance and recreational routine  2. Pt will improve R shoulder ROM to WNL in order to be able to perform ADLs without difficulty  3. Pt will demonstrate at least 4+/5 of RTC muscle testing to demo improvement in tolerance to activity.  4. Pt will tolerate lifting 10# overhead to improve  tolerance to ADLs and work-related activities.  5. Pt will report at CJ level (20-40% impaired) on FOTO Shoulder to demonstrate increase in LE function with every day tasks.       Plan   Plan of care Certification: 5/29/2024 to 08/20/2024    Outpatient Physical Therapy 2 times weekly for 24 weeks to include the following interventions: Gait Training, Manual Therapy, Moist Heat/ Ice, Neuromuscular Re-ed, Patient Education, Therapeutic Activites, Therapeutic Exercise, and Functional Dry Needling with/or without Electrical Stimulation as needed.     Emery Rossi, PT, DPT, OCS

## 2024-05-29 NOTE — PROGRESS NOTES
"CC: Right shoulder post op 2 weeks    Patient is here for her 2 week post op appointment s/p below and is doing well. She is scheduled for her initial physical therapy evaluation later today. Patient is no longer taking pain medication. she denies any chest pain, SOB, fevers, chills, nausea, vomiting, or drainage from incision sites.     DATE OF SURGERY:  5/15/2024      PREOPERATIVE DIAGNOSIS:   1. Right shoulder rotator cuff arthropathy / degenerative arthritis  2. Right shoulder biceps tendinopathy     POSTOPERATIVE DIAGNOSIS:   1. Right shoulder rotator cuff arthropathy / degenerative arthritis  2. Right shoulder biceps tendinopathy     PROCEDURE:   1. Right reverse total shoulder arthroplasty   2. Right shoulder biceps tenodesis     SURGEON: Jean-Pierre Barrera M.D.    Pain well tolerated on pain medication  Sling in place  No issues reported    Review of Systems   Constitution: Negative. Negative for chills, fever and night sweats.    Cardiovascular: Negative for chest pain and syncope.   Respiratory: Negative for cough and shortness of breath.    Gastrointestinal: Negative for abdominal pain and bowel incontinence.      PE:    /63   Pulse 70   Ht 5' 6" (1.676 m)   Wt 75 kg (165 lb 5.5 oz)   BMI 26.69 kg/m²      Right shoulder    Incision healed  No sign of infection  Swelling resolved  Compartments soft  Neurovascular status intact in extremity    PROM  Forward elevation 70 degrees  External rotation 10 degrees    X-rays right shoulder (5/29/2024):  Status post right reverse total shoulder arthroplasty.  Prosthetics in excellent position.  No perihardware fracture or visible complication seen.    Assessment:  2 weeks s/p right reverse total shoulder arthroplasty and biceps tenodesis    Plan:  1. Begin physical therapy per protocol.  Continue sling wear until approximately 6 weeks postop.  2. Pain medication as needed for PT; try to wean off for next visit  3. Return to clinic in 4 weeks for 6 week post " op follow up      All questions were answered. Instructed patient to call with questions or concerns in the interim.       Medical Dictation software was used during the dictation of portions or the entirety of this medical record.  Phonetic or grammatic errors may exist due to the use of this software. For clarification, refer to the author of the document.

## 2024-05-30 NOTE — PLAN OF CARE
[facility]  Physical Therapy Initial Evaluation  [address]    Name: Idalmis Morejon  Clinic Number: [id number]    Therapy Diagnosis:   Encounter Diagnoses   Name Primary?    Primary osteoarthritis of right shoulder     Nontraumatic tear of right rotator cuff, unspecified tear extent     Acute pain of right shoulder Yes     Physician: Jean-Pierre Barrera MD    Physician Orders: PT Eval and Treat  Medical Diagnosis from Referral:   M19.011 (ICD-10-CM) - Primary osteoarthritis of right shoulder   M75.101 (ICD-10-CM) - Nontraumatic tear of right rotator cuff, unspecified tear extent   Evaluation Date: 5/29/2024  Authorization Period Expiration: 12/31/2024  Plan of Care Expiration: 08/29/2024  Visit # / Visits authorized: 1/ 1    FOTO: 8  FOTO 1st Follow-up: NA  FOTO 2nd Follow-up: NA    Time In: 1200  Time Out: 1300  Total Billable Time: 60 minutes    Precautions: Standard    POD: 2 weeks and 0 days as of 5/29/2024    Procedure Date: 05/15/2024  Procedure:  1. Right reverse total shoulder arthroplasty   2. Right shoulder biceps tenodesis    Weeks 4-6:  1. Supine shoulder PROM: flexion/abd as tolerated, ER as tolerated, IR to belt line   2. No IR or extension, no lifting arm against gravity   3. Begin gentle resisted exercises of elbow, wrist, and hand   4. Begin rotator cuff strengthening and deltoid strengthening with gravity eliminated   5. Progress scapula and trapezius work with light resistance     Subjective     Date of Onset: 05/15/2024  History of Current Condition - Idalmis reports: issues with R shoulder pain dating back to September 2023. Attempted conservative management for a few months, but eventually elected to see Dr. Barrera and pursue surgery.  R-hand dominant  Works around cleaning the house. Has a schedule that she adheres to to clean the house.  Daughter lives in house and can help post-op.   Likes to work puzzles and games on phone. Was having issues prior to  surgery where she would get pain the shoulder when trying to write. Issues with OH activities     Imaging: see chart    Prior Therapy: Yes; a few sessions of PT 2nd Floor  Social History: Lives with daughter   Occupation: Retired  Prior Level of Function: Issues with OH RUE capacity  Current Level of Function: Post-op precautions    Pain:  Current 8/10, worst 10/10, best 5/10   Location: right shoulder  Description: Aching, Dull, Tight, and Deep  Aggravating Factors: Bending, Touching, Night Time, Morning, Extension, Flexing, and Lifting  Easing Factors: ice and rest    Pts Goals: Improved RUE OH tolerance to activity    Medical History:   Past Medical History:   Diagnosis Date    Abnormal chest CT     Acid reflux     Allergy     Anemia     Anxiety     Cataract     Chronic UTI     recently treated with antibiotics -x 3 wks. ago-    Colon adenoma 2015 due 2020 10/21/2015    Colon adenoma 2015 due 2020 10/21/2015    Depression     Disturbed sleep rhythm     DJD (degenerative joint disease)     Dry eyes     Dry mouth     Grief reaction 03/24/2014    Hx of migraine headaches     Hyperlipemia     Hypernatremia 08/08/2014    Hypertension     Iritis     Mild vitamin D deficiency 09/09/2013    Multiple lung nodules on CT: 0722-0090 no f/u needed 06/06/2016    Neurogenic bladder     Osteoporosis: 9/15 see rheumatology notes 06/06/2016    Positive GEORGIANA (antinuclear antibody)     Schatzki's ring: 3/15 dilated 06/06/2016    Sciatica neuralgia 06/21/2013    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/05/2016    Undifferentiated connective tissue disease 06/30/2020    Visual impairment        Surgical History:   Idalmis Morejon  has a past surgical history that includes Cholecystectomy; Appendectomy; Posterior fusion lumbar spine w/ corpectomy; Back surgery (2007); TONSILLECTOMY, ADENOIDECTOMY; Cystoscopy; Hernia repair; Colonoscopy (N/A, 10/02/2015); Hysterectomy; Esophagogastroduodenoscopy (N/A, 05/13/2019);  Esophagogastroduodenoscopy (N/A, 09/09/2019); Colonoscopy (N/A, 09/09/2019); Spine surgery; Trigger finger release (Right, 06/03/2022); Esophagogastroduodenoscopy (N/A, 12/07/2022); Colonoscopy (N/A, 07/21/2023); Arthroplasty of shoulder (Right, 05/15/2024); Fixation of tendon (Right, 05/15/2024); and Shoulder surgery.    Medications:   Idalmis has a current medication list which includes the following prescription(s): acetaminophen-codeine 300-30mg, acetaminophen/diphenhydramine, amlodipine, ascorbic acid, aspirin, b-complex with vitamin c, duloxetine, ferrous gluconate, labetalol, myrbetriq, pantoprazole, promethazine, timolol maleate 0.5%, valsartan, and vitamin d.    Allergies:   Review of patient's allergies indicates:   Allergen Reactions    Alendronate Nausea Only     And heartburn    Brimonidine Dermatitis     Follicular Conjunctivitis    Kenalog [triamcinolone acetonide] Hives        Objective     Posture: pt in shoulder sling with abduction pillow    Passive Range of Motion:   Shoulder R   Flexion 90   Abduction NP   ER at 20 30   IR waistline      Active Range of Motion:   Shoulder Right Left   Flexion    Abduction    ER at 0 NT 75   ER at 90 NT NT   IR (behind back) NT NT     Upper Extremity Strength:  Formal MMT not performed 2/2 POD #14 and increased pain.      Joint Mobility: Hypomobile R scap as expected post-op.    Palpation:  TTP R shoulder as expected post-op.    Sensation: Intact but diminished 2/2 residual nerve block.    TREATMENT   Treatment Time In: 1200  Treatment Time Out: 1300  Total Treatment Time separate from Evaluation: 45 minutes    Idalmis received the treatments listed below:      Idalmis received therapeutic exercises to R shoulder to develop strength, endurance, ROM, flexibility, and posture for 30 minutes including:  Access Code: [email or web address]  URL: [email or web address]  Date: 05/29/2024  Prepared by: Ramón Rossi    Exercises  - Seated Shoulder Flexion Towel Slide at  Table Top  - 1 x daily - 7 x weekly - 3 sets - 10 reps  - Circular Shoulder Pendulum with Table Support  - 1 x daily - 7 x weekly - 3 sets - 10 reps  - Seated Scapular Retraction  - 1 x daily - 7 x weekly - 3 sets - 10 reps  - Seated Wrist Extension with Dumbbell  - 1 x daily - 7 x weekly - 3 sets - 10 reps  - Seated Wrist Flexion with Dumbbell  - 1 x daily - 7 x weekly - 3 sets - 10 reps  - Supported Elbow Flexion Extension PROM  - 1 x daily - 7 x weekly - 3 sets - 10 reps     Idalmis received the following manual therapy techniques: Joint mobilizations were applied to the: R scapula and R shoulder for 15 minutes, including:  PROM R shoulder within precautions  Sling adjustment     Idalmis participated in neuromuscular re-education activities to improve: Balance, Coordination, Sense, Proprioception, and Posture for 0 minutes. The following activities were included:    Idalmis participated in therapeutic activities to improve functional performance for 0 minutes, including:    Home Exercises and Patient Education Provided     Education provided:   - PT POC   - PT goals   - HEP  - Post-Op positioning, sling wear, protocol    Written Home Exercises Provided:   Exercises were reviewed and Idalmis was able to demonstrate them prior to the end of the session.   Pt received a written copy of exercises to perform at home. Idalmis demonstrated good understanding of the education provided.     See EMR under patient instructions for exercises given.     Assessment   Idalmis is a 73 y.o. female referred to outpatient Physical Therapy s/p R rTSA and Biceps Tendosis 5/15/2024. The patient demonstrates normal post operative restrictions in shoulder AROM/PROM, underlying strength deficits, pain, difficulty sleeping and decreased ability to independently complete ADLs and IADLs decreasing quality.    Pt will benefit from skilled outpatient Physical Therapy to address the deficits stated above and in the chart below, provide pt/family  education, and to maximize pt's level of independence. Pt prognosis is Good.     Plan of care discussed with patient: Yes  Pt's spiritual, cultural and educational needs considered and patient is agreeable to the plan of care and goals as stated below:     Anticipated Barriers for therapy: Age    Medical Necessity is demonstrated by the following:    History  Co-morbidities and personal factors that may impact the plan of care [x] LOW: no personal factors / co-morbidities  [] MODERATE: 1-2 personal factors / co-morbidities  [] HIGH: 3+ personal factors / co-morbidities    Moderate / High Support Documentation:   Co-morbidities affecting plan of care: None    Personal Factors:   No deficits     Examination  Body Structures and Functions, activity limitations and participation restrictions that may impact the plan of care [x] LOW: addressing 1-2 elements  [] MODERATE: 3+ elements  [] HIGH: 4+ elements (please support below)    Moderate / High Support Documentation: None     Clinical Presentation [x] LOW: stable  [] MODERATE: Evolving  [] HIGH: Unstable     Decision Making/ Complexity Score: low       GOALS   Short Term Goals:  6 weeks  Pt will report decreased R shoulder pain  < / =  3/10  to increase tolerance for ADL performance and recreational routine.  Pt will improve R shoulder ROM to WFL in order to be able to perform ADLs without difficulty.  Pt will demonstrate tolerance to being out of the sling for 24 hours with pain <2/10.  Pt will demonstrate tolerance to HEP to improve independence with ADL's    Long Term Goals: 24 weeks  1. Pt will report decreased R shoulder pain  < / =  1/10  to increase tolerance for ADL performance and recreational routine  2. Pt will improve R shoulder ROM to WNL in order to be able to perform ADLs without difficulty  3. Pt will demonstrate at least 4+/5 of RTC muscle testing to demo improvement in tolerance to activity.  4. Pt will tolerate lifting 10# overhead to improve  tolerance to ADLs and work-related activities.  5. Pt will report at CJ level (20-40% impaired) on FOTO Shoulder to demonstrate increase in LE function with every day tasks.       Plan   Plan of care Certification: 5/29/2024 to 08/20/2024    Outpatient Physical Therapy 2 times weekly for 24 weeks to include the following interventions: Gait Training, Manual Therapy, Moist Heat/ Ice, Neuromuscular Re-ed, Patient Education, Therapeutic Activites, Therapeutic Exercise, and Functional Dry Needling with/or without Electrical Stimulation as needed.     Emery Rossi, PT, DPT, OCS

## 2024-06-04 ENCOUNTER — CLINICAL SUPPORT (OUTPATIENT)
Dept: REHABILITATION | Facility: HOSPITAL | Age: 74
End: 2024-06-04
Payer: MEDICARE

## 2024-06-04 DIAGNOSIS — M25.511 ACUTE PAIN OF RIGHT SHOULDER: Primary | ICD-10-CM

## 2024-06-04 PROCEDURE — 97110 THERAPEUTIC EXERCISES: CPT

## 2024-06-04 PROCEDURE — 97140 MANUAL THERAPY 1/> REGIONS: CPT

## 2024-06-04 PROCEDURE — 97112 NEUROMUSCULAR REEDUCATION: CPT

## 2024-06-05 NOTE — PROGRESS NOTES
Physical Therapy Daily Treatment Note     Name: Idalmis Morejon  Clinic Number: [id number]    Therapy Diagnosis:   Encounter Diagnosis   Name Primary?    Acute pain of right shoulder Yes     Physician: Jean-Pierre Barrera MD    Visit Date: 6/4/2024    Physician Orders: PT Eval and Treat  Medical Diagnosis from Referral:   M19.011 (ICD-10-CM) - Primary osteoarthritis of right shoulder   M75.101 (ICD-10-CM) - Nontraumatic tear of right rotator cuff, unspecified tear extent   Evaluation Date: 5/29/2024  Authorization Period Expiration: 12/31/2024  Plan of Care Expiration: 08/29/2024  Visit # / Visits authorized: 1/10 (+ EVAL)     FOTO: 8  FOTO 1st Follow-up: NA  FOTO 2nd Follow-up: NA     Time In: 0850  Time Out: 1000  Total Billable Time: 60 minutes     Precautions: Standard     POD: 2 weeks and 6 days as of 6/4/2024     Procedure Date: 05/15/2024  Procedure:  1. Right reverse total shoulder arthroplasty   2. Right shoulder biceps tenodesis     Weeks 4-6:  1. Supine shoulder PROM: flexion/abd as tolerated, ER as tolerated, IR to belt line   2. No IR or extension, no lifting arm against gravity   3. Begin gentle resisted exercises of elbow, wrist, and hand   4. Begin rotator cuff strengthening and deltoid strengthening with gravity eliminated   5. Progress scapula and trapezius work with light resistance       Subjective     Pt reports overall doing well since last session. Has been compliant with precautions.  She was compliant with home exercise program.  Response to previous treatment: Good  Functional change: improved R shoulder PROM    Pain: 3/10  Location: right shoulder      Objective     Posture: pt in shoulder sling with abduction pillow     Passive Range of Motion:   Shoulder R   Flexion 110   Abduction NP   ER at 20 30   IR waistline     Treatment     Access Code: [email or web address]    Idalmis received the treatments listed below:       Idalmis received therapeutic exercises to R shoulder to develop strength, endurance,  ROM, flexibility, and posture for 30 minutes including:  - Seated Shoulder Flexion Towel Slide at Table Top  - 1 x daily - 7 x weekly - 3 sets - 10 reps  - Circular Shoulder Pendulum with Table Support  - 1 x daily - 7 x weekly - 3 sets - 10 reps  - Seated Scapular Retraction  - 1 x daily - 7 x weekly - 3 sets - 10 reps  - Seated Wrist Extension with Dumbbell  - 1 x daily - 7 x weekly - 3 sets - 10 reps  - Seated Wrist Flexion with Dumbbell  - 1 x daily - 7 x weekly - 3 sets - 10 reps  - Supported Elbow Flexion Extension PROM  - 1 x daily - 7 x weekly - 3 sets - 10 reps     Idalmis received the following manual therapy techniques: Joint mobilizations were applied to the: R scapula and R shoulder for 15 minutes, including:  PROM R shoulder within precautions  Sling adjustment     Idalmis participated in neuromuscular re-education activities to improve: Balance, Coordination, Sense, Proprioception, and Posture for 15 minutes. The following activities were included:  SL Scap Setting  SL AAROM Dowel Flex 3 x 15     Idalmis participated in therapeutic activities to improve functional performance for 0 minutes, including:     Home Exercises and Patient Education Provided      Education provided:   - PT POC   - PT goals   - HEP  - Post-Op positioning, sling wear, protocol     Written Home Exercises Provided:   Exercises were reviewed and Idalmis was able to demonstrate them prior to the end of the session.   Pt received a written copy of exercises to perform at home. Idalmis demonstrated good understanding of the education provided.      See EMR under patient instructions for exercises given.     Assessment     s/p R rTSA and Biceps Tendosis 5/15/2024   First return since original session. Progressed PROM within tolerable range. Improved overall motion. Keep up with scap loading in prep for active phase moving forward.  Idalmis Is progressing well towards her goals.   Pt prognosis is Good.     Pt will continue to benefit from  skilled outpatient physical therapy to address the deficits listed in the problem list box on initial evaluation, provide pt/family education and to maximize pt's level of independence in the home and community environment.     Pt's spiritual, cultural and educational needs considered and pt agreeable to plan of care and goals.    Anticipated barriers to physical therapy: Age    GOALS   Short Term Goals:  6 weeks  Pt will report decreased R shoulder pain  < / =  3/10  to increase tolerance for ADL performance and recreational routine.  Pt will improve R shoulder ROM to WFL in order to be able to perform ADLs without difficulty.  Pt will demonstrate tolerance to being out of the sling for 24 hours with pain <2/10.  Pt will demonstrate tolerance to HEP to improve independence with ADL's     Long Term Goals: 24 weeks  1. Pt will report decreased R shoulder pain  < / =  1/10  to increase tolerance for ADL performance and recreational routine  2. Pt will improve R shoulder ROM to WNL in order to be able to perform ADLs without difficulty  3. Pt will demonstrate at least 4+/5 of RTC muscle testing to demo improvement in tolerance to activity.  4. Pt will tolerate lifting 10# overhead to improve tolerance to ADLs and work-related activities.  5. Pt will report at CJ level (20-40% impaired) on FOTO Shoulder to demonstrate increase in LE function with every day tasks.     Plan     Continue per PALLAVI Rossi, PT, DPT  Board Certified in Orthopedic Physical Therapy

## 2024-06-10 ENCOUNTER — PATIENT MESSAGE (OUTPATIENT)
Dept: INTERNAL MEDICINE | Facility: CLINIC | Age: 74
End: 2024-06-10
Payer: MEDICARE

## 2024-06-11 ENCOUNTER — CLINICAL SUPPORT (OUTPATIENT)
Dept: REHABILITATION | Facility: HOSPITAL | Age: 74
End: 2024-06-11
Payer: MEDICARE

## 2024-06-11 DIAGNOSIS — M25.511 ACUTE PAIN OF RIGHT SHOULDER: Primary | ICD-10-CM

## 2024-06-11 PROCEDURE — 97112 NEUROMUSCULAR REEDUCATION: CPT

## 2024-06-11 PROCEDURE — 97110 THERAPEUTIC EXERCISES: CPT

## 2024-06-11 PROCEDURE — 97530 THERAPEUTIC ACTIVITIES: CPT

## 2024-06-11 PROCEDURE — 97140 MANUAL THERAPY 1/> REGIONS: CPT

## 2024-06-11 NOTE — PROGRESS NOTES
Physical Therapy Daily Treatment Note     Name: Idalmis Morejon  Clinic Number: [id number]    Therapy Diagnosis:   Encounter Diagnosis   Name Primary?    Acute pain of right shoulder Yes     Physician: Jean-Pierre Barrera MD    Visit Date: 6/11/2024    Physician Orders: PT Eval and Treat  Medical Diagnosis from Referral:   M19.011 (ICD-10-CM) - Primary osteoarthritis of right shoulder   M75.101 (ICD-10-CM) - Nontraumatic tear of right rotator cuff, unspecified tear extent   Evaluation Date: 5/29/2024  Authorization Period Expiration: 12/31/2024  Plan of Care Expiration: 08/29/2024  Visit # / Visits authorized: 2/10 (+ EVAL)     FOTO: 8  FOTO 1st Follow-up: NA  FOTO 2nd Follow-up: NA     Time In: 0855  Time Out: 1000  Total Billable Time: 60 minutes     Precautions: Standard     POD: 3 weeks and 6 days as of 6/11/2024     Procedure Date: 05/15/2024  Procedure:  1. Right reverse total shoulder arthroplasty   2. Right shoulder biceps tenodesis     Weeks 4-6:  1. Supine shoulder PROM: flexion/abd as tolerated, ER as tolerated, IR to belt line   2. No IR or extension, no lifting arm against gravity   3. Begin gentle resisted exercises of elbow, wrist, and hand   4. Begin rotator cuff strengthening and deltoid strengthening with gravity eliminated   5. Progress scapula and trapezius work with light resistance       Subjective     Pt reports small gains in motion each session.  She was compliant with home exercise program.  Response to previous treatment: Good  Functional change: improved R shoulder PROM    Pain: 3/10  Location: right shoulder      Objective     Posture: pt in shoulder sling with abduction pillow     Passive Range of Motion:   Shoulder R   Flexion 130   Abduction NP   ER at 20 30   IR waistline     Treatment     Access Code: [email or web address]    Idalmis received the treatments listed below:       Idalmis received therapeutic exercises to R shoulder to develop strength, endurance, ROM, flexibility, and posture for 15  minutes including:  - Seated Shoulder Flexion Towel Slide at Table Top  - 1 x daily - 7 x weekly - 3 sets - 10 reps  - Circular Shoulder Pendulum with Table Support  - 1 x daily - 7 x weekly - 3 sets - 10 reps  - Seated Scapular Retraction  - 1 x daily - 7 x weekly - 3 sets - 10 reps  - Seated Wrist Extension with Dumbbell  - 1 x daily - 7 x weekly - 3 sets - 10 reps  - Seated Wrist Flexion with Dumbbell  - 1 x daily - 7 x weekly - 3 sets - 10 reps  - Supported Elbow Flexion Extension PROM  - 1 x daily - 7 x weekly - 3 sets - 10 reps     Idalmis received the following manual therapy techniques: Joint mobilizations were applied to the: R scapula and R shoulder for 15 minutes, including:  PROM R shoulder within precautions  Sling adjustment     Idalmis participated in neuromuscular re-education activities to improve: Balance, Coordination, Sense, Proprioception, and Posture for 15 minutes. The following activities were included:  SL Scap Setting  SL AAROM Dowel Flex 3 x 15     Idalmis participated in therapeutic activities to improve functional performance for 15 minutes, including:  LUE ONLY UBE completed for 8' to increase ROM, endurance, and decrease pain to improve tolerance to ADLs and age-related activities  Pulleys x 6'     Home Exercises and Patient Education Provided      Education provided:   - PT POC   - PT goals   - HEP  - Post-Op positioning, sling wear, protocol     Written Home Exercises Provided:   Exercises were reviewed and Idalmis was able to demonstrate them prior to the end of the session.   Pt received a written copy of exercises to perform at home. Idalmis demonstrated good understanding of the education provided.      See EMR under patient instructions for exercises given.     Assessment     s/p R rTSA and Biceps Tendosis 5/15/2024   Began to work in more gravity-eliminated shoulder strengthening with AAROM in SL. Able to work in more ROM with PVC and Pulley to good pt tolerance.  Idalmis Is  progressing well towards her goals.   Pt prognosis is Good.     Pt will continue to benefit from skilled outpatient physical therapy to address the deficits listed in the problem list box on initial evaluation, provide pt/family education and to maximize pt's level of independence in the home and community environment.     Pt's spiritual, cultural and educational needs considered and pt agreeable to plan of care and goals.    Anticipated barriers to physical therapy: Age    GOALS   Short Term Goals:  6 weeks  Pt will report decreased R shoulder pain  < / =  3/10  to increase tolerance for ADL performance and recreational routine.  Pt will improve R shoulder ROM to WFL in order to be able to perform ADLs without difficulty.  Pt will demonstrate tolerance to being out of the sling for 24 hours with pain <2/10.  Pt will demonstrate tolerance to HEP to improve independence with ADL's     Long Term Goals: 24 weeks  1. Pt will report decreased R shoulder pain  < / =  1/10  to increase tolerance for ADL performance and recreational routine  2. Pt will improve R shoulder ROM to WNL in order to be able to perform ADLs without difficulty  3. Pt will demonstrate at least 4+/5 of RTC muscle testing to demo improvement in tolerance to activity.  4. Pt will tolerate lifting 10# overhead to improve tolerance to ADLs and work-related activities.  5. Pt will report at CJ level (20-40% impaired) on FOTO Shoulder to demonstrate increase in LE function with every day tasks.     Plan     Continue per PALLAVI Rossi, PT, DPT  Board Certified in Orthopedic Physical Therapy

## 2024-06-18 ENCOUNTER — CLINICAL SUPPORT (OUTPATIENT)
Dept: REHABILITATION | Facility: HOSPITAL | Age: 74
End: 2024-06-18
Payer: MEDICARE

## 2024-06-18 DIAGNOSIS — M25.511 ACUTE PAIN OF RIGHT SHOULDER: Primary | ICD-10-CM

## 2024-06-18 PROCEDURE — 97110 THERAPEUTIC EXERCISES: CPT | Mod: CQ

## 2024-06-18 PROCEDURE — 97112 NEUROMUSCULAR REEDUCATION: CPT | Mod: CQ

## 2024-06-18 PROCEDURE — 97140 MANUAL THERAPY 1/> REGIONS: CPT | Mod: CQ

## 2024-06-18 NOTE — PROGRESS NOTES
Physical Therapy Daily Treatment Note     Name: Idalmis Morejon  Clinic Number: [id number]    Therapy Diagnosis:   Encounter Diagnosis   Name Primary?    Acute pain of right shoulder Yes     Physician: Jean-Pierre Barrera MD    Visit Date: 6/18/2024    Physician Orders: PT Eval and Treat  Medical Diagnosis from Referral:   M19.011 (ICD-10-CM) - Primary osteoarthritis of right shoulder   M75.101 (ICD-10-CM) - Nontraumatic tear of right rotator cuff, unspecified tear extent   Evaluation Date: 5/29/2024  Authorization Period Expiration: 12/31/2024  Plan of Care Expiration: 08/29/2024  Visit # / Visits authorized: 2/10 (+ EVAL)     FOTO: 8  FOTO 1st Follow-up: NA  FOTO 2nd Follow-up: NA     Time In: 0955  Time Out: 1055  Total Billable Time: 60 minutes     Precautions: Standard     POD: 3 weeks and 6 days as of 6/11/2024     Procedure Date: 05/15/2024  Procedure:  1. Right reverse total shoulder arthroplasty   2. Right shoulder biceps tenodesis     Weeks 4-6:  1. Supine shoulder PROM: flexion/abd as tolerated, ER as tolerated, IR to belt line   2. No IR or extension, no lifting arm against gravity   3. Begin gentle resisted exercises of elbow, wrist, and hand   4. Begin rotator cuff strengthening and deltoid strengthening with gravity eliminated   5. Progress scapula and trapezius work with light resistance       Subjective     Pt reports weather causing increased aches this morning   She was compliant with home exercise program.  Response to previous treatment: a little sore went straight home   Functional change: improved R shoulder PROM    Pain: 7/10  Location: right shoulder      Objective     Posture: pt in shoulder sling with abduction pillow     Passive Range of Motion:   Shoulder R   Flexion 130   Abduction NP   ER at 20 30   IR waistline     Treatment     Access Code: [email or web address]    Idalmis received the treatments listed below:       Idalmis received therapeutic exercises to R shoulder to develop strength, endurance,  "ROM, flexibility, and posture for 15 minutes including:  - pendulum swings 4 ways/2x30 ea   - Seated Scapular Retraction 3x10  - seated shld flex table slides 2x10      NT   - Seated Wrist Extension with Dumbbell  - 1 x daily - 7 x weekly - 3 sets - 10 reps  - Seated Wrist Flexion with Dumbbell  - 1 x daily - 7 x weekly - 3 sets - 10 reps  - Supported Elbow Flexion Extension PROM  - 1 x daily - 7 x weekly - 3 sets - 10 reps     Idalmis received the following manual therapy techniques: Joint mobilizations were applied to the: R scapula and R shoulder for 15 minutes, including:  Scapular mobs, PROM R shoulder within precautions, elbow flexion/ext        Idalmis participated in neuromuscular re-education activities to improve: Balance, Coordination, Sense, Proprioception, and Posture for 30 minutes. The following activities were included:  Moist heat 10' red ball 4 way wrist   SL ER 3x10/3"  SL AAROM Dowel Flex 30x   SL shld abd 30x       Idalmis participated in therapeutic activities to improve functional performance for  minutes, including:       Home Exercises and Patient Education Provided      Education provided:   - PT POC   - PT goals   - HEP  - Post-Op positioning, sling wear, protocol     Written Home Exercises Provided:   Exercises were reviewed and Idalmis was able to demonstrate them prior to the end of the session.   Pt received a written copy of exercises to perform at home. Idalmis demonstrated good understanding of the education provided.      See EMR under patient instructions for exercises given.     Assessment     s/p R rTSA and Biceps Tendosis 5/15/2024   Pt tolerating tx well. Good PROM per protocol. Continue with gravity-eliminated shoulder strengthening with AAROM in SL per Supervising PT. VC/TC for correcting form/technique with therex, Continue to progress as tolerated   Idalmis Is progressing well towards her goals.   Pt prognosis is Good.     Pt will continue to benefit from skilled outpatient " physical therapy to address the deficits listed in the problem list box on initial evaluation, provide pt/family education and to maximize pt's level of independence in the home and community environment.     Pt's spiritual, cultural and educational needs considered and pt agreeable to plan of care and goals.    Anticipated barriers to physical therapy: Age    GOALS   Short Term Goals:  6 weeks  Pt will report decreased R shoulder pain  < / =  3/10  to increase tolerance for ADL performance and recreational routine.  Pt will improve R shoulder ROM to WFL in order to be able to perform ADLs without difficulty.  Pt will demonstrate tolerance to being out of the sling for 24 hours with pain <2/10.  Pt will demonstrate tolerance to HEP to improve independence with ADL's     Long Term Goals: 24 weeks  1. Pt will report decreased R shoulder pain  < / =  1/10  to increase tolerance for ADL performance and recreational routine  2. Pt will improve R shoulder ROM to WNL in order to be able to perform ADLs without difficulty  3. Pt will demonstrate at least 4+/5 of RTC muscle testing to demo improvement in tolerance to activity.  4. Pt will tolerate lifting 10# overhead to improve tolerance to ADLs and work-related activities.  5. Pt will report at CJ level (20-40% impaired) on FOTO Shoulder to demonstrate increase in LE function with every day tasks.     Plan     Continue per POC    Jean-Pierre Katz, PTA, STS

## 2024-06-20 ENCOUNTER — CLINICAL SUPPORT (OUTPATIENT)
Dept: REHABILITATION | Facility: HOSPITAL | Age: 74
End: 2024-06-20
Payer: MEDICARE

## 2024-06-20 DIAGNOSIS — M25.511 ACUTE PAIN OF RIGHT SHOULDER: Primary | ICD-10-CM

## 2024-06-20 PROCEDURE — 97110 THERAPEUTIC EXERCISES: CPT

## 2024-06-20 PROCEDURE — 97112 NEUROMUSCULAR REEDUCATION: CPT

## 2024-06-20 PROCEDURE — 97140 MANUAL THERAPY 1/> REGIONS: CPT

## 2024-06-20 PROCEDURE — 97530 THERAPEUTIC ACTIVITIES: CPT

## 2024-06-20 NOTE — PROGRESS NOTES
Physical Therapy Daily Treatment Note     Name: Idalmis Morejon  Clinic Number: [id number]    Therapy Diagnosis:   Encounter Diagnosis   Name Primary?    Acute pain of right shoulder Yes     Physician: Jean-Pierre Barrera MD    Visit Date: 6/20/2024    Physician Orders: PT Eval and Treat  Medical Diagnosis from Referral:   M19.011 (ICD-10-CM) - Primary osteoarthritis of right shoulder   M75.101 (ICD-10-CM) - Nontraumatic tear of right rotator cuff, unspecified tear extent   Evaluation Date: 5/29/2024  Authorization Period Expiration: 12/31/2024  Plan of Care Expiration: 08/29/2024  Visit # / Visits authorized: 4/10 (+ EVAL)     FOTO: 8  FOTO 1st Follow-up: NA  FOTO 2nd Follow-up: NA     Time In: 0855  Time Out: 1000  Total Billable Time: 65 minutes     Precautions: Standard     POD: 5 weeks and 1 days as of 6/20/2024     Procedure Date: 05/15/2024  Procedure:  1. Right reverse total shoulder arthroplasty   2. Right shoulder biceps tenodesis     Weeks 4-6:  1. Supine shoulder PROM: flexion/abd as tolerated, ER as tolerated, IR to belt line   2. No IR or extension, no lifting arm against gravity   3. Begin gentle resisted exercises of elbow, wrist, and hand   4. Begin rotator cuff strengthening and deltoid strengthening with gravity eliminated   5. Progress scapula and trapezius work with light resistance       Subjective     Pt reports no issues since last session; has been compliant with precautions.  She was compliant with home exercise program.  Response to previous treatment: Good  Functional change: improved R shoulder PROM    Pain: 4/10  Location: right shoulder      Objective     Posture: pt in shoulder sling with abduction pillow     Passive Range of Motion:   Shoulder R   Flexion 135   Abduction NP   ER at 20 30   IR waistline     Treatment     Access Code: [email or web address]    Idalmis received the treatments listed below:       Idalmis received therapeutic exercises to R shoulder to develop strength, endurance, ROM,  flexibility, and posture for 15 minutes including:  Table Walk-Away FLEX 3 x 15  Seated Table Slides 4 x 15  Pulleys x 3' ea FLEX/SCAP    NP:  - pendulum swings 4 ways/2x30 ea   - Seated Scapular Retraction 3x10  - seated shld flex table slides 2x10    NP  - Seated Wrist Extension with Dumbbell  - 1 x daily - 7 x weekly - 3 sets - 10 reps  - Seated Wrist Flexion with Dumbbell  - 1 x daily - 7 x weekly - 3 sets - 10 reps  - Supported Elbow Flexion Extension PROM  - 1 x daily - 7 x weekly - 3 sets - 10 reps     Idalmis received the following manual therapy techniques: Joint mobilizations were applied to the: R scapula and R shoulder for 15 minutes, including:  Scapular mobs, PROM R shoulder within precautions, elbow flexion/ext     Idalmis participated in neuromuscular re-education activities to improve: Balance, Coordination, Sense, Proprioception, and Posture for 15 minutes. The following activities were included:  SL AAROM Dowel Flex 30x   SL shld abd 30x     Idalmis participated in therapeutic activities to improve functional performance for 15 minutes, including:  LUE ONLY UBE completed for 10' to increase ROM, endurance, and decrease pain to improve tolerance to ADLs and age-related activities       Home Exercises and Patient Education Provided      Education provided:   - PT POC   - PT goals   - HEP  - Post-Op positioning, sling wear, protocol     Written Home Exercises Provided:   Exercises were reviewed and Idalmis was able to demonstrate them prior to the end of the session.   Pt received a written copy of exercises to perform at home. Idalmis demonstrated good understanding of the education provided.      See EMR under patient instructions for exercises given.     Assessment     s/p R rTSA and Biceps Tendosis 5/15/2024   Idalmis wit 6-week f/u next week. Overall, PROM continues to improve. Capsule stiff this morning prior to MT, but improved following MT. Continued to educate on pain-free PROM, but more flow into  movement. Worked in more scap activation interventions to set up for active tolerance allowance in future.  Idalmis Is progressing well towards her goals.   Pt prognosis is Good.     Pt will continue to benefit from skilled outpatient physical therapy to address the deficits listed in the problem list box on initial evaluation, provide pt/family education and to maximize pt's level of independence in the home and community environment.     Pt's spiritual, cultural and educational needs considered and pt agreeable to plan of care and goals.    Anticipated barriers to physical therapy: Age    GOALS   Short Term Goals:  6 weeks  Pt will report decreased R shoulder pain  < / =  3/10  to increase tolerance for ADL performance and recreational routine.  Pt will improve R shoulder ROM to WFL in order to be able to perform ADLs without difficulty.  Pt will demonstrate tolerance to being out of the sling for 24 hours with pain <2/10.  Pt will demonstrate tolerance to HEP to improve independence with ADL's     Long Term Goals: 24 weeks  1. Pt will report decreased R shoulder pain  < / =  1/10  to increase tolerance for ADL performance and recreational routine  2. Pt will improve R shoulder ROM to WNL in order to be able to perform ADLs without difficulty  3. Pt will demonstrate at least 4+/5 of RTC muscle testing to demo improvement in tolerance to activity.  4. Pt will tolerate lifting 10# overhead to improve tolerance to ADLs and work-related activities.  5. Pt will report at CJ level (20-40% impaired) on FOTO Shoulder to demonstrate increase in LE function with every day tasks.     Plan     Continue per PALLAVI Rossi, PT

## 2024-06-25 ENCOUNTER — CLINICAL SUPPORT (OUTPATIENT)
Dept: REHABILITATION | Facility: HOSPITAL | Age: 74
End: 2024-06-25
Payer: MEDICARE

## 2024-06-25 DIAGNOSIS — M25.511 ACUTE PAIN OF RIGHT SHOULDER: Primary | ICD-10-CM

## 2024-06-25 PROCEDURE — 97530 THERAPEUTIC ACTIVITIES: CPT | Mod: CQ

## 2024-06-25 PROCEDURE — 97112 NEUROMUSCULAR REEDUCATION: CPT | Mod: CQ

## 2024-06-25 PROCEDURE — 97140 MANUAL THERAPY 1/> REGIONS: CPT | Mod: CQ

## 2024-06-25 PROCEDURE — 97110 THERAPEUTIC EXERCISES: CPT | Mod: CQ

## 2024-06-25 NOTE — PROGRESS NOTES
Physical Therapy Daily Treatment Note     Name: Idalmis Morejon  Clinic Number: [id number]    Therapy Diagnosis:   Encounter Diagnosis   Name Primary?    Acute pain of right shoulder Yes     Physician: Jean-Pierre Barrera MD    Visit Date: 6/25/2024    Physician Orders: PT Eval and Treat  Medical Diagnosis from Referral:   M19.011 (ICD-10-CM) - Primary osteoarthritis of right shoulder   M75.101 (ICD-10-CM) - Nontraumatic tear of right rotator cuff, unspecified tear extent   Evaluation Date: 5/29/2024  Authorization Period Expiration: 12/31/2024  Plan of Care Expiration: 08/29/2024  Visit # / Visits authorized: 4/10 (+ EVAL)     FOTO: 8  FOTO 1st Follow-up: NA  FOTO 2nd Follow-up: NA     Time In: 1000  Time Out: 1100  Total Billable Time: 55     Precautions: Standard     POD: 5 weeks and 1 days as of 6/20/2024     Procedure Date: 05/15/2024  Procedure:  1. Right reverse total shoulder arthroplasty   2. Right shoulder biceps tenodesis     Weeks 4-6:  1. Supine shoulder PROM: flexion/abd as tolerated, ER as tolerated, IR to belt line   2. No IR or extension, no lifting arm against gravity   3. Begin gentle resisted exercises of elbow, wrist, and hand   4. Begin rotator cuff strengthening and deltoid strengthening with gravity eliminated   5. Progress scapula and trapezius work with light resistance       Subjective     Pt reports mod discomfort, its not really painful   She was compliant with home exercise program.  Response to previous treatment: rough   Functional change: improved R shoulder PROM    Pain: 5/10  Location: right shoulder      Objective     Posture: pt in shoulder sling with abduction pillow     Passive Range of Motion:   Shoulder R   Flexion 135   Abduction NP   ER at 20 30   IR waistline     Treatment     Access Code: [email or web address]    Idalmis received the treatments listed below:       Idalmis received therapeutic exercises to R shoulder to develop strength, endurance, ROM, flexibility, and posture for 15  minutes including:  Table step backs 30x shld flexion   OTB scapular rows 30x   Shld shrugs 30x       NP  - Seated Wrist Extension with Dumbbell  - 1 x daily - 7 x weekly - 3 sets - 10 reps  - Seated Wrist Flexion with Dumbbell  - 1 x daily - 7 x weekly - 3 sets - 10 reps  - Supported Elbow Flexion Extension PROM  - 1 x daily - 7 x weekly - 3 sets - 10 reps     Idalmis received the following manual therapy techniques: Joint mobilizations were applied to the: R scapula and R shoulder for 10 minutes, including:  Scapular mobs, PROM R shoulder within precautions, elbow flexion/ext     Idalmis participated in neuromuscular re-education activities to improve: Balance, Coordination, Sense, Proprioception, and Posture for 15 minutes. The following activities were included:  Moist heat 10' 4 way wrist grn ball   SL AAROM Dowel Flex 30x   SL shld abd 30x     Idalmis participated in therapeutic activities to improve functional performance for 15 minutes, including:  Pulley shld flexion 5'   LUE ONLY UBE completed for 10' to increase ROM, endurance, and decrease pain to improve tolerance to ADLs and age-related activities    Home Exercises and Patient Education Provided      Education provided:   - PT POC   - PT goals   - HEP  - Post-Op positioning, sling wear, protocol     Written Home Exercises Provided:   Exercises were reviewed and Idalmis was able to demonstrate them prior to the end of the session.   Pt received a written copy of exercises to perform at home. Idalmis demonstrated good understanding of the education provided.      See EMR under patient instructions for exercises given.     Assessment     s/p R rTSA and Biceps Tendosis 5/15/2024   Pt tolerating tx well. Continued with periscapular and deltoid strengthening per protocol. Improved PROM shld flexion and abd limitation still present in ER. VC/TC for correcting form/technique.   Idalmis Is progressing well towards her goals.   Pt prognosis is Good.     Pt will  continue to benefit from skilled outpatient physical therapy to address the deficits listed in the problem list box on initial evaluation, provide pt/family education and to maximize pt's level of independence in the home and community environment.     Pt's spiritual, cultural and educational needs considered and pt agreeable to plan of care and goals.    Anticipated barriers to physical therapy: Age    GOALS   Short Term Goals:  6 weeks  Pt will report decreased R shoulder pain  < / =  3/10  to increase tolerance for ADL performance and recreational routine.  Pt will improve R shoulder ROM to WFL in order to be able to perform ADLs without difficulty.  Pt will demonstrate tolerance to being out of the sling for 24 hours with pain <2/10.  Pt will demonstrate tolerance to HEP to improve independence with ADL's     Long Term Goals: 24 weeks  1. Pt will report decreased R shoulder pain  < / =  1/10  to increase tolerance for ADL performance and recreational routine  2. Pt will improve R shoulder ROM to WNL in order to be able to perform ADLs without difficulty  3. Pt will demonstrate at least 4+/5 of RTC muscle testing to demo improvement in tolerance to activity.  4. Pt will tolerate lifting 10# overhead to improve tolerance to ADLs and work-related activities.  5. Pt will report at CJ level (20-40% impaired) on FOTO Shoulder to demonstrate increase in LE function with every day tasks.     Plan     Continue per POC    Jean-Pierre Katz, PTA, STS

## 2024-06-26 ENCOUNTER — PATIENT MESSAGE (OUTPATIENT)
Dept: SPORTS MEDICINE | Facility: CLINIC | Age: 74
End: 2024-06-26
Payer: MEDICARE

## 2024-06-27 ENCOUNTER — CLINICAL SUPPORT (OUTPATIENT)
Dept: REHABILITATION | Facility: HOSPITAL | Age: 74
End: 2024-06-27
Payer: MEDICARE

## 2024-06-27 ENCOUNTER — OFFICE VISIT (OUTPATIENT)
Dept: SPORTS MEDICINE | Facility: CLINIC | Age: 74
End: 2024-06-27
Payer: MEDICARE

## 2024-06-27 ENCOUNTER — HOSPITAL ENCOUNTER (OUTPATIENT)
Dept: RADIOLOGY | Facility: HOSPITAL | Age: 74
Discharge: HOME | End: 2024-06-27
Attending: ORTHOPAEDIC SURGERY
Payer: MEDICARE

## 2024-06-27 VITALS
HEART RATE: 62 BPM | HEIGHT: 66 IN | SYSTOLIC BLOOD PRESSURE: 129 MMHG | WEIGHT: 165 LBS | BODY MASS INDEX: 26.52 KG/M2 | DIASTOLIC BLOOD PRESSURE: 73 MMHG

## 2024-06-27 DIAGNOSIS — M25.511 RIGHT SHOULDER PAIN, UNSPECIFIED CHRONICITY: ICD-10-CM

## 2024-06-27 DIAGNOSIS — M25.511 ACUTE PAIN OF RIGHT SHOULDER: Primary | ICD-10-CM

## 2024-06-27 DIAGNOSIS — M25.511 RIGHT SHOULDER PAIN, UNSPECIFIED CHRONICITY: Primary | ICD-10-CM

## 2024-06-27 PROCEDURE — 97530 THERAPEUTIC ACTIVITIES: CPT

## 2024-06-27 PROCEDURE — 73030 X-RAY EXAM OF SHOULDER: CPT | Mod: 26,RT,, | Performed by: RADIOLOGY

## 2024-06-27 PROCEDURE — 99999 PR PBB SHADOW E&M-EST. PATIENT-LVL III: CPT | Mod: PBBFAC,,, | Performed by: ORTHOPAEDIC SURGERY

## 2024-06-27 PROCEDURE — 3078F DIAST BP <80 MM HG: CPT | Mod: CPTII,S$GLB,, | Performed by: ORTHOPAEDIC SURGERY

## 2024-06-27 PROCEDURE — 97112 NEUROMUSCULAR REEDUCATION: CPT

## 2024-06-27 PROCEDURE — 3074F SYST BP LT 130 MM HG: CPT | Mod: CPTII,S$GLB,, | Performed by: ORTHOPAEDIC SURGERY

## 2024-06-27 PROCEDURE — 97140 MANUAL THERAPY 1/> REGIONS: CPT

## 2024-06-27 PROCEDURE — 3288F FALL RISK ASSESSMENT DOCD: CPT | Mod: CPTII,S$GLB,, | Performed by: ORTHOPAEDIC SURGERY

## 2024-06-27 PROCEDURE — 1159F MED LIST DOCD IN RCRD: CPT | Mod: CPTII,S$GLB,, | Performed by: ORTHOPAEDIC SURGERY

## 2024-06-27 PROCEDURE — 1126F AMNT PAIN NOTED NONE PRSNT: CPT | Mod: CPTII,S$GLB,, | Performed by: ORTHOPAEDIC SURGERY

## 2024-06-27 PROCEDURE — 99024 POSTOP FOLLOW-UP VISIT: CPT | Mod: S$GLB,,, | Performed by: ORTHOPAEDIC SURGERY

## 2024-06-27 PROCEDURE — 1101F PT FALLS ASSESS-DOCD LE1/YR: CPT | Mod: CPTII,S$GLB,, | Performed by: ORTHOPAEDIC SURGERY

## 2024-06-27 PROCEDURE — 3044F HG A1C LEVEL LT 7.0%: CPT | Mod: CPTII,S$GLB,, | Performed by: ORTHOPAEDIC SURGERY

## 2024-06-27 PROCEDURE — 73030 X-RAY EXAM OF SHOULDER: CPT | Mod: TC,RT

## 2024-06-27 NOTE — PROGRESS NOTES
"CC: Right shoulder post op 6 weeks    Patient is here for her 6 week post op appointment s/p below and is doing well. She is attending PT at [place] and is progressing well. No longer taking pain medication.     DATE OF SURGERY:  5/15/2024      PREOPERATIVE DIAGNOSIS:   1. Right shoulder rotator cuff arthropathy / degenerative arthritis  2. Right shoulder biceps tendinopathy     POSTOPERATIVE DIAGNOSIS:   1. Right shoulder rotator cuff arthropathy / degenerative arthritis  2. Right shoulder biceps tendinopathy     PROCEDURE:   1. Right reverse total shoulder arthroplasty   2. Right shoulder biceps tenodesis     SURGEON: Jean-Pierre Barrera M.D.    Pain well tolerated on pain medication  Sling in place  No issues reported    Review of Systems   Constitution: Negative. Negative for chills, fever and night sweats.    Cardiovascular: Negative for chest pain and syncope.   Respiratory: Negative for cough and shortness of breath.    Gastrointestinal: Negative for abdominal pain and bowel incontinence.      PE:    /73   Pulse 62   Ht 5' 6" (1.676 m)   Wt 74.8 kg (165 lb)   BMI 26.63 kg/m²      Right shoulder    Incision healed  No sign of infection  Swelling resolved  Compartments soft  Neurovascular status intact in extremity    PROM  Forward elevation 70 degrees  External rotation 10 degrees    X-rays right shoulder (5/29/2024):  Status post right reverse total shoulder arthroplasty.  Prosthetics in excellent position.  No perihardware fracture or visible complication seen.    Assessment:  6 weeks s/p right reverse total shoulder arthroplasty and biceps tenodesis    Plan:  1. Begin physical therapy per protocol.  Continue sling wear until approximately 6 weeks postop.  2. Pain medication as needed for PT; try to wean off for next visit  3. Return to clinic in 4 weeks for 6 week post op follow up      All questions were answered. Instructed patient to call with questions or concerns in the interim.             "

## 2024-06-29 NOTE — PROGRESS NOTES
Physical Therapy Daily Treatment Note     Name: Idalmis Morejon  Clinic Number: [id number]    Therapy Diagnosis:   Encounter Diagnosis   Name Primary?    Acute pain of right shoulder Yes     Physician: Jean-Pierre Barrera MD    Visit Date: 6/27/2024    Physician Orders: PT Eval and Treat  Medical Diagnosis from Referral:   M19.011 (ICD-10-CM) - Primary osteoarthritis of right shoulder   M75.101 (ICD-10-CM) - Nontraumatic tear of right rotator cuff, unspecified tear extent   Evaluation Date: 5/29/2024  Authorization Period Expiration: 12/31/2024  Plan of Care Expiration: 08/29/2024  Visit # / Visits authorized: 6/10 (+ EVAL)     FOTO: 8  FOTO 1st Follow-up: NA  FOTO 2nd Follow-up: NA     Time In: 0950  Time Out: 1100  Total Billable Time: 65 minutes     Precautions: Standard     POD: 6 weeks and 1 days as of 6/27/2024     Procedure Date: 05/15/2024  Procedure:  1. Right reverse total shoulder arthroplasty   2. Right shoulder biceps tenodesis     Weeks 4-6:  1. Supine shoulder PROM: flexion/abd as tolerated, ER as tolerated, IR to belt line   2. No IR or extension, no lifting arm against gravity   3. Begin gentle resisted exercises of elbow, wrist, and hand   4. Begin rotator cuff strengthening and deltoid strengthening with gravity eliminated   5. Progress scapula and trapezius work with light resistance       Subjective     Pt reports no issues; excited to potentially remove sling.  She was compliant with home exercise program.  Response to previous treatment: rough   Functional change: improved R shoulder PROM    Pain: 5/10  Location: right shoulder      Objective     Posture: pt in shoulder sling with abduction pillow     Passive Range of Motion:   Shoulder R   Flexion 135   Abduction NP   ER at 20 30   IR waistline     Treatment     Access Code: [email or web address]    Idalmis received the treatments listed below:       Idalmis received therapeutic exercises to R shoulder to develop strength, endurance, ROM, flexibility, and  posture for 0 minutes including:    NP  - Seated Wrist Extension with Dumbbell  - 1 x daily - 7 x weekly - 3 sets - 10 reps  - Seated Wrist Flexion with Dumbbell  - 1 x daily - 7 x weekly - 3 sets - 10 reps  - Supported Elbow Flexion Extension PROM  - 1 x daily - 7 x weekly - 3 sets - 10 reps     Idalmis received the following manual therapy techniques: Joint mobilizations were applied to the: R scapula and R shoulder for 15 minutes, including:  Scapular mobs, PROM R shoulder within precautions, elbow flexion/ext     Idalmis participated in neuromuscular re-education activities to improve: Balance, Coordination, Sense, Proprioception, and Posture for 30 minutes. The following activities were included:  SL AAROM Dowel Flex 30x   SL shld abd 30x   Landmine press 0 wt 3 x 15    Idalmis participated in therapeutic activities to improve functional performance for 15 minutes, including:  Pulley shld flexion 5'   UBE completed for 10' to increase ROM, endurance, and decrease pain to improve tolerance to ADLs and age-related activities    Home Exercises and Patient Education Provided      Education provided:   - PT POC   - PT goals   - HEP  - Post-Op positioning, sling wear, protocol     Written Home Exercises Provided:   Exercises were reviewed and Idalmis was able to demonstrate them prior to the end of the session.   Pt received a written copy of exercises to perform at home. Idalmis demonstrated good understanding of the education provided.      See EMR under patient instructions for exercises given.     Assessment     s/p R rTSA and Biceps Tendosis 5/15/2024   Idalmis has 6-week f/u with Dr. Barrera today. Worked in more scar mobility; well-healing incisional area. Started to incorporate more active interventions in prep for removal of sling wear later today. Continue to progress per protocol.  Idalmis Is progressing well towards her goals.   Pt prognosis is Good.     Pt will continue to benefit from skilled outpatient  physical therapy to address the deficits listed in the problem list box on initial evaluation, provide pt/family education and to maximize pt's level of independence in the home and community environment.     Pt's spiritual, cultural and educational needs considered and pt agreeable to plan of care and goals.    Anticipated barriers to physical therapy: Age    GOALS   Short Term Goals:  6 weeks  Pt will report decreased R shoulder pain  < / =  3/10  to increase tolerance for ADL performance and recreational routine.  Pt will improve R shoulder ROM to WFL in order to be able to perform ADLs without difficulty.  Pt will demonstrate tolerance to being out of the sling for 24 hours with pain <2/10.  Pt will demonstrate tolerance to HEP to improve independence with ADL's     Long Term Goals: 24 weeks  1. Pt will report decreased R shoulder pain  < / =  1/10  to increase tolerance for ADL performance and recreational routine  2. Pt will improve R shoulder ROM to WNL in order to be able to perform ADLs without difficulty  3. Pt will demonstrate at least 4+/5 of RTC muscle testing to demo improvement in tolerance to activity.  4. Pt will tolerate lifting 10# overhead to improve tolerance to ADLs and work-related activities.  5. Pt will report at CJ level (20-40% impaired) on FOTO Shoulder to demonstrate increase in LE function with every day tasks.     Plan     Continue per PALLAVI Rossi, PT

## 2024-07-02 ENCOUNTER — CLINICAL SUPPORT (OUTPATIENT)
Dept: REHABILITATION | Facility: HOSPITAL | Age: 74
End: 2024-07-02
Payer: MEDICARE

## 2024-07-02 DIAGNOSIS — M25.511 ACUTE PAIN OF RIGHT SHOULDER: Primary | ICD-10-CM

## 2024-07-02 PROCEDURE — 97530 THERAPEUTIC ACTIVITIES: CPT

## 2024-07-02 PROCEDURE — 97112 NEUROMUSCULAR REEDUCATION: CPT

## 2024-07-02 NOTE — PROGRESS NOTES
Physical Therapy Daily Treatment Note     Name: Idalmis Morejon  Clinic Number: [id number]    Therapy Diagnosis:   Encounter Diagnosis   Name Primary?    Acute pain of right shoulder Yes     Physician: Jean-Pierre Barrera MD    Visit Date: 7/2/2024    Physician Orders: PT Eval and Treat  Medical Diagnosis from Referral:   M19.011 (ICD-10-CM) - Primary osteoarthritis of right shoulder   M75.101 (ICD-10-CM) - Nontraumatic tear of right rotator cuff, unspecified tear extent   Evaluation Date: 5/29/2024  Authorization Period Expiration: 12/31/2024  Plan of Care Expiration: 08/29/2024  Visit # / Visits authorized: 7/10 (+ EVAL)     FOTO: 8  FOTO 1st Follow-up: NA  FOTO 2nd Follow-up: NA     Time In: 0845  Time Out: 1000  Total Billable Time: 40 minutes     Precautions: Standard     POD: 6 weeks and 6 days as of 7/2/2024     Procedure Date: 05/15/2024  Procedure:  1. Right reverse total shoulder arthroplasty   2. Right shoulder biceps tenodesis     Subjective     Pt reports feels better after removing the sling and moving the shoulder.  She was compliant with home exercise program.  Response to previous treatment: Good   Functional change: improved R shoulder PROM    Pain: 5/10  Location: right shoulder      Objective     Passive Range of Motion:   Shoulder R   Flexion 135   Abduction NP   ER at 20 30   IR waistline     Treatment     Access Code: [email or web address]    Idalmis received the treatments listed below:       Idalmis received therapeutic exercises to R shoulder to develop strength, endurance, ROM, flexibility, and posture for 0 minutes including:    NP  - Seated Wrist Extension with Dumbbell  - 1 x daily - 7 x weekly - 3 sets - 10 reps  - Seated Wrist Flexion with Dumbbell  - 1 x daily - 7 x weekly - 3 sets - 10 reps  - Supported Elbow Flexion Extension PROM  - 1 x daily - 7 x weekly - 3 sets - 10 reps     Idalmis received the following manual therapy techniques: Joint mobilizations were applied to the: R scapula and R shoulder  for 5 minutes, including:  Scapular mobs, PROM R shoulder within precautions, elbow flexion/ext     Idalmis participated in neuromuscular re-education activities to improve: Balance, Coordination, Sense, Proprioception, and Posture for 30 minutes. The following activities were included:  SL AAROM Dowel Flex 30x   SL shld abd 30x   Landmine press 0 wt 3 x 15  Prone Scap Setting 3 x 10  Prone A's 3 x 10    Idalmis participated in therapeutic activities to improve functional performance for 10 minutes, including:  Pulley shld flexion 5'   UBE completed for 10' to increase ROM, endurance, and decrease pain to improve tolerance to ADLs and age-related activities    Home Exercises and Patient Education Provided      Education provided:   - PT POC   - PT goals   - HEP  - Post-Op positioning, sling wear, protocol     Written Home Exercises Provided:   Exercises were reviewed and Idalmis was able to demonstrate them prior to the end of the session.   Pt received a written copy of exercises to perform at home. Idalmis demonstrated good understanding of the education provided.      See EMR under patient instructions for exercises given.     Assessment     s/p R rTSA and Biceps Tendosis 5/15/2024   Idalmis had successful 6-week f/u with Dr. Barrera. Has been going without the sling since last session save for crowded areas such as [place]. Continued to work on normalizing PROM. Introduced more light AAROM/AROM activities as we shift focus to working on building into strength phase.  Idalmis Is progressing well towards her goals.   Pt prognosis is Good.     Pt will continue to benefit from skilled outpatient physical therapy to address the deficits listed in the problem list box on initial evaluation, provide pt/family education and to maximize pt's level of independence in the home and community environment.     Pt's spiritual, cultural and educational needs considered and pt agreeable to plan of care and goals.    Anticipated  barriers to physical therapy: Age    GOALS   Short Term Goals:  6 weeks  Pt will report decreased R shoulder pain  < / =  3/10  to increase tolerance for ADL performance and recreational routine. MET  Pt will improve R shoulder ROM to WFL in order to be able to perform ADLs without difficulty. PROGRESSING  Pt will demonstrate tolerance to being out of the sling for 24 hours with pain <2/10. MET  Pt will demonstrate tolerance to HEP to improve independence with ADL's MET     Long Term Goals: 24 weeks  1. Pt will report decreased R shoulder pain  < / =  1/10  to increase tolerance for ADL performance and recreational routine  2. Pt will improve R shoulder ROM to WNL in order to be able to perform ADLs without difficulty  3. Pt will demonstrate at least 4+/5 of RTC muscle testing to demo improvement in tolerance to activity.  4. Pt will tolerate lifting 10# overhead to improve tolerance to ADLs and work-related activities.  5. Pt will report at CJ level (20-40% impaired) on FOTO Shoulder to demonstrate increase in LE function with every day tasks.     Plan     Continue per PALLAVI Rossi, PT

## 2024-07-05 ENCOUNTER — LAB VISIT (OUTPATIENT)
Dept: LAB | Facility: HOSPITAL | Age: 74
End: 2024-07-05
Payer: MEDICARE

## 2024-07-05 DIAGNOSIS — D50.9 IRON DEFICIENCY ANEMIA, UNSPECIFIED IRON DEFICIENCY ANEMIA TYPE: ICD-10-CM

## 2024-07-05 DIAGNOSIS — N18.32 STAGE 3B CHRONIC KIDNEY DISEASE: ICD-10-CM

## 2024-07-05 LAB
ALBUMIN SERPL BCP-MCNC: 3.7 G/DL (ref 3.5–5.2)
ANION GAP SERPL CALC-SCNC: 12 MMOL/L (ref 8–16)
BASOPHILS # BLD AUTO: 0.04 K/UL (ref 0–0.2)
BASOPHILS NFR BLD: 0.6 % (ref 0–1.9)
BUN SERPL-MCNC: 19 MG/DL (ref 8–23)
CALCIUM SERPL-MCNC: 10.1 MG/DL (ref 8.7–10.5)
CHLORIDE SERPL-SCNC: 109 MMOL/L (ref 95–110)
CO2 SERPL-SCNC: 23 MMOL/L (ref 23–29)
CREAT SERPL-MCNC: 1.6 MG/DL (ref 0.5–1.4)
DIFFERENTIAL METHOD BLD: ABNORMAL
EOSINOPHIL # BLD AUTO: 0.1 K/UL (ref 0–0.5)
EOSINOPHIL NFR BLD: 2.3 % (ref 0–8)
ERYTHROCYTE [DISTWIDTH] IN BLOOD BY AUTOMATED COUNT: 15.8 % (ref 11.5–14.5)
EST. GFR  (NO RACE VARIABLE): 33.4 ML/MIN/1.73 M^2
FERRITIN SERPL-MCNC: 81 NG/ML (ref 20–300)
GLUCOSE SERPL-MCNC: 94 MG/DL (ref 70–110)
HCT VFR BLD AUTO: 32.7 % (ref 37–48.5)
HGB BLD-MCNC: 10.2 G/DL (ref 12–16)
HGB BLD-MCNC: 10.2 G/DL (ref 12–16)
IMM GRANULOCYTES # BLD AUTO: 0.02 K/UL (ref 0–0.04)
IMM GRANULOCYTES NFR BLD AUTO: 0.3 % (ref 0–0.5)
IRON SERPL-MCNC: 112 UG/DL (ref 30–160)
LYMPHOCYTES # BLD AUTO: 1.7 K/UL (ref 1–4.8)
LYMPHOCYTES NFR BLD: 28.1 % (ref 18–48)
MCH RBC QN AUTO: 27.1 PG (ref 27–31)
MCHC RBC AUTO-ENTMCNC: 31.2 G/DL (ref 32–36)
MCV RBC AUTO: 87 FL (ref 82–98)
MONOCYTES # BLD AUTO: 0.6 K/UL (ref 0.3–1)
MONOCYTES NFR BLD: 10.3 % (ref 4–15)
NEUTROPHILS # BLD AUTO: 3.6 K/UL (ref 1.8–7.7)
NEUTROPHILS NFR BLD: 58.4 % (ref 38–73)
NRBC BLD-RTO: 0 /100 WBC
PHOSPHATE SERPL-MCNC: 2.9 MG/DL (ref 2.7–4.5)
PLATELET # BLD AUTO: 326 K/UL (ref 150–450)
PMV BLD AUTO: 12.1 FL (ref 9.2–12.9)
POTASSIUM SERPL-SCNC: 3.9 MMOL/L (ref 3.5–5.1)
PTH-INTACT SERPL-MCNC: 126 PG/ML (ref 9–77)
RBC # BLD AUTO: 3.76 M/UL (ref 4–5.4)
SATURATED IRON: 34 % (ref 20–50)
SODIUM SERPL-SCNC: 144 MMOL/L (ref 136–145)
TOTAL IRON BINDING CAPACITY: 330 UG/DL (ref 250–450)
TRANSFERRIN SERPL-MCNC: 223 MG/DL (ref 200–375)
WBC # BLD AUTO: 6.2 K/UL (ref 3.9–12.7)

## 2024-07-05 PROCEDURE — 36415 COLL VENOUS BLD VENIPUNCTURE: CPT | Performed by: NURSE PRACTITIONER

## 2024-07-05 PROCEDURE — 83540 ASSAY OF IRON: CPT | Performed by: INTERNAL MEDICINE

## 2024-07-05 PROCEDURE — 80069 RENAL FUNCTION PANEL: CPT | Performed by: NURSE PRACTITIONER

## 2024-07-05 PROCEDURE — 85025 COMPLETE CBC W/AUTO DIFF WBC: CPT | Performed by: NURSE PRACTITIONER

## 2024-07-05 PROCEDURE — 82728 ASSAY OF FERRITIN: CPT | Performed by: INTERNAL MEDICINE

## 2024-07-05 PROCEDURE — 83970 ASSAY OF PARATHORMONE: CPT | Performed by: NURSE PRACTITIONER

## 2024-07-08 ENCOUNTER — PATIENT MESSAGE (OUTPATIENT)
Dept: NEPHROLOGY | Facility: CLINIC | Age: 74
End: 2024-07-08
Payer: MEDICARE

## 2024-07-09 ENCOUNTER — OFFICE VISIT (OUTPATIENT)
Dept: NEPHROLOGY | Facility: CLINIC | Age: 74
End: 2024-07-09
Payer: MEDICARE

## 2024-07-09 DIAGNOSIS — N18.32 STAGE 3B CHRONIC KIDNEY DISEASE: ICD-10-CM

## 2024-07-09 DIAGNOSIS — N17.9 AKI (ACUTE KIDNEY INJURY): Primary | ICD-10-CM

## 2024-07-09 NOTE — Clinical Note
Pls call or message on portal to schedule BMP on Fri or Sat. She wants to go to primary care building, but whether she goes Fri or Sat depends on the hours of the lab.  RTC in 6 mos  Thanks

## 2024-07-09 NOTE — PROGRESS NOTES
"Subjective:       Patient ID: Idalmis Morejon is a 73 y.o. AA female who presents for follow-up evaluation of CKD       HPI   Patient is new to me. Last seen by Dr. Sena in June 2019.  Prior pertinent chart reviewed since this is patient's first appointment with me.    Patient presents for f/u of CKD.  Baseline creatinine of 1.0-1.2 mg/dL. Pt admits to prior heavy use of various NSAID like Ibuprofen, goody powder and other compounds. She was still using NSAID until later part of 2016.    Home BPs: 140s/80s (when not taking amlodipine); 120s/70s (when taking amlodipine).     Significant hx includes longstanding hypertension "for decades", Schatzki's ring, DJD, sciatica, osteoporosis, hyperlipidemia.      The patient denies taking NSAIDs, herbal supplements, or new antibiotics, recreational drugs, recent episode of dehydration, diarrhea, nausea or vomiting, acute illness, hospitalization or exposure to IV radiocontrast.     Significant family hx includes: HTN; brother, father, nephew c ESRD on dialysis    Last renal US: April 2019, reviewed.    Update 8/1/22: (virtual)  Notes:   Had audio only visit in June 2021.  Presents today for f/u of CKD. No recent labs.  Had trigger finger surgery.  Home BPs: 140-145/78-80s. After medications.  Denies NSAIDs. Eats a lot salt diet.  Mother also had kidney disease.    Update 8/1/22 (virtual):  Notes:   Presents today for f/u of CKD.   Recent sCr up to 1.4 from 1.0-1.2 mg/dL.  Home BPs: 130s/80s after medications  Prescribed mobic but did not take.  Takes tylenol for pain.  Starting Prolia in December.    Update 1/9/24 (virtual):  Notes:   Presents for f/u of CKD.  Baseline sCr 1.1-1.3 mg/dL.  sCr was 1.6 on  1/5/24.  Was only drinking about 32 oz of water daily + ensure/boost or juice + green tea + occasional coffee.    She increased her water intake to about 50 oz of water daily + the other fluids.    Home BPs: 130s/70s-80s    Currently holding 12.5 mg chlorthalidone qod due to " "BRANDT.    Update 4/9/24 (virtual):  Presents for f/u of CKD.  [email or web address] did not register that patient had checked into appointment until over 50% appt slot was over. Pt requested phone call to review labs instead of rescheduling.  Baseline sCr 1.1-1.3 mg/dL.  Home BPs:  (lowest)/ 70-80s (1 hr p meds)    Update 7/9/24 (virtual):  The patient location is: [place]  The chief complaint leading to consultation is: f/u of CKD    Visit type: audiovisual    Face to Face time with patient: 10 minutes  14 minutes of total time spent on the encounter, which includes face to face time and non-face to face time preparing to see the patient (eg, review of tests), Obtaining and/or reviewing separately obtained history, Documenting clinical information in the electronic or other health record, Independently interpreting results (not separately reported) and communicating results to the patient/family/caregiver, or Care coordination (not separately reported).         Each patient to whom he or she provides medical services by telemedicine is:  (1) informed of the relationship between the physician and patient and the respective role of any other health care provider with respect to management of the patient; and (2) notified that he or she may decline to receive medical services by telemedicine and may withdraw from such care at any time.    Notes:   Presents for f/u of CKD.  Baseline sCr 1.1-1.3 mg/dL. Now c sCr 1.6 mg/dL  Home BPs: 120s/70s  Started labetolol last visit.  Prior to last set of labs, she had been drinking about 32 oz of water daily. Now drinking 48 oz.      Review of Systems   Respiratory:  Positive for shortness of breath ("a little bit").    Cardiovascular:  Positive for leg swelling (to ankles with amlodipine; "not much").   Gastrointestinal:  Negative for diarrhea, nausea and vomiting.   Genitourinary:  Positive for frequency (nocturia). Negative for difficulty urinating, dysuria and hematuria.       Objective:     "   There were no vitals taken for this visit.  Physical Exam  Constitutional:       General: She is not in acute distress.     Appearance: Normal appearance. She is well-developed. She is not diaphoretic.   Pulmonary:      Effort: Pulmonary effort is normal. No respiratory distress.   Neurological:      Mental Status: She is alert and oriented to person, place, and time.   Psychiatric:         Mood and Affect: Mood normal.         Behavior: Behavior normal.         Thought Content: Thought content normal.         Judgment: Judgment normal.         Lab Results   Component Value Date    CREATININE 1.6 (H) 07/05/2024    URICACID 2.8 06/20/2012     Prot/Creat Ratio, Urine   Date Value Ref Range Status   07/05/2024 0.10 0.00 - 0.20 Final   04/08/2024 0.09 0.00 - 0.20 Final   01/05/2024 0.16 0.00 - 0.20 Final     Lab Results   Component Value Date     07/05/2024    K 3.9 07/05/2024    CO2 23 07/05/2024     07/05/2024     Lab Results   Component Value Date    .0 (H) 07/05/2024    CALCIUM 10.1 07/05/2024    PHOS 2.9 07/05/2024     Lab Results   Component Value Date    HGB 10.2 (L) 07/05/2024    HGB 10.2 (L) 07/05/2024    WBC 6.20 07/05/2024    HCT 32.7 (L) 07/05/2024      Lab Results   Component Value Date    HGBA1C 5.5 04/08/2024     07/05/2024    BUN 19 07/05/2024     Lab Results   Component Value Date    LDLCALC 108.8 04/08/2024           Assessment:       No diagnosis found.            Plan:   CKD stage 3 c superimposed BRANDT - clinically r/t longstanding HTN and heavy NSAID use, possible APOL-1 component.     Current BRANDT - expect it is r/t hydration status. Increase fluid intake. Repeat labs.    Previous BRANDT resolved off chlorthalidone and with increased hydration.    Plans to attend intro to CKD later this month.    UPCR Mild albuminuria. On valsartan 160 mg BID.    May consider SGLT2i if it persists, but also concerned for risk of volume depletion.   Acid-base WNL.   Secondary  hyperparathyroidism Ca, phos okay. PTH elevated. Vitamin D okay. On vitamin D.   Anemia At goal for CKD. On PO iron.   DM Non-diabetic.   Lipid Management Defer to PCP.   ESRD planning Defer     HTN - WNL at home amlodipine 5 mg BID,  valsartan 320 mg, labetolol 100 mg BID  - has had issues with volume depletions    All questions patient had were answered.  Asked if further questions. None. F/u in clinic in 3 mos  with labs and urine prior to next visit or sooner if needed.  ER for emergency concerns.    Summary of Plan:  1. Hydrate well; BMP on Friday  2. avoid NSAID/ bactrim/ IV contrast/ gadolinium/ aminoglycoside where possible  3. Refer to RD  4. RTC in 6 mos    Visit today included increased complexity associated with the care of the episodic problem BRANDT addressed and managing the longitudinal care of the patient due to the serious and/or complex managed problem(s) CKD.

## 2024-07-10 ENCOUNTER — TELEPHONE (OUTPATIENT)
Dept: ENDOCRINOLOGY | Facility: CLINIC | Age: 74
End: 2024-07-10
Payer: MEDICARE

## 2024-07-11 ENCOUNTER — CLINICAL SUPPORT (OUTPATIENT)
Dept: REHABILITATION | Facility: HOSPITAL | Age: 74
End: 2024-07-11
Payer: MEDICARE

## 2024-07-11 DIAGNOSIS — M25.511 ACUTE PAIN OF RIGHT SHOULDER: Primary | ICD-10-CM

## 2024-07-11 PROCEDURE — 97530 THERAPEUTIC ACTIVITIES: CPT

## 2024-07-11 PROCEDURE — 97112 NEUROMUSCULAR REEDUCATION: CPT

## 2024-07-11 NOTE — PROGRESS NOTES
Physical Therapy Daily Treatment Note     Name: Idalmis Morejon  Clinic Number: [id number]    Therapy Diagnosis:   Encounter Diagnosis   Name Primary?    Acute pain of right shoulder Yes       Physician: Jean-Pierre Barrera MD    Visit Date: 7/11/2024    Physician Orders: PT Eval and Treat  Medical Diagnosis from Referral:   M19.011 (ICD-10-CM) - Primary osteoarthritis of right shoulder   M75.101 (ICD-10-CM) - Nontraumatic tear of right rotator cuff, unspecified tear extent   Evaluation Date: 5/29/2024  Authorization Period Expiration: 12/31/2024  Plan of Care Expiration: 08/29/2024  Visit # / Visits authorized:  8/10 (+ EVAL)     FOTO: 8  FOTO 1st Follow-up: NA  FOTO 2nd Follow-up: NA     Time In: 9:00 AM  Time Out: 10:00 AM  Total Billable Time: 60 minutes     Precautions: Standard     POD: 8 weeks and 1 day as of 7/11/2024     Procedure Date: 05/15/2024  Procedure:  1. Right reverse total shoulder arthroplasty   2. Right shoulder biceps tenodesis     Subjective     Pt reports feels some pain in the shoulder after trying to make the bed yesterday. She has been packing day by day in preparation to go out of town with her daughter.     She was compliant with home exercise program.    Response to previous treatment: Good   Functional change: improved R shoulder PROM    Pain: 5/10  Location: right shoulder      Objective     Passive Range of Motion:   Shoulder R   Flexion 135   Abduction NP   ER at 20 30   IR waistline     Treatment     Access Code: [email or web address]    Idalmis received the treatments listed below:       Idalmis received therapeutic exercises to R shoulder to develop strength, endurance, ROM, flexibility, and posture for 0 minutes including:    NP  - Seated Wrist Extension with Dumbbell  - 1 x daily - 7 x weekly - 3 sets - 10 reps  - Seated Wrist Flexion with Dumbbell  - 1 x daily - 7 x weekly - 3 sets - 10 reps  - Supported Elbow Flexion Extension PROM  - 1 x daily - 7 x weekly - 3 sets - 10 reps     Idalmis  received the following manual therapy techniques: Joint mobilizations were applied to the: R scapula and R shoulder for 10 minutes, including:    Scapular mobs, PROM R shoulder within precautions, elbow flexion/ext     Idalmis participated in neuromuscular re-education activities to improve: Balance, Coordination, Sense, Proprioception, and Posture for 32 minutes. The following activities were included:    OH AAROM Dowel Flex 2 x 15   SL shld abd 30x   Landmine press, 1 lb dowel 2 x 15  Prone Scap Setting 3 x 10  Shldr supported, mid trap activation/scap adduction, 2 x 10    Idalmis participated in therapeutic activities to improve functional performance for 18 minutes, including:    Pulley shld flexion 5'   UBE completed for 8' to increase ROM, endurance, and decrease pain to improve tolerance to ADLs and age-related activities  Table slides reaching into flexion, 3 mins    Home Exercises and Patient Education Provided      Education provided:   - PT POC   - PT goals   - HEP  - Post-Op positioning, sling wear, protocol     Written Home Exercises Provided:   Exercises were reviewed and Idalmis was able to demonstrate them prior to the end of the session.   Pt received a written copy of exercises to perform at home. Idalmis demonstrated good understanding of the education provided.      See EMR under patient instructions for exercises given.     Assessment     s/p R rTSA and Biceps Tendosis 5/15/2024     Idalmis presented with slight shoulder pain/stiffness actively into flexion around 100-110 degrees today and had to modify some interventions. Tolerated overhead reaching AAROM and scapular isolation exercises well. Continuing with light AAROM/AROM activities as we shift focus to working on building into strength phase.    Idalmis Is progressing well towards her goals.   Pt prognosis is Good.     Pt will continue to benefit from skilled outpatient physical therapy to address the deficits listed in the problem list box on  initial evaluation, provide pt/family education and to maximize pt's level of independence in the home and community environment.     Pt's spiritual, cultural and educational needs considered and pt agreeable to plan of care and goals.    Anticipated barriers to physical therapy: Age    GOALS   Short Term Goals:  6 weeks  Pt will report decreased R shoulder pain  < / =  3/10  to increase tolerance for ADL performance and recreational routine. MET  Pt will improve R shoulder ROM to WFL in order to be able to perform ADLs without difficulty. PROGRESSING  Pt will demonstrate tolerance to being out of the sling for 24 hours with pain <2/10. MET  Pt will demonstrate tolerance to HEP to improve independence with ADL's MET     Long Term Goals: 24 weeks  1. Pt will report decreased R shoulder pain  < / =  1/10  to increase tolerance for ADL performance and recreational routine  2. Pt will improve R shoulder ROM to WNL in order to be able to perform ADLs without difficulty  3. Pt will demonstrate at least 4+/5 of RTC muscle testing to demo improvement in tolerance to activity.  4. Pt will tolerate lifting 10# overhead to improve tolerance to ADLs and work-related activities.  5. Pt will report at CJ level (20-40% impaired) on FOTO Shoulder to demonstrate increase in LE function with every day tasks.     Plan     Continue per PALLAVI Sandra, PT

## 2024-07-12 ENCOUNTER — PATIENT MESSAGE (OUTPATIENT)
Dept: NEPHROLOGY | Facility: CLINIC | Age: 74
End: 2024-07-12
Payer: MEDICARE

## 2024-07-21 ENCOUNTER — PATIENT MESSAGE (OUTPATIENT)
Dept: GASTROENTEROLOGY | Facility: CLINIC | Age: 74
End: 2024-07-21
Payer: MEDICARE

## 2024-07-21 DIAGNOSIS — D50.9 IRON DEFICIENCY ANEMIA, UNSPECIFIED IRON DEFICIENCY ANEMIA TYPE: ICD-10-CM

## 2024-07-21 RX ORDER — FERROUS GLUCONATE 324(38)MG
324 TABLET ORAL
Qty: 90 TABLET | Refills: 1 | Status: SHIPPED | OUTPATIENT
Start: 2024-07-21 | End: 2025-01-17

## 2024-07-22 ENCOUNTER — PATIENT MESSAGE (OUTPATIENT)
Dept: INTERNAL MEDICINE | Facility: CLINIC | Age: 74
End: 2024-07-22
Payer: MEDICARE

## 2024-07-22 ENCOUNTER — TELEPHONE (OUTPATIENT)
Dept: INTERNAL MEDICINE | Facility: CLINIC | Age: 74
End: 2024-07-22
Payer: MEDICARE

## 2024-07-22 DIAGNOSIS — I10 ESSENTIAL HYPERTENSION: ICD-10-CM

## 2024-07-22 RX ORDER — VALSARTAN 160 MG/1
320 TABLET ORAL DAILY
Qty: 10 TABLET | Refills: 0 | Status: SHIPPED | OUTPATIENT
Start: 2024-07-22 | End: 2025-07-22

## 2024-07-22 RX ORDER — AMLODIPINE BESYLATE 10 MG/1
5 TABLET ORAL 2 TIMES DAILY
Qty: 10 TABLET | Refills: 0 | Status: SHIPPED | OUTPATIENT
Start: 2024-07-22

## 2024-07-22 NOTE — TELEPHONE ENCOUNTER
Pt is stuck in [place] and requesting a short fill of her BP meds, pended     LOV with Noemy Pugh MD , 5/7/2024

## 2024-07-22 NOTE — TELEPHONE ENCOUNTER
No care due was identified.  [facility] Embedded Care Due Messages. Reference number: [id number].   7/22/2024 11:22:19 AM CDT

## 2024-07-22 NOTE — TELEPHONE ENCOUNTER
----- Message from Dorothy Buenrostro sent at 7/22/2024 11:01 AM CDT -----  Contact: Pt [phone]  Is this a refill or new RX: Refill    RX name and strength valsartan (DIOVAN) 160 MG tablet and amLODIPine (NORVASC) 10 MG tablet    Is this a 30 day or 90 day RX: 3 days of each    Pharmacy name and phone # [id number] [place] [address] Phone: [phone] Fax: [phone]    She got stuck in [place] due to the computer hacking. Per the pharmacy please send a script for 3 days.     She asked for a phone call once it's done because she is waiting at the pharmacy due having to pay for [employer].

## 2024-07-23 ENCOUNTER — PATIENT MESSAGE (OUTPATIENT)
Facility: CLINIC | Age: 74
End: 2024-07-23
Payer: MEDICARE

## 2024-07-25 ENCOUNTER — LAB VISIT (OUTPATIENT)
Dept: LAB | Facility: HOSPITAL | Age: 74
End: 2024-07-25
Payer: MEDICARE

## 2024-07-25 ENCOUNTER — CLINICAL SUPPORT (OUTPATIENT)
Dept: REHABILITATION | Facility: HOSPITAL | Age: 74
End: 2024-07-25
Payer: MEDICARE

## 2024-07-25 DIAGNOSIS — M25.511 ACUTE PAIN OF RIGHT SHOULDER: Primary | ICD-10-CM

## 2024-07-25 DIAGNOSIS — N17.9 AKI (ACUTE KIDNEY INJURY): ICD-10-CM

## 2024-07-25 LAB
ANION GAP SERPL CALC-SCNC: 8 MMOL/L (ref 8–16)
BUN SERPL-MCNC: 15 MG/DL (ref 8–23)
CALCIUM SERPL-MCNC: 9.5 MG/DL (ref 8.7–10.5)
CHLORIDE SERPL-SCNC: 107 MMOL/L (ref 95–110)
CO2 SERPL-SCNC: 24 MMOL/L (ref 23–29)
CREAT SERPL-MCNC: 1.6 MG/DL (ref 0.5–1.4)
EST. GFR  (NO RACE VARIABLE): 33.4 ML/MIN/1.73 M^2
GLUCOSE SERPL-MCNC: 75 MG/DL (ref 70–110)
POTASSIUM SERPL-SCNC: 4.2 MMOL/L (ref 3.5–5.1)
SODIUM SERPL-SCNC: 139 MMOL/L (ref 136–145)

## 2024-07-25 PROCEDURE — 97112 NEUROMUSCULAR REEDUCATION: CPT | Mod: CQ

## 2024-07-25 PROCEDURE — 97530 THERAPEUTIC ACTIVITIES: CPT | Mod: CQ

## 2024-07-25 PROCEDURE — 36415 COLL VENOUS BLD VENIPUNCTURE: CPT | Performed by: NURSE PRACTITIONER

## 2024-07-25 PROCEDURE — 80048 BASIC METABOLIC PNL TOTAL CA: CPT | Performed by: NURSE PRACTITIONER

## 2024-07-25 NOTE — PROGRESS NOTES
Physical Therapy Daily Treatment Note     Name: Idalmis Morejon  Clinic Number: [id number]    Therapy Diagnosis:   Encounter Diagnosis   Name Primary?    Acute pain of right shoulder Yes     Physician: Jean-Pierre Barrera MD    Visit Date: 7/25/2024    Physician Orders: PT Eval and Treat  Medical Diagnosis from Referral:   M19.011 (ICD-10-CM) - Primary osteoarthritis of right shoulder   M75.101 (ICD-10-CM) - Nontraumatic tear of right rotator cuff, unspecified tear extent   Evaluation Date: 5/29/2024  Authorization Period Expiration: 12/31/2024  Plan of Care Expiration: 08/29/2024  Visit # / Visits authorized: 7/10 (+ EVAL)     FOTO: 8  FOTO 1st Follow-up: NA  FOTO 2nd Follow-up: NA     Time In: 0845  Time Out: 1000  Total Billable Time: 40 minutes     Precautions: Standard     POD: 6 weeks and 6 days as of 7/2/2024     Procedure Date: 05/15/2024  Procedure:  1. Right reverse total shoulder arthroplasty   2. Right shoulder biceps tenodesis     Subjective     Pt reports mod pain at present time it really stiff.  Stuck in [place] Saturday to Tuesday due to computer outage.   She was  NOT compliant with home exercise program while in [place]   Response to previous treatment: Good   Functional change: improved R shoulder PROM    Pain: 6/10  Location: right shoulder      Objective     Passive Range of Motion:   Shoulder R   Flexion 135   Abduction NP   ER at 20 30   IR waistline     Treatment     Access Code: [email or web address]    Idalmis received the treatments listed below:       Idalmis received therapeutic exercises to R shoulder to develop strength, endurance, ROM, flexibility, and posture for 0 minutes including:    NP  - Seated Wrist Extension with Dumbbell  - 1 x daily - 7 x weekly - 3 sets - 10 reps  - Seated Wrist Flexion with Dumbbell  - 1 x daily - 7 x weekly - 3 sets - 10 reps  - Supported Elbow Flexion Extension PROM  - 1 x daily - 7 x weekly - 3 sets - 10 reps     Idalmis received the following manual therapy techniques:  Joint mobilizations were applied to the: R scapula and R shoulder for 10 minutes, including:  Scapular mobs, PROM R shoulder within precautions, elbow flexion/ext     Idalmis participated in neuromuscular re-education activities to improve: Balance, Coordination, Sense, Proprioception, and Posture for 30 minutes. The following activities were included:  Prone Scap Setting 3 x 10  Prone shld ext 3x10  Supine wand shld flexion 30x   SL shld abd 30x   Landmine press 0 wt 3 x 15        Idalmis participated in therapeutic activities to improve functional performance for 10 minutes, including:  Pulley shld flexion 5'   UBE completed for 10' to increase ROM, endurance, and decrease pain to improve tolerance to ADLs and age-related activities    Home Exercises and Patient Education Provided      Education provided:   - PT POC   - PT goals   - HEP  - Post-Op positioning, sling wear, protocol     Written Home Exercises Provided:   Exercises were reviewed and Idalmis was able to demonstrate them prior to the end of the session.   Pt received a written copy of exercises to perform at home. Idalmis demonstrated good understanding of the education provided.      See EMR under patient instructions for exercises given.     Assessment     s/p R rTSA and Biceps Tendosis 5/15/2024   Pt tolerating tx well. Increased mm tightness/stiffness secondary to lack of therapy and HEP after being stuck in [place] for five days.  Continued to work PROM and light AAROM/AROM activities per Supervising PT. VC/TC for correcting form/technique.   Idalmis Is progressing well towards her goals.   Pt prognosis is Good.     Pt will continue to benefit from skilled outpatient physical therapy to address the deficits listed in the problem list box on initial evaluation, provide pt/family education and to maximize pt's level of independence in the home and community environment.     Pt's spiritual, cultural and educational needs considered and pt agreeable to  plan of care and goals.    Anticipated barriers to physical therapy: Age    GOALS   Short Term Goals:  6 weeks  Pt will report decreased R shoulder pain  < / =  3/10  to increase tolerance for ADL performance and recreational routine. MET  Pt will improve R shoulder ROM to WFL in order to be able to perform ADLs without difficulty. PROGRESSING  Pt will demonstrate tolerance to being out of the sling for 24 hours with pain <2/10. MET  Pt will demonstrate tolerance to HEP to improve independence with ADL's MET     Long Term Goals: 24 weeks  1. Pt will report decreased R shoulder pain  < / =  1/10  to increase tolerance for ADL performance and recreational routine  2. Pt will improve R shoulder ROM to WNL in order to be able to perform ADLs without difficulty  3. Pt will demonstrate at least 4+/5 of RTC muscle testing to demo improvement in tolerance to activity.  4. Pt will tolerate lifting 10# overhead to improve tolerance to ADLs and work-related activities.  5. Pt will report at CJ level (20-40% impaired) on FOTO Shoulder to demonstrate increase in LE function with every day tasks.     Plan     Continue per POC    Jean-Pierre Katz, PTA, STS

## 2024-07-30 ENCOUNTER — CLINICAL SUPPORT (OUTPATIENT)
Dept: REHABILITATION | Facility: HOSPITAL | Age: 74
End: 2024-07-30
Payer: MEDICARE

## 2024-07-30 DIAGNOSIS — M25.511 ACUTE PAIN OF RIGHT SHOULDER: Primary | ICD-10-CM

## 2024-07-30 PROCEDURE — 97140 MANUAL THERAPY 1/> REGIONS: CPT

## 2024-07-30 PROCEDURE — 97530 THERAPEUTIC ACTIVITIES: CPT

## 2024-07-30 PROCEDURE — 97112 NEUROMUSCULAR REEDUCATION: CPT

## 2024-07-30 NOTE — PROGRESS NOTES
Physical Therapy Daily Treatment Note     Name: Idalmis Morejon  Clinic Number: [id number]    Therapy Diagnosis:   Encounter Diagnosis   Name Primary?    Acute pain of right shoulder Yes     Physician: Jean-Pierre Barrera MD    Visit Date: 7/30/2024    Physician Orders: PT Eval and Treat  Medical Diagnosis from Referral:   M19.011 (ICD-10-CM) - Primary osteoarthritis of right shoulder   M75.101 (ICD-10-CM) - Nontraumatic tear of right rotator cuff, unspecified tear extent   Evaluation Date: 5/29/2024  Authorization Period Expiration: 12/31/2024  Plan of Care Expiration: 08/29/2024  Visit # / Visits authorized: 10/20 (+ EVAL)     FOTO: 8  FOTO 1st Follow-up: NA  FOTO 2nd Follow-up: NA     Time In: 0915  Time Out: 1015  Total Billable Time: 60 minutes     Precautions: Standard     POD: 10 weeks and 6 days as of 7/30/2024     Procedure Date: 05/15/2024  Procedure:  1. Right reverse total shoulder arthroplasty   2. Right shoulder biceps tenodesis     Subjective     Pt reports improved mobility now that she is back in town.  She was  compliant with home exercise program.  Response to previous treatment: Good   Functional change: improved R shoulder PROM    Pain: 6/10  Location: right shoulder      Objective     Passive Range of Motion: 7/30  Shoulder R   Flexion 160   Abduction 110   ER at 20 50   IR 45     Treatment     Access Code: [email or web address]    Idalmis received the treatments listed below:       Idalmis received therapeutic exercises to R shoulder to develop strength, endurance, ROM, flexibility, and posture for 0 minutes including:    NP  - Seated Wrist Extension with Dumbbell  - 1 x daily - 7 x weekly - 3 sets - 10 reps  - Seated Wrist Flexion with Dumbbell  - 1 x daily - 7 x weekly - 3 sets - 10 reps  - Supported Elbow Flexion Extension PROM  - 1 x daily - 7 x weekly - 3 sets - 10 reps     Idalmis received the following manual therapy techniques: Joint mobilizations were applied to the: R scapula and R shoulder for 10  minutes, including:  Scapular mobs, PROM R shoulder    Idalmis participated in neuromuscular re-education activities to improve: Balance, Coordination, Sense, Proprioception, and Posture for 30 minutes. The following activities were included:  SL ER 0wt 4 x 8  Prone Scap Setting 3 x 10  Prone shld ext 3x10  Seated single arm CC pulldown 10# 5 x 8    NP:  Supine wand shld flexion 30x   SL shld abd 30x   Landmine press 0 wt 3 x 15      Idalmis participated in therapeutic activities to improve functional performance for 15 minutes, including:  Pulley shld flexion 5'   UBE completed for 10' to increase ROM, endurance, and decrease pain to improve tolerance to ADLs and age-related activities    Home Exercises and Patient Education Provided      Education provided:   - PT POC   - PT goals   - HEP  - Post-Op positioning, sling wear, protocol     Written Home Exercises Provided:   Exercises were reviewed and Idalmis was able to demonstrate them prior to the end of the session.   Pt received a written copy of exercises to perform at home. Idalmis demonstrated good understanding of the education provided.      See EMR under patient instructions for exercises given.     Assessment     s/p R rTSA and Biceps Tendosis 5/15/2024     Idalmis stiff with OH motion today, but improved with PROM and MT to R shoulder. Progressed more OH tolerance with single arm pulldowns and more time spent on pulleys with extended soft tissue dynamic stretching. Progress prone-based strengthening.    Idalmis Is progressing well towards her goals.   Pt prognosis is Good.     Pt will continue to benefit from skilled outpatient physical therapy to address the deficits listed in the problem list box on initial evaluation, provide pt/family education and to maximize pt's level of independence in the home and community environment.     Pt's spiritual, cultural and educational needs considered and pt agreeable to plan of care and goals.    Anticipated barriers to  physical therapy: Age    GOALS   Short Term Goals:  6 weeks  Pt will report decreased R shoulder pain  < / =  3/10  to increase tolerance for ADL performance and recreational routine. MET  Pt will improve R shoulder ROM to WFL in order to be able to perform ADLs without difficulty. PROGRESSING  Pt will demonstrate tolerance to being out of the sling for 24 hours with pain <2/10. MET  Pt will demonstrate tolerance to HEP to improve independence with ADL's MET     Long Term Goals: 24 weeks  1. Pt will report decreased R shoulder pain  < / =  1/10  to increase tolerance for ADL performance and recreational routine  2. Pt will improve R shoulder ROM to WNL in order to be able to perform ADLs without difficulty  3. Pt will demonstrate at least 4+/5 of RTC muscle testing to demo improvement in tolerance to activity.  4. Pt will tolerate lifting 10# overhead to improve tolerance to ADLs and work-related activities.  5. Pt will report at CJ level (20-40% impaired) on FOTO Shoulder to demonstrate increase in LE function with every day tasks.     Plan     Continue per PALLAVI Rossi, PT

## 2024-08-02 ENCOUNTER — CLINICAL SUPPORT (OUTPATIENT)
Dept: REHABILITATION | Facility: HOSPITAL | Age: 74
End: 2024-08-02
Payer: MEDICARE

## 2024-08-02 DIAGNOSIS — M25.511 ACUTE PAIN OF RIGHT SHOULDER: Primary | ICD-10-CM

## 2024-08-02 PROCEDURE — 97112 NEUROMUSCULAR REEDUCATION: CPT

## 2024-08-02 PROCEDURE — 97530 THERAPEUTIC ACTIVITIES: CPT

## 2024-08-02 NOTE — PROGRESS NOTES
Physical Therapy Daily Treatment Note     Name: Idalmis Morejon  Clinic Number: [id number]    Therapy Diagnosis:   Encounter Diagnosis   Name Primary?    Acute pain of right shoulder Yes     Physician: Jean-Pierre Barrera MD    Visit Date: 8/2/2024    Physician Orders: PT Eval and Treat  Medical Diagnosis from Referral:   M19.011 (ICD-10-CM) - Primary osteoarthritis of right shoulder   M75.101 (ICD-10-CM) - Nontraumatic tear of right rotator cuff, unspecified tear extent   Evaluation Date: 5/29/2024  Authorization Period Expiration: 12/31/2024  Plan of Care Expiration: 11/02/2024  Visit # / Visits authorized: 11/20 (+ EVAL)     FOTO: 8  FOTO 1st Follow-up: NA  FOTO 2nd Follow-up: NA     Time In: 1250  Time Out: 1410  Total Billable Time: 60 minutes     Precautions: Standard     POD: 11 weeks and 2 days as of 8/2/2024     Procedure Date: 05/15/2024  Procedure:  1. Right reverse total shoulder arthroplasty   2. Right shoulder biceps tenodesis     Subjective     Pt reports soreness following last session, but does feel improved mobility.  She was  compliant with home exercise program.  Response to previous treatment: Good   Functional change: improved R shoulder PROM    Pain: 6/10  Location: right shoulder      Objective     Passive Range of Motion: 7/30  Shoulder R   Flexion 160   Abduction 110   ER at 20 50   IR 45     Treatment     Access Code: [email or web address]    Idalmis received the treatments listed below:       Idalmis received therapeutic exercises to R shoulder to develop strength, endurance, ROM, flexibility, and posture for 0 minutes including:    NP  - Seated Wrist Extension with Dumbbell  - 1 x daily - 7 x weekly - 3 sets - 10 reps  - Seated Wrist Flexion with Dumbbell  - 1 x daily - 7 x weekly - 3 sets - 10 reps  - Supported Elbow Flexion Extension PROM  - 1 x daily - 7 x weekly - 3 sets - 10 reps     Idalmis received the following manual therapy techniques: Joint mobilizations were applied to the: R scapula and R  shoulder for 5 minutes, including:  Scapular mobs, PROM R shoulder    Idalmis participated in neuromuscular re-education activities to improve: Balance, Coordination, Sense, Proprioception, and Posture for 30 minutes. The following activities were included:  SL ER 0wt 4 x 8  Prone Scap Setting 3 x 10  Prone shld ext 3x10  Seated single arm CC pulldown 10# 5 x 8    NP:  Supine wand shld flexion 30x   SL shld abd 30x   Landmine press 0 wt 3 x 15      Idalmis participated in therapeutic activities to improve functional performance for 15 minutes, including:  Pulley shld flexion 5'   UBE completed for 10' to increase ROM, endurance, and decrease pain to improve tolerance to ADLs and age-related activities    Home Exercises and Patient Education Provided      Education provided:   - PT POC   - PT goals   - HEP  - Post-Op positioning, sling wear, protocol     Written Home Exercises Provided:   Exercises were reviewed and Idalmis was able to demonstrate them prior to the end of the session.   Pt received a written copy of exercises to perform at home. Idalmis demonstrated good understanding of the education provided.      See EMR under patient instructions for exercises given.     Assessment     s/p R rTSA and Biceps Tendosis 5/15/2024     Idalmis nearing 3 months post-op.  Having missed over a week, we are working to regain some lost ROM. Stiff at beginning of sessions that improves with MT and dynamic mobility. Continue to work on OH mobility and subsequent strengthening to support the range.    Idalmis Is progressing well towards her goals.   Pt prognosis is Good.     Pt will continue to benefit from skilled outpatient physical therapy to address the deficits listed in the problem list box on initial evaluation, provide pt/family education and to maximize pt's level of independence in the home and community environment.     Pt's spiritual, cultural and educational needs considered and pt agreeable to plan of care and  goals.    Anticipated barriers to physical therapy: Age    GOALS   Short Term Goals:  6 weeks  Pt will report decreased R shoulder pain  < / =  3/10  to increase tolerance for ADL performance and recreational routine. MET  Pt will improve R shoulder ROM to WFL in order to be able to perform ADLs without difficulty. PROGRESSING  Pt will demonstrate tolerance to being out of the sling for 24 hours with pain <2/10. MET  Pt will demonstrate tolerance to HEP to improve independence with ADL's MET     Long Term Goals: 24 weeks  1. Pt will report decreased R shoulder pain  < / =  1/10  to increase tolerance for ADL performance and recreational routine  2. Pt will improve R shoulder ROM to WNL in order to be able to perform ADLs without difficulty  3. Pt will demonstrate at least 4+/5 of RTC muscle testing to demo improvement in tolerance to activity.  4. Pt will tolerate lifting 10# overhead to improve tolerance to ADLs and work-related activities.  5. Pt will report at CJ level (20-40% impaired) on FOTO Shoulder to demonstrate increase in LE function with every day tasks.     Plan     Continue per PALLAVI Rossi, PT

## 2024-08-05 ENCOUNTER — PATIENT MESSAGE (OUTPATIENT)
Dept: NEPHROLOGY | Facility: CLINIC | Age: 74
End: 2024-08-05
Payer: MEDICARE

## 2024-08-05 DIAGNOSIS — N17.9 AKI (ACUTE KIDNEY INJURY): Primary | ICD-10-CM

## 2024-08-06 ENCOUNTER — CLINICAL SUPPORT (OUTPATIENT)
Dept: REHABILITATION | Facility: HOSPITAL | Age: 74
End: 2024-08-06
Payer: MEDICARE

## 2024-08-06 DIAGNOSIS — M25.511 ACUTE PAIN OF RIGHT SHOULDER: Primary | ICD-10-CM

## 2024-08-06 PROCEDURE — 97530 THERAPEUTIC ACTIVITIES: CPT

## 2024-08-08 ENCOUNTER — HOSPITAL ENCOUNTER (OUTPATIENT)
Dept: RADIOLOGY | Facility: HOSPITAL | Age: 74
Discharge: HOME | End: 2024-08-08
Attending: ORTHOPAEDIC SURGERY
Payer: MEDICARE

## 2024-08-08 ENCOUNTER — OFFICE VISIT (OUTPATIENT)
Dept: SPORTS MEDICINE | Facility: CLINIC | Age: 74
End: 2024-08-08
Payer: MEDICARE

## 2024-08-08 ENCOUNTER — CLINICAL SUPPORT (OUTPATIENT)
Dept: REHABILITATION | Facility: HOSPITAL | Age: 74
End: 2024-08-08
Payer: MEDICARE

## 2024-08-08 VITALS
HEART RATE: 61 BPM | WEIGHT: 164.88 LBS | SYSTOLIC BLOOD PRESSURE: 156 MMHG | DIASTOLIC BLOOD PRESSURE: 74 MMHG | HEIGHT: 66 IN | BODY MASS INDEX: 26.5 KG/M2

## 2024-08-08 DIAGNOSIS — Z96.611 STATUS POST REVERSE TOTAL REPLACEMENT OF RIGHT SHOULDER: Primary | ICD-10-CM

## 2024-08-08 DIAGNOSIS — M25.511 ACUTE PAIN OF RIGHT SHOULDER: Primary | ICD-10-CM

## 2024-08-08 DIAGNOSIS — Z96.611 STATUS POST REVERSE TOTAL REPLACEMENT OF RIGHT SHOULDER: ICD-10-CM

## 2024-08-08 PROCEDURE — 97530 THERAPEUTIC ACTIVITIES: CPT

## 2024-08-08 PROCEDURE — 3288F FALL RISK ASSESSMENT DOCD: CPT | Mod: CPTII,S$GLB,, | Performed by: ORTHOPAEDIC SURGERY

## 2024-08-08 PROCEDURE — 3078F DIAST BP <80 MM HG: CPT | Mod: CPTII,S$GLB,, | Performed by: ORTHOPAEDIC SURGERY

## 2024-08-08 PROCEDURE — 3044F HG A1C LEVEL LT 7.0%: CPT | Mod: CPTII,S$GLB,, | Performed by: ORTHOPAEDIC SURGERY

## 2024-08-08 PROCEDURE — 1125F AMNT PAIN NOTED PAIN PRSNT: CPT | Mod: CPTII,S$GLB,, | Performed by: ORTHOPAEDIC SURGERY

## 2024-08-08 PROCEDURE — 99024 POSTOP FOLLOW-UP VISIT: CPT | Mod: S$GLB,,, | Performed by: ORTHOPAEDIC SURGERY

## 2024-08-08 PROCEDURE — 1159F MED LIST DOCD IN RCRD: CPT | Mod: CPTII,S$GLB,, | Performed by: ORTHOPAEDIC SURGERY

## 2024-08-08 PROCEDURE — 3077F SYST BP >= 140 MM HG: CPT | Mod: CPTII,S$GLB,, | Performed by: ORTHOPAEDIC SURGERY

## 2024-08-08 PROCEDURE — 99999 PR PBB SHADOW E&M-EST. PATIENT-LVL III: CPT | Mod: PBBFAC,,, | Performed by: ORTHOPAEDIC SURGERY

## 2024-08-08 PROCEDURE — 73030 X-RAY EXAM OF SHOULDER: CPT | Mod: 26,RT,, | Performed by: RADIOLOGY

## 2024-08-08 PROCEDURE — 73030 X-RAY EXAM OF SHOULDER: CPT | Mod: TC,RT

## 2024-08-08 PROCEDURE — 1101F PT FALLS ASSESS-DOCD LE1/YR: CPT | Mod: CPTII,S$GLB,, | Performed by: ORTHOPAEDIC SURGERY

## 2024-08-13 ENCOUNTER — CLINICAL SUPPORT (OUTPATIENT)
Dept: REHABILITATION | Facility: HOSPITAL | Age: 74
End: 2024-08-13
Payer: MEDICARE

## 2024-08-13 DIAGNOSIS — M25.511 ACUTE PAIN OF RIGHT SHOULDER: Primary | ICD-10-CM

## 2024-08-13 PROCEDURE — 97112 NEUROMUSCULAR REEDUCATION: CPT | Mod: CQ

## 2024-08-13 PROCEDURE — 97530 THERAPEUTIC ACTIVITIES: CPT | Mod: CQ

## 2024-08-13 NOTE — PROGRESS NOTES
Physical Therapy Daily Treatment Note     Name: Idalmis Morejon  Clinic Number: [id number]    Therapy Diagnosis:   Encounter Diagnosis   Name Primary?    Acute pain of right shoulder Yes     Physician: Jean-Pierre Barrera MD    Visit Date: 8/13/2024    Physician Orders: PT Eval and Treat  Medical Diagnosis from Referral:   M19.011 (ICD-10-CM) - Primary osteoarthritis of right shoulder   M75.101 (ICD-10-CM) - Nontraumatic tear of right rotator cuff, unspecified tear extent   Evaluation Date: 5/29/2024  Authorization Period Expiration: 12/31/2024  Plan of Care Expiration: 11/02/2024  Visit # / Visits authorized: 13/20 (+ EVAL)     FOTO: 8  FOTO 1st Follow-up: NA  FOTO 2nd Follow-up: NA     Time In: 0945  Time Out: 1045  Total Billable Time: 45 minutes     Precautions: Standard     POD: 12 weeks and 1 days as of 8/8/2024     Procedure Date: 05/15/2024  Procedure:  1. Right reverse total shoulder arthroplasty   2. Right shoulder biceps tenodesis     Subjective     Pt reports stiffness/min pain this morning   She was  compliant with home exercise program.  Response to previous treatment: no issues   Functional change: improved R shoulder PROM    Pain: 6/10  Location: right shoulder      Objective     Passive Range of Motion: 7/30  Shoulder R   Flexion 160   Abduction 110   ER at 20 50   IR 45     Treatment     Access Code: [email or web address]    Idalmis received the treatments listed below:       Idalmis received therapeutic exercises to R shoulder to develop strength, endurance, ROM, flexibility, and posture for 0 minutes including:      Idalmis received the following manual therapy techniques: Joint mobilizations were applied to the: R scapula and R shoulder for 10 minutes, including:  Scapular mobs, PROM R shoulder, GHJ inf/post mobs, oscillation,     Idalmis participated in neuromuscular re-education activities to improve: Balance, Coordination, Sense, Proprioception, and Posture for 15 minutes. The following activities were  included:  SL ER 0wt 4x10  Prone shld ext 4x10  Seated single arm CC pulldown 10# 5 x 8 np    NP:  Supine wand shld flexion 30x   SL shld abd 30x   Landmine press 0 wt 3 x 15      Idalmis participated in therapeutic activities to improve functional performance for 35 minutes, including:  Pulley shld flexion 5'   UBE completed for 10' to increase ROM, endurance, and decrease pain to improve tolerance to ADLs and age-related activities  AAROM Slot Machine 2# PVC 3x10  Pillowcase Slides at wall ABD/FLEX Plane 3 x 15  Shrugs 3# 4 x 8 np  Standing Rows GTB 3x10 np    Home Exercises and Patient Education Provided      Education provided:   - PT POC   - PT goals   - HEP  - Post-Op positioning, sling wear, protocol     Written Home Exercises Provided:   Exercises were reviewed and Idalmis was able to demonstrate them prior to the end of the session.   Pt received a written copy of exercises to perform at home. Idalmis demonstrated good understanding of the education provided.      See EMR under patient instructions for exercises given.     Assessment     s/p R rTSA and Biceps Tendosis 5/15/2024   Pt tolerating tx well. Continues to show improved PROM and progressing with OH AROM. Continue with periscapular/cuff  strengthening. VC/TC for correcting form/technique. Continue to progress per protocol.    Idalmis Is progressing well towards her goals.   Pt prognosis is Good.     Pt will continue to benefit from skilled outpatient physical therapy to address the deficits listed in the problem list box on initial evaluation, provide pt/family education and to maximize pt's level of independence in the home and community environment.     Pt's spiritual, cultural and educational needs considered and pt agreeable to plan of care and goals.    Anticipated barriers to physical therapy: Age    GOALS   Short Term Goals:  6 weeks  Pt will report decreased R shoulder pain  < / =  3/10  to increase tolerance for ADL performance and recreational  routine. MET  Pt will improve R shoulder ROM to WFL in order to be able to perform ADLs without difficulty. PROGRESSING  Pt will demonstrate tolerance to being out of the sling for 24 hours with pain <2/10. MET  Pt will demonstrate tolerance to HEP to improve independence with ADL's MET     Long Term Goals: 24 weeks  1. Pt will report decreased R shoulder pain  < / =  1/10  to increase tolerance for ADL performance and recreational routine  2. Pt will improve R shoulder ROM to WNL in order to be able to perform ADLs without difficulty  3. Pt will demonstrate at least 4+/5 of RTC muscle testing to demo improvement in tolerance to activity.  4. Pt will tolerate lifting 10# overhead to improve tolerance to ADLs and work-related activities.  5. Pt will report at CJ level (20-40% impaired) on FOTO Shoulder to demonstrate increase in LE function with every day tasks.     Plan     Continue per POC    Jean-Pierre Katz, PTA, STS

## 2024-08-15 ENCOUNTER — CLINICAL SUPPORT (OUTPATIENT)
Dept: REHABILITATION | Facility: HOSPITAL | Age: 74
End: 2024-08-15
Payer: MEDICARE

## 2024-08-15 DIAGNOSIS — M25.511 ACUTE PAIN OF RIGHT SHOULDER: Primary | ICD-10-CM

## 2024-08-15 PROCEDURE — 97112 NEUROMUSCULAR REEDUCATION: CPT | Mod: CQ

## 2024-08-15 PROCEDURE — 97530 THERAPEUTIC ACTIVITIES: CPT | Mod: CQ

## 2024-08-15 PROCEDURE — 97140 MANUAL THERAPY 1/> REGIONS: CPT | Mod: CQ

## 2024-08-15 NOTE — PROGRESS NOTES
Physical Therapy Daily Treatment Note     Name: Idalmis Morejon  Clinic Number: [id number]    Therapy Diagnosis:   Encounter Diagnosis   Name Primary?    Acute pain of right shoulder Yes     Physician: Jean-Pierre Barrera MD    Visit Date: 8/15/2024    Physician Orders: PT Eval and Treat  Medical Diagnosis from Referral:   M19.011 (ICD-10-CM) - Primary osteoarthritis of right shoulder   M75.101 (ICD-10-CM) - Nontraumatic tear of right rotator cuff, unspecified tear extent   Evaluation Date: 5/29/2024  Authorization Period Expiration: 12/31/2024  Plan of Care Expiration: 11/02/2024  Visit # / Visits authorized: 13/20 (+ EVAL)     FOTO: 8  FOTO 1st Follow-up: NA  FOTO 2nd Follow-up: NA     Time In: 0955  Time Out: 1045  Total Billable Time: 50 minutes     Precautions: Standard     POD: 12 weeks and 1 days as of 8/8/2024     Procedure Date: 05/15/2024  Procedure:  1. Right reverse total shoulder arthroplasty   2. Right shoulder biceps tenodesis     Subjective     Pt reports stiffness/min pain this morning   She was  compliant with home exercise program.  Response to previous treatment: no issues   Functional change: still with weakness OH and limited IR -behind back with ADL's     Pain: 4/10  Location: right shoulder      Objective     Passive Range of Motion: 7/30  Shoulder R   Flexion 160   Abduction 110   ER at 20 50   IR 45     Treatment     Access Code: [email or web address]    Idalmis received the treatments listed below:       Idalmis received therapeutic exercises to R shoulder to develop strength, endurance, ROM, flexibility, and posture for 0 minutes including:      Idalmis received the following manual therapy techniques: Joint mobilizations were applied to the: R scapula and R shoulder for 10 minutes, including:  Scapular mobs, PROM R shoulder, GHJ inf/post mobs, oscillation,     Idalmis participated in neuromuscular re-education activities to improve: Balance, Coordination, Sense, Proprioception, and Posture for 15 minutes.  "The following activities were included:  Standing wand IR 3x10/3"  OTB ER stepouts 30x   Prone shld ext 4x10    Seated single arm CC pulldown 10# 5 x 8 np    NP:  Supine wand shld flexion 30x   SL shld abd 30x   Landmine press 0 wt 3 x 15      Idalmis participated in therapeutic activities to improve functional performance for 25 minutes, including:  Pulley shld flexion 5'   UBE completed for 10' to increase ROM, endurance, and decrease pain to improve tolerance to ADLs and age-related activities  AAROM Slot Machine 3# 3x10  Pillowcase Slides at wall ABD/FLEX Plane 3 x 15  Shrugs 3# 4 x 8 np  Standing Rows GTB 3x10 np    Home Exercises and Patient Education Provided      Education provided:   - PT POC   - PT goals   - HEP  - Post-Op positioning, sling wear, protocol     Written Home Exercises Provided:   Exercises were reviewed and Idalmis was able to demonstrate them prior to the end of the session.   Pt received a written copy of exercises to perform at home. Idalmis demonstrated good understanding of the education provided.      See EMR under patient instructions for exercises given.     Assessment     s/p R rTSA and Biceps Tendosis 5/15/2024   Pt tolerating tx well. Continued progressing with OH AROM and IR with wand Continue with periscapular and cuff strengthening for improved use with ADL's.  VC/TC for correcting form/technique. Continue to progress per protocol.    Idalmis Is progressing well towards her goals.   Pt prognosis is Good.     Pt will continue to benefit from skilled outpatient physical therapy to address the deficits listed in the problem list box on initial evaluation, provide pt/family education and to maximize pt's level of independence in the home and community environment.     Pt's spiritual, cultural and educational needs considered and pt agreeable to plan of care and goals.    Anticipated barriers to physical therapy: Age    GOALS   Short Term Goals:  6 weeks  Pt will report decreased R " shoulder pain  < / =  3/10  to increase tolerance for ADL performance and recreational routine. MET  Pt will improve R shoulder ROM to WFL in order to be able to perform ADLs without difficulty. PROGRESSING  Pt will demonstrate tolerance to being out of the sling for 24 hours with pain <2/10. MET  Pt will demonstrate tolerance to HEP to improve independence with ADL's MET     Long Term Goals: 24 weeks  1. Pt will report decreased R shoulder pain  < / =  1/10  to increase tolerance for ADL performance and recreational routine  2. Pt will improve R shoulder ROM to WNL in order to be able to perform ADLs without difficulty  3. Pt will demonstrate at least 4+/5 of RTC muscle testing to demo improvement in tolerance to activity.  4. Pt will tolerate lifting 10# overhead to improve tolerance to ADLs and work-related activities.  5. Pt will report at CJ level (20-40% impaired) on FOTO Shoulder to demonstrate increase in LE function with every day tasks.     Plan     Continue per POC    Jean-Pierre Katz, PTA, STS

## 2024-08-20 ENCOUNTER — CLINICAL SUPPORT (OUTPATIENT)
Dept: REHABILITATION | Facility: HOSPITAL | Age: 74
End: 2024-08-20
Payer: MEDICARE

## 2024-08-20 DIAGNOSIS — M25.511 ACUTE PAIN OF RIGHT SHOULDER: Primary | ICD-10-CM

## 2024-08-20 PROCEDURE — 97112 NEUROMUSCULAR REEDUCATION: CPT | Mod: CQ

## 2024-08-20 PROCEDURE — 97530 THERAPEUTIC ACTIVITIES: CPT | Mod: CQ

## 2024-08-20 NOTE — PROGRESS NOTES
Physical Therapy Daily Treatment Note     Name: Idalmis Morejon  Clinic Number: [id number]    Therapy Diagnosis:   Encounter Diagnosis   Name Primary?    Acute pain of right shoulder Yes     Physician: Jean-Pierre Barrera MD    Visit Date: 8/20/2024    Physician Orders: PT Eval and Treat  Medical Diagnosis from Referral:   M19.011 (ICD-10-CM) - Primary osteoarthritis of right shoulder   M75.101 (ICD-10-CM) - Nontraumatic tear of right rotator cuff, unspecified tear extent   Evaluation Date: 5/29/2024  Authorization Period Expiration: 12/31/2024  Plan of Care Expiration: 11/02/2024  Visit # / Visits authorized: 13/20 (+ EVAL)     FOTO: 8  FOTO 1st Follow-up: NA  FOTO 2nd Follow-up: NA     Time In: 1000  Time Out: 1100  Total Billable Time: 30 minutes     Precautions: Standard     POD: 12 weeks and 1 days as of 8/8/2024     Procedure Date: 05/15/2024  Procedure:  1. Right reverse total shoulder arthroplasty   2. Right shoulder biceps tenodesis     Subjective     Pt reports painful all weekend, due to possibly sleeping on it. Stated has been sleeping in the recliner last couple days.   She was  compliant with home exercise program.  Response to previous treatment: soreness   Functional change: still with weakness OH and limited IR -behind back with ADL's     Pain: 7/10  Location: right shoulder      Objective     Passive Range of Motion: 7/30  Shoulder R   Flexion 160   Abduction 110   ER at 20 50   IR 45     Treatment     Access Code: [email or web address]    Idalmis received the treatments listed below:       Idalmis received therapeutic exercises to R shoulder to develop strength, endurance, ROM, flexibility, and posture for 0 minutes including:      Idalmis received the following manual therapy techniques: Joint mobilizations were applied to the: R scapula and R shoulder for 10 minutes, including:  Scapular mobs, PROM R shoulder, GHJ inf/post mobs, oscillation,     Idalmis participated in neuromuscular re-education activities to  "improve: Balance, Coordination, Sense, Proprioception, and Posture for 15 minutes. The following activities were included:  Standing wand IR 3x10/3"  OTB ER stepouts 3x10/3"  Prone shld ext 4x10    Seated single arm CC pulldown 10# 5 x 8 np    NP:  Supine wand shld flexion 30x   SL shld abd 30x   Landmine press 0 wt 3 x 15      Idalmis participated in therapeutic activities to improve functional performance for 25 minutes, including:  Pulley shld flexion 5'   UBE completed for 10' to increase ROM, endurance, and decrease pain to improve tolerance to ADLs and age-related activities  AAROM Slot Machine 3# 3x10  Pillowcase Slides at wall ABD/FLEX Plane 3 x 15  Shrugs 3# 4 x 8 np  Standing Rows GTB 3x10 np    Home Exercises and Patient Education Provided      Education provided:   - PT POC   - PT goals   - HEP  - Post-Op positioning, sling wear, protocol     Written Home Exercises Provided:   Exercises were reviewed and Idalmis was able to demonstrate them prior to the end of the session.   Pt received a written copy of exercises to perform at home. Idalmis demonstrated good understanding of the education provided.      See EMR under patient instructions for exercises given.     Assessment     s/p R rTSA and Biceps Tendosis 5/15/2024   Pt tolerating tx well with decreased initial soreness/pain level when arrived for tx today.  Continued progressing with OH AROM and IR with wand Continue with periscapular and cuff strengthening for improved use with ADL's.  VC/TC for correcting form/technique. Continue to progress per protocol.    Idalmis Is progressing well towards her goals.   Pt prognosis is Good.     Pt will continue to benefit from skilled outpatient physical therapy to address the deficits listed in the problem list box on initial evaluation, provide pt/family education and to maximize pt's level of independence in the home and community environment.     Pt's spiritual, cultural and educational needs considered and pt " agreeable to plan of care and goals.    Anticipated barriers to physical therapy: Age    GOALS   Short Term Goals:  6 weeks  Pt will report decreased R shoulder pain  < / =  3/10  to increase tolerance for ADL performance and recreational routine. MET  Pt will improve R shoulder ROM to WFL in order to be able to perform ADLs without difficulty. PROGRESSING  Pt will demonstrate tolerance to being out of the sling for 24 hours with pain <2/10. MET  Pt will demonstrate tolerance to HEP to improve independence with ADL's MET     Long Term Goals: 24 weeks  1. Pt will report decreased R shoulder pain  < / =  1/10  to increase tolerance for ADL performance and recreational routine  2. Pt will improve R shoulder ROM to WNL in order to be able to perform ADLs without difficulty  3. Pt will demonstrate at least 4+/5 of RTC muscle testing to demo improvement in tolerance to activity.  4. Pt will tolerate lifting 10# overhead to improve tolerance to ADLs and work-related activities.  5. Pt will report at CJ level (20-40% impaired) on FOTO Shoulder to demonstrate increase in LE function with every day tasks.     Plan     Continue per POC    Jean-Pierre Katz, PTA, STS

## 2024-08-22 ENCOUNTER — CLINICAL SUPPORT (OUTPATIENT)
Dept: REHABILITATION | Facility: HOSPITAL | Age: 74
End: 2024-08-22
Payer: MEDICARE

## 2024-08-22 DIAGNOSIS — M25.511 ACUTE PAIN OF RIGHT SHOULDER: Primary | ICD-10-CM

## 2024-08-22 PROCEDURE — 97112 NEUROMUSCULAR REEDUCATION: CPT | Mod: KX

## 2024-08-22 PROCEDURE — 97530 THERAPEUTIC ACTIVITIES: CPT | Mod: KX

## 2024-08-22 NOTE — PROGRESS NOTES
Physical Therapy Daily Treatment Note     Name: Idalmis Morejon  Clinic Number: [id number]    Therapy Diagnosis:   Encounter Diagnosis   Name Primary?    Acute pain of right shoulder Yes     Physician: Jean-Pierre Barrera MD    Visit Date: 8/22/2024    Physician Orders: PT Eval and Treat  Medical Diagnosis from Referral:   M19.011 (ICD-10-CM) - Primary osteoarthritis of right shoulder   M75.101 (ICD-10-CM) - Nontraumatic tear of right rotator cuff, unspecified tear extent   Evaluation Date: 5/29/2024  Authorization Period Expiration: 12/31/2024  Plan of Care Expiration: 11/02/2024  Visit # / Visits authorized: 17/20 (+ EVAL)     FOTO: 8  FOTO 1st Follow-up: NA  FOTO 2nd Follow-up: NA     Time In: 1000 am  Time Out: 1045 am (pt requested to leave early)  Total Billable Time: 45 minutes     Precautions: Standard     POD: 14 weeks and 1 days as of 8/22/2024     Procedure Date: 05/15/2024  Procedure:  1. Right reverse total shoulder arthroplasty   2. Right shoulder biceps tenodesis     Subjective     Pt reports her shoulder continues to feel very sore all day  She was  compliant with home exercise program.  Response to previous treatment: soreness   Functional change: still with weakness OH and limited IR -behind back with ADL's     Pain: 7/10  Location: right shoulder      Objective     Passive Range of Motion: 7/30  Shoulder R   Flexion 160   Abduction 110   ER at 20 50   IR 45     Treatment     Access Code: [email or web address]      Idalmis received the treatments listed below:      THERAPEUTIC EXERCISES to develop strength, endurance, ROM, flexibility, posture, and core stabilization for 00 minutes including:      MANUAL THERAPY TECHNIQUES including Joint mobilizations and Soft tissue Mobilization were applied to R shoulder for 00 minutes.  Scapular mobs, PROM R shoulder, GHJ inf/post mobs, oscillation,     NEUROMUSCULAR RE-EDUCATION ACTIVITIES to improve Balance, Coordination, Kinesthetic, Sense, Proprioception, and Posture for 15  "minutes.  The following were included:   SL shld abd 3 x 10  Supine wand shld flexion; 2# 2 x 10  Supine chest press with HOB elevated; 2#; 3 x 10  SL flex w/ dowel 3 x 10      NP Today:  Seated single arm CC pulldown 10# 5 x 8 np  Landmine press 0 wt 3 x 15  Prone shld ext 4x10  Standing wand IR 3x10/3"  OTB ER stepouts 3x10/3"    THERAPEUTIC ACTIVITIES to improve dynamic and functional performance for 30 minutes including.  UBE completed for 4' fwd/4'bkwd to increase ROM, endurance, and decrease pain to improve tolerance to ADLs and age-related activities  Standing Rows GTB 3 x 10 x 5"   Pulley shld flexion 5'   Shrugs 3# 3 x 10 x 5"     Functional reaching series: slot machine to 90 deg 20x; slot machine on chair 20x; uni wall slide 20x ----> add next    NP Today:  Pillowcase Slides at wall ABD/FLEX Plane 3 x 15    AAROM Slot Machine 3# 3x10      Home Exercises and Patient Education Provided      Education provided:   - PT POC   - PT goals   - HEP  - Post-Op positioning, sling wear, protocol     Written Home Exercises Provided:   Exercises were reviewed and Idalmis was able to demonstrate them prior to the end of the session.   Pt received a written copy of exercises to perform at home. Idalmis demonstrated good understanding of the education provided.      See EMR under patient instructions for exercises given.     Assessment     s/p R rTSA and Biceps Tendosis 5/15/2024     Idalmis is continuing to progress well with deltoid dominant movements in sidelying. She was very challenged by periscapular control in these exercises but improved with intermittent tactile cues. She will benefit continued strengthening as able.     Idalmis Is progressing well towards her goals.   Pt prognosis is Good.     Pt will continue to benefit from skilled outpatient physical therapy to address the deficits listed in the problem list box on initial evaluation, provide pt/family education and to maximize pt's level of independence in the " home and community environment.     Pt's spiritual, cultural and educational needs considered and pt agreeable to plan of care and goals.    Anticipated barriers to physical therapy: Age    GOALS   Short Term Goals:  6 weeks  Pt will report decreased R shoulder pain  < / =  3/10  to increase tolerance for ADL performance and recreational routine. MET  Pt will improve R shoulder ROM to WFL in order to be able to perform ADLs without difficulty. PROGRESSING  Pt will demonstrate tolerance to being out of the sling for 24 hours with pain <2/10. MET  Pt will demonstrate tolerance to HEP to improve independence with ADL's MET     Long Term Goals: 24 weeks  1. Pt will report decreased R shoulder pain  < / =  1/10  to increase tolerance for ADL performance and recreational routine  2. Pt will improve R shoulder ROM to WNL in order to be able to perform ADLs without difficulty  3. Pt will demonstrate at least 4+/5 of RTC muscle testing to demo improvement in tolerance to activity.  4. Pt will tolerate lifting 10# overhead to improve tolerance to ADLs and work-related activities.  5. Pt will report at CJ level (20-40% impaired) on FOTO Shoulder to demonstrate increase in LE function with every day tasks.     Plan     Continue per POC    Enid Hightower, PT, DPT, SCS

## 2024-08-23 ENCOUNTER — TELEPHONE (OUTPATIENT)
Dept: ADMINISTRATIVE | Facility: CLINIC | Age: 74
End: 2024-08-23
Payer: MEDICARE

## 2024-08-23 ENCOUNTER — PATIENT MESSAGE (OUTPATIENT)
Dept: ADMINISTRATIVE | Facility: CLINIC | Age: 74
End: 2024-08-23
Payer: MEDICARE

## 2024-08-26 ENCOUNTER — OFFICE VISIT (OUTPATIENT)
Dept: HOME HEALTH SERVICES | Facility: CLINIC | Age: 74
End: 2024-08-26
Payer: MEDICARE

## 2024-08-26 VITALS — BODY MASS INDEX: 25.71 KG/M2 | HEIGHT: 66 IN | WEIGHT: 160 LBS

## 2024-08-26 DIAGNOSIS — H40.63X0 STEROID-INDUCED GLAUCOMA OF BOTH EYES: ICD-10-CM

## 2024-08-26 DIAGNOSIS — H25.13 NUCLEAR SCLEROSIS OF BOTH EYES: ICD-10-CM

## 2024-08-26 DIAGNOSIS — I77.1 TORTUOUS AORTA: ICD-10-CM

## 2024-08-26 DIAGNOSIS — T38.0X5A STEROID-INDUCED GLAUCOMA OF BOTH EYES: ICD-10-CM

## 2024-08-26 DIAGNOSIS — K21.00 GASTROESOPHAGEAL REFLUX DISEASE WITH ESOPHAGITIS WITHOUT HEMORRHAGE: ICD-10-CM

## 2024-08-26 DIAGNOSIS — M81.0 AGE-RELATED OSTEOPOROSIS WITHOUT CURRENT PATHOLOGICAL FRACTURE: ICD-10-CM

## 2024-08-26 DIAGNOSIS — N25.81 SECONDARY HYPERPARATHYROIDISM: ICD-10-CM

## 2024-08-26 DIAGNOSIS — M35.9 UNDIFFERENTIATED CONNECTIVE TISSUE DISEASE: ICD-10-CM

## 2024-08-26 DIAGNOSIS — Z00.00 ENCOUNTER FOR PREVENTIVE HEALTH EXAMINATION: Primary | ICD-10-CM

## 2024-08-26 DIAGNOSIS — D89.89 AUTOIMMUNE DISORDER: ICD-10-CM

## 2024-08-26 DIAGNOSIS — N18.32 STAGE 3B CHRONIC KIDNEY DISEASE: ICD-10-CM

## 2024-08-26 DIAGNOSIS — I10 ESSENTIAL HYPERTENSION: ICD-10-CM

## 2024-08-26 DIAGNOSIS — F33.41 RECURRENT MAJOR DEPRESSIVE DISORDER, IN PARTIAL REMISSION: ICD-10-CM

## 2024-08-26 PROCEDURE — G0439 PPPS, SUBSEQ VISIT: HCPCS | Mod: 95,,, | Performed by: NURSE PRACTITIONER

## 2024-08-26 PROCEDURE — 1159F MED LIST DOCD IN RCRD: CPT | Mod: CPTII,95,, | Performed by: NURSE PRACTITIONER

## 2024-08-26 PROCEDURE — 3288F FALL RISK ASSESSMENT DOCD: CPT | Mod: CPTII,95,, | Performed by: NURSE PRACTITIONER

## 2024-08-26 PROCEDURE — 1160F RVW MEDS BY RX/DR IN RCRD: CPT | Mod: CPTII,95,, | Performed by: NURSE PRACTITIONER

## 2024-08-26 PROCEDURE — 1101F PT FALLS ASSESS-DOCD LE1/YR: CPT | Mod: CPTII,95,, | Performed by: NURSE PRACTITIONER

## 2024-08-26 PROCEDURE — 1125F AMNT PAIN NOTED PAIN PRSNT: CPT | Mod: CPTII,95,, | Performed by: NURSE PRACTITIONER

## 2024-08-26 PROCEDURE — 1158F ADVNC CARE PLAN TLK DOCD: CPT | Mod: CPTII,95,, | Performed by: NURSE PRACTITIONER

## 2024-08-26 PROCEDURE — 1170F FXNL STATUS ASSESSED: CPT | Mod: CPTII,95,, | Performed by: NURSE PRACTITIONER

## 2024-08-26 NOTE — PATIENT INSTRUCTIONS
Counseling and Referral of Other Preventative  (Italic type indicates deductible and co-insurance are waived)    Patient Name: Idalmis Morejon  Today's Date: 8/26/2024    Health Maintenance       Date Due Completion Date    Shingles Vaccine (1 of 2) Never done ---    RSV Vaccine (Age 60+ and Pregnant patients) (1 - 1-dose 60+ series) Never done ---    COVID-19 Vaccine (5 - 2023-24 season) 04/04/2024 2/8/2024    Influenza Vaccine (1) 09/01/2024 3/12/2024 (Declined)    Override on 3/12/2024: Declined    Override on 3/12/2020: Declined    Mammogram 03/13/2025 3/13/2024    Override on 3/26/2012: Done    Hemoglobin A1c (Prediabetes) 04/08/2025 4/8/2024    DEXA Scan 07/24/2025 7/24/2023    TETANUS VACCINE 02/03/2026 2/3/2016    Lipid Panel 04/08/2029 4/8/2024        No orders of the defined types were placed in this encounter.    The following information is provided to all patients.  This information is to help you find resources for any of the problems found today that may be affecting your health:                  Living healthy guide: www.[facility].[place].gov      Understanding Diabetes: www.diabetes.org      Eating healthy: www.cdc.gov/healthyweight      [facility] [place] home safety checklist: www.cdc.gov/steadi/patient.html      Agency on Aging: www.goea.[place].[facility]      Alcoholics anonymous (AA): www.aa.org      Physical Activity: www.dago.nih.gov/bl4fzni      Tobacco use: www.quitwithusla.org         
no

## 2024-08-26 NOTE — PROGRESS NOTES
"The patient location is: [place]  The chief complaint leading to consultation is: Health Risk Assessment    Visit type: audiovisual    Face to Face time with patient: 20  40 minutes of total time spent on the encounter, which includes face to face time and non-face to face time preparing to see the patient (eg, review of tests), Obtaining and/or reviewing separately obtained history, Documenting clinical information in the electronic or other health record, Independently interpreting results (not separately reported) and communicating results to the patient/family/caregiver, or Care coordination (not separately reported).         Each patient to whom he or she provides medical services by telemedicine is:  (1) informed of the relationship between the physician and patient and the respective role of any other health care provider with respect to management of the patient; and (2) notified that he or she may decline to receive medical services by telemedicine and may withdraw from such care at any time.    Notes:       Idalmis Morejon presented for a follow-up Medicare AWV today. The following components were reviewed and updated:    Medical history  Family History  Social history  Allergies and Current Medications  Health Risk Assessment  Health Maintenance  Care Team    **See Completed Assessments for Annual Wellness visit with in the encounter summary    The following assessments were completed:  Depression Screening  Cognitive function Screening  Timed Get Up Test  Whisper Test      Opioid documentation:      Patient does not have a current opioid prescription.          Vitals:    08/26/24 1059   Weight: 72.6 kg (160 lb)   Height: 5' 6" (1.676 m)     Body mass index is 25.82 kg/m².       Physical Exam  Constitutional:       Appearance: Normal appearance.   Neurological:      General: No focal deficit present.      Mental Status: She is alert and oriented to person, place, and time.           Diagnoses and health " risks identified today and associated recommendations/orders:  1. Encounter for preventive health examination  Assessments completed. Preventive measures, health maintenance, and immunizations reviewed with patient.    2. Stage 3b chronic kidney disease  Stable, followed by PCP and Nephrology.    3. Secondary hyperparathyroidism  Stable, followed by PCP and Nephrology.    4. Tortuous aorta; on CT 12/10 and CXR 7/20: CT SCORE 0 1/22  Stable, followed by PCP.    5. Undifferentiated connective tissue disease  Stable, followed by PCP.    6. Autoimmune disorder- recurrent iritis  Stable, followed by Optometry.    7. Steroid-induced glaucoma of both eyes  Stable, patient on Timolol eye drops. Followed by Optometry.    8. Nuclear sclerosis of both eyes  Stable, followed by Optometry.    9. Recurrent major depressive disorder, in partial remission  Stable, patient on Cymbalta. Followed by PCP.    10. Essential hypertension  Stable, patient on Amlodipine, Labetalol, and Valsartan. Followed by PCP.    11. Gastroesophageal reflux disease with esophagitis without hemorrhage  Stable, patient on Protonix. Followed by PCP.    12. Age-related osteoporosis 2016; also 4/21, 7/23  Stable, patient on Vitamin D3. Followed by PCP.      Provided Idalmis with a 5-10 year written screening schedule and personal prevention plan. Recommendations were developed using the USPSTF age appropriate recommendations. Education, counseling, and referrals were provided as needed.  After Visit Summary printed and given to patient which includes a list of additional screenings\tests needed.    Follow up in about 1 year (around 8/26/2025) for your next annual wellness visit.      Joann Gold NP  I offered to discuss advanced care planning, including how to pick a person who would make decisions for you if you were unable to make them for yourself, called a health care power of , and what kind of decisions you might make such as use of life  sustaining treatments such as ventilators and tube feeding when faced with a life limiting illness recorded on a living will that they will need to know. (How you want to be cared for as you near the end of your natural life)     X Patient is interested in learning more about how to make advanced directives.  I provided them paperwork and offered to discuss this with them.

## 2024-08-27 ENCOUNTER — CLINICAL SUPPORT (OUTPATIENT)
Dept: REHABILITATION | Facility: HOSPITAL | Age: 74
End: 2024-08-27
Payer: MEDICARE

## 2024-08-27 DIAGNOSIS — M25.511 ACUTE PAIN OF RIGHT SHOULDER: Primary | ICD-10-CM

## 2024-08-27 PROCEDURE — 97112 NEUROMUSCULAR REEDUCATION: CPT | Mod: CQ

## 2024-08-27 PROCEDURE — 97530 THERAPEUTIC ACTIVITIES: CPT | Mod: CQ

## 2024-08-27 NOTE — PROGRESS NOTES
Physical Therapy Daily Treatment Note     Name: Idalmis Morejon  Clinic Number: [id number]    Therapy Diagnosis:   Encounter Diagnosis   Name Primary?    Acute pain of right shoulder Yes     Physician: Jean-Pierre Barrera MD    Visit Date: 8/27/2024    Physician Orders: PT Eval and Treat  Medical Diagnosis from Referral:   M19.011 (ICD-10-CM) - Primary osteoarthritis of right shoulder   M75.101 (ICD-10-CM) - Nontraumatic tear of right rotator cuff, unspecified tear extent   Evaluation Date: 5/29/2024  Authorization Period Expiration: 12/31/2024  Plan of Care Expiration: 11/02/2024  Visit # / Visits authorized: 17/20 (+ EVAL)     FOTO: 8  FOTO 1st Follow-up: NA  FOTO 2nd Follow-up: NA     Time In: 0955  Time Out: 1055  Total Billable Time:      Precautions: Standard     POD: 14 weeks and 1 days as of 8/22/2024     Procedure Date: 05/15/2024  Procedure:  1. Right reverse total shoulder arthroplasty   2. Right shoulder biceps tenodesis     Subjective     Pt reports feeling better then last week   She was  compliant with home exercise program.  Response to previous treatment: soreness   Functional change: still with weakness OH and limited IR -behind back with ADL's     Pain: 3/10  Location: right shoulder      Objective     Passive Range of Motion: 7/30  Shoulder R   Flexion 160   Abduction 110   ER at 20 50   IR 45     Treatment     Access Code: [email or web address]      Idalmis received the treatments listed below:      THERAPEUTIC EXERCISES to develop strength, endurance, ROM, flexibility, posture, and core stabilization for 00 minutes including:      MANUAL THERAPY TECHNIQUES including Joint mobilizations and Soft tissue Mobilization were applied to R shoulder for 00 minutes.  Scapular mobs, PROM R shoulder, GHJ inf/post mobs, oscillation,     NEUROMUSCULAR RE-EDUCATION ACTIVITIES to improve Balance, Coordination, Kinesthetic, Sense, Proprioception, and Posture for 25 minutes.  The following were included:   Standing IR w/strap  "30x/5"  SL shld abd 3 x 10  Supine wand shld flexion; 2# 2 x 10  Supine chest press with HOB elevated; 2#; 3 x 10  SL flex w/ dowel 3 x 10      NP Today:  Seated single arm CC pulldown 10# 5 x 8 np  Landmine press 0 wt 3 x 15  Prone shld ext 4x10  Standing wand IR 3x10/3"  OTB ER stepouts 3x10/3"    THERAPEUTIC ACTIVITIES to improve dynamic and functional performance for 30 minutes including.  UBE completed for 4' fwd/4'bkwd to increase ROM, endurance, and decrease pain to improve tolerance to ADLs and age-related activities  Standing Rows GTB 3 x 10 x 5"   Pulley shld flexion 5'   Shrugs 3# 3 x 10 x 5"     Functional reaching series: slot machine to 90 deg 20x; slot machine on chair 20x; uni wall slide 20x ----> add next    NP Today:  Pillowcase Slides at wall ABD/FLEX Plane 3 x 15    AAROM Slot Machine 3# 3x10      Home Exercises and Patient Education Provided      Education provided:   - PT POC   - PT goals   - HEP  - Post-Op positioning, sling wear, protocol     Written Home Exercises Provided:   Exercises were reviewed and Idalmis was able to demonstrate them prior to the end of the session.   Pt received a written copy of exercises to perform at home. Idalmis demonstrated good understanding of the education provided.      See EMR under patient instructions for exercises given.     Assessment     s/p R rTSA and Biceps Tendosis 5/15/2024     Idalmis is continuing to progress well with deltoid dominant movements in sidelying. She was very challenged by periscapular control in these exercises but improved with intermittent tactile cues. She will benefit continued strengthening as able.     Idalmis Is progressing well towards her goals.   Pt prognosis is Good.     Pt will continue to benefit from skilled outpatient physical therapy to address the deficits listed in the problem list box on initial evaluation, provide pt/family education and to maximize pt's level of independence in the home and community environment. "     Pt's spiritual, cultural and educational needs considered and pt agreeable to plan of care and goals.    Anticipated barriers to physical therapy: Age    GOALS   Short Term Goals:  6 weeks  Pt will report decreased R shoulder pain  < / =  3/10  to increase tolerance for ADL performance and recreational routine. MET  Pt will improve R shoulder ROM to WFL in order to be able to perform ADLs without difficulty. PROGRESSING  Pt will demonstrate tolerance to being out of the sling for 24 hours with pain <2/10. MET  Pt will demonstrate tolerance to HEP to improve independence with ADL's MET     Long Term Goals: 24 weeks  1. Pt will report decreased R shoulder pain  < / =  1/10  to increase tolerance for ADL performance and recreational routine  2. Pt will improve R shoulder ROM to WNL in order to be able to perform ADLs without difficulty  3. Pt will demonstrate at least 4+/5 of RTC muscle testing to demo improvement in tolerance to activity.  4. Pt will tolerate lifting 10# overhead to improve tolerance to ADLs and work-related activities.  5. Pt will report at CJ level (20-40% impaired) on FOTO Shoulder to demonstrate increase in LE function with every day tasks.     Plan     Continue per POC    Jean-Pierre Katz, PTA, STS

## 2024-08-28 ENCOUNTER — TELEPHONE (OUTPATIENT)
Facility: CLINIC | Age: 74
End: 2024-08-28
Payer: MEDICARE

## 2024-08-28 NOTE — TELEPHONE ENCOUNTER
Cancelling order for CKD Basic Education due to frequent cancellation of education appointments. Notified provider. Please resend referral in the future if patient expresses interest in attending.    : Kidney Disease Education History  Order Specific Questions   Reason for Referral:   CKD: Basic Education            CKD Stages:   CKD stages 1-3 (not covered by CMS/INS)            Request History   Action Date and Time User Details      Request Created 01/09/2024 09:43 Mercy Maldonado NP Appointment Encounter, Details      Appointment Linked 01/26/2024 08:56 Nasrin Bey, ELINA EDUCATION CLASS on 3/28/2024 at 10:00 AM (Canceled)      Appointment Canceled 03/22/2024 13:06 Myochsner, System Message Reason: Appt Time No Longer Works  EDUCATION CLASS on 3/28/2024 at 10:00 AM      Appointment Linked 04/09/2024 14:21 Nasrin Bey RN EDUCATION CLASS on 5/23/2024 at 10:00 AM (Canceled)      Appointment Canceled 05/17/2024 11:09 Maria Luisa Zhu MA Reason: Patient Rescheduled  EDUCATION CLASS on 5/23/2024 at 10:00 AM      Appointment Rescheduled 05/17/2024 11:09 Maria Luisa Zhu MA EDUCATION CLASS on 7/25/2024 at 10:00 AM (Canceled)      Appointment Canceled 06/03/2024 11:34 Lucy Maurice Reason: Other  EDUCATION CLASS on 7/25/2024 at 10:00 AM      Appointment Rescheduled 06/03/2024 11:34 Lucy Maurice EDUCATION CLASS on 7/25/2024 at 10:00 AM (Canceled)      Appointment Canceled 07/24/2024 13:34 Myochsner, System Message Reason: Appt Time No Longer Works  EDUCATION CLASS on 7/25/2024 at 10:00 AM      Appointment Scheduled 07/24/2024 15:06 Lucy Maurice EDUCATION CLASS on 8/29/2024 at 10:00 AM (Canceled)      Appointment Canceled 08/27/2024 12:47 Myochsner, System Message Reason: Appt Time No Longer Works  EDUCATION CLASS on 8/29/2024 at 10:00 AM

## 2024-09-03 ENCOUNTER — CLINICAL SUPPORT (OUTPATIENT)
Dept: REHABILITATION | Facility: HOSPITAL | Age: 74
End: 2024-09-03
Payer: MEDICARE

## 2024-09-03 DIAGNOSIS — M25.511 ACUTE PAIN OF RIGHT SHOULDER: Primary | ICD-10-CM

## 2024-09-03 PROCEDURE — 97530 THERAPEUTIC ACTIVITIES: CPT | Mod: CQ

## 2024-09-03 PROCEDURE — 97140 MANUAL THERAPY 1/> REGIONS: CPT | Mod: CQ

## 2024-09-03 PROCEDURE — 97112 NEUROMUSCULAR REEDUCATION: CPT | Mod: CQ

## 2024-09-03 NOTE — PROGRESS NOTES
Physical Therapy Daily Treatment Note     Name: Idalmis Morejon  Clinic Number: [id number]    Therapy Diagnosis:   Encounter Diagnosis   Name Primary?    Acute pain of right shoulder Yes     Physician: Jean-Pierre Barrera MD    Visit Date: 9/3/2024    Physician Orders: PT Eval and Treat  Medical Diagnosis from Referral:   M19.011 (ICD-10-CM) - Primary osteoarthritis of right shoulder   M75.101 (ICD-10-CM) - Nontraumatic tear of right rotator cuff, unspecified tear extent   Evaluation Date: 5/29/2024  Authorization Period Expiration: 12/31/2024  Plan of Care Expiration: 11/02/2024  Visit # / Visits authorized: 17/20 (+ EVAL)     FOTO: 8  FOTO 1st Follow-up: NA  FOTO 2nd Follow-up: NA     Time In: 0955  Time Out: 1055  Total Billable Time:      Precautions: Standard     POD: 14 weeks and 1 days as of 8/22/2024     Procedure Date: 05/15/2024  Procedure:  1. Right reverse total shoulder arthroplasty   2. Right shoulder biceps tenodesis     Subjective     Pt reports shld feeling good my stomach is a little upset this morning  She was  compliant with home exercise program.  Response to previous treatment: soreness   Functional change: still with weakness OH and limited IR -but progressing     Pain: 3/10  Location: right shoulder      Objective     Passive Range of Motion: 7/30  Shoulder R   Flexion 160   Abduction 110   ER at 20 50   IR 45     Treatment     Access Code: [email or web address]      Idalmis received the treatments listed below:      THERAPEUTIC EXERCISES to develop strength, endurance, ROM, flexibility, posture, and core stabilization for 00 minutes including:      MANUAL THERAPY TECHNIQUES including Joint mobilizations and Soft tissue Mobilization were applied to R shoulder for 10 minutes.  Scapular mobs, PROM R shoulder, GHJ inf/post mobs, oscillation,     NEUROMUSCULAR RE-EDUCATION ACTIVITIES to improve Balance, Coordination, Kinesthetic, Sense, Proprioception, and Posture for 20 minutes.  The following were included:  "  Standing IR w/strap 30x/5"  SL shld abd 1# 3x10  Supine wand shld flexion; 3# 2 x 10      Supine chest press with HOB elevated; 2#; 3 x 10   SL flex w/ dowel 3 x 10      NP Today:  Seated single arm CC pulldown 10# 5 x 8 np  Landmine press 0 wt 3 x 15  Prone shld ext 4x10  Standing wand IR 3x10/3"  OTB ER stepouts 3x10/3"    THERAPEUTIC ACTIVITIES to improve dynamic and functional performance for 30 minutes including.  UBE completed for 4' fwd/4'bkwd to increase ROM, endurance, and decrease pain to improve tolerance to ADLs and age-related activities Lv 3.5   Slot machine 1# 30x  Seated slot machine 1# 30x       Standing Rows GTB 3 x 10 x 5"   Pulley shld flexion 5'   Shrugs 3# 3 x 10 x 5"       NP Today:  Pillowcase Slides at wall ABD/FLEX Plane 3 x 15    AAROM Slot Machine 3# 3x10      Home Exercises and Patient Education Provided      Education provided:   - PT POC   - PT goals   - HEP  - Post-Op positioning, sling wear, protocol     Written Home Exercises Provided:   Exercises were reviewed and Idalmis was able to demonstrate them prior to the end of the session.   Pt received a written copy of exercises to perform at home. Idalmis demonstrated good understanding of the education provided.      See EMR under patient instructions for exercises given.     Assessment     s/p R rTSA and Biceps Tendosis 5/15/2024   Pt tolerating tx well. Continued with periscapular and and delt strengthening along continued progression in OH and IR ROM. Tolerating increased wt with several exercise and OH activity.  VC/TC for correcting form/technique. Continue to progress as tolerated.     Idalmis Is progressing well towards her goals.   Pt prognosis is Good.     Pt will continue to benefit from skilled outpatient physical therapy to address the deficits listed in the problem list box on initial evaluation, provide pt/family education and to maximize pt's level of independence in the home and community environment.     Pt's " spiritual, cultural and educational needs considered and pt agreeable to plan of care and goals.    Anticipated barriers to physical therapy: Age    GOALS   Short Term Goals:  6 weeks  Pt will report decreased R shoulder pain  < / =  3/10  to increase tolerance for ADL performance and recreational routine. MET  Pt will improve R shoulder ROM to WFL in order to be able to perform ADLs without difficulty. PROGRESSING  Pt will demonstrate tolerance to being out of the sling for 24 hours with pain <2/10. MET  Pt will demonstrate tolerance to HEP to improve independence with ADL's MET     Long Term Goals: 24 weeks  1. Pt will report decreased R shoulder pain  < / =  1/10  to increase tolerance for ADL performance and recreational routine  2. Pt will improve R shoulder ROM to WNL in order to be able to perform ADLs without difficulty  3. Pt will demonstrate at least 4+/5 of RTC muscle testing to demo improvement in tolerance to activity.  4. Pt will tolerate lifting 10# overhead to improve tolerance to ADLs and work-related activities.  5. Pt will report at CJ level (20-40% impaired) on FOTO Shoulder to demonstrate increase in LE function with every day tasks.     Plan     Continue per POC    Jean-Pierre Katz, PTA, STS

## 2024-09-14 DIAGNOSIS — I10 ESSENTIAL HYPERTENSION: ICD-10-CM

## 2024-09-15 NOTE — TELEPHONE ENCOUNTER
Refill Routing Note   Medication(s) are not appropriate for processing by [facility] for the following reason(s):        Required vitals abnormal  Required labs abnormal    [facility] action(s):  Defer             Appointments  past 12m or future 3m with PCP    Date Provider   Last Visit   5/7/2024 Noemy Pugh MD   Next Visit   3/10/2025 Noemy Pugh MD   ED visits in past 90 days: 0        Note composed:1:38 PM 09/15/2024

## 2024-09-15 NOTE — TELEPHONE ENCOUNTER
No care due was identified.  [facility] Embedded Care Due Messages. Reference number: [id number].   9/15/2024 1:33:11 PM CDT

## 2024-09-16 RX ORDER — VALSARTAN 160 MG/1
320 TABLET ORAL DAILY
Qty: 180 TABLET | Refills: 3 | Status: SHIPPED | OUTPATIENT
Start: 2024-09-16 | End: 2025-09-16

## 2024-09-17 ENCOUNTER — CLINICAL SUPPORT (OUTPATIENT)
Dept: REHABILITATION | Facility: HOSPITAL | Age: 74
End: 2024-09-17
Payer: MEDICARE

## 2024-09-17 DIAGNOSIS — M25.511 ACUTE PAIN OF RIGHT SHOULDER: Primary | ICD-10-CM

## 2024-09-17 PROCEDURE — 97530 THERAPEUTIC ACTIVITIES: CPT | Performed by: PHYSICAL THERAPIST

## 2024-09-17 PROCEDURE — 97112 NEUROMUSCULAR REEDUCATION: CPT | Performed by: PHYSICAL THERAPIST

## 2024-09-17 NOTE — PROGRESS NOTES
"Physical Therapy Daily Treatment Note     Name: Idalmis Morejon  Clinic Number: [id number]    Therapy Diagnosis:   Encounter Diagnosis   Name Primary?    Acute pain of right shoulder Yes     Physician: Jean-Pierre Barrera MD    Visit Date: 9/17/2024    Physician Orders: PT Eval and Treat  Medical Diagnosis from Referral:   M19.011 (ICD-10-CM) - Primary osteoarthritis of right shoulder   M75.101 (ICD-10-CM) - Nontraumatic tear of right rotator cuff, unspecified tear extent   Evaluation Date: 5/29/2024  Authorization Period Expiration: 12/31/2024  Plan of Care Expiration: 11/02/2024  Visit # / Visits authorized: 20/30 (+ EVAL)     FOTO: 8  FOTO 1st Follow-up: NA  FOTO 2nd Follow-up: NA     Time In: 0930  Time Out: 1030  Total Billable Time: 60 minutes     Precautions: Standard     POD: 17 weeks and 6 days as of 9/17/2024     Procedure Date: 05/15/2024  Procedure:  1. Right reverse total shoulder arthroplasty   2. Right shoulder biceps tenodesis     Subjective     Pt reports shoulder stiffness present this morning. Has been having trouble getting to appointments, so has not been as pleased with her progress.  She was  compliant with home exercise program.  Response to previous treatment: soreness   Functional change: still with weakness OH and limited IR -but progressing     Pain: 3/10  Location: right shoulder      Objective     Passive Range of Motion: 7/30  Shoulder R   Flexion 160   Abduction 110   ER at 20 50   IR 45     Treatment     Access Code: [email or web address]    Idalmis received the treatments listed below:      Neuromuscular re-education activities to improve: Balance, Coordination, Kinesthetic, Sense, Proprioception, and Posture for 30 minutes. The following activities were included:  Standing IR w/strap 30x/5"  SL shld abd 1# 3x10  Supine wand shld flexion; 3# 2 x 10  Supine chest press with HOB elevated; 2#; 3 x 10   SL flex w/ dowel 3 x 10      NP Today:  Seated single arm CC pulldown 10# 5 x 8 np  Landmine press 0 " "wt 3 x 15  Prone shld ext 4x10  Standing wand IR 3x10/3"  OTB ER stepouts 3x10/3"    Dynamic functional Therapeutic activities to improve functional performance for 30 minutes, including:  UBE completed for 4' fwd/4'bkwd to increase ROM, endurance, and decrease pain to improve tolerance to ADLs and age-related activities Lv 3.5   Slot machine 1# 30x  Seated slot machine 1# 30x     NP:  Standing Rows GTB 3 x 10 x 5"   Pulley shld flexion 5'   Shrugs 3# 3 x 10 x 5"       NP Today:  Pillowcase Slides at wall ABD/FLEX Plane 3 x 15    AAROM Slot Machine 3# 3x10      Home Exercises and Patient Education Provided      Education provided:   - PT POC   - PT goals   - HEP  - Post-Op positioning, sling wear, protocol     Written Home Exercises Provided:   Exercises were reviewed and Idalmis was able to demonstrate them prior to the end of the session.   Pt received a written copy of exercises to perform at home. Idalmis demonstrated good understanding of the education provided.      See EMR under patient instructions for exercises given.     Assessment     s/p R rTSA and Biceps Tendosis 5/15/2024     Improved R shoulder AROM since last session. Worked in more t-sp mobility. Progressed scapular endurance-based interventions to good pt tolerance.    Idalmis Is progressing well towards her goals.   Pt prognosis is Good.     Pt will continue to benefit from skilled outpatient physical therapy to address the deficits listed in the problem list box on initial evaluation, provide pt/family education and to maximize pt's level of independence in the home and community environment.     Pt's spiritual, cultural and educational needs considered and pt agreeable to plan of care and goals.    Anticipated barriers to physical therapy: Age    GOALS   Short Term Goals:  6 weeks  Pt will report decreased R shoulder pain  < / =  3/10  to increase tolerance for ADL performance and recreational routine. MET  Pt will improve R shoulder ROM to WFL in " order to be able to perform ADLs without difficulty. PROGRESSING  Pt will demonstrate tolerance to being out of the sling for 24 hours with pain <2/10. MET  Pt will demonstrate tolerance to HEP to improve independence with ADL's MET     Long Term Goals: 24 weeks  1. Pt will report decreased R shoulder pain  < / =  1/10  to increase tolerance for ADL performance and recreational routine  2. Pt will improve R shoulder ROM to WNL in order to be able to perform ADLs without difficulty  3. Pt will demonstrate at least 4+/5 of RTC muscle testing to demo improvement in tolerance to activity.  4. Pt will tolerate lifting 10# overhead to improve tolerance to ADLs and work-related activities.  5. Pt will report at CJ level (20-40% impaired) on FOTO Shoulder to demonstrate increase in LE function with every day tasks.     Plan     Continue per PALLAVI Rossi, PT, DPT

## 2024-09-20 ENCOUNTER — OFFICE VISIT (OUTPATIENT)
Dept: PSYCHIATRY | Facility: CLINIC | Age: 74
End: 2024-09-20
Payer: MEDICARE

## 2024-09-20 DIAGNOSIS — F33.41 RECURRENT MAJOR DEPRESSIVE DISORDER, IN PARTIAL REMISSION: ICD-10-CM

## 2024-09-20 RX ORDER — DULOXETIN HYDROCHLORIDE 60 MG/1
60 CAPSULE, DELAYED RELEASE ORAL DAILY
Qty: 90 CAPSULE | Refills: 0 | Status: SHIPPED | OUTPATIENT
Start: 2024-09-20
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
## 2024-09-26 ENCOUNTER — CLINICAL SUPPORT (OUTPATIENT)
Dept: REHABILITATION | Facility: HOSPITAL | Age: 74
End: 2024-09-26
Payer: MEDICARE

## 2024-09-26 DIAGNOSIS — M25.511 ACUTE PAIN OF RIGHT SHOULDER: Primary | ICD-10-CM

## 2024-09-26 PROCEDURE — 97112 NEUROMUSCULAR REEDUCATION: CPT | Performed by: PHYSICAL THERAPIST

## 2024-09-26 PROCEDURE — 97530 THERAPEUTIC ACTIVITIES: CPT | Performed by: PHYSICAL THERAPIST

## 2024-09-26 NOTE — PROGRESS NOTES
"Physical Therapy Daily Treatment Note     Name: Idalmis Morejon  Clinic Number: [id number]    Therapy Diagnosis:   Encounter Diagnosis   Name Primary?    Acute pain of right shoulder Yes     Physician: Jean-Pierre Barrera MD    Visit Date: 9/26/2024    Physician Orders: PT Eval and Treat  Medical Diagnosis from Referral:   M19.011 (ICD-10-CM) - Primary osteoarthritis of right shoulder   M75.101 (ICD-10-CM) - Nontraumatic tear of right rotator cuff, unspecified tear extent   Evaluation Date: 5/29/2024  Authorization Period Expiration: 12/31/2024  Plan of Care Expiration: 11/02/2024  Visit # / Visits authorized: 21/30 (+ EVAL)     FOTO: 8  FOTO 1st Follow-up: NA  FOTO 2nd Follow-up: NA     Time In: 0900  Time Out: 1000  Total Billable Time: 60 minutes     Precautions: Standard     POD: 19 weeks and 1 days as of 9/26/2024     Procedure Date: 05/15/2024  Procedure:  1. Right reverse total shoulder arthroplasty   2. Right shoulder biceps tenodesis     Subjective     Pt reports no new issues. Feels like she can do all her household cleaning.  She was  compliant with home exercise program.  Response to previous treatment: soreness   Functional change: still with weakness OH and limited IR -but progressing     Pain: 3/10  Location: right shoulder      Objective     Passive Range of Motion: 7/30  Shoulder R   Flexion 160   Abduction 110   ER at 20 50   IR 45     Treatment     Access Code: [email or web address]    Idalmis received the treatments listed below:      Neuromuscular re-education activities to improve: Balance, Coordination, Kinesthetic, Sense, Proprioception, and Posture for 30 minutes. The following activities were included:  Standing IR w/strap 30x/5"  SL shld abd 1# 3x10  Supine wand shld flexion; 3# 2 x 10  Supine chest press with HOB elevated; 2#; 3 x 10   SL flex w/ dowel 3 x 10      NP Today:  Seated single arm CC pulldown 10# 5 x 8 np  Landmine press 0 wt 3 x 15  Prone shld ext 4x10  Standing wand IR 3x10/3"  OTB ER " "stepouts 3x10/3"    Dynamic functional Therapeutic activities to improve functional performance for 30 minutes, including:  UBE completed for 4' fwd/4'bkwd to increase ROM, endurance, and decrease pain to improve tolerance to ADLs and age-related activities Lv 3.5   Slot machine 1# 30x  Seated slot machine 1# 30x     NP:  Standing Rows GTB 3 x 10 x 5"   Pulley shld flexion 5'   Shrugs 3# 3 x 10 x 5"       NP Today:  Pillowcase Slides at wall ABD/FLEX Plane 3 x 15    AAROM Slot Machine 3# 3x10      Home Exercises and Patient Education Provided      Education provided:   - PT POC   - PT goals   - HEP  - Post-Op positioning, sling wear, protocol     Written Home Exercises Provided:   Exercises were reviewed and Idalmis was able to demonstrate them prior to the end of the session.   Pt received a written copy of exercises to perform at home. Idalmis demonstrated good understanding of the education provided.      See EMR under patient instructions for exercises given.     Assessment     s/p R rTSA and Biceps Tendosis 5/15/2024     Continued to demonstrate improved R shoulder ROM since last session. Worked in more MT to joint and surrounding tissue; improved overall motion post-MT.    Idalmis Is progressing well towards her goals.   Pt prognosis is Good.     Pt will continue to benefit from skilled outpatient physical therapy to address the deficits listed in the problem list box on initial evaluation, provide pt/family education and to maximize pt's level of independence in the home and community environment.     Pt's spiritual, cultural and educational needs considered and pt agreeable to plan of care and goals.    Anticipated barriers to physical therapy: Age    GOALS   Short Term Goals:  6 weeks  Pt will report decreased R shoulder pain  < / =  3/10  to increase tolerance for ADL performance and recreational routine. MET  Pt will improve R shoulder ROM to WFL in order to be able to perform ADLs without difficulty. " PROGRESSING  Pt will demonstrate tolerance to being out of the sling for 24 hours with pain <2/10. MET  Pt will demonstrate tolerance to HEP to improve independence with ADL's MET     Long Term Goals: 24 weeks  1. Pt will report decreased R shoulder pain  < / =  1/10  to increase tolerance for ADL performance and recreational routine  2. Pt will improve R shoulder ROM to WNL in order to be able to perform ADLs without difficulty  3. Pt will demonstrate at least 4+/5 of RTC muscle testing to demo improvement in tolerance to activity.  4. Pt will tolerate lifting 10# overhead to improve tolerance to ADLs and work-related activities.  5. Pt will report at CJ level (20-40% impaired) on FOTO Shoulder to demonstrate increase in LE function with every day tasks.     Plan     Continue per PALLAVI Rossi, PT, DPT

## 2024-09-27 DIAGNOSIS — I10 ESSENTIAL HYPERTENSION: ICD-10-CM

## 2024-09-27 RX ORDER — AMLODIPINE BESYLATE 10 MG/1
TABLET ORAL
Qty: 90 TABLET | Refills: 2 | Status: SHIPPED | OUTPATIENT
Start: 2024-09-27

## 2024-09-27 NOTE — TELEPHONE ENCOUNTER
No care due was identified.  [facility] Embedded Care Due Messages. Reference number: [id number].   9/27/2024 12:32:08 AM CDT

## 2024-09-27 NOTE — TELEPHONE ENCOUNTER
Refill Routing Note   Medication(s) are not appropriate for processing by [facility] for the following reason(s):        Required vitals abnormal    [facility] action(s):  Defer               Appointments  past 12m or future 3m with PCP    Date Provider   Last Visit   5/7/2024 Noemy Pugh MD   Next Visit   3/10/2025 Noemy Pugh MD   ED visits in past 90 days: 0        Note composed:1:56 PM 09/27/2024

## 2024-10-03 ENCOUNTER — CLINICAL SUPPORT (OUTPATIENT)
Dept: REHABILITATION | Facility: HOSPITAL | Age: 74
End: 2024-10-03
Payer: MEDICARE

## 2024-10-03 DIAGNOSIS — M25.511 ACUTE PAIN OF RIGHT SHOULDER: Primary | ICD-10-CM

## 2024-10-03 PROCEDURE — 97530 THERAPEUTIC ACTIVITIES: CPT | Performed by: PHYSICAL THERAPIST

## 2024-10-03 PROCEDURE — 97112 NEUROMUSCULAR REEDUCATION: CPT | Performed by: PHYSICAL THERAPIST

## 2024-10-04 NOTE — PROGRESS NOTES
"Physical Therapy Daily Treatment Note     Name: Idalmis Morejon  Clinic Number: [id number]    Therapy Diagnosis:   Encounter Diagnosis   Name Primary?    Acute pain of right shoulder Yes     Physician: Jean-Pierre Barrera MD    Visit Date: 10/3/2024    Physician Orders: PT Eval and Treat  Medical Diagnosis from Referral:   M19.011 (ICD-10-CM) - Primary osteoarthritis of right shoulder   M75.101 (ICD-10-CM) - Nontraumatic tear of right rotator cuff, unspecified tear extent   Evaluation Date: 5/29/2024  Authorization Period Expiration: 12/31/2024  Plan of Care Expiration: 11/02/2024  Visit # / Visits authorized: 22/30 (+ EVAL)     FOTO: 8  FOTO 1st Follow-up: NA  FOTO 2nd Follow-up: NA     Time In: 0815  Time Out: 0915  Total Billable Time: 60 minutes     Precautions: Standard     POD: 20 weeks and 1 days as of 10/4/2024     Procedure Date: 05/15/2024  Procedure:  1. Right reverse total shoulder arthroplasty   2. Right shoulder biceps tenodesis     Subjective     Pt reports only mild stiffness into IR when reaching up back.  She was  compliant with home exercise program.  Response to previous treatment: soreness   Functional change: still with weakness OH and limited IR -but progressing     Pain: 3/10  Location: right shoulder      Objective     Passive Range of Motion: 7/30  Shoulder R   Flexion 160   Abduction 110   ER at 20 50   IR 45     Treatment     Access Code: [email or web address]    Idalmis received the treatments listed below:      Neuromuscular re-education activities to improve: Balance, Coordination, Kinesthetic, Sense, Proprioception, and Posture for 30 minutes. The following activities were included:  Standing IR w/strap 30x/5"  SL shld abd 1# 3x10  Supine wand shld flexion; 3# 2 x 10  Supine chest press with HOB elevated; 2#; 3 x 10   SL flex w/ dowel 3 x 10      NP Today:  Seated single arm CC pulldown 10# 5 x 8 np  Landmine press 0 wt 3 x 15  Prone shld ext 4x10  Standing wand IR 3x10/3"  OTB ER stepouts " "3x10/3"    Dynamic functional Therapeutic activities to improve functional performance for 30 minutes, including:  UBE completed for 4' fwd/4'bkwd to increase ROM, endurance, and decrease pain to improve tolerance to ADLs and age-related activities Lv 3.5   Slot machine 1# 30x  Seated slot machine 1# 30x     NP:  Standing Rows GTB 3 x 10 x 5"   Pulley shld flexion 5'   Shrugs 3# 3 x 10 x 5"       NP Today:  Pillowcase Slides at wall ABD/FLEX Plane 3 x 15    AAROM Slot Machine 3# 3x10      Home Exercises and Patient Education Provided      Education provided:   - PT POC   - PT goals   - HEP  - Post-Op positioning, sling wear, protocol     Written Home Exercises Provided:   Exercises were reviewed and Idalmis was able to demonstrate them prior to the end of the session.   Pt received a written copy of exercises to perform at home. Idalmis demonstrated good understanding of the education provided.      See EMR under patient instructions for exercises given.     Assessment     s/p R rTSA and Biceps Tendosis 5/15/2024     Worked in more dynamic stretching into combined IR/ADD to aid with reaching up back. Progressed periscapular training for OH tolerance.    Idalmis Is progressing well towards her goals.   Pt prognosis is Good.     Pt will continue to benefit from skilled outpatient physical therapy to address the deficits listed in the problem list box on initial evaluation, provide pt/family education and to maximize pt's level of independence in the home and community environment.     Pt's spiritual, cultural and educational needs considered and pt agreeable to plan of care and goals.    Anticipated barriers to physical therapy: Age    GOALS   Short Term Goals:  6 weeks  Pt will report decreased R shoulder pain  < / =  3/10  to increase tolerance for ADL performance and recreational routine. MET  Pt will improve R shoulder ROM to WFL in order to be able to perform ADLs without difficulty. PROGRESSING  Pt will demonstrate " tolerance to being out of the sling for 24 hours with pain <2/10. MET  Pt will demonstrate tolerance to HEP to improve independence with ADL's MET     Long Term Goals: 24 weeks  1. Pt will report decreased R shoulder pain  < / =  1/10  to increase tolerance for ADL performance and recreational routine  2. Pt will improve R shoulder ROM to WNL in order to be able to perform ADLs without difficulty  3. Pt will demonstrate at least 4+/5 of RTC muscle testing to demo improvement in tolerance to activity.  4. Pt will tolerate lifting 10# overhead to improve tolerance to ADLs and work-related activities.  5. Pt will report at CJ level (20-40% impaired) on FOTO Shoulder to demonstrate increase in LE function with every day tasks.     Plan     Continue per PALLVAI Rossi, PT, DPT

## 2024-10-10 ENCOUNTER — CLINICAL SUPPORT (OUTPATIENT)
Dept: REHABILITATION | Facility: HOSPITAL | Age: 74
End: 2024-10-10
Payer: MEDICARE

## 2024-10-10 DIAGNOSIS — M25.511 ACUTE PAIN OF RIGHT SHOULDER: Primary | ICD-10-CM

## 2024-10-10 PROCEDURE — 97530 THERAPEUTIC ACTIVITIES: CPT | Performed by: PHYSICAL THERAPIST

## 2024-10-10 NOTE — PROGRESS NOTES
"Physical Therapy Daily Treatment Note     Name: Idalmis Morejon  Clinic Number: [id number]    Therapy Diagnosis:   Encounter Diagnosis   Name Primary?    Acute pain of right shoulder Yes     Physician: Jean-Pierre Barrera MD    Visit Date: 10/10/2024    Physician Orders: PT Eval and Treat  Medical Diagnosis from Referral:   M19.011 (ICD-10-CM) - Primary osteoarthritis of right shoulder   M75.101 (ICD-10-CM) - Nontraumatic tear of right rotator cuff, unspecified tear extent   Evaluation Date: 5/29/2024  Authorization Period Expiration: 12/31/2024  Plan of Care Expiration: 11/02/2024  Visit # / Visits authorized: 23/30 (+ EVAL)     FOTO: 8  FOTO 1st Follow-up: NA  FOTO 2nd Follow-up: NA     Time In: 0815  Time Out: 0915  Total Billable Time: 40 minutes     Precautions: Standard     POD: 21 weeks and 0 days as of 10/10/2024     Procedure Date: 05/15/2024  Procedure:  1. Right reverse total shoulder arthroplasty   2. Right shoulder biceps tenodesis     Subjective     Pt reports ROM reaching up back continues to improve.  She was  compliant with home exercise program.  Response to previous treatment: soreness   Functional change: still with weakness OH and limited IR -but progressing     Pain: 3/10  Location: right shoulder      Objective     Passive Range of Motion: 7/30  Shoulder R   Flexion 160   Abduction 110   ER at 20 50   IR 45     Treatment     Access Code: [email or web address]    Idalmis received the treatments listed below:      Neuromuscular re-education activities to improve: Balance, Coordination, Kinesthetic, Sense, Proprioception, and Posture for 5 minutes. The following activities were included:  Standing IR w/strap 30x/5"  SL shld abd 1# 3x10  Supine wand shld flexion; 3# 2 x 10  Supine chest press with HOB elevated; 2#; 3 x 10   SL flex w/ dowel 3 x 10      NP Today:  Seated single arm CC pulldown 10# 5 x 8 np  Landmine press 0 wt 3 x 15  Prone shld ext 4x10  Standing wand IR 3x10/3"  OTB ER stepouts 3x10/3"    Dynamic " "functional Therapeutic activities to improve functional performance for 40 minutes, including:  UBE completed for 4' fwd/4'bkwd to increase ROM, endurance, and decrease pain to improve tolerance to ADLs and age-related activities Lv 3.5   Slot machine 1# 30x  Seated slot machine 1# 30x     NP:  Standing Rows GTB 3 x 10 x 5"   Pulley shld flexion 5'   Shrugs 3# 3 x 10 x 5"       NP Today:  Pillowcase Slides at wall ABD/FLEX Plane 3 x 15    AAROM Slot Machine 3# 3x10      Home Exercises and Patient Education Provided      Education provided:   - PT POC   - PT goals   - HEP  - Post-Op positioning, sling wear, protocol     Written Home Exercises Provided:   Exercises were reviewed and Idalmis was able to demonstrate them prior to the end of the session.   Pt received a written copy of exercises to perform at home. Idalmis demonstrated good understanding of the education provided.      See EMR under patient instructions for exercises given.     Assessment     s/p R rTSA and Biceps Tendosis 5/15/2024     Worked in more scar mobility and MT to general R shoulder; improved mobility following MT. Progressed strengthening to include more OH activity.    Idalmis Is progressing well towards her goals.   Pt prognosis is Good.     Pt will continue to benefit from skilled outpatient physical therapy to address the deficits listed in the problem list box on initial evaluation, provide pt/family education and to maximize pt's level of independence in the home and community environment.     Pt's spiritual, cultural and educational needs considered and pt agreeable to plan of care and goals.    Anticipated barriers to physical therapy: Age    GOALS   Short Term Goals:  6 weeks  Pt will report decreased R shoulder pain  < / =  3/10  to increase tolerance for ADL performance and recreational routine. MET  Pt will improve R shoulder ROM to WFL in order to be able to perform ADLs without difficulty. PROGRESSING  Pt will demonstrate tolerance " to being out of the sling for 24 hours with pain <2/10. MET  Pt will demonstrate tolerance to HEP to improve independence with ADL's MET     Long Term Goals: 24 weeks  1. Pt will report decreased R shoulder pain  < / =  1/10  to increase tolerance for ADL performance and recreational routine  2. Pt will improve R shoulder ROM to WNL in order to be able to perform ADLs without difficulty  3. Pt will demonstrate at least 4+/5 of RTC muscle testing to demo improvement in tolerance to activity.  4. Pt will tolerate lifting 10# overhead to improve tolerance to ADLs and work-related activities.  5. Pt will report at CJ level (20-40% impaired) on FOTO Shoulder to demonstrate increase in LE function with every day tasks.     Plan     Continue per PALLAVI Rossi, PT, DPT

## 2024-10-15 ENCOUNTER — OFFICE VISIT (OUTPATIENT)
Dept: SPORTS MEDICINE | Facility: CLINIC | Age: 74
End: 2024-10-15
Payer: MEDICARE

## 2024-10-15 ENCOUNTER — HOSPITAL ENCOUNTER (OUTPATIENT)
Dept: RADIOLOGY | Facility: HOSPITAL | Age: 74
Discharge: HOME | End: 2024-10-15
Attending: ORTHOPAEDIC SURGERY
Payer: MEDICARE

## 2024-10-15 VITALS
SYSTOLIC BLOOD PRESSURE: 150 MMHG | HEART RATE: 66 BPM | BODY MASS INDEX: 25.72 KG/M2 | DIASTOLIC BLOOD PRESSURE: 69 MMHG | WEIGHT: 160.06 LBS | HEIGHT: 66 IN

## 2024-10-15 DIAGNOSIS — Z96.611 STATUS POST REVERSE TOTAL REPLACEMENT OF RIGHT SHOULDER: Primary | ICD-10-CM

## 2024-10-15 DIAGNOSIS — Z96.611 STATUS POST REVERSE TOTAL REPLACEMENT OF RIGHT SHOULDER: ICD-10-CM

## 2024-10-15 PROCEDURE — 3288F FALL RISK ASSESSMENT DOCD: CPT | Mod: CPTII,S$GLB,, | Performed by: ORTHOPAEDIC SURGERY

## 2024-10-15 PROCEDURE — 1101F PT FALLS ASSESS-DOCD LE1/YR: CPT | Mod: CPTII,S$GLB,, | Performed by: ORTHOPAEDIC SURGERY

## 2024-10-15 PROCEDURE — 1126F AMNT PAIN NOTED NONE PRSNT: CPT | Mod: CPTII,S$GLB,, | Performed by: ORTHOPAEDIC SURGERY

## 2024-10-15 PROCEDURE — 99214 OFFICE O/P EST MOD 30 MIN: CPT | Mod: S$GLB,,, | Performed by: ORTHOPAEDIC SURGERY

## 2024-10-15 PROCEDURE — 3077F SYST BP >= 140 MM HG: CPT | Mod: CPTII,S$GLB,, | Performed by: ORTHOPAEDIC SURGERY

## 2024-10-15 PROCEDURE — 73030 X-RAY EXAM OF SHOULDER: CPT | Mod: 26,RT,, | Performed by: RADIOLOGY

## 2024-10-15 PROCEDURE — 99999 PR PBB SHADOW E&M-EST. PATIENT-LVL III: CPT | Mod: PBBFAC,,, | Performed by: ORTHOPAEDIC SURGERY

## 2024-10-15 PROCEDURE — 3078F DIAST BP <80 MM HG: CPT | Mod: CPTII,S$GLB,, | Performed by: ORTHOPAEDIC SURGERY

## 2024-10-15 PROCEDURE — 73030 X-RAY EXAM OF SHOULDER: CPT | Mod: TC,RT

## 2024-10-15 PROCEDURE — 1159F MED LIST DOCD IN RCRD: CPT | Mod: CPTII,S$GLB,, | Performed by: ORTHOPAEDIC SURGERY

## 2024-10-15 NOTE — PROGRESS NOTES
"CC: Right shoulder 5 months post op     Patient admits that she is doing well. Patient states that she doesn't have any pain and that her shoulder feels good. Patient notes that she is unable to sleep on that shoulder and still has limited internal rotation. Patient admits to working on her HEP and that she is going to physical therapy 1x per week.     Review from Previous visit on 8/8/2024  Patient is here for her 12 week post op appointment s/p below and is doing well. She is attending PT at [facility] and is progressing well. No longer taking pain medication just Tylenol at night as needed.       DATE OF SURGERY:  5/15/2024      PREOPERATIVE DIAGNOSIS:   1. Right shoulder rotator cuff arthropathy / degenerative arthritis  2. Right shoulder biceps tendinopathy     POSTOPERATIVE DIAGNOSIS:   1. Right shoulder rotator cuff arthropathy / degenerative arthritis  2. Right shoulder biceps tendinopathy     PROCEDURE:   1. Right reverse total shoulder arthroplasty   2. Right shoulder biceps tenodesis     SURGEON: Jean-Pierre Barrera M.D.    Pain well tolerated  No issues reported    Review of Systems   Constitution: Negative. Negative for chills, fever and night sweats.    Cardiovascular: Negative for chest pain and syncope.   Respiratory: Negative for cough and shortness of breath.    Gastrointestinal: Negative for abdominal pain and bowel incontinence.      PE:    BP (!) 150/69   Pulse 66   Ht 5' 6" (1.676 m)   Wt 72.6 kg (160 lb 0.9 oz)   BMI 25.83 kg/m²      Right shoulder    Incision healed  No sign of infection  Swelling resolved  Compartments soft  Neurovascular status intact in extremity    PROM  Forward elevation 70 degrees  External rotation 10 degrees    X-rays right shoulder (10/15/24):  Status post right reverse total shoulder arthroplasty.  Prosthetics in excellent position.  No perihardware fracture or visible complication seen.    Assessment:  5 months  s/p right reverse total shoulder arthroplasty and " biceps tenodesis    Plan:  Continue HEP   Follow up in 6 months     All questions were answered. Instructed patient to call with questions or concerns in the interim.

## 2024-10-18 DIAGNOSIS — H40.1194 PRIMARY OPEN-ANGLE GLAUCOMA, INDETERMINATE STAGE, UNSPECIFIED LATERALITY: ICD-10-CM

## 2024-10-21 DIAGNOSIS — H40.1194 PRIMARY OPEN-ANGLE GLAUCOMA, INDETERMINATE STAGE, UNSPECIFIED LATERALITY: ICD-10-CM

## 2024-10-21 RX ORDER — TIMOLOL MALEATE 5 MG/ML
1 SOLUTION/ DROPS OPHTHALMIC 2 TIMES DAILY
Qty: 30 ML | Refills: 1 | OUTPATIENT
Start: 2024-10-21

## 2024-10-21 RX ORDER — TIMOLOL MALEATE 5 MG/ML
1 SOLUTION/ DROPS OPHTHALMIC 2 TIMES DAILY
Qty: 30 ML | Refills: 4 | Status: SHIPPED | OUTPATIENT
Start: 2024-10-21

## 2024-11-01 NOTE — PROGRESS NOTES
Last 5 Patient Entered Readings                                      Current 30 Day Average: 130/79     Recent Readings 7/15/2019 7/11/2019 7/8/2019 7/4/2019 6/25/2019    SBP (mmHg) 129 128 128 128 140    DBP (mmHg) 77 78 82 80 80    Pulse 69 72 77 69 77            Digital Medicine: Health  Follow Up    Ms. Idalmis Morejon answered then hung up.   Current BP average 130/79 mmHg is at goal, [130/80 mmHg].           Physical Therapy Discharge Summary    Reason for therapy discharge:    Discharged to transitional care facility.    Progress towards therapy goal(s). See goals on Care Plan in [facility] electronic health record for goal details.  Goals partially met.  Barriers to achieving goals:   discharge from facility.    Therapy recommendation(s):    Recommend continued therapies to improve overall strength, balance and mobility.       Kayla Skelton, PT, DPT  11/1/2024

## 2024-11-13 ENCOUNTER — PATIENT MESSAGE (OUTPATIENT)
Dept: ADMINISTRATIVE | Facility: HOSPITAL | Age: 74
End: 2024-11-13
Payer: MEDICARE

## 2024-12-16 NOTE — LETTER
October 31, 2019      Martin Mccann PA-C  [address] [address]           [facility] - Podiatry  [address]  [facility] [address]  Phone: [phone]          Patient: Idalmis Morejon   MR Number: [id number]   YOB: 1950   Date of Visit: 10/31/2019       Dear Martin Mccann:    Thank you for referring Idalmis Morejon to me for evaluation. Attached you will find relevant portions of my assessment and plan of care.    If you have questions, please do not hesitate to call me. I look forward to following Idalmis Morejon along with you.    Sincerely,    Obed Abdalla, CHARAN    Enclosure  CC:  No Recipients    If you would like to receive this communication electronically, please contact [email or web address] or [phone] to request more information on [facility] [facility] access.    For providers and/or their staff who would like to refer a patient to [facility], please contact us through our one-stop-shop provider referral line, [facility] , at [phone].    If you feel you have received this communication in error or would no longer like to receive these types of communications, please e-mail [email or web address]          For information on Fall & Injury Prevention, visit: [email or web address] OR  [email or web address] OR  https://www.cdc.gov/steadi/patient.html

## 2024-12-30 DIAGNOSIS — F33.41 RECURRENT MAJOR DEPRESSIVE DISORDER, IN PARTIAL REMISSION: ICD-10-CM

## 2024-12-30 RX ORDER — DULOXETIN HYDROCHLORIDE 60 MG/1
60 CAPSULE, DELAYED RELEASE ORAL DAILY
Qty: 90 CAPSULE | Refills: 0 | Status: SHIPPED | OUTPATIENT
Start: 2024-12-30

## 2025-01-03 ENCOUNTER — LAB VISIT (OUTPATIENT)
Dept: LAB | Facility: HOSPITAL | Age: 75
End: 2025-01-03
Payer: MEDICARE

## 2025-01-03 DIAGNOSIS — N18.32 STAGE 3B CHRONIC KIDNEY DISEASE: ICD-10-CM

## 2025-01-03 LAB
ALBUMIN/CREAT UR: 20 UG/MG (ref 0–30)
BILIRUB UR QL STRIP: NEGATIVE
CLARITY UR REFRACT.AUTO: CLEAR
COLOR UR AUTO: YELLOW
CREAT UR-MCNC: 250 MG/DL (ref 15–325)
CREAT UR-MCNC: 254 MG/DL (ref 15–325)
GLUCOSE UR QL STRIP: NEGATIVE
HGB UR QL STRIP: NEGATIVE
KETONES UR QL STRIP: NEGATIVE
LEUKOCYTE ESTERASE UR QL STRIP: NEGATIVE
MICROALBUMIN UR DL<=1MG/L-MCNC: 50 UG/ML
NITRITE UR QL STRIP: NEGATIVE
PH UR STRIP: 6 [PH] (ref 5–8)
PROT UR QL STRIP: ABNORMAL
PROT UR-MCNC: 25 MG/DL (ref 0–15)
PROT/CREAT UR: 0.1 MG/G{CREAT} (ref 0–0.2)
SP GR UR STRIP: 1.02 (ref 1–1.03)
URN SPEC COLLECT METH UR: ABNORMAL

## 2025-01-03 PROCEDURE — 81003 URINALYSIS AUTO W/O SCOPE: CPT | Performed by: NURSE PRACTITIONER

## 2025-01-03 PROCEDURE — 82043 UR ALBUMIN QUANTITATIVE: CPT | Performed by: NURSE PRACTITIONER

## 2025-01-03 PROCEDURE — 84156 ASSAY OF PROTEIN URINE: CPT | Performed by: NURSE PRACTITIONER

## 2025-01-06 ENCOUNTER — PATIENT MESSAGE (OUTPATIENT)
Dept: NEPHROLOGY | Facility: CLINIC | Age: 75
End: 2025-01-06
Payer: MEDICARE

## 2025-01-07 ENCOUNTER — OFFICE VISIT (OUTPATIENT)
Dept: NEPHROLOGY | Facility: CLINIC | Age: 75
End: 2025-01-07
Payer: MEDICARE

## 2025-01-07 DIAGNOSIS — N18.32 STAGE 3B CHRONIC KIDNEY DISEASE: ICD-10-CM

## 2025-01-07 DIAGNOSIS — D64.9 ANEMIA, UNSPECIFIED TYPE: ICD-10-CM

## 2025-01-07 DIAGNOSIS — I10 HYPERTENSION, UNSPECIFIED TYPE: ICD-10-CM

## 2025-01-07 DIAGNOSIS — N25.81 SECONDARY HYPERPARATHYROIDISM: Primary | ICD-10-CM

## 2025-01-07 NOTE — PROGRESS NOTES
"Subjective:       Patient ID: Idalmis Morejon is a 74 y.o. AA female who presents for follow-up evaluation of CKD       HPI   Patient is new to me. Last seen by Dr. Sena in June 2019.  Prior pertinent chart reviewed since this is patient's first appointment with me.    Patient presents for f/u of CKD.  Baseline creatinine of 1.0-1.2 mg/dL. Pt admits to prior heavy use of various NSAID like Ibuprofen, goody powder and other compounds. She was still using NSAID until later part of 2016.    Home BPs: 140s/80s (when not taking amlodipine); 120s/70s (when taking amlodipine).     Significant hx includes longstanding hypertension "for decades", Schatzki's ring, DJD, sciatica, osteoporosis, hyperlipidemia.      The patient denies taking NSAIDs, herbal supplements, or new antibiotics, recreational drugs, recent episode of dehydration, diarrhea, nausea or vomiting, acute illness, hospitalization or exposure to IV radiocontrast.     Significant family hx includes: HTN; brother, father, nephew c ESRD on dialysis    Last renal US: April 2019, reviewed.    Update 8/1/22: (virtual)  Notes:   Had audio only visit in June 2021.  Presents today for f/u of CKD. No recent labs.  Had trigger finger surgery.  Home BPs: 140-145/78-80s. After medications.  Denies NSAIDs. Eats a lot salt diet.  Mother also had kidney disease.    Update 8/1/22 (virtual):  Notes:   Presents today for f/u of CKD.   Recent sCr up to 1.4 from 1.0-1.2 mg/dL.  Home BPs: 130s/80s after medications  Prescribed mobic but did not take.  Takes tylenol for pain.  Starting Prolia in December.    Update 1/9/24 (virtual):  Notes:   Presents for f/u of CKD.  Baseline sCr 1.1-1.3 mg/dL.  sCr was 1.6 on  1/5/24.  Was only drinking about 32 oz of water daily + ensure/boost or juice + green tea + occasional coffee.    She increased her water intake to about 50 oz of water daily + the other fluids.    Home BPs: 130s/70s-80s    Currently holding 12.5 mg chlorthalidone qod due to " BRANDT.    Update 4/9/24 (virtual):  Presents for f/u of CKD.  [facility] did not register that patient had checked into appointment until over 50% appt slot was over. Pt requested phone call to review labs instead of rescheduling.  Baseline sCr 1.1-1.3 mg/dL.  Home BPs:  (lowest)/ 70-80s (1 hr p meds)    Update 7/9/24 (virtual):  Notes:   Presents for f/u of CKD.  Baseline sCr 1.1-1.3 mg/dL. Now c sCr 1.6 mg/dL  Home BPs: 120s/70s  Started labetolol last visit.  Prior to last set of labs, she had been drinking about 32 oz of water daily. Now drinking 48 oz.    Update 1/7/25 (virtual):  The patient location is: [place]  The chief complaint leading to consultation is: f/u of CKD    Visit type: audiovisual    Face to Face time with patient: 9  min  16 minutes of total time spent on the encounter, which includes face to face time and non-face to face time preparing to see the patient (eg, review of tests), Obtaining and/or reviewing separately obtained history, Documenting clinical information in the electronic or other health record, Independently interpreting results (not separately reported) and communicating results to the patient/family/caregiver, or Care coordination (not separately reported).         Each patient to whom he or she provides medical services by telemedicine is:  (1) informed of the relationship between the physician and patient and the respective role of any other health care provider with respect to management of the patient; and (2) notified that he or she may decline to receive medical services by telemedicine and may withdraw from such care at any time.    Notes:   Presents for f/u of CKD.  Baseline sCr 1.1-1.3 mg/dL.  Home BPs: 130s/70s    Review of Systems   Respiratory:  Positive for shortness of breath (relates to GERD).    Cardiovascular:  Negative for leg swelling.   Gastrointestinal:  Negative for diarrhea, nausea and vomiting.   Genitourinary:  Negative for difficulty urinating, dysuria and  hematuria.       Objective:       There were no vitals taken for this visit.  Physical Exam  Constitutional:       General: She is not in acute distress.     Appearance: Normal appearance. She is well-developed. She is not diaphoretic.   Pulmonary:      Effort: Pulmonary effort is normal. No respiratory distress.   Neurological:      Mental Status: She is alert and oriented to person, place, and time.   Psychiatric:         Mood and Affect: Mood normal.         Behavior: Behavior normal.         Thought Content: Thought content normal.         Judgment: Judgment normal.         Lab Results   Component Value Date    CREATININE 1.3 01/03/2025    URICACID 2.8 06/20/2012     Prot/Creat Ratio, Urine   Date Value Ref Range Status   01/03/2025 0.10 0.00 - 0.20 Final   07/05/2024 0.10 0.00 - 0.20 Final   04/08/2024 0.09 0.00 - 0.20 Final     Lab Results   Component Value Date     01/03/2025    K 4.5 01/03/2025    CO2 26 01/03/2025     01/03/2025     Lab Results   Component Value Date    PTH 92.0 (H) 01/03/2025    CALCIUM 10.0 01/03/2025    PHOS 3.6 01/03/2025     Lab Results   Component Value Date    HGB 10.7 (L) 01/03/2025    WBC 6.87 01/03/2025    HCT 35.0 (L) 01/03/2025      Lab Results   Component Value Date    HGBA1C 5.5 04/08/2024     01/03/2025    BUN 18 01/03/2025     Lab Results   Component Value Date    LDLCALC 108.8 04/08/2024           Assessment:       1. Secondary hyperparathyroidism    2. Stage 3b chronic kidney disease    3. Hypertension, unspecified type    4. Anemia, unspecified type                Plan:   CKD stage 3B- clinically r/t longstanding HTN and heavy NSAID use, possible APOL-1 component.     Previous BRANDT resolved off chlorthalidone and with increased hydration.    Cancelled multiple Basic education appointments.    UPCR Minimal albuminuria.   On valsartan 320 mg    Judicious with starting SGLT2i due to h/o recurrent volume depletion episodes.   Acid-base WNL.   Secondary  hyperparathyroidism Ca, phos okay. PTH elevated. Vitamin D okay. On vitamin D.   Anemia At goal for CKD. On PO iron.   DM Non-diabetic.   Lipid Management Defer to PCP.   ESRD planning Kidney Failure Risk Equation ([employer]angri)    Kidney Failure Risk at 2 years: 0.3%    Kidney Failure Risk at 5 years: 1.1%    Lab Results   Component Value Date    MICALBCREAT 20.0 01/03/2025    CREATININE 1.3 01/03/2025          HTN - WNL at home amlodipine 5 mg BID, valsartan 160 mg BID, labetolol 100 mg BID  - has had issues with volume depletion    All questions patient had were answered.  Asked if further questions. None. F/u in clinic in 6 mos  with labs and urine prior to next visit or sooner if needed.  ER for emergency concerns.    Summary of Plan:  1.  Basic CKD education (virtual)  2. avoid NSAID/ bactrim/ IV contrast/ gadolinium/ aminoglycoside where possible  3. RTC in 6 mos (virtual)    Visit today included increased complexity associated with managing the longitudinal care of the patient due to the serious and/or complex managed problem(s) CKD.

## 2025-01-10 DIAGNOSIS — N18.32 STAGE 3B CHRONIC KIDNEY DISEASE: ICD-10-CM

## 2025-01-10 RX ORDER — LABETALOL 100 MG/1
100 TABLET, FILM COATED ORAL 2 TIMES DAILY
Qty: 180 TABLET | Refills: 1 | Status: SHIPPED | OUTPATIENT
Start: 2025-01-10

## 2025-01-13 ENCOUNTER — OFFICE VISIT (OUTPATIENT)
Dept: PSYCHIATRY | Facility: CLINIC | Age: 75
End: 2025-01-13
Payer: MEDICARE

## 2025-01-13 ENCOUNTER — PATIENT MESSAGE (OUTPATIENT)
Dept: OPHTHALMOLOGY | Facility: CLINIC | Age: 75
End: 2025-01-13
Payer: MEDICARE

## 2025-01-13 DIAGNOSIS — F40.298 SPECIFIC PHOBIA: Primary | ICD-10-CM

## 2025-01-13 DIAGNOSIS — F33.41 RECURRENT MAJOR DEPRESSIVE DISORDER, IN PARTIAL REMISSION: ICD-10-CM

## 2025-01-13 RX ORDER — DULOXETIN HYDROCHLORIDE 60 MG/1
60 CAPSULE, DELAYED RELEASE ORAL DAILY
Qty: 90 CAPSULE | Refills: 0 | Status: SHIPPED | OUTPATIENT
Start: 2025-01-13

## 2025-01-13 NOTE — PROGRESS NOTES
The patient location is: home  The chief complaint leading to consultation is: depression and anxiety    Visit type: audiovisual    Face to Face time with patient: 21 mins  27 minutes of total time spent on the encounter, which includes face to face time and non-face to face time preparing to see the patient (eg, review of tests), Obtaining and/or reviewing separately obtained history, Documenting clinical information in the electronic or other health record, Independently interpreting results (not separately reported) and communicating results to the patient/family/caregiver, or Care coordination (not separately reported).     Each patient to whom he or she provides medical services by telemedicine is:  (1) informed of the relationship between the physician and patient and the respective role of any other health care provider with respect to management of the patient; and (2) notified that he or she may decline to receive medical services by telemedicine and may withdraw from such care at any time.    Notes:     Outpatient Psychiatry Follow-Up Visit (MD/NP)    1/13/2025    Clinical Status of Patient:  Outpatient (Ambulatory)    Chief Complaint:  Idalmis Morejon is a 74 y.o. female who presents today for follow-up of depression and anxiety.      Met with patient.      Interval History and Content of Current Session:  Interim Events/Subjective Report/Content of Current Session:   Pt reports she is okay.  Usually does not do well over holidays dn terror attacks made it worse.  Had some crying.  Spent [place] watching football.      She stopped PT in Oct because she was tired of doing it.  She has not been discharged from her doctor yet.  She has achieved 75 to 80% recovery.  She is much more independent.  Sleep is much better, though she still does not lay on that shoulder as much.  Weight is stable. Never desires to die.  Looking forward to nothing.  Doing games on her phone and puzzle books.      Has not had a  great deal of anxiety.  Has it if too many demands like doctors appointments.  This contributed to her stopping therapy as well.          Prior trials:  remeron - HA  wellbutrin - does not recall      Psychotherapy:  Target symptoms: depression, anxiety   Why chosen therapy is appropriate versus another modality: relevant to diagnosis  Outcome monitoring methods: self-report, observation  Therapeutic intervention type: supportive psychotherapy  Topics discussed/themes: stress related to medical comorbidities, building skills sets for symptom management, symptom recognition  The patient's response to the intervention is motivated. The patient's progress toward treatment goals is good.   Duration of intervention:  mins      Review of Systems   Constitutional:  Negative for chills, fever and weight loss.   Respiratory:  Negative for cough, shortness of breath and wheezing.    Cardiovascular:  Negative for chest pain and palpitations.         Past Medical, Family and Social History: The patient's past medical, family and social history have been reviewed and updated as appropriate within the electronic medical record - see encounter notes.    Compliance: yes    Side effects: none    Risk Parameters:  Patient reports no suicidal ideation  Patient reports no homicidal ideation  Patient reports no self-injurious behavior  Patient reports no violent behavior    Exam (detailed: at least 9 elements; comprehensive: all 15 elements)   Constitutional  Vitals:  Most recent vital signs, dated less than 90 days prior to this appointment, were reviewed.    There were no vitals filed for this visit.     General:  age appropriate, casually dressed, neatly groomed     Musculoskeletal  Muscle Strength/Tone:  no dyskinesia, no tremor   Gait & Station:  Not observed       Psychiatric  Speech:  normal tone, normal rate, normal pitch, normal volume, prosody intact   Mood:    Affect:  euthymic  appropriate, full   Thought Process:   goal-directed, logical   Associations:  intact   Thought Content:  normal, no suicidality, no homicidality, delusions, or paranoia   Insight  Judgement:  good, patient has awareness of illness  Patient's behavior is adequate to circumstances   Orientation:  grossly intact   Memory: intact for content of interview   Language: grossly intact   Attention Span & Concentration:  able to focus   Fund of Knowledge:  intact and appropriate to age and level of education     Assessment and Diagnosis   Status/Progress: Based on the examination today, the patient's problem(s) is/are improved.  New problems have not been presented today.   Comorbidities are complicating management of the primary condition.  There are no active rule-out diagnoses for this patient at this time.    General Impression: Pt with depression and anxiety as well as multiple other medical comorbidities.  She is still affected by the death of her mother in March 2014.      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  Chronic kidney disease stage 3  History of illusions      Intervention/Counseling/Treatment Plan   Continue cymbalta 60 mg daily.  Discussed the possibility of discontinuing this medication if her GFR drops below 30  Continue to monitor CrCl    Return to Clinic:  6 months

## 2025-01-17 DIAGNOSIS — R10.13 EPIGASTRIC PAIN: ICD-10-CM

## 2025-01-17 DIAGNOSIS — Z51.81 ENCOUNTER FOR MONITORING PROTON PUMP INHIBITOR THERAPY: ICD-10-CM

## 2025-01-17 DIAGNOSIS — K21.9 GASTROESOPHAGEAL REFLUX DISEASE: ICD-10-CM

## 2025-01-17 DIAGNOSIS — Z79.899 ENCOUNTER FOR MONITORING PROTON PUMP INHIBITOR THERAPY: ICD-10-CM

## 2025-01-17 RX ORDER — PANTOPRAZOLE SODIUM 40 MG/1
TABLET, DELAYED RELEASE ORAL
Qty: 90 TABLET | Refills: 2 | Status: SHIPPED | OUTPATIENT
Start: 2025-01-17

## 2025-01-30 DIAGNOSIS — Z00.00 ENCOUNTER FOR MEDICARE ANNUAL WELLNESS EXAM: ICD-10-CM

## 2025-02-10 ENCOUNTER — PATIENT MESSAGE (OUTPATIENT)
Dept: FAMILY MEDICINE | Facility: CLINIC | Age: 75
End: 2025-02-10
Payer: MEDICARE

## 2025-02-12 ENCOUNTER — TELEPHONE (OUTPATIENT)
Dept: ENDOSCOPY | Facility: HOSPITAL | Age: 75
End: 2025-02-12
Payer: MEDICARE

## 2025-02-12 VITALS — WEIGHT: 152 LBS | BODY MASS INDEX: 24.43 KG/M2 | HEIGHT: 66 IN

## 2025-02-12 DIAGNOSIS — R13.10 DYSPHAGIA, UNSPECIFIED TYPE: Primary | ICD-10-CM

## 2025-02-12 NOTE — TELEPHONE ENCOUNTER
"----- Message from Bryan Love MD sent at 2/11/2025  3:59 PM CST -----  Procedure: EGD    Diagnosis: Dysphagia    Procedure Timing: Within 1 weeks (Urgent) any possibility to do her on the 2nd floor this Friday    #If within 4 weeks selected, please lachelle as high priority#    #If greater than 12 weeks, please select "5-12 weeks" and delay sending until 3 months prior to requested date#     Location: Hospital Based ([facility] 2-Endo,  endo, [facility])    Additional Scheduling Information: No scheduling concerns    Prep Specifications:Standard prep    Is the patient taking a GLP-1 Agonist:no    Have you attached a patient to this message: yes  ----- Message -----  From: Robina Sales MA  Sent: 2/11/2025   9:25 AM CST  To: Bryan Love MD    Spoke with patient.   She feels she may need to have her esophagus stretched.   For the past to weeks her food has been getting stuck in her esophagus.   She stated eventually it goes down.  No problems with liquids.  She stated it is also causing SOB.    She does not feel her food is digesting. She has been experiencing belching and a sour stomach.   She is concerned her hernia may be causing her issues    Niki  ----- Message -----  From: Romi Jaramillo  Sent: 2/10/2025   3:24 PM CST  To: [facility] Staff    Type:  Needs Medical Advice    Who Called: Ms Raygoza   Symptoms (please be specific): issues with food digesting    How long has patient had these symptoms:  a couple of weeks   Pharmacy name and phone #:    Would the patient rather a call back or a response via [facility]? Call   Best Call Back Number: [phone]  Additional Information: please call  "

## 2025-02-12 NOTE — TELEPHONE ENCOUNTER
Referral for procedure from [facility]      Spoke to Idalmis to schedule procedure(s) Upper Endoscopy (EGD)       Physician to perform procedure(s) Dr. DYLAN Love  Date of Procedure (s) 2/14/25  Arrival Time 6:00 AM  Time of Procedure(s) 7:00 AM   Location of Procedure(s) [address]  Type of Rx Prep sent to patient: Other  Instructions provided to patient via [facility]    Patient was informed on the following information and verbalized understanding. Screening questionnaire reviewed with patient and complete. If procedure requires anesthesia, a responsible adult needs to be present to accompany the patient home, patient cannot drive after receiving anesthesia. Appointment details are tentative, especially check-in time. Patient will receive a prep-op call 7 days prior to confirm check-in time for procedure. If applicable the patient should contact their pharmacy to verify Rx for procedure prep is ready for pick-up. Patient was advised to call the scheduling department at [phone] if pharmacy states no Rx is available. Patient was advised to call the endoscopy scheduling department if any questions or concerns arise.       Endoscopy Scheduling Department

## 2025-02-13 ENCOUNTER — TELEPHONE (OUTPATIENT)
Dept: ENDOSCOPY | Facility: HOSPITAL | Age: 75
End: 2025-02-13
Payer: MEDICARE

## 2025-02-14 ENCOUNTER — ANESTHESIA EVENT (OUTPATIENT)
Dept: ENDOSCOPY | Facility: HOSPITAL | Age: 75
End: 2025-02-14
Payer: MEDICARE

## 2025-02-14 ENCOUNTER — ANESTHESIA (OUTPATIENT)
Dept: ENDOSCOPY | Facility: HOSPITAL | Age: 75
End: 2025-02-14
Payer: MEDICARE

## 2025-02-14 ENCOUNTER — HOSPITAL ENCOUNTER (OUTPATIENT)
Facility: HOSPITAL | Age: 75
Discharge: HOME | End: 2025-02-14
Attending: INTERNAL MEDICINE | Admitting: INTERNAL MEDICINE
Payer: MEDICARE

## 2025-02-14 VITALS
HEIGHT: 66 IN | WEIGHT: 152 LBS | HEART RATE: 67 BPM | SYSTOLIC BLOOD PRESSURE: 129 MMHG | BODY MASS INDEX: 24.43 KG/M2 | OXYGEN SATURATION: 99 % | TEMPERATURE: 98 F | RESPIRATION RATE: 12 BRPM | DIASTOLIC BLOOD PRESSURE: 66 MMHG

## 2025-02-14 DIAGNOSIS — R13.10 DYSPHAGIA: ICD-10-CM

## 2025-02-14 PROCEDURE — 37000009 HC ANESTHESIA EA ADD 15 MINS: Performed by: INTERNAL MEDICINE

## 2025-02-14 PROCEDURE — 43249 ESOPH EGD DILATION <30 MM: CPT | Performed by: INTERNAL MEDICINE

## 2025-02-14 PROCEDURE — 63600175 PHARM REV CODE 636 W HCPCS: Performed by: NURSE ANESTHETIST, CERTIFIED REGISTERED

## 2025-02-14 PROCEDURE — 43239 EGD BIOPSY SINGLE/MULTIPLE: CPT | Mod: 59 | Performed by: INTERNAL MEDICINE

## 2025-02-14 PROCEDURE — 88305 TISSUE EXAM BY PATHOLOGIST: CPT | Mod: 59 | Performed by: PATHOLOGY

## 2025-02-14 PROCEDURE — 37000008 HC ANESTHESIA 1ST 15 MINUTES: Performed by: INTERNAL MEDICINE

## 2025-02-14 PROCEDURE — 25000003 PHARM REV CODE 250: Performed by: NURSE ANESTHETIST, CERTIFIED REGISTERED

## 2025-02-14 PROCEDURE — 27201012 HC FORCEPS, HOT/COLD, DISP: Performed by: INTERNAL MEDICINE

## 2025-02-14 PROCEDURE — C1726 CATH, BAL DIL, NON-VASCULAR: HCPCS | Performed by: INTERNAL MEDICINE

## 2025-02-14 RX ORDER — SODIUM CHLORIDE 9 MG/ML
INJECTION, SOLUTION INTRAVENOUS CONTINUOUS PRN
Status: DISCONTINUED | OUTPATIENT
Start: 2025-02-14 | End: 2025-02-14

## 2025-02-14 RX ORDER — PROPOFOL 10 MG/ML
VIAL (ML) INTRAVENOUS CONTINUOUS PRN
Status: DISCONTINUED | OUTPATIENT
Start: 2025-02-14 | End: 2025-02-14

## 2025-02-14 RX ORDER — PROPOFOL 10 MG/ML
VIAL (ML) INTRAVENOUS
Status: DISCONTINUED | OUTPATIENT
Start: 2025-02-14 | End: 2025-02-14

## 2025-02-14 RX ORDER — GLUCAGON 1 MG
1 KIT INJECTION
OUTPATIENT
Start: 2025-02-14

## 2025-02-14 RX ORDER — LIDOCAINE HYDROCHLORIDE 20 MG/ML
INJECTION INTRAVENOUS
Status: DISCONTINUED | OUTPATIENT
Start: 2025-02-14 | End: 2025-02-14

## 2025-02-14 RX ORDER — HALOPERIDOL 5 MG/ML
0.5 INJECTION INTRAMUSCULAR EVERY 10 MIN PRN
OUTPATIENT
Start: 2025-02-14

## 2025-02-14 RX ADMIN — SODIUM CHLORIDE: 0.9 INJECTION, SOLUTION INTRAVENOUS at 07:02

## 2025-02-14 RX ADMIN — GLYCOPYRROLATE 0.2 MG: 0.2 INJECTION, SOLUTION INTRAMUSCULAR; INTRAVENOUS at 07:02

## 2025-02-14 RX ADMIN — LIDOCAINE HYDROCHLORIDE 120 MG: 20 INJECTION INTRAVENOUS at 07:02

## 2025-02-14 RX ADMIN — PROPOFOL 150 MCG/KG/MIN: 10 INJECTION, EMULSION INTRAVENOUS at 07:02

## 2025-02-14 RX ADMIN — PROPOFOL 80 MG: 10 INJECTION, EMULSION INTRAVENOUS at 07:02

## 2025-02-14 NOTE — ANESTHESIA PREPROCEDURE EVALUATION
[facility] ANESTHESIOLOGY  Pre-operative Anesthetic Evaluation    Idalmis Morejon is a 74 y.o. female here for Procedure(s) (LRB):  EGD (ESOPHAGOGASTRODUODENOSCOPY) (N/A)    Htn  GERD, asymptomatic today with being appr npo  Chornic pain  CKD  Stress test 2023 w/o concerning findings, preserved BiV fxn  Prev colonoscopy under [facility] uncomplicated  Appr NPO  No symptoms that would preclude patient from [facility]    PMHx:  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    Colon adenoma: 10/15 acceptable 2019, repeated 2023- repeat 5 years    Age-related osteoporosis 2016; also 4/21, 7/23    Schatzki's ring: 3/15 dilated, also 12/22    Multiple lung nodules on CT: 1843-1936 no f/u needed    Steroid-induced glaucoma of both eyes    Proteinuria    Family history of colon cancer: maternal uncle and aunt    Recurrent major depressive disorder, in partial remission    Atherosclerosis of abdominal aorta: minimal see CT 7/16; CT SCORE 0 1/22    Secondary hyperparathyroidism    Esophageal dysphagia    Chronic follicular conjunctivitis of both eyes    Undifferentiated connective tissue disease    Tortuous aorta; on CT 12/10 and CXR 7/20: CT SCORE 0 1/22    Open angle with borderline findings and low glaucoma risk in both eyes    History of compression fracture of spine    Decreased range of motion of finger of right hand    Decreased  strength of right hand    Stage 3b chronic  kidney disease    Acute pain of right shoulder       Echocardiogram (if on file):  No results found for this or any previous visit.      Allergies:  Review of patient's allergies indicates:   Allergen Reactions    Alendronate Nausea Only     And heartburn    Brimonidine Dermatitis     Follicular Conjunctivitis    Kenalog [triamcinolone acetonide] Hives       Home Medications:  Current Outpatient Medications   Medication Instructions    acetaminophen/diphenhydramine (TYLENOL PM ORAL) Take by mouth.    amLODIPine (NORVASC) 10 MG tablet TAKE 1/2 TABLET TWICE A DAY BY MOUTH    ascorbic acid (VITAMIN C ORAL) Take by mouth.    B-complex with vitamin C (Z-BEC OR EQUIV) tablet 1 tablet, Daily    DULoxetine (CYMBALTA) 60 mg, Oral, Daily    labetaloL (NORMODYNE) 100 mg, Oral, 2 times daily    MYRBETRIQ 25 mg, Oral, Daily    pantoprazole (PROTONIX) 40 MG tablet TAKE 1 TABLET BY MOUTH EVERY DAY BEFORE BREAKFAST    timolol maleate 0.5% (TIMOPTIC) 0.5 % Drop 1 drop, Both Eyes, 2 times daily    valsartan (DIOVAN) 320 mg, Oral, Daily    vitamin D (VITAMIN D3) 1,000 Units, Daily       Surgical Hx:  Past Surgical History:   Procedure Laterality Date    APPENDECTOMY      ARTHROPLASTY OF SHOULDER Right 05/15/2024    Procedure: ARTHROPLASTY, REVERSE TOTAL SHOULDER;  Surgeon: Jean-Pierre Barrera MD;  Location: [facility];  Service: Orthopedics;  Laterality: Right;  regional w/catheter (interscalene)    BACK SURGERY  2007    x4    CHOLECYSTECTOMY      lap orin    COLONOSCOPY N/A 10/02/2015    Procedure: COLONOSCOPY;  Surgeon: Bryan Love MD;  Location: [facility] ([address] [place] [address]);  Service: Endoscopy;  Laterality: N/A;    COLONOSCOPY N/A 09/09/2019    Procedure: COLONOSCOPY;  Surgeon: Bryan Love MD;  [facility]  Service: Endoscopy;  Laterality: N/A;    COLONOSCOPY N/A 07/21/2023    Procedure: COLONOSCOPY;  Surgeon: Bryan Love MD;  [facility]  Service: Endoscopy;  Laterality: N/A;   "extended miralax prep-instr portal-pt declined any earlier dates-tb-hx of colon ca in multiple 2nd degree relatives    CYSTOSCOPY      with BOTOX INJECTION    ESOPHAGOGASTRODUODENOSCOPY N/A 05/13/2019    Procedure: EGD (ESOPHAGOGASTRODUODENOSCOPY);  Surgeon: Bryan Love MD;  Location: [facility] (4TH FLR);  Service: Endoscopy;  Laterality: N/A;    ESOPHAGOGASTRODUODENOSCOPY N/A 09/09/2019    Procedure: EGD (ESOPHAGOGASTRODUODENOSCOPY);  Surgeon: Bryan Love MD;  Location: [facility] (4TH FLR);  Service: Endoscopy;  Laterality: N/A;    ESOPHAGOGASTRODUODENOSCOPY N/A 12/07/2022    Procedure: EGD (ESOPHAGOGASTRODUODENOSCOPY);  Surgeon: Bryan Love MD;  [facility]  Service: Endoscopy;  Laterality: N/A;  Endoscopy schedulers please schedule patient as soon as possible with me for EGD for esophageal dilation for dysphagia.  Thank you     instr portal-GT  pre call complete-as    FIXATION OF TENDON Right 05/15/2024    Procedure: BICEPS TENODESIS, FIXATION, TENDON;  Surgeon: Jean-Pierre Barrera MD;  Location: [facility] OR;  Service: Orthopedics;  Laterality: Right;    HERNIA REPAIR      umbilical    HYSTERECTOMY      COMPLETE    POSTERIOR FUSION LUMBAR SPINE W/ CORPECTOMY      SHOULDER SURGERY      SPINE SURGERY      TONSILLECTOMY, ADENOIDECTOMY      TRIGGER FINGER RELEASE Right 06/03/2022    Procedure: RELEASE, TRIGGER FINGER,RIGHT,LONG;  Surgeon: Noemy Hernandez MD;  Location: [facility] OR;  Service: Orthopedics;  Laterality: Right;       Tobacco/EtOH:  Social History     Tobacco Use    Smoking status: Never    Smokeless tobacco: Never   Substance Use Topics    Alcohol use: Yes     Comment: red wine occasionally       Labs:  CBC: No results for input(s): "WBC", "RBC", "HGB", "HCT", "PLT", "MCV", "MCH", "MCHC" in the last 72 hours.    CMP: No results for input(s): "NA", "K", "CL", "CO2", "BUN", "CREATININE", "GLU", "MG", "PHOS", "CALCIUM", "ALBUMIN", "PROT", "ALKPHOS", "ALT", "AST", " ""BILITOT" in the last 72 hours.    Coags: No results for input(s): "PT", "INR", "PROTIME", "APTT" in the last 72 hours.    EKG:  Results for orders placed or performed in visit on 04/25/23   IN OFFICE EKG 12-LEAD (to [place])    Collection Time: 04/25/23 12:18 PM    Narrative    Test Reason : R06.02,    Vent. Rate : 060 BPM     Atrial Rate : 060 BPM     P-R Int : 166 ms          QRS Dur : 084 ms      QT Int : 438 ms       P-R-T Axes : 044 027 079 degrees     QTc Int : 438 ms    Normal sinus rhythm  Early repolarization  Moderate voltage criteria for LVH, may be normal variant  Nonspecific T wave abnormality  Abnormal ECG  When compared with ECG of 21-FEB-2018 11:19,  Questionable change in The axis  ST elevation now present in Anterior leads  T wave inversion no longer evident in Lateral leads  Confirmed by JOSE GUADALUPE ERNST MD ([id number]) on 4/25/2023 4:03:13 PM    Referred By: ITZEL LYNCH           Confirmed By:JOSE GUADALUPE ERNST MD       Diagnostic Studies (pertinent only):        Pre-op Assessment    I have reviewed the Patient Summary Reports.     I have reviewed the Nursing Notes. I have reviewed the NPO Status.   I have reviewed the Medications.     Review of Systems  Anesthesia Hx:   History of prior surgery of interest to airway management or planning:            Denies Personal Hx of Anesthesia complications.                    Social:  Non-Smoker       Cardiovascular:     Hypertension                                    Hypertension         Renal/:  Chronic Renal Disease, CKD        Kidney Function/Disease             Hepatic/GI:     GERD, well controlled         Gerd          Musculoskeletal:  Arthritis        Arthritis          Neurological:    Neuromuscular Disease,           Arthritis                         Neuromuscular Disease   Endocrine:  Denies Diabetes.         Denies Obesity / BMI > 30  Psych:  Psychiatric History                  Physical Exam  General: Cooperative, Well nourished, Alert and " Oriented    Airway:  Mallampati: II / II  Mouth Opening: Normal  TM Distance: Normal  Tongue: Normal    Dental:  Intact    Chest/Lungs:  Normal Respiratory Rate    Heart:  Rate: Normal        Anesthesia Plan  Type of Anesthesia, risks & benefits discussed:    Anesthesia Type: Gen ETT, Gen Natural Airway, Gen Supraglottic Airway  Intra-op Monitoring Plan: Standard ASA Monitors  Post Op Pain Control Plan: multimodal analgesia and IV/PO Opioids PRN  Induction:  IV  ASA Score: 3  Day of Surgery Review of History & Physical: H&P Update referred to the surgeon/provider.    Ready For Surgery From Anesthesia Perspective.     .

## 2025-02-14 NOTE — H&P
[facility] - Endoscopy  History & Physical    Subjective:      Chief Complaint/Reason for Admission:     EGD for dysphagia    Idalmis Morejon is a 74 y.o. female.    Past Medical History:   Diagnosis Date    Abnormal chest CT     Acid reflux     Allergy     Anemia     Anxiety     Cataract     Chronic UTI     recently treated with antibiotics -x 3 wks. ago-    Colon adenoma 2015 due 2020 10/21/2015    Colon adenoma 2015 due 2020 10/21/2015    Depression     Disturbed sleep rhythm     DJD (degenerative joint disease)     Dry eyes     Dry mouth     Grief reaction 03/24/2014    Hx of migraine headaches     Hyperlipemia     Hypernatremia 08/08/2014    Hypertension     Iritis     Mild vitamin D deficiency 09/09/2013    Multiple lung nodules on CT: 8240-3338 no f/u needed 06/06/2016    Neurogenic bladder     Osteoporosis: 9/15 see rheumatology notes 06/06/2016    Positive GEORGIANA (antinuclear antibody)     Schatzki's ring: 3/15 dilated 06/06/2016    Sciatica neuralgia 06/21/2013    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/05/2016    Undifferentiated connective tissue disease 06/30/2020    Visual impairment      Past Surgical History:   Procedure Laterality Date    APPENDECTOMY      ARTHROPLASTY OF SHOULDER Right 05/15/2024    Procedure: ARTHROPLASTY, REVERSE TOTAL SHOULDER;  Surgeon: Jean-Pierre Barrera MD;  Location: [facility];  Service: Orthopedics;  Laterality: Right;  regional w/catheter (interscalene)    BACK SURGERY  2007    x4    CHOLECYSTECTOMY      lap orin    COLONOSCOPY N/A 10/02/2015    Procedure: COLONOSCOPY;  Surgeon: Bryan Love MD;  Location: [address]);  Service: Endoscopy;  Laterality: N/A;    COLONOSCOPY N/A 09/09/2019    Procedure: COLONOSCOPY;  Surgeon: Bryan Love MD;  Location: [address]);  Service: Endoscopy;  Laterality: N/A;    COLONOSCOPY N/A 07/21/2023    Procedure: COLONOSCOPY;  Surgeon: Bryan Love MD;  Location: [facility] ([address] [place] [address]);  Service: Endoscopy;   Laterality: N/A;  extended miralax prep-instr portal-pt declined any earlier dates-tb-hx of colon ca in multiple 2nd degree relatives    CYSTOSCOPY      with BOTOX INJECTION    ESOPHAGOGASTRODUODENOSCOPY N/A 05/13/2019    Procedure: EGD (ESOPHAGOGASTRODUODENOSCOPY);  Surgeon: Bryan Love MD;  [address]  Service: Endoscopy;  Laterality: N/A;    ESOPHAGOGASTRODUODENOSCOPY N/A 09/09/2019    Procedure: EGD (ESOPHAGOGASTRODUODENOSCOPY);  Surgeon: Bryan Love MD;  [address]  Service: Endoscopy;  Laterality: N/A;    ESOPHAGOGASTRODUODENOSCOPY N/A 12/07/2022    Procedure: EGD (ESOPHAGOGASTRODUODENOSCOPY);  Surgeon: Bryan Love MD;  Location: [address]);  Service: Endoscopy;  Laterality: N/A;  Endoscopy schedulers please schedule patient as soon as possible with me for EGD for esophageal dilation for dysphagia.  Thank you     instr portal-GT  pre call complete-as    FIXATION OF TENDON Right 05/15/2024    Procedure: BICEPS TENODESIS, FIXATION, TENDON;  Surgeon: Jean-Pierre Barrera MD;  Location: [facility] [place] OR;  Service: Orthopedics;  Laterality: Right;    HERNIA REPAIR      umbilical    HYSTERECTOMY      COMPLETE    POSTERIOR FUSION LUMBAR SPINE W/ CORPECTOMY      SHOULDER SURGERY      SPINE SURGERY      TONSILLECTOMY, ADENOIDECTOMY      TRIGGER FINGER RELEASE Right 06/03/2022    Procedure: RELEASE, TRIGGER FINGER,RIGHT,LONG;  Surgeon: Noemy Hernandez MD;  Location: [facility] [place] OR;  Service: Orthopedics;  Laterality: Right;     Social History     Tobacco Use    Smoking status: Never    Smokeless tobacco: Never   Substance Use Topics    Alcohol use: Yes     Comment: red wine occasionally    Drug use: No       PTA Medications   Medication Sig    acetaminophen/diphenhydramine (TYLENOL PM ORAL) Take by mouth.    amLODIPine (NORVASC) 10 MG tablet TAKE 1/2 TABLET TWICE A DAY BY MOUTH    ascorbic acid (VITAMIN C ORAL) Take by mouth.    B-complex with vitamin C (Z-BEC OR  EQUIV) tablet Take 1 tablet by mouth once daily.    DULoxetine (CYMBALTA) 60 MG capsule Take 1 capsule (60 mg total) by mouth once daily.    labetaloL (NORMODYNE) 100 MG tablet TAKE 1 TABLET BY MOUTH TWICE A DAY    mirabegron (MYRBETRIQ) 25 mg Tb24 ER tablet Take 1 tablet (25 mg total) by mouth once daily.    pantoprazole (PROTONIX) 40 MG tablet TAKE 1 TABLET BY MOUTH EVERY DAY BEFORE BREAKFAST    timolol maleate 0.5% (TIMOPTIC) 0.5 % Drop Place 1 drop into both eyes 2 (two) times daily.    valsartan (DIOVAN) 160 MG tablet TAKE 2 TABLETS (320 MG TOTAL) BY MOUTH ONCE DAILY.    vitamin D (VITAMIN D3) 1000 units Tab Take 1,000 Units by mouth once daily.     Review of patient's allergies indicates:   Allergen Reactions    Alendronate Nausea Only     And heartburn    Brimonidine Dermatitis     Follicular Conjunctivitis    Kenalog [triamcinolone acetonide] Hives        ROS    Objective:      Vital Signs (Most Recent)  Temp: 98.1 °F (36.7 °C) (02/14/25 0657)  Pulse: 70 (02/14/25 0657)  Resp: 16 (02/14/25 0657)  BP: (!) 163/73 (02/14/25 0657)  SpO2: 98 % (02/14/25 0657)    Vital Signs Range (Last 24H):  Temp:  [98.1 °F (36.7 °C)]   Pulse:  [70]   Resp:  [16]   BP: (163)/(73)   SpO2:  [98 %]     Physical Exam  Cardiovascular:      Rate and Rhythm: Normal rate.   Pulmonary:      Effort: Pulmonary effort is normal.   Neurological:      Mental Status: She is alert and oriented to person, place, and time.           Assessment:        EGD for dysphagia    Plan:    EGD for dysphagia

## 2025-02-14 NOTE — PROVATION PATIENT INSTRUCTIONS
Discharge Summary/Instructions after an Endoscopic Procedure  Patient Name: Idalmis Morejon  Patient MRN: [id number]  Patient YOB: 1950 Friday, February 14, 2025  Bryan Love MD  Dear patient,  As a result of recent federal legislation (The Federal Cures Act), you may   receive lab or pathology results from your procedure in your [facility]   account before your physician is able to contact you. Your physician or   their representative will relay the results to you with their   recommendations at their soonest availability.  Thank you,  RESTRICTIONS:  During your procedure today, you received medications for sedation.  These   medications may affect your judgment, balance and coordination.  Therefore,   for 24 hours, you have the following restrictions:   - DO NOT drive a car, operate machinery, make legal/financial decisions,   sign important papers or drink alcohol.    ACTIVITY:  Today: no heavy lifting, straining or running due to procedural   sedation/anesthesia.  The following day: return to full activity including work.  DIET:  Eat and drink normally unless instructed otherwise.     TREATMENT FOR COMMON SIDE EFFECTS:  - Mild abdominal pain, nausea, belching, bloating or excessive gas:  rest,   eat lightly and use a heating pad.  - Sore Throat: treat with throat lozenges and/or gargle with warm salt   water.  - Because air was used during the procedure, expelling large amounts of air   from your rectum or belching is normal.  - If a bowel prep was taken, you may not have a bowel movement for 1-3 days.    This is normal.  SYMPTOMS TO WATCH FOR AND REPORT TO YOUR PHYSICIAN:  1. Abdominal pain or bloating, other than gas cramps.  2. Chest pain.  3. Back pain.  4. Signs of infection such as: chills or fever occurring within 24 hours   after the procedure.  5. Rectal bleeding, which would show as bright red, maroon, or black stools.   (A tablespoon of blood from the rectum is not serious, especially if    hemorrhoids are present.)  6. Vomiting.  7. Weakness or dizziness.  GO DIRECTLY TO THE NEAREST EMERGENCY ROOM IF YOU HAVE ANY OF THE FOLLOWING:      Difficulty breathing              Chills and/or fever over 101 F   Persistent vomiting and/or vomiting blood   Severe abdominal pain   Severe chest pain   Black, tarry stools   Bleeding- more than one tablespoon   Any other symptom or condition that you feel may need urgent attention  Your doctor recommends these additional instructions:  If any biopsies were taken, your doctors clinic will contact you in 1 to 2   weeks with any results.  - Discharge patient to home.   - Follow an antireflux regimen.   - Use Protonix 40 mg once daily (or any other full strength proton pump   inhibitor) - best taken 45-60 minutes before your first protein containing   meal.   - Await pathology results.   - Telephone endoscopist for pathology results in 3 weeks.   - Return to GI clinic at the next available appointment.   - Return to primary care physician.   - The findings and recommendations were discussed with the patient.  For questions, problems or results please call your physician - Bryan Love MD at Work:  [phone].  [facility] [place], EMERGENCY ROOM PHONE NUMBER: [phone]  IF A COMPLICATION OR EMERGENCY SITUATION ARISES AND YOU ARE UNABLE TO REACH   YOUR PHYSICIAN - GO DIRECTLY TO THE EMERGENCY ROOM.  Bryan Love MD  2/14/2025 7:59:25 AM  This report has been verified and signed electronically.  Dear patient,  As a result of recent federal legislation (The Federal Cures Act), you may   receive lab or pathology results from your procedure in your [facility]   account before your physician is able to contact you. Your physician or   their representative will relay the results to you with their   recommendations at their soonest availability.  Thank you,  PROVATION

## 2025-02-14 NOTE — ANESTHESIA POSTPROCEDURE EVALUATION
Anesthesia Post Evaluation    Patient: Idalmis Morejon    Procedure(s) Performed: Procedure(s) (LRB):  EGD (ESOPHAGOGASTRODUODENOSCOPY) (N/A)    Final Anesthesia Type: general      Patient location during evaluation: [facility]  Patient participation: Yes- Able to Participate  Level of consciousness: awake and alert and oriented  Post-procedure vital signs: reviewed and stable  Pain management: adequate  Airway patency: patent  ZENON mitigation strategies: Multimodal analgesia  PONV status at discharge: No PONV  Anesthetic complications: no      Cardiovascular status: blood pressure returned to baseline and hemodynamically stable  Respiratory status: unassisted, spontaneous ventilation and room air  Hydration status: euvolemic  Follow-up not needed.              Vitals Value Taken Time   /66 02/14/25 0831   Temp 36.4 °C (97.5 °F) 02/14/25 0830   Pulse 67 02/14/25 0831   Resp 28 02/14/25 0831   SpO2 100 % 02/14/25 0831   Vitals shown include unfiled device data.      No case tracking events are documented in the log.      Pain/Brina Score: No data recorded

## 2025-02-14 NOTE — PROGRESS NOTES
Discharge instructions reviewed w/ pt and daughter, verbalized understanding. Pt in NADN. No complaints at this time. Tolerated liquids w/no issues. To be d/c'd home w/ daughter.

## 2025-02-14 NOTE — TRANSFER OF CARE
"Anesthesia Transfer of Care Note    Patient: Idalmis Morejon    Procedure(s) Performed: Procedure(s) (LRB):  EGD (ESOPHAGOGASTRODUODENOSCOPY) (N/A)    Patient location: [facility]    Anesthesia Type: general    Transport from OR: Transported from OR on room air with adequate spontaneous ventilation    Post pain: adequate analgesia    Post assessment: no apparent anesthetic complications and tolerated procedure well    Post vital signs: stable    Level of consciousness: sedated    Nausea/Vomiting: no nausea/vomiting    Complications: none    Transfer of care protocol was followed      Last vitals: Visit Vitals  /61   Pulse 80   Temp 36.4 °C (97.5 °F) (Skin)   Resp 16   Ht 5' 6" (1.676 m)   Wt 68.9 kg (152 lb)   SpO2 100%   Breastfeeding No   BMI 24.53 kg/m²     "

## 2025-02-17 ENCOUNTER — PATIENT MESSAGE (OUTPATIENT)
Dept: GASTROENTEROLOGY | Facility: CLINIC | Age: 75
End: 2025-02-17
Payer: MEDICARE

## 2025-02-17 ENCOUNTER — RESULTS FOLLOW-UP (OUTPATIENT)
Dept: GASTROENTEROLOGY | Facility: CLINIC | Age: 75
End: 2025-02-17
Payer: MEDICARE

## 2025-02-17 LAB
FINAL PATHOLOGIC DIAGNOSIS: NORMAL
GROSS: NORMAL
Lab: NORMAL

## 2025-02-18 ENCOUNTER — PATIENT MESSAGE (OUTPATIENT)
Dept: UROLOGY | Facility: CLINIC | Age: 75
End: 2025-02-18
Payer: MEDICARE

## 2025-02-18 NOTE — PROGRESS NOTES
Idalmis your EGD pathology was benign no evidence of celiac sprue no evidence of H pylori no evidence of eosinophilic esophagitis no dysplasia.    1. DUODENUM, BIOPSY:  Duodenal mucosa with no significant pathologic abnormality  Villous architecture is maintained    2. STOMACH, BIOPSY:  Gastric body and antral mucosa with mild chronic gastritis and reactive changes  No evidence of intestinal metaplasia, dysplasia or malignancy  No evidence of Helicobacter pylori organisms on H&E stain    3. DISTAL ESOPHAGUS, BIOPSY:  Squamous mucosa with no significant pathologic changes  No evidence of eosinophils    4. MID/PROXIMAL ESOPHAGUS, BIOPSY:  Squamous mucosa with no significant pathologic changes  No evidence of eosinophils    Comment: Interp By Lindsey Lee M.D., Signed on 02/17/2025

## 2025-02-20 ENCOUNTER — OFFICE VISIT (OUTPATIENT)
Dept: OPHTHALMOLOGY | Facility: CLINIC | Age: 75
End: 2025-02-20
Payer: MEDICARE

## 2025-02-20 DIAGNOSIS — T38.0X5A STEROID-INDUCED GLAUCOMA OF BOTH EYES: ICD-10-CM

## 2025-02-20 DIAGNOSIS — H04.123 BILATERAL DRY EYES: ICD-10-CM

## 2025-02-20 DIAGNOSIS — H25.13 NUCLEAR SCLEROSIS OF BOTH EYES: ICD-10-CM

## 2025-02-20 DIAGNOSIS — H40.013 OPEN ANGLE WITH BORDERLINE FINDINGS AND LOW GLAUCOMA RISK IN BOTH EYES: Primary | ICD-10-CM

## 2025-02-20 DIAGNOSIS — H40.63X0 STEROID-INDUCED GLAUCOMA OF BOTH EYES: ICD-10-CM

## 2025-02-20 DIAGNOSIS — H15.003 BILATERAL SCLERITIS: ICD-10-CM

## 2025-02-20 NOTE — PROGRESS NOTES
Assessment /Plan     For exam results, see Encounter Report.    Open angle with borderline findings and low glaucoma risk in both eyes  -     Color Fundus Photography - OU - Both Eyes  -     Posterior Segment OCT Optic Nerve- Both eyes; Future  -     Rojas Visual Field - OU - Extended - Both Eyes; Future    Bilateral scleritis    Steroid-induced glaucoma of both eyes    Bilateral dry eyes - Both Eyes    Nuclear sclerosis of both eyes        LTFU 08/14/2019 --> 12/07/2020 --> 02/14/2023 07/24/2023 --> 02/20/2025  Re-Discussed fu glc silent LOV / Blindness      Steroid glaucoma  Steroid use and elevated IOP response --> re-discussed    Patient with Hx scleritis --> resolved  Long term PF 1% use without steroid response in past  Switched from PF 1% --> Durezol q day in April 2/2 cost and lack of coverage  Presents 10/5/2016  45 OU, clear corneas and quiet --> patient felt blur in OS x 3 weeks / no pain    Recurrent Scleritis OU  + [place] Lupus  Azathioprine -->  Not using    Off Oral Motrin 400 mg TWICE DAILY -> proteinuria     CCT  489 // 495    Low - mid teens -->  achieved --> discussed    Both eyes --> tolerating well & good adherence --> CSM  Timolol BID    Alphagan Allergy --> resolved // Holding    consider Cosopt BID    NSC OU  CE PRN --> diff with reading and glare --> re-discussed CE IOL Options & Expectations & R & B  Patient to consider      HESHAM OS  Inf limbus  Flat without vessels      Dry Eye Syndrome: discussed use of warm compresses, non-preserved artificial tears, gel and PM ointment options.    02/20/2025        Plan  RTC 4 months IOP and Adherence --> HVF & OCT RNFL  RTC sooner prn with good understanding

## 2025-03-10 ENCOUNTER — TELEPHONE (OUTPATIENT)
Dept: FAMILY MEDICINE | Facility: CLINIC | Age: 75
End: 2025-03-10
Payer: MEDICARE

## 2025-03-10 ENCOUNTER — OFFICE VISIT (OUTPATIENT)
Dept: FAMILY MEDICINE | Facility: CLINIC | Age: 75
End: 2025-03-10
Payer: MEDICARE

## 2025-03-10 ENCOUNTER — PATIENT MESSAGE (OUTPATIENT)
Dept: ENDOCRINOLOGY | Facility: CLINIC | Age: 75
End: 2025-03-10
Payer: MEDICARE

## 2025-03-10 VITALS — DIASTOLIC BLOOD PRESSURE: 78 MMHG | SYSTOLIC BLOOD PRESSURE: 138 MMHG

## 2025-03-10 DIAGNOSIS — E78.2 MIXED HYPERLIPIDEMIA: ICD-10-CM

## 2025-03-10 DIAGNOSIS — D12.6 COLON ADENOMA: ICD-10-CM

## 2025-03-10 DIAGNOSIS — M81.0 AGE-RELATED OSTEOPOROSIS WITHOUT CURRENT PATHOLOGICAL FRACTURE: Primary | ICD-10-CM

## 2025-03-10 DIAGNOSIS — M81.0 AGE-RELATED OSTEOPOROSIS WITHOUT CURRENT PATHOLOGICAL FRACTURE: ICD-10-CM

## 2025-03-10 DIAGNOSIS — R06.00 DYSPNEA, UNSPECIFIED TYPE: ICD-10-CM

## 2025-03-10 DIAGNOSIS — Z12.31 SCREENING MAMMOGRAM, ENCOUNTER FOR: Primary | ICD-10-CM

## 2025-03-10 DIAGNOSIS — D89.89 AUTOIMMUNE DISORDER: ICD-10-CM

## 2025-03-10 DIAGNOSIS — F33.41 RECURRENT MAJOR DEPRESSIVE DISORDER, IN PARTIAL REMISSION: ICD-10-CM

## 2025-03-10 DIAGNOSIS — D64.9 ANEMIA, UNSPECIFIED TYPE: ICD-10-CM

## 2025-03-10 DIAGNOSIS — K21.00 GASTROESOPHAGEAL REFLUX DISEASE WITH ESOPHAGITIS WITHOUT HEMORRHAGE: ICD-10-CM

## 2025-03-10 DIAGNOSIS — R73.01 IFG (IMPAIRED FASTING GLUCOSE): ICD-10-CM

## 2025-03-10 DIAGNOSIS — R91.8 MULTIPLE LUNG NODULES ON CT: ICD-10-CM

## 2025-03-10 DIAGNOSIS — N25.81 SECONDARY HYPERPARATHYROIDISM: ICD-10-CM

## 2025-03-10 DIAGNOSIS — N18.32 STAGE 3B CHRONIC KIDNEY DISEASE: ICD-10-CM

## 2025-03-10 DIAGNOSIS — R53.83 FATIGUE, UNSPECIFIED TYPE: ICD-10-CM

## 2025-03-10 DIAGNOSIS — M35.9 UNDIFFERENTIATED CONNECTIVE TISSUE DISEASE: ICD-10-CM

## 2025-03-10 DIAGNOSIS — I10 ESSENTIAL HYPERTENSION: ICD-10-CM

## 2025-03-10 PROBLEM — M25.511 ACUTE PAIN OF RIGHT SHOULDER: Status: RESOLVED | Noted: 2024-05-29 | Resolved: 2025-03-10

## 2025-03-10 PROCEDURE — 1160F RVW MEDS BY RX/DR IN RCRD: CPT | Mod: CPTII,95,, | Performed by: INTERNAL MEDICINE

## 2025-03-10 PROCEDURE — 1159F MED LIST DOCD IN RCRD: CPT | Mod: CPTII,95,, | Performed by: INTERNAL MEDICINE

## 2025-03-10 PROCEDURE — 98006 SYNCH AUDIO-VIDEO EST MOD 30: CPT | Mod: 95,,, | Performed by: INTERNAL MEDICINE

## 2025-03-10 PROCEDURE — 3075F SYST BP GE 130 - 139MM HG: CPT | Mod: CPTII,95,, | Performed by: INTERNAL MEDICINE

## 2025-03-10 PROCEDURE — G2211 COMPLEX E/M VISIT ADD ON: HCPCS | Mod: 95,,, | Performed by: INTERNAL MEDICINE

## 2025-03-10 PROCEDURE — 3078F DIAST BP <80 MM HG: CPT | Mod: CPTII,95,, | Performed by: INTERNAL MEDICINE

## 2025-03-10 NOTE — PROGRESS NOTES
Telemedicine Video Visit    The patient location is:  Patient Home   The chief complaint leading to consultation is: follow up  Visit type: Virtual visit with synchronous audio and video  Total time spent with patient: 30 minutes  Each patient to whom he or she provides medical services by telemedicine is:  (1) informed of the relationship between the physician and patient and the respective role of any other health care provider with respect to management of the patient; and (2) notified that he or she may decline to receive medical services by telemedicine and may withdraw from such care at any time.     Follow-up of multiple medical issues.    BP doing well.  Sometimes systolic is around 138-140 but typically in a pretty decent range and diastolic is normal.  She denies syncope, chest pain, pressure, jaw pain or left arm pain.    She sometimes has some shortness a breath with exertion.  This has been evaluated a couple of years ago with an acceptable chest x-ray.  CT score was 0 in 2022.  Stress test in 2023 was acceptable.  She admits that she does not exercise very much, typically only once or twice a week.  We discussed getting a follow-up CT and pulmonary function testing.  We also discussed the possibility of deconditioning.  She would like to work on increasing her activity before doing additional testing.  She has never smoked.  She has had no fever, chills, sweats, night sweats, wheezing, cough or hemoptysis.  She will consider doing a chest CT and pulmonary function testing if symptoms do not improve with increased activity.    Recent labs were acceptable.  CKD is stable.  She will be due for follow-up labs within the next several months.    She has a diagnosis of undifferentiated connective tissue disease.  She was last seen in rheumatology about 4 years ago.  She had formally been on azathioprine but is on no meds currently.  She does not want to return to Rheumatology, citing lack of symptoms.  She  denies morning stiffness, weight loss or change in her functional status.  She does have some arthralgias.  She is still struggling with her right shoulder surgery recovery from last year but it is improving.    She does not have any current GI or  symptoms.  She is due for follow-up of iron studies.    She has a history of osteoporosis.  She was on Prolia, last dose was last year.  She is due for follow-up.  A bone density exam will be due this summer.    Immunizations were discussed.  She does not get a flu shot.  She is not interested in COVID shots.  She has been ambivalent about shingles vaccine.  RSV vaccine was discussed, she might consider.    She is due for a mammogram.    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    Colon adenoma: 10/15 acceptable 2019, repeated 2023- repeat 5 years    Age-related osteoporosis 2016; also 4/21, 7/23    Schatzki's ring: 3/15 dilated, also 12/22    Multiple lung nodules on CT: 7570-0675 no f/u needed    Steroid-induced glaucoma of both eyes    Proteinuria    Family history of colon cancer: maternal uncle and aunt    Recurrent major depressive disorder, in partial remission    Atherosclerosis of abdominal aorta: minimal see CT 7/16; CT SCORE 0 1/22    Secondary hyperparathyroidism    Esophageal dysphagia    Chronic follicular conjunctivitis of both eyes    Undifferentiated connective tissue disease    Tortuous aorta; on CT 12/10 and CXR 7/20: CT SCORE 0 1/22     Open angle with borderline findings and low glaucoma risk in both eyes    History of compression fracture of spine    Decreased range of motion of finger of right hand    Decreased  strength of right hand    Stage 3b chronic kidney disease         Review of Systems   HENT:  Negative for hearing loss.    Eyes:  Negative for discharge.   Respiratory:  Negative for wheezing.         See HPI   Cardiovascular:  Negative for chest pain and palpitations.   Gastrointestinal:  Negative for blood in stool, constipation, diarrhea and vomiting.   Genitourinary:  Negative for dysuria and hematuria.   Musculoskeletal:  Negative for neck pain.   Neurological:  Negative for weakness and headaches.   Endo/Heme/Allergies:  Negative for polydipsia.        Physical Exam  Constitutional:       Appearance: She is well-developed.   HENT:      Head: Normocephalic and atraumatic.   Eyes:      Extraocular Movements: Extraocular movements intact.      Conjunctiva/sclera: Conjunctivae normal.   Neck:      Thyroid: No thyromegaly.   Pulmonary:      Effort: No respiratory distress.   Neurological:      General: No focal deficit present.      Mental Status: She is alert and oriented to person, place, and time.   Psychiatric:         Mood and Affect: Mood normal.         Behavior: Behavior normal.         Thought Content: Thought content normal.         Judgment: Judgment normal.          1. Screening mammogram, encounter for  -     Mammo Digital Screening Bilat w/ Jordan (XPD); Future; Expected date: 03/14/2025    2. Age-related osteoporosis 2016; also 4/21, 7/23:  Chart to endocrinology about resuming Prolia  -     DXA Bone Density Axial Skeleton 1 or more sites; Future; Expected date: 03/10/2025    3. Secondary hyperparathyroidism:  Stable.  Keep Nephrology follow-up    4. Colon adenoma: 10/15 acceptable 2019, repeated 2023- repeat 5 years:  No current symptoms    5. Gastroesophageal reflux disease with esophagitis without hemorrhage:  Stable  without symptoms currently    6. Stage 3b chronic kidney disease:  Stable to slightly improved.  Gentle hydration, avoid nephrotoxins.  Keep Nephrology follow-up.  Strict blood pressure control    7. Undifferentiated connective tissue disease:  She does not feel she needs any evaluation in Rheumatology.  Alarm symptoms; she is having none    8. Autoimmune disorder- recurrent iritis:  She follows in Ophthalmology; no recent exacerbations.  She does not feel she needs any evaluation in Rheumatology.  Alarm symptoms; she is having none    9. Mixed hyperlipidemia:  CT score of 0 in 2022, will continue to monitor  -     Lipid Panel; Future; Expected date: 03/10/2025    10. Anemia, unspecified type  -     Ferritin; Future; Expected date: 03/10/2025  -     Iron and TIBC; Future; Expected date: 03/10/2025  -     CBC Auto Differential; Future; Expected date: 03/10/2025    11. Fatigue, unspecified type  -     TSH; Future; Expected date: 03/10/2025    12. IFG (impaired fasting glucose)  -     Hemoglobin A1C; Future; Expected date: 03/10/2025    13. Dyspnea, unspecified type  -     CT Chest Without Contrast  -     Complete PFT w/ bronchodilator; Future     14. HTN: Low salt diet, exercise as tolerated.  Call if BP > 130/80 on a regular basis.    15. Depression: Mood stable on her regimen    16. History of lung nodules in the past, stable on CT, no alarm symptoms.    Visit today manifests increased complexity associated with the care of the multiple chronic and episodic problems I addressed.  I am managing a longitudinal care plan of the patient due to the serious and complex problems listed above.      Answers submitted by the patient for this visit:  Review of Systems Questionnaire (Submitted on 3/8/2025)  activity change: Yes  unexpected weight change: No  rhinorrhea: No  trouble swallowing: No  visual disturbance: No  chest tightness: No  polyuria: No  difficulty urinating: No  menstrual problem: No  joint swelling:  No  arthralgias: No  confusion: No  dysphoric mood: No

## 2025-03-10 NOTE — Clinical Note
Damion Hand- It looks like she got Prolia in January 2024.  She does not seem to think she is scheduled for any more infusions.  Can you please review?  I ordered her DEXA for July thanks MALIA

## 2025-03-10 NOTE — TELEPHONE ENCOUNTER
----- Message from Peace Glass NP sent at 3/10/2025 12:10 PM CDT -----  Good morning, Thank you for letting me know. She is overdue for an appointment. I will get her in and reorder Prolia. She is due for DXA in July 2025. Thank you, Peace Glass NP  ----- Message -----  From: Noemy Pugh MD  Sent: 3/10/2025   9:25 AM CDT  To: YUSRA Joseph It looks like she got Prolia in January 2024.  She does not seem to think she is scheduled for any more infusions.  Can you please review?  I ordered her DEXA for July thanks  LB

## 2025-03-10 NOTE — TELEPHONE ENCOUNTER
Please schedule for labs in July- all that I ordered today thanks    Also DEXA scan in July thanks

## 2025-03-13 ENCOUNTER — OFFICE VISIT (OUTPATIENT)
Dept: UROLOGY | Facility: CLINIC | Age: 75
End: 2025-03-13
Payer: MEDICARE

## 2025-03-13 DIAGNOSIS — R35.1 NOCTURIA: Primary | ICD-10-CM

## 2025-03-13 RX ORDER — MIRABEGRON 50 MG/1
50 TABLET, FILM COATED, EXTENDED RELEASE ORAL DAILY
Qty: 30 TABLET | Refills: 11 | Status: SHIPPED | OUTPATIENT
Start: 2025-03-13 | End: 2026-03-13

## 2025-03-17 ENCOUNTER — PATIENT MESSAGE (OUTPATIENT)
Dept: FAMILY MEDICINE | Facility: CLINIC | Age: 75
End: 2025-03-17
Payer: MEDICARE

## 2025-03-21 ENCOUNTER — HOSPITAL ENCOUNTER (OUTPATIENT)
Dept: RADIOLOGY | Facility: HOSPITAL | Age: 75
Discharge: HOME | End: 2025-03-21
Attending: INTERNAL MEDICINE
Payer: MEDICARE

## 2025-03-21 DIAGNOSIS — Z12.31 SCREENING MAMMOGRAM, ENCOUNTER FOR: ICD-10-CM

## 2025-03-21 PROCEDURE — 77067 SCR MAMMO BI INCL CAD: CPT | Mod: TC

## 2025-03-21 PROCEDURE — 77067 SCR MAMMO BI INCL CAD: CPT | Mod: 26,,, | Performed by: RADIOLOGY

## 2025-03-21 PROCEDURE — 77063 BREAST TOMOSYNTHESIS BI: CPT | Mod: 26,,, | Performed by: RADIOLOGY

## 2025-04-24 ENCOUNTER — PATIENT MESSAGE (OUTPATIENT)
Dept: SPORTS MEDICINE | Facility: CLINIC | Age: 75
End: 2025-04-24
Payer: MEDICARE

## 2025-04-24 ENCOUNTER — CLINICAL SUPPORT (OUTPATIENT)
Facility: CLINIC | Age: 75
End: 2025-04-24
Payer: MEDICARE

## 2025-04-24 ENCOUNTER — PATIENT MESSAGE (OUTPATIENT)
Facility: CLINIC | Age: 75
End: 2025-04-24

## 2025-04-24 DIAGNOSIS — N18.32 STAGE 3B CHRONIC KIDNEY DISEASE: ICD-10-CM

## 2025-04-24 NOTE — Clinical Note
Sangeeta Madrid! This patient attended CKD education. She participated sparingly but was keeping up during the group class. She didn't ask further questions, but please let us know if there's anything we can assist with! Lucy

## 2025-04-25 NOTE — PROGRESS NOTES
Idalmis Morejon was referred to Introduction to CKD education by ROCAEL Zambrano (collaborating MD: Dr. Grove)    The patient attended class virtually via [facility] portal in class setting with other participants. Audio and visual support offered throughout the class.    The following material was covered. Outcomes were assessed via frequent question/answer and engagement strategies (yes/no questions to the group; and open ended questions for discussion).    Chronic Kidney Disease Education (Lesson 1 and 2)    Lesson 1 Understanding Kidney Disease  Lesson Objectives  By the end of each session, participants will be able to:  Recognize that fear and grief are usual responses to kidney disease  State causes/risk factors for kidney disease  Explain how GFR reflects kidney function  Explain how urine albumin/protein reflects kidney damage  State two ways the kidneys help maintain health  Describe how kidney disease is progressive but can be slowed down  Topics & Points Covered  Emotional impact of diagnosis  You're not alone  Emotional aspects  Depression, grief, and fear are typical  Physical activity may help  Benefits of education: Why are you here?  There are many causes of CKD. Diabetes and hypertension are the leading causes. Family history, cardiovascular disease, recurrent UTIs, immunological disease and genetics also play a role in CKD  CKD is complicated  The more you understand, the better you'll do. (Stated directly)  Basic anatomy  Location in the body  The nephrons have filters (working units)  Normal kidney function  Maintain chemical balance  Produce hormones  Regulate blood pressure  Kidney damage  Major causes of kidney damage  High blood pressure, diabetes  Fewer functioning nephrons  Monitoring kidney function and damage  eGFR (function)  Urine albumin/UACR or Urine protein/creatinine ratio (damage)  eGFR goal: a stable level  Decline means progression  Urine albumin/UACR goal: a stable or  lower level  Increase in urine albumin/protein may mean progression  Kidney disease is often irreversible and progressive  You'll likely need the help of a kidney specialist  Overview of treatment modalities  There are things you can do that may slow progression (more on this in next session)  Take your medications  Control blood pressure  Manage diabetes  Eat less salt  Miscellaneous  Kidney disease runs in families (Encouraged testing)  Early kidney disease has no symptoms    Lesson 2 Managing Your Kidney Disease  Lesson Objectives  By the end of each session, participants will be able to:  Recognize that managing blood pressure is a key part of managing kidney disease  Recognize that managing diabetes is a key part of managing kidney disease  State at least one step to eating right for kidney health  Recognize the importance of being cautious about over-the-counter medicines  Recognize that smoking can worsen kidney disease  Identify which lab values are used to keep track of diabetes, high blood pressure, and other conditions  Topics & Points Covered  There are steps you can take to keep your kidneys working  Weight management  Blood pressure management  Blood pressure goal: < 140/90 mm Hg  Medications - ACEs/ARBs, diuretics  ACEs/ARBS and risk of hyperkalemia (too much potassium in the blood, but help lower urine albumin)  Sodium reduction (<2,300 mg)  Physical activity  Diabetes management  A1C target   Diabetes medications may change because of kidney disease (often takes less medication to control glucose in the later stages of CKD)  How to treat hypoglycemia appropriately (risk of high potassium with ACEi and ARB use) glucose tablets are preferred [May need to avoid juices with high potassium levels if hyperkalemic]  Hyperglycemia  Cardiovascular disease (CVD)  Physical activity  CVD is major cause of mortality  LDL goal  Medications  Nutritional health  Choose and prepare foods with less salt and  sodium  Eat the right amount and kind of protein  Choose foods that are healthy for your heart  Choose foods based on phosphorus and potassium content (if restricted)  Make choices that help with diabetes management  Dietitian referral/nutrition therapy is covered benefit  Medication safety  Prescription  Over-the-counter (pain relief)  Tobacco cessation    The content of these lessons are adapted from the Kidney Disease Education (KDE) Services benefit as defined by the Centers for Medicare & Medicaid Services.    120 minutes spent educating the patient of the above content.

## 2025-04-29 ENCOUNTER — OFFICE VISIT (OUTPATIENT)
Dept: SPORTS MEDICINE | Facility: CLINIC | Age: 75
End: 2025-04-29
Payer: MEDICARE

## 2025-04-29 ENCOUNTER — HOSPITAL ENCOUNTER (OUTPATIENT)
Dept: RADIOLOGY | Facility: HOSPITAL | Age: 75
Discharge: HOME | End: 2025-04-29
Attending: ORTHOPAEDIC SURGERY
Payer: MEDICARE

## 2025-04-29 VITALS
HEART RATE: 65 BPM | BODY MASS INDEX: 25.71 KG/M2 | HEIGHT: 66 IN | DIASTOLIC BLOOD PRESSURE: 70 MMHG | SYSTOLIC BLOOD PRESSURE: 122 MMHG | WEIGHT: 160 LBS

## 2025-04-29 DIAGNOSIS — M25.511 RIGHT SHOULDER PAIN, UNSPECIFIED CHRONICITY: ICD-10-CM

## 2025-04-29 DIAGNOSIS — Z96.611 STATUS POST REVERSE TOTAL REPLACEMENT OF RIGHT SHOULDER: Primary | ICD-10-CM

## 2025-04-29 PROCEDURE — 1101F PT FALLS ASSESS-DOCD LE1/YR: CPT | Mod: CPTII,S$GLB,, | Performed by: ORTHOPAEDIC SURGERY

## 2025-04-29 PROCEDURE — 99214 OFFICE O/P EST MOD 30 MIN: CPT | Mod: S$GLB,,, | Performed by: ORTHOPAEDIC SURGERY

## 2025-04-29 PROCEDURE — 3074F SYST BP LT 130 MM HG: CPT | Mod: CPTII,S$GLB,, | Performed by: ORTHOPAEDIC SURGERY

## 2025-04-29 PROCEDURE — 73030 X-RAY EXAM OF SHOULDER: CPT | Mod: TC,RT

## 2025-04-29 PROCEDURE — 73030 X-RAY EXAM OF SHOULDER: CPT | Mod: 26,RT,, | Performed by: RADIOLOGY

## 2025-04-29 PROCEDURE — 3288F FALL RISK ASSESSMENT DOCD: CPT | Mod: CPTII,S$GLB,, | Performed by: ORTHOPAEDIC SURGERY

## 2025-04-29 PROCEDURE — 99999 PR PBB SHADOW E&M-EST. PATIENT-LVL III: CPT | Mod: PBBFAC,,, | Performed by: ORTHOPAEDIC SURGERY

## 2025-04-29 PROCEDURE — 1126F AMNT PAIN NOTED NONE PRSNT: CPT | Mod: CPTII,S$GLB,, | Performed by: ORTHOPAEDIC SURGERY

## 2025-04-29 PROCEDURE — 3078F DIAST BP <80 MM HG: CPT | Mod: CPTII,S$GLB,, | Performed by: ORTHOPAEDIC SURGERY

## 2025-04-29 PROCEDURE — 1159F MED LIST DOCD IN RCRD: CPT | Mod: CPTII,S$GLB,, | Performed by: ORTHOPAEDIC SURGERY

## 2025-04-29 NOTE — PROGRESS NOTES
CC: Right shoulder 11 months post op     Patient admits that she is doing well. Patient states that she doesn't have any pain and that her shoulder feels good. Patient notes that she is unable to sleep on that shoulder and still has limited internal rotation. Patient admits to working on her HEP and that she is going to physical therapy 1x per week.     Review from Previous visit on 8/8/2024  Patient is here for her 12 week post op appointment s/p below and is doing well. She is attending PT at [place] and is progressing well. No longer taking pain medication just Tylenol at night as needed.       DATE OF SURGERY:  5/15/2024      PREOPERATIVE DIAGNOSIS:   1. Right shoulder rotator cuff arthropathy / degenerative arthritis  2. Right shoulder biceps tendinopathy     POSTOPERATIVE DIAGNOSIS:   1. Right shoulder rotator cuff arthropathy / degenerative arthritis  2. Right shoulder biceps tendinopathy     PROCEDURE:   1. Right reverse total shoulder arthroplasty   2. Right shoulder biceps tenodesis     SURGEON: Jean-Pierre Barrera M.D.      Past Medical History:   Diagnosis Date    Abnormal chest CT     Acid reflux     Allergy     Anemia     Anxiety     Cataract     Chronic UTI     recently treated with antibiotics -x 3 wks. ago-    Colon adenoma 2015 due 2020 10/21/2015    Colon adenoma 2015 due 2020 10/21/2015    Depression     Disturbed sleep rhythm     DJD (degenerative joint disease)     Dry eyes     Dry mouth     Grief reaction 03/24/2014    Hx of migraine headaches     Hyperlipemia     Hypernatremia 08/08/2014    Hypertension     Iritis     Mild vitamin D deficiency 09/09/2013    Multiple lung nodules on CT: 7211-3663 no f/u needed 06/06/2016    Neurogenic bladder     Osteoporosis: 9/15 see rheumatology notes 06/06/2016    Positive GEORGIANA (antinuclear antibody)     Schatzki's ring: 3/15 dilated 06/06/2016    Sciatica neuralgia 06/21/2013    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/05/2016    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    Visual impairment        Past Surgical History:   Procedure Laterality Date    APPENDECTOMY      ARTHROPLASTY OF SHOULDER Right 05/15/2024    Procedure: ARTHROPLASTY, REVERSE TOTAL SHOULDER;  Surgeon: Jean-Pierre Barrera MD;  Location: [facility];  Service: Orthopedics;  Laterality: Right;  regional w/catheter (interscalene)    BACK SURGERY  2007    x4    CHOLECYSTECTOMY      lap orin    COLONOSCOPY N/A 10/02/2015    Procedure: COLONOSCOPY;  Surgeon: Bryan Love MD;  Location: [facility] ENDO (4TH FLR);  Service: Endoscopy;  Laterality: N/A;    COLONOSCOPY N/A 09/09/2019    Procedure: COLONOSCOPY;  Surgeon: Bryan Love MD;  Location: [facility] ENDO (4TH FLR);  Service: Endoscopy;  Laterality: N/A;    COLONOSCOPY N/A 07/21/2023    Procedure: COLONOSCOPY;  Surgeon: Bryan Love MD;  Location: [facility] ENDO (4TH FLR);  Service: Endoscopy;  Laterality: N/A;  extended miralax prep-instr portal-pt declined any earlier dates-tb-hx of colon ca in multiple 2nd degree relatives    CYSTOSCOPY      with BOTOX INJECTION    ESOPHAGOGASTRODUODENOSCOPY N/A 05/13/2019    Procedure: EGD (ESOPHAGOGASTRODUODENOSCOPY);  Surgeon: Bryan Love MD;  Location: [facility] (4TH FLR);  Service: Endoscopy;  Laterality: N/A;    ESOPHAGOGASTRODUODENOSCOPY N/A 09/09/2019    Procedure: EGD (ESOPHAGOGASTRODUODENOSCOPY);  Surgeon: Bryan Love MD;  Location: [facility] (4TH FLR);  Service: Endoscopy;  Laterality: N/A;    ESOPHAGOGASTRODUODENOSCOPY N/A 12/07/2022    Procedure: EGD (ESOPHAGOGASTRODUODENOSCOPY);  Surgeon: Bryan Love MD;  Location: [facility] (4TH FLR);  Service: Endoscopy;  Laterality: N/A;  Endoscopy schedulers please schedule patient as soon as possible with me for EGD for esophageal dilation for dysphagia.  Thank you     instr portal-GT  pre call complete-as    ESOPHAGOGASTRODUODENOSCOPY N/A 2/14/2025    Procedure: EGD (ESOPHAGOGASTRODUODENOSCOPY);  Surgeon: Bryan Love MD;   Location: [facility] ENDO (2ND FLR);  Service: Endoscopy;  Laterality: N/A;  Add on case approved by [facility] KLS/prep inst sent to pt via portal/ela pt  Pt contacted epr dr mahmood request / pt will come at 630am    FIXATION OF TENDON Right 05/15/2024    Procedure: BICEPS TENODESIS, FIXATION, TENDON;  Surgeon: Jean-Pierre Barrera MD;  Location: [facility] OR;  Service: Orthopedics;  Laterality: Right;    HERNIA REPAIR      umbilical    HYSTERECTOMY      COMPLETE    POSTERIOR FUSION LUMBAR SPINE W/ CORPECTOMY      SHOULDER SURGERY      SPINE SURGERY      TONSILLECTOMY, ADENOIDECTOMY      TRIGGER FINGER RELEASE Right 06/03/2022    Procedure: RELEASE, TRIGGER FINGER,RIGHT,LONG;  Surgeon: Noemy Hernandez MD;  Location: [facility] OR;  Service: Orthopedics;  Laterality: Right;       Family History   Problem Relation Name Age of Onset    Kidney disease Mother      Hypertension Mother      Diabetes Father      Diabetes Brother      Kidney disease Brother      Heart disease Brother      Hyperlipidemia Sister Soraya Morgan     Hypertension Sister Soraya Morgan     Diverticulosis Sister Soraya Morgan     Hypertension Daughter Edna Morejon     Colon cancer Maternal Aunt  70        stomach    Blindness Maternal Aunt      Cancer Maternal Aunt          stomach cancer    Cancer Maternal Uncle          colon    Colon cancer Maternal Uncle  70        two uncles    Glaucoma Neg Hx      Amblyopia Neg Hx      Macular degeneration Neg Hx      Retinal detachment Neg Hx      Anesthesia problems Neg Hx      Celiac disease Neg Hx      Cirrhosis Neg Hx      Crohn's disease Neg Hx      Esophageal cancer Neg Hx      Irritable bowel syndrome Neg Hx      Liver cancer Neg Hx      Liver disease Neg Hx      Rectal cancer Neg Hx      Stomach cancer Neg Hx      Melanoma Neg Hx         Current Medications[1]    Review of patient's allergies indicates:   Allergen Reactions    Alendronate Nausea Only     And heartburn    Brimonidine Dermatitis     Follicular  "Conjunctivitis    Kenalog [triamcinolone acetonide] Hives          REVIEW OF SYSTEMS:  Constitution: Negative. Negative for chills, fever and night sweats.   HENT: Negative for congestion and headaches.    Eyes: Negative for blurred vision, left vision loss and right vision loss.   Cardiovascular: Negative for chest pain and syncope.   Respiratory: Negative for cough and shortness of breath.    Endocrine: Negative for polydipsia, polyphagia and polyuria.   Hematologic/Lymphatic: Negative for bleeding problem. Does not bruise/bleed easily.   Skin: Negative for dry skin, itching and rash.   Musculoskeletal: Negative for falls.  Positive for right shoulder pain and muscle weakness.   Gastrointestinal: Negative for abdominal pain and bowel incontinence.   Genitourinary: Negative for bladder incontinence and nocturia.   Neurological: Negative for disturbances in coordination, loss of balance and seizures.   Psychiatric/Behavioral: Negative for depression. The patient does not have insomnia.    Allergic/Immunologic: Negative for hives and persistent infections.      PHYSICAL EXAMINATION:  Vitals:  /70   Pulse 65   Ht 5' 6" (1.676 m)   Wt 72.6 kg (160 lb 0.2 oz)   BMI 25.83 kg/m²    General: The patient is alert and oriented x 3.  Mood is pleasant.  Observation of ears, eyes and nose reveal no gross abnormalities.  No labored breathing observed.  Gait is coordinated. Patient can toe walk and heel walk without difficulty.      RIGHT SHOULDER / UPPER EXTREMITY EXAM    OBSERVATION:     Swelling  none  Deformity  none   Discoloration  none   Scapular winging none   Scars   none  Atrophy  none    TENDERNESS / CREPITUS (T/C):          T/C      T/C   Clavicle   -/-  SUPRAspinatus    -/-     AC Jt.    -/-  INFRAspinatus  -/-    SC Jt.    -/-  Deltoid    -/-      G. Tuberosity  -/-  LH BICEP groove  -/-   Acromion:  -/-  Midline Neck   -/-     Scapular Spine -/-  Trapezium   -/-   SMA Scapula  -/-  GH jt. line - " post  -/-     Scapulothoracic  -/-         ROM: (* = with pain)  Left shoulder   Right shoulder        AROM (PROM)   AROM (PROM)   FE    170° (175°)     170° (175°)     ER at 0°    60°  (65°)    60°  (65°)   ER at 90° ABD  90°  (90°)    90°  (90°)   IR at 90°  ABD   NA  (40°)     NA  (40°)      IR (spine level)   T10     T10    STRENGTH: (* = with pain) Left shoulder   Right shoulder   SCAPTION   5/5    5/5    IR    5/5    5/5   ER    5/5    5/5   BICEPS   5/5    5/5   Deltoid    5/5    5/5     SIGNS:  Painful side       NEER   -    OJJS  neg    CLEMENTS   -    SPEEDS  neg     DROP ARM   -   BELLY PRESS neg   Superior escape none    LIFT-OFF  neg   X-Body ADD    neg    MOVING VALGUS neg        STABILITY TESTING    Left shoulder   Right shoulder    Translation     Anterior  up face     up face    Posterior  up face    up face    Sulcus   < 10mm    < 10 mm     Signs   Apprehension   neg      neg       Relocation   no change     no change      Jerk test  neg     neg    EXTREMITY NEURO-VASCULAR EXAM:    Sensation grossly intact to light touch all dermatomal regions.    DTR 2+ Biceps, Triceps, BR and Negative Adelas sign   Grossly intact motor function at Elbow, Wrist and Hand   Distal pulses radial and ulnar 2+, brisk cap refill, symmetric.      NECK:  Painless FROM and spinous processes non-tender. Negative Spurlings sign.      OTHER FINDINGS:      XRAYS:  Xrays including AP, Outlet and Axillary Lateral of shoulder are ordered / images reviewed by me:   XR SHOULDER COMPLETE 2 OR MORE VIEWS RIGHT     CLINICAL HISTORY:  Pain in right shoulder     TECHNIQUE:  Two or three views of the right shoulder were performed.     COMPARISON:  10/15/2024     FINDINGS:  Reverse shoulder prosthesis is in place in good position and alignment.  There is some periosteal reaction around the distal and of the stem in the proximal humerus.  There is may represent a a previous fracture that is healed.     Impression:     See  above        ASSESSMENT:   Right shoulder pain:  1. Status post reverse total replacement of right shoulder    2. Right shoulder pain, unspecified chronicity        PLAN:      1. Continue HEP    2. F/u at 1 year lachelle.     All questions were answered, patient will contact us for questions or concerns in the interim.             [1]   Current Outpatient Medications:     amLODIPine (NORVASC) 10 MG tablet, TAKE 1/2 TABLET TWICE A DAY BY MOUTH, Disp: 90 tablet, Rfl: 2    ascorbic acid (VITAMIN C ORAL), Take by mouth., Disp: , Rfl:     B-complex with vitamin C (Z-BEC OR EQUIV) tablet, Take 1 tablet by mouth once daily., Disp: , Rfl:     DULoxetine (CYMBALTA) 60 MG capsule, Take 1 capsule (60 mg total) by mouth once daily., Disp: 90 capsule, Rfl: 0    labetaloL (NORMODYNE) 100 MG tablet, TAKE 1 TABLET BY MOUTH TWICE A DAY, Disp: 180 tablet, Rfl: 1    mirabegron (MYRBETRIQ) 50 mg Tb24, Take 1 tablet (50 mg total) by mouth once daily., Disp: 30 tablet, Rfl: 11    pantoprazole (PROTONIX) 40 MG tablet, TAKE 1 TABLET BY MOUTH EVERY DAY BEFORE BREAKFAST, Disp: 90 tablet, Rfl: 2    timolol maleate 0.5% (TIMOPTIC) 0.5 % Drop, Place 1 drop into both eyes 2 (two) times daily., Disp: 30 mL, Rfl: 4    valsartan (DIOVAN) 160 MG tablet, TAKE 2 TABLETS (320 MG TOTAL) BY MOUTH ONCE DAILY., Disp: 180 tablet, Rfl: 3    vitamin D (VITAMIN D3) 1000 units Tab, Take 1,000 Units by mouth once daily., Disp: , Rfl:     acetaminophen/diphenhydramine (TYLENOL PM ORAL), Take by mouth. (Patient not taking: Reported on 4/29/2025), Disp: , Rfl:

## 2025-05-02 ENCOUNTER — TELEPHONE (OUTPATIENT)
Dept: NEPHROLOGY | Facility: CLINIC | Age: 75
End: 2025-05-02
Payer: MEDICARE

## 2025-05-05 NOTE — PROGRESS NOTES
I, Alesha Ng RN, assisted with education and was present for the duration of the full 120-minute class, as outlined in the note by KEEGAN Klein. All questions were answered and patients verbalized understanding of the material presented.

## 2025-06-13 ENCOUNTER — PATIENT MESSAGE (OUTPATIENT)
Dept: NEPHROLOGY | Facility: CLINIC | Age: 75
End: 2025-06-13
Payer: MEDICARE

## 2025-06-23 ENCOUNTER — OFFICE VISIT (OUTPATIENT)
Dept: OPHTHALMOLOGY | Facility: CLINIC | Age: 75
End: 2025-06-23
Payer: MEDICARE

## 2025-06-23 ENCOUNTER — CLINICAL SUPPORT (OUTPATIENT)
Dept: OPHTHALMOLOGY | Facility: CLINIC | Age: 75
End: 2025-06-23
Payer: MEDICARE

## 2025-06-23 DIAGNOSIS — H40.013 OPEN ANGLE WITH BORDERLINE FINDINGS AND LOW GLAUCOMA RISK IN BOTH EYES: Primary | ICD-10-CM

## 2025-06-23 DIAGNOSIS — H40.053 BILATERAL OCULAR HYPERTENSION: ICD-10-CM

## 2025-06-23 DIAGNOSIS — H04.123 BILATERAL DRY EYES: ICD-10-CM

## 2025-06-23 DIAGNOSIS — T38.0X5A STEROID-INDUCED GLAUCOMA OF BOTH EYES: ICD-10-CM

## 2025-06-23 DIAGNOSIS — H40.63X0 STEROID-INDUCED GLAUCOMA OF BOTH EYES: ICD-10-CM

## 2025-06-23 DIAGNOSIS — H25.13 NUCLEAR SCLEROTIC CATARACT OF BOTH EYES: ICD-10-CM

## 2025-06-23 PROCEDURE — 3288F FALL RISK ASSESSMENT DOCD: CPT | Mod: CPTII,S$GLB,, | Performed by: OPHTHALMOLOGY

## 2025-06-23 PROCEDURE — 1159F MED LIST DOCD IN RCRD: CPT | Mod: CPTII,S$GLB,, | Performed by: OPHTHALMOLOGY

## 2025-06-23 PROCEDURE — 99999 PR PBB SHADOW E&M-EST. PATIENT-LVL III: CPT | Mod: PBBFAC,,, | Performed by: OPHTHALMOLOGY

## 2025-06-23 PROCEDURE — 92083 EXTENDED VISUAL FIELD XM: CPT | Mod: S$GLB,,, | Performed by: OPHTHALMOLOGY

## 2025-06-23 PROCEDURE — 92133 CPTRZD OPH DX IMG PST SGM ON: CPT | Mod: S$GLB,,, | Performed by: OPHTHALMOLOGY

## 2025-06-23 PROCEDURE — 1126F AMNT PAIN NOTED NONE PRSNT: CPT | Mod: CPTII,S$GLB,, | Performed by: OPHTHALMOLOGY

## 2025-06-23 PROCEDURE — 1160F RVW MEDS BY RX/DR IN RCRD: CPT | Mod: CPTII,S$GLB,, | Performed by: OPHTHALMOLOGY

## 2025-06-23 PROCEDURE — 1101F PT FALLS ASSESS-DOCD LE1/YR: CPT | Mod: CPTII,S$GLB,, | Performed by: OPHTHALMOLOGY

## 2025-06-23 PROCEDURE — 99214 OFFICE O/P EST MOD 30 MIN: CPT | Mod: S$GLB,,, | Performed by: OPHTHALMOLOGY

## 2025-06-23 PROCEDURE — 92020 GONIOSCOPY: CPT | Mod: S$GLB,,, | Performed by: OPHTHALMOLOGY

## 2025-06-23 PROCEDURE — G2211 COMPLEX E/M VISIT ADD ON: HCPCS | Mod: S$GLB,,, | Performed by: OPHTHALMOLOGY

## 2025-06-23 NOTE — PROGRESS NOTES
Oct/hvf ou done/ou/rel/fix/coop.good/patient chart checked for allergies/od.+0.75 +0.50 x180/os.+1.75/ej.        Assessment /Plan     For exam results, see Encounter Report.    There are no diagnoses linked to this encounter.

## 2025-06-23 NOTE — PROGRESS NOTES
Assessment /Plan     For exam results, see Encounter Report.    Open angle with borderline findings and low glaucoma risk in both eyes  -     Rojas Visual Field - OU - Extended - Both Eyes  -     Posterior Segment OCT Optic Nerve- Both eyes    Bilateral ocular hypertension    Steroid-induced glaucoma of both eyes    Bilateral dry eyes - Both Eyes    Nuclear sclerotic cataract of both eyes        LTFU 08/14/2019 --> 12/07/2020 --> 02/14/2023 07/24/2023 --> 02/20/2025  Re-Discussed fu glc silent LOV / Blindness      Steroid glaucoma  Steroid use and elevated IOP response --> re-discussed    Patient with Hx scleritis --> resolved  Long term PF 1% use without steroid response in past  Switched from PF 1% --> Durezol q day in April 2/2 cost and lack of coverage  Presents 10/5/2016  45 OU, clear corneas and quiet --> patient felt blur in OS x 3 weeks / no pain    Recurrent Scleritis OU  + GEORGIANA Lupus  Azathioprine -->  Not using    Off Oral Motrin 400 mg TWICE DAILY -> proteinuria     CCT  489 // 495    HVF taker ?? --> difficult task    OS progression OCT RNFL  06/23/2025  Discussed      Low - mid teens -->  achieved --> push lower discussed    Both eyes --> tolerating well & good adherence --> adjust  Timolol BID --> Cosopt BID    Alphagan Allergy --> resolved // Holding    consider Cosopt BID    NSC OU  CE PRN -->  re-discussed CE IOL Options & Expectations & R & B  Patient to consider      HESHAM OS  Inf limbus  Flat without vessels      Dry Eye Syndrome: discussed use of warm compresses, non-preserved artificial tears, gel and PM ointment options.    02/20/2025        Plan  RTC 4 months IOP and Adherence --> then DFE & OCT RNFL  RTC sooner prn with good understanding

## 2025-06-27 DIAGNOSIS — F33.41 RECURRENT MAJOR DEPRESSIVE DISORDER, IN PARTIAL REMISSION: ICD-10-CM

## 2025-06-27 DIAGNOSIS — I10 ESSENTIAL HYPERTENSION: ICD-10-CM

## 2025-06-27 RX ORDER — DULOXETIN HYDROCHLORIDE 60 MG/1
60 CAPSULE, DELAYED RELEASE ORAL DAILY
Qty: 90 CAPSULE | Refills: 0 | Status: SHIPPED | OUTPATIENT
Start: 2025-06-27

## 2025-06-27 RX ORDER — AMLODIPINE BESYLATE 10 MG/1
TABLET ORAL
Qty: 90 TABLET | Refills: 2 | Status: SHIPPED | OUTPATIENT
Start: 2025-06-27

## 2025-06-27 NOTE — TELEPHONE ENCOUNTER
Refill Decision Note   Idalmis Morejon  is requesting a refill authorization.  Brief Assessment and Rationale for Refill:  Approve     Medication Therapy Plan:         Comments:     Note composed:3:47 AM 06/27/2025

## 2025-06-27 NOTE — TELEPHONE ENCOUNTER
No care due was identified.  [facility] Embedded Care Due Messages. Reference number: [id number].   6/27/2025 12:21:02 AM CDT

## 2025-07-03 DIAGNOSIS — N18.32 STAGE 3B CHRONIC KIDNEY DISEASE: ICD-10-CM

## 2025-07-03 RX ORDER — LABETALOL 100 MG/1
100 TABLET, FILM COATED ORAL 2 TIMES DAILY
Qty: 180 TABLET | Refills: 1 | Status: SHIPPED | OUTPATIENT
Start: 2025-07-03

## 2025-07-07 ENCOUNTER — PATIENT MESSAGE (OUTPATIENT)
Dept: NEPHROLOGY | Facility: CLINIC | Age: 75
End: 2025-07-07
Payer: MEDICARE

## 2025-07-10 ENCOUNTER — PATIENT MESSAGE (OUTPATIENT)
Dept: NEPHROLOGY | Facility: CLINIC | Age: 75
End: 2025-07-10
Payer: MEDICARE

## 2025-07-10 ENCOUNTER — LAB VISIT (OUTPATIENT)
Dept: LAB | Facility: HOSPITAL | Age: 75
End: 2025-07-10
Payer: MEDICARE

## 2025-07-10 DIAGNOSIS — N18.32 STAGE 3B CHRONIC KIDNEY DISEASE: ICD-10-CM

## 2025-07-10 DIAGNOSIS — R73.01 IFG (IMPAIRED FASTING GLUCOSE): ICD-10-CM

## 2025-07-10 DIAGNOSIS — R53.83 FATIGUE, UNSPECIFIED TYPE: ICD-10-CM

## 2025-07-10 DIAGNOSIS — E78.2 MIXED HYPERLIPIDEMIA: ICD-10-CM

## 2025-07-10 DIAGNOSIS — D64.9 ANEMIA, UNSPECIFIED TYPE: ICD-10-CM

## 2025-07-10 LAB
25(OH)D3+25(OH)D2 SERPL-MCNC: 50 NG/ML (ref 30–96)
ABSOLUTE EOSINOPHIL (OHS): 0.17 K/UL
ABSOLUTE MONOCYTE (OHS): 0.64 K/UL (ref 0.3–1)
ABSOLUTE NEUTROPHIL COUNT (OHS): 2.7 K/UL (ref 1.8–7.7)
ALBUMIN SERPL BCP-MCNC: 4 G/DL (ref 3.5–5.2)
ANION GAP (OHS): 10 MMOL/L (ref 8–16)
BASOPHILS # BLD AUTO: 0.04 K/UL
BASOPHILS NFR BLD AUTO: 0.7 %
BUN SERPL-MCNC: 16 MG/DL (ref 8–23)
CALCIUM SERPL-MCNC: 9.3 MG/DL (ref 8.7–10.5)
CHLORIDE SERPL-SCNC: 107 MMOL/L (ref 95–110)
CHOLEST SERPL-MCNC: 180 MG/DL (ref 120–199)
CHOLEST/HDLC SERPL: 3.1 {RATIO} (ref 2–5)
CO2 SERPL-SCNC: 26 MMOL/L (ref 23–29)
CREAT SERPL-MCNC: 1.6 MG/DL (ref 0.5–1.4)
EAG (OHS): 103 MG/DL (ref 68–131)
ERYTHROCYTE [DISTWIDTH] IN BLOOD BY AUTOMATED COUNT: 15.2 % (ref 11.5–14.5)
FERRITIN SERPL-MCNC: 59 NG/ML (ref 20–300)
GFR SERPLBLD CREATININE-BSD FMLA CKD-EPI: 33 ML/MIN/1.73/M2
GLUCOSE SERPL-MCNC: 93 MG/DL (ref 70–110)
HBA1C MFR BLD: 5.2 % (ref 4–5.6)
HCT VFR BLD AUTO: 33.4 % (ref 37–48.5)
HDLC SERPL-MCNC: 58 MG/DL (ref 40–75)
HDLC SERPL: 32.2 % (ref 20–50)
HGB BLD-MCNC: 10.8 GM/DL (ref 12–16)
IMM GRANULOCYTES # BLD AUTO: 0.01 K/UL (ref 0–0.04)
IMM GRANULOCYTES NFR BLD AUTO: 0.2 % (ref 0–0.5)
IRON SATN MFR SERPL: 12 % (ref 20–50)
IRON SERPL-MCNC: 44 UG/DL (ref 30–160)
LDLC SERPL CALC-MCNC: 108.2 MG/DL (ref 63–159)
LYMPHOCYTES # BLD AUTO: 1.85 K/UL (ref 1–4.8)
MCH RBC QN AUTO: 27 PG (ref 27–31)
MCHC RBC AUTO-ENTMCNC: 32.3 G/DL (ref 32–36)
MCV RBC AUTO: 84 FL (ref 82–98)
NONHDLC SERPL-MCNC: 122 MG/DL
NUCLEATED RBC (/100WBC) (OHS): 0 /100 WBC
PHOSPHATE SERPL-MCNC: 3.7 MG/DL (ref 2.7–4.5)
PLATELET # BLD AUTO: 305 K/UL (ref 150–450)
PMV BLD AUTO: 11.9 FL (ref 9.2–12.9)
POTASSIUM SERPL-SCNC: 4.5 MMOL/L (ref 3.5–5.1)
PTH-INTACT SERPL-MCNC: 137.7 PG/ML (ref 9–77)
RBC # BLD AUTO: 4 M/UL (ref 4–5.4)
RELATIVE EOSINOPHIL (OHS): 3.1 %
RELATIVE LYMPHOCYTE (OHS): 34.2 % (ref 18–48)
RELATIVE MONOCYTE (OHS): 11.8 % (ref 4–15)
RELATIVE NEUTROPHIL (OHS): 50 % (ref 38–73)
SODIUM SERPL-SCNC: 143 MMOL/L (ref 136–145)
TIBC SERPL-MCNC: 373 UG/DL (ref 250–450)
TRANSFERRIN SERPL-MCNC: 252 MG/DL (ref 200–375)
TRIGL SERPL-MCNC: 69 MG/DL (ref 30–150)
TSH SERPL-ACNC: 2.98 UIU/ML (ref 0.4–4)
WBC # BLD AUTO: 5.41 K/UL (ref 3.9–12.7)

## 2025-07-10 PROCEDURE — 84443 ASSAY THYROID STIM HORMONE: CPT

## 2025-07-10 PROCEDURE — 36415 COLL VENOUS BLD VENIPUNCTURE: CPT

## 2025-07-10 PROCEDURE — 82728 ASSAY OF FERRITIN: CPT

## 2025-07-10 PROCEDURE — 82310 ASSAY OF CALCIUM: CPT

## 2025-07-10 PROCEDURE — 82306 VITAMIN D 25 HYDROXY: CPT

## 2025-07-10 PROCEDURE — 83970 ASSAY OF PARATHORMONE: CPT

## 2025-07-10 PROCEDURE — 83036 HEMOGLOBIN GLYCOSYLATED A1C: CPT

## 2025-07-10 PROCEDURE — 84466 ASSAY OF TRANSFERRIN: CPT

## 2025-07-10 PROCEDURE — 82465 ASSAY BLD/SERUM CHOLESTEROL: CPT

## 2025-07-10 PROCEDURE — 85025 COMPLETE CBC W/AUTO DIFF WBC: CPT

## 2025-07-11 ENCOUNTER — RESULTS FOLLOW-UP (OUTPATIENT)
Dept: NEPHROLOGY | Facility: CLINIC | Age: 75
End: 2025-07-11
Payer: MEDICARE

## 2025-07-11 DIAGNOSIS — R31.9 HEMATURIA, UNSPECIFIED TYPE: Primary | ICD-10-CM

## 2025-07-16 ENCOUNTER — TELEPHONE (OUTPATIENT)
Dept: FAMILY MEDICINE | Facility: CLINIC | Age: 75
End: 2025-07-16
Payer: MEDICARE

## 2025-07-16 DIAGNOSIS — D50.9 IRON DEFICIENCY ANEMIA, UNSPECIFIED IRON DEFICIENCY ANEMIA TYPE: Primary | ICD-10-CM

## 2025-07-16 RX ORDER — FERROUS GLUCONATE 324(38)MG
TABLET ORAL
COMMUNITY
Start: 2025-07-16

## 2025-07-22 ENCOUNTER — OFFICE VISIT (OUTPATIENT)
Dept: NEPHROLOGY | Facility: CLINIC | Age: 75
End: 2025-07-22
Payer: MEDICARE

## 2025-07-22 DIAGNOSIS — I10 HYPERTENSION, UNSPECIFIED TYPE: ICD-10-CM

## 2025-07-22 DIAGNOSIS — D64.9 ANEMIA, UNSPECIFIED TYPE: ICD-10-CM

## 2025-07-22 DIAGNOSIS — R31.9 HEMATURIA, UNSPECIFIED TYPE: ICD-10-CM

## 2025-07-22 DIAGNOSIS — N17.9 AKI (ACUTE KIDNEY INJURY): Primary | ICD-10-CM

## 2025-07-22 DIAGNOSIS — N25.81 SECONDARY HYPERPARATHYROIDISM: ICD-10-CM

## 2025-07-22 DIAGNOSIS — N18.32 STAGE 3B CHRONIC KIDNEY DISEASE: ICD-10-CM

## 2025-07-22 PROCEDURE — G2211 COMPLEX E/M VISIT ADD ON: HCPCS | Mod: 95,,, | Performed by: NURSE PRACTITIONER

## 2025-07-22 PROCEDURE — 1160F RVW MEDS BY RX/DR IN RCRD: CPT | Mod: CPTII,95,, | Performed by: NURSE PRACTITIONER

## 2025-07-22 PROCEDURE — 98006 SYNCH AUDIO-VIDEO EST MOD 30: CPT | Mod: 95,,, | Performed by: NURSE PRACTITIONER

## 2025-07-22 PROCEDURE — 1159F MED LIST DOCD IN RCRD: CPT | Mod: CPTII,95,, | Performed by: NURSE PRACTITIONER

## 2025-07-22 NOTE — PROGRESS NOTES
"The patient location is: [place]  The chief complaint leading to consultation is: f/u CKD    Visit type: audiovisual    Face to Face time with patient: 12 min  21 minutes of total time spent on the encounter, which includes face to face time and non-face to face time preparing to see the patient (eg, review of tests), Obtaining and/or reviewing separately obtained history, Documenting clinical information in the electronic or other health record, Independently interpreting results (not separately reported) and communicating results to the patient/family/caregiver, or Care coordination (not separately reported).         Each patient to whom he or she provides medical services by telemedicine is:  (1) informed of the relationship between the physician and patient and the respective role of any other health care provider with respect to management of the patient; and (2) notified that he or she may decline to receive medical services by telemedicine and may withdraw from such care at any time.    Notes:   Subjective:       Patient ID: Idalmis Morejon is a 74 y.o. AA female who presents for follow-up evaluation of CKD       HPI   Patient is new to me. Last seen by Dr. Sean in June 2019.  Prior pertinent chart reviewed since this is patient's first appointment with me.    Patient presents for f/u of CKD.  Baseline creatinine of 1.0-1.2 mg/dL. Pt admits to prior heavy use of various NSAID like Ibuprofen, goody powder and other compounds. She was still using NSAID until later part of 2016.    Home BPs: 140s/80s (when not taking amlodipine); 120s/70s (when taking amlodipine).     Significant hx includes longstanding hypertension "for decades", Schatzki's ring, DJD, sciatica, osteoporosis, hyperlipidemia.      The patient denies taking NSAIDs, herbal supplements, or new antibiotics, recreational drugs, recent episode of dehydration, diarrhea, nausea or vomiting, acute illness, hospitalization or exposure to IV " radiocontrast.     Significant family hx includes: HTN; brother, father, nephew c ESRD on dialysis    Last renal US: April 2019, reviewed.    Update 8/1/22: (virtual)  Notes:   Had audio only visit in June 2021.  Presents today for f/u of CKD. No recent labs.  Had trigger finger surgery.  Home BPs: 140-145/78-80s. After medications.  Denies NSAIDs. Eats a lot salt diet.  Mother also had kidney disease.    Update 8/1/22 (virtual):  Notes:   Presents today for f/u of CKD.   Recent sCr up to 1.4 from 1.0-1.2 mg/dL.  Home BPs: 130s/80s after medications  Prescribed mobic but did not take.  Takes tylenol for pain.  Starting Prolia in December.    Update 1/9/24 (virtual):  Notes:   Presents for f/u of CKD.  Baseline sCr 1.1-1.3 mg/dL.  sCr was 1.6 on  1/5/24.  Was only drinking about 32 oz of water daily + ensure/boost or juice + green tea + occasional coffee.    She increased her water intake to about 50 oz of water daily + the other fluids.    Home BPs: 130s/70s-80s    Currently holding 12.5 mg chlorthalidone qod due to BRANDT.    Update 4/9/24 (virtual):  Presents for f/u of CKD.  [facility] did not register that patient had checked into appointment until over 50% appt slot was over. Pt requested phone call to review labs instead of rescheduling.  Baseline sCr 1.1-1.3 mg/dL.  Home BPs:  (lowest)/ 70-80s (1 hr p meds)    Update 7/9/24 (virtual):  Notes:   Presents for f/u of CKD.  Baseline sCr 1.1-1.3 mg/dL. Now c sCr 1.6 mg/dL  Home BPs: 120s/70s  Started labetolol last visit.  Prior to last set of labs, she had been drinking about 32 oz of water daily. Now drinking 48 oz.    Update 1/7/25 (virtual):  Presents for f/u of CKD.  Baseline sCr 1.1-1.3 mg/dL.  Home BPs: 130s/70s    Update 7/22/25:  Presents for f/u of CKD.  Baseline sCr 1.1-1.3 mg/dL. Recent sCr of 1.6 mg/dL  Home BPs: 128-130/70s-80s after medication    Review of Systems   Respiratory:  Positive for shortness of breath (at baseline).    Cardiovascular:   Negative for leg swelling.   Gastrointestinal:  Negative for diarrhea, nausea and vomiting.   Genitourinary:  Negative for difficulty urinating, dysuria and hematuria.       Objective:       There were no vitals taken for this visit.  Physical Exam  Constitutional:       General: She is not in acute distress.     Appearance: Normal appearance. She is well-developed. She is not diaphoretic.   Pulmonary:      Effort: Pulmonary effort is normal. No respiratory distress.   Neurological:      Mental Status: She is alert and oriented to person, place, and time.   Psychiatric:         Mood and Affect: Mood normal.         Behavior: Behavior normal.         Thought Content: Thought content normal.         Judgment: Judgment normal.         Lab Results   Component Value Date    CREATININE 1.6 (H) 07/10/2025    URICACID 2.8 06/20/2012     Urine Protein/Creatinine Ratio   Date Value Ref Range Status   07/10/2025 0.08 <=0.20 Final     Prot/Creat Ratio, Urine   Date Value Ref Range Status   01/03/2025 0.10 0.00 - 0.20 Final   07/05/2024 0.10 0.00 - 0.20 Final   04/08/2024 0.09 0.00 - 0.20 Final     Lab Results   Component Value Date     07/10/2025    K 4.5 07/10/2025    CO2 26 07/10/2025     07/10/2025     Lab Results   Component Value Date    .7 (H) 07/10/2025    CALCIUM 9.3 07/10/2025    PHOS 3.7 07/10/2025     Lab Results   Component Value Date    HGB 10.8 (L) 07/10/2025    WBC 5.41 07/10/2025    HCT 33.4 (L) 07/10/2025      Lab Results   Component Value Date    HGBA1C 5.2 07/10/2025     07/10/2025    BUN 16 07/10/2025     Lab Results   Component Value Date    LDLCALC 108.2 07/10/2025           Assessment:       1. BRANDT (acute kidney injury)    2. Stage 3b chronic kidney disease    3. Hematuria, unspecified type    4. Secondary hyperparathyroidism    5. Anemia, unspecified type    6. Hypertension, unspecified type                  Plan:   CKD stage 3B c superimposed BRANDT- clinically r/t longstanding  HTN and heavy NSAID use, possible APOL-1 component.     Previous BRANDT resolved off chlorthalidone and with increased hydration.    Attended CKD basic ed.    UPCR No significant proteinuria.  On valsartan 320 mg    Judicious with starting SGLT2i due to h/o recurrent volume depletion episodes.   Acid-base WNL.   Secondary hyperparathyroidism Ca, phos okay. PTH elevated. Vitamin D okay. On vitamin D.   Anemia Hgb at goal for CKD. Off PO iron due to constipation. PCP just asked her to resume taking it 3x/week. If still unable to tolerate, can consider iron infusions. Discussed this, and she will let us know.  TSAT 12, Ferritin 59.    DM Non-diabetic.   Lipid Management Defer to PCP.   ESRD planning Kidney Failure Risk Equation (Tangri)    Kidney Failure Risk at 2 years: 0.8%    Kidney Failure Risk at 5 years: 2.6%    Lab Results   Component Value Date    MICALBCREAT 12.0 07/10/2025    CREATININE 1.6 (H) 07/10/2025          HTN - Okay at home amlodipine 5 mg BID, valsartan 160 mg BID, labetolol 100 mg BID  - has had issues with volume depletion on chlorthalidone    Hematuria - repeat UA microscopic. If persistent, will need to spin urine.    All questions patient had were answered.  Asked if further questions. None. F/u in clinic in 4 mos  with labs and urine prior to next visit or sooner if needed.  ER for emergency concerns.    Summary of Plan:  BMP, UA, UA microscopic - will spin urine if still with hematuria  2. avoid NSAID/ bactrim/ IV contrast/ gadolinium/ aminoglycoside where possible  3. RTC in 4 mos (virtual)    Visit today included increased complexity associated with managing the longitudinal care of the patient due to the serious and/or complex managed problem(s) CKD.

## 2025-07-22 NOTE — Clinical Note
Pls schedule BMP, UA, urine microscopic for next Tuesday. Pls coordinate time/location with patient. It's okay if she needs it Thursday instead.  F/u in 4 mos

## 2025-07-24 ENCOUNTER — HOSPITAL ENCOUNTER (OUTPATIENT)
Dept: RADIOLOGY | Facility: CLINIC | Age: 75
Discharge: HOME | End: 2025-07-24
Attending: INTERNAL MEDICINE
Payer: MEDICARE

## 2025-07-24 DIAGNOSIS — M81.0 AGE-RELATED OSTEOPOROSIS WITHOUT CURRENT PATHOLOGICAL FRACTURE: ICD-10-CM

## 2025-07-24 PROCEDURE — 77080 DXA BONE DENSITY AXIAL: CPT | Mod: TC

## 2025-08-07 ENCOUNTER — PATIENT MESSAGE (OUTPATIENT)
Dept: NEPHROLOGY | Facility: CLINIC | Age: 75
End: 2025-08-07
Payer: MEDICARE

## 2025-08-20 ENCOUNTER — PATIENT MESSAGE (OUTPATIENT)
Dept: NEPHROLOGY | Facility: CLINIC | Age: 75
End: 2025-08-20
Payer: MEDICARE

## 2025-08-27 ENCOUNTER — PATIENT MESSAGE (OUTPATIENT)
Dept: ADMINISTRATIVE | Facility: CLINIC | Age: 75
End: 2025-08-27
Payer: MEDICARE

## 2025-09-03 ENCOUNTER — OFFICE VISIT (OUTPATIENT)
Dept: HOME HEALTH SERVICES | Facility: CLINIC | Age: 75
End: 2025-09-03
Payer: MEDICARE

## 2025-09-03 VITALS — HEIGHT: 66 IN | WEIGHT: 158 LBS | BODY MASS INDEX: 25.39 KG/M2

## 2025-09-03 DIAGNOSIS — N25.81 SECONDARY HYPERPARATHYROIDISM: ICD-10-CM

## 2025-09-03 DIAGNOSIS — D50.9 IRON DEFICIENCY ANEMIA, UNSPECIFIED IRON DEFICIENCY ANEMIA TYPE: ICD-10-CM

## 2025-09-03 DIAGNOSIS — F33.41 RECURRENT MAJOR DEPRESSIVE DISORDER, IN PARTIAL REMISSION: ICD-10-CM

## 2025-09-03 DIAGNOSIS — M81.0 AGE-RELATED OSTEOPOROSIS WITHOUT CURRENT PATHOLOGICAL FRACTURE: ICD-10-CM

## 2025-09-03 DIAGNOSIS — I70.0 ATHEROSCLEROSIS OF ABDOMINAL AORTA: ICD-10-CM

## 2025-09-03 DIAGNOSIS — N18.32 STAGE 3B CHRONIC KIDNEY DISEASE: ICD-10-CM

## 2025-09-03 DIAGNOSIS — K21.00 GASTROESOPHAGEAL REFLUX DISEASE WITH ESOPHAGITIS WITHOUT HEMORRHAGE: ICD-10-CM

## 2025-09-03 DIAGNOSIS — I10 ESSENTIAL HYPERTENSION: ICD-10-CM

## 2025-09-03 DIAGNOSIS — H40.053 BILATERAL OCULAR HYPERTENSION: ICD-10-CM

## 2025-09-03 DIAGNOSIS — H25.13 NUCLEAR SCLEROSIS OF BOTH EYES: ICD-10-CM

## 2025-09-03 DIAGNOSIS — E78.2 MIXED HYPERLIPIDEMIA: ICD-10-CM

## 2025-09-03 DIAGNOSIS — Z00.00 ENCOUNTER FOR MEDICARE ANNUAL WELLNESS EXAM: Primary | ICD-10-CM

## 2025-09-05 ENCOUNTER — OFFICE VISIT (OUTPATIENT)
Dept: PSYCHIATRY | Facility: CLINIC | Age: 75
End: 2025-09-05
Payer: MEDICARE

## 2025-09-05 DIAGNOSIS — I10 ESSENTIAL HYPERTENSION: ICD-10-CM

## 2025-09-05 DIAGNOSIS — F40.298 SPECIFIC PHOBIA: Primary | ICD-10-CM

## 2025-09-05 DIAGNOSIS — N18.32 STAGE 3B CHRONIC KIDNEY DISEASE: ICD-10-CM

## 2025-09-05 DIAGNOSIS — F33.41 RECURRENT MAJOR DEPRESSIVE DISORDER, IN PARTIAL REMISSION: ICD-10-CM

## 2025-09-05 RX ORDER — DULOXETIN HYDROCHLORIDE 60 MG/1
60 CAPSULE, DELAYED RELEASE ORAL DAILY
Qty: 90 CAPSULE | Refills: 1 | Status: SHIPPED | OUTPATIENT
Start: 2025-09-05

## 2025-09-05 RX ORDER — LANOLIN ALCOHOL/MO/W.PET/CERES
1 CREAM (GRAM) TOPICAL
COMMUNITY

## 2025-09-05 RX ORDER — VALSARTAN 160 MG/1
320 TABLET ORAL DAILY
Qty: 180 TABLET | Refills: 1 | Status: SHIPPED | OUTPATIENT
Start: 2025-09-05

## (undated) DEVICE — DRESSING N ADH OIL EMUL 3X3

## (undated) DEVICE — SPONGE LAP 18X18 PREWASHED

## (undated) DEVICE — SUT CTD VICRYL 4-0 P-3 18IN

## (undated) DEVICE — SEE L#120831

## (undated) DEVICE — WRAP SHLDR HIP ACCU THRM PACK

## (undated) DEVICE — ADHESIVE DERMABOND ADVANCED

## (undated) DEVICE — ELECTRODE REM PLYHSV RETURN 9

## (undated) DEVICE — GLOVE BIOGEL SKINSENSE PI 7.0

## (undated) DEVICE — HOOD T7 W/ PEEL AWAY LENS

## (undated) DEVICE — COVER MAYO STAND REINFRCD 30

## (undated) DEVICE — KIT IRR SUCTION HND PIECE

## (undated) DEVICE — GLOVE BIOGEL ECLIPSE SZ 7

## (undated) DEVICE — BNDG COFLEX FOAM LF2 ST 4X5YD

## (undated) DEVICE — GLOVE SENSICARE PI SURG 8

## (undated) DEVICE — DRAPE THREE-QTR REINF 53X77IN

## (undated) DEVICE — Device

## (undated) DEVICE — COVER LIGHT HANDLE 80/CA

## (undated) DEVICE — BLADE SAW SAG 22.13MM 0.92MM

## (undated) DEVICE — SET BASIN 48X48IN 6000ML RING

## (undated) DEVICE — BNDG COFLEX FOAM LF2 ST 6X5YD

## (undated) DEVICE — SUT MCRYL PLUS 4-0 PS2 27IN

## (undated) DEVICE — COVER CAMERA OPERATING ROOM

## (undated) DEVICE — NDL 22GA X1 1/2 REG BEVEL

## (undated) DEVICE — SUT 4/0 18IN ETHILON BL P3

## (undated) DEVICE — CUP HUM REUNION RFX TRAIL ADPT
Type: IMPLANTABLE DEVICE | Site: SHOULDER | Status: NON-FUNCTIONAL
Removed: 2024-05-15

## (undated) DEVICE — DRAPE STERI INSTRUMENT 1018

## (undated) DEVICE — DRAPE U SPLIT SHEET 54X76IN

## (undated) DEVICE — CORD FOR BIPOLAR FORCEPS 12

## (undated) DEVICE — BANDAGE MATRIX HK LOOP 2IN 5YD

## (undated) DEVICE — GAUZE SPONGE 4X4 12PLY

## (undated) DEVICE — SUPPORT SLING SHOT II MEDIUM

## (undated) DEVICE — NDL 18GA X1 1/2 REG BEVEL

## (undated) DEVICE — SYR B-D DISP CONTROL 10CC100/C

## (undated) DEVICE — COVER PROXIMA MAYO STAND

## (undated) DEVICE — WIRE FIXATION REUN NIT PILT
Type: IMPLANTABLE DEVICE | Site: SHOULDER | Status: NON-FUNCTIONAL
Removed: 2024-05-15

## (undated) DEVICE — TOURNIQUET SB QC DP 18X4IN

## (undated) DEVICE — SOL NACL IRR 3000ML

## (undated) DEVICE — GLOVE SENSICARE PI GRN 8

## (undated) DEVICE — SUT VICRYL PLUS 0 CT1 18IN

## (undated) DEVICE — BIT DRILL 3.1MM

## (undated) DEVICE — DRAPE INCISE IOBAN 2 23X17IN

## (undated) DEVICE — SUT 0 VICRYL / CT-1

## (undated) DEVICE — DRAPE STERI-DRAPE 1000 17X11IN

## (undated) DEVICE — SCRUB 10% POVIDONE IODINE 4OZ

## (undated) DEVICE — DRESSING AQUACEL AG ADV 3.5X12

## (undated) DEVICE — BANDAGE MATRIX HK LOOP 4IN 5YD

## (undated) DEVICE — BLANKET HYPOTHERMIA 25X64IN

## (undated) DEVICE — GLOVE BIOGEL PI MICRO INDIC 7

## (undated) DEVICE — DRAPE STERI U-SHAPED 47X51IN

## (undated) DEVICE — SUT VICRYL CTD 2-0 GI 27 SH

## (undated) DEVICE — SUT MONOCRYL PLUS 4-0 P3

## (undated) DEVICE — TUBE SUCTION YANKAUER

## (undated) DEVICE — SUT VICRYL PLUS 3-0 SH 18IN